# Patient Record
Sex: FEMALE | Race: BLACK OR AFRICAN AMERICAN | NOT HISPANIC OR LATINO
[De-identification: names, ages, dates, MRNs, and addresses within clinical notes are randomized per-mention and may not be internally consistent; named-entity substitution may affect disease eponyms.]

---

## 2017-01-25 ENCOUNTER — MED ADMIN CHARGE (OUTPATIENT)
Age: 32
End: 2017-01-25

## 2017-01-25 ENCOUNTER — LABORATORY RESULT (OUTPATIENT)
Age: 32
End: 2017-01-25

## 2017-01-25 ENCOUNTER — APPOINTMENT (OUTPATIENT)
Dept: FAMILY MEDICINE | Facility: CLINIC | Age: 32
End: 2017-01-25

## 2017-01-25 VITALS
BODY MASS INDEX: 30.23 KG/M2 | HEART RATE: 102 BPM | WEIGHT: 154 LBS | HEIGHT: 60 IN | SYSTOLIC BLOOD PRESSURE: 119 MMHG | DIASTOLIC BLOOD PRESSURE: 81 MMHG | TEMPERATURE: 97.6 F | OXYGEN SATURATION: 99 %

## 2017-01-26 LAB
25(OH)D3 SERPL-MCNC: 40.5 NG/ML
ALBUMIN SERPL ELPH-MCNC: 4.4 G/DL
ALP BLD-CCNC: 62 U/L
ALT SERPL-CCNC: 21 U/L
ANION GAP SERPL CALC-SCNC: 12 MMOL/L
APPEARANCE: ABNORMAL
AST SERPL-CCNC: 26 U/L
BASOPHILS # BLD AUTO: 0.01 K/UL
BASOPHILS NFR BLD AUTO: 0.2 %
BILIRUB SERPL-MCNC: 0.2 MG/DL
BILIRUBIN URINE: NEGATIVE
BLOOD URINE: NEGATIVE
BUN SERPL-MCNC: 8 MG/DL
CALCIUM SERPL-MCNC: 9.7 MG/DL
CHLORIDE SERPL-SCNC: 100 MMOL/L
CHOLEST SERPL-MCNC: 109 MG/DL
CHOLEST/HDLC SERPL: 2.5 RATIO
CO2 SERPL-SCNC: 26 MMOL/L
COLOR: YELLOW
CREAT SERPL-MCNC: 0.67 MG/DL
EOSINOPHIL # BLD AUTO: 0.04 K/UL
EOSINOPHIL NFR BLD AUTO: 0.9 %
GLUCOSE QUALITATIVE U: NORMAL MG/DL
GLUCOSE SERPL-MCNC: 125 MG/DL
HCT VFR BLD CALC: 37.9 %
HDLC SERPL-MCNC: 43 MG/DL
HGB BLD-MCNC: 12.4 G/DL
IMM GRANULOCYTES NFR BLD AUTO: 0 %
KETONES URINE: NEGATIVE
LDLC SERPL CALC-MCNC: 49 MG/DL
LEUKOCYTE ESTERASE URINE: NEGATIVE
LYMPHOCYTES # BLD AUTO: 1.82 K/UL
LYMPHOCYTES NFR BLD AUTO: 39.4 %
MAN DIFF?: NORMAL
MCHC RBC-ENTMCNC: 26.4 PG
MCHC RBC-ENTMCNC: 32.7 GM/DL
MCV RBC AUTO: 80.6 FL
MONOCYTES # BLD AUTO: 0.42 K/UL
MONOCYTES NFR BLD AUTO: 9.1 %
NEUTROPHILS # BLD AUTO: 2.33 K/UL
NEUTROPHILS NFR BLD AUTO: 50.4 %
NITRITE URINE: NEGATIVE
PH URINE: 7
PLATELET # BLD AUTO: 237 K/UL
POTASSIUM SERPL-SCNC: 4.2 MMOL/L
PROT SERPL-MCNC: 8.3 G/DL
PROTEIN URINE: NEGATIVE MG/DL
RBC # BLD: 4.7 M/UL
RBC # FLD: 15 %
SODIUM SERPL-SCNC: 138 MMOL/L
SPECIFIC GRAVITY URINE: 1.02
TRIGL SERPL-MCNC: 87 MG/DL
TSH SERPL-ACNC: 0.6 UIU/ML
UROBILINOGEN URINE: NORMAL MG/DL
WBC # FLD AUTO: 4.62 K/UL

## 2017-03-06 ENCOUNTER — RX RENEWAL (OUTPATIENT)
Age: 32
End: 2017-03-06

## 2017-04-28 ENCOUNTER — APPOINTMENT (OUTPATIENT)
Dept: FAMILY MEDICINE | Facility: CLINIC | Age: 32
End: 2017-04-28

## 2017-04-28 VITALS
TEMPERATURE: 97.8 F | WEIGHT: 155 LBS | SYSTOLIC BLOOD PRESSURE: 115 MMHG | HEIGHT: 60 IN | DIASTOLIC BLOOD PRESSURE: 73 MMHG | HEART RATE: 74 BPM | OXYGEN SATURATION: 98 % | BODY MASS INDEX: 30.43 KG/M2

## 2017-04-28 DIAGNOSIS — R35.1 NOCTURIA: ICD-10-CM

## 2017-04-28 LAB
GLUCOSE BLDC GLUCOMTR-MCNC: 89
HBA1C MFR BLD HPLC: 5.8

## 2017-06-23 ENCOUNTER — RX RENEWAL (OUTPATIENT)
Age: 32
End: 2017-06-23

## 2017-08-07 ENCOUNTER — APPOINTMENT (OUTPATIENT)
Dept: FAMILY MEDICINE | Facility: CLINIC | Age: 32
End: 2017-08-07
Payer: MEDICAID

## 2017-08-07 VITALS
OXYGEN SATURATION: 98 % | HEART RATE: 88 BPM | HEIGHT: 60 IN | WEIGHT: 155 LBS | BODY MASS INDEX: 30.43 KG/M2 | SYSTOLIC BLOOD PRESSURE: 120 MMHG | DIASTOLIC BLOOD PRESSURE: 81 MMHG | TEMPERATURE: 98 F

## 2017-08-07 PROCEDURE — 99212 OFFICE O/P EST SF 10 MIN: CPT

## 2017-10-12 ENCOUNTER — APPOINTMENT (OUTPATIENT)
Dept: FAMILY MEDICINE | Facility: CLINIC | Age: 32
End: 2017-10-12
Payer: MEDICAID

## 2017-10-12 VITALS
DIASTOLIC BLOOD PRESSURE: 70 MMHG | HEIGHT: 60 IN | HEART RATE: 81 BPM | OXYGEN SATURATION: 100 % | SYSTOLIC BLOOD PRESSURE: 105 MMHG | WEIGHT: 157 LBS | TEMPERATURE: 98 F | BODY MASS INDEX: 30.82 KG/M2

## 2017-10-12 PROCEDURE — 90686 IIV4 VACC NO PRSV 0.5 ML IM: CPT

## 2017-10-12 PROCEDURE — 99214 OFFICE O/P EST MOD 30 MIN: CPT | Mod: 25

## 2017-10-12 PROCEDURE — G0008: CPT

## 2017-10-12 PROCEDURE — 69210 REMOVE IMPACTED EAR WAX UNI: CPT

## 2018-01-26 ENCOUNTER — APPOINTMENT (OUTPATIENT)
Dept: FAMILY MEDICINE | Facility: CLINIC | Age: 33
End: 2018-01-26
Payer: MEDICAID

## 2018-01-26 VITALS
WEIGHT: 156 LBS | HEIGHT: 60 IN | BODY MASS INDEX: 30.63 KG/M2 | TEMPERATURE: 97.6 F | OXYGEN SATURATION: 99 % | HEART RATE: 70 BPM | DIASTOLIC BLOOD PRESSURE: 61 MMHG | SYSTOLIC BLOOD PRESSURE: 93 MMHG

## 2018-01-26 PROCEDURE — 86580 TB INTRADERMAL TEST: CPT

## 2018-01-26 PROCEDURE — 99395 PREV VISIT EST AGE 18-39: CPT | Mod: 25

## 2018-01-26 PROCEDURE — 69210 REMOVE IMPACTED EAR WAX UNI: CPT

## 2018-01-26 PROCEDURE — 36415 COLL VENOUS BLD VENIPUNCTURE: CPT

## 2018-01-26 RX ORDER — PHENAZOPYRIDINE 200 MG/1
200 TABLET, FILM COATED ORAL 3 TIMES DAILY
Qty: 6 | Refills: 0 | Status: DISCONTINUED | COMMUNITY
Start: 2017-04-28 | End: 2018-01-26

## 2018-01-29 LAB
25(OH)D3 SERPL-MCNC: 39.6 NG/ML
ALBUMIN SERPL ELPH-MCNC: 4.2 G/DL
ALP BLD-CCNC: 59 U/L
ALT SERPL-CCNC: 22 U/L
ANION GAP SERPL CALC-SCNC: 15 MMOL/L
APPEARANCE: CLEAR
AST SERPL-CCNC: 27 U/L
BASOPHILS # BLD AUTO: 0.02 K/UL
BASOPHILS NFR BLD AUTO: 0.5 %
BILIRUB SERPL-MCNC: 0.2 MG/DL
BILIRUBIN URINE: NEGATIVE
BLOOD URINE: NEGATIVE
BUN SERPL-MCNC: 10 MG/DL
CALCIUM SERPL-MCNC: 9.3 MG/DL
CHLORIDE SERPL-SCNC: 102 MMOL/L
CHOLEST SERPL-MCNC: 110 MG/DL
CHOLEST/HDLC SERPL: 2.2 RATIO
CO2 SERPL-SCNC: 23 MMOL/L
COLOR: YELLOW
CREAT SERPL-MCNC: 0.76 MG/DL
EOSINOPHIL # BLD AUTO: 0.05 K/UL
EOSINOPHIL NFR BLD AUTO: 1.2 %
FERRITIN SERPL-MCNC: 21 NG/ML
GLUCOSE QUALITATIVE U: NEGATIVE MG/DL
GLUCOSE SERPL-MCNC: 87 MG/DL
HCT VFR BLD CALC: 36.3 %
HDLC SERPL-MCNC: 50 MG/DL
HGB BLD-MCNC: 11.8 G/DL
IMM GRANULOCYTES NFR BLD AUTO: 0 %
KETONES URINE: ABNORMAL
LDLC SERPL CALC-MCNC: 48 MG/DL
LEUKOCYTE ESTERASE URINE: NEGATIVE
LYMPHOCYTES # BLD AUTO: 2.08 K/UL
LYMPHOCYTES NFR BLD AUTO: 48.7 %
MAN DIFF?: NORMAL
MCHC RBC-ENTMCNC: 26.5 PG
MCHC RBC-ENTMCNC: 32.5 GM/DL
MCV RBC AUTO: 81.4 FL
MONOCYTES # BLD AUTO: 0.37 K/UL
MONOCYTES NFR BLD AUTO: 8.7 %
NEUTROPHILS # BLD AUTO: 1.75 K/UL
NEUTROPHILS NFR BLD AUTO: 40.9 %
NITRITE URINE: NEGATIVE
PH URINE: 7.5
PLATELET # BLD AUTO: 218 K/UL
POTASSIUM SERPL-SCNC: 4.1 MMOL/L
PROT SERPL-MCNC: 8.4 G/DL
PROTEIN URINE: NEGATIVE MG/DL
RBC # BLD: 4.46 M/UL
RBC # FLD: 15 %
SODIUM SERPL-SCNC: 140 MMOL/L
SPECIFIC GRAVITY URINE: 1.03
TRIGL SERPL-MCNC: 59 MG/DL
TSH SERPL-ACNC: 0.49 UIU/ML
UROBILINOGEN URINE: NEGATIVE MG/DL
WBC # FLD AUTO: 4.27 K/UL

## 2018-04-02 ENCOUNTER — RX RENEWAL (OUTPATIENT)
Age: 33
End: 2018-04-02

## 2018-06-05 ENCOUNTER — RX RENEWAL (OUTPATIENT)
Age: 33
End: 2018-06-05

## 2018-10-01 ENCOUNTER — MED ADMIN CHARGE (OUTPATIENT)
Age: 33
End: 2018-10-01

## 2018-10-01 ENCOUNTER — APPOINTMENT (OUTPATIENT)
Dept: FAMILY MEDICINE | Facility: CLINIC | Age: 33
End: 2018-10-01
Payer: MEDICAID

## 2018-10-01 VITALS
TEMPERATURE: 98 F | SYSTOLIC BLOOD PRESSURE: 106 MMHG | BODY MASS INDEX: 28.66 KG/M2 | HEIGHT: 60 IN | HEART RATE: 81 BPM | OXYGEN SATURATION: 98 % | DIASTOLIC BLOOD PRESSURE: 77 MMHG | WEIGHT: 146 LBS

## 2018-10-01 DIAGNOSIS — Z51.81 ENCOUNTER FOR THERAPEUTIC DRUG LVL MONITORING: ICD-10-CM

## 2018-10-01 PROCEDURE — 99214 OFFICE O/P EST MOD 30 MIN: CPT | Mod: 25

## 2018-10-01 PROCEDURE — 90686 IIV4 VACC NO PRSV 0.5 ML IM: CPT

## 2018-10-01 PROCEDURE — G0008: CPT

## 2018-10-01 PROCEDURE — 36415 COLL VENOUS BLD VENIPUNCTURE: CPT

## 2018-10-01 NOTE — COUNSELING
[Weight management counseling provided] : Weight management [Healthy eating counseling provided] : healthy eating [Activity counseling provided] : activity [Behavioral health counseling provided] : behavioral health  [Limited decision making ability] : Limited decision making ability [Needs reinforcement, provided] : Patient needs reinforcement on understanding lifestyle changes and  the steps needed to achieve self management goals and reinforcement was provided

## 2018-10-01 NOTE — HISTORY OF PRESENT ILLNESS
[Formal Caregiver] : formal caregiver [Other: _____] : [unfilled] [FreeTextEntry1] : flu shot [de-identified] : Pt w/ mental Disorder. No present complaints. Attend daily program at "[facility]".\par \par Seen regularly by Psychiatry. \par  Pt is here for flu shot.\par

## 2018-10-01 NOTE — ASSESSMENT
[FreeTextEntry1] : -CPE:1/2018\par \par -Annual PPD screening.: up to date.\par \par -Immunizations: up to date.Flu shot today\par \par -Continue Psychiatric meds and f/up.: cbc w/ diff, folate.\par \par \par

## 2018-10-02 LAB
BASOPHILS # BLD AUTO: 0.01 K/UL
BASOPHILS NFR BLD AUTO: 0.2 %
EOSINOPHIL # BLD AUTO: 0.03 K/UL
EOSINOPHIL NFR BLD AUTO: 0.7 %
FOLATE SERPL-MCNC: >20 NG/ML
HCT VFR BLD CALC: 38.5 %
HGB BLD-MCNC: 12 G/DL
IMM GRANULOCYTES NFR BLD AUTO: 0.2 %
LYMPHOCYTES # BLD AUTO: 1.94 K/UL
LYMPHOCYTES NFR BLD AUTO: 42.7 %
MAN DIFF?: NORMAL
MCHC RBC-ENTMCNC: 25.2 PG
MCHC RBC-ENTMCNC: 31.2 GM/DL
MCV RBC AUTO: 80.9 FL
MONOCYTES # BLD AUTO: 0.29 K/UL
MONOCYTES NFR BLD AUTO: 6.4 %
NEUTROPHILS # BLD AUTO: 2.26 K/UL
NEUTROPHILS NFR BLD AUTO: 49.8 %
PLATELET # BLD AUTO: 219 K/UL
RBC # BLD: 4.76 M/UL
RBC # FLD: 14.7 %
WBC # FLD AUTO: 4.54 K/UL

## 2018-11-20 ENCOUNTER — RX RENEWAL (OUTPATIENT)
Age: 33
End: 2018-11-20

## 2019-01-25 ENCOUNTER — APPOINTMENT (OUTPATIENT)
Dept: FAMILY MEDICINE | Facility: CLINIC | Age: 34
End: 2019-01-25
Payer: MEDICAID

## 2019-01-25 ENCOUNTER — MED ADMIN CHARGE (OUTPATIENT)
Age: 34
End: 2019-01-25

## 2019-01-25 VITALS
HEIGHT: 60 IN | HEART RATE: 75 BPM | WEIGHT: 148 LBS | DIASTOLIC BLOOD PRESSURE: 76 MMHG | OXYGEN SATURATION: 99 % | BODY MASS INDEX: 29.06 KG/M2 | TEMPERATURE: 98.1 F | SYSTOLIC BLOOD PRESSURE: 130 MMHG

## 2019-01-25 PROCEDURE — 86580 TB INTRADERMAL TEST: CPT

## 2019-01-25 PROCEDURE — 36415 COLL VENOUS BLD VENIPUNCTURE: CPT

## 2019-01-25 PROCEDURE — 99395 PREV VISIT EST AGE 18-39: CPT | Mod: 25

## 2019-01-25 PROCEDURE — 69210 REMOVE IMPACTED EAR WAX UNI: CPT | Mod: 59

## 2019-01-28 LAB
25(OH)D3 SERPL-MCNC: 38.8 NG/ML
ALBUMIN SERPL ELPH-MCNC: 4.3 G/DL
ALP BLD-CCNC: 58 U/L
ALT SERPL-CCNC: 15 U/L
ANION GAP SERPL CALC-SCNC: 9 MMOL/L
APPEARANCE: CLEAR
AST SERPL-CCNC: 20 U/L
BASOPHILS # BLD AUTO: 0.01 K/UL
BASOPHILS NFR BLD AUTO: 0.3 %
BILIRUB SERPL-MCNC: 0.2 MG/DL
BILIRUBIN URINE: NEGATIVE
BLOOD URINE: NEGATIVE
BUN SERPL-MCNC: 10 MG/DL
CALCIUM SERPL-MCNC: 9.3 MG/DL
CHLORIDE SERPL-SCNC: 101 MMOL/L
CHOLEST SERPL-MCNC: 105 MG/DL
CHOLEST/HDLC SERPL: 2.5 RATIO
CO2 SERPL-SCNC: 26 MMOL/L
COLOR: YELLOW
CREAT SERPL-MCNC: 0.7 MG/DL
EOSINOPHIL # BLD AUTO: 0.01 K/UL
EOSINOPHIL NFR BLD AUTO: 0.3 %
GLUCOSE QUALITATIVE U: NEGATIVE MG/DL
GLUCOSE SERPL-MCNC: 97 MG/DL
HCT VFR BLD CALC: 36.6 %
HDLC SERPL-MCNC: 42 MG/DL
HGB BLD-MCNC: 11.7 G/DL
IMM GRANULOCYTES NFR BLD AUTO: 0 %
KETONES URINE: NEGATIVE
LDLC SERPL CALC-MCNC: 52 MG/DL
LEUKOCYTE ESTERASE URINE: NEGATIVE
LYMPHOCYTES # BLD AUTO: 1.51 K/UL
LYMPHOCYTES NFR BLD AUTO: 43.4 %
MAN DIFF?: NORMAL
MCHC RBC-ENTMCNC: 25.7 PG
MCHC RBC-ENTMCNC: 32 GM/DL
MCV RBC AUTO: 80.4 FL
MONOCYTES # BLD AUTO: 0.41 K/UL
MONOCYTES NFR BLD AUTO: 11.8 %
NEUTROPHILS # BLD AUTO: 1.54 K/UL
NEUTROPHILS NFR BLD AUTO: 44.2 %
NITRITE URINE: NEGATIVE
PH URINE: 8
PLATELET # BLD AUTO: 210 K/UL
POTASSIUM SERPL-SCNC: 4 MMOL/L
PROT SERPL-MCNC: 7.8 G/DL
PROTEIN URINE: NEGATIVE MG/DL
RBC # BLD: 4.55 M/UL
RBC # FLD: 14.5 %
SODIUM SERPL-SCNC: 136 MMOL/L
SPECIFIC GRAVITY URINE: 1.02
TRIGL SERPL-MCNC: 57 MG/DL
TSH SERPL-ACNC: 0.39 UIU/ML
UROBILINOGEN URINE: NEGATIVE MG/DL
WBC # FLD AUTO: 3.48 K/UL

## 2019-05-13 ENCOUNTER — RX RENEWAL (OUTPATIENT)
Age: 34
End: 2019-05-13

## 2019-10-23 ENCOUNTER — MED ADMIN CHARGE (OUTPATIENT)
Age: 34
End: 2019-10-23

## 2019-10-23 ENCOUNTER — APPOINTMENT (OUTPATIENT)
Dept: FAMILY MEDICINE | Facility: CLINIC | Age: 34
End: 2019-10-23
Payer: MEDICAID

## 2019-10-23 VITALS
DIASTOLIC BLOOD PRESSURE: 73 MMHG | TEMPERATURE: 98.2 F | OXYGEN SATURATION: 97 % | BODY MASS INDEX: 28.47 KG/M2 | HEIGHT: 60 IN | HEART RATE: 77 BPM | WEIGHT: 145 LBS | SYSTOLIC BLOOD PRESSURE: 105 MMHG

## 2019-10-23 PROCEDURE — 99213 OFFICE O/P EST LOW 20 MIN: CPT | Mod: 25

## 2019-10-23 PROCEDURE — 90674 CCIIV4 VAC NO PRSV 0.5 ML IM: CPT

## 2019-10-23 PROCEDURE — 90471 IMMUNIZATION ADMIN: CPT

## 2019-10-23 NOTE — HISTORY OF PRESENT ILLNESS
[Formal Caregiver] : formal caregiver [Other: _____] : [unfilled] [FreeTextEntry1] : flu shot [de-identified] : Pt w/ mental Disorder. No present complaints. Attend daily program at "[facility]".\par \par Seen regularly by Psychiatry. \par  Pt is here for flu shot.\par

## 2019-10-23 NOTE — ASSESSMENT
[FreeTextEntry1] : -CPE:1/2019\par \par -Annual PPD screening.: up to date.\par \par -Immunizations: up to date.Flu shot today\par \par -Continue Psychiatric meds and f/up.\par \par \par

## 2019-10-23 NOTE — PHYSICAL EXAM
[de-identified] : B/L impacted ear wax [No Acute Distress] : no acute distress [Well Nourished] : well nourished [Well Developed] : well developed [Well-Appearing] : well-appearing [Normal Sclera/Conjunctiva] : normal sclera/conjunctiva [PERRL] : pupils equal round and reactive to light [EOMI] : extraocular movements intact [Normal Outer Ear/Nose] : the outer ears and nose were normal in appearance [Normal Oropharynx] : the oropharynx was normal [No JVD] : no jugular venous distention [Supple] : supple [No Lymphadenopathy] : no lymphadenopathy [No Respiratory Distress] : no respiratory distress  [Thyroid Normal, No Nodules] : the thyroid was normal and there were no nodules present [Clear to Auscultation] : lungs were clear to auscultation bilaterally [No Accessory Muscle Use] : no accessory muscle use [Normal Rate] : normal rate  [Regular Rhythm] : with a regular rhythm [Normal S1, S2] : normal S1 and S2 [No Murmur] : no murmur heard [No Carotid Bruits] : no carotid bruits [No Abdominal Bruit] : a ~M bruit was not heard ~T in the abdomen [No Varicosities] : no varicosities [Pedal Pulses Present] : the pedal pulses are present [No Edema] : there was no peripheral edema [No Extremity Clubbing/Cyanosis] : no extremity clubbing/cyanosis [No Palpable Aorta] : no palpable aorta [Soft] : abdomen soft [Non Tender] : non-tender [Non-distended] : non-distended [No Masses] : no abdominal mass palpated [No HSM] : no HSM [Normal Bowel Sounds] : normal bowel sounds [Normal Posterior Cervical Nodes] : no posterior cervical lymphadenopathy [Normal Anterior Cervical Nodes] : no anterior cervical lymphadenopathy [No CVA Tenderness] : no CVA  tenderness [No Spinal Tenderness] : no spinal tenderness [No Joint Swelling] : no joint swelling [No Rash] : no rash [Grossly Normal Strength/Tone] : grossly normal strength/tone [Normal Gait] : normal gait [Coordination Grossly Intact] : coordination grossly intact [No Focal Deficits] : no focal deficits [Deep Tendon Reflexes (DTR)] : deep tendon reflexes were 2+ and symmetric [Normal Affect] : the affect was normal [Normal Insight/Judgement] : insight and judgment were intact

## 2019-10-23 NOTE — HEALTH RISK ASSESSMENT
[Good] : ~his/her~  mood as  good [Patient reported PAP Smear was normal] : Patient reported PAP Smear was normal [HIV test declined] : HIV test declined [Hepatitis C test declined] : Hepatitis C test declined [Behavior] : difficulty with behavior [Learning/Retaining New Information] : difficulty learning/retaining new information [Handling Complex Tasks] : difficulty handling complex tasks [Reasoning] : difficulty with reasoning [Spatial Ability and Orientation] : difficulty with spatial ability and orientation [Behavioral] : behavioral [On disability] : on disability [Less Than High School] : less than high school [Single] : single [Feels Safe at Home] : Feels safe at home [Fully functional (bathing, dressing, toileting, transferring, walking, feeding)] : Fully functional (bathing, dressing, toileting, transferring, walking, feeding) [Full assistance needed] : managing finances [Smoke Detector] : smoke detector [Seat Belt] :  uses seat belt [Discussed at today's visit] : Advance Directives Discussed at today's visit [Designated Healthcare Proxy] : Designated healthcare proxy [Name: ___] : Health Care Proxy's Name: [unfilled]  [Relationship: ___] : Relationship: [unfilled] [Change in mental status noted] : No change in mental status noted [Language] : denies difficulty with language [Sexually Active] : not sexually active [Reports changes in hearing] : Reports no changes in hearing [Reports changes in vision] : Reports no changes in vision [Reports changes in dental health] : Reports no changes in dental health [PapSmearDate] : 2017 [de-identified] : residential family home [de-identified] : Mental disability [No falls in past year] : Patient reported no falls in the past year [0] : 2) Feeling down, depressed, or hopeless: Not at all (0) [] : No

## 2019-10-23 NOTE — PHYSICAL EXAM
[No Acute Distress] : no acute distress [Well Nourished] : well nourished [Well Developed] : well developed [Well-Appearing] : well-appearing [Normal Sclera/Conjunctiva] : normal sclera/conjunctiva [EOMI] : extraocular movements intact [PERRL] : pupils equal round and reactive to light [Normal Outer Ear/Nose] : the outer ears and nose were normal in appearance [Normal Oropharynx] : the oropharynx was normal [No JVD] : no jugular venous distention [Supple] : supple [Thyroid Normal, No Nodules] : the thyroid was normal and there were no nodules present [No Lymphadenopathy] : no lymphadenopathy [No Respiratory Distress] : no respiratory distress  [Clear to Auscultation] : lungs were clear to auscultation bilaterally [No Accessory Muscle Use] : no accessory muscle use [Normal Rate] : normal rate  [Regular Rhythm] : with a regular rhythm [Normal S1, S2] : normal S1 and S2 [No Murmur] : no murmur heard [No Carotid Bruits] : no carotid bruits [No Varicosities] : no varicosities [No Abdominal Bruit] : a ~M bruit was not heard ~T in the abdomen [No Extremity Clubbing/Cyanosis] : no extremity clubbing/cyanosis [No Edema] : there was no peripheral edema [Pedal Pulses Present] : the pedal pulses are present [No Palpable Aorta] : no palpable aorta [Soft] : abdomen soft [Non-distended] : non-distended [Non Tender] : non-tender [No Masses] : no abdominal mass palpated [No HSM] : no HSM [Normal Posterior Cervical Nodes] : no posterior cervical lymphadenopathy [Normal Bowel Sounds] : normal bowel sounds [Normal Anterior Cervical Nodes] : no anterior cervical lymphadenopathy [No CVA Tenderness] : no CVA  tenderness [No Spinal Tenderness] : no spinal tenderness [No Joint Swelling] : no joint swelling [No Rash] : no rash [Grossly Normal Strength/Tone] : grossly normal strength/tone [Normal Gait] : normal gait [Coordination Grossly Intact] : coordination grossly intact [No Focal Deficits] : no focal deficits [Deep Tendon Reflexes (DTR)] : deep tendon reflexes were 2+ and symmetric [Normal Affect] : the affect was normal [Normal Insight/Judgement] : insight and judgment were intact

## 2019-10-23 NOTE — HISTORY OF PRESENT ILLNESS
[Formal Caregiver] : formal caregiver [Other: _____] : [unfilled] [FreeTextEntry1] : flu shot [de-identified] : Pt w/ mental Disorder. No present complaints. Attend daily program at "[facility]".\par \par Seen regularly by Psychiatry. \par  Pt is here for flu shot.\par

## 2019-10-23 NOTE — PHYSICAL EXAM
[de-identified] : B/L impacted ear wax [No Acute Distress] : no acute distress [Well Nourished] : well nourished [Well Developed] : well developed [Well-Appearing] : well-appearing [Normal Sclera/Conjunctiva] : normal sclera/conjunctiva [PERRL] : pupils equal round and reactive to light [EOMI] : extraocular movements intact [Normal Outer Ear/Nose] : the outer ears and nose were normal in appearance [Normal Oropharynx] : the oropharynx was normal [No JVD] : no jugular venous distention [Supple] : supple [No Lymphadenopathy] : no lymphadenopathy [Thyroid Normal, No Nodules] : the thyroid was normal and there were no nodules present [No Respiratory Distress] : no respiratory distress  [Clear to Auscultation] : lungs were clear to auscultation bilaterally [No Accessory Muscle Use] : no accessory muscle use [Normal Rate] : normal rate  [Regular Rhythm] : with a regular rhythm [Normal S1, S2] : normal S1 and S2 [No Murmur] : no murmur heard [No Carotid Bruits] : no carotid bruits [No Abdominal Bruit] : a ~M bruit was not heard ~T in the abdomen [No Varicosities] : no varicosities [Pedal Pulses Present] : the pedal pulses are present [No Edema] : there was no peripheral edema [No Extremity Clubbing/Cyanosis] : no extremity clubbing/cyanosis [No Palpable Aorta] : no palpable aorta [Soft] : abdomen soft [Non Tender] : non-tender [Non-distended] : non-distended [No Masses] : no abdominal mass palpated [No HSM] : no HSM [Normal Bowel Sounds] : normal bowel sounds [Normal Posterior Cervical Nodes] : no posterior cervical lymphadenopathy [Normal Anterior Cervical Nodes] : no anterior cervical lymphadenopathy [No CVA Tenderness] : no CVA  tenderness [No Spinal Tenderness] : no spinal tenderness [No Joint Swelling] : no joint swelling [Grossly Normal Strength/Tone] : grossly normal strength/tone [No Rash] : no rash [Normal Gait] : normal gait [Coordination Grossly Intact] : coordination grossly intact [No Focal Deficits] : no focal deficits [Deep Tendon Reflexes (DTR)] : deep tendon reflexes were 2+ and symmetric [Normal Affect] : the affect was normal [Normal Insight/Judgement] : insight and judgment were intact

## 2019-10-23 NOTE — COUNSELING
[Fall prevention counseling provided] : Fall prevention counseling provided [Limited decision making ability] : Limited decision making ability [Behavioral health counseling provided] : Behavioral health counseling provided [Needs reinforcement, provided] : Patient needs reinforcement on understanding of lifestyle changes and steps needed to achieve self management goal; reinforcement was provided

## 2019-10-23 NOTE — COUNSELING
[Weight management counseling provided] : Weight management [Healthy eating counseling provided] : healthy eating [Activity counseling provided] : activity [Fall prevention counseling provided] : Fall prevention counseling provided [Limited decision making ability] : Limited decision making ability [Behavioral health counseling provided] : Behavioral health counseling provided [Needs reinforcement, provided] : Patient needs reinforcement on understanding of lifestyle changes and steps needed to achieve self management goal; reinforcement was provided

## 2019-10-23 NOTE — PAST MEDICAL HISTORY
[Definite ___ (Date)] : the last menstrual period was [unfilled] [Total Preg ___] : G[unfilled] [Menstruating] : menstruating [Regular Cycle Intervals] : have been regular

## 2019-10-23 NOTE — HEALTH RISK ASSESSMENT
[Good] : ~his/her~  mood as  good [Patient reported PAP Smear was normal] : Patient reported PAP Smear was normal [HIV test declined] : HIV test declined [Hepatitis C test declined] : Hepatitis C test declined [Behavior] : difficulty with behavior [Learning/Retaining New Information] : difficulty learning/retaining new information [Handling Complex Tasks] : difficulty handling complex tasks [Reasoning] : difficulty with reasoning [Spatial Ability and Orientation] : difficulty with spatial ability and orientation [Behavioral] : behavioral [On disability] : on disability [Less Than High School] : less than high school [Single] : single [Feels Safe at Home] : Feels safe at home [Fully functional (bathing, dressing, toileting, transferring, walking, feeding)] : Fully functional (bathing, dressing, toileting, transferring, walking, feeding) [Full assistance needed] : managing finances [Smoke Detector] : smoke detector [Seat Belt] :  uses seat belt [Discussed at today's visit] : Advance Directives Discussed at today's visit [Designated Healthcare Proxy] : Designated healthcare proxy [Name: ___] : Health Care Proxy's Name: [unfilled]  [Relationship: ___] : Relationship: [unfilled] [Change in mental status noted] : No change in mental status noted [Language] : denies difficulty with language [Sexually Active] : not sexually active [Reports changes in hearing] : Reports no changes in hearing [Reports changes in vision] : Reports no changes in vision [Reports changes in dental health] : Reports no changes in dental health [PapSmearDate] : 2017 [de-identified] : residential family home [de-identified] : Mental disability [No falls in past year] : Patient reported no falls in the past year [0] : 2) Feeling down, depressed, or hopeless: Not at all (0) [] : No

## 2019-10-23 NOTE — HISTORY OF PRESENT ILLNESS
[Formal Caregiver] : formal caregiver [Other: _____] : [unfilled] [FreeTextEntry1] : flu shot [de-identified] : Pt w/ mental Disorder. No present complaints. Attend daily program at "[facility]".\par \par Seen regularly by Psychiatry. \par  Pt is here for flu shot.\par

## 2019-12-18 ENCOUNTER — MEDICATION RENEWAL (OUTPATIENT)
Age: 34
End: 2019-12-18

## 2020-01-27 ENCOUNTER — APPOINTMENT (OUTPATIENT)
Dept: FAMILY MEDICINE | Facility: CLINIC | Age: 35
End: 2020-01-27
Payer: MEDICAID

## 2020-01-27 ENCOUNTER — LABORATORY RESULT (OUTPATIENT)
Age: 35
End: 2020-01-27

## 2020-01-27 ENCOUNTER — MED ADMIN CHARGE (OUTPATIENT)
Age: 35
End: 2020-01-27

## 2020-01-27 VITALS
WEIGHT: 144 LBS | SYSTOLIC BLOOD PRESSURE: 104 MMHG | BODY MASS INDEX: 28.27 KG/M2 | DIASTOLIC BLOOD PRESSURE: 66 MMHG | HEIGHT: 60 IN | TEMPERATURE: 98.8 F | HEART RATE: 67 BPM | OXYGEN SATURATION: 96 %

## 2020-01-27 DIAGNOSIS — Z11.1 ENCOUNTER FOR SCREENING FOR RESPIRATORY TUBERCULOSIS: ICD-10-CM

## 2020-01-27 PROCEDURE — 86580 TB INTRADERMAL TEST: CPT

## 2020-01-27 PROCEDURE — 36415 COLL VENOUS BLD VENIPUNCTURE: CPT

## 2020-01-27 PROCEDURE — 99395 PREV VISIT EST AGE 18-39: CPT | Mod: 25

## 2020-01-27 PROCEDURE — 69210 REMOVE IMPACTED EAR WAX UNI: CPT | Mod: 59

## 2020-01-27 NOTE — PHYSICAL EXAM

## 2020-01-27 NOTE — HISTORY OF PRESENT ILLNESS
[Formal Caregiver] : formal caregiver [Other: _____] : [unfilled] [FreeTextEntry1] : Annual physical\par \par  [de-identified] : Pt is here for CPE.\par \par Pt w/ mental Disorder. No present complaints. Attend daily program at "[facility]".\par \par Seen regularly by Psychiatry. \par

## 2020-01-27 NOTE — ASSESSMENT
[FreeTextEntry1] : -CPE: blood and UA today.\par \par -Annual PPD screening in left anterior forear,.: will have read at program.\par \par -Immunizations: up to date.\par \par -Continue Psychiatric meds and f/up.\par \par -B/L impacted ear wax \par removal with water pressure/ lavage with no complications.

## 2020-01-27 NOTE — COUNSELING
[Weight management counseling provided] : Weight management [Healthy eating counseling provided] : healthy eating [Activity counseling provided] : activity [Limited decision making ability] : Limited decision making ability [Needs reinforcement, provided] : Patient needs reinforcement on understanding lifestyle changes and  the steps needed to achieve self management goals and reinforcement was provided [Fall prevention counseling provided] : Fall prevention counseling provided [Behavioral health counseling provided] : Behavioral health counseling provided [None] : None [Good understanding] : Patient has a good understanding of lifestyle changes and steps needed to achieve self management goal

## 2020-01-27 NOTE — HEALTH RISK ASSESSMENT
[Good] : ~his/her~  mood as  good [No] : In the past 12 months have you used drugs other than those required for medical reasons? No [No falls in past year] : Patient reported no falls in the past year [0] : 2) Feeling down, depressed, or hopeless: Not at all (0) [Patient reported PAP Smear was normal] : Patient reported PAP Smear was normal [HIV test declined] : HIV test declined [Hepatitis C test declined] : Hepatitis C test declined [Behavior] : difficulty with behavior [Learning/Retaining New Information] : difficulty learning/retaining new information [Handling Complex Tasks] : difficulty handling complex tasks [Reasoning] : difficulty with reasoning [Spatial Ability and Orientation] : difficulty with spatial ability and orientation [Behavioral] : behavioral [On disability] : on disability [Less Than High School] : less than high school [Single] : single [Feels Safe at Home] : Feels safe at home [Fully functional (bathing, dressing, toileting, transferring, walking, feeding)] : Fully functional (bathing, dressing, toileting, transferring, walking, feeding) [Full assistance needed] : managing finances [Smoke Detector] : smoke detector [Seat Belt] :  uses seat belt [Reviewed no changes] : Reviewed no changes [] : No [de-identified] : no [Change in mental status noted] : No change in mental status noted [Language] : denies difficulty with language [Sexually Active] : not sexually active [Reports changes in hearing] : Reports no changes in hearing [Reports changes in vision] : Reports no changes in vision [Reports changes in dental health] : Reports no changes in dental health [PapSmearDate] : 2019 [de-identified] : residential family home [de-identified] : Mental disability [AdvancecareDate] : 01/27/20

## 2020-01-28 LAB
25(OH)D3 SERPL-MCNC: 36.2 NG/ML
ALBUMIN SERPL ELPH-MCNC: 4.4 G/DL
ALP BLD-CCNC: 65 U/L
ALT SERPL-CCNC: 33 U/L
ANION GAP SERPL CALC-SCNC: 14 MMOL/L
APPEARANCE: ABNORMAL
AST SERPL-CCNC: 27 U/L
BASOPHILS # BLD AUTO: 0.03 K/UL
BASOPHILS NFR BLD AUTO: 0.8 %
BILIRUB SERPL-MCNC: <0.2 MG/DL
BILIRUBIN URINE: NEGATIVE
BLOOD URINE: NEGATIVE
BUN SERPL-MCNC: 12 MG/DL
CALCIUM SERPL-MCNC: 9.3 MG/DL
CHLORIDE SERPL-SCNC: 106 MMOL/L
CHOLEST SERPL-MCNC: 112 MG/DL
CHOLEST/HDLC SERPL: 3.1 RATIO
CO2 SERPL-SCNC: 24 MMOL/L
COLOR: YELLOW
CREAT SERPL-MCNC: 0.71 MG/DL
EOSINOPHIL # BLD AUTO: 0.03 K/UL
EOSINOPHIL NFR BLD AUTO: 0.8 %
GLUCOSE QUALITATIVE U: NEGATIVE
GLUCOSE SERPL-MCNC: 100 MG/DL
HCT VFR BLD CALC: 35.5 %
HDLC SERPL-MCNC: 36 MG/DL
HGB BLD-MCNC: 11.3 G/DL
IMM GRANULOCYTES NFR BLD AUTO: 0.3 %
KETONES URINE: NEGATIVE
LDLC SERPL CALC-MCNC: 48 MG/DL
LEUKOCYTE ESTERASE URINE: ABNORMAL
LYMPHOCYTES # BLD AUTO: 1.64 K/UL
LYMPHOCYTES NFR BLD AUTO: 41.2 %
MAN DIFF?: NORMAL
MCHC RBC-ENTMCNC: 26.2 PG
MCHC RBC-ENTMCNC: 31.8 GM/DL
MCV RBC AUTO: 82.2 FL
MONOCYTES # BLD AUTO: 0.4 K/UL
MONOCYTES NFR BLD AUTO: 10.1 %
NEUTROPHILS # BLD AUTO: 1.87 K/UL
NEUTROPHILS NFR BLD AUTO: 46.8 %
NITRITE URINE: NEGATIVE
PH URINE: 7.5
PLATELET # BLD AUTO: 205 K/UL
POTASSIUM SERPL-SCNC: 4.2 MMOL/L
PROT SERPL-MCNC: 7.7 G/DL
PROTEIN URINE: NORMAL
RBC # BLD: 4.32 M/UL
RBC # FLD: 14.3 %
SODIUM SERPL-SCNC: 143 MMOL/L
SPECIFIC GRAVITY URINE: 1.03
TRIGL SERPL-MCNC: 142 MG/DL
TSH SERPL-ACNC: 0.57 UIU/ML
UROBILINOGEN URINE: NORMAL
WBC # FLD AUTO: 3.98 K/UL

## 2020-09-25 ENCOUNTER — APPOINTMENT (OUTPATIENT)
Dept: FAMILY MEDICINE | Facility: CLINIC | Age: 35
End: 2020-09-25
Payer: MEDICAID

## 2020-09-25 VITALS
HEIGHT: 60 IN | HEART RATE: 66 BPM | RESPIRATION RATE: 13 BRPM | DIASTOLIC BLOOD PRESSURE: 72 MMHG | OXYGEN SATURATION: 96 % | TEMPERATURE: 97.2 F | SYSTOLIC BLOOD PRESSURE: 124 MMHG

## 2020-09-25 PROCEDURE — 90471 IMMUNIZATION ADMIN: CPT

## 2020-09-25 PROCEDURE — 90686 IIV4 VACC NO PRSV 0.5 ML IM: CPT

## 2021-03-18 ENCOUNTER — LABORATORY RESULT (OUTPATIENT)
Age: 36
End: 2021-03-18

## 2021-03-18 ENCOUNTER — APPOINTMENT (OUTPATIENT)
Dept: FAMILY MEDICINE | Facility: CLINIC | Age: 36
End: 2021-03-18
Payer: MEDICAID

## 2021-03-18 VITALS
WEIGHT: 157 LBS | HEART RATE: 78 BPM | HEIGHT: 60 IN | SYSTOLIC BLOOD PRESSURE: 120 MMHG | OXYGEN SATURATION: 98 % | DIASTOLIC BLOOD PRESSURE: 72 MMHG | RESPIRATION RATE: 16 BRPM | TEMPERATURE: 98 F | BODY MASS INDEX: 30.82 KG/M2

## 2021-03-18 PROCEDURE — 99395 PREV VISIT EST AGE 18-39: CPT | Mod: 25

## 2021-03-18 PROCEDURE — 69210 REMOVE IMPACTED EAR WAX UNI: CPT

## 2021-03-18 NOTE — HISTORY OF PRESENT ILLNESS
[Formal Caregiver] : formal caregiver [Other: _____] : [unfilled] [FreeTextEntry1] : Annual physical\par \par  [de-identified] : Pt is here for CPE.\par \par Pt w/ mental Disorder. No present complaints. Attend daily program at "[facility]".\par \par Seen regularly by Psychiatry. \par had 1st dose Moderna Covid vaccine 3/12/21\par

## 2021-03-18 NOTE — PHYSICAL EXAM

## 2021-03-18 NOTE — ASSESSMENT
[FreeTextEntry1] : -CPE: blood and UA today.\par \par -Annual PPD screening> on hold. recent COVID vaccine.\par \par -Immunizations: up to date.\par \par -Continue Psychiatric meds and f/up.\par \par -B/L impacted ear wax \par removal with water pressure/ lavage with no complications.

## 2021-03-18 NOTE — COUNSELING
[Fall prevention counseling provided] : Fall prevention counseling provided [Behavioral health counseling provided] : Behavioral health counseling provided [None] : None [Good understanding] : Patient has a good understanding of lifestyle changes and steps needed to achieve self management goal [Potential consequences of obesity discussed] : Potential consequences of obesity discussed [Benefits of weight loss discussed] : Benefits of weight loss discussed [Needs reinforcement, provided] : Patient needs reinforcement on understanding of disease, goals and obesity follow-up plan; reinforcement was provided

## 2021-03-18 NOTE — HEALTH RISK ASSESSMENT
[Good] : ~his/her~  mood as  good [No falls in past year] : Patient reported no falls in the past year [0] : 2) Feeling down, depressed, or hopeless: Not at all (0) [Patient reported PAP Smear was normal] : Patient reported PAP Smear was normal [HIV test declined] : HIV test declined [Hepatitis C test declined] : Hepatitis C test declined [Behavior] : difficulty with behavior [Learning/Retaining New Information] : difficulty learning/retaining new information [Handling Complex Tasks] : difficulty handling complex tasks [Reasoning] : difficulty with reasoning [Spatial Ability and Orientation] : difficulty with spatial ability and orientation [Behavioral] : behavioral [On disability] : on disability [Less Than High School] : less than high school [Single] : single [Feels Safe at Home] : Feels safe at home [Fully functional (bathing, dressing, toileting, transferring, walking, feeding)] : Fully functional (bathing, dressing, toileting, transferring, walking, feeding) [Full assistance needed] : managing finances [Smoke Detector] : smoke detector [Seat Belt] :  uses seat belt [Reviewed no changes] : Reviewed no changes [No] : No [] : No [de-identified] : no [Change in mental status noted] : No change in mental status noted [Language] : denies difficulty with language [Sexually Active] : not sexually active [Reports changes in hearing] : Reports no changes in hearing [Reports changes in vision] : Reports no changes in vision [Reports changes in dental health] : Reports no changes in dental health [PapSmearDate] : 2019 [de-identified] : residential family home [de-identified] : Mental disability [AdvancecareDate] : 03/21

## 2021-03-19 LAB
25(OH)D3 SERPL-MCNC: 33.8 NG/ML
ALBUMIN SERPL ELPH-MCNC: 4.1 G/DL
ALP BLD-CCNC: 70 U/L
ALT SERPL-CCNC: 20 U/L
ANION GAP SERPL CALC-SCNC: 12 MMOL/L
AST SERPL-CCNC: 27 U/L
BILIRUB SERPL-MCNC: <0.2 MG/DL
BUN SERPL-MCNC: 7 MG/DL
CALCIUM SERPL-MCNC: 9.7 MG/DL
CHLORIDE SERPL-SCNC: 101 MMOL/L
CHOLEST SERPL-MCNC: 109 MG/DL
CO2 SERPL-SCNC: 26 MMOL/L
CREAT SERPL-MCNC: 0.75 MG/DL
GLUCOSE SERPL-MCNC: 92 MG/DL
HDLC SERPL-MCNC: 38 MG/DL
LDLC SERPL CALC-MCNC: 53 MG/DL
NONHDLC SERPL-MCNC: 71 MG/DL
POTASSIUM SERPL-SCNC: 4.3 MMOL/L
PROT SERPL-MCNC: 7.8 G/DL
SODIUM SERPL-SCNC: 138 MMOL/L
TRIGL SERPL-MCNC: 88 MG/DL

## 2021-03-22 LAB — TSH SERPL-ACNC: 0.79 UIU/ML

## 2021-03-23 LAB
BASOPHILS # BLD AUTO: 0.04 K/UL
BASOPHILS NFR BLD AUTO: 0.9 %
EOSINOPHIL # BLD AUTO: 0.03 K/UL
EOSINOPHIL NFR BLD AUTO: 0.6 %
ESTIMATED AVERAGE GLUCOSE: 123 MG/DL
HBA1C MFR BLD HPLC: 5.9 %
HCT VFR BLD CALC: 40 %
HGB BLD-MCNC: 12.5 G/DL
IMM GRANULOCYTES NFR BLD AUTO: 0.2 %
LYMPHOCYTES # BLD AUTO: 2.18 K/UL
LYMPHOCYTES NFR BLD AUTO: 47.1 %
MAN DIFF?: NORMAL
MCHC RBC-ENTMCNC: 25.6 PG
MCHC RBC-ENTMCNC: 31.3 GM/DL
MCV RBC AUTO: 82 FL
MONOCYTES # BLD AUTO: 0.41 K/UL
MONOCYTES NFR BLD AUTO: 8.9 %
NEUTROPHILS # BLD AUTO: 1.96 K/UL
NEUTROPHILS NFR BLD AUTO: 42.3 %
PLATELET # BLD AUTO: 228 K/UL
RBC # BLD: 4.88 M/UL
RBC # FLD: 14.6 %
WBC # FLD AUTO: 4.63 K/UL

## 2021-10-18 ENCOUNTER — APPOINTMENT (OUTPATIENT)
Dept: FAMILY MEDICINE | Facility: CLINIC | Age: 36
End: 2021-10-18

## 2021-12-29 ENCOUNTER — TRANSCRIPTION ENCOUNTER (OUTPATIENT)
Age: 36
End: 2021-12-29

## 2022-03-21 ENCOUNTER — APPOINTMENT (OUTPATIENT)
Dept: FAMILY MEDICINE | Facility: CLINIC | Age: 37
End: 2022-03-21
Payer: MEDICAID

## 2022-03-21 VITALS
OXYGEN SATURATION: 97 % | WEIGHT: 154 LBS | SYSTOLIC BLOOD PRESSURE: 118 MMHG | RESPIRATION RATE: 16 BRPM | HEIGHT: 60 IN | HEART RATE: 84 BPM | TEMPERATURE: 97.6 F | DIASTOLIC BLOOD PRESSURE: 80 MMHG | BODY MASS INDEX: 30.23 KG/M2

## 2022-03-21 PROCEDURE — 99395 PREV VISIT EST AGE 18-39: CPT | Mod: 25

## 2022-03-21 PROCEDURE — 69210 REMOVE IMPACTED EAR WAX UNI: CPT

## 2022-03-21 RX ORDER — ILOPERIDONE 1 MG/1
1 TABLET ORAL
Refills: 0 | Status: DISCONTINUED | COMMUNITY
Start: 2020-01-27 | End: 2022-03-21

## 2022-03-21 NOTE — COUNSELING
[Fall prevention counseling provided] : Fall prevention counseling provided [Behavioral health counseling provided] : Behavioral health counseling provided [Potential consequences of obesity discussed] : Potential consequences of obesity discussed [Benefits of weight loss discussed] : Benefits of weight loss discussed [Needs reinforcement, provided] : Patient needs reinforcement on understanding of disease, goals and obesity follow-up plan; reinforcement was provided [None] : None [Good understanding] : Patient has a good understanding of lifestyle changes and steps needed to achieve self management goal

## 2022-03-21 NOTE — PHYSICAL EXAM

## 2022-03-21 NOTE — HEALTH RISK ASSESSMENT
[Good] : ~his/her~  mood as  good [No] : In the past 12 months have you used drugs other than those required for medical reasons? No [No falls in past year] : Patient reported no falls in the past year [0] : 2) Feeling down, depressed, or hopeless: Not at all (0) [Patient reported PAP Smear was normal] : Patient reported PAP Smear was normal [HIV test declined] : HIV test declined [Hepatitis C test declined] : Hepatitis C test declined [Behavior] : difficulty with behavior [Learning/Retaining New Information] : difficulty learning/retaining new information [Handling Complex Tasks] : difficulty handling complex tasks [Reasoning] : difficulty with reasoning [Spatial Ability and Orientation] : difficulty with spatial ability and orientation [Behavioral] : behavioral [On disability] : on disability [Less Than High School] : less than high school [Single] : single [Feels Safe at Home] : Feels safe at home [Fully functional (bathing, dressing, toileting, transferring, walking, feeding)] : Fully functional (bathing, dressing, toileting, transferring, walking, feeding) [Full assistance needed] : managing finances [Smoke Detector] : smoke detector [Seat Belt] :  uses seat belt [Never] : Never [With Patient/Caregiver] : , with patient/caregiver [Designated Healthcare Proxy] : Designated healthcare proxy [Name: ___] : Health Care Proxy's Name: [unfilled]  [Relationship: ___] : Relationship: [unfilled] [de-identified] : no [[id number]] : 0 [Change in mental status noted] : No change in mental status noted [Language] : denies difficulty with language [Sexually Active] : not sexually active [Reports changes in hearing] : Reports no changes in hearing [Reports changes in vision] : Reports no changes in vision [Reports changes in dental health] : Reports no changes in dental health [PapSmearDate] : 2019 [de-identified] : residential family home [de-identified] : Mental disability [AdvancecareDate] : 03/22

## 2022-03-21 NOTE — HISTORY OF PRESENT ILLNESS
[Formal Caregiver] : formal caregiver [Other: _____] : [unfilled] [FreeTextEntry1] : Annual physical\par \par  [de-identified] : Pt is here for CPE with new Formal caregiver since last year.\par \par Pt w/ mental Disorder. No present complaints. Attend daily program at "[facility]". [facility] program.\par \par Seen regularly by Psychiatry. \par had 3 dose Moderna Covid vaccine.\par

## 2022-03-21 NOTE — ASSESSMENT
[FreeTextEntry1] : -CPE: blood and UA today.\par TB screening annual: quantiferon.\par \par -Immunizations: up to date.\par \par -Continue Psychiatric meds and f/up.\par \par -B/L impacted ear wax \par removal with water pressure/ lavage with no complications.

## 2022-03-22 LAB
25(OH)D3 SERPL-MCNC: 29.9 NG/ML
ALBUMIN SERPL ELPH-MCNC: 4.6 G/DL
ALP BLD-CCNC: 69 U/L
ALT SERPL-CCNC: 16 U/L
ANION GAP SERPL CALC-SCNC: 13 MMOL/L
APPEARANCE: CLEAR
AST SERPL-CCNC: 25 U/L
BASOPHILS # BLD AUTO: 0.03 K/UL
BASOPHILS NFR BLD AUTO: 0.7 %
BILIRUB SERPL-MCNC: 0.3 MG/DL
BILIRUBIN URINE: NEGATIVE
BLOOD URINE: NEGATIVE
BUN SERPL-MCNC: 11 MG/DL
CALCIUM SERPL-MCNC: 9.3 MG/DL
CHLORIDE SERPL-SCNC: 99 MMOL/L
CHOLEST SERPL-MCNC: 95 MG/DL
CO2 SERPL-SCNC: 22 MMOL/L
COLOR: YELLOW
CREAT SERPL-MCNC: 0.66 MG/DL
EGFR: 117 ML/MIN/1.73M2
EOSINOPHIL # BLD AUTO: 0.01 K/UL
EOSINOPHIL NFR BLD AUTO: 0.2 %
ESTIMATED AVERAGE GLUCOSE: 120 MG/DL
GLUCOSE QUALITATIVE U: NEGATIVE
GLUCOSE SERPL-MCNC: 83 MG/DL
HBA1C MFR BLD HPLC: 5.8 %
HCT VFR BLD CALC: 36.8 %
HDLC SERPL-MCNC: 46 MG/DL
HGB BLD-MCNC: 11.1 G/DL
IMM GRANULOCYTES NFR BLD AUTO: 0.2 %
KETONES URINE: NORMAL
LDLC SERPL CALC-MCNC: 40 MG/DL
LEUKOCYTE ESTERASE URINE: NEGATIVE
LYMPHOCYTES # BLD AUTO: 1.79 K/UL
LYMPHOCYTES NFR BLD AUTO: 41.1 %
MAN DIFF?: NORMAL
MCHC RBC-ENTMCNC: 25.2 PG
MCHC RBC-ENTMCNC: 30.2 GM/DL
MCV RBC AUTO: 83.4 FL
MONOCYTES # BLD AUTO: 0.49 K/UL
MONOCYTES NFR BLD AUTO: 11.3 %
NEUTROPHILS # BLD AUTO: 2.02 K/UL
NEUTROPHILS NFR BLD AUTO: 46.5 %
NITRITE URINE: NEGATIVE
NONHDLC SERPL-MCNC: 49 MG/DL
PH URINE: 6
PLATELET # BLD AUTO: 232 K/UL
POTASSIUM SERPL-SCNC: 4 MMOL/L
PROT SERPL-MCNC: 7.8 G/DL
PROTEIN URINE: NORMAL
RBC # BLD: 4.41 M/UL
RBC # FLD: 15.9 %
SODIUM SERPL-SCNC: 134 MMOL/L
SPECIFIC GRAVITY URINE: 1.03
TRIGL SERPL-MCNC: 45 MG/DL
TSH SERPL-ACNC: 0.67 UIU/ML
UROBILINOGEN URINE: NORMAL
WBC # FLD AUTO: 4.35 K/UL

## 2022-03-23 LAB
M TB IFN-G BLD-IMP: NEGATIVE
QUANTIFERON TB PLUS MITOGEN MINUS NIL: 9.96 IU/ML
QUANTIFERON TB PLUS NIL: 0.04 IU/ML
QUANTIFERON TB PLUS TB1 MINUS NIL: 0.01 IU/ML
QUANTIFERON TB PLUS TB2 MINUS NIL: 0.01 IU/ML

## 2022-09-19 ENCOUNTER — RESULT CHARGE (OUTPATIENT)
Age: 37
End: 2022-09-19

## 2022-09-19 ENCOUNTER — APPOINTMENT (OUTPATIENT)
Dept: FAMILY MEDICINE | Facility: CLINIC | Age: 37
End: 2022-09-19

## 2022-09-19 VITALS
DIASTOLIC BLOOD PRESSURE: 78 MMHG | WEIGHT: 146 LBS | SYSTOLIC BLOOD PRESSURE: 112 MMHG | BODY MASS INDEX: 28.66 KG/M2 | TEMPERATURE: 98 F | HEIGHT: 60 IN | OXYGEN SATURATION: 98 % | HEART RATE: 78 BPM | RESPIRATION RATE: 16 BRPM

## 2022-09-19 DIAGNOSIS — Z71.85 ENCOUNTER FOR IMMUNIZATION SAFETY COUNSELING: ICD-10-CM

## 2022-09-19 DIAGNOSIS — Z23 ENCOUNTER FOR IMMUNIZATION: ICD-10-CM

## 2022-09-19 LAB — HBA1C MFR BLD HPLC: 5.9

## 2022-09-19 PROCEDURE — 99214 OFFICE O/P EST MOD 30 MIN: CPT | Mod: 25

## 2022-09-19 PROCEDURE — 90686 IIV4 VACC NO PRSV 0.5 ML IM: CPT

## 2022-09-19 PROCEDURE — 0124A: CPT

## 2022-09-19 PROCEDURE — G0008: CPT

## 2022-09-19 NOTE — HISTORY OF PRESENT ILLNESS
[Formal Caregiver] : formal caregiver [Other: _____] : [unfilled] [FreeTextEntry1] : f/up [de-identified] : Pt w/ mental Disorder. No present complaints. Attend daily program at "[facility] program.\par \par Seen regularly by Psychiatry. \par had 3 dose Moderna Covid vaccine.\par

## 2022-09-19 NOTE — ASSESSMENT
[FreeTextEntry1] : -CPE: 03/2022\par TB screening annual: quantiferon.\par \par -Immunizations: \par COVID Bivalent and Flu shot today\par \par -Continue Psychiatric meds and f/up.\par \par \par

## 2022-12-12 ENCOUNTER — APPOINTMENT (OUTPATIENT)
Dept: DERMATOLOGY | Facility: CLINIC | Age: 37
End: 2022-12-12

## 2022-12-12 PROCEDURE — 99203 OFFICE O/P NEW LOW 30 MIN: CPT

## 2022-12-19 ENCOUNTER — APPOINTMENT (OUTPATIENT)
Dept: DERMATOLOGY | Facility: CLINIC | Age: 37
End: 2022-12-19

## 2022-12-19 PROCEDURE — 99214 OFFICE O/P EST MOD 30 MIN: CPT

## 2022-12-28 ENCOUNTER — APPOINTMENT (OUTPATIENT)
Dept: DERMATOLOGY | Facility: CLINIC | Age: 37
End: 2022-12-28

## 2023-01-30 ENCOUNTER — APPOINTMENT (OUTPATIENT)
Dept: DERMATOLOGY | Facility: CLINIC | Age: 38
End: 2023-01-30
Payer: MEDICAID

## 2023-01-30 PROCEDURE — 99213 OFFICE O/P EST LOW 20 MIN: CPT

## 2023-02-10 ENCOUNTER — OFFICE (OUTPATIENT)
Dept: URBAN - METROPOLITAN AREA CLINIC 63 | Facility: CLINIC | Age: 38
Setting detail: OPHTHALMOLOGY
End: 2023-02-10
Payer: MEDICAID

## 2023-02-10 DIAGNOSIS — H16.223: ICD-10-CM

## 2023-02-10 DIAGNOSIS — H02.89: ICD-10-CM

## 2023-02-10 DIAGNOSIS — H10.45: ICD-10-CM

## 2023-02-10 DIAGNOSIS — H16.222: ICD-10-CM

## 2023-02-10 PROBLEM — H16.221 DRY EYE SYNDROME K SICCA; RIGHT EYE, LEFT EYE, BOTH EYES: Status: ACTIVE | Noted: 2023-02-10

## 2023-02-10 PROCEDURE — 68761 CLOSE TEAR DUCT OPENING: CPT | Performed by: STUDENT IN AN ORGANIZED HEALTH CARE EDUCATION/TRAINING PROGRAM

## 2023-02-10 PROCEDURE — 92012 INTRM OPH EXAM EST PATIENT: CPT | Performed by: STUDENT IN AN ORGANIZED HEALTH CARE EDUCATION/TRAINING PROGRAM

## 2023-02-10 ASSESSMENT — REFRACTION_AUTOREFRACTION
OS_SPHERE: 0.00
OS_AXIS: 117
OD_AXIS: 047
OS_CYLINDER: -0.75
OD_CYLINDER: -2.00
OD_SPHERE: +0.25

## 2023-02-10 ASSESSMENT — KERATOMETRY
OS_K1POWER_DIOPTERS: 43.50
OS_K2POWER_DIOPTERS: 44.75
OD_AXISANGLE_DEGREES: 121
OD_K2POWER_DIOPTERS: 46.00
OS_AXISANGLE_DEGREES: 068
OD_K1POWER_DIOPTERS: 43.75

## 2023-02-10 ASSESSMENT — LID EXAM ASSESSMENTS
OD_COMMENTS: 1+ MEIBOMIAN GLAND DYSFUNCTION
OS_COMMENTS: 1+ MEIBOMIAN GLAND DYSFUNCTION

## 2023-02-10 ASSESSMENT — TONOMETRY
OS_IOP_MMHG: 14
OD_IOP_MMHG: 16

## 2023-02-10 ASSESSMENT — AXIALLENGTH_DERIVED
OD_AL: 23.381
OS_AL: 23.5087

## 2023-02-10 ASSESSMENT — SPHEQUIV_DERIVED
OS_SPHEQUIV: -0.375
OD_SPHEQUIV: -0.75

## 2023-02-10 ASSESSMENT — CONFRONTATIONAL VISUAL FIELD TEST (CVF)
OD_FINDINGS: FULL
OS_FINDINGS: FULL

## 2023-02-10 ASSESSMENT — VISUAL ACUITY
OS_BCVA: 20/25-1
OD_BCVA: 20/20

## 2023-02-10 ASSESSMENT — SUPERFICIAL PUNCTATE KERATITIS (SPK)
OS_SPK: 2+
OD_SPK: 2+

## 2023-03-20 ENCOUNTER — APPOINTMENT (OUTPATIENT)
Dept: FAMILY MEDICINE | Facility: CLINIC | Age: 38
End: 2023-03-20
Payer: MEDICAID

## 2023-03-20 ENCOUNTER — LABORATORY RESULT (OUTPATIENT)
Age: 38
End: 2023-03-20

## 2023-03-20 VITALS
OXYGEN SATURATION: 99 % | RESPIRATION RATE: 12 BRPM | HEART RATE: 87 BPM | WEIGHT: 145 LBS | BODY MASS INDEX: 28.47 KG/M2 | HEIGHT: 60 IN | DIASTOLIC BLOOD PRESSURE: 80 MMHG | SYSTOLIC BLOOD PRESSURE: 110 MMHG

## 2023-03-20 DIAGNOSIS — H61.20 IMPACTED CERUMEN, UNSPECIFIED EAR: ICD-10-CM

## 2023-03-20 DIAGNOSIS — Z00.00 ENCOUNTER FOR GENERAL ADULT MEDICAL EXAMINATION W/OUT ABNORMAL FINDINGS: ICD-10-CM

## 2023-03-20 PROCEDURE — 99395 PREV VISIT EST AGE 18-39: CPT | Mod: 25

## 2023-03-20 PROCEDURE — 69210 REMOVE IMPACTED EAR WAX UNI: CPT

## 2023-03-20 RX ORDER — MOMETASONE FUROATE 1 MG/G
0.1 CREAM TOPICAL
Qty: 45 | Refills: 0 | Status: COMPLETED | COMMUNITY
Start: 2022-12-15

## 2023-03-20 RX ORDER — TRIAMCINOLONE ACETONIDE 1 MG/G
0.1 OINTMENT TOPICAL
Qty: 454 | Refills: 0 | Status: COMPLETED | COMMUNITY
Start: 2022-12-19

## 2023-03-20 RX ORDER — HYDROCORTISONE 25 MG/G
2.5 OINTMENT TOPICAL
Qty: 454 | Refills: 0 | Status: COMPLETED | COMMUNITY
Start: 2023-01-30

## 2023-03-20 RX ORDER — ALCLOMETASONE DIPROPIONATE 0.5 MG/G
0.05 OINTMENT TOPICAL
Qty: 60 | Refills: 0 | Status: COMPLETED | COMMUNITY
Start: 2022-12-14

## 2023-03-20 NOTE — PHYSICAL EXAM

## 2023-03-20 NOTE — HEALTH RISK ASSESSMENT
[Good] : ~his/her~  mood as  good [No] : In the past 12 months have you used drugs other than those required for medical reasons? No [No falls in past year] : Patient reported no falls in the past year [0] : 2) Feeling down, depressed, or hopeless: Not at all (0) [Patient reported PAP Smear was normal] : Patient reported PAP Smear was normal [HIV test declined] : HIV test declined [Hepatitis C test declined] : Hepatitis C test declined [Behavior] : difficulty with behavior [Learning/Retaining New Information] : difficulty learning/retaining new information [Handling Complex Tasks] : difficulty handling complex tasks [Reasoning] : difficulty with reasoning [Spatial Ability and Orientation] : difficulty with spatial ability and orientation [Behavioral] : behavioral [On disability] : on disability [Less Than High School] : less than high school [Single] : single [Feels Safe at Home] : Feels safe at home [Fully functional (bathing, dressing, toileting, transferring, walking, feeding)] : Fully functional (bathing, dressing, toileting, transferring, walking, feeding) [Full assistance needed] : managing finances [Smoke Detector] : smoke detector [Seat Belt] :  uses seat belt [With Patient/Caregiver] : , with patient/caregiver [Designated Healthcare Proxy] : Designated healthcare proxy [Name: ___] : Health Care Proxy's Name: [unfilled]  [Relationship: ___] : Relationship: [unfilled] [PHQ-2 Negative - No further assessment needed] : PHQ-2 Negative - No further assessment needed [de-identified] : daily exercise [de-identified] : regular- low sugar [[id number]] : 0 [Change in mental status noted] : No change in mental status noted [Language] : denies difficulty with language [Sexually Active] : not sexually active [Reports changes in hearing] : Reports no changes in hearing [Reports changes in vision] : Reports no changes in vision [Reports changes in dental health] : Reports no changes in dental health [PapSmearDate] : 2019 [de-identified] : residential family home [de-identified] : Mental disability [AdvancecareDate] : 03/22 [Never] : Never

## 2023-03-20 NOTE — HISTORY OF PRESENT ILLNESS
[Formal Caregiver] : formal caregiver [Other: _____] : [unfilled] [FreeTextEntry1] : Annual physical\par \par  [de-identified] : Pt is here for CPE with new Formal caregiver for 2 years.\par \par Pt w/ mental Disorder. No present complaints. Attend daily program at "[facility]". \par \par Seen regularly by Psychiatry. \par had 3 dose Moderna Covid vaccine.\par

## 2023-03-21 LAB
ALBUMIN SERPL ELPH-MCNC: 4.3 G/DL
ALP BLD-CCNC: 66 U/L
ALT SERPL-CCNC: 16 U/L
ANION GAP SERPL CALC-SCNC: 11 MMOL/L
APPEARANCE: ABNORMAL
AST SERPL-CCNC: 22 U/L
BASOPHILS # BLD AUTO: 0.02 K/UL
BASOPHILS NFR BLD AUTO: 0.5 %
BILIRUB SERPL-MCNC: 0.2 MG/DL
BILIRUBIN URINE: NEGATIVE
BLOOD URINE: NEGATIVE
BUN SERPL-MCNC: 15 MG/DL
CALCIUM SERPL-MCNC: 9.5 MG/DL
CHLORIDE SERPL-SCNC: 104 MMOL/L
CHOLEST SERPL-MCNC: 117 MG/DL
CO2 SERPL-SCNC: 24 MMOL/L
COLOR: NORMAL
CREAT SERPL-MCNC: 0.73 MG/DL
EGFR: 109 ML/MIN/1.73M2
EOSINOPHIL # BLD AUTO: 0.01 K/UL
EOSINOPHIL NFR BLD AUTO: 0.3 %
ESTIMATED AVERAGE GLUCOSE: 126 MG/DL
GLUCOSE QUALITATIVE U: NEGATIVE
GLUCOSE SERPL-MCNC: 96 MG/DL
HBA1C MFR BLD HPLC: 6 %
HCT VFR BLD CALC: 37.5 %
HDLC SERPL-MCNC: 55 MG/DL
HGB BLD-MCNC: 11.5 G/DL
IMM GRANULOCYTES NFR BLD AUTO: 0.3 %
KETONES URINE: NEGATIVE
LDLC SERPL CALC-MCNC: 56 MG/DL
LEUKOCYTE ESTERASE URINE: NEGATIVE
LYMPHOCYTES # BLD AUTO: 1.4 K/UL
LYMPHOCYTES NFR BLD AUTO: 35.6 %
MAN DIFF?: NORMAL
MCHC RBC-ENTMCNC: 25.5 PG
MCHC RBC-ENTMCNC: 30.7 GM/DL
MCV RBC AUTO: 83.1 FL
MONOCYTES # BLD AUTO: 0.39 K/UL
MONOCYTES NFR BLD AUTO: 9.9 %
NEUTROPHILS # BLD AUTO: 2.1 K/UL
NEUTROPHILS NFR BLD AUTO: 53.4 %
NITRITE URINE: NEGATIVE
NONHDLC SERPL-MCNC: 62 MG/DL
PH URINE: 6
PLATELET # BLD AUTO: 228 K/UL
POTASSIUM SERPL-SCNC: 4.6 MMOL/L
PROT SERPL-MCNC: 7.6 G/DL
PROTEIN URINE: NORMAL
RBC # BLD: 4.51 M/UL
RBC # FLD: 15 %
SODIUM SERPL-SCNC: 139 MMOL/L
SPECIFIC GRAVITY URINE: 1.03
TRIGL SERPL-MCNC: 30 MG/DL
TSH SERPL-ACNC: 0.68 UIU/ML
UROBILINOGEN URINE: NORMAL
WBC # FLD AUTO: 3.93 K/UL

## 2023-03-23 LAB
M TB IFN-G BLD-IMP: NEGATIVE
QUANTIFERON TB PLUS MITOGEN MINUS NIL: 3.03 IU/ML
QUANTIFERON TB PLUS NIL: 0.03 IU/ML
QUANTIFERON TB PLUS TB1 MINUS NIL: 0 IU/ML
QUANTIFERON TB PLUS TB2 MINUS NIL: 0 IU/ML

## 2023-04-10 ENCOUNTER — APPOINTMENT (OUTPATIENT)
Dept: DERMATOLOGY | Facility: CLINIC | Age: 38
End: 2023-04-10
Payer: MEDICAID

## 2023-04-10 PROCEDURE — 99213 OFFICE O/P EST LOW 20 MIN: CPT

## 2023-05-11 ENCOUNTER — APPOINTMENT (OUTPATIENT)
Dept: DERMATOLOGY | Facility: CLINIC | Age: 38
End: 2023-05-11
Payer: MEDICAID

## 2023-05-11 PROCEDURE — 99213 OFFICE O/P EST LOW 20 MIN: CPT

## 2023-05-30 ENCOUNTER — APPOINTMENT (OUTPATIENT)
Dept: OBGYN | Facility: CLINIC | Age: 38
End: 2023-05-30
Payer: MEDICAID

## 2023-05-30 VITALS
BODY MASS INDEX: 25.69 KG/M2 | HEIGHT: 63 IN | DIASTOLIC BLOOD PRESSURE: 82 MMHG | HEART RATE: 82 BPM | WEIGHT: 145 LBS | SYSTOLIC BLOOD PRESSURE: 125 MMHG

## 2023-05-30 DIAGNOSIS — Z01.419 ENCOUNTER FOR GYNECOLOGICAL EXAMINATION (GENERAL) (ROUTINE) W/OUT ABNORMAL FINDINGS: ICD-10-CM

## 2023-05-30 DIAGNOSIS — Z11.3 ENCOUNTER FOR SCREENING FOR INFECTIONS WITH A PREDOMINANTLY SEXUAL MODE OF TRANSMISSION: ICD-10-CM

## 2023-05-30 PROCEDURE — 99385 PREV VISIT NEW AGE 18-39: CPT

## 2023-05-30 NOTE — HISTORY OF PRESENT ILLNESS
[FreeTextEntry1] : 37-year-old -American woman  0, virginal presents as a new patient.  She is here with a provider whom she lives with.  Patient has history of schizophrenia.  She reports regular menses; denies that they are heavy.\par \par Has a medical history of schizophrenia.  No surgical history.  She is G0.  She is single.  She denies alcohol tobacco or drug use.  She denies any family history of breast, ovarian, colon, uterine CA.

## 2023-05-30 NOTE — DISCUSSION/SUMMARY
[FreeTextEntry1] : Pap smear is performed.  I advised mammograms starting at 40.  Patient will monitor her periods and if she finds they are heavy or irregular will advise me and return for follow-up visit.

## 2023-05-31 LAB
C TRACH RRNA SPEC QL NAA+PROBE: NOT DETECTED
HPV HIGH+LOW RISK DNA PNL CVX: NOT DETECTED
N GONORRHOEA RRNA SPEC QL NAA+PROBE: NOT DETECTED
SOURCE TP AMPLIFICATION: NORMAL

## 2023-06-04 LAB — CYTOLOGY CVX/VAG DOC THIN PREP: NORMAL

## 2023-06-06 ENCOUNTER — OFFICE (OUTPATIENT)
Dept: URBAN - METROPOLITAN AREA CLINIC 63 | Facility: CLINIC | Age: 38
Setting detail: OPHTHALMOLOGY
End: 2023-06-06
Payer: MEDICAID

## 2023-06-06 DIAGNOSIS — H02.89: ICD-10-CM

## 2023-06-06 DIAGNOSIS — H16.221: ICD-10-CM

## 2023-06-06 DIAGNOSIS — H10.45: ICD-10-CM

## 2023-06-06 DIAGNOSIS — H16.223: ICD-10-CM

## 2023-06-06 PROBLEM — H16.222 DRY EYE SYNDROME K SICCA; RIGHT EYE, LEFT EYE, BOTH EYES: Status: ACTIVE | Noted: 2023-06-06

## 2023-06-06 PROCEDURE — 92012 INTRM OPH EXAM EST PATIENT: CPT | Performed by: STUDENT IN AN ORGANIZED HEALTH CARE EDUCATION/TRAINING PROGRAM

## 2023-06-06 PROCEDURE — 68761 CLOSE TEAR DUCT OPENING: CPT | Performed by: STUDENT IN AN ORGANIZED HEALTH CARE EDUCATION/TRAINING PROGRAM

## 2023-06-06 ASSESSMENT — KERATOMETRY
OD_K2POWER_DIOPTERS: 46.25
OD_K1POWER_DIOPTERS: 44.00
OS_AXISANGLE_DEGREES: 081
OS_K2POWER_DIOPTERS: 45.25
OS_K1POWER_DIOPTERS: 44.00
OD_AXISANGLE_DEGREES: 120

## 2023-06-06 ASSESSMENT — REFRACTION_AUTOREFRACTION
OS_SPHERE: -0.75
OD_SPHERE: 0.00
OD_CYLINDER: -2.00
OS_CYLINDER: -0.25
OD_AXIS: 045
OS_AXIS: 146

## 2023-06-06 ASSESSMENT — LID EXAM ASSESSMENTS
OS_COMMENTS: 1+ MEIBOMIAN GLAND DYSFUNCTION
OD_COMMENTS: 1+ MEIBOMIAN GLAND DYSFUNCTION

## 2023-06-06 ASSESSMENT — TONOMETRY
OS_IOP_MMHG: 17
OD_IOP_MMHG: 17

## 2023-06-06 ASSESSMENT — CONFRONTATIONAL VISUAL FIELD TEST (CVF)
OD_FINDINGS: FULL
OS_FINDINGS: FULL

## 2023-06-06 ASSESSMENT — VISUAL ACUITY
OD_BCVA: 20/25
OS_BCVA: 20/20-1

## 2023-06-06 ASSESSMENT — SUPERFICIAL PUNCTATE KERATITIS (SPK)
OS_SPK: 2+
OD_SPK: 2+

## 2023-06-06 ASSESSMENT — SPHEQUIV_DERIVED
OS_SPHEQUIV: -0.875
OD_SPHEQUIV: -1

## 2023-06-06 ASSESSMENT — AXIALLENGTH_DERIVED
OS_AL: 23.5198
OD_AL: 23.3864

## 2023-09-19 ENCOUNTER — OFFICE (OUTPATIENT)
Dept: URBAN - METROPOLITAN AREA CLINIC 63 | Facility: CLINIC | Age: 38
Setting detail: OPHTHALMOLOGY
End: 2023-09-19
Payer: MEDICAID

## 2023-09-19 DIAGNOSIS — H16.223: ICD-10-CM

## 2023-09-19 DIAGNOSIS — H02.89: ICD-10-CM

## 2023-09-19 DIAGNOSIS — H10.45: ICD-10-CM

## 2023-09-19 PROBLEM — H16.222 DRY EYE SYNDROME K SICCA; RIGHT EYE, LEFT EYE, BOTH EYES: Status: ACTIVE | Noted: 2023-09-19

## 2023-09-19 PROBLEM — H16.221 DRY EYE SYNDROME K SICCA; RIGHT EYE, LEFT EYE, BOTH EYES: Status: ACTIVE | Noted: 2023-09-19

## 2023-09-19 PROCEDURE — 92012 INTRM OPH EXAM EST PATIENT: CPT | Performed by: STUDENT IN AN ORGANIZED HEALTH CARE EDUCATION/TRAINING PROGRAM

## 2023-09-19 PROCEDURE — 68761 CLOSE TEAR DUCT OPENING: CPT | Performed by: STUDENT IN AN ORGANIZED HEALTH CARE EDUCATION/TRAINING PROGRAM

## 2023-09-19 ASSESSMENT — SUPERFICIAL PUNCTATE KERATITIS (SPK)
OS_SPK: 2+
OD_SPK: 2+

## 2023-09-19 ASSESSMENT — TONOMETRY
OD_IOP_MMHG: 13
OS_IOP_MMHG: 16

## 2023-09-19 ASSESSMENT — LID EXAM ASSESSMENTS
OD_MEIBOMITIS: 1+
OS_MEIBOMITIS: 1+

## 2023-09-20 ASSESSMENT — AXIALLENGTH_DERIVED
OD_AL: 23.3441
OS_AL: 23.4207

## 2023-09-20 ASSESSMENT — REFRACTION_AUTOREFRACTION
OS_CYLINDER: -0.50
OD_CYLINDER: -1.75
OD_SPHERE: -0.25
OD_AXIS: 044
OS_AXIS: 105
OS_SPHERE: -0.25

## 2023-09-20 ASSESSMENT — SPHEQUIV_DERIVED
OS_SPHEQUIV: -0.5
OD_SPHEQUIV: -1.125

## 2023-09-20 ASSESSMENT — VISUAL ACUITY
OS_BCVA: 20/20-1
OD_BCVA: 20/20-1

## 2023-09-20 ASSESSMENT — KERATOMETRY
OD_AXISANGLE_DEGREES: 118
OS_K1POWER_DIOPTERS: 44.00
OD_K2POWER_DIOPTERS: 46.50
OS_AXISANGLE_DEGREES: 072
OD_K1POWER_DIOPTERS: 44.25
OS_K2POWER_DIOPTERS: 45.00

## 2023-10-17 ENCOUNTER — APPOINTMENT (OUTPATIENT)
Dept: FAMILY MEDICINE | Facility: CLINIC | Age: 38
End: 2023-10-17
Payer: MEDICAID

## 2023-10-17 VITALS
SYSTOLIC BLOOD PRESSURE: 130 MMHG | HEIGHT: 63 IN | OXYGEN SATURATION: 98 % | BODY MASS INDEX: 26.4 KG/M2 | HEART RATE: 111 BPM | RESPIRATION RATE: 12 BRPM | DIASTOLIC BLOOD PRESSURE: 80 MMHG | WEIGHT: 149 LBS | TEMPERATURE: 97.8 F

## 2023-10-17 DIAGNOSIS — R73.03 PREDIABETES.: ICD-10-CM

## 2023-10-17 DIAGNOSIS — Z23 ENCOUNTER FOR IMMUNIZATION: ICD-10-CM

## 2023-10-17 PROCEDURE — 99214 OFFICE O/P EST MOD 30 MIN: CPT | Mod: 25

## 2023-10-17 PROCEDURE — G0008: CPT

## 2023-10-17 PROCEDURE — 90686 IIV4 VACC NO PRSV 0.5 ML IM: CPT

## 2023-10-17 RX ORDER — MULTIVITAMIN WITH FOLIC ACID 400 MCG
TABLET ORAL
Qty: 90 | Refills: 1 | Status: ACTIVE | COMMUNITY
Start: 2017-03-06 | End: 1900-01-01

## 2023-11-06 ENCOUNTER — APPOINTMENT (OUTPATIENT)
Dept: FAMILY MEDICINE | Facility: CLINIC | Age: 38
End: 2023-11-06
Payer: MEDICAID

## 2023-11-06 VITALS
DIASTOLIC BLOOD PRESSURE: 78 MMHG | HEIGHT: 63 IN | WEIGHT: 142 LBS | TEMPERATURE: 97.9 F | HEART RATE: 83 BPM | OXYGEN SATURATION: 99 % | BODY MASS INDEX: 25.16 KG/M2 | SYSTOLIC BLOOD PRESSURE: 118 MMHG | RESPIRATION RATE: 14 BRPM

## 2023-11-06 DIAGNOSIS — J30.2 OTHER SEASONAL ALLERGIC RHINITIS: ICD-10-CM

## 2023-11-06 DIAGNOSIS — F99 MENTAL DISORDER, NOT OTHERWISE SPECIFIED: ICD-10-CM

## 2023-11-06 PROCEDURE — 99213 OFFICE O/P EST LOW 20 MIN: CPT

## 2023-11-06 RX ORDER — TACROLIMUS 1 MG/G
0.1 OINTMENT TOPICAL
Qty: 100 | Refills: 0 | Status: ACTIVE | COMMUNITY
Start: 2023-05-11

## 2023-11-06 RX ORDER — CETIRIZINE HYDROCHLORIDE 10 MG/1
10 TABLET, FILM COATED ORAL
Qty: 30 | Refills: 0 | Status: ACTIVE | COMMUNITY
Start: 2023-11-06 | End: 1900-01-01

## 2023-11-13 ENCOUNTER — RX RENEWAL (OUTPATIENT)
Age: 38
End: 2023-11-13

## 2023-11-13 RX ORDER — MULTIVITAMIN WITH FOLIC ACID 400 MCG
TABLET ORAL
Qty: 90 | Refills: 3 | Status: ACTIVE | COMMUNITY
Start: 2023-11-13 | End: 1900-01-01

## 2023-11-20 ENCOUNTER — APPOINTMENT (OUTPATIENT)
Dept: DERMATOLOGY | Facility: CLINIC | Age: 38
End: 2023-11-20
Payer: MEDICAID

## 2023-11-20 PROCEDURE — 99213 OFFICE O/P EST LOW 20 MIN: CPT

## 2023-12-04 ENCOUNTER — APPOINTMENT (OUTPATIENT)
Dept: FAMILY MEDICINE | Facility: CLINIC | Age: 38
End: 2023-12-04
Payer: MEDICAID

## 2023-12-04 DIAGNOSIS — K21.9 GASTRO-ESOPHAGEAL REFLUX DISEASE W/OUT ESOPHAGITIS: ICD-10-CM

## 2023-12-04 PROCEDURE — 99212 OFFICE O/P EST SF 10 MIN: CPT | Mod: 95

## 2023-12-07 ENCOUNTER — NON-APPOINTMENT (OUTPATIENT)
Age: 38
End: 2023-12-07

## 2023-12-07 DIAGNOSIS — R05.8 OTHER SPECIFIED COUGH: ICD-10-CM

## 2023-12-27 ENCOUNTER — RX RENEWAL (OUTPATIENT)
Age: 38
End: 2023-12-27

## 2023-12-27 RX ORDER — OMEPRAZOLE 20 MG/1
20 CAPSULE, DELAYED RELEASE ORAL DAILY
Qty: 30 | Refills: 0 | Status: ACTIVE | COMMUNITY
Start: 2023-12-04 | End: 1900-01-01

## 2023-12-27 RX ORDER — CETIRIZINE HYDROCHLORIDE 10 MG/1
10 TABLET, COATED ORAL
Qty: 30 | Refills: 0 | Status: ACTIVE | COMMUNITY
Start: 2023-12-27 | End: 1900-01-01

## 2024-01-03 ENCOUNTER — EMERGENCY (EMERGENCY)
Facility: HOSPITAL | Age: 39
LOS: 1 days | Discharge: HOME | End: 2024-01-03
Attending: EMERGENCY MEDICINE
Payer: MEDICAID

## 2024-01-03 VITALS
HEART RATE: 60 BPM | TEMPERATURE: 98 F | DIASTOLIC BLOOD PRESSURE: 86 MMHG | SYSTOLIC BLOOD PRESSURE: 136 MMHG | RESPIRATION RATE: 16 BRPM | OXYGEN SATURATION: 98 %

## 2024-01-03 PROCEDURE — 99285 EMERGENCY DEPT VISIT HI MDM: CPT
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
## 2024-01-04 DIAGNOSIS — F79 UNSPECIFIED INTELLECTUAL DISABILITIES: ICD-10-CM

## 2024-01-04 DIAGNOSIS — F23 BRIEF PSYCHOTIC DISORDER: ICD-10-CM

## 2024-01-04 LAB
AMORPH CRY # UR COMP ASSIST: PRESENT
AMORPH CRY # UR COMP ASSIST: PRESENT
AMPHET UR-MCNC: NEGATIVE — SIGNIFICANT CHANGE UP
AMPHET UR-MCNC: NEGATIVE — SIGNIFICANT CHANGE UP
ANION GAP SERPL CALC-SCNC: 11 MMOL/L — SIGNIFICANT CHANGE UP (ref 5–17)
ANION GAP SERPL CALC-SCNC: 11 MMOL/L — SIGNIFICANT CHANGE UP (ref 5–17)
APAP SERPL-MCNC: <3 UG/ML — LOW (ref 10–26)
APAP SERPL-MCNC: <3 UG/ML — LOW (ref 10–26)
APPEARANCE UR: ABNORMAL
APPEARANCE UR: ABNORMAL
BACTERIA # UR AUTO: ABNORMAL /HPF
BACTERIA # UR AUTO: ABNORMAL /HPF
BARBITURATES UR SCN-MCNC: NEGATIVE — SIGNIFICANT CHANGE UP
BARBITURATES UR SCN-MCNC: NEGATIVE — SIGNIFICANT CHANGE UP
BASOPHILS # BLD AUTO: 0.02 K/UL — SIGNIFICANT CHANGE UP (ref 0–0.2)
BASOPHILS # BLD AUTO: 0.02 K/UL — SIGNIFICANT CHANGE UP (ref 0–0.2)
BASOPHILS NFR BLD AUTO: 0.4 % — SIGNIFICANT CHANGE UP (ref 0–2)
BASOPHILS NFR BLD AUTO: 0.4 % — SIGNIFICANT CHANGE UP (ref 0–2)
BENZODIAZ UR-MCNC: NEGATIVE — SIGNIFICANT CHANGE UP
BENZODIAZ UR-MCNC: NEGATIVE — SIGNIFICANT CHANGE UP
BILIRUB UR-MCNC: NEGATIVE — SIGNIFICANT CHANGE UP
BILIRUB UR-MCNC: NEGATIVE — SIGNIFICANT CHANGE UP
BUN SERPL-MCNC: 11.5 MG/DL — SIGNIFICANT CHANGE UP (ref 8–20)
BUN SERPL-MCNC: 11.5 MG/DL — SIGNIFICANT CHANGE UP (ref 8–20)
CALCIUM SERPL-MCNC: 9.2 MG/DL — SIGNIFICANT CHANGE UP (ref 8.4–10.5)
CALCIUM SERPL-MCNC: 9.2 MG/DL — SIGNIFICANT CHANGE UP (ref 8.4–10.5)
CHLORIDE SERPL-SCNC: 102 MMOL/L — SIGNIFICANT CHANGE UP (ref 96–108)
CHLORIDE SERPL-SCNC: 102 MMOL/L — SIGNIFICANT CHANGE UP (ref 96–108)
CO2 SERPL-SCNC: 27 MMOL/L — SIGNIFICANT CHANGE UP (ref 22–29)
CO2 SERPL-SCNC: 27 MMOL/L — SIGNIFICANT CHANGE UP (ref 22–29)
COCAINE METAB.OTHER UR-MCNC: NEGATIVE — SIGNIFICANT CHANGE UP
COCAINE METAB.OTHER UR-MCNC: NEGATIVE — SIGNIFICANT CHANGE UP
COLOR SPEC: YELLOW — SIGNIFICANT CHANGE UP
COLOR SPEC: YELLOW — SIGNIFICANT CHANGE UP
CREAT SERPL-MCNC: 0.84 MG/DL — SIGNIFICANT CHANGE UP (ref 0.5–1.3)
CREAT SERPL-MCNC: 0.84 MG/DL — SIGNIFICANT CHANGE UP (ref 0.5–1.3)
DIFF PNL FLD: NEGATIVE — SIGNIFICANT CHANGE UP
DIFF PNL FLD: NEGATIVE — SIGNIFICANT CHANGE UP
EGFR: 91 ML/MIN/1.73M2 — SIGNIFICANT CHANGE UP
EGFR: 91 ML/MIN/1.73M2 — SIGNIFICANT CHANGE UP
EOSINOPHIL # BLD AUTO: 0.05 K/UL — SIGNIFICANT CHANGE UP (ref 0–0.5)
EOSINOPHIL # BLD AUTO: 0.05 K/UL — SIGNIFICANT CHANGE UP (ref 0–0.5)
EOSINOPHIL NFR BLD AUTO: 1.1 % — SIGNIFICANT CHANGE UP (ref 0–6)
EOSINOPHIL NFR BLD AUTO: 1.1 % — SIGNIFICANT CHANGE UP (ref 0–6)
ETHANOL SERPL-MCNC: <10 MG/DL — SIGNIFICANT CHANGE UP (ref 0–9)
ETHANOL SERPL-MCNC: <10 MG/DL — SIGNIFICANT CHANGE UP (ref 0–9)
GLUCOSE SERPL-MCNC: 105 MG/DL — HIGH (ref 70–99)
GLUCOSE SERPL-MCNC: 105 MG/DL — HIGH (ref 70–99)
GLUCOSE UR QL: NEGATIVE MG/DL — SIGNIFICANT CHANGE UP
GLUCOSE UR QL: NEGATIVE MG/DL — SIGNIFICANT CHANGE UP
HCG SERPL-ACNC: <4 MIU/ML — SIGNIFICANT CHANGE UP
HCG SERPL-ACNC: <4 MIU/ML — SIGNIFICANT CHANGE UP
HCT VFR BLD CALC: 32.1 % — LOW (ref 34.5–45)
HCT VFR BLD CALC: 32.1 % — LOW (ref 34.5–45)
HGB BLD-MCNC: 10.4 G/DL — LOW (ref 11.5–15.5)
HGB BLD-MCNC: 10.4 G/DL — LOW (ref 11.5–15.5)
IMM GRANULOCYTES NFR BLD AUTO: 0.4 % — SIGNIFICANT CHANGE UP (ref 0–0.9)
IMM GRANULOCYTES NFR BLD AUTO: 0.4 % — SIGNIFICANT CHANGE UP (ref 0–0.9)
KETONES UR-MCNC: ABNORMAL MG/DL
KETONES UR-MCNC: ABNORMAL MG/DL
LEUKOCYTE ESTERASE UR-ACNC: NEGATIVE — SIGNIFICANT CHANGE UP
LEUKOCYTE ESTERASE UR-ACNC: NEGATIVE — SIGNIFICANT CHANGE UP
LYMPHOCYTES # BLD AUTO: 2.05 K/UL — SIGNIFICANT CHANGE UP (ref 1–3.3)
LYMPHOCYTES # BLD AUTO: 2.05 K/UL — SIGNIFICANT CHANGE UP (ref 1–3.3)
LYMPHOCYTES # BLD AUTO: 43.6 % — SIGNIFICANT CHANGE UP (ref 13–44)
LYMPHOCYTES # BLD AUTO: 43.6 % — SIGNIFICANT CHANGE UP (ref 13–44)
MCHC RBC-ENTMCNC: 25.5 PG — LOW (ref 27–34)
MCHC RBC-ENTMCNC: 25.5 PG — LOW (ref 27–34)
MCHC RBC-ENTMCNC: 32.4 GM/DL — SIGNIFICANT CHANGE UP (ref 32–36)
MCHC RBC-ENTMCNC: 32.4 GM/DL — SIGNIFICANT CHANGE UP (ref 32–36)
MCV RBC AUTO: 78.7 FL — LOW (ref 80–100)
MCV RBC AUTO: 78.7 FL — LOW (ref 80–100)
METHADONE UR-MCNC: NEGATIVE — SIGNIFICANT CHANGE UP
METHADONE UR-MCNC: NEGATIVE — SIGNIFICANT CHANGE UP
MONOCYTES # BLD AUTO: 0.42 K/UL — SIGNIFICANT CHANGE UP (ref 0–0.9)
MONOCYTES # BLD AUTO: 0.42 K/UL — SIGNIFICANT CHANGE UP (ref 0–0.9)
MONOCYTES NFR BLD AUTO: 8.9 % — SIGNIFICANT CHANGE UP (ref 2–14)
MONOCYTES NFR BLD AUTO: 8.9 % — SIGNIFICANT CHANGE UP (ref 2–14)
NEUTROPHILS # BLD AUTO: 2.14 K/UL — SIGNIFICANT CHANGE UP (ref 1.8–7.4)
NEUTROPHILS # BLD AUTO: 2.14 K/UL — SIGNIFICANT CHANGE UP (ref 1.8–7.4)
NEUTROPHILS NFR BLD AUTO: 45.6 % — SIGNIFICANT CHANGE UP (ref 43–77)
NEUTROPHILS NFR BLD AUTO: 45.6 % — SIGNIFICANT CHANGE UP (ref 43–77)
NITRITE UR-MCNC: NEGATIVE — SIGNIFICANT CHANGE UP
NITRITE UR-MCNC: NEGATIVE — SIGNIFICANT CHANGE UP
OPIATES UR-MCNC: NEGATIVE — SIGNIFICANT CHANGE UP
OPIATES UR-MCNC: NEGATIVE — SIGNIFICANT CHANGE UP
PCP SPEC-MCNC: SIGNIFICANT CHANGE UP
PCP SPEC-MCNC: SIGNIFICANT CHANGE UP
PCP UR-MCNC: NEGATIVE — SIGNIFICANT CHANGE UP
PCP UR-MCNC: NEGATIVE — SIGNIFICANT CHANGE UP
PH UR: 7 — SIGNIFICANT CHANGE UP (ref 5–8)
PH UR: 7 — SIGNIFICANT CHANGE UP (ref 5–8)
PLATELET # BLD AUTO: 212 K/UL — SIGNIFICANT CHANGE UP (ref 150–400)
PLATELET # BLD AUTO: 212 K/UL — SIGNIFICANT CHANGE UP (ref 150–400)
POTASSIUM SERPL-MCNC: 3.5 MMOL/L — SIGNIFICANT CHANGE UP (ref 3.5–5.3)
POTASSIUM SERPL-MCNC: 3.5 MMOL/L — SIGNIFICANT CHANGE UP (ref 3.5–5.3)
POTASSIUM SERPL-SCNC: 3.5 MMOL/L — SIGNIFICANT CHANGE UP (ref 3.5–5.3)
POTASSIUM SERPL-SCNC: 3.5 MMOL/L — SIGNIFICANT CHANGE UP (ref 3.5–5.3)
PROT UR-MCNC: SIGNIFICANT CHANGE UP MG/DL
PROT UR-MCNC: SIGNIFICANT CHANGE UP MG/DL
RBC # BLD: 4.08 M/UL — SIGNIFICANT CHANGE UP (ref 3.8–5.2)
RBC # BLD: 4.08 M/UL — SIGNIFICANT CHANGE UP (ref 3.8–5.2)
RBC # FLD: 15 % — HIGH (ref 10.3–14.5)
RBC # FLD: 15 % — HIGH (ref 10.3–14.5)
RBC CASTS # UR COMP ASSIST: 4 /HPF — SIGNIFICANT CHANGE UP (ref 0–4)
RBC CASTS # UR COMP ASSIST: 4 /HPF — SIGNIFICANT CHANGE UP (ref 0–4)
SALICYLATES SERPL-MCNC: <0.6 MG/DL — LOW (ref 10–20)
SALICYLATES SERPL-MCNC: <0.6 MG/DL — LOW (ref 10–20)
SARS-COV-2 RNA SPEC QL NAA+PROBE: SIGNIFICANT CHANGE UP
SARS-COV-2 RNA SPEC QL NAA+PROBE: SIGNIFICANT CHANGE UP
SODIUM SERPL-SCNC: 140 MMOL/L — SIGNIFICANT CHANGE UP (ref 135–145)
SODIUM SERPL-SCNC: 140 MMOL/L — SIGNIFICANT CHANGE UP (ref 135–145)
SP GR SPEC: 1.02 — SIGNIFICANT CHANGE UP (ref 1–1.03)
SP GR SPEC: 1.02 — SIGNIFICANT CHANGE UP (ref 1–1.03)
SQUAMOUS # UR AUTO: 5 /HPF — SIGNIFICANT CHANGE UP (ref 0–5)
SQUAMOUS # UR AUTO: 5 /HPF — SIGNIFICANT CHANGE UP (ref 0–5)
THC UR QL: NEGATIVE — SIGNIFICANT CHANGE UP
THC UR QL: NEGATIVE — SIGNIFICANT CHANGE UP
UROBILINOGEN FLD QL: 1 MG/DL — SIGNIFICANT CHANGE UP (ref 0.2–1)
UROBILINOGEN FLD QL: 1 MG/DL — SIGNIFICANT CHANGE UP (ref 0.2–1)
WBC # BLD: 4.7 K/UL — SIGNIFICANT CHANGE UP (ref 3.8–10.5)
WBC # BLD: 4.7 K/UL — SIGNIFICANT CHANGE UP (ref 3.8–10.5)
WBC # FLD AUTO: 4.7 K/UL — SIGNIFICANT CHANGE UP (ref 3.8–10.5)
WBC # FLD AUTO: 4.7 K/UL — SIGNIFICANT CHANGE UP (ref 3.8–10.5)
WBC UR QL: 5 /HPF — SIGNIFICANT CHANGE UP (ref 0–5)
WBC UR QL: 5 /HPF — SIGNIFICANT CHANGE UP (ref 0–5)

## 2024-01-04 PROCEDURE — 90792 PSYCH DIAG EVAL W/MED SRVCS: CPT

## 2024-01-04 PROCEDURE — 93010 ELECTROCARDIOGRAM REPORT: CPT

## 2024-01-04 PROCEDURE — 99223 1ST HOSP IP/OBS HIGH 75: CPT

## 2024-01-04 RX ORDER — ACETAMINOPHEN 500 MG
650 TABLET ORAL ONCE
Refills: 0 | Status: COMPLETED | OUTPATIENT
Start: 2024-01-04 | End: 2024-01-04

## 2024-01-04 RX ADMIN — Medication 650 MILLIGRAM(S): at 20:39

## 2024-01-04 NOTE — ED PROVIDER NOTE - CLINICAL SUMMARY MEDICAL DECISION MAKING FREE TEXT BOX
Patient is a 39 yo female with PMHx MR, schizophrenia presenting with SI and aggressive behavior. VSS.      Will check labs, r.o electrolyte abnormalities, consult , place on 1:1, r.o intoxication with UDS BAL, continue to monitor. Patient is a 37 yo female with PMHx MR, schizophrenia presenting with SI and aggressive behavior. VSS.      Will check labs, r.o electrolyte abnormalities, consult , place on 1:1, r.o intoxication with UDS BAL, continue to monitor.

## 2024-01-04 NOTE — ED ADULT NURSE REASSESSMENT NOTE - NS ED NURSE REASSESS COMMENT FT1
Report given to Gabrielle IVY. Patient pending to be escorted to behavioral health unit with RN and wanded by security Report given to Deja IVY. Pt awake alert in stretcher with no active complains
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
## 2024-01-04 NOTE — ED ADULT NURSE NOTE - OBJECTIVE STATEMENT
pt. appears to be sleeping. refuses to wake up to name/touch. no acute resp distress noted. appears comfortable. safety maintained. co at bedside.

## 2024-01-04 NOTE — ED PROVIDER NOTE - WET READ LAUNCH FT
Alvaro Cedeño, APRN-CNP  Zafar Goldsmith MA  Continues to be anemic, potentially iron deficiency, improving. Monitor for now.  
Spoke with Stuart at 3:16PM. Verbalized understanding no further concerns.   
There are no Wet Read(s) to document.

## 2024-01-04 NOTE — ED ADULT NURSE NOTE - NSFALLRISKASMTTYPE_ED_ALL_ED
no loss of consciousness, no gait abnormality, no headache, no sensory deficits, and no weakness. Initial (On Arrival)

## 2024-01-04 NOTE — ED ADULT NURSE NOTE - NSFALLUNIVINTERV_ED_ALL_ED
Bed/Stretcher in lowest position, wheels locked, appropriate side rails in place/Call bell, personal items and telephone in reach/Instruct patient to call for assistance before getting out of bed/chair/stretcher/Non-slip footwear applied when patient is off stretcher/[place] to call system/Physically safe environment - no spills, clutter or unnecessary equipment/Purposeful proactive rounding/Room/bathroom lighting operational, light cord in reach Bed/Stretcher in lowest position, wheels locked, appropriate side rails in place/Call bell, personal items and telephone in reach/Instruct patient to call for assistance before getting out of bed/chair/stretcher/Non-slip footwear applied when patient is off stretcher/[place] to call system/Physically safe environment - no spills, clutter or unnecessary equipment/Purposeful proactive rounding/Room/bathroom lighting operational, light cord in reach

## 2024-01-04 NOTE — ED BEHAVIORAL HEALTH ASSESSMENT NOTE - DESCRIPTION
11 GERD T(C): 36.8 (01-03-24 @ 23:15), Max: 36.8 (01-03-24 @ 23:15)  T(F): 98.3 (01-03-24 @ 23:15), Max: 98.3 (01-03-24 @ 23:15)  HR: 60 (01-03-24 @ 23:15) (60 - 60)  BP: 136/86 (01-03-24 @ 23:15) (136/86 - 136/86)  RR: 16 (01-03-24 @ 23:15) (16 - 16)  SpO2: 98% (01-03-24 @ 23:15) (98% - 98%) lives with caretaker and family
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
## 2024-01-04 NOTE — ED PROVIDER NOTE - PHYSICAL EXAMINATION
Gen: no acute distress  Head: normocephalic, atraumatic  Lung: CTAB, no respiratory distress, no wheezing, rales, rhonchi  CV: normal s1/s2, rrr,   Abd: soft, non-tender, non-distended  MSK: No edema, no visible deformities, full range of motion in all 4 extremities  Neuro: No focal neurologic deficits  Psych: Flat affect, calm, cooperative, endorsing HI, no hallucinations or delusions
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
## 2024-01-04 NOTE — ED ADULT NURSE REASSESSMENT NOTE - NS ED NURSE REASSESS COMMENT FT1
Assumed care of patient under 1:1 constant observation. Pt has no complains at this time. Awaiting Psych dispo
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
## 2024-01-04 NOTE — ED BEHAVIORAL HEALTH ASSESSMENT NOTE - NSBHMSEKNOW_PSY_A_CORE
"[facility]  Office visit  Divine Banuelos  1944  [phone]    VISIT DATE:  10/12/2017    PCP: Queenie Amezcua, APRN  [address] [address]    CC:  Chief Complaint   Patient presents with   • Hypertension   • Shortness of Breath       PROBLEM LIST:  1. Hypertension:  a.  History of elevated blood pressure due to stress.  b.  Discontinuation of enalapril secondary to hypotension, June 2013.  2. Bradycardia-Holter October 2017: Average heart rate 52 bpm, range of 36-94 bpm. Asymptomatic  3. Hypothyroidism.  4. Depression.  5. Anxiety.  6. Osteoporosis, mild.  7. Mild dyslipidemia.  8. History of Bell’s palsy.  9. Surgical history:  a. Bilateral cataract extraction.    ASSESSMENT:   Diagnosis Plan   1. Essential hypertension     2. Bradycardia         PLAN:  Blood pressure lability: Currently well controlled.    Sinus bradycardia: Holter monitor consistent with underlying sick sinus physiology, currently asymptomatic. Continue annual clinical follow-up. Currently no indication for pacing.     Subjective  Blood pressures are predominantly running less than 140/90 mmHg.  She is not required any as needed clonidine.  Denies chest pain, palpitations or dyspnea.  Maintaining an active lifestyle, reports a heart healthy diet and exercising on a regular basis.  No further episodes of lightheadedness.    PHYSICAL EXAMINATION:  Vitals:    02/28/19 1538   BP: 130/70   BP Location: Right arm   Patient Position: Sitting   Pulse: 71   SpO2: 99%   Weight: 72.4 kg (159 lb 9.6 oz)   Height: 157.5 cm (62\")     General Appearance:    Alert, cooperative, no distress, appears stated age   Head:    Normocephalic, without obvious abnormality, atraumatic   Eyes:    conjunctiva/corneas clear   Nose:   Nares normal, septum midline, mucosa normal, no drainage   Throat:   Lips, teeth and gums normal   Neck:   Supple, symmetrical, trachea midline, no carotid    bruit or JVD   Lungs:     Clear to " auscultation bilaterally, respirations unlabored   Chest Wall:    No tenderness or deformity    Heart:    Regular rate and rhythm, S1 and S2 normal, no murmur, rub   or gallop, normal carotid impulse bilaterally without bruit.   Abdomen:     Soft, non-tender   Extremities:   Extremities normal, atraumatic, no cyanosis or edema   Pulses:   2+ and symmetric all extremities   Skin:   Skin color, texture, turgor normal, no rashes or lesions       Diagnostic Data:  Procedures  No results found for: CHLPL, TRIG, HDL, LDLDIRECT  No results found for: GLUCOSE, BUN, CREATININE, NA, K, CL, CO2, CREATININE, BUN  No results found for: HGBA1C  No results found for: WBC, HGB, HCT, PLT    Allergies  No Known Allergies    Current Medications    Current Outpatient Medications:   •  aspirin 81 MG EC tablet, Take 81 mg by mouth daily., Disp: , Rfl:   •  B Complex Vitamins (VITAMIN B COMPLEX) capsule capsule, Take 1 capsule by mouth Daily., Disp: , Rfl:   •  Calcium Citrate (CITRACAL PO), Take  by mouth daily. + Magnesium, Disp: , Rfl:   •  CloNIDine (CATAPRES) 0.1 MG tablet, Take 1 tablet by mouth 3 (Three) Times a Day As Needed for high blood pressure (SBP> 150 and/or DBP>90)., Disp: 30 tablet, Rfl: 11  •  levothyroxine (SYNTHROID, LEVOTHROID) 25 MCG tablet, Take 25 mcg by mouth daily., Disp: , Rfl:   •  meloxicam (MOBIC) 15 MG tablet, Take 1 tablet by mouth As Needed for Moderate Pain ., Disp: 30 tablet, Rfl: 11  •  Misc Natural Products (OSTEO BI-FLEX TRIPLE STRENGTH PO), Take 1 tablet by mouth Daily., Disp: , Rfl:   •  Misc. Devices (GELLHORN PESSARY) misc, Use as directed (Size 3), Disp: 1 each, Rfl: 0  •  Omega-3 Fatty Acids (FISH OIL) 1200 MG capsule capsule, Take 1,200 mg by mouth 2 (two) times a day., Disp: , Rfl:   •  PARoxetine (PAXIL) 20 MG tablet, Take 1 tablet by mouth Every Morning., Disp: 90 tablet, Rfl: 3          ROS  Review of Systems   Cardiovascular: Negative for chest pain, dyspnea on exertion, irregular  heartbeat and near-syncope.   Respiratory: Negative for shortness of breath.        SOCIAL HX  Social History     Socioeconomic History   • Marital status:      Spouse name: Not on file   • Number of children: Not on file   • Years of education: Not on file   • Highest education level: Not on file   Social Needs   • Financial resource strain: Not on file   • Food insecurity - worry: Not on file   • Food insecurity - inability: Not on file   • Transportation needs - medical: Not on file   • Transportation needs - non-medical: Not on file   Occupational History   • Not on file   Tobacco Use   • Smoking status: Never Smoker   • Smokeless tobacco: Never Used   Substance and Sexual Activity   • Alcohol use: No   • Drug use: No   • Sexual activity: Defer     Birth control/protection: Post-menopausal   Other Topics Concern   • Not on file   Social History Narrative   • Not on file       FAMILY HX  Family History   Problem Relation Age of Onset   • Hypertension Mother    • Colon cancer Father    • Colon cancer Paternal Grandmother    • Colon cancer Paternal Uncle    • Breast cancer Maternal Aunt              Reinaldo Cope III, MD, FACC       Impaired

## 2024-01-04 NOTE — ED ADULT NURSE REASSESSMENT NOTE - NS ED NURSE REASSESS COMMENT FT1
Patient received awake and alert x4 in cart, has no labored breathing, is in no acute distress, vital signs are stable
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
## 2024-01-04 NOTE — ED CDU PROVIDER INITIAL DAY NOTE - CLINICAL SUMMARY MEDICAL DECISION MAKING FREE TEXT BOX
Patient is a 37 yo female with PMHx MR, schizophrenia presenting with SI and aggressive behavior. Medically cleared. To be seen by psych. 1:1 for safety.

## 2024-01-04 NOTE — ED ADULT NURSE NOTE - NS ED PATIENT SAFETY CONCERN
Pt. States started chills, cognestion, headache, wheezing and fever. Daughter had the flu about 10 days ago.
Unable to assess due to medical condition

## 2024-01-05 PROCEDURE — 99213 OFFICE O/P EST LOW 20 MIN: CPT

## 2024-01-05 PROCEDURE — 99232 SBSQ HOSP IP/OBS MODERATE 35: CPT

## 2024-01-05 RX ORDER — PANTOPRAZOLE SODIUM 20 MG/1
40 TABLET, DELAYED RELEASE ORAL
Refills: 0 | Status: DISCONTINUED | OUTPATIENT
Start: 2024-01-05 | End: 2024-01-11

## 2024-01-05 RX ORDER — RISPERIDONE 4 MG/1
2 TABLET ORAL AT BEDTIME
Refills: 0 | Status: DISCONTINUED | OUTPATIENT
Start: 2024-01-05 | End: 2024-01-11

## 2024-01-05 RX ORDER — RISPERIDONE 4 MG/1
1 TABLET ORAL DAILY
Refills: 0 | Status: DISCONTINUED | OUTPATIENT
Start: 2024-01-05 | End: 2024-01-08

## 2024-01-05 RX ADMIN — RISPERIDONE 1 MILLIGRAM(S): 4 TABLET ORAL at 18:55

## 2024-01-05 RX ADMIN — RISPERIDONE 2 MILLIGRAM(S): 4 TABLET ORAL at 22:41

## 2024-01-05 NOTE — ED CDU PROVIDER SUBSEQUENT DAY NOTE - CLINICAL SUMMARY MEDICAL DECISION MAKING FREE TEXT BOX
Patient is a 39 yo female with PMHx MR, schizophrenia presenting with SI and aggressive behavior. Medically cleared. Seen by psych; pending involuntary admission for psychosis. Patient is a 37 yo female with PMHx MR, schizophrenia presenting with SI and aggressive behavior. Medically cleared. Seen by psych; pending involuntary admission for psychosis.

## 2024-01-05 NOTE — ED ADULT NURSE REASSESSMENT NOTE - NS ED NURSE REASSESS COMMENT FT1
Assumed care of patient from BIJU GRANADOS at this time. patient resting in bed in room. patient showing no signs of acute distress. patient safety maintained.

## 2024-01-05 NOTE — CHART NOTE - NSCHARTNOTEFT_GEN_A_CORE
SW Note: Bed search ongoing, pt was presented to [facility] earlier and pt was reviewed however ultimately [facility] declined saying pt too acute for opening on womens unit, not appropriate for that setting at this time.  provider alerted, no beds at  or [place], pt w/ straight medicaid so that limits referrals, SW following SW Note: Bed search ongoing, pt was presented to [facility] earlier and pt was reviewed however ultimately [facility] declined saying pt too acute for opening on womens unit, not appropriate for that setting at this time.  provider alerted, no beds at  or [place], pt w/ straight medicaid so that limits referrals, SW following
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
## 2024-01-05 NOTE — ED BEHAVIORAL HEALTH PROGRESS NOTE - TRANSFER ATTEMPTS TO INPATIENT BH SINCE LAST ASSESSMENT
[place].../[facility].../[place].../[place].../[place].../[place].../[place]... [place].../[facility].../[place].../[place].../[place].../[place].../[place]...

## 2024-01-05 NOTE — ED BEHAVIORAL HEALTH PROGRESS NOTE - CASE SUMMARY/FORMULATION (CLEARLY DOCUMENT RATIONALE FOR DISPOSITION CHANGE)
Pt lying in bed watching TV at times.  Ate a good breakfast.  Continues to endorse AH to hurt herself, throw a rock.  Sometimes she hears a boy scream.  Some inappropriate laughing note today and yesterday when in main ED.  No agitation or aggression.  Does not endorse thoughts of wanting to die aside form the voices.  Denies HI.  No delusions noted today.  Less guarded, limited eye contact.  Previously on Fanapt 1 mg bid and Risperdal 1 mg bid, however Fanapt is non-formulary therefore Risperdal increased to 2 mg hs and continue one mg am.  Message left with Dr Rodriguez from [facility] for return call.  Continues to require in pt psych.  H reviewing   Pt lying in bed watching TV at times.  Ate a good breakfast.  Continues to endorse AH to hurt herself, throw a rock.  Sometimes she hears a boy scream.  Some inappropriate laughing note today and yesterday when in main ED.  No agitation or aggression.  Does not endorse thoughts of wanting to die aside form the voices.  Denies HI.  No delusions noted today.  Less guarded, limited eye contact.  Previously on Fanapt 1 mg bid and Risperdal 1 mg bid, however Fanapt is non-formulary therefore Risperdal increased to 2 mg hs and continue one mg am.  Message left with Dr Rodriguez from [facility] for return call.  Continues to require in pt psych.  H reviewing

## 2024-01-05 NOTE — ED ADULT NURSE REASSESSMENT NOTE - NS ED NURSE REASSESS COMMENT FT1
Patient in bed, tends to self-isolate. Polite and cooperative, though subdued, during interactions with staff. Ate well at meals, good PO fluid intake. Awaiting bed for inpatient transfer. SW submitting information to multiple facilities for review. No distress noted.

## 2024-01-05 NOTE — ED BEHAVIORAL HEALTH PROGRESS NOTE - DETAILS:
Pt lying in bed watching TV at times.  Ate a good breakfast.  Continues to endorse AH to hurt herself, throw a rock.  Sometimes she hears a boy scream.  Some inappropriate laughing note today and yesterday when in main ED.  No agitation or aggression.  Does not endorse thoughts of wanting to die aside form the voices.  Denies HI.  No delusions noted today.  Less guarded, limited eye contact.  Previously on Fanapt 1 mg bid and Risperdal 1 mg bid, however Fanapt is non-formulary therefore Risperdal increased to 2 mg hs and continue one mg am.  Message left with Dr Rodriguez from [facility] for return call  Continues to require in pt psych.   Pt lying in bed watching TV at times.  Ate a good breakfast.  Continues to endorse AH to hurt herself, throw a rock.  Sometimes she hears a boy scream.  Some inappropriate laughing note today and yesterday when in main ED.  No agitation or aggression.  Does not endorse thoughts of wanting to die aside form the voices.  Denies HI.  No delusions noted today.  Less guarded, limited eye contact.  Previously on Fanapt 1 mg bid and Risperdal 1 mg bid, however Fanapt is non-formulary therefore Risperdal increased to 2 mg hs and continue one mg am.  Message left with Dr Rodriguez from [facility] for return call  Continues to require in pt psych.

## 2024-01-05 NOTE — ED BEHAVIORAL HEALTH PROGRESS NOTE - TRANSFER ATTEMPTS TO INPATIENT BH SINCE LAST ASSESSMENT DETAIL FREE TEXT
no beds, [facility] reviewing no beds, [facility] reviewing

## 2024-01-05 NOTE — ED ADULT NURSE REASSESSMENT NOTE - NS ED NURSE REASSESS COMMENT FT1
received report.  received pt laying in bed.  pt is polite and pleasant.  no harm to self or others will monitor

## 2024-01-06 PROCEDURE — 99232 SBSQ HOSP IP/OBS MODERATE 35: CPT

## 2024-01-06 PROCEDURE — 99214 OFFICE O/P EST MOD 30 MIN: CPT

## 2024-01-06 RX ADMIN — RISPERIDONE 2 MILLIGRAM(S): 4 TABLET ORAL at 21:54

## 2024-01-06 RX ADMIN — RISPERIDONE 1 MILLIGRAM(S): 4 TABLET ORAL at 13:08

## 2024-01-06 RX ADMIN — PANTOPRAZOLE SODIUM 40 MILLIGRAM(S): 20 TABLET, DELAYED RELEASE ORAL at 06:34

## 2024-01-06 NOTE — ED BEHAVIORAL HEALTH PROGRESS NOTE - DETAILS:
Slept OK.  Appetite good.  Feeling worse than yesterday.  Hearing multiple voices cannot say what voices are saying.  Feels safe her.  No agitation or aggression.  No behavioral problems.  Watching TV in room.  No complaints.
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
## 2024-01-07 PROCEDURE — 99232 SBSQ HOSP IP/OBS MODERATE 35: CPT

## 2024-01-07 RX ADMIN — RISPERIDONE 2 MILLIGRAM(S): 4 TABLET ORAL at 22:08

## 2024-01-07 RX ADMIN — RISPERIDONE 1 MILLIGRAM(S): 4 TABLET ORAL at 12:48

## 2024-01-07 NOTE — ED ADULT NURSE REASSESSMENT NOTE - NS ED NURSE REASSESS COMMENT FT1
remains self isolating to room.  pleasant and cooperative no harm to self or others awaiting transfer
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
## 2024-01-07 NOTE — ED CDU PROVIDER SUBSEQUENT DAY NOTE - CROS ED ROS STATEMENT
all other ROS negative except as per HPI

## 2024-01-07 NOTE — CHART NOTE - NSCHARTNOTEFT_GEN_A_CORE
KATERYNA Note: Pt plan for 9.27 inpt psych tx - pt has straight medicaid. [facility] and [facility] report no beds. [facility] report they have M&F beds and can review. KATERYNA reached out to [facility]'s report they can review for pt. KATERYNA faxed over clincials to [facility] and [facility]. SW will follow. KATERYNA Note: Pt plan for 9.27 inpt psych tx - pt has straight medicaid. [facility] and [facility] report no beds. [facility] report they have M&F beds and can review. KATERYNA reached out to [facility]'s report they can review for pt. KATERYNA faxed over clincials to [facility] and [facility]. SW will follow.

## 2024-01-07 NOTE — ED ADULT NURSE REASSESSMENT NOTE - NS ED NURSE REASSESS COMMENT FT1
resting/sleeping in no acute distress at this time. pt consuming meals 100%. pt has made no attempts to harm herself or others. pt evaluated by psych attending. pt has been cooperative.

## 2024-01-07 NOTE — ED CDU PROVIDER SUBSEQUENT DAY NOTE - STUDIES
EKG - see results section for interpretation

## 2024-01-07 NOTE — ED ADULT NURSE REASSESSMENT NOTE - NS ED NURSE REASSESS COMMENT FT1
pt resting and self isolating to her room. No attempts to harm self or others. pt consuming meals 100% and po fluids. pt awaiting in patient transfer.

## 2024-01-07 NOTE — ED CDU PROVIDER SUBSEQUENT DAY NOTE - OTHER FINDINGS
as interpreted by attending Dr. Hawkins

## 2024-01-08 PROCEDURE — 99213 OFFICE O/P EST LOW 20 MIN: CPT

## 2024-01-08 PROCEDURE — 99232 SBSQ HOSP IP/OBS MODERATE 35: CPT

## 2024-01-08 RX ORDER — RISPERIDONE 4 MG/1
2 TABLET ORAL DAILY
Refills: 0 | Status: DISCONTINUED | OUTPATIENT
Start: 2024-01-08 | End: 2024-01-11

## 2024-01-08 RX ADMIN — RISPERIDONE 2 MILLIGRAM(S): 4 TABLET ORAL at 11:05

## 2024-01-08 RX ADMIN — PANTOPRAZOLE SODIUM 40 MILLIGRAM(S): 20 TABLET, DELAYED RELEASE ORAL at 06:27

## 2024-01-08 RX ADMIN — RISPERIDONE 2 MILLIGRAM(S): 4 TABLET ORAL at 21:31

## 2024-01-08 NOTE — ED BEHAVIORAL HEALTH PROGRESS NOTE - PSYCHIATRIC ISSUES AND PLAN (INCLUDE STANDING AND PRN MEDICATION)
Haldol 5 mg PO/IM, Ativan 2 mg PO/IM, and Benadryl 50 mg PO/IM PRN Q6H for agitation. Haldol 5 mg PO/IM, Ativan 2 mg PO/IM, and Benadryl 50 mg PO/IM PRN Q6H for agitation; increase morning risperidone to 2 mg daily; continue with risperidone 2 mg nightly

## 2024-01-08 NOTE — ED ADULT NURSE REASSESSMENT NOTE - NSFALLUNIVINTERV_ED_ALL_ED
Bed/Stretcher in lowest position, wheels locked, appropriate side rails in place/Non-slip footwear applied when patient is off stretcher/Physically safe environment - no spills, clutter or unnecessary equipment/Purposeful proactive rounding/Room/bathroom lighting operational, light cord in reach
Monitor gait and stability/Monitor for mental status changes and reorient to person, place, and time, as needed/Physically safe environment - no spills, clutter or unnecessary equipment/Purposeful proactive rounding
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
## 2024-01-08 NOTE — ED ADULT NURSE REASSESSMENT NOTE - NS ED NURSE REASSESS COMMENT FT1
received report.  received pt in bed.  pt remains calm cooperative no aggression.  when asked about good and bad hallucinations pt states hears screaming.  pt has difficult time elaborating.  states hears baby screaming.  pt offered shower.  pt showered.  returned to room.  remains self isolating calm and cooperative

## 2024-01-08 NOTE — ED ADULT NURSE REASSESSMENT NOTE - NS ED NURSE REASSESS COMMENT FT1
pt resting/sleeping in no acute distress. No attempts to harm self or others. pt consuming meals 100%. pt continues to endorse auditory hallucinations.

## 2024-01-08 NOTE — ED BEHAVIORAL HEALTH PROGRESS NOTE - CASE SUMMARY/FORMULATION (CLEARLY DOCUMENT RATIONALE FOR DISPOSITION CHANGE)
Slept okay.  Appetite good.  Feeling the same as yesterday.  Hearing multiple voices cannot say what voices are saying but notes that she hears them everyday.  Feels more safe here than at home.  No agitation or aggression.  No behavioral problems.  No complaints. Remains with paranoia and perceptual disturbances, distressed. Would benefit from inpatient psychiatric admission for assurance of safety, further stabilization, medication management, and diagnostic clarification.

## 2024-01-08 NOTE — CHART NOTE - NSCHARTNOTEFT_GEN_A_CORE
KATERYNA Note: Plan is to transfer pt for IP psychiatric care. Pt is listed with straight medicaid so can not be referred to [facility] and [place]. Today there were no beds at [facility],  and [facility]. Contacted [facility], spoke with Shivani [phone]. She confirmed available beds and requested clinical sent for review. Emailsent to Shivani and Dr. Dixon. KATERYNA will follow KATERYNA Note: Plan is to transfer pt for IP psychiatric care. Pt is listed with straight medicaid so can not be referred to [facility] and [place]. Today there were no beds at [facility],  and [facility]. Contacted [facility], spoke with Shivani [phone]. She confirmed available beds and requested clinical sent for review. Emailsent to Shivani and Dr. Dixon. KATERYNA will follow

## 2024-01-08 NOTE — ED ADULT NURSE REASSESSMENT NOTE - NS ED NURSE REASSESS COMMENT FT1
Assumed care of patient at 07:20.  Patient resting in bed appears to be sleeping with no distress and regular nonlabored breathing noted.  Safety of patient maintained.

## 2024-01-08 NOTE — ED ADULT NURSE REASSESSMENT NOTE - NS ED NURSE REASSESS COMMENT FT1
Patient ate 100% of there breakfast.  No attempts to harm self or others.  Patient reports voices are saying good and bad things.  Safety maintained.

## 2024-01-08 NOTE — ED CDU PROVIDER SUBSEQUENT DAY NOTE - CLINICAL SUMMARY MEDICAL DECISION MAKING FREE TEXT BOX
HPI: 30-year-old female past medical history of MR, schizophrenia presents with SI and aggressive behavior.  Patient has been medically cleared and boarding for psych admission.  No acute overnight events.      PE:  General: NAD  Head: NC/AT  Eyes: PERRL, EOMI  ENT: Airway patent, mmm  Pulmonary: CTA b/l, symmetric breath sounds. No W/R/R.  Cardiac: s1s2, RRR, no M,G,R, No JVD  Psych: Suicidal ideation    MDM: 30-year-old female admitted for involuntary transfer pending psychiatric bed.  No acute overnight events or agitation.
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
## 2024-01-08 NOTE — ED BEHAVIORAL HEALTH PROGRESS NOTE - OTHER
pending psychosis okay did not assess concrete vague paranoia, does not feel safe outside of hospital or by herself partial not absent tbd

## 2024-01-08 NOTE — CHART NOTE - NSCHARTNOTEFT_GEN_A_CORE
SWNote: pt needs transfer 9.27 status , no bed available at [facility] this evening per AD N (Ary) . SW to follow in the am. SWNote: pt needs transfer 9.27 status , no bed available at [facility] this evening per AD N (Ary) . SW to follow in the am.

## 2024-01-09 VITALS
SYSTOLIC BLOOD PRESSURE: 138 MMHG | DIASTOLIC BLOOD PRESSURE: 82 MMHG | OXYGEN SATURATION: 99 % | RESPIRATION RATE: 16 BRPM | HEART RATE: 72 BPM | TEMPERATURE: 98 F

## 2024-01-09 PROCEDURE — 80048 BASIC METABOLIC PNL TOTAL CA: CPT

## 2024-01-09 PROCEDURE — 84702 CHORIONIC GONADOTROPIN TEST: CPT

## 2024-01-09 PROCEDURE — 81001 URINALYSIS AUTO W/SCOPE: CPT

## 2024-01-09 PROCEDURE — 80307 DRUG TEST PRSMV CHEM ANLYZR: CPT

## 2024-01-09 PROCEDURE — 36415 COLL VENOUS BLD VENIPUNCTURE: CPT

## 2024-01-09 PROCEDURE — 99239 HOSP IP/OBS DSCHRG MGMT >30: CPT

## 2024-01-09 PROCEDURE — 99285 EMERGENCY DEPT VISIT HI MDM: CPT | Mod: 25

## 2024-01-09 PROCEDURE — 99213 OFFICE O/P EST LOW 20 MIN: CPT

## 2024-01-09 PROCEDURE — 82962 GLUCOSE BLOOD TEST: CPT

## 2024-01-09 PROCEDURE — 93005 ELECTROCARDIOGRAM TRACING: CPT

## 2024-01-09 PROCEDURE — G0378: CPT

## 2024-01-09 PROCEDURE — 87635 SARS-COV-2 COVID-19 AMP PRB: CPT

## 2024-01-09 PROCEDURE — 85025 COMPLETE CBC W/AUTO DIFF WBC: CPT

## 2024-01-09 RX ADMIN — RISPERIDONE 2 MILLIGRAM(S): 4 TABLET ORAL at 11:10

## 2024-01-09 RX ADMIN — PANTOPRAZOLE SODIUM 40 MILLIGRAM(S): 20 TABLET, DELAYED RELEASE ORAL at 06:14

## 2024-01-09 NOTE — ED ADULT NURSE REASSESSMENT NOTE - NS ED NURSE REASSESS COMMENT FT1
Patient reviewed discharge instructions and provided a copy of instructions.  Patient denies suicidal or homicidal ideations.  Patients mood is elevated related to pending discharge.  Patient agrees to return to local ED or call 911 if symptoms worsen or thoughts to harm self or others develop.

## 2024-01-09 NOTE — ED BEHAVIORAL HEALTH PROGRESS NOTE - RISK ASSESSMENT
RF new onset agitation and aggression  PF formerly stable, supportive network

## 2024-01-09 NOTE — ED ADULT NURSE REASSESSMENT NOTE - NS ED NURSE REASSESS COMMENT FT1
Patient ate 100% of her breakfast.  Patient denies suicidal or homicidal ideations.  Patient superficial on interactions.  Patient reports intermittent voices say both good and bad things.  No attempts to harm self or others.  Safety of patient maintained.

## 2024-01-09 NOTE — ED BEHAVIORAL HEALTH PROGRESS NOTE - NS ED BHA PLAN
Admit/Transfer to Inpatient Psychiatry
Treat and Release

## 2024-01-09 NOTE — ED ADULT NURSE REASSESSMENT NOTE - GENERAL PATIENT STATE
comfortable appearance/cooperative
comfortable appearance/cooperative
comfortable appearance/resting/sleeping
comfortable appearance/cooperative
comfortable appearance/cooperative
comfortable appearance/resting/sleeping
comfortable appearance/cooperative
comfortable appearance/resting/sleeping
comfortable appearance/resting/sleeping
comfortable appearance/cooperative
comfortable appearance/resting/sleeping
comfortable appearance/resting/sleeping
comfortable appearance/cooperative
comfortable appearance/cooperative
cooperative/resting/sleeping

## 2024-01-09 NOTE — ED CDU PROVIDER SUBSEQUENT DAY NOTE - HISTORY
Dr. Devries: See attending attestation.
no acute events overnight
awaiting inpt psych bed no acute events overnight

## 2024-01-09 NOTE — ED BEHAVIORAL HEALTH PROGRESS NOTE - DETAILS
aware Advised patient to go to nearest ER or call 911, for any of the followin) agitation aggression or anxiety, 2) suicidal or homicidal thoughts 3) worsening of symptoms or 4) side effects of medications

## 2024-01-09 NOTE — ED ADULT NURSE REASSESSMENT NOTE - NS ED NURSE REASSESS COMMENT FT1
Assumed care of patient at 07!5.  Patient resting in bed appears to be sleeping with no distress and regular nonlabored breathing noted.  Safety of patient maintained.

## 2024-01-09 NOTE — ED CDU PROVIDER SUBSEQUENT DAY NOTE - TOBACCO USE
Unknown if ever smoked

## 2024-01-09 NOTE — ED CDU PROVIDER DISPOSITION NOTE - PATIENT PORTAL LINK FT
You can access the FollowMyHealth Patient Portal offered by [facility] by registering at the following website: [email or web address]. By joining [employer]’s FollowMyHealth portal, you will also be able to view your health information using other applications (apps) compatible with our system. You can access the FollowMyHealth Patient Portal offered by [facility] by registering at the following website: [email or web address]. By joining [employer]’s FollowMyHealth portal, you will also be able to view your health information using other applications (apps) compatible with our system.

## 2024-01-09 NOTE — ED BEHAVIORAL HEALTH PROGRESS NOTE - BED AVAILABILITY
Lack of inpatient Psychiatry bed...
N/A

## 2024-01-09 NOTE — ED BEHAVIORAL HEALTH PROGRESS NOTE - DETAILS:
Evaluated for follow up. Calm cooperative engaged and pleasant throughout encounter. Noted to be intellectually limited. Also noted to be unreliable historian considering inconsistent responses/narrative. Reports feeling overall better, no longer in any distress. While in bh unit, has maintained good acceptable behavioral control, no instances of violence/aggression. Adherent with medications. Inconsistent reports perceptual disturbances though states that no longer command in nature, furthermore does not appear particularly distressed or debilitated by her chronic hallucinations. No overt delusions elicited. Does not appear distressed dysphoric or overtly manic/psychotic.    Collateral information obtained from malika over phone [phone]  Provided with updates on patient. Caregiver able to have supervised conversation over phone with patient. States that she appears at baseline. Comfortable with patient's discharge back home. Does not have any safety concerns regarding the patient. Does not think that patient is acutely dangerous to self or anyone else. Aware of the medication adjustments (as is Dr. Rodriguez, outpatient provider).  Evaluated for follow up. Calm cooperative engaged and pleasant throughout encounter. Noted to be intellectually limited. Also noted to be unreliable historian considering inconsistent responses/narrative. Reports feeling overall better, no longer in any distress. While in bh unit, has maintained good acceptable behavioral control, no instances of violence/aggression. Adherent with medications. Inconsistent reports perceptual disturbances though states that no longer command in nature, furthermore does not appear particularly distressed or debilitated by her chronic hallucinations. No overt delusions elicited. Does not appear distressed dysphoric or overtly manic/psychotic.    Collateral information obtained from malika over phone [phone]  Provided with updates on patient. Caregiver able to have supervised conversation over phone with patient. States that she appears at baseline. Comfortable with patient's discharge back home. Does not have any safety concerns regarding the patient. Does not think that patient is acutely dangerous to self or anyone else. Aware of the medication adjustments (as is Dr. Rodriguez, outpatient provider).

## 2024-01-09 NOTE — ED ADULT NURSE REASSESSMENT NOTE - STATUS
awaiting discharge, no change
awaiting transfer, no change

## 2024-01-09 NOTE — ED BEHAVIORAL HEALTH PROGRESS NOTE - CASE SUMMARY/FORMULATION (CLEARLY DOCUMENT RATIONALE FOR DISPOSITION CHANGE)
Calm cooperative engaged and pleasant throughout encounter. Noted to be intellectually limited. Also noted to be unreliable historian considering inconsistent responses/narrative. Reports feeling overall better, no longer in any distress. While in bh unit, has maintained good acceptable behavioral control, no instances of violence/aggression. Adherent with medications. Inconsistent reports perceptual disturbances though states that no longer command in nature, furthermore does not appear particularly distressed or debilitated by her chronic hallucinations. No overt delusions elicited. Does not appear distressed dysphoric or overtly manic/psychotic.  Patient denies any acute psychiatric issues concerns or complaints. No indication for admission to an acute inpatient psychiatric unit. Combined psychopharmacology and outpatient psychotherapy are the primary treatment modalities that are most likely to assist the patient in developing more productive means of managing her mental health conditions and navigating stressors, rather than an inpatient psychiatric admission.  Patient is at chronic elevated risk for self-injury and aggression towards others / property given psychiatric history and serious intellectual / cognitive limitations which lay the foundation of lifelong dysfunction across the areas of mood, behaviors and adaptive behaviors. Protective factors: stable medical comorbidities; no hx of substance. Medication and treatment compliant; lives in a supervised residence. At this time, Patient's clinical presentation is consistent with her chronic state and consistent with her baseline. Patient has been calm, cooperative and behaved appropriately the entire time she was at the ED. Patient at this time presents as consistent with her baseline. Albeit patient's baseline is very low, she is not an acute threat to self or others and may be discharged back to residence. Consequently, patient would not benefit from inpatient hospitalization as there are no acute off-baseline symptoms to target. suggesting simplifying medication regimen for patient.
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
## 2024-01-09 NOTE — ED BEHAVIORAL HEALTH PROGRESS NOTE - NSICDXBHSECONDARYDX_PSY_ALL_CORE
Intellectual disability   F79  

## 2024-01-09 NOTE — ED BEHAVIORAL HEALTH PROGRESS NOTE - GENERAL APPEARANCE
Patient doing well today.   Sees [facility] Cardiologist.   No concerns today.   No changes today.   
Patient doing well today.   Sees [facility]ologist.   No concerns today.   No changes today.   
Patient doing well today.   Sees [facility] Cardiologist.   No concerns today.   No changes today.   
No deformities present

## 2024-01-09 NOTE — ED ADULT NURSE REASSESSMENT NOTE - NS ED NURSE REASSESS COMMENT FT1
Patient agrees with plan for transfer when a bed is available.  Patient ate 100% of his lunch.  No attempts to harm self or others.  Safety of patient maintained.

## 2024-01-09 NOTE — ED ADULT NURSE REASSESSMENT NOTE - COMFORT CARE
meal provided/plan of care explained
meal provided/plan of care explained
meal provided/plan of care explained/po fluids offered
plan of care explained
meal provided/plan of care explained
meal provided/plan of care explained
darkened lights/meal provided/plan of care explained/po fluids offered
darkened lights
meal provided/plan of care explained/po fluids offered
ambulated to bathroom/meal provided/plan of care explained/po fluids offered
meal provided/plan of care explained/po fluids offered

## 2024-01-09 NOTE — ED BEHAVIORAL HEALTH PROGRESS NOTE - NSBHMSESPABN_PSY_A_CORE
Soft volume/Decreased productivity/Increased latency
Soft volume/Decreased productivity/Increased latency
Soft volume/Slowed rate
Soft volume/Increased latency
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
## 2024-01-11 ENCOUNTER — EMERGENCY (EMERGENCY)
Facility: HOSPITAL | Age: 39
LOS: 1 days | Discharge: OTHER INSTITUTION | End: 2024-01-11
Attending: EMERGENCY MEDICINE
Payer: MEDICAID

## 2024-01-11 VITALS
TEMPERATURE: 97 F | SYSTOLIC BLOOD PRESSURE: 147 MMHG | WEIGHT: 154.98 LBS | RESPIRATION RATE: 18 BRPM | DIASTOLIC BLOOD PRESSURE: 80 MMHG | OXYGEN SATURATION: 98 % | HEART RATE: 84 BPM | HEIGHT: 66 IN

## 2024-01-11 LAB
ANION GAP SERPL CALC-SCNC: 14 MMOL/L — SIGNIFICANT CHANGE UP (ref 5–17)
ANION GAP SERPL CALC-SCNC: 14 MMOL/L — SIGNIFICANT CHANGE UP (ref 5–17)
APAP SERPL-MCNC: <3 UG/ML — LOW (ref 10–26)
APAP SERPL-MCNC: <3 UG/ML — LOW (ref 10–26)
BASOPHILS # BLD AUTO: 0.03 K/UL — SIGNIFICANT CHANGE UP (ref 0–0.2)
BASOPHILS # BLD AUTO: 0.03 K/UL — SIGNIFICANT CHANGE UP (ref 0–0.2)
BASOPHILS NFR BLD AUTO: 0.7 % — SIGNIFICANT CHANGE UP (ref 0–2)
BASOPHILS NFR BLD AUTO: 0.7 % — SIGNIFICANT CHANGE UP (ref 0–2)
BUN SERPL-MCNC: 13.8 MG/DL — SIGNIFICANT CHANGE UP (ref 8–20)
BUN SERPL-MCNC: 13.8 MG/DL — SIGNIFICANT CHANGE UP (ref 8–20)
CALCIUM SERPL-MCNC: 8.8 MG/DL — SIGNIFICANT CHANGE UP (ref 8.4–10.5)
CALCIUM SERPL-MCNC: 8.8 MG/DL — SIGNIFICANT CHANGE UP (ref 8.4–10.5)
CHLORIDE SERPL-SCNC: 100 MMOL/L — SIGNIFICANT CHANGE UP (ref 96–108)
CHLORIDE SERPL-SCNC: 100 MMOL/L — SIGNIFICANT CHANGE UP (ref 96–108)
CO2 SERPL-SCNC: 23 MMOL/L — SIGNIFICANT CHANGE UP (ref 22–29)
CO2 SERPL-SCNC: 23 MMOL/L — SIGNIFICANT CHANGE UP (ref 22–29)
CREAT SERPL-MCNC: 0.6 MG/DL — SIGNIFICANT CHANGE UP (ref 0.5–1.3)
CREAT SERPL-MCNC: 0.6 MG/DL — SIGNIFICANT CHANGE UP (ref 0.5–1.3)
EGFR: 118 ML/MIN/1.73M2 — SIGNIFICANT CHANGE UP
EGFR: 118 ML/MIN/1.73M2 — SIGNIFICANT CHANGE UP
EOSINOPHIL # BLD AUTO: 0.05 K/UL — SIGNIFICANT CHANGE UP (ref 0–0.5)
EOSINOPHIL # BLD AUTO: 0.05 K/UL — SIGNIFICANT CHANGE UP (ref 0–0.5)
EOSINOPHIL NFR BLD AUTO: 1.2 % — SIGNIFICANT CHANGE UP (ref 0–6)
EOSINOPHIL NFR BLD AUTO: 1.2 % — SIGNIFICANT CHANGE UP (ref 0–6)
ETHANOL SERPL-MCNC: <10 MG/DL — SIGNIFICANT CHANGE UP (ref 0–9)
ETHANOL SERPL-MCNC: <10 MG/DL — SIGNIFICANT CHANGE UP (ref 0–9)
FLUAV AG NPH QL: SIGNIFICANT CHANGE UP
FLUAV AG NPH QL: SIGNIFICANT CHANGE UP
FLUBV AG NPH QL: SIGNIFICANT CHANGE UP
FLUBV AG NPH QL: SIGNIFICANT CHANGE UP
GLUCOSE SERPL-MCNC: 96 MG/DL — SIGNIFICANT CHANGE UP (ref 70–99)
GLUCOSE SERPL-MCNC: 96 MG/DL — SIGNIFICANT CHANGE UP (ref 70–99)
HCG SERPL-ACNC: <4 MIU/ML — SIGNIFICANT CHANGE UP
HCG SERPL-ACNC: <4 MIU/ML — SIGNIFICANT CHANGE UP
HCT VFR BLD CALC: 33.9 % — LOW (ref 34.5–45)
HCT VFR BLD CALC: 33.9 % — LOW (ref 34.5–45)
HGB BLD-MCNC: 10.9 G/DL — LOW (ref 11.5–15.5)
HGB BLD-MCNC: 10.9 G/DL — LOW (ref 11.5–15.5)
IMM GRANULOCYTES NFR BLD AUTO: 0.5 % — SIGNIFICANT CHANGE UP (ref 0–0.9)
IMM GRANULOCYTES NFR BLD AUTO: 0.5 % — SIGNIFICANT CHANGE UP (ref 0–0.9)
LYMPHOCYTES # BLD AUTO: 1.59 K/UL — SIGNIFICANT CHANGE UP (ref 1–3.3)
LYMPHOCYTES # BLD AUTO: 1.59 K/UL — SIGNIFICANT CHANGE UP (ref 1–3.3)
LYMPHOCYTES # BLD AUTO: 37.5 % — SIGNIFICANT CHANGE UP (ref 13–44)
LYMPHOCYTES # BLD AUTO: 37.5 % — SIGNIFICANT CHANGE UP (ref 13–44)
MCHC RBC-ENTMCNC: 25.4 PG — LOW (ref 27–34)
MCHC RBC-ENTMCNC: 25.4 PG — LOW (ref 27–34)
MCHC RBC-ENTMCNC: 32.2 GM/DL — SIGNIFICANT CHANGE UP (ref 32–36)
MCHC RBC-ENTMCNC: 32.2 GM/DL — SIGNIFICANT CHANGE UP (ref 32–36)
MCV RBC AUTO: 79 FL — LOW (ref 80–100)
MCV RBC AUTO: 79 FL — LOW (ref 80–100)
MONOCYTES # BLD AUTO: 0.53 K/UL — SIGNIFICANT CHANGE UP (ref 0–0.9)
MONOCYTES # BLD AUTO: 0.53 K/UL — SIGNIFICANT CHANGE UP (ref 0–0.9)
MONOCYTES NFR BLD AUTO: 12.5 % — SIGNIFICANT CHANGE UP (ref 2–14)
MONOCYTES NFR BLD AUTO: 12.5 % — SIGNIFICANT CHANGE UP (ref 2–14)
NEUTROPHILS # BLD AUTO: 2.02 K/UL — SIGNIFICANT CHANGE UP (ref 1.8–7.4)
NEUTROPHILS # BLD AUTO: 2.02 K/UL — SIGNIFICANT CHANGE UP (ref 1.8–7.4)
NEUTROPHILS NFR BLD AUTO: 47.6 % — SIGNIFICANT CHANGE UP (ref 43–77)
NEUTROPHILS NFR BLD AUTO: 47.6 % — SIGNIFICANT CHANGE UP (ref 43–77)
PLATELET # BLD AUTO: 234 K/UL — SIGNIFICANT CHANGE UP (ref 150–400)
PLATELET # BLD AUTO: 234 K/UL — SIGNIFICANT CHANGE UP (ref 150–400)
POTASSIUM SERPL-MCNC: 3.9 MMOL/L — SIGNIFICANT CHANGE UP (ref 3.5–5.3)
POTASSIUM SERPL-MCNC: 3.9 MMOL/L — SIGNIFICANT CHANGE UP (ref 3.5–5.3)
POTASSIUM SERPL-SCNC: 3.9 MMOL/L — SIGNIFICANT CHANGE UP (ref 3.5–5.3)
POTASSIUM SERPL-SCNC: 3.9 MMOL/L — SIGNIFICANT CHANGE UP (ref 3.5–5.3)
RBC # BLD: 4.29 M/UL — SIGNIFICANT CHANGE UP (ref 3.8–5.2)
RBC # BLD: 4.29 M/UL — SIGNIFICANT CHANGE UP (ref 3.8–5.2)
RBC # FLD: 15.2 % — HIGH (ref 10.3–14.5)
RBC # FLD: 15.2 % — HIGH (ref 10.3–14.5)
RSV RNA NPH QL NAA+NON-PROBE: SIGNIFICANT CHANGE UP
RSV RNA NPH QL NAA+NON-PROBE: SIGNIFICANT CHANGE UP
SALICYLATES SERPL-MCNC: <0.6 MG/DL — LOW (ref 10–20)
SALICYLATES SERPL-MCNC: <0.6 MG/DL — LOW (ref 10–20)
SARS-COV-2 RNA SPEC QL NAA+PROBE: SIGNIFICANT CHANGE UP
SARS-COV-2 RNA SPEC QL NAA+PROBE: SIGNIFICANT CHANGE UP
SODIUM SERPL-SCNC: 137 MMOL/L — SIGNIFICANT CHANGE UP (ref 135–145)
SODIUM SERPL-SCNC: 137 MMOL/L — SIGNIFICANT CHANGE UP (ref 135–145)
WBC # BLD: 4.24 K/UL — SIGNIFICANT CHANGE UP (ref 3.8–10.5)
WBC # BLD: 4.24 K/UL — SIGNIFICANT CHANGE UP (ref 3.8–10.5)
WBC # FLD AUTO: 4.24 K/UL — SIGNIFICANT CHANGE UP (ref 3.8–10.5)
WBC # FLD AUTO: 4.24 K/UL — SIGNIFICANT CHANGE UP (ref 3.8–10.5)

## 2024-01-11 PROCEDURE — 90792 PSYCH DIAG EVAL W/MED SRVCS: CPT

## 2024-01-11 PROCEDURE — 99223 1ST HOSP IP/OBS HIGH 75: CPT

## 2024-01-11 PROCEDURE — 93010 ELECTROCARDIOGRAM REPORT: CPT

## 2024-01-11 RX ORDER — RISPERIDONE 4 MG/1
2 TABLET ORAL
Refills: 0 | Status: DISCONTINUED | OUTPATIENT
Start: 2024-01-11 | End: 2024-01-19

## 2024-01-11 RX ORDER — RISPERIDONE 4 MG/1
3 TABLET ORAL
Refills: 0 | Status: DISCONTINUED | OUTPATIENT
Start: 2024-01-11 | End: 2024-01-19

## 2024-01-11 RX ADMIN — RISPERIDONE 3 MILLIGRAM(S): 4 TABLET ORAL at 22:40
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
## 2024-01-11 NOTE — ED ADULT NURSE NOTE - CHIEF COMPLAINT QUOTE
pt BIBA from \[facility]\"" after having "psych episode." pt restrained on arrival, calm and cooperative on arrival, had recent psych med changes. pt with spit mask on as well, placed by EMS. SCPD at bedside as well for safety during transport. pt BIBA from [facility] after having "psych episode." pt restrained on arrival, calm and cooperative on arrival, had recent psych med changes. pt with spit mask on as well, placed by EMS. SCPD at bedside as well for safety during transport.

## 2024-01-11 NOTE — ED BEHAVIORAL HEALTH ASSESSMENT NOTE - OTHER PAST PSYCHIATRIC HISTORY (INCLUDE DETAILS REGARDING ONSET, COURSE OF ILLNESS, INPATIENT/OUTPATIENT TREATMENT)
Diagnosis: intellectual disability, schizophrenia  Medications: previously on fanapt and risperidone  Hospitalizations: none previously  Provider: TRUDY Rodriguez

## 2024-01-11 NOTE — ED ADULT NURSE NOTE - NS ED NURSE AMBULANCES2
[facility]
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
## 2024-01-11 NOTE — ED PROVIDER NOTE - PHYSICAL EXAMINATION
Vital Signs per nursing documentation  Gen: well appearing, no acute distress  HEENT: NCAT, MMM  Cardiac: regular rate rhythm, normal S1S2  Chest: clear to auscultation bilateral, no wheezes or crackles  Abdomen: soft, non tender non distended  Extremity: no gross deformity, good perfusion  Skin: no rash  Neuro: nonfocal neuro exam

## 2024-01-11 NOTE — ED BEHAVIORAL HEALTH ASSESSMENT NOTE - SUMMARY
38-year-old woman, lives with caretaker, [facility] of [facility]ER[facility], PPH schizophrenia, intellectual disability, no prior psych admissions or suicide attempt, in tx at [facility] with Dr Rodriguez, no drug or alcohol use, bib [employer] after she destroyed her caretaker's nail salon. Patient with CAH to destroy property, continues to have CAH to harm herself. Trial of discharge was attempted, patient presents again within two days of discharge. Patient at this time is unable to remain safe in community, requires hospitalization for safety and stabilization. 38-year-old woman, lives with caretaker, [facility] of [place]ER[facility], PPH schizophrenia, intellectual disability, no prior psych admissions or suicide attempt, in tx at [facility] with Dr Rodriguez, no drug or alcohol use, bib [facility] after she destroyed her caretaker's nail salon. Patient with CAH to destroy property, continues to have CAH to harm herself. Trial of discharge was attempted, patient presents again within two days of discharge. Patient at this time is unable to remain safe in community, requires hospitalization for safety and stabilization.

## 2024-01-11 NOTE — ED PROVIDER NOTE - CLINICAL SUMMARY MEDICAL DECISION MAKING FREE TEXT BOX
38 yof pmh intellectual delay, schizophrenia, recently seen here, here for bizarre behavior. Was at nail salon and starting acting bizarre prompting EMS transit. Here patient is calm. Is endorsing auditory hallucinations telling her to "throw glass". Poor eye contact. Does not answer when asked about suicidal intent or homicidal intent. Does not endorse as headache, cp, sob, abd pain.   AP: +hallucinations, poor eye contact. does not offer other complaints. will get medical clearance labs. psych to eval

## 2024-01-11 NOTE — ED BEHAVIORAL HEALTH ASSESSMENT NOTE - PSYCHIATRIC ISSUES AND PLAN (INCLUDE STANDING AND PRN MEDICATION)
Haldol 5 mg PO/IM, Ativan 2 mg PO/IM, and Benadryl 50 mg PO/IM PRN Q6H for agitation. Risperidone 3mg PO qhs; 2mg PO daily

## 2024-01-11 NOTE — ED PROVIDER NOTE - OBJECTIVE STATEMENT
38 yof pmh intellectual delay, schizophrenia, recently seen here, here for bizarre behavior. Was at nail salon and starting acting bizarre prompting EMS transit. Here patient is calm. Is endorsing auditory hallucinations telling her to "throw glass". Poor eye contact. Does not answer when asked about suicidal intent or homicidal intent. Does not endorse as headache, cp, sob, abd pain.

## 2024-01-11 NOTE — ED CDU PROVIDER INITIAL DAY NOTE - CLINICAL SUMMARY MEDICAL DECISION MAKING FREE TEXT BOX
38 yof pmh intellectual delay, schizophrenia, recently seen here, here for bizarre behavior. Was at nail salon and starting acting bizarre prompting EMS transit. Here patient is calm. Is endorsing auditory hallucinations telling her to "throw glass". Poor eye contact. Does not answer when asked about suicidal intent or homicidal intent. Does not endorse as headache, cp, sob, abd pain.   AP: +hallucinations, poor eye contact. does not offer other complaints. will get medical clearance labs. psych recommending inpt admission. pending bed

## 2024-01-11 NOTE — ED ADULT TRIAGE NOTE - CHIEF COMPLAINT QUOTE
pt BIBA from [facility] after having "psych episode." pt restrained on arrival, calm and cooperative on arrival, had recent psych med changes. pt with spit mask on as well, placed by EMS. [facility] at bedside as well for safety during transport. pt BIBA from [facility] after having "psych episode." pt restrained on arrival, calm and cooperative on arrival, had recent psych med changes. pt with spit mask on as well, placed by EMS. [facility] at bedside as well for safety during transport.

## 2024-01-11 NOTE — ED BEHAVIORAL HEALTH ASSESSMENT NOTE - DESCRIPTION
ICU Vital Signs Last 24 Hrs  T(C): 36.1 (11 Jan 2024 16:29), Max: 36.1 (11 Jan 2024 16:29)  T(F): 97 (11 Jan 2024 16:29), Max: 97 (11 Jan 2024 16:29)  HR: 84 (11 Jan 2024 16:29) (84 - 84)  BP: 147/80 (11 Jan 2024 16:29) (147/80 - 147/80)  BP(mean): --  ABP: --  ABP(mean): --  RR: 18 (11 Jan 2024 16:29) (18 - 18)  SpO2: 98% (11 Jan 2024 16:29) (98% - 98%)    O2 Parameters below as of 11 Jan 2024 16:29  Patient On (Oxygen Delivery Method): room air GERD lives with caretaker and family

## 2024-01-12 LAB
AMPHET UR-MCNC: NEGATIVE — SIGNIFICANT CHANGE UP
AMPHET UR-MCNC: NEGATIVE — SIGNIFICANT CHANGE UP
APPEARANCE UR: CLEAR — SIGNIFICANT CHANGE UP
APPEARANCE UR: CLEAR — SIGNIFICANT CHANGE UP
BARBITURATES UR SCN-MCNC: NEGATIVE — SIGNIFICANT CHANGE UP
BARBITURATES UR SCN-MCNC: NEGATIVE — SIGNIFICANT CHANGE UP
BENZODIAZ UR-MCNC: NEGATIVE — SIGNIFICANT CHANGE UP
BENZODIAZ UR-MCNC: NEGATIVE — SIGNIFICANT CHANGE UP
BILIRUB UR-MCNC: NEGATIVE — SIGNIFICANT CHANGE UP
BILIRUB UR-MCNC: NEGATIVE — SIGNIFICANT CHANGE UP
COCAINE METAB.OTHER UR-MCNC: NEGATIVE — SIGNIFICANT CHANGE UP
COCAINE METAB.OTHER UR-MCNC: NEGATIVE — SIGNIFICANT CHANGE UP
COLOR SPEC: YELLOW — SIGNIFICANT CHANGE UP
COLOR SPEC: YELLOW — SIGNIFICANT CHANGE UP
DIFF PNL FLD: NEGATIVE — SIGNIFICANT CHANGE UP
DIFF PNL FLD: NEGATIVE — SIGNIFICANT CHANGE UP
GLUCOSE UR QL: NEGATIVE MG/DL — SIGNIFICANT CHANGE UP
GLUCOSE UR QL: NEGATIVE MG/DL — SIGNIFICANT CHANGE UP
KETONES UR-MCNC: NEGATIVE MG/DL — SIGNIFICANT CHANGE UP
KETONES UR-MCNC: NEGATIVE MG/DL — SIGNIFICANT CHANGE UP
LEUKOCYTE ESTERASE UR-ACNC: NEGATIVE — SIGNIFICANT CHANGE UP
LEUKOCYTE ESTERASE UR-ACNC: NEGATIVE — SIGNIFICANT CHANGE UP
METHADONE UR-MCNC: NEGATIVE — SIGNIFICANT CHANGE UP
METHADONE UR-MCNC: NEGATIVE — SIGNIFICANT CHANGE UP
NITRITE UR-MCNC: NEGATIVE — SIGNIFICANT CHANGE UP
NITRITE UR-MCNC: NEGATIVE — SIGNIFICANT CHANGE UP
OPIATES UR-MCNC: NEGATIVE — SIGNIFICANT CHANGE UP
OPIATES UR-MCNC: NEGATIVE — SIGNIFICANT CHANGE UP
PCP SPEC-MCNC: SIGNIFICANT CHANGE UP
PCP SPEC-MCNC: SIGNIFICANT CHANGE UP
PCP UR-MCNC: NEGATIVE — SIGNIFICANT CHANGE UP
PCP UR-MCNC: NEGATIVE — SIGNIFICANT CHANGE UP
PH UR: 8.5 (ref 5–8)
PH UR: 8.5 (ref 5–8)
PROT UR-MCNC: NEGATIVE MG/DL — SIGNIFICANT CHANGE UP
PROT UR-MCNC: NEGATIVE MG/DL — SIGNIFICANT CHANGE UP
SP GR SPEC: 1.01 — SIGNIFICANT CHANGE UP (ref 1–1.03)
SP GR SPEC: 1.01 — SIGNIFICANT CHANGE UP (ref 1–1.03)
THC UR QL: NEGATIVE — SIGNIFICANT CHANGE UP
THC UR QL: NEGATIVE — SIGNIFICANT CHANGE UP
UROBILINOGEN FLD QL: 0.2 MG/DL — SIGNIFICANT CHANGE UP (ref 0.2–1)
UROBILINOGEN FLD QL: 0.2 MG/DL — SIGNIFICANT CHANGE UP (ref 0.2–1)

## 2024-01-12 PROCEDURE — 99233 SBSQ HOSP IP/OBS HIGH 50: CPT

## 2024-01-12 PROCEDURE — 99213 OFFICE O/P EST LOW 20 MIN: CPT

## 2024-01-12 RX ADMIN — RISPERIDONE 3 MILLIGRAM(S): 4 TABLET ORAL at 21:48

## 2024-01-12 RX ADMIN — RISPERIDONE 2 MILLIGRAM(S): 4 TABLET ORAL at 09:25

## 2024-01-12 NOTE — ED BEHAVIORAL HEALTH PROGRESS NOTE - SUMMARY
38-year-old woman, lives with caretaker, [facility] of [place]ERD, PPH schizophrenia, intellectual disability, no prior psych admissions or suicide attempt, in tx at [facility] with Dr Rodriguez, no drug or alcohol use, bib [employer] after she destroyed her caretaker's nail salon. Patient with CAH to destroy property, continues to have CAH to harm herself. Trial of discharge was attempted, patient presents again within two days of discharge. Patient at this time is unable to remain safe in community, requires hospitalization for safety and stabilization. 38-year-old woman, lives with caretaker, [facility] of [place], PPH schizophrenia, intellectual disability, no prior psych admissions or suicide attempt, in tx at [facility] with Dr Rodriguez, no drug or alcohol use, bib [facility] after she destroyed her caretaker's nail salon. Patient with CAH to destroy property, continues to have CAH to harm herself. Trial of discharge was attempted, patient presents again within two days of discharge. Patient at this time is unable to remain safe in community, requires hospitalization for safety and stabilization.

## 2024-01-12 NOTE — CHART NOTE - NSCHARTNOTEFT_GEN_A_CORE
SW Note: Plan is to transfer pt for IP psychiatric care. Pt has straight medicaid so can not be referred to [facility]. The following facilities reported no beds, [place] , , [facility], [facility] and [facility]. Referral made to [facility]. Confirmed with Shivani Hector and RN Corey Franklin that the pt has been accepted but they can only accept transfer tomorrow 1/13/24 a.m. Unit assigned 2N , # station [phone]. Faxed 9.27 legals to all for review. [facility] has access to sunrise chart for additional info. They are asking for an updated psych note sent with pt as well as MAR. SW will need to call the unit in the a.m to confirm bed and RN will need to give report.   Tried to call pts caregiver Dee [phone] but no answer and no VM  SW will follow SW Note: Plan is to transfer pt for IP psychiatric care. Pt has straight medicaid so can not be referred to [facility]. The following facilities reported no beds, [place] , , [facility], [facility] and [facility]. Referral made to [facility]. Confirmed with Shivani Hector and RN Corey Franklin that the pt has been accepted but they can only accept transfer tomorrow 1/13/24 a.m. Unit assigned 2N , # station [phone]. Faxed 9.27 legals to all for review. [facility] has access to sunrise chart for additional info. They are asking for an updated psych note sent with pt as well as MAR. SW will need to call the unit in the a.m to confirm bed and RN will need to give report.   Tried to call pts caregiver Dee [phone] but no answer and no VM  SW will follow

## 2024-01-12 NOTE — ED ADULT NURSE REASSESSMENT NOTE - GENERAL PATIENT STATE
comfortable appearance/resting/sleeping

## 2024-01-12 NOTE — ED BEHAVIORAL HEALTH PROGRESS NOTE - CASE SUMMARY/FORMULATION (CLEARLY DOCUMENT RATIONALE FOR DISPOSITION CHANGE)
38-year-old woman, lives with caretaker, [facility] of [place]ER[place], PPH schizophrenia, intellectual disability, no prior psych admissions or suicide attempt, in tx at [facility] with Dr Rodriguez, no drug or alcohol use, bib [employer] after she destroyed her caretaker's nail salon. Patient with CAH to destroy property, continues to have CAH to harm herself. Trial of discharge was attempted, patient presents again within two days of discharge. Patient at this time is unable to remain safe in community, requires hospitalization for safety and stabilization.    Continues to have CAH to harm self and others; had recent aggressive behavior; requires hospitalization at this time. 38-year-old woman, lives with caretaker, [facility] of [place]ER[place], PPH schizophrenia, intellectual disability, no prior psych admissions or suicide attempt, in tx at [facility] with Dr Rodriguez, no drug or alcohol use, bib [employer] after she destroyed her caretaker's nail salon. Patient with CAH to destroy property, continues to have CAH to harm herself. Trial of discharge was attempted, patient presents again within two days of discharge. Patient at this time is unable to remain safe in community, requires hospitalization for safety and stabilization.    Continues to have CAH to harm self and others; had recent aggressive behavior; requires hospitalization at this time.

## 2024-01-12 NOTE — ED BEHAVIORAL HEALTH PROGRESS NOTE - DETAILS:
Continues to have CAH, is internally preoccupied, limiting herself to her room; limited, concrete interview.

## 2024-01-13 ENCOUNTER — INPATIENT (INPATIENT)
Facility: HOSPITAL | Age: 39
LOS: 12 days | Discharge: HOME | DRG: 750 | End: 2024-01-26
Attending: PSYCHIATRY & NEUROLOGY | Admitting: PSYCHIATRY & NEUROLOGY
Payer: MEDICAID

## 2024-01-13 VITALS
DIASTOLIC BLOOD PRESSURE: 68 MMHG | TEMPERATURE: 98 F | OXYGEN SATURATION: 97 % | RESPIRATION RATE: 18 BRPM | SYSTOLIC BLOOD PRESSURE: 106 MMHG | HEART RATE: 67 BPM

## 2024-01-13 VITALS
HEIGHT: 63 IN | RESPIRATION RATE: 16 BRPM | TEMPERATURE: 97 F | HEART RATE: 73 BPM | DIASTOLIC BLOOD PRESSURE: 55 MMHG | SYSTOLIC BLOOD PRESSURE: 110 MMHG | WEIGHT: 143.3 LBS

## 2024-01-13 DIAGNOSIS — E22.1 HYPERPROLACTINEMIA: ICD-10-CM

## 2024-01-13 DIAGNOSIS — F20.9 SCHIZOPHRENIA, UNSPECIFIED: ICD-10-CM

## 2024-01-13 DIAGNOSIS — F20.89 OTHER SCHIZOPHRENIA: ICD-10-CM

## 2024-01-13 DIAGNOSIS — R45.1 RESTLESSNESS AND AGITATION: ICD-10-CM

## 2024-01-13 DIAGNOSIS — F29 UNSPECIFIED PSYCHOSIS NOT DUE TO A SUBSTANCE OR KNOWN PHYSIOLOGICAL CONDITION: ICD-10-CM

## 2024-01-13 DIAGNOSIS — F81.9 DEVELOPMENTAL DISORDER OF SCHOLASTIC SKILLS, UNSPECIFIED: ICD-10-CM

## 2024-01-13 DIAGNOSIS — F79 UNSPECIFIED INTELLECTUAL DISABILITIES: ICD-10-CM

## 2024-01-13 PROCEDURE — 81003 URINALYSIS AUTO W/O SCOPE: CPT

## 2024-01-13 PROCEDURE — 99285 EMERGENCY DEPT VISIT HI MDM: CPT | Mod: 25

## 2024-01-13 PROCEDURE — 80061 LIPID PANEL: CPT

## 2024-01-13 PROCEDURE — 80053 COMPREHEN METABOLIC PANEL: CPT

## 2024-01-13 PROCEDURE — 36415 COLL VENOUS BLD VENIPUNCTURE: CPT

## 2024-01-13 PROCEDURE — 84443 ASSAY THYROID STIM HORMONE: CPT

## 2024-01-13 PROCEDURE — 84146 ASSAY OF PROLACTIN: CPT

## 2024-01-13 PROCEDURE — 99233 SBSQ HOSP IP/OBS HIGH 50: CPT

## 2024-01-13 PROCEDURE — 93005 ELECTROCARDIOGRAM TRACING: CPT

## 2024-01-13 PROCEDURE — 80048 BASIC METABOLIC PNL TOTAL CA: CPT

## 2024-01-13 PROCEDURE — 84702 CHORIONIC GONADOTROPIN TEST: CPT

## 2024-01-13 PROCEDURE — 80307 DRUG TEST PRSMV CHEM ANLYZR: CPT

## 2024-01-13 PROCEDURE — 85025 COMPLETE CBC W/AUTO DIFF WBC: CPT

## 2024-01-13 PROCEDURE — 83036 HEMOGLOBIN GLYCOSYLATED A1C: CPT

## 2024-01-13 PROCEDURE — G0378: CPT

## 2024-01-13 PROCEDURE — 87637 SARSCOV2&INF A&B&RSV AMP PRB: CPT

## 2024-01-13 RX ORDER — DIPHENHYDRAMINE HCL 50 MG
50 CAPSULE ORAL EVERY 6 HOURS
Refills: 0 | Status: DISCONTINUED | OUTPATIENT
Start: 2024-01-13 | End: 2024-01-26

## 2024-01-13 RX ORDER — RISPERIDONE 4 MG/1
1 TABLET ORAL DAILY
Refills: 0 | Status: DISCONTINUED | OUTPATIENT
Start: 2024-01-13 | End: 2024-01-17

## 2024-01-13 RX ORDER — HALOPERIDOL DECANOATE 100 MG/ML
5 INJECTION INTRAMUSCULAR EVERY 6 HOURS
Refills: 0 | Status: DISCONTINUED | OUTPATIENT
Start: 2024-01-13 | End: 2024-01-26

## 2024-01-13 RX ORDER — RISPERIDONE 4 MG/1
2 TABLET ORAL AT BEDTIME
Refills: 0 | Status: DISCONTINUED | OUTPATIENT
Start: 2024-01-13 | End: 2024-01-22

## 2024-01-13 RX ADMIN — RISPERIDONE 2 MILLIGRAM(S): 4 TABLET ORAL at 20:15

## 2024-01-13 RX ADMIN — RISPERIDONE 2 MILLIGRAM(S): 4 TABLET ORAL at 09:07

## 2024-01-13 NOTE — CHART NOTE - NSCHARTNOTEFT_GEN_A_CORE
KATERYNA informed by clinical team that patient remains cleared pending inpatient psych placement. Bed was obtained for patient yesterday at [facility] to be confirmed this morning. KATERYNA contacted [facility] 2N unit, [phone] and spoke with BIJU Calvert who confirms they are expecting patient and have the bed for her. Per Nehal, they have all documentation needed. Patient must arrive to [facility] by 3pm. RN report given. KATERYNA contacted [facility], [phone], and spoke with Luis Alberto to arrange EMS transport for next available. RN report given to EMS. [facility]  and physician transfer form completed and packet arranged.     KATERYNA spoke with patient's caretaker, Dee [phone], who is aware and in agreement with transfer to [facility]. KATERYNA informed by clinical team that patient remains cleared pending inpatient psych placement. Bed was obtained for patient yesterday at [facility] to be confirmed this morning. KATERYNA contacted [facility] 2N unit, [phone] and spoke with BIJU Calvert who confirms they are expecting patient and have the bed for her. Per Nehal, they have all documentation needed. Patient must arrive to [facility] by 3pm. RN report given. KATERYNA contacted [facility], [phone], and spoke with Luis Alberto to arrange EMS transport for next available. RN report given to EMS. [facility]  and physician transfer form completed and packet arranged.     KATERYNA spoke with patient's caretaker, Dee [phone], who is aware and in agreement with transfer to [facility].

## 2024-01-13 NOTE — BH INPATIENT PSYCHIATRY ASSESSMENT NOTE - NSBHASSESSSUMMFT_PSY_ALL_CORE
Amanda Neil is a 38-year-old female, domiciled in a private residence with her caretaker and caretaker's two nephews (none have any biological relation to patient) for the last 3 years, disabled, single, no significant PMH, past psychiatric history of intellectual disability and ? schizophrenia, no known history of suicide attempts, no known history of IPP admissions aside from current admission at [facility], currently in outpatient treatment with psychiatrist Dr. Albertina Rodriguez, no known substance abuse history, presented to the ED BIB police after destroying property, admitted to IP for "psychosis."    On psychiatric evaluation, patient was calm, cooperative, and remained in good behavioral control; she did not appear to be overtly psychotic or responding to internal stimuli. She was admitted to the inpatient psychiatric unit following a behavioral disturbance that included destroying property at her caretaker's nail salon, which patient endorsed she did due to feeling frustrated with hearing voices and that the voices told her to do so. At this time, it is unclear whether patient is experiencing true auditory hallucinations as a psychotic symptom. The voices she endorses hearing may represent a feature of her potentially regressed developmental level given the presence of intellectual disability. There does not appear to be prodrome symptoms for the psychosis and substance use does not appear to be a factor. It will also be important to consider whether patient has a comorbid depressive or anxiety disorder, in which she may benefit from an SSRI. Her behavioral disturbances, per collateral, are occurring in the context of medication changes made by patient's outpatient psychiatrist. At this time, will continue admission on inpatient psychiatry, will continue patient's home Risperdal at 1 mg qdaily and 2 mg qHS, will hold off on her home med Fanapt 1 mg BID (non-formulary), and will plan to contact outpatient psychiatrist who has been caring for patient since she was a child for collateral information.    # Behavioral agitation associated with intellectual disability vs. Psychosis  - Risperdal 1 mg qdaily and 2 mg qHS  - Obtain further collateral information (medication history, diagnoses) from outpatient psychiatrist Dr. Albertina Rodriguez from [facility] ([phone]).  - Patient may benefit from treatment of depression/anxiety, if present, with an SSRI, but it will be important to obtain collateral from Dr. Jennifer to see history of medication trials.    # Agitation recommendations  - For severe agitation not responding to behavioral intervention, may give Haldol 5 mg PO q6hrs PRN, Ativan 2 mg PO q6hrs PRN, Benadryl 50 mg PO q6hrs PRN, with escalation to IM if patient refusing PO and remains an imminent danger to self or others. If IM antipsychotic is administered, please perform follow-up ECG for QTc monitoring. Amanda Neil is a 38-year-old female, domiciled in a private residence with her caretaker and caretaker's two nephews (none have any biological relation to patient) for the last 3 years, disabled, single, no significant PMH, past psychiatric history of intellectual disability and ? schizophrenia, no known history of suicide attempts, no known history of IPP admissions aside from current admission at [facility], currently in outpatient treatment with psychiatrist Dr. Albertina Rodriguez, no known substance abuse history, presented to the ED BIB police after destroying property, admitted to IP for "psychosis."    On psychiatric evaluation, patient was calm, cooperative, and remained in good behavioral control; she did not appear to be overtly psychotic or responding to internal stimuli. She was admitted to the inpatient psychiatric unit following a behavioral disturbance that included destroying property at her caretaker's nail salon, which patient endorsed she did due to feeling frustrated with hearing voices and that the voices told her to do so. At this time, it is unclear whether patient is experiencing true auditory hallucinations as a psychotic symptom. The voices she endorses hearing may represent a feature of her potentially regressed developmental level given the presence of intellectual disability. There does not appear to be prodrome symptoms for the psychosis and substance use does not appear to be a factor. It will also be important to consider whether patient has a comorbid depressive or anxiety disorder, in which she may benefit from an SSRI. Her behavioral disturbances, per collateral, are occurring in the context of medication changes made by patient's outpatient psychiatrist. At this time, will continue admission on inpatient psychiatry, will continue patient's home Risperdal at 1 mg qdaily and 2 mg qHS, will hold off on her home med Fanapt 1 mg BID (non-formulary), and will plan to contact outpatient psychiatrist who has been caring for patient since she was a child for collateral information.    # Behavioral agitation associated with intellectual disability vs. Psychosis  - Risperdal 1 mg qdaily and 2 mg qHS  - Obtain further collateral information (medication history, diagnoses) from outpatient psychiatrist Dr. Albertina Rodriguez from [facility] ([phone]).  - Patient may benefit from treatment of depression/anxiety, if present, with an SSRI, but it will be important to obtain collateral from Dr. Jennifer to see history of medication trials.    # Agitation recommendations  - For severe agitation not responding to behavioral intervention, may give Haldol 5 mg PO q6hrs PRN, Ativan 2 mg PO q6hrs PRN, Benadryl 50 mg PO q6hrs PRN, with escalation to IM if patient refusing PO and remains an imminent danger to self or others. If IM antipsychotic is administered, please perform follow-up ECG for QTc monitoring.

## 2024-01-13 NOTE — BH INPATIENT PSYCHIATRY ASSESSMENT NOTE - PERSONAL COLLATERAL RELATIONSHIP
Caretaker. No biological relationship. Patient resides in the same home as Dee, but Dee is not her legal guardian.

## 2024-01-13 NOTE — ED CDU PROVIDER DISPOSITION NOTE - CLINICAL COURSE
Patient presented for bizarre behavior, danger to self and others, unable to care for self, medically cleared and evaluated by psych, stable for inpatient psychiatric admission. Accepted to [facility]. Patient presented for bizarre behavior, danger to self and others, unable to care for self, medically cleared and evaluated by psych, stable for inpatient psychiatric admission. Accepted to [facility].

## 2024-01-13 NOTE — BH INPATIENT PSYCHIATRY ASSESSMENT NOTE - RISK ASSESSMENT
Risk factors for suicide include impulsivity, possible underlying depressed mood. Protective factors include residential stability, sobriety, engagement in treatment, no known history of suicide attempts.
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
## 2024-01-13 NOTE — BH INPATIENT PSYCHIATRY ASSESSMENT NOTE - NSBHATTESTBILLING_PSY_A_CORE
[id number] diagnostic evaluation with medical services [id number] diagnostic evaluation with medical services

## 2024-01-13 NOTE — BH INPATIENT PSYCHIATRY ASSESSMENT NOTE - NSBHCHARTREVIEWVS_PSY_A_CORE FT
Vital Signs Last 24 Hrs  T(C): 35.9 (01-13-24 @ 13:47), Max: 37 (01-13-24 @ 07:38)  T(F): 96.7 (01-13-24 @ 13:47), Max: 98.6 (01-13-24 @ 07:38)  HR: 73 (01-13-24 @ 13:47) (67 - 82)  BP: 110/55 (01-13-24 @ 13:47) (101/67 - 111/71)  BP(mean): --  RR: 16 (01-13-24 @ 13:47) (16 - 19)  SpO2: 97% (01-13-24 @ 10:55) (95% - 98%)

## 2024-01-13 NOTE — BH PATIENT PROFILE - STATED REASON FOR ADMISSION
Pt. stated, " I was just throwing stuff and breaking glass". When asked why, patient stated that she was hearing voices that made her upset.

## 2024-01-13 NOTE — BH INPATIENT PSYCHIATRY ASSESSMENT NOTE - OTHER
Not formally assessed Unclear at this time if the voices patient endorses she hears are true auditory hallucinations (psychosis) or secondary to developmental deficit secondary to intellectual disability. "Sad"

## 2024-01-13 NOTE — ED CDU PROVIDER SUBSEQUENT DAY NOTE - CLINICAL SUMMARY MEDICAL DECISION MAKING FREE TEXT BOX
38 yof pmh intellectual delay, schizophrenia, recently seen here, here for bizarre behavior. Endorsing auditory hallucinations. Medically cleared. Psych recommending inpt admission. pending bed
38 yof pmh intellectual delay, schizophrenia, recently seen here, here for bizarre behavior. Endorsing auditory hallucinations. Medically cleared. Psych recommending inpt admission. pending bed

## 2024-01-13 NOTE — ED CDU PROVIDER SUBSEQUENT DAY NOTE - PHYSICAL EXAMINATION
Vital Signs per nursing documentation  Gen: well appearing, no acute distress  HEENT: NCAT, MMM  Cardiac: regular rate rhythm, normal S1S2  Chest: clear to auscultation bilateral, no wheezes or crackles  Abdomen: soft, non tender non distended  Extremity: no gross deformity, good perfusion  Skin: no rash  Neuro: nonfocal neuro exam
Vital Signs per nursing documentation  Gen: well appearing, no acute distress  HEENT: NCAT, MMM  Cardiac: regular rate rhythm, normal S1S2  Chest: clear to auscultation bilateral, no wheezes or crackles  Abdomen: soft, non tender non distended  Extremity: no gross deformity, good perfusion  Skin: no rash  Neuro: nonfocal neuro exam

## 2024-01-13 NOTE — ED ADULT NURSE REASSESSMENT NOTE - NS ED NURSE REASSESS COMMENT FT1
Assumed care of patient.  Pt alert and cooperative.  Pt states that she continues to hear voices to harm self without a plan at this time.  Pt offered po fluids and will monitor and chart changes and maintain safe environment
Assumed patient care at 7:00 PM. Patient in bed, alert and awake. Patient reported she is here because of auditory hallucinations. She reported she still hearing voices but a weak level.  Patient is on schedule for transfer, she is aware. Patient complied with the plan of care, no attempt made to self harm or to harm others, safety maintained.
Patient in bed sleeping, safety maintained.
Patient observed sleeping during the night, complied with nursing care and medications. Patient has no complains this AM, no sign of agitation, no attempt to harm self or others, safety maintained.
Received PM medication, tolerates well. Patient in bed sleeping, safety maintained.
pt ambulatory to bathroom.  remains self isolating to room.  no harm to self or others
pt care assumed at 0730, no apparent distress noted at this time, charting as noted. Pt received A&Ox4 resting in bed comfortably at this time. Pt states "I knocked things over and was spitting in the salon". Pt understands plan of care to transfer to inpatient psych. VSS.
pt latoya admits to ah saying "bad things"
remains self isolating to room. no harm to self or others
received report.   received pt sleeping no harm to self or others.

## 2024-01-13 NOTE — BH INPATIENT PSYCHIATRY ASSESSMENT NOTE - NSBHCONSBHPROVDETAILS_PSY_A_CORE  FT
Behavioral health provider is Dr. Albertina Rodriguez from [facility] ([phone]). Unable to speak with Dr. Rodriguez since office is closed 1/13-1/15.  Behavioral health provider is Dr. Albertina Rodriguez from [facility] ([phone]). Unable to speak with Dr. Rodriguez since office is closed 1/13-1/15.

## 2024-01-13 NOTE — BH INPATIENT PSYCHIATRY ASSESSMENT NOTE - NSBHCHARTREVIEWINVESTIGATE_PSY_A_CORE FT
< from: 12 Lead ECG (01.11.24 @ 17:16) >    Ventricular Rate 70 BPM    Atrial Rate 70 BPM    P-R Interval 132 ms    QRS Duration 94 ms    Q-T Interval 404 ms    QTC Calculation(Bazett) 436 ms    P Axis 42 degrees    R Axis 50 degrees    T Axis 20 degrees    Diagnosis Line Normal sinus rhythm  Nonspecific T wave abnormality  Abnormal ECG    < end of copied text >

## 2024-01-13 NOTE — BH PATIENT PROFILE - FALL HARM RISK - UNIVERSAL INTERVENTIONS
Bed in lowest position, wheels locked, appropriate side rails in place/Call bell, personal items and telephone in reach/Instruct patient to call for assistance before getting out of bed or chair/Non-slip footwear when patient is out of bed/[place] to call system/Physically safe environment - no spills, clutter or unnecessary equipment/Purposeful Proactive Rounding/Room/bathroom lighting operational, light cord in reach Bed in lowest position, wheels locked, appropriate side rails in place/Call bell, personal items and telephone in reach/Instruct patient to call for assistance before getting out of bed or chair/Non-slip footwear when patient is out of bed/[place] to call system/Physically safe environment - no spills, clutter or unnecessary equipment/Purposeful Proactive Rounding/Room/bathroom lighting operational, light cord in reach

## 2024-01-13 NOTE — BH INPATIENT PSYCHIATRY ASSESSMENT NOTE - CURRENT MEDICATION
MEDICATIONS  (STANDING):    MEDICATIONS  (PRN):   MEDICATIONS  (STANDING):  risperiDONE   Tablet 1 milliGRAM(s) Oral daily  risperiDONE   Tablet 2 milliGRAM(s) Oral at bedtime    MEDICATIONS  (PRN):  diphenhydrAMINE 50 milliGRAM(s) Oral every 6 hours PRN EPS Prophylaxis (given with Haldol, Ativan for agitation)  haloperidol     Tablet 5 milliGRAM(s) Oral every 6 hours PRN Severe Agitation  LORazepam     Tablet 2 milliGRAM(s) Oral every 6 hours PRN Severe Agitation

## 2024-01-13 NOTE — BH INPATIENT PSYCHIATRY ASSESSMENT NOTE - NSBHMETABOLIC_PSY_ALL_CORE_FT
BMI: BMI (kg/m2): 25.4 (01-13-24 @ 13:47)  HbA1c:   Glucose: POCT Blood Glucose.: 115 mg/dL (01-04-24 @ 00:46)    BP: 110/55 (01-13-24 @ 13:47) (110/55 - 110/55)Vital Signs Last 24 Hrs  T(C): 35.9 (01-13-24 @ 13:47), Max: 37 (01-13-24 @ 07:38)  T(F): 96.7 (01-13-24 @ 13:47), Max: 98.6 (01-13-24 @ 07:38)  HR: 73 (01-13-24 @ 13:47) (67 - 82)  BP: 110/55 (01-13-24 @ 13:47) (101/67 - 111/71)  BP(mean): --  RR: 16 (01-13-24 @ 13:47) (16 - 19)  SpO2: 97% (01-13-24 @ 10:55) (95% - 98%)      Lipid Panel:

## 2024-01-13 NOTE — BH INPATIENT PSYCHIATRY ASSESSMENT NOTE - NSBHCHARTREVIEWLAB_PSY_A_CORE FT
HCG Quantitative, Serum (01.11.24 @ 17:40)   HCG Quantitative, Serum: <4.0    Complete Blood Count + Automated Diff (01.11.24 @ 17:40)   WBC Count: 4.24 K/uL  RBC Count: 4.29 M/uL  Hemoglobin: 10.9 g/dL  Hematocrit: 33.9 %  Mean Cell Volume: 79.0 fl  Mean Cell Hemoglobin: 25.4 pg  Mean Cell Hemoglobin Conc: 32.2 gm/dL  Red Cell Distrib Width: 15.2 %  Platelet Count - Automated: 234 K/uL  Auto Neutrophil #: 2.02 K/uL  Auto Lymphocyte #: 1.59 K/uL  Auto Monocyte #: 0.53 K/uL  Auto Eosinophil #: 0.05 K/uL  Auto Basophil #: 0.03 K/uL  Auto Neutrophil %: 47.6: Differential percentages must be correlated with absolute numbers for   clinical significance. %  Auto Lymphocyte %: 37.5 %  Auto Monocyte %: 12.5 %  Auto Eosinophil %: 1.2 %  Auto Basophil %: 0.7 %  Auto Immature Granulocyte %: 0.5: (Includes meta, myelo and promyelocytes). Mild elevations in immature   granulocytes may be seen with many inflammatory processes and pregnancy;   clinical correlation suggested. %    Basic Metabolic Panel (01.11.24 @ 17:40)   Sodium: 137 mmol/L  Potassium: 3.9 mmol/L  Chloride: 100: Chloride reference range changed from ..10/26/2022 mmol/L  Carbon Dioxide: 23.0 mmol/L  Anion Gap: 14 mmol/L  Blood Urea Nitrogen: 13.8 mg/dL  Creatinine: 0.60 mg/dL  Glucose: 96 mg/dL  Calcium: 8.8 mg/dL  eGFR: 118

## 2024-01-14 LAB
ALBUMIN SERPL ELPH-MCNC: 4.1 G/DL — SIGNIFICANT CHANGE UP (ref 3.5–5.2)
ALBUMIN SERPL ELPH-MCNC: 4.1 G/DL — SIGNIFICANT CHANGE UP (ref 3.5–5.2)
ALP SERPL-CCNC: 66 U/L — SIGNIFICANT CHANGE UP (ref 30–115)
ALP SERPL-CCNC: 66 U/L — SIGNIFICANT CHANGE UP (ref 30–115)
ALT FLD-CCNC: 21 U/L — SIGNIFICANT CHANGE UP (ref 0–41)
ALT FLD-CCNC: 21 U/L — SIGNIFICANT CHANGE UP (ref 0–41)
ANION GAP SERPL CALC-SCNC: 11 MMOL/L — SIGNIFICANT CHANGE UP (ref 7–14)
ANION GAP SERPL CALC-SCNC: 11 MMOL/L — SIGNIFICANT CHANGE UP (ref 7–14)
AST SERPL-CCNC: 33 U/L — SIGNIFICANT CHANGE UP (ref 0–41)
AST SERPL-CCNC: 33 U/L — SIGNIFICANT CHANGE UP (ref 0–41)
BASOPHILS # BLD AUTO: 0.04 K/UL — SIGNIFICANT CHANGE UP (ref 0–0.2)
BASOPHILS # BLD AUTO: 0.04 K/UL — SIGNIFICANT CHANGE UP (ref 0–0.2)
BASOPHILS NFR BLD AUTO: 0.9 % — SIGNIFICANT CHANGE UP (ref 0–1)
BASOPHILS NFR BLD AUTO: 0.9 % — SIGNIFICANT CHANGE UP (ref 0–1)
BILIRUB SERPL-MCNC: <0.2 MG/DL — SIGNIFICANT CHANGE UP (ref 0.2–1.2)
BILIRUB SERPL-MCNC: <0.2 MG/DL — SIGNIFICANT CHANGE UP (ref 0.2–1.2)
BUN SERPL-MCNC: 9 MG/DL — LOW (ref 10–20)
BUN SERPL-MCNC: 9 MG/DL — LOW (ref 10–20)
CALCIUM SERPL-MCNC: 9.1 MG/DL — SIGNIFICANT CHANGE UP (ref 8.4–10.5)
CALCIUM SERPL-MCNC: 9.1 MG/DL — SIGNIFICANT CHANGE UP (ref 8.4–10.5)
CHLORIDE SERPL-SCNC: 98 MMOL/L — SIGNIFICANT CHANGE UP (ref 98–110)
CHLORIDE SERPL-SCNC: 98 MMOL/L — SIGNIFICANT CHANGE UP (ref 98–110)
CHOLEST SERPL-MCNC: 130 MG/DL — SIGNIFICANT CHANGE UP
CHOLEST SERPL-MCNC: 130 MG/DL — SIGNIFICANT CHANGE UP
CO2 SERPL-SCNC: 25 MMOL/L — SIGNIFICANT CHANGE UP (ref 17–32)
CO2 SERPL-SCNC: 25 MMOL/L — SIGNIFICANT CHANGE UP (ref 17–32)
CREAT SERPL-MCNC: 0.7 MG/DL — SIGNIFICANT CHANGE UP (ref 0.7–1.5)
CREAT SERPL-MCNC: 0.7 MG/DL — SIGNIFICANT CHANGE UP (ref 0.7–1.5)
EGFR: 113 ML/MIN/1.73M2 — SIGNIFICANT CHANGE UP
EGFR: 113 ML/MIN/1.73M2 — SIGNIFICANT CHANGE UP
EOSINOPHIL # BLD AUTO: 0.02 K/UL — SIGNIFICANT CHANGE UP (ref 0–0.7)
EOSINOPHIL # BLD AUTO: 0.02 K/UL — SIGNIFICANT CHANGE UP (ref 0–0.7)
EOSINOPHIL NFR BLD AUTO: 0.5 % — SIGNIFICANT CHANGE UP (ref 0–8)
EOSINOPHIL NFR BLD AUTO: 0.5 % — SIGNIFICANT CHANGE UP (ref 0–8)
GLUCOSE SERPL-MCNC: 92 MG/DL — SIGNIFICANT CHANGE UP (ref 70–99)
GLUCOSE SERPL-MCNC: 92 MG/DL — SIGNIFICANT CHANGE UP (ref 70–99)
HCT VFR BLD CALC: 36.6 % — LOW (ref 37–47)
HCT VFR BLD CALC: 36.6 % — LOW (ref 37–47)
HDLC SERPL-MCNC: 53 MG/DL — SIGNIFICANT CHANGE UP
HDLC SERPL-MCNC: 53 MG/DL — SIGNIFICANT CHANGE UP
HGB BLD-MCNC: 11.6 G/DL — LOW (ref 12–16)
HGB BLD-MCNC: 11.6 G/DL — LOW (ref 12–16)
IMM GRANULOCYTES NFR BLD AUTO: 0.2 % — SIGNIFICANT CHANGE UP (ref 0.1–0.3)
IMM GRANULOCYTES NFR BLD AUTO: 0.2 % — SIGNIFICANT CHANGE UP (ref 0.1–0.3)
LIPID PNL WITH DIRECT LDL SERPL: 61 MG/DL — SIGNIFICANT CHANGE UP
LIPID PNL WITH DIRECT LDL SERPL: 61 MG/DL — SIGNIFICANT CHANGE UP
LYMPHOCYTES # BLD AUTO: 1.7 K/UL — SIGNIFICANT CHANGE UP (ref 1.2–3.4)
LYMPHOCYTES # BLD AUTO: 1.7 K/UL — SIGNIFICANT CHANGE UP (ref 1.2–3.4)
LYMPHOCYTES # BLD AUTO: 38.7 % — SIGNIFICANT CHANGE UP (ref 20.5–51.1)
LYMPHOCYTES # BLD AUTO: 38.7 % — SIGNIFICANT CHANGE UP (ref 20.5–51.1)
MCHC RBC-ENTMCNC: 24.8 PG — LOW (ref 27–31)
MCHC RBC-ENTMCNC: 24.8 PG — LOW (ref 27–31)
MCHC RBC-ENTMCNC: 31.7 G/DL — LOW (ref 32–37)
MCHC RBC-ENTMCNC: 31.7 G/DL — LOW (ref 32–37)
MCV RBC AUTO: 78.2 FL — LOW (ref 81–99)
MCV RBC AUTO: 78.2 FL — LOW (ref 81–99)
MONOCYTES # BLD AUTO: 0.36 K/UL — SIGNIFICANT CHANGE UP (ref 0.1–0.6)
MONOCYTES # BLD AUTO: 0.36 K/UL — SIGNIFICANT CHANGE UP (ref 0.1–0.6)
MONOCYTES NFR BLD AUTO: 8.2 % — SIGNIFICANT CHANGE UP (ref 1.7–9.3)
MONOCYTES NFR BLD AUTO: 8.2 % — SIGNIFICANT CHANGE UP (ref 1.7–9.3)
NEUTROPHILS # BLD AUTO: 2.26 K/UL — SIGNIFICANT CHANGE UP (ref 1.4–6.5)
NEUTROPHILS # BLD AUTO: 2.26 K/UL — SIGNIFICANT CHANGE UP (ref 1.4–6.5)
NEUTROPHILS NFR BLD AUTO: 51.5 % — SIGNIFICANT CHANGE UP (ref 42.2–75.2)
NEUTROPHILS NFR BLD AUTO: 51.5 % — SIGNIFICANT CHANGE UP (ref 42.2–75.2)
NON HDL CHOLESTEROL: 77 MG/DL — SIGNIFICANT CHANGE UP
NON HDL CHOLESTEROL: 77 MG/DL — SIGNIFICANT CHANGE UP
NRBC # BLD: 0 /100 WBCS — SIGNIFICANT CHANGE UP (ref 0–0)
NRBC # BLD: 0 /100 WBCS — SIGNIFICANT CHANGE UP (ref 0–0)
PLATELET # BLD AUTO: 262 K/UL — SIGNIFICANT CHANGE UP (ref 130–400)
PLATELET # BLD AUTO: 262 K/UL — SIGNIFICANT CHANGE UP (ref 130–400)
PMV BLD: 9.7 FL — SIGNIFICANT CHANGE UP (ref 7.4–10.4)
PMV BLD: 9.7 FL — SIGNIFICANT CHANGE UP (ref 7.4–10.4)
POTASSIUM SERPL-MCNC: 4.9 MMOL/L — SIGNIFICANT CHANGE UP (ref 3.5–5)
POTASSIUM SERPL-MCNC: 4.9 MMOL/L — SIGNIFICANT CHANGE UP (ref 3.5–5)
POTASSIUM SERPL-SCNC: 4.9 MMOL/L — SIGNIFICANT CHANGE UP (ref 3.5–5)
POTASSIUM SERPL-SCNC: 4.9 MMOL/L — SIGNIFICANT CHANGE UP (ref 3.5–5)
PROT SERPL-MCNC: 7.7 G/DL — SIGNIFICANT CHANGE UP (ref 6–8)
PROT SERPL-MCNC: 7.7 G/DL — SIGNIFICANT CHANGE UP (ref 6–8)
RBC # BLD: 4.68 M/UL — SIGNIFICANT CHANGE UP (ref 4.2–5.4)
RBC # BLD: 4.68 M/UL — SIGNIFICANT CHANGE UP (ref 4.2–5.4)
RBC # FLD: 14.8 % — HIGH (ref 11.5–14.5)
RBC # FLD: 14.8 % — HIGH (ref 11.5–14.5)
SODIUM SERPL-SCNC: 134 MMOL/L — LOW (ref 135–146)
SODIUM SERPL-SCNC: 134 MMOL/L — LOW (ref 135–146)
TRIGL SERPL-MCNC: 79 MG/DL — SIGNIFICANT CHANGE UP
TRIGL SERPL-MCNC: 79 MG/DL — SIGNIFICANT CHANGE UP
WBC # BLD: 4.39 K/UL — LOW (ref 4.8–10.8)
WBC # BLD: 4.39 K/UL — LOW (ref 4.8–10.8)
WBC # FLD AUTO: 4.39 K/UL — LOW (ref 4.8–10.8)
WBC # FLD AUTO: 4.39 K/UL — LOW (ref 4.8–10.8)

## 2024-01-14 PROCEDURE — 99236 HOSP IP/OBS SAME DATE HI 85: CPT

## 2024-01-14 RX ADMIN — RISPERIDONE 1 MILLIGRAM(S): 4 TABLET ORAL at 09:03

## 2024-01-14 RX ADMIN — RISPERIDONE 2 MILLIGRAM(S): 4 TABLET ORAL at 20:09

## 2024-01-14 NOTE — BH INPATIENT PSYCHIATRY PROGRESS NOTE - NSBHFUPINTERVALHXFT_PSY_A_CORE
Chart reviwed , discussed with staff and patient  interviewed.    No  acute event overnight. she is compliant with medications.    mood   " ok".  she admits hearing command   auditory  hallucinations  telling her to throw  random things.   She  reports that she does not want to act on them. she is calm ,  laying on bed.   denies s/h ideations intent or plan.

## 2024-01-14 NOTE — CONSULT NOTE ADULT - SUBJECTIVE AND OBJECTIVE BOX
SUBJECTIVE:    Today is hospital day 1d. This morning she is resting in bed and reports no new issues or overnight events.     PAST MEDICAL & SURGICAL HISTORY    SOCIAL HISTORY:  Negative for smoking/alcohol/drug use.     ALLERGIES:  No Known Allergies    MEDICATIONS:  STANDING MEDICATIONS  risperiDONE   Tablet 2 milliGRAM(s) Oral at bedtime  risperiDONE   Tablet 1 milliGRAM(s) Oral daily    PRN MEDICATIONS  diphenhydrAMINE 50 milliGRAM(s) Oral every 6 hours PRN  haloperidol     Tablet 5 milliGRAM(s) Oral every 6 hours PRN  LORazepam     Tablet 2 milliGRAM(s) Oral every 6 hours PRN    VITALS:   T(F): 96.1  HR: 87  BP: 113/69  RR: 16  SpO2: --    LABS:                        11.6   4.39  )-----------( 262      ( 14 Jan 2024 04:30 )             36.6     01-14    134<L>  |  98  |  9<L>  ----------------------------<  92  4.9   |  25  |  0.7    Ca    9.1      14 Jan 2024 04:30    TPro  7.7  /  Alb  4.1  /  TBili  <0.2  /  DBili  x   /  AST  33  /  ALT  21  /  AlkPhos  66  01-14      Urinalysis Basic - ( 14 Jan 2024 04:30 )    Color: x / Appearance: x / SG: x / pH: x  Gluc: 92 mg/dL / Ketone: x  / Bili: x / Urobili: x   Blood: x / Protein: x / Nitrite: x   Leuk Esterase: x / RBC: x / WBC x   Sq Epi: x / Non Sq Epi: x / Bacteria: x    RADIOLOGY:  no images to review     PHYSICAL EXAM:  GEN: No acute distress  LUNGS: Clear to auscultation bilaterally   HEART: S1/S2 present. RRR.   ABD: Soft, non-tender, non-distended. Bowel sounds present  EXT: NC/NC/NE/2+PP/LYNN  NEURO: AAOX3  SKIN: intact

## 2024-01-14 NOTE — BH INPATIENT PSYCHIATRY PROGRESS NOTE - NSBHCONSBHPROVDETAILS_PSY_A_CORE  FT
Behavioral health provider is Dr. Albertina Rodriguez from [facility] ([phone]). Unable to speak with Dr. Rodriguez since office is closed 1/13-1/15.  Behavioral health provider is Dr. Albertina Rodriguez from [facility] ([phone]). Unable to speak with Dr. Rodriguez since office is closed 1/13-1/15.

## 2024-01-14 NOTE — BH INPATIENT PSYCHIATRY PROGRESS NOTE - NSBHMETABOLIC_PSY_ALL_CORE_FT
BMI: BMI (kg/m2): 25.4 (01-13-24 @ 13:47)  HbA1c:   Glucose: POCT Blood Glucose.: 115 mg/dL (01-04-24 @ 00:46)    BP: 113/69 (01-14-24 @ 08:22) (110/55 - 113/69)Vital Signs Last 24 Hrs  T(C): 35.6 (01-14-24 @ 08:22), Max: 35.6 (01-14-24 @ 08:22)  T(F): 96.1 (01-14-24 @ 08:22), Max: 96.1 (01-14-24 @ 08:22)  HR: 87 (01-14-24 @ 08:22) (87 - 87)  BP: 113/69 (01-14-24 @ 08:22) (113/69 - 113/69)  BP(mean): --  RR: 16 (01-14-24 @ 08:22) (16 - 16)  SpO2: --      Lipid Panel: Date/Time: 01-14-24 @ 04:30  Cholesterol, Serum: 130  LDL Cholesterol Calculated: 61  HDL Cholesterol, Serum: 53  Total Cholesterol/HDL Ration Measurement: --  Triglycerides, Serum: 79

## 2024-01-14 NOTE — BH INPATIENT PSYCHIATRY PROGRESS NOTE - CURRENT MEDICATION
MEDICATIONS  (STANDING):  risperiDONE   Tablet 2 milliGRAM(s) Oral at bedtime  risperiDONE   Tablet 1 milliGRAM(s) Oral daily    MEDICATIONS  (PRN):  diphenhydrAMINE 50 milliGRAM(s) Oral every 6 hours PRN EPS Prophylaxis (given with Haldol, Ativan for agitation)  haloperidol     Tablet 5 milliGRAM(s) Oral every 6 hours PRN Severe Agitation  LORazepam     Tablet 2 milliGRAM(s) Oral every 6 hours PRN Severe Agitation

## 2024-01-14 NOTE — CONSULT NOTE ADULT - ASSESSMENT
38-year-old female with history of intellectual disability and schizophrenia (follows psychiatrist Dr. Albertina Rodriguez) presented to the ED BIB police after destroying property, admitted to [facility] for "psychosis."    # Aggression   # Schizophrenia   - no active complains at encounter  - hemodynamically stable  - labs unremarkable   - utox neg   - RVP and COVID negative   - EKG: NSR   - check TSH   - treat as per primary team  38-year-old female with history of intellectual disability and schizophrenia (follows psychiatrist Dr. Albertina Rodriguez) presented to the ED BIB police after destroying property, admitted to [facility] for "psychosis."    # Aggression   # Schizophrenia   # Intellectual Disability   - no active complains at encounter  - hemodynamically stable  - labs unremarkable   - utox neg   - RVP and COVID negative   - EKG: NSR   - check TSH   - treat as per primary team

## 2024-01-14 NOTE — BH INPATIENT PSYCHIATRY PROGRESS NOTE - NSBHCHARTREVIEWVS_PSY_A_CORE FT
Vital Signs Last 24 Hrs  T(C): 35.6 (01-14-24 @ 08:22), Max: 35.6 (01-14-24 @ 08:22)  T(F): 96.1 (01-14-24 @ 08:22), Max: 96.1 (01-14-24 @ 08:22)  HR: 87 (01-14-24 @ 08:22) (87 - 87)  BP: 113/69 (01-14-24 @ 08:22) (113/69 - 113/69)  BP(mean): --  RR: 16 (01-14-24 @ 08:22) (16 - 16)  SpO2: --

## 2024-01-15 LAB
A1C WITH ESTIMATED AVERAGE GLUCOSE RESULT: 6.1 % — HIGH (ref 4–5.6)
A1C WITH ESTIMATED AVERAGE GLUCOSE RESULT: 6.1 % — HIGH (ref 4–5.6)
ESTIMATED AVERAGE GLUCOSE: 128 MG/DL — HIGH (ref 68–114)
ESTIMATED AVERAGE GLUCOSE: 128 MG/DL — HIGH (ref 68–114)
TSH SERPL-MCNC: 0.59 UIU/ML — SIGNIFICANT CHANGE UP (ref 0.27–4.2)
TSH SERPL-MCNC: 0.59 UIU/ML — SIGNIFICANT CHANGE UP (ref 0.27–4.2)

## 2024-01-15 RX ADMIN — RISPERIDONE 2 MILLIGRAM(S): 4 TABLET ORAL at 20:07

## 2024-01-15 RX ADMIN — RISPERIDONE 1 MILLIGRAM(S): 4 TABLET ORAL at 08:14

## 2024-01-15 NOTE — BH INPATIENT PSYCHIATRY PROGRESS NOTE - NSBHCONSBHPROVDETAILS_PSY_A_CORE  FT
Behavioral health provider is Dr. Albertina Rodriguez from [facility] ([phone]). Unable to speak with Dr. Rodriguez since office is closed 1/13-1/15.  Behavioral health provider is Dr. Albertina Rodriguez from [facility] ([phone]). Unable to speak with Dr. Rodriguez since office is closed 1/13-1/15.  Behavioral health provider is Dr. Albertina Rodriguez from [facility] ([phone]). Unable to speak with Dr. Rodriguez since office is closed 1/13-1/15.

## 2024-01-15 NOTE — BH INPATIENT PSYCHIATRY PROGRESS NOTE - NSBHMETABOLIC_PSY_ALL_CORE_FT
BMI: BMI (kg/m2): 25.4 (01-13-24 @ 13:47)  HbA1c: A1C with Estimated Average Glucose Result: 6.1 % (01-14-24 @ 04:30)    Glucose: POCT Blood Glucose.: 115 mg/dL (01-04-24 @ 00:46)    BP: 124/58 (01-15-24 @ 07:39) (106/68 - 124/58)Vital Signs Last 24 Hrs  T(C): 36.2 (01-15-24 @ 07:39), Max: 36.2 (01-15-24 @ 07:39)  T(F): 97.2 (01-15-24 @ 07:39), Max: 97.2 (01-15-24 @ 07:39)  HR: 82 (01-15-24 @ 07:39) (82 - 100)  BP: 124/58 (01-15-24 @ 07:39) (106/68 - 124/58)  BP(mean): --  RR: 18 (01-15-24 @ 07:39) (16 - 18)  SpO2: --      Lipid Panel: Date/Time: 01-14-24 @ 04:30  Cholesterol, Serum: 130  LDL Cholesterol Calculated: 61  HDL Cholesterol, Serum: 53  Total Cholesterol/HDL Ration Measurement: --  Triglycerides, Serum: 79   BMI: BMI (kg/m2): 25.4 (01-13-24 @ 13:47)  HbA1c: A1C with Estimated Average Glucose Result: 6.1 % (01-14-24 @ 04:30)    Glucose: POCT Blood Glucose.: 115 mg/dL (01-04-24 @ 00:46)    BP: 110/60 (01-15-24 @ 15:59) (106/68 - 124/58)Vital Signs Last 24 Hrs  T(C): 37.1 (01-15-24 @ 15:59), Max: 37.1 (01-15-24 @ 15:59)  T(F): 98.8 (01-15-24 @ 15:59), Max: 98.8 (01-15-24 @ 15:59)  HR: 91 (01-15-24 @ 15:59) (82 - 91)  BP: 110/60 (01-15-24 @ 15:59) (110/60 - 124/58)  BP(mean): --  RR: 16 (01-15-24 @ 15:59) (16 - 18)  SpO2: --      Lipid Panel: Date/Time: 01-14-24 @ 04:30  Cholesterol, Serum: 130  LDL Cholesterol Calculated: 61  HDL Cholesterol, Serum: 53  Total Cholesterol/HDL Ration Measurement: --  Triglycerides, Serum: 79

## 2024-01-15 NOTE — BH INPATIENT PSYCHIATRY PROGRESS NOTE - NSBHFUPINTERVALHXFT_PSY_A_CORE
Nursing reports no acute events overnight. Per chart review the patient has not required any behavioral PRNS since the last assessment.    Chart reviewed, patient seen and evaluated in AM. On approach, patient is sititng up her bed. Patient reports that she us doing good. She appears elevated, alughing throughout interview and smiling. Patient endorsed OK sleep and appetite. No SI/HI/AVH elicited.   Nursing reports no acute events overnight. Per chart review the patient has not required any behavioral PRNS since the last assessment.    Chart reviewed, patient seen and evaluated in AM. On approach, patient is sitting up her bed. Patient reports that she us doing good. She appears elevated, laughing throughout interview and smiling. Patient endorsed OK sleep and appetite. No SI/HI/AVH elicited.   Nursing reports no acute events overnight. Per chart review the patient has not required any behavioral PRNS since the last assessment.    Chart reviewed, patient seen and evaluated in AM. On approach, patient is sitting up her bed. Patient reports that she us doing good. She appears elevated, laughing throughout interview and smiling. Patient endorsed OK sleep and appetite. Denied medication side effects. No SI/HI/AVH elicited.

## 2024-01-15 NOTE — BH INPATIENT PSYCHIATRY PROGRESS NOTE - NSBHCHARTREVIEWVS_PSY_A_CORE FT
Vital Signs Last 24 Hrs  T(C): 36.2 (01-15-24 @ 07:39), Max: 36.2 (01-15-24 @ 07:39)  T(F): 97.2 (01-15-24 @ 07:39), Max: 97.2 (01-15-24 @ 07:39)  HR: 82 (01-15-24 @ 07:39) (82 - 100)  BP: 124/58 (01-15-24 @ 07:39) (106/68 - 124/58)  BP(mean): --  RR: 18 (01-15-24 @ 07:39) (16 - 18)  SpO2: --     Vital Signs Last 24 Hrs  T(C): 37.1 (01-15-24 @ 15:59), Max: 37.1 (01-15-24 @ 15:59)  T(F): 98.8 (01-15-24 @ 15:59), Max: 98.8 (01-15-24 @ 15:59)  HR: 91 (01-15-24 @ 15:59) (82 - 91)  BP: 110/60 (01-15-24 @ 15:59) (110/60 - 124/58)  BP(mean): --  RR: 16 (01-15-24 @ 15:59) (16 - 18)  SpO2: --

## 2024-01-15 NOTE — BH INPATIENT PSYCHIATRY PROGRESS NOTE - CURRENT MEDICATION
MEDICATIONS  (STANDING):  risperiDONE   Tablet 1 milliGRAM(s) Oral daily  risperiDONE   Tablet 2 milliGRAM(s) Oral at bedtime    MEDICATIONS  (PRN):  diphenhydrAMINE 50 milliGRAM(s) Oral every 6 hours PRN EPS Prophylaxis (given with Haldol, Ativan for agitation)  haloperidol     Tablet 5 milliGRAM(s) Oral every 6 hours PRN Severe Agitation  LORazepam     Tablet 2 milliGRAM(s) Oral every 6 hours PRN Severe Agitation   MEDICATIONS  (STANDING):  risperiDONE   Tablet 2 milliGRAM(s) Oral at bedtime  risperiDONE   Tablet 1 milliGRAM(s) Oral daily    MEDICATIONS  (PRN):  diphenhydrAMINE 50 milliGRAM(s) Oral every 6 hours PRN EPS Prophylaxis (given with Haldol, Ativan for agitation)  haloperidol     Tablet 5 milliGRAM(s) Oral every 6 hours PRN Severe Agitation  LORazepam     Tablet 2 milliGRAM(s) Oral every 6 hours PRN Severe Agitation

## 2024-01-15 NOTE — BH INPATIENT PSYCHIATRY PROGRESS NOTE - NSBHASSESSSUMMFT_PSY_ALL_CORE
Amanda Neil is a 38-year-old female, domiciled in a private residence with her caretaker and caretaker's two nephews (none have any biological relation to patient) for the last 3 years, disabled, single, no significant PMH, past psychiatric history of intellectual disability and ? schizophrenia, no known history of suicide attempts, no known history of IPP admissions aside from current admission at [facility], [place], currently in outpatient treatment with psychiatrist Dr. Albertina Rodriguez, no known substance abuse history, presented to the ED BIB police after destroying property, admitted to IPP for "psychosis."    On psychiatric evaluation, patient was calm, cooperative, and remained in good behavioral control; she did not appear to be overtly psychotic or responding to internal stimuli. She was admitted to the inpatient psychiatric unit following a behavioral disturbance that included destroying property at her caretaker's nail salon, which patient endorsed she did due to feeling frustrated with hearing voices and that the voices told her to do so. At this time, it is unclear whether patient is experiencing true auditory hallucinations as a psychotic symptom. The voices she endorses hearing may represent a feature of her potentially regressed developmental level given the presence of intellectual disability. There does not appear to be prodrome symptoms for the psychosis and substance use does not appear to be a factor. It will also be important to consider whether patient has a comorbid depressive or anxiety disorder, in which she may benefit from an SSRI. Her behavioral disturbances, per collateral, are occurring in the context of medication changes made by patient's outpatient psychiatrist. At this time, will continue admission on inpatient psychiatry, will continue patient's home Risperdal at 1 mg qdaily and 2 mg qHS, will hold off on her home med Fanapt 1 mg BID (non-formulary), and will plan to contact outpatient psychiatrist who has been caring for patient since she was a child for collateral information.    1/15/24: Patient was calm, cooperative, and remained in good behavioral control. She appears elevated, smiling/laughing throughout interview.    #Behavioral agitation associated with intellectual disability vs. Psychosis  - Risperdal 1 mg daily and 2 mg qHS  - Obtain further collateral information (medication history, diagnoses) from outpatient psychiatrist Dr. Albertina Rodriguez from [facility] ([phone]).  - Patient may benefit from treatment of depression/anxiety, if present, with an SSRI, but it will be important to obtain collateral from Dr. Rodriguez to see history of medication trials.    # Agitation recommendations  - For severe agitation not responding to behavioral intervention, may give Haldol 5 mg PO q6hrs PRN, Ativan 2 mg PO q6hrs PRN, Benadryl 50 mg PO q6hrs PRN, with escalation to IM if patient refusing PO and remains an imminent danger to self or others. If IM antipsychotic is administered, please perform follow-up ECG for QTc monitoring Amanda Neil is a 38-year-old female, domiciled in a private residence with her caretaker and caretaker's two nephews (none have any biological relation to patient) for the last 3 years, disabled, single, no significant PMH, past psychiatric history of intellectual disability and ? schizophrenia, no known history of suicide attempts, no known history of IPP admissions aside from current admission at [facility], currently in outpatient treatment with psychiatrist Dr. Albertina Rodriguez, no known substance abuse history, presented to the ED BIB police after destroying property, admitted to IPP for "psychosis."    On psychiatric evaluation, patient was calm, cooperative, and remained in good behavioral control; she did not appear to be overtly psychotic or responding to internal stimuli. She was admitted to the inpatient psychiatric unit following a behavioral disturbance that included destroying property at her caretaker's nail salon, which patient endorsed she did due to feeling frustrated with hearing voices and that the voices told her to do so. At this time, it is unclear whether patient is experiencing true auditory hallucinations as a psychotic symptom. The voices she endorses hearing may represent a feature of her potentially regressed developmental level given the presence of intellectual disability. There does not appear to be prodrome symptoms for the psychosis and substance use does not appear to be a factor. It will also be important to consider whether patient has a comorbid depressive or anxiety disorder, in which she may benefit from an SSRI. Her behavioral disturbances, per collateral, are occurring in the context of medication changes made by patient's outpatient psychiatrist. At this time, will continue admission on inpatient psychiatry, will continue patient's home Risperdal at 1 mg qdaily and 2 mg qHS, will hold off on her home med Fanapt 1 mg BID (non-formulary), and will plan to contact outpatient psychiatrist who has been caring for patient since she was a child for collateral information.    1/15/24: Patient was calm, cooperative, and remained in good behavioral control. She appears elevated, smiling/laughing throughout interview.    #Behavioral agitation associated with intellectual disability vs. Psychosis  - Risperdal 1 mg daily and 2 mg qHS  - Obtain further collateral information (medication history, diagnoses) from outpatient psychiatrist Dr. Albertina Rodriguez from [facility] ([phone]).  - Patient may benefit from treatment of depression/anxiety, if present, with an SSRI, but it will be important to obtain collateral from Dr. Rodriguez to see history of medication trials.    # Agitation recommendations  - For severe agitation not responding to behavioral intervention, may give Haldol 5 mg PO q6hrs PRN, Ativan 2 mg PO q6hrs PRN, Benadryl 50 mg PO q6hrs PRN, with escalation to IM if patient refusing PO and remains an imminent danger to self or others. If IM antipsychotic is administered, please perform follow-up ECG for QTc monitoring Amanda Neil is a 38-year-old female, domiciled in a private residence with her caretaker and caretaker's two nephews (none have any biological relation to patient) for the last 3 years, disabled, single, no significant PMH, past psychiatric history of intellectual disability and ? schizophrenia, no known history of suicide attempts, no known history of IPP admissions aside from current admission at [facility], currently in outpatient treatment with psychiatrist Dr. Albertina Rodriguez, no known substance abuse history, presented to the ED BIB police after destroying property, admitted to IPP for "psychosis."    On psychiatric evaluation, patient was calm, cooperative, and remained in good behavioral control; she did not appear to be overtly psychotic or responding to internal stimuli. She was admitted to the inpatient psychiatric unit following a behavioral disturbance that included destroying property at her caretaker's nail salon, which patient endorsed she did due to feeling frustrated with hearing voices and that the voices told her to do so. At this time, it is unclear whether patient is experiencing true auditory hallucinations as a psychotic symptom. The voices she endorses hearing may represent a feature of her potentially regressed developmental level given the presence of intellectual disability. There does not appear to be prodrome symptoms for the psychosis and substance use does not appear to be a factor. It will also be important to consider whether patient has a comorbid depressive or anxiety disorder, in which she may benefit from an SSRI. Her behavioral disturbances, per collateral, are occurring in the context of medication changes made by patient's outpatient psychiatrist. At this time, will continue admission on inpatient psychiatry, will continue patient's home Risperdal at 1 mg qdaily and 2 mg qHS, will hold off on her home med Fanapt 1 mg BID (non-formulary), and will plan to contact outpatient psychiatrist who has been caring for patient since she was a child for collateral information.    1/15/24: Patient was calm, cooperative, and remained in good behavioral control. She appears elevated, smiling/laughing throughout interview.    #Behavioral agitation associated with intellectual disability vs. Psychosis  - Risperdal 1 mg daily and 2 mg qHS  - Obtain further collateral information (medication history, diagnoses) from outpatient psychiatrist Dr. Albertina Rodriguez from [facility] ([phone]).  - Patient may benefit from treatment of depression/anxiety, if present, with an SSRI, but it will be important to obtain collateral from Dr. Rodriguez to see history of medication trials.    # Agitation recommendations  - For severe agitation not responding to behavioral intervention, may give Haldol 5 mg PO q6hrs PRN, Ativan 2 mg PO q6hrs PRN, Benadryl 50 mg PO q6hrs PRN, with escalation to IM if patient refusing PO and remains an imminent danger to self or others. If IM antipsychotic is administered, please perform follow-up ECG for QTc monitoring Amanda Neil is a 38-year-old female, domiciled in a private residence with her caretaker and caretaker's two nephews (none have any biological relation to patient) for the last 3 years, disabled, single, no significant PMH, past psychiatric history of intellectual disability and ? schizophrenia, no known history of suicide attempts, no known history of IPP admissions aside from current admission at [facility], currently in outpatient treatment with psychiatrist Dr. Albertina Rodriguez, no known substance abuse history, presented to the ED BIB police after destroying property, admitted to IP for "psychosis."    On psychiatric evaluation, patient was calm, cooperative, and remained in good behavioral control; she did not appear to be overtly psychotic or responding to internal stimuli. She was admitted to the inpatient psychiatric unit following a behavioral disturbance that included destroying property at her caretaker's nail salon, which patient endorsed she did due to feeling frustrated with hearing voices and that the voices told her to do so. At this time, it is unclear whether patient is experiencing true auditory hallucinations as a psychotic symptom. The voices she endorses hearing may represent a feature of her potentially regressed developmental level given the presence of intellectual disability. There does not appear to be prodrome symptoms for the psychosis and substance use does not appear to be a factor. It will also be important to consider whether patient has a comorbid depressive or anxiety disorder, in which she may benefit from an SSRI. Her behavioral disturbances, per collateral, are occurring in the context of medication changes made by patient's outpatient psychiatrist. At this time, will continue admission on inpatient psychiatry, will continue patient's home Risperdal at 1 mg qdaily and 2 mg qHS, will hold off on her home med Fanapt 1 mg BID (non-formulary), and will plan to contact outpatient psychiatrist who has been caring for patient since she was a child for collateral information.    1/15/24: Patient was calm, cooperative, and remained in good behavioral control. She appears elevated, smiling/laughing throughout interview. She is medication compliant. No changes to medication regimen were made.     #Behavioral agitation associated with intellectual disability vs. Psychosis  - Risperdal 1 mg daily and 2 mg qHS  - Obtain further collateral information (medication history, diagnoses) from outpatient psychiatrist Dr. Albertina Rodriguez from [facility] ([phone]).  - Patient may benefit from treatment of depression/anxiety, if present, with an SSRI, but it will be important to obtain collateral from Dr. Rodriguez to see history of medication trials.    # Agitation recommendations  - For severe agitation not responding to behavioral intervention, may give Haldol 5 mg PO q6hrs PRN, Ativan 2 mg PO q6hrs PRN, Benadryl 50 mg PO q6hrs PRN, with escalation to IM if patient refusing PO and remains an imminent danger to self or others. If IM antipsychotic is administered, please perform follow-up ECG for QTc monitoring Amanda Neil is a 38-year-old female, domiciled in a private residence with her caretaker and caretaker's two nephews (none have any biological relation to patient) for the last 3 years, disabled, single, no significant PMH, past psychiatric history of intellectual disability and ? schizophrenia, no known history of suicide attempts, no known history of IPP admissions aside from current admission at [facility], currently in outpatient treatment with psychiatrist Dr. Albertina Rodriguez, no known substance abuse history, presented to the ED BIB police after destroying property, admitted to IP for "psychosis."    On psychiatric evaluation, patient was calm, cooperative, and remained in good behavioral control; she did not appear to be overtly psychotic or responding to internal stimuli. She was admitted to the inpatient psychiatric unit following a behavioral disturbance that included destroying property at her caretaker's nail salon, which patient endorsed she did due to feeling frustrated with hearing voices and that the voices told her to do so. At this time, it is unclear whether patient is experiencing true auditory hallucinations as a psychotic symptom. The voices she endorses hearing may represent a feature of her potentially regressed developmental level given the presence of intellectual disability. There does not appear to be prodrome symptoms for the psychosis and substance use does not appear to be a factor. It will also be important to consider whether patient has a comorbid depressive or anxiety disorder, in which she may benefit from an SSRI. Her behavioral disturbances, per collateral, are occurring in the context of medication changes made by patient's outpatient psychiatrist. At this time, will continue admission on inpatient psychiatry, will continue patient's home Risperdal at 1 mg qdaily and 2 mg qHS, will hold off on her home med Fanapt 1 mg BID (non-formulary), and will plan to contact outpatient psychiatrist who has been caring for patient since she was a child for collateral information.    1/15/24: Patient was calm, cooperative, and remained in good behavioral control. She appears elevated, smiling/laughing throughout interview. She is medication compliant. No changes to medication regimen were made.     #Behavioral agitation associated with intellectual disability vs. Psychosis  - Risperdal 1 mg daily and 2 mg qHS  - Obtain further collateral information (medication history, diagnoses) from outpatient psychiatrist Dr. Albertina Rodriguez from [facility] ([phone]).  - Patient may benefit from treatment of depression/anxiety, if present, with an SSRI, but it will be important to obtain collateral from Dr. Rodriguez to see history of medication trials.    # Agitation recommendations  - For severe agitation not responding to behavioral intervention, may give Haldol 5 mg PO q6hrs PRN, Ativan 2 mg PO q6hrs PRN, Benadryl 50 mg PO q6hrs PRN, with escalation to IM if patient refusing PO and remains an imminent danger to self or others. If IM antipsychotic is administered, please perform follow-up ECG for QTc monitoring Amanda Neil is a 38-year-old female, domiciled in a private residence with her caretaker and caretaker's two nephews (none have any biological relation to patient) for the last 3 years, disabled, single, no significant PMH, past psychiatric history of intellectual disability and ? schizophrenia, no known history of suicide attempts, no known history of IPP admissions aside from current admission at [facility], currently in outpatient treatment with psychiatrist Dr. Albertina Rodriguez, no known substance abuse history, presented to the ED BIB police after destroying property, admitted to IP for "psychosis."    On psychiatric evaluation, patient was calm, cooperative, and remained in good behavioral control; she did not appear to be overtly psychotic or responding to internal stimuli. She was admitted to the inpatient psychiatric unit following a behavioral disturbance that included destroying property at her caretaker's nail salon, which patient endorsed she did due to feeling frustrated with hearing voices and that the voices told her to do so. At this time, it is unclear whether patient is experiencing true auditory hallucinations as a psychotic symptom. The voices she endorses hearing may represent a feature of her potentially regressed developmental level given the presence of intellectual disability. There does not appear to be prodrome symptoms for the psychosis and substance use does not appear to be a factor. It will also be important to consider whether patient has a comorbid depressive or anxiety disorder, in which she may benefit from an SSRI. Her behavioral disturbances, per collateral, are occurring in the context of medication changes made by patient's outpatient psychiatrist. At this time, will continue admission on inpatient psychiatry, will continue patient's home Risperdal at 1 mg qdaily and 2 mg qHS, will hold off on her home med Fanapt 1 mg BID (non-formulary), and will plan to contact outpatient psychiatrist who has been caring for patient since she was a child for collateral information.    1/15/24: Patient was calm, cooperative, and remained in good behavioral control. She appears elevated, smiling/laughing throughout interview. She is medication compliant. No changes to medication regimen were made.     #Behavioral agitation associated with intellectual disability vs. Psychosis  - Risperdal 1 mg daily and 2 mg qHS  - Obtain further collateral information (medication history, diagnoses) from outpatient psychiatrist Dr. Albertina Rodriguez from [facility] ([phone]).  - Patient may benefit from treatment of depression/anxiety, if present, with an SSRI, but it will be important to obtain collateral from Dr. Rodriguez to see history of medication trials.    # Agitation recommendations  - For severe agitation not responding to behavioral intervention, may give Haldol 5 mg PO q6hrs PRN, Ativan 2 mg PO q6hrs PRN, Benadryl 50 mg PO q6hrs PRN, with escalation to IM if patient refusing PO and remains an imminent danger to self or others. If IM antipsychotic is administered, please perform follow-up ECG for QTc monitoring

## 2024-01-15 NOTE — BH INPATIENT PSYCHIATRY PROGRESS NOTE - NSBHATTESTBILLING_PSY_A_CORE
91977-Gueyexaepr OBS or IP - low complexity OR 25-34 mins 37365-Pwxkwyjhov OBS or IP - low complexity OR 25-34 mins 58681-Gcvmggdvrz OBS or IP - low complexity OR 25-34 mins

## 2024-01-15 NOTE — BH INPATIENT PSYCHIATRY PROGRESS NOTE - NSBHATTESTCOMMENTATTENDFT_PSY_A_CORE
Patient was seen and evaluated with resident Dr. Fernandez. Appropriate changes made. Agree with assessment and plan.

## 2024-01-16 RX ADMIN — RISPERIDONE 2 MILLIGRAM(S): 4 TABLET ORAL at 20:11

## 2024-01-16 RX ADMIN — RISPERIDONE 1 MILLIGRAM(S): 4 TABLET ORAL at 08:12

## 2024-01-16 RX ADMIN — Medication 2 MILLIGRAM(S): at 01:31

## 2024-01-16 NOTE — BH INPATIENT PSYCHIATRY PROGRESS NOTE - NSBHMETABOLIC_PSY_ALL_CORE_FT
BMI: BMI (kg/m2): 25.4 (01-13-24 @ 13:47)  HbA1c: A1C with Estimated Average Glucose Result: 6.1 % (01-14-24 @ 04:30)    Glucose: POCT Blood Glucose.: 115 mg/dL (01-04-24 @ 00:46)    BP: 125/72 (01-16-24 @ 07:50) (106/68 - 127/79)Vital Signs Last 24 Hrs  T(C): 36 (01-16-24 @ 07:50), Max: 37.1 (01-15-24 @ 15:59)  T(F): 96.8 (01-16-24 @ 07:50), Max: 98.8 (01-15-24 @ 15:59)  HR: 123 (01-16-24 @ 07:50) (91 - 123)  BP: 125/72 (01-16-24 @ 07:50) (110/60 - 127/79)  BP(mean): --  RR: 18 (01-16-24 @ 07:50) (16 - 18)  SpO2: --      Lipid Panel: Date/Time: 01-14-24 @ 04:30  Cholesterol, Serum: 130  LDL Cholesterol Calculated: 61  HDL Cholesterol, Serum: 53  Total Cholesterol/HDL Ration Measurement: --  Triglycerides, Serum: 79

## 2024-01-16 NOTE — BH INPATIENT PSYCHIATRY PROGRESS NOTE - CURRENT MEDICATION
MEDICATIONS  (STANDING):  risperiDONE   Tablet 2 milliGRAM(s) Oral at bedtime  risperiDONE   Tablet 1 milliGRAM(s) Oral daily    MEDICATIONS  (PRN):  diphenhydrAMINE 50 milliGRAM(s) Oral every 6 hours PRN EPS Prophylaxis (given with Haldol, Ativan for agitation)  haloperidol     Tablet 5 milliGRAM(s) Oral every 6 hours PRN Severe Agitation  LORazepam     Tablet 2 milliGRAM(s) Oral every 6 hours PRN Severe Agitation   MEDICATIONS  (STANDING):  risperiDONE   Tablet 1 milliGRAM(s) Oral daily  risperiDONE   Tablet 2 milliGRAM(s) Oral at bedtime    MEDICATIONS  (PRN):  diphenhydrAMINE 50 milliGRAM(s) Oral every 6 hours PRN EPS Prophylaxis (given with Haldol, Ativan for agitation)  haloperidol     Tablet 5 milliGRAM(s) Oral every 6 hours PRN Severe Agitation  LORazepam     Tablet 2 milliGRAM(s) Oral every 6 hours PRN Severe Agitation

## 2024-01-16 NOTE — BH INPATIENT PSYCHIATRY PROGRESS NOTE - NSBHCONSBHPROVDETAILS_PSY_A_CORE  FT
TOYA Elizabeth/Nurse Behavioral health provider is Dr. Albertina Rodriguez from [facility] ([phone]). Unable to speak with Dr. Rodriguez since office is closed 1/13-1/15.  Behavioral health provider is Dr. Albertina Rodriguez from [facility] ([phone]). Unable to speak with Dr. Rodriguez since office is closed 1/13-1/15.  Behavioral health provider is Dr. Albertina Rodriguez from [facility] ([phone]). Unable to speak with Dr. Rodriguez since office is closed 1/13-1/15.    1/16/24 Collateral attempted at Dr. Rodriguez's office at two separate times, but was not able to be reached.   No voice mail system; unable to leave voice mail.   Behavioral health provider is Dr. Albertina Rodriguez from [facility] ([phone]). Unable to speak with Dr. Rodriguez since office is closed 1/13-1/15.    1/16/24 Collateral attempted at Dr. Rodriguez's office at two separate times, but was not able to be reached.   No voice mail system; unable to leave voice mail.

## 2024-01-16 NOTE — BH INPATIENT PSYCHIATRY PROGRESS NOTE - NSBHFUPINTERVALHXFT_PSY_A_CORE
Chart reviewed. Patient did not receive any PRN medications overnight. Patient was found this morning in the day room watching tv. She was agreeable to a short conversation. Patient stated that she feels okay, but feels uncomfortable sometimes when she over hears the other patients on the [place]. Patient said she is sleeping normally and eating normally. Patient emphasized that she loved the food and snacks. Patient admitted to hearing voicing last night that were saying "bad things about herself", but denied having thoughts about hurting herself or others. Patient also denied having thoughts about ending her life.  Chart reviewed. Patient did not receive any PRN medications overnight. Patient was found this morning in the day room watching tv. She was agreeable to a short conversation. Patient stated that she feels okay, but feels uncomfortable sometimes when she over hears the other patients on the [place]. Patient said she is sleeping normally and eating normally. Patient emphasized that she loved the food and snacks. Patient admitted to hearing voicing last night that were saying "bad things about herself", but denied having thoughts about hurting herself or others. Patient also denied having thoughts about ending her life. Tried to contact Dr. Rodriguez's office twice and was not able to get through nor leave a voice message. Chart reviewed. Patient received Lorazepam 2mg PO at 1AM last night. Otherwise, behavior well controlled.     Upon approach, patient was in the day room watching TV. She was calm, cooperative, and agreeable to an interview in her room. Patient stated that she feels okay, but feels uncomfortable sometimes when she over hears the other patients on the [place]. She notes this as a reason to taking Ativan 2mg PO last night. Patient said she is sleeping normally and eating normally. Patient emphasized that she "loved the food and snacks". Patient admitted to hearing voicing last night that were saying "bad things about herself", but denied having thoughts about hurting herself or others. Patient also denied having thoughts about ending her life.     Collateral attempted at Dr. Rodriguez's office at two separate times, but was not able to be reached.   No voice mail system; unable to leave voice mail.  Chart reviewed. Patient received Lorazepam 2mg PO at 1AM last night. Otherwise, behavior well controlled.     Upon approach, patient was in the day room watching TV. She was calm, cooperative, and agreeable to an interview in her room. Patient stated that she feels okay, but feels uncomfortable sometimes when she over hears the other patients on the [place]. She notes this as a reason to taking Ativan 2mg PO last night. Patient said she is sleeping normally and eating normally. Patient emphasized that she "loved the food and snacks". Patient admitted to hearing voicing last night that were saying "bad things about herself", but denied having thoughts about hurting herself or others. Patient also denied having thoughts about ending her life.     Collateral attempted at Dr. Rodriguez's office at two separate times, but was not able to be reached.   No voice mail system; unable to leave voice mail.     Collateral attempted at [facility], patient's care provider   No voice mail box set up.  Chart reviewed. Patient received Lorazepam 2mg PO at 1AM last night. Otherwise, behavior well controlled.     Upon approach, patient was in the day room watching TV. She was calm, cooperative, and agreeable to an interview in her room. Patient stated that she feels okay, but feels uncomfortable sometimes when she over hears the other patients on the [place]. She notes this as a reason to taking Ativan 2mg PO last night. Patient said she is sleeping normally and eating normally. Patient emphasized that she "loved the food and snacks". Patient admitted to hearing voicing last night that were saying "bad things about herself", but denied having thoughts about hurting herself or others. Patient also denied having thoughts about ending her life.     Collateral attempted at Dr. Rodriguez's office at two separate times, but was not able to be reached.   No voice mail system; unable to leave voice mail.     Collateral attempted at [facility], patient's care provider   No voice mail box set up.

## 2024-01-16 NOTE — BH INPATIENT PSYCHIATRY PROGRESS NOTE - NSBHASSESSSUMMFT_PSY_ALL_CORE
Amanda Neil is a 38-year-old female, domiciled in a private residence with her caretaker and caretaker's two nephews (none have any biological relation to patient) for the last 3 years, disabled, single, no significant PMH, past psychiatric history of intellectual disability and ? schizophrenia, no known history of suicide attempts, no known history of IPP admissions aside from current admission at [facility], currently in outpatient treatment with psychiatrist Dr. Albertina Rodriguez, no known substance abuse history, presented to the ED BIB police after destroying property, admitted to IPP for "psychosis."    On psychiatric evaluation, patient was calm, cooperative, and remained in good behavioral control; she did not appear to be overtly psychotic or responding to internal stimuli. She was admitted to the inpatient psychiatric unit following a behavioral disturbance that included destroying property at her caretaker's nail salon, which patient endorsed she did due to feeling frustrated with hearing voices and that the voices told her to do so. At this time, it is unclear whether patient is experiencing true auditory hallucinations as a psychotic symptom. The voices she endorses hearing may represent a feature of her potentially regressed developmental level given the presence of intellectual disability. There does not appear to be prodrome symptoms for the psychosis and substance use does not appear to be a factor. It will also be important to consider whether patient has a comorbid depressive or anxiety disorder, in which she may benefit from an SSRI. Her behavioral disturbances, per collateral, are occurring in the context of medication changes made by patient's outpatient psychiatrist. At this time, will continue admission on inpatient psychiatry, will continue patient's home Risperdal at 1 mg qdaily and 2 mg qHS, will hold off on her home med Fanapt 1 mg BID (non-formulary), and will plan to contact outpatient psychiatrist who has been caring for patient since she was a child for collateral information.    Week 1 of admission:  1/16/24 Patient admitted to hearing voices the night before and felt uncomfortable because of overhearing the other patients on the [place].     # Behavioral agitation associated with intellectual disability vs. Psychosis  - Risperdal 1 mg qdaily and 2 mg qHS  - Obtain further collateral information (medication history, diagnoses) from outpatient psychiatrist Dr. Albertina Rodriguez from [facility] ([phone]).  - Patient may benefit from treatment of depression/anxiety, if present, with an SSRI, but it will be important to obtain collateral from Dr. Rodriguez to see history of medication trials.    # Agitation recommendations  - For severe agitation not responding to behavioral intervention, may give Haldol 5 mg PO q6hrs PRN, Ativan 2 mg PO q6hrs PRN, Benadryl 50 mg PO q6hrs PRN, with escalation to IM if patient refusing PO and remains an imminent danger to self or others. If IM antipsychotic is administered, please perform follow-up ECG for QTc monitoring. Amanda Neil is a 38-year-old female, domiciled in a private residence with her caretaker and caretaker's two nephews (none have any biological relation to patient) for the last 3 years, disabled, single, no significant PMH, past psychiatric history of intellectual disability and ? schizophrenia, no known history of suicide attempts, no known history of IPP admissions aside from current admission at [facility], currently in outpatient treatment with psychiatrist Dr. Albertina Rodriguez, no known substance abuse history, presented to the ED BIB police after destroying property, admitted to IPP for "psychosis."    On psychiatric evaluation, patient was calm, cooperative, and remained in good behavioral control; she did not appear to be overtly psychotic or responding to internal stimuli. She was admitted to the inpatient psychiatric unit following a behavioral disturbance that included destroying property at her caretaker's nail salon, which patient endorsed she did due to feeling frustrated with hearing voices and that the voices told her to do so. At this time, it is unclear whether patient is experiencing true auditory hallucinations as a psychotic symptom. The voices she endorses hearing may represent a feature of her potentially regressed developmental level given the presence of intellectual disability. There does not appear to be prodrome symptoms for the psychosis and substance use does not appear to be a factor. It will also be important to consider whether patient has a comorbid depressive or anxiety disorder, in which she may benefit from an SSRI. Her behavioral disturbances, per collateral, are occurring in the context of medication changes made by patient's outpatient psychiatrist. At this time, will continue admission on inpatient psychiatry, will continue patient's home Risperdal at 1 mg qdaily and 2 mg qHS, will hold off on her home med Fanapt 1 mg BID (non-formulary), and will plan to contact outpatient psychiatrist who has been caring for patient since she was a child for collateral information.    Week 1 of admission:  1/16/24 Patient admitted to hearing voices the night before and felt uncomfortable because of overhearing the other patients on the [place].     # Behavioral agitation associated with intellectual disability vs. Psychosis  - Risperdal 1 mg qdaily and 2 mg qHS  - Obtain further collateral information (medication history, diagnoses) from outpatient psychiatrist Dr. Albertina Rodriguez from [facility] ([phone]).  - Patient may benefit from treatment of depression/anxiety, if present, with an SSRI, but it will be important to obtain collateral from Dr. Rodriguez to see history of medication trials.    # Agitation recommendations  - For severe agitation not responding to behavioral intervention, may give Haldol 5 mg PO q6hrs PRN, Ativan 2 mg PO q6hrs PRN, Benadryl 50 mg PO q6hrs PRN, with escalation to IM if patient refusing PO and remains an imminent danger to self or others. If IM antipsychotic is administered, please perform follow-up ECG for QTc monitoring. Amanda Neil is a 38-year-old female, domiciled in a private residence with her caretaker and caretaker's two nephews (none have any biological relation to patient) for the last 3 years, disabled, single, no significant PMH, past psychiatric history of intellectual disability and ? schizophrenia, no known history of suicide attempts, no known history of IPP admissions aside from current admission at [facility], currently in outpatient treatment with psychiatrist Dr. Albertina Rodriguez, no known substance abuse history, presented to the ED BIB police after destroying property, admitted to IP for "psychosis."    On psychiatric evaluation, patient was calm, cooperative, and remained in good behavioral control; she did not appear to be overtly psychotic or responding to internal stimuli. She was admitted to the inpatient psychiatric unit following a behavioral disturbance that included destroying property at her caretaker's nail salon, which patient endorsed she did due to feeling frustrated with hearing voices and that the voices told her to do so. At this time, it is unclear whether patient is experiencing true auditory hallucinations as a psychotic symptom. The voices she endorses hearing may represent a feature of her potentially regressed developmental level given the presence of intellectual disability. There does not appear to be prodrome symptoms for the psychosis and substance use does not appear to be a factor. It will also be important to consider whether patient has a comorbid depressive or anxiety disorder, in which she may benefit from an SSRI. Her behavioral disturbances, per collateral, are occurring in the context of medication changes made by patient's outpatient psychiatrist. At this time, will continue admission on inpatient psychiatry, will continue patient's home Risperdal at 1 mg qdaily and 2 mg qHS, will hold off on her home med Fanapt 1 mg BID (non-formulary), and will plan to contact outpatient psychiatrist who has been caring for patient since she was a child for collateral information.    Week 1 of admission:  1/16/24 : Received Ativan 2mg PO at 1AM last night, in the context of peers being in fights. Patient notes auditory hallucination of "bad things about [her]self" Otherwise, pleasant, calm, cooperative, behavior well controlled. No event noted during 1/13-1/15.     # Behavioral agitation associated with intellectual disability vs. Psychosis  - Risperdal 1 mg qdaily and 2 mg qHS  - Hold Fanapt at this time   - Obtain further collateral information (medication history, diagnoses) from outpatient psychiatrist Dr. Albertina Rodriguez from [facility] ([phone]); Reached out twice with no voice mail system.   - Patient may benefit from treatment of depression/anxiety, if present, with an SSRI, but it will be important to obtain collateral from Dr. Rodriguez to see history of medication trials.    # Agitation recommendations  - For severe agitation not responding to behavioral intervention, may give Haldol 5 mg PO q6hrs PRN, Ativan 2 mg PO q6hrs PRN, Benadryl 50 mg PO q6hrs PRN, with escalation to IM if patient refusing PO and remains an imminent danger to self or others. If IM antipsychotic is administered, please perform follow-up ECG for QTc monitoring. Amanda Neil is a 38-year-old female, domiciled in a private residence with her caretaker and caretaker's two nephews (none have any biological relation to patient) for the last 3 years, disabled, single, no significant PMH, past psychiatric history of intellectual disability and ? schizophrenia, no known history of suicide attempts, no known history of IPP admissions aside from current admission at [facility] currently in outpatient treatment with psychiatrist Dr. Albertina Rodriguez, no known substance abuse history, presented to the ED BIB police after destroying property, admitted to IP for "psychosis."    On psychiatric evaluation, patient was calm, cooperative, and remained in good behavioral control; she did not appear to be overtly psychotic or responding to internal stimuli. She was admitted to the inpatient psychiatric unit following a behavioral disturbance that included destroying property at her caretaker's nail salon, which patient endorsed she did due to feeling frustrated with hearing voices and that the voices told her to do so. At this time, it is unclear whether patient is experiencing true auditory hallucinations as a psychotic symptom. The voices she endorses hearing may represent a feature of her potentially regressed developmental level given the presence of intellectual disability. There does not appear to be prodrome symptoms for the psychosis and substance use does not appear to be a factor. It will also be important to consider whether patient has a comorbid depressive or anxiety disorder, in which she may benefit from an SSRI. Her behavioral disturbances, per collateral, are occurring in the context of medication changes made by patient's outpatient psychiatrist. At this time, will continue admission on inpatient psychiatry, will continue patient's home Risperdal at 1 mg qdaily and 2 mg qHS, will hold off on her home med Fanapt 1 mg BID (non-formulary), and will plan to contact outpatient psychiatrist who has been caring for patient since she was a child for collateral information.    Week 1 of admission:  1/16/24 : Received Ativan 2mg PO at 1AM last night, in the context of peers being in fights. Patient notes auditory hallucination of "bad things about [her]self" Otherwise, pleasant, calm, cooperative, behavior well controlled. No event noted during 1/13-1/15.     # Behavioral agitation associated with intellectual disability vs. Psychosis  - Risperdal 1 mg qdaily and 2 mg qHS  - Hold Fanapt at this time   - Obtain further collateral information (medication history, diagnoses) from outpatient psychiatrist Dr. Albertina Rodriguez from [facility] ([phone]); Reached out twice with no voice mail system.   - Patient may benefit from treatment of depression/anxiety, if present, with an SSRI, but it will be important to obtain collateral from Dr. Rodriguez to see history of medication trials.    # Agitation recommendations  - For severe agitation not responding to behavioral intervention, may give Haldol 5 mg PO q6hrs PRN, Ativan 2 mg PO q6hrs PRN, Benadryl 50 mg PO q6hrs PRN, with escalation to IM if patient refusing PO and remains an imminent danger to self or others. If IM antipsychotic is administered, please perform follow-up ECG for QTc monitoring.

## 2024-01-16 NOTE — BH INPATIENT PSYCHIATRY PROGRESS NOTE - NSBHCHARTREVIEWVS_PSY_A_CORE FT
Vital Signs Last 24 Hrs  T(C): 36 (01-16-24 @ 07:50), Max: 37.1 (01-15-24 @ 15:59)  T(F): 96.8 (01-16-24 @ 07:50), Max: 98.8 (01-15-24 @ 15:59)  HR: 123 (01-16-24 @ 07:50) (91 - 123)  BP: 125/72 (01-16-24 @ 07:50) (110/60 - 127/79)  BP(mean): --  RR: 18 (01-16-24 @ 07:50) (16 - 18)  SpO2: --

## 2024-01-17 RX ORDER — RISPERIDONE 4 MG/1
2 TABLET ORAL DAILY
Refills: 0 | Status: DISCONTINUED | OUTPATIENT
Start: 2024-01-17 | End: 2024-01-22

## 2024-01-17 RX ADMIN — RISPERIDONE 1 MILLIGRAM(S): 4 TABLET ORAL at 08:10

## 2024-01-17 RX ADMIN — RISPERIDONE 2 MILLIGRAM(S): 4 TABLET ORAL at 20:18

## 2024-01-17 RX ADMIN — HALOPERIDOL DECANOATE 5 MILLIGRAM(S): 100 INJECTION INTRAMUSCULAR at 22:00

## 2024-01-17 NOTE — BH INPATIENT PSYCHIATRY PROGRESS NOTE - NSBHFUPINTERVALHXFT_PSY_A_CORE
Chart reviewed. Patient received Ativan 2mg PO at 1AM last night. Otherwise, behavior well controlled.     Upon approach, patient was standing outside of her room. She was calm, cooperative, and agreeable to an interview in her room. Patient stated that she feels good while holding two thumbs up, and feels comfortable here. She said she was upset last night because Jack, Zora and Gregory were talking about her behind her back. She notes this as a reason to taking Ativan 2mg PO last night. Patient said she is sleeping normally and eating normally. When asked if patient was hearing voices, patient stated "not really". Patient denied having thoughts about hurting herself or others. Patient also denied having thoughts about ending her life. Patient did not have any concerns. She stated that when she is upset or angry she has coping techniques like going on vacation or to hug someone. She said that she hugged Shankar last night. She also provided her caregivers, Dee's, number so we can get into contact with her. Chart reviewed. Patient received Ativan 2mg PO at 1AM last night. Otherwise, behavior well controlled.      Upon approach, patient was standing outside of her room. She was calm, cooperative, and agreeable to an interview in her room. Patient stated that she feels good while holding two thumbs up, and feels comfortable here. She said she was upset last night because Jack, Zora and Gregory were talking about her behind her back. She notes this as a reason to taking Ativan 2mg PO last night. Patient said she is sleeping normally and eating normally. When asked if patient was hearing voices, patient stated "not really". Patient denied having thoughts about hurting herself or others. Patient also denied having thoughts about ending her life. Patient did not have any concerns. She stated that when she is upset or angry she has coping techniques like going on vacation or to hug someone. She said that she hugged Shankar last night. She also provided her caregivers, Dee's, number so we can get into contact with her. Chart reviewed. Patient received Ativan 2mg PO at 1AM last night. Otherwise, behavior well controlled.      Upon approach, patient was standing outside of her room. She was calm, cooperative, and agreeable to an interview in her room. Patient stated that she feels good while holding two thumbs up, and feels comfortable here. She said she was upset last night because Jack, Zora and Gregory were talking about her behind her back. She notes this as a reason to taking Ativan 2mg PO last night. Patient said she is sleeping normally and eating normally. When asked if patient was hearing voices, patient stated "not really". Patient denied having thoughts about hurting herself or others. Patient also denied having thoughts about ending her life. Patient did not have any concerns. She stated that when she is upset or angry she has coping techniques like going on vacation or to hug someone. She said that she hugged Shankar last night. She also provided her caregivers, Dee's, number so we can get into contact with her.    Collateral contacted 1/17- Dee : Dee stated that the patient called her over 20 times and was very upset. Patient was crying and saying that "the nurses, doctors and other patients were talking about her and teasing her". Dee noted that the patient's ability to request medication to calm her down when she was upset is an improvement from previous behavior. Dee's main concern upon release of patient is safety because it has been "a cycle of her getting upset and breaking things every 3-4 days". Chart reviewed. Patient received Ativan 2mg PO at 1AM last night. Otherwise, behavior well controlled.      Upon approach, patient was standing outside of her room. She was calm, cooperative, and agreeable to an interview in her room. Patient stated that she feels good while holding two thumbs up, and feels comfortable here. She said she was upset last night because Jack, Zora and Gregory were talking about her behind her back. She notes this as a reason to taking Ativan 2mg PO last night. Patient said she is sleeping normally and eating normally. When asked if patient was hearing voices, patient stated "not really". Patient denied having thoughts about hurting herself or others. Patient also denied having thoughts about ending her life. Patient did not have any concerns. She stated that when she is upset or angry she has coping techniques like going on vacation or to hug someone. She said that she hugged Shankar last night. She also provided her caregivers, Dee's, number so we can get into contact with her.    Collateral contacted 1/17- Dee : Dee stated that the patient called her over 20 times and was very upset. Patient was crying and saying that "the nurses, doctors and other patients were talking about her and teasing her". Dee noted that the patient's ability to request medication to calm her down when she was upset is an improvement from previous behavior. Dee's main concern upon release of patient is safety because it has been "a cycle of her getting upset and breaking things every 3-4 days".    Collateral attempted 1/17- Dr. Rodriguez : Call attempted and Dr. Rodriguez's  picked up and wrote down the message to give to Dr. Rodriguez. She said that the  will call back during business hours when he has time.

## 2024-01-17 NOTE — BH INPATIENT PSYCHIATRY PROGRESS NOTE - NSBHCONSBHPROVDETAILS_PSY_A_CORE  FT
Behavioral health provider is Dr. Albertina Rodriguez from [facility] ([phone]). Unable to speak with Dr. Rodriguez since office is closed 1/13-1/15.    1/16/24 Collateral attempted at Dr. Rodriguze's office at two separate times, but was not able to be reached.   No voice mail system; unable to leave voice mail.

## 2024-01-17 NOTE — BH INPATIENT PSYCHIATRY PROGRESS NOTE - NSBHCHARTREVIEWVS_PSY_A_CORE FT
Vital Signs Last 24 Hrs  T(C): 35.9 (01-17-24 @ 07:53), Max: 36.1 (01-16-24 @ 15:59)  T(F): 96.7 (01-17-24 @ 07:53), Max: 96.9 (01-16-24 @ 15:59)  HR: 100 (01-17-24 @ 07:53) (100 - 108)  BP: 119/58 (01-17-24 @ 07:53) (116/60 - 119/58)  BP(mean): --  RR: 17 (01-17-24 @ 07:53) (16 - 17)  SpO2: --

## 2024-01-17 NOTE — BH INPATIENT PSYCHIATRY PROGRESS NOTE - CURRENT MEDICATION
MEDICATIONS  (STANDING):  risperiDONE   Tablet 2 milliGRAM(s) Oral at bedtime  risperiDONE   Tablet 1 milliGRAM(s) Oral daily    MEDICATIONS  (PRN):  diphenhydrAMINE 50 milliGRAM(s) Oral every 6 hours PRN EPS Prophylaxis (given with Haldol, Ativan for agitation)  haloperidol     Tablet 5 milliGRAM(s) Oral every 6 hours PRN Severe Agitation  LORazepam     Tablet 2 milliGRAM(s) Oral every 6 hours PRN Severe Agitation   MEDICATIONS  (STANDING):  risperiDONE   Tablet 2 milliGRAM(s) Oral at bedtime  risperiDONE   Tablet 2 milliGRAM(s) Oral daily    MEDICATIONS  (PRN):  diphenhydrAMINE 50 milliGRAM(s) Oral every 6 hours PRN EPS Prophylaxis (given with Haldol, Ativan for agitation)  haloperidol     Tablet 5 milliGRAM(s) Oral every 6 hours PRN Severe Agitation  LORazepam     Tablet 2 milliGRAM(s) Oral every 6 hours PRN Severe Agitation

## 2024-01-17 NOTE — BH INPATIENT PSYCHIATRY PROGRESS NOTE - NSBHMETABOLIC_PSY_ALL_CORE_FT
BMI: BMI (kg/m2): 25.4 (01-13-24 @ 13:47)  HbA1c: A1C with Estimated Average Glucose Result: 6.1 % (01-14-24 @ 04:30)    Glucose: POCT Blood Glucose.: 115 mg/dL (01-04-24 @ 00:46)    BP: 119/58 (01-17-24 @ 07:53) (106/68 - 127/79)Vital Signs Last 24 Hrs  T(C): 35.9 (01-17-24 @ 07:53), Max: 36.1 (01-16-24 @ 15:59)  T(F): 96.7 (01-17-24 @ 07:53), Max: 96.9 (01-16-24 @ 15:59)  HR: 100 (01-17-24 @ 07:53) (100 - 108)  BP: 119/58 (01-17-24 @ 07:53) (116/60 - 119/58)  BP(mean): --  RR: 17 (01-17-24 @ 07:53) (16 - 17)  SpO2: --      Lipid Panel: Date/Time: 01-14-24 @ 04:30  Cholesterol, Serum: 130  LDL Cholesterol Calculated: 61  HDL Cholesterol, Serum: 53  Total Cholesterol/HDL Ration Measurement: --  Triglycerides, Serum: 79

## 2024-01-17 NOTE — BH INPATIENT PSYCHIATRY PROGRESS NOTE - NSBHASSESSSUMMFT_PSY_ALL_CORE
Amanda Neil is a 38-year-old female, domiciled in a private residence with her caretaker and caretaker's two nephews (none have any biological relation to patient) for the last 3 years, disabled, single, no significant PMH, past psychiatric history of intellectual disability and ? schizophrenia, no known history of suicide attempts, no known history of IPP admissions aside from current admission at [facility], currently in outpatient treatment with psychiatrist Dr. Albertina Rodriguez, no known substance abuse history, presented to the ED BIB police after destroying property, admitted to IP for "psychosis."    On psychiatric evaluation, patient was calm, cooperative, and remained in good behavioral control; she did not appear to be overtly psychotic or responding to internal stimuli. She was admitted to the inpatient psychiatric unit following a behavioral disturbance that included destroying property at her caretaker's nail salon, which patient endorsed she did due to feeling frustrated with hearing voices and that the voices told her to do so. At this time, it is unclear whether patient is experiencing true auditory hallucinations as a psychotic symptom. The voices she endorses hearing may represent a feature of her potentially regressed developmental level given the presence of intellectual disability. There does not appear to be prodrome symptoms for the psychosis and substance use does not appear to be a factor. It will also be important to consider whether patient has a comorbid depressive or anxiety disorder, in which she may benefit from an SSRI. Her behavioral disturbances, per collateral, are occurring in the context of medication changes made by patient's outpatient psychiatrist. At this time, will continue admission on inpatient psychiatry, will continue patient's home Risperdal at 1 mg qdaily and 2 mg qHS, will hold off on her home med Fanapt 1 mg BID (non-formulary), and will plan to contact outpatient psychiatrist who has been caring for patient since she was a child for collateral information.    Week 1 of admission:  1/16/24 : Received Ativan 2mg PO at 1AM 1/15, in the context of peers being in fights. Patient notes auditory hallucination of "bad things about [her]self" Otherwise, pleasant, calm, cooperative, behavior well controlled. No event noted during 1/13-1/15.   1/17/24 : Received Ativan 2mg PO at 1AM 1/16, in the context of peers "Jack, Zora and Gregory talking behind [her] back". Otherwise, pleasant, calm, cooperative, behavior well controlled. No acute events.     # Behavioral agitation associated with intellectual disability vs. Psychosis  - Risperdal 1 mg qdaily and 2 mg qHS  - Hold Fanapt at this time   - Obtain further collateral information (medication history, diagnoses) from outpatient psychiatrist Dr. Albertina Rodriguez from [facility] ([phone]); Reached out twice with no voice mail system.   - Patient may benefit from treatment of depression/anxiety, if present, with an SSRI, but it will be important to obtain collateral from Dr. Rodriguez to see history of medication trials.    # Agitation recommendations  - For severe agitation not responding to behavioral intervention, may give Haldol 5 mg PO q6hrs PRN, Ativan 2 mg PO q6hrs PRN, Benadryl 50 mg PO q6hrs PRN, with escalation to IM if patient refusing PO and remains an imminent danger to self or others. If IM antipsychotic is administered, please perform follow-up ECG for QTc monitoring.

## 2024-01-18 RX ADMIN — HALOPERIDOL DECANOATE 5 MILLIGRAM(S): 100 INJECTION INTRAMUSCULAR at 13:05

## 2024-01-18 RX ADMIN — Medication 2 MILLIGRAM(S): at 13:05

## 2024-01-18 RX ADMIN — RISPERIDONE 2 MILLIGRAM(S): 4 TABLET ORAL at 20:40

## 2024-01-18 RX ADMIN — RISPERIDONE 2 MILLIGRAM(S): 4 TABLET ORAL at 08:13

## 2024-01-18 NOTE — BH INPATIENT PSYCHIATRY PROGRESS NOTE - NSBHASSESSSUMMFT_PSY_ALL_CORE
Amanda Neil is a 38-year-old female, domiciled in a private residence with her caretaker and caretaker's two nephews (none have any biological relation to patient) for the last 3 years, disabled, single, no significant PMH, past psychiatric history of intellectual disability and ? schizophrenia, no known history of suicide attempts, no known history of IPP admissions aside from current admission at [facility], currently in outpatient treatment with psychiatrist Dr. Albertina Rodriguez, no known substance abuse history, presented to the ED BIB police after destroying property, admitted to IP for "psychosis."    On psychiatric evaluation, patient was calm, cooperative, and remained in good behavioral control; she did not appear to be overtly psychotic or responding to internal stimuli. She was admitted to the inpatient psychiatric unit following a behavioral disturbance that included destroying property at her caretaker's nail salon, which patient endorsed she did due to feeling frustrated with hearing voices and that the voices told her to do so. At this time, it is unclear whether patient is experiencing true auditory hallucinations as a psychotic symptom. The voices she endorses hearing may represent a feature of her potentially regressed developmental level given the presence of intellectual disability. There does not appear to be prodrome symptoms for the psychosis and substance use does not appear to be a factor. It will also be important to consider whether patient has a comorbid depressive or anxiety disorder, in which she may benefit from an SSRI. Her behavioral disturbances, per collateral, are occurring in the context of medication changes made by patient's outpatient psychiatrist. At this time, will continue admission on inpatient psychiatry, will continue patient's home Risperdal at 1 mg qdaily and 2 mg qHS, will hold off on her home med Fanapt 1 mg BID (non-formulary), and will plan to contact outpatient psychiatrist who has been caring for patient since she was a child for collateral information.    Week 1 of admission:  1/16/24 : Received Ativan 2mg PO at 1AM 1/15, in the context of peers being in fights. Patient notes auditory hallucination of "bad things about [her]self" Otherwise, pleasant, calm, cooperative, behavior well controlled. No event noted during 1/13-1/15.   1/17/24 : Received Ativan 2mg PO at 1AM 1/16, in the context of peers "Jack, Zora and Gregory talking behind [her] back". Otherwise, pleasant, calm, cooperative, behavior well controlled. No acute events.     # Behavioral agitation associated with intellectual disability vs. Psychosis  - Risperdal 1 mg qdaily and 2 mg qHS  - Hold Fanapt at this time   - Obtain further collateral information (medication history, diagnoses) from outpatient psychiatrist Dr. Albertina Rodriguez from [facility] ([phone]); Reached out twice with no voice mail system.   - Patient may benefit from treatment of depression/anxiety, if present, with an SSRI, but it will be important to obtain collateral from Dr. Rodriguez to see history of medication trials.    # Agitation recommendations  - For severe agitation not responding to behavioral intervention, may give Haldol 5 mg PO q6hrs PRN, Ativan 2 mg PO q6hrs PRN, Benadryl 50 mg PO q6hrs PRN, with escalation to IM if patient refusing PO and remains an imminent danger to self or others. If IM antipsychotic is administered, please perform follow-up ECG for QTc monitoring. Amanda Neil is a 38-year-old female, domiciled in a private residence with her caretaker and caretaker's two nephews (none have any biological relation to patient) for the last 3 years, disabled, single, no significant PMH, past psychiatric history of intellectual disability and ? schizophrenia, no known history of suicide attempts, no known history of IPP admissions aside from current admission at [facility], currently in outpatient treatment with psychiatrist Dr. Albertina Rodriguez, no known substance abuse history, presented to the ED BIB police after destroying property, admitted to IP for "psychosis."    On psychiatric evaluation, patient was calm, cooperative, and remained in good behavioral control; she did not appear to be overtly psychotic or responding to internal stimuli. She was admitted to the inpatient psychiatric unit following a behavioral disturbance that included destroying property at her caretaker's nail salon, which patient endorsed she did due to feeling frustrated with hearing voices and that the voices told her to do so. At this time, it is unclear whether patient is experiencing true auditory hallucinations as a psychotic symptom. The voices she endorses hearing may represent a feature of her potentially regressed developmental level given the presence of intellectual disability. There does not appear to be prodrome symptoms for the psychosis and substance use does not appear to be a factor. It will also be important to consider whether patient has a comorbid depressive or anxiety disorder, in which she may benefit from an SSRI. Her behavioral disturbances, per collateral, are occurring in the context of medication changes made by patient's outpatient psychiatrist. At this time, will continue admission on inpatient psychiatry, will continue patient's home Risperdal at 1 mg qdaily and 2 mg qHS, will hold off on her home med Fanapt 1 mg BID (non-formulary), and will plan to contact outpatient psychiatrist who has been caring for patient since she was a child for collateral information.    Week 1 of admission:  1/16/24 : Received Ativan 2mg PO at 1AM 1/15, in the context of peers being in fights. Patient notes auditory hallucination of "bad things about [her]self" Otherwise, pleasant, calm, cooperative, behavior well controlled. No event noted during 1/13-1/15.   1/17/24 : Received Ativan 2mg PO at 1AM 1/16, in the context of peers "Jack, Zora and Gregory talking behind [her] back". Otherwise, pleasant, calm, cooperative, behavior well controlled. No acute events.   1/18/24 : Received Haldol 5 mg PO at 10 PM 1/17, in the context of peers were "talking behind her back". Otherwise, pleasant, calm, cooperative, behavior well controlled. No acute events.    # Behavioral agitation associated with intellectual disability vs. Psychosis  - Risperdal 1 mg qdaily and 2 mg qHS  - Hold Fanapt at this time   - Obtain further collateral information (medication history, diagnoses) from outpatient psychiatrist Dr. Albertina Rodriguez from [facility] ([phone]); Reached out twice with no voice mail system.   - Patient may benefit from treatment of depression/anxiety, if present, with an SSRI, but it will be important to obtain collateral from Dr. Rodriguez to see history of medication trials.    # Agitation recommendations  - For severe agitation not responding to behavioral intervention, may give Haldol 5 mg PO q6hrs PRN, Ativan 2 mg PO q6hrs PRN, Benadryl 50 mg PO q6hrs PRN, with escalation to IM if patient refusing PO and remains an imminent danger to self or others. If IM antipsychotic is administered, please perform follow-up ECG for QTc monitoring. Amanda Neil is a 38-year-old female, domiciled in a private residence with her caretaker and caretaker's two nephews (none have any biological relation to patient) for the last 3 years, disabled, single, no significant PMH, past psychiatric history of intellectual disability and ? schizophrenia, no known history of suicide attempts, no known history of IPP admissions aside from current admission at [facility], currently in outpatient treatment with psychiatrist Dr. Albertina Rodriguez, no known substance abuse history, presented to the ED BIB police after destroying property, admitted to IP for "psychosis."    On psychiatric evaluation, patient was calm, cooperative, and remained in good behavioral control; she did not appear to be overtly psychotic or responding to internal stimuli. She was admitted to the inpatient psychiatric unit following a behavioral disturbance that included destroying property at her caretaker's nail salon, which patient endorsed she did due to feeling frustrated with hearing voices and that the voices told her to do so. At this time, it is unclear whether patient is experiencing true auditory hallucinations as a psychotic symptom. The voices she endorses hearing may represent a feature of her potentially regressed developmental level given the presence of intellectual disability. There does not appear to be prodrome symptoms for the psychosis and substance use does not appear to be a factor. It will also be important to consider whether patient has a comorbid depressive or anxiety disorder, in which she may benefit from an SSRI. Her behavioral disturbances, per collateral, are occurring in the context of medication changes made by patient's outpatient psychiatrist. At this time, will continue admission on inpatient psychiatry, will continue patient's home Risperdal at 1 mg qdaily and 2 mg qHS, will hold off on her home med Fanapt 1 mg BID (non-formulary), and will plan to contact outpatient psychiatrist who has been caring for patient since she was a child for collateral information.    Week 1 of admission:  1/16/24 : Received Ativan 2mg PO at 1AM 1/15, in the context of peers being in fights. Patient notes auditory hallucination of "bad things about [her]self" Otherwise, pleasant, calm, cooperative, behavior well controlled. No event noted during 1/13-1/15.   1/17/24 : Received Ativan 2mg PO at 1AM 1/16, in the context of peers "Jack, Zora and Gregory talking behind [her] back". Otherwise, pleasant, calm, cooperative, behavior well controlled. Risperdal was increased to 2mg BID.   1/18/24 : Received Haldol 5 mg PO at 10 PM 1/17, in the context of peers were "talking behind her back". Otherwise, pleasant, calm, cooperative, behavior well controlled. Due to hx of prolactinemia, prolactin is ordered for 1/19/24 AM. It appears that patient has episodes of agitation in the setting of referential paranoia/auditory hallucinations; therefore, will continue risperdal for now and check prolactin level for hx of prolactinemia. If there is prolactinemia, will consider switching to zyprexa.     # Behavioral agitation associated with intellectual disability vs. Psychosis  - Continue Risperdal 2mg PO BID   - Hold Fanapt at this time   - Obtain further collateral information (medication history, diagnoses) from outpatient psychiatrist Dr. Albertina Rodriguez from [facility] ([phone]); Reached out twice with no voice mail system.   - Patient may benefit from treatment of depression/anxiety, if present, with an SSRI, but it will be important to obtain collateral from Dr. Rodriguez to see history of medication trials.     # Agitation recommendations  - For severe agitation not responding to behavioral intervention, may give Haldol 5 mg PO q6hrs PRN, Ativan 2 mg PO q6hrs PRN, Benadryl 50 mg PO q6hrs PRN, with escalation to IM if patient refusing PO and remains an imminent danger to self or others. If IM antipsychotic is administered, please perform follow-up ECG for QTc monitoring.

## 2024-01-18 NOTE — BH INPATIENT PSYCHIATRY PROGRESS NOTE - NSBHMETABOLIC_PSY_ALL_CORE_FT
BMI: BMI (kg/m2): 25.4 (01-13-24 @ 13:47)  HbA1c: A1C with Estimated Average Glucose Result: 6.1 % (01-14-24 @ 04:30)    Glucose: POCT Blood Glucose.: 115 mg/dL (01-04-24 @ 00:46)    BP: 115/70 (01-18-24 @ 08:27) (110/60 - 127/79)Vital Signs Last 24 Hrs  T(C): 35.8 (01-18-24 @ 08:27), Max: 35.8 (01-18-24 @ 08:27)  T(F): 96.4 (01-18-24 @ 08:27), Max: 96.4 (01-18-24 @ 08:27)  HR: 87 (01-18-24 @ 08:27) (87 - 87)  BP: 115/70 (01-18-24 @ 08:27) (115/70 - 115/70)  BP(mean): --  RR: 18 (01-18-24 @ 08:27) (18 - 18)  SpO2: --      Lipid Panel: Date/Time: 01-14-24 @ 04:30  Cholesterol, Serum: 130  LDL Cholesterol Calculated: 61  HDL Cholesterol, Serum: 53  Total Cholesterol/HDL Ration Measurement: --  Triglycerides, Serum: 79

## 2024-01-18 NOTE — BH INPATIENT PSYCHIATRY PROGRESS NOTE - NSBHCONSBHPROVDETAILS_PSY_A_CORE  FT
Behavioral health provider is Dr. Albertina Rodriguez from [facility] ([phone]). Unable to speak with Dr. Rodriguez since office is closed 1/13-1/15.    1/16/24 Collateral attempted at Dr. Rodriguez's office at two separate times, but was not able to be reached.   No voice mail system; unable to leave voice mail.

## 2024-01-18 NOTE — BH INPATIENT PSYCHIATRY PROGRESS NOTE - NSBHFUPINTERVALHXFT_PSY_A_CORE
Chart reviewed. Patient received Haldol 5 mg PO at 10PM last night. Otherwise, behavior well controlled.      Upon approach, patient was leaving her room. She was calm, cooperative, and agreeable to an interview in her room. Patient stated that she was upset last night because people were talking about her behind her back again. She said that she meditated and talked to Shankar and those helped her calm down. Patient conveyed that she destroyed a bathroom in the [employer] shop because "people were talking about her". When questioned about what makes her upset usually, she said "when people talk behind [her] back". Patient said she is sleeping normally and eating normally. She stated that she likes "diet food". When asked if patient was hearing voices, patient said "yeah" and the voices say "bad things". Patient denied having thoughts about hurting herself or others. Patient also denied having thoughts about ending her life. Patient did not have any concerns. Chart reviewed. Patient received Haldol 5 mg PO at 10PM last night. Otherwise, behavior well controlled.      Upon approach, patient was leaving her room. She was calm, cooperative, and agreeable to an interview in her room. Patient stated that she was upset last night because people were talking about her behind her back again. She said that she meditated and talked to Shankar and those helped her calm down. Patient conveyed that she destroyed a bathroom in the beauty shop because "people were talking about her". When questioned about what makes her upset usually, she said "when people talk behind [her] back". Patient said she is sleeping normally and eating normally. She stated that she likes "diet food". When asked if patient was hearing voices, patient said "yeah" and the voices say "bad things". Patient denied having thoughts about hurting herself or others. Patient also denied having thoughts about ending her life. Patient did not have any concerns.    Collateral obtained from Jose, visiting nurse ([phone]) and Dee, care taker ([phone])  Per Dee, patient has never had behavior dysregulation for the past 3 yers she has seen her, until since August of 2023. According to Jose, patient has been on 3mg Fanapt PO BID, and 2mg Risperdal PO BID until August 2023, when her care was transitioned to a new psychiatrist. The new psychiatrist attempted to down-titrate patient's medication, and since august it was Fanapt 3mg PO BID and 1mg Risperdal PO BID. In december 2023, the dose was decreased further to Fanapt 1mg PO BID and 0.5mg Risperdal PO BID. The dosage adjustment was done due to prolactinemia.  Chart reviewed. Patient received Haldol 5 mg PO at 10PM last night. Otherwise, behavior well controlled.      Upon approach, patient was leaving her room. She was calm, cooperative, and agreeable to an interview in her room. Patient stated that she was upset last night because people were talking about her behind her back again. She said that she meditated and talked to Shankar and those helped her calm down. Patient conveyed that she destroyed a bathroom in the beauty shop because "people were talking about her". When questioned about what makes her upset usually, she said "when people talk behind [her] back". Patient said she is sleeping normally and eating normally. She stated that she likes "diet food". When asked if patient was hearing voices, patient said "yeah" and the voices say "bad things". Patient denied having thoughts about hurting herself or others. Patient also denied having thoughts about ending her life. Patient did not have any concerns.    Collateral obtained from Jose, visiting nurse ([phone]) and Dee, care taker ([phone])  Per Dee, patient has never had behavior dysregulation for the past 3 yers she has seen her, until since August of 2023. According to Jose, patient has been on 3mg Fanapt PO BID, and 2mg Risperdal PO BID until August 2023, when her care was transitioned to a new psychiatrist. The new psychiatrist attempted to down-titrate patient's medication, and since august it was Fanapt 3mg PO BID and 1mg Risperdal PO BID. In december 2023, the dose was decreased further to Fanapt 1mg PO BID and 0.5mg Risperdal PO BID. The dosage adjustment was done due to prolactinemia.     Dr. Jennifer Machadoapt started being lowered in February but then increased back in March.   Fanapt 1mg BID and Risperdal 1mg BID was the dose she was stable on.   Has been on Geodon, fanapt and risperdal when she first saw the patient in 2017 September.  Patient has chronic self talk, talking to imaginary friend. Dr. Rodriguez has not heard of patient having referential thinking or any changes in her life that might have triggered this.

## 2024-01-18 NOTE — BH INPATIENT PSYCHIATRY PROGRESS NOTE - CURRENT MEDICATION
MEDICATIONS  (STANDING):  risperiDONE   Tablet 2 milliGRAM(s) Oral at bedtime  risperiDONE   Tablet 2 milliGRAM(s) Oral daily    MEDICATIONS  (PRN):  diphenhydrAMINE 50 milliGRAM(s) Oral every 6 hours PRN EPS Prophylaxis (given with Haldol, Ativan for agitation)  haloperidol     Tablet 5 milliGRAM(s) Oral every 6 hours PRN Severe Agitation  LORazepam     Tablet 2 milliGRAM(s) Oral every 6 hours PRN Severe Agitation

## 2024-01-18 NOTE — BH INPATIENT PSYCHIATRY PROGRESS NOTE - NSBHCHARTREVIEWVS_PSY_A_CORE FT
Vital Signs Last 24 Hrs  T(C): 35.8 (01-18-24 @ 08:27), Max: 35.8 (01-18-24 @ 08:27)  T(F): 96.4 (01-18-24 @ 08:27), Max: 96.4 (01-18-24 @ 08:27)  HR: 87 (01-18-24 @ 08:27) (87 - 87)  BP: 115/70 (01-18-24 @ 08:27) (115/70 - 115/70)  BP(mean): --  RR: 18 (01-18-24 @ 08:27) (18 - 18)  SpO2: --

## 2024-01-19 PROCEDURE — 99231 SBSQ HOSP IP/OBS SF/LOW 25: CPT

## 2024-01-19 RX ORDER — ACETAMINOPHEN 500 MG
650 TABLET ORAL EVERY 6 HOURS
Refills: 0 | Status: DISCONTINUED | OUTPATIENT
Start: 2024-01-19 | End: 2024-01-26

## 2024-01-19 RX ADMIN — RISPERIDONE 2 MILLIGRAM(S): 4 TABLET ORAL at 08:26

## 2024-01-19 RX ADMIN — Medication 650 MILLIGRAM(S): at 19:57

## 2024-01-19 RX ADMIN — Medication 650 MILLIGRAM(S): at 20:02

## 2024-01-19 RX ADMIN — RISPERIDONE 2 MILLIGRAM(S): 4 TABLET ORAL at 20:04

## 2024-01-19 RX ADMIN — Medication 2 MILLIGRAM(S): at 22:58
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
## 2024-01-19 NOTE — PSYCHIATRIC REHAB INITIAL EVALUATION - PRO INTERPRETER NEED 2
How Severe Is Your Rash?: moderate Is This A New Presentation, Or A Follow-Up?: Follow Up Rash Additional History: The patient notes that her rash was improved while she was on prednisone, though once she finished her prednisone, her rash flared up again on her hands and feet. English
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
## 2024-01-19 NOTE — BH INPATIENT PSYCHIATRY PROGRESS NOTE - NSBHCHARTREVIEWVS_PSY_A_CORE FT
Vital Signs Last 24 Hrs  T(C): 35.6 (01-19-24 @ 08:58), Max: 35.6 (01-19-24 @ 08:58)  T(F): 96 (01-19-24 @ 08:58), Max: 96 (01-19-24 @ 08:58)  HR: 121 (01-19-24 @ 08:58) (109 - 121)  BP: 117/75 (01-19-24 @ 08:58) (117/75 - 118/55)  BP(mean): --  RR: 16 (01-19-24 @ 08:58) (16 - 16)  SpO2: --
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
## 2024-01-19 NOTE — BH INPATIENT PSYCHIATRY PROGRESS NOTE - NSBHFUPINTERVALHXFT_PSY_A_CORE
Chart reviewed. Patient received Haldol 5 mg PO and Ativan 2 mg PO at 12PM yesterday. Otherwise, behavior well controlled.      Upon approach, patient was outside of her room talking to another patient (Preethi W). She was calm, cooperative, and agreeable to an interview in her room. Patient stated that she was very happy because she "is going home today at 3PM". She plans to take out her cristino when she gets home because her head is itching. She is excited to watch the Super Bowl and hopes the [place]olphins win against the [place]. She is "going to have a Super Bowl party with a lot of food". She also stated that she is going to travel to a place to see a movie. Patient said she is sleeping normally and eating normally. When asked if patient was hearing voices, patient said "no. I only here the patients outside [her] room". Patient denied having thoughts about hurting herself or others. Patient also denied having thoughts about ending her life. Patient did not have any concerns.  Chart reviewed. Patient received Haldol 5 mg PO and Ativan 2 mg PO at 12PM yesterday. Otherwise, behavior well controlled.      Upon approach, patient was outside of her room talking to another patient (Preethi FOX). She was calm, cooperative, and agreeable to an interview in her room. Patient stated that she was very happy because she "is going home today at 3PM". She plans to take out her cristino when she gets home because her head is itching. She is excited to watch the Super Bowl and hopes the [place] win against the [place]. She is "going to have a Super Bowl party with a lot of food". She also stated that she is going to travel to a place to see a movie. Patient stated that she talked to Shankar yesterday and that "he is [her] best friend". Patient said she is sleeping normally and eating normally. When asked if patient was hearing voices, patient said "no. I only here the patients outside [her] room". Patient denied having thoughts about hurting herself or others. Patient also denied having thoughts about ending her life. Patient did not have any concerns.  Chart reviewed. Patient received Haldol 5 mg PO and Ativan 2 mg PO at 12PM yesterday. Otherwise, behavior well controlled.      Upon approach, patient was outside of her room talking to another patient. She was calm, cooperative, and agreeable to an interview in her room. Patient stated that she was very happy because she "is going home today at 3PM". She plans to take out her cristino when she gets home because her head is itching. She is excited to watch the Super Bowl and hopes the [place] win against the [place]. She is "going to have a Super Bowl party with a lot of food". She also stated that she is going to travel to a place to see a movie. Patient stated that she talked to Shankar yesterday and that "he is [her] best friend". Patient said she is sleeping normally and eating normally. When asked if patient was hearing voices, patient said "no. I only here the patients outside [her] room". Patient denied having thoughts about hurting herself or others. Patient also denied having thoughts about ending her life. Patient did not have any concerns.

## 2024-01-19 NOTE — BH INPATIENT PSYCHIATRY PROGRESS NOTE - NSBHCONSBHPROVDETAILS_PSY_A_CORE  FT
Behavioral health provider is Dr. Albertina Rodriguez from [facility] ([phone]). Unable to speak with Dr. Rodriguez since office is closed 1/13-1/15.    1/16/24 Collateral attempted at Dr. Rodriguez's office at two separate times, but was not able to be reached.   No voice mail system; unable to leave voice mail.

## 2024-01-19 NOTE — BH INPATIENT PSYCHIATRY PROGRESS NOTE - NSBHMETABOLIC_PSY_ALL_CORE_FT
BMI: BMI (kg/m2): 25.4 (01-13-24 @ 13:47)  HbA1c: A1C with Estimated Average Glucose Result: 6.1 % (01-14-24 @ 04:30)    Glucose: POCT Blood Glucose.: 115 mg/dL (01-04-24 @ 00:46)    BP: 117/75 (01-19-24 @ 08:58) (115/70 - 119/58)Vital Signs Last 24 Hrs  T(C): 35.6 (01-19-24 @ 08:58), Max: 35.6 (01-19-24 @ 08:58)  T(F): 96 (01-19-24 @ 08:58), Max: 96 (01-19-24 @ 08:58)  HR: 121 (01-19-24 @ 08:58) (109 - 121)  BP: 117/75 (01-19-24 @ 08:58) (117/75 - 118/55)  BP(mean): --  RR: 16 (01-19-24 @ 08:58) (16 - 16)  SpO2: --      Lipid Panel: Date/Time: 01-14-24 @ 04:30  Cholesterol, Serum: 130  LDL Cholesterol Calculated: 61  HDL Cholesterol, Serum: 53  Total Cholesterol/HDL Ration Measurement: --  Triglycerides, Serum: 79

## 2024-01-20 LAB — PROLACTIN SERPL-MCNC: 55.3 NG/ML — HIGH (ref 3.4–24.1)

## 2024-01-20 PROCEDURE — 99232 SBSQ HOSP IP/OBS MODERATE 35: CPT

## 2024-01-20 RX ADMIN — Medication 50 MILLIGRAM(S): at 02:09

## 2024-01-20 RX ADMIN — RISPERIDONE 2 MILLIGRAM(S): 4 TABLET ORAL at 20:14

## 2024-01-20 RX ADMIN — RISPERIDONE 2 MILLIGRAM(S): 4 TABLET ORAL at 08:27

## 2024-01-20 RX ADMIN — HALOPERIDOL DECANOATE 5 MILLIGRAM(S): 100 INJECTION INTRAMUSCULAR at 02:09
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
## 2024-01-20 NOTE — BH INPATIENT PSYCHIATRY PROGRESS NOTE - CURRENT MEDICATION
MEDICATIONS  (STANDING):  risperiDONE   Tablet 2 milliGRAM(s) Oral at bedtime  risperiDONE   Tablet 2 milliGRAM(s) Oral daily    MEDICATIONS  (PRN):  acetaminophen     Tablet .. 650 milliGRAM(s) Oral every 6 hours PRN Mild Pain (1 - 3)  diphenhydrAMINE 50 milliGRAM(s) Oral every 6 hours PRN EPS Prophylaxis (given with Haldol, Ativan for agitation)  haloperidol     Tablet 5 milliGRAM(s) Oral every 6 hours PRN Severe Agitation  LORazepam     Tablet 2 milliGRAM(s) Oral every 6 hours PRN Severe Agitation

## 2024-01-20 NOTE — BH INPATIENT PSYCHIATRY PROGRESS NOTE - NSBHCONSBHPROVDETAILS_PSY_A_CORE  FT
Behavioral health [facility] ([phone]). Unable to speak with Dr. Rodriguez since office is closed 1/13-1/15.    1/16/24 Collateral attempted at Dr. Rodriguez's office at two separate times, but was not able to be reached.   No voice mail system; unable to leave voice mail.

## 2024-01-20 NOTE — BH INPATIENT PSYCHIATRY PROGRESS NOTE - NSBHCHARTREVIEWVS_PSY_A_CORE FT
Vital Signs Last 24 Hrs  T(C): 35.7 (01-20-24 @ 09:12), Max: 36.2 (01-19-24 @ 16:18)  T(F): 96.3 (01-20-24 @ 09:12), Max: 97.1 (01-19-24 @ 16:18)  HR: 92 (01-20-24 @ 09:12) (92 - 119)  BP: 125/75 (01-20-24 @ 09:12) (125/75 - 130/66)  BP(mean): --  RR: 18 (01-20-24 @ 09:12) (18 - 18)  SpO2: --

## 2024-01-20 NOTE — BH INPATIENT PSYCHIATRY PROGRESS NOTE - NSBHFUPINTERVALHXFT_PSY_A_CORE
****THIS IS AN INCOMPLETE NOTE. ****PLAN HAS NOT BEEN FINALIZED BY ATTENDING. ****FULL NOTE TO FOLLOW SHORTLY. ****  Chart reviewed. No significant behavioral events since incident yesterday evening as documented in chart note. Per staff, patient has been calm, did not appear in acute distress, did not receive any PRNs.     Upon approach, patient in room. Patient on 1:1 for inappropriate behaviors. Patient was calm, cooperative, and agreeable to an interview in her room. Patient appears cheerful, thought process is very concrete. Patient states that her mood is "OK."  Patient said she is sleeping normally and eating normally. When asked if patient was seeing things, patient said that she knows a man who is "43 years old who has kids." Denies VH.  Patient denied having thoughts about hurting herself or others. Patient also denied having thoughts about ending her life. States that she feels safe on the unit. Patient did not have any acute concerns.    States that she hopes to go home son.
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
## 2024-01-20 NOTE — BH INPATIENT PSYCHIATRY PROGRESS NOTE - NSBHMETABOLIC_PSY_ALL_CORE_FT
BMI: BMI (kg/m2): 25.4 (01-13-24 @ 13:47)  HbA1c: A1C with Estimated Average Glucose Result: 6.1 % (01-14-24 @ 04:30)    Glucose: POCT Blood Glucose.: 115 mg/dL (01-04-24 @ 00:46)    BP: 125/75 (01-20-24 @ 09:12) (115/70 - 130/66)Vital Signs Last 24 Hrs  T(C): 35.7 (01-20-24 @ 09:12), Max: 36.2 (01-19-24 @ 16:18)  T(F): 96.3 (01-20-24 @ 09:12), Max: 97.1 (01-19-24 @ 16:18)  HR: 92 (01-20-24 @ 09:12) (92 - 119)  BP: 125/75 (01-20-24 @ 09:12) (125/75 - 130/66)  BP(mean): --  RR: 18 (01-20-24 @ 09:12) (18 - 18)  SpO2: --      Lipid Panel: Date/Time: 01-14-24 @ 04:30  Cholesterol, Serum: 130  LDL Cholesterol Calculated: 61  HDL Cholesterol, Serum: 53  Total Cholesterol/HDL Ration Measurement: --  Triglycerides, Serum: 79

## 2024-01-21 RX ADMIN — RISPERIDONE 2 MILLIGRAM(S): 4 TABLET ORAL at 20:43

## 2024-01-21 RX ADMIN — RISPERIDONE 2 MILLIGRAM(S): 4 TABLET ORAL at 08:19

## 2024-01-21 NOTE — BH INPATIENT PSYCHIATRY PROGRESS NOTE - NSBHFUPINTERVALHXFT_PSY_A_CORE
Chart reviewed. No significant behavioral events since incident yesterday evening as documented in chart note. Per staff, patient has been calm, did not appear in acute distress.    Upon approach, patient in room. Patient on 1:1 for sexual activity risk. Patient was cooperative and agreeable to an interview in her room. Patient appears cheerful, smiling and laughing throughout interview, thought process is very concrete. Patient states that her mood is good.  Patient said she is sleeping normally and eating normally. Patient denied any AH/VH. Patient denied having thoughts about hurting herself or others. Patient did not have any acute concerns.

## 2024-01-21 NOTE — BH INPATIENT PSYCHIATRY PROGRESS NOTE - NSBHCONSBHPROVDETAILS_PSY_A_CORE  FT
Behavioral health provider is Dr. Albertina Rodriguez from [facility] ([phone]). Unable to speak with Dr. Rodriguez since office is closed 1/13-1/15.    1/16/24 Collateral attempted at Dr. Rodriguez's office at two separate times, but was not able to be reached.   No voice mail system; unable to leave voice mail.

## 2024-01-21 NOTE — BH INPATIENT PSYCHIATRY PROGRESS NOTE - NSBHASSESSSUMMFT_PSY_ALL_CORE
Amanda Neil is a 38-year-old female, domiciled in a private residence with her caretaker and caretaker's two nephews (none have any biological relation to patient) for the last 3 years, disabled, single, no significant PMH, past psychiatric history of intellectual disability and ? schizophrenia, no known history of suicide attempts, no known history of IPP admissions aside from current admission at [facility], currently in outpatient treatment with psychiatrist Dr. Albertina Rodriguez, no known substance abuse history, presented to the ED BIB police after destroying property, admitted to IP for "psychosis."    On psychiatric evaluation, patient was calm, cooperative, and remained in good behavioral control; she did not appear to be overtly psychotic or responding to internal stimuli. She was admitted to the inpatient psychiatric unit following a behavioral disturbance that included destroying property at her caretaker's nail salon, which patient endorsed she did due to feeling frustrated with hearing voices and that the voices told her to do so. At this time, it is unclear whether patient is experiencing true auditory hallucinations as a psychotic symptom. The voices she endorses hearing may represent a feature of her potentially regressed developmental level given the presence of intellectual disability. There does not appear to be prodrome symptoms for the psychosis and substance use does not appear to be a factor. It will also be important to consider whether patient has a comorbid depressive or anxiety disorder, in which she may benefit from an SSRI. Her behavioral disturbances, per collateral, are occurring in the context of medication changes made by patient's outpatient psychiatrist. At this time, will continue admission on inpatient psychiatry, will continue patient's home Risperdal at 1 mg qdaily and 2 mg qHS, will hold off on her home med Fanapt 1 mg BID (non-formulary), and will plan to contact outpatient psychiatrist who has been caring for patient since she was a child for collateral information.    Week 1 of admission:  1/16/24 : Received Ativan 2mg PO at 1AM 1/15, in the context of peers being in fights. Patient notes auditory hallucination of "bad things about [her]self" Otherwise, pleasant, calm, cooperative, behavior well controlled. No event noted during 1/13-1/15.   1/17/24 : Received Ativan 2mg PO at 1AM 1/16, in the context of peers "Jack, Zora and Gregory talking behind [her] back". Otherwise, pleasant, calm, cooperative, behavior well controlled. Risperdal was increased to 2mg BID.   1/18/24 : Received Haldol 5 mg PO at 10 PM 1/17, in the context of peers were "talking behind her back". Otherwise, pleasant, calm, cooperative, behavior well controlled. Due to hx of prolactinemia, prolactin is ordered for 1/19/24 AM. It appears that patient has episodes of agitation in the setting of referential paranoia/auditory hallucinations; therefore, will continue Risperdal for now and check prolactin level for hx of prolactinemia. If there is prolactinemia, will consider switching to Zyprexa.   1/19/24 : Patient received Haldol 5 mg PO and Ativan 2 mg PO at 12PM 1/18. Otherwise, pleasant, calm, cooperative, behavior well controlled. No acute events noted.  1/20/24: pt presents as pleasant, calm and cooperative on interview. Very concrete, presentation much more consistent with primary intellectual disability than psychosis. Continue risperdal for mood/agitation     # Behavioral agitation associated with intellectual disability r/o possible Psychosis   - Continue Risperdal 2mg PO BID   - It appears that patient has episodes of agitation in the setting of referential paranoia/auditory hallucinations; therefore, will continue Risperdal for now and check prolactin level for hx of hyperprolactinemia. If there is hyperprolactinemia, will consider switching to Zyprexa.   - Hold Fanapt at this time   - Prolactin level 1/19 55.3    # Agitation recommendations  - For severe agitation not responding to behavioral intervention, may give Haldol 5 mg PO q6hrs PRN, Ativan 2 mg PO q6hrs PRN, Benadryl 50 mg PO q6hrs PRN, with escalation to IM if patient refusing PO and remains an imminent danger to self or others. If IM antipsychotic is administered, please perform follow-up ECG for QTc monitoring.

## 2024-01-21 NOTE — BH INPATIENT PSYCHIATRY PROGRESS NOTE - NSBHATTESTCOMMENTATTENDFT_PSY_A_CORE
Amanda Neil is a 38-year-old female, domiciled in a private residence with her caretaker and caretaker's two nephews (none have any biological relation to patient) for the last 3 years, disabled, single, no significant PMH, past psychiatric history of intellectual disability and ? schizophrenia, no known history of suicide attempts, no known history of IPP admissions aside from current admission at [facility], currently in outpatient treatment with psychiatrist Dr. Albertina Rodriguez, no known substance abuse history, presented to the ED BIB police after destroying property, admitted to IPP for "psychosis."

## 2024-01-21 NOTE — BH INPATIENT PSYCHIATRY PROGRESS NOTE - NSBHMETABOLIC_PSY_ALL_CORE_FT
BMI: BMI (kg/m2): 25.4 (01-13-24 @ 13:47)  HbA1c: A1C with Estimated Average Glucose Result: 6.1 % (01-14-24 @ 04:30)    Glucose: POCT Blood Glucose.: 115 mg/dL (01-04-24 @ 00:46)    BP: 136/89 (01-21-24 @ 08:44) (117/75 - 136/89)Vital Signs Last 24 Hrs  T(C): 35.9 (01-21-24 @ 08:44), Max: 35.9 (01-21-24 @ 08:44)  T(F): 96.6 (01-21-24 @ 08:44), Max: 96.6 (01-21-24 @ 08:44)  HR: 94 (01-21-24 @ 08:44) (94 - 94)  BP: 136/89 (01-21-24 @ 08:44) (136/89 - 136/89)  BP(mean): --  RR: 18 (01-21-24 @ 08:44) (18 - 18)  SpO2: --      Lipid Panel: Date/Time: 01-14-24 @ 04:30  Cholesterol, Serum: 130  LDL Cholesterol Calculated: 61  HDL Cholesterol, Serum: 53  Total Cholesterol/HDL Ration Measurement: --  Triglycerides, Serum: 79

## 2024-01-21 NOTE — BH INPATIENT PSYCHIATRY PROGRESS NOTE - NSBHCHARTREVIEWVS_PSY_A_CORE FT
Vital Signs Last 24 Hrs  T(C): 35.9 (01-21-24 @ 08:44), Max: 35.9 (01-21-24 @ 08:44)  T(F): 96.6 (01-21-24 @ 08:44), Max: 96.6 (01-21-24 @ 08:44)  HR: 94 (01-21-24 @ 08:44) (94 - 94)  BP: 136/89 (01-21-24 @ 08:44) (136/89 - 136/89)  BP(mean): --  RR: 18 (01-21-24 @ 08:44) (18 - 18)  SpO2: --

## 2024-01-22 RX ORDER — RISPERIDONE 4 MG/1
1 TABLET ORAL AT BEDTIME
Refills: 0 | Status: COMPLETED | OUTPATIENT
Start: 2024-01-22 | End: 2024-01-23

## 2024-01-22 RX ORDER — OLANZAPINE 15 MG/1
2.5 TABLET, FILM COATED ORAL ONCE
Refills: 0 | Status: COMPLETED | OUTPATIENT
Start: 2024-01-22 | End: 2024-01-22

## 2024-01-22 RX ORDER — OLANZAPINE 15 MG/1
5 TABLET, FILM COATED ORAL DAILY
Refills: 0 | Status: DISCONTINUED | OUTPATIENT
Start: 2024-01-23 | End: 2024-01-24

## 2024-01-22 RX ORDER — RISPERIDONE 4 MG/1
1 TABLET ORAL DAILY
Refills: 0 | Status: COMPLETED | OUTPATIENT
Start: 2024-01-23 | End: 2024-01-24

## 2024-01-22 RX ADMIN — RISPERIDONE 2 MILLIGRAM(S): 4 TABLET ORAL at 08:05

## 2024-01-22 RX ADMIN — RISPERIDONE 1 MILLIGRAM(S): 4 TABLET ORAL at 20:17

## 2024-01-22 RX ADMIN — OLANZAPINE 2.5 MILLIGRAM(S): 15 TABLET, FILM COATED ORAL at 14:37

## 2024-01-22 NOTE — BH INPATIENT PSYCHIATRY PROGRESS NOTE - NSBHASSESSSUMMFT_PSY_ALL_CORE
Amanda Neil is a 38-year-old female, domiciled in a private residence with her caretaker and caretaker's two nephews (none have any biological relation to patient) for the last 3 years, disabled, single, no significant PMH, past psychiatric history of intellectual disability and ? schizophrenia, no known history of suicide attempts, no known history of IPP admissions aside from current admission at [facility], currently in outpatient treatment with psychiatrist Dr. Albertina Rodriguez, no known substance abuse history, presented to the ED BIB police after destroying property, admitted to IP for "psychosis."    On psychiatric evaluation, patient was calm, cooperative, and remained in good behavioral control; she did not appear to be overtly psychotic or responding to internal stimuli. She was admitted to the inpatient psychiatric unit following a behavioral disturbance that included destroying property at her caretaker's nail salon, which patient endorsed she did due to feeling frustrated with hearing voices and that the voices told her to do so. At this time, it is unclear whether patient is experiencing true auditory hallucinations as a psychotic symptom. The voices she endorses hearing may represent a feature of her potentially regressed developmental level given the presence of intellectual disability. There does not appear to be prodrome symptoms for the psychosis and substance use does not appear to be a factor. It will also be important to consider whether patient has a comorbid depressive or anxiety disorder, in which she may benefit from an SSRI. Her behavioral disturbances, per collateral, are occurring in the context of medication changes made by patient's outpatient psychiatrist. At this time, will continue admission on inpatient psychiatry, will continue patient's home Risperdal at 1 mg qdaily and 2 mg qHS, will hold off on her home med Fanapt 1 mg BID (non-formulary), and will plan to contact outpatient psychiatrist who has been caring for patient since she was a child for collateral information.    Week 1 of admission:  1/16/24 : Received Ativan 2mg PO at 1AM 1/15, in the context of peers being in fights. Patient notes auditory hallucination of "bad things about [her]self" Otherwise, pleasant, calm, cooperative, behavior well controlled. No event noted during 1/13-1/15.   1/17/24 : Received Ativan 2mg PO at 1AM 1/16, in the context of peers "Jack, Zora and Gregory talking behind [her] back". Otherwise, pleasant, calm, cooperative, behavior well controlled. Risperdal was increased to 2mg BID.   1/18/24 : Received Haldol 5 mg PO at 10 PM 1/17, in the context of peers were "talking behind her back". Otherwise, pleasant, calm, cooperative, behavior well controlled. Due to hx of prolactinemia, prolactin is ordered for 1/19/24 AM. It appears that patient has episodes of agitation in the setting of referential paranoia/auditory hallucinations; therefore, will continue Risperdal for now and check prolactin level for hx of prolactinemia. If there is prolactinemia, will consider switching to Zyprexa.   1/19/24 : Patient received Haldol 5 mg PO and Ativan 2 mg PO at 12PM 1/18. Otherwise, pleasant, calm, cooperative, behavior well controlled. No acute events noted.  1/20/24: pt presents as pleasant, calm and cooperative on interview. Very concrete, presentation much more consistent with primary intellectual disability than psychosis. Continue risperdal for mood/agitation   Week 2 of admission:  1/22/24 : No PRN's received on 1/21. Patient on 1:1 for sexual activity risk due to incident on 1/19 as documented in chart note. Otherwise, pleasant, calm, cooperative, behavior well controlled.    # Behavioral agitation associated with intellectual disability r/o possible Psychosis   - Continue Risperdal 2mg PO BID   - It appears that patient has episodes of agitation in the setting of referential paranoia/auditory hallucinations; therefore, will continue Risperdal for now and check prolactin level for hx of hyperprolactinemia. If there is hyperprolactinemia, will consider switching to Zyprexa.   - Hold Fanapt at this time   - Prolactin level 1/19 55.3    # Agitation recommendations  - For severe agitation not responding to behavioral intervention, may give Haldol 5 mg PO q6hrs PRN, Ativan 2 mg PO q6hrs PRN, Benadryl 50 mg PO q6hrs PRN, with escalation to IM if patient refusing PO and remains an imminent danger to self or others. If IM antipsychotic is administered, please perform follow-up ECG for QTc monitoring. Amanda Neil is a 38-year-old female, domiciled in a private residence with her caretaker and caretaker's two nephews (none have any biological relation to patient) for the last 3 years, disabled, single, no significant PMH, past psychiatric history of intellectual disability and ? schizophrenia, no known history of suicide attempts, no known history of IPP admissions aside from current admission at [facility], currently in outpatient treatment with psychiatrist Dr. Albertina Rodriguez, no known substance abuse history, presented to the ED BIB police after destroying property, admitted to IP for "psychosis."    On psychiatric evaluation, patient was calm, cooperative, and remained in good behavioral control; she did not appear to be overtly psychotic or responding to internal stimuli. She was admitted to the inpatient psychiatric unit following a behavioral disturbance that included destroying property at her caretaker's nail salon, which patient endorsed she did due to feeling frustrated with hearing voices and that the voices told her to do so. At this time, it is unclear whether patient is experiencing true auditory hallucinations as a psychotic symptom. The voices she endorses hearing may represent a feature of her potentially regressed developmental level given the presence of intellectual disability. There does not appear to be prodrome symptoms for the psychosis and substance use does not appear to be a factor. It will also be important to consider whether patient has a comorbid depressive or anxiety disorder, in which she may benefit from an SSRI. Her behavioral disturbances, per collateral, are occurring in the context of medication changes made by patient's outpatient psychiatrist. At this time, will continue admission on inpatient psychiatry, will continue patient's home Risperdal at 1 mg qdaily and 2 mg qHS, will hold off on her home med Fanapt 1 mg BID (non-formulary), and will plan to contact outpatient psychiatrist who has been caring for patient since she was a child for collateral information.    Week 1 of admission:  1/16/24 : Received Ativan 2mg PO at 1AM 1/15, in the context of peers being in fights. Patient notes auditory hallucination of "bad things about [her]self" Otherwise, pleasant, calm, cooperative, behavior well controlled. No event noted during 1/13-1/15.   1/17/24 : Received Ativan 2mg PO at 1AM 1/16, in the context of peers "Jack, Zora and Gregory talking behind [her] back". Otherwise, pleasant, calm, cooperative, behavior well controlled. Risperdal was increased to 2mg BID.   1/18/24 : Received Haldol 5 mg PO at 10 PM 1/17, in the context of peers were "talking behind her back". Otherwise, pleasant, calm, cooperative, behavior well controlled. Due to hx of prolactinemia, prolactin is ordered for 1/19/24 AM. It appears that patient has episodes of agitation in the setting of referential paranoia/auditory hallucinations; therefore, will continue Risperdal for now and check prolactin level for hx of prolactinemia. If there is prolactinemia, will consider switching to Zyprexa.   1/19/24 : Patient received Haldol 5 mg PO and Ativan 2 mg PO at 12PM 1/18. Otherwise, pleasant, calm, cooperative, behavior well controlled. No acute events noted. Patient had a sexual encounter with another patient in the unit. It was determined that patient has capacity to consent.   1/20/24: pt presents as pleasant, calm and cooperative on interview. Very concrete, presentation much more consistent with primary intellectual disability than psychosis. Continue risperdal for mood/agitation   Week 2 of admission:  1/22/24 : No PRN's received on 1/21. Patient on 1:1 for sexual activity risk due to incident on 1/19 as documented in chart note. Patient's Prolactin level came back as 55.3. Will taper off Risperdal and start Zyprexa. Risperdal 2mg qd, 1mg qhs. Zyprexa 2.5mg added.   1/23/24: Risperdal 1mg BID. Zyprexa increased to 5mg   1/24/24: Continue with Zyprexa 5mg. Risperdal continue to wean off. 1mg PO qd.   1/25/24:    # Behavioral agitation associated with intellectual disability r/o possible Psychosis   - Prolactin level 1/19: 55.3. Due to the hyperprolactenemia, patient will be tapered off from Risperdal and start on Zyprexa.  - Risperdal 2mg qd, 1mg qhs today. Start Zyprexa 2.5mg today.   - Hold Fanapt at this time        # Agitation recommendations  - For severe agitation not responding to behavioral intervention, may give Haldol 5 mg PO q6hrs PRN, Ativan 2 mg PO q6hrs PRN, Benadryl 50 mg PO q6hrs PRN, with escalation to IM if patient refusing PO and remains an imminent danger to self or others. If IM antipsychotic is administered, please perform follow-up ECG for QTc monitoring.

## 2024-01-22 NOTE — BH INPATIENT PSYCHIATRY PROGRESS NOTE - CURRENT MEDICATION
MEDICATIONS  (STANDING):  risperiDONE   Tablet 2 milliGRAM(s) Oral at bedtime  risperiDONE   Tablet 2 milliGRAM(s) Oral daily    MEDICATIONS  (PRN):  acetaminophen     Tablet .. 650 milliGRAM(s) Oral every 6 hours PRN Mild Pain (1 - 3)  diphenhydrAMINE 50 milliGRAM(s) Oral every 6 hours PRN EPS Prophylaxis (given with Haldol, Ativan for agitation)  haloperidol     Tablet 5 milliGRAM(s) Oral every 6 hours PRN Severe Agitation  LORazepam     Tablet 2 milliGRAM(s) Oral every 6 hours PRN Severe Agitation   MEDICATIONS  (STANDING):  OLANZapine 2.5 milliGRAM(s) Oral once  risperiDONE   Tablet 1 milliGRAM(s) Oral at bedtime    MEDICATIONS  (PRN):  acetaminophen     Tablet .. 650 milliGRAM(s) Oral every 6 hours PRN Mild Pain (1 - 3)  diphenhydrAMINE 50 milliGRAM(s) Oral every 6 hours PRN EPS Prophylaxis (given with Haldol, Ativan for agitation)  haloperidol     Tablet 5 milliGRAM(s) Oral every 6 hours PRN Severe Agitation  LORazepam     Tablet 2 milliGRAM(s) Oral every 6 hours PRN Severe Agitation

## 2024-01-22 NOTE — BH INPATIENT PSYCHIATRY PROGRESS NOTE - NSBHMETABOLIC_PSY_ALL_CORE_FT
BMI: BMI (kg/m2): 25.4 (01-13-24 @ 13:47)  HbA1c: A1C with Estimated Average Glucose Result: 6.1 % (01-14-24 @ 04:30)    Glucose: POCT Blood Glucose.: 115 mg/dL (01-04-24 @ 00:46)    BP: 144/65 (01-22-24 @ 08:44) (125/75 - 144/65)Vital Signs Last 24 Hrs  T(C): 35.9 (01-22-24 @ 08:44), Max: 35.9 (01-22-24 @ 08:44)  T(F): 96.7 (01-22-24 @ 08:44), Max: 96.7 (01-22-24 @ 08:44)  HR: 119 (01-22-24 @ 08:44) (101 - 119)  BP: 144/65 (01-22-24 @ 08:44) (135/85 - 144/65)  BP(mean): --  RR: 17 (01-22-24 @ 08:44) (16 - 17)  SpO2: --      Lipid Panel: Date/Time: 01-14-24 @ 04:30  Cholesterol, Serum: 130  LDL Cholesterol Calculated: 61  HDL Cholesterol, Serum: 53  Total Cholesterol/HDL Ration Measurement: --  Triglycerides, Serum: 79

## 2024-01-22 NOTE — BH TREATMENT PLAN - NSTXPLANTHERAPYSESSIONSFT_PSY_ALL_CORE
01-19-24  Type of therapy: Coping skills, Creative arts therapy, Inspiration and motiviation, Leisure development, Social skills training, Stress management, Symptom management  Type of session: Group  Level of patient participation: Participated with encouragement  Duration of participation: 30 minutes  Therapy conducted by: Psych rehab  Therapy Summary: Pt has attended RT sessions , pt has been pleasant , cooperative but does require assistance and reminders to focus .    01-22-24  Type of therapy: Coping skills, Creative arts therapy, Inspiration and motiviation, Leisure development, Self esteem, Social skills training, Stress management, Symptom management  Type of session: Group  Level of patient participation: Engaged, Participates  Duration of participation: 45 minutes  Therapy conducted by: Psych rehab  Therapy Summary: Pt is attending RT sessions , pt will engage in creative arts , enjoys music , pt will need assistance and reminders to remain focused .

## 2024-01-22 NOTE — BH INPATIENT PSYCHIATRY PROGRESS NOTE - NSBHCONSBHPROVDETAILS_PSY_A_CORE  FT
Behavioral health provider is Dr. Albertina Rodriguez from [facility] ([phone]). Unable to speak with Dr. Rodriguez since office is closed 1/13-1/15.    1/16/24 Collateral attempted at Dr. Rodriguez's office at two separate times, but was not able to be reached.   No voice mail system; unable to leave voice mail.

## 2024-01-22 NOTE — BH INPATIENT PSYCHIATRY PROGRESS NOTE - NSBHCHARTREVIEWVS_PSY_A_CORE FT
Vital Signs Last 24 Hrs  T(C): 35.9 (01-22-24 @ 08:44), Max: 35.9 (01-22-24 @ 08:44)  T(F): 96.7 (01-22-24 @ 08:44), Max: 96.7 (01-22-24 @ 08:44)  HR: 119 (01-22-24 @ 08:44) (101 - 119)  BP: 144/65 (01-22-24 @ 08:44) (135/85 - 144/65)  BP(mean): --  RR: 17 (01-22-24 @ 08:44) (16 - 17)  SpO2: --

## 2024-01-22 NOTE — BH TREATMENT PLAN - NSTXPSYCHOINTERSW_PSY_ALL_CORE
SW will meet with patient daily to provide support, education, collateral and referrals. Patient will be encouraged to attend groups to gain coping skills.
SW will meet with patient daily to provide support, education, collateral and referrals. Patient will be encouraged to attend groups to gain coping skills.

## 2024-01-22 NOTE — BH INPATIENT PSYCHIATRY PROGRESS NOTE - NSBHFUPINTERVALHXFT_PSY_A_CORE
Chart reviewed. No significant behavioral events since incident on 1/19 as documented in chart note. Per staff, patient has been calm, did not appear in acute distress. No PRN medication given yesterday.    Upon approach, patient was in room lying down in bed. Patient on 1:1 for sexual activity risk. Patient was cooperative and agreeable to an interview in her room. Patient appears cheerful, smiling and laughing throughout interview. Patient had prolonged inappropriate laughter throughout the interview. Patient states that her mood is good. Patient said she is sleeping normally and eating normally. Patient denied any AH/VH. Patient denied having thoughts about hurting herself or others. Patient wanted to speak with her  to find out when she is leaving this facility. Chart reviewed. No significant behavioral events since incident on 1/19 as documented in chart note. Per staff, patient has been calm, did not appear in acute distress. No PRN medication given yesterday.    Upon approach, patient was in room lying down in bed. Patient on 1:1 for sexual activity risk. Patient was cooperative and agreeable to an interview in her room. Patient appears cheerful, smiling and laughing throughout interview. Patient had prolonged laughter throughout the interview. Patient states that her mood is good. Patient said she is sleeping normally and eating normally. Patient denied any AH/VH. Patient denied having thoughts about hurting herself or others. Patient wanted to speak with her  to find out when she is leaving this facility.

## 2024-01-23 RX ADMIN — RISPERIDONE 1 MILLIGRAM(S): 4 TABLET ORAL at 20:12

## 2024-01-23 RX ADMIN — OLANZAPINE 5 MILLIGRAM(S): 15 TABLET, FILM COATED ORAL at 08:10

## 2024-01-23 RX ADMIN — RISPERIDONE 1 MILLIGRAM(S): 4 TABLET ORAL at 08:10

## 2024-01-23 NOTE — BH INPATIENT PSYCHIATRY PROGRESS NOTE - CURRENT MEDICATION
MEDICATIONS  (STANDING):  OLANZapine 5 milliGRAM(s) Oral daily  risperiDONE   Tablet 1 milliGRAM(s) Oral at bedtime  risperiDONE   Tablet 1 milliGRAM(s) Oral daily    MEDICATIONS  (PRN):  acetaminophen     Tablet .. 650 milliGRAM(s) Oral every 6 hours PRN Mild Pain (1 - 3)  diphenhydrAMINE 50 milliGRAM(s) Oral every 6 hours PRN EPS Prophylaxis (given with Haldol, Ativan for agitation)  haloperidol     Tablet 5 milliGRAM(s) Oral every 6 hours PRN Severe Agitation  LORazepam     Tablet 2 milliGRAM(s) Oral every 6 hours PRN Severe Agitation
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
## 2024-01-23 NOTE — BH INPATIENT PSYCHIATRY PROGRESS NOTE - NSBHCHARTREVIEWVS_PSY_A_CORE FT
Vital Signs Last 24 Hrs  T(C): 36.1 (01-23-24 @ 09:09), Max: 36.1 (01-22-24 @ 16:12)  T(F): 96.9 (01-23-24 @ 09:09), Max: 96.9 (01-22-24 @ 16:12)  HR: 96 (01-23-24 @ 09:09) (96 - 99)  BP: 134/96 (01-23-24 @ 09:09) (129/74 - 134/96)  BP(mean): --  RR: 18 (01-23-24 @ 09:09) (18 - 18)  SpO2: --

## 2024-01-23 NOTE — BH INPATIENT PSYCHIATRY PROGRESS NOTE - NSBHFUPINTERVALHXFT_PSY_A_CORE
Chart reviewed. No significant behavioral events since incident on 1/19 as documented in chart note. Per staff, patient has been calm, did not appear in acute distress. No PRN medication given yesterday.    Upon approach, patient was in room lying down in bed. Patient on 1:1 for sexual activity risk. Patient was cooperative and agreeable to an interview in her room. Patient appears cheerful, smiling and laughing throughout interview. Patient had prolonged laughter throughout the interview. Patient states that her mood is good. Patient said she is sleeping normally aside from the nightmares last night most likely because there is "no fun in [this facility]". Patient is eating okay but the "eggs were cold" and she ate a salad yesterday. Yesterday, she was hearing staff talking about her. Patient denied any AH/VH. Patient denied having thoughts about hurting herself or others. Patient stated that she spoke with her caretaker, Dee, a couple days ago on the phone. She expressed that she wants to be told when she is getting discharged. Chart reviewed. Per staff, patient has been calm, did not appear in acute distress. Nursing staff reported that patient complained that she hears staff talking about her and had nightmares. Per 1:1, there was no acute overnight events. No PRN medication given yesterday.    Upon approach, patient was in room lying down in bed. Patient on 1:1 for sexual activity risk. Patient was cooperative and agreeable to an interview in her room. Patient appears cheerful, smiling and laughing throughout interview. Patient had prolonged laughter throughout the interview. Patient states that her mood is good. Patient said she is sleeping normally aside from the nightmares last night most likely because there is "no fun in [this facility]". Patient is eating okay but the "eggs were cold" and she ate a salad yesterday.  Patient stated that she spoke with her caretaker, Dee, a couple days ago on the phone. She expressed that she wants to be told when she is getting discharged. No SI/HI/AVH were elicited.

## 2024-01-23 NOTE — BH INPATIENT PSYCHIATRY PROGRESS NOTE - NSBHMETABOLIC_PSY_ALL_CORE_FT
BMI: BMI (kg/m2): 25.4 (01-13-24 @ 13:47)  HbA1c: A1C with Estimated Average Glucose Result: 6.1 % (01-14-24 @ 04:30)    Glucose: POCT Blood Glucose.: 115 mg/dL (01-04-24 @ 00:46)    BP: 134/96 (01-23-24 @ 09:09) (129/74 - 144/65)Vital Signs Last 24 Hrs  T(C): 36.1 (01-23-24 @ 09:09), Max: 36.1 (01-22-24 @ 16:12)  T(F): 96.9 (01-23-24 @ 09:09), Max: 96.9 (01-22-24 @ 16:12)  HR: 96 (01-23-24 @ 09:09) (96 - 99)  BP: 134/96 (01-23-24 @ 09:09) (129/74 - 134/96)  BP(mean): --  RR: 18 (01-23-24 @ 09:09) (18 - 18)  SpO2: --      Lipid Panel: Date/Time: 01-14-24 @ 04:30  Cholesterol, Serum: 130  LDL Cholesterol Calculated: 61  HDL Cholesterol, Serum: 53  Total Cholesterol/HDL Ration Measurement: --  Triglycerides, Serum: 79

## 2024-01-24 RX ORDER — OLANZAPINE 15 MG/1
7.5 TABLET, FILM COATED ORAL DAILY
Refills: 0 | Status: DISCONTINUED | OUTPATIENT
Start: 2024-01-25 | End: 2024-01-26

## 2024-01-24 RX ORDER — OLANZAPINE 15 MG/1
2.5 TABLET, FILM COATED ORAL ONCE
Refills: 0 | Status: COMPLETED | OUTPATIENT
Start: 2024-01-24 | End: 2024-01-24

## 2024-01-24 RX ADMIN — OLANZAPINE 5 MILLIGRAM(S): 15 TABLET, FILM COATED ORAL at 08:06

## 2024-01-24 RX ADMIN — RISPERIDONE 1 MILLIGRAM(S): 4 TABLET ORAL at 08:07

## 2024-01-24 RX ADMIN — OLANZAPINE 2.5 MILLIGRAM(S): 15 TABLET, FILM COATED ORAL at 10:50

## 2024-01-24 NOTE — BH INPATIENT PSYCHIATRY PROGRESS NOTE - NSBHMETABOLIC_PSY_ALL_CORE_FT
BMI: BMI (kg/m2): 25.4 (01-13-24 @ 13:47)  HbA1c: A1C with Estimated Average Glucose Result: 6.1 % (01-14-24 @ 04:30)    Glucose: POCT Blood Glucose.: 115 mg/dL (01-04-24 @ 00:46)    BP: 122/77 (01-24-24 @ 08:20) (122/77 - 144/65)Vital Signs Last 24 Hrs  T(C): 35.7 (01-24-24 @ 08:20), Max: 36.5 (01-23-24 @ 16:27)  T(F): 96.3 (01-24-24 @ 08:20), Max: 97.7 (01-23-24 @ 16:27)  HR: 108 (01-24-24 @ 08:20) (108 - 115)  BP: 122/77 (01-24-24 @ 08:20) (122/77 - 137/82)  BP(mean): --  RR: 16 (01-24-24 @ 08:20) (16 - 18)  SpO2: --      Lipid Panel: Date/Time: 01-14-24 @ 04:30  Cholesterol, Serum: 130  LDL Cholesterol Calculated: 61  HDL Cholesterol, Serum: 53  Total Cholesterol/HDL Ration Measurement: --  Triglycerides, Serum: 79

## 2024-01-24 NOTE — BH DISCHARGE NOTE NURSING/SOCIAL WORK/PSYCH REHAB - NSCDUDCCRISIS_PSY_A_CORE
[facility] Well  [phone] [facility]-WELL ([phone])  Text "WELL" to 30130  Website: [email or web address]  [facility] Crisis [facility] for Children, Adults and Families  [address] [place] [address]  Mobile Crisis Hotline – [phone]) 174-4255/988 Suicide and Crisis Lifeline

## 2024-01-24 NOTE — BH INPATIENT PSYCHIATRY PROGRESS NOTE - NSBHCHARTREVIEWVS_PSY_A_CORE FT
Vital Signs Last 24 Hrs  T(C): 35.7 (01-24-24 @ 08:20), Max: 36.5 (01-23-24 @ 16:27)  T(F): 96.3 (01-24-24 @ 08:20), Max: 97.7 (01-23-24 @ 16:27)  HR: 108 (01-24-24 @ 08:20) (108 - 115)  BP: 122/77 (01-24-24 @ 08:20) (122/77 - 137/82)  BP(mean): --  RR: 16 (01-24-24 @ 08:20) (16 - 18)  SpO2: --

## 2024-01-24 NOTE — BH DISCHARGE NOTE NURSING/SOCIAL WORK/PSYCH REHAB - PATIENT PORTAL LINK FT
You can access the FollowMyHealth Patient Portal offered by [facility] by registering at the following website: [email or web address]. By joining [employer]’s FollowMyHealth portal, you will also be able to view your health information using other applications (apps) compatible with our system.

## 2024-01-24 NOTE — BH INPATIENT PSYCHIATRY PROGRESS NOTE - NSBHATTESTTYPESTAFF_PSY_A_CORE
Resident
Resident/Student
Resident
Resident/Student
Resident

## 2024-01-24 NOTE — BH INPATIENT PSYCHIATRY PROGRESS NOTE - NSBHATTESTCOMMENTATTENDFT_PSY_A_CORE
Pt seen, chart reviewed and case discussed with treatment team. Agree with assessment and plan as stated in this document.  Participated in meeting with pts agency for post DC planning and they were informed and explained in detail pts hospital course , treatment changes and observable improvement and psychiatric stability
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
## 2024-01-24 NOTE — BH DISCHARGE NOTE NURSING/SOCIAL WORK/PSYCH REHAB - NSDCNEXTLEVELWHO_PSY_ALL_CORE_FT
Fax to [phone] Attn to Lauren Martínez Fax to [phone] Attn to Lauren Martínez & Email to Grace [email or web address] Ruthann Fax to [phone] Attn to Lauren Martínez & Email to [email or web address]

## 2024-01-24 NOTE — BH INPATIENT PSYCHIATRY PROGRESS NOTE - NSBHFUPINTERVALHXFT_PSY_A_CORE
Chart reviewed. Per nursing there were no acute overnight events. No PRN medication given yesterday.    Upon approach, patient sitting upright on her bed. Patient on 1:1 for sexual activity risk. Patient was cooperative and agreeable to an interview in her room. Patient appears cheerful, smiling and inappropriately laughing after questions throughout interview. Patient states that her mood is "good" with a thumbs up gesture to the team. Patient agrees that she is sleeping "good!". When prompted whether the patient feels as if anyone is talking about her or scheming against her, the patient looks up with her hand on her chin appearing to engage in brief deep thought for a few seconds before replying "no, nothing like that going on" the laughs. The patient informs the team that "they told me I would be going home tomorrow after", and when asked who had told the patient this information th patient responded "um... Gabriel" and smiles. The patient states that "all is good!" and endorses that her appetite is good. She expressed that she wants to be told when she is getting discharged. No SI/HI/AVH were elicited.  Chart reviewed. Per nursing there were no acute overnight events. No PRN medication given yesterday.    Upon approach, patient sitting upright on her bed. Patient on 1:1 for sexual activity risk. Patient was cooperative and agreeable to an interview in her room. Patient appears cheerful, smiling and laughing after questions throughout interview. Patient states that her mood is "good" with a thumbs up gesture to the team. Patient agrees that she is sleeping "good!". When prompted whether the patient feels as if anyone is talking about her or scheming against her, the patient looks up with her hand on her chin appearing to engage in brief deep thought for a few seconds before replying "no, nothing like that going on" the laughs. The patient informs the team that "they told me I would be going home tomorrow after", and when asked who had told the patient this information th patient responded "um... Gabriel" and smiles. The patient states that "all is good!" and endorses that her appetite is good. She expressed that she wants to be told when she is getting discharged. No SI/HI/AVH were elicited.

## 2024-01-24 NOTE — BH INPATIENT PSYCHIATRY PROGRESS NOTE - CURRENT MEDICATION
MEDICATIONS  (STANDING):  OLANZapine 5 milliGRAM(s) Oral daily    MEDICATIONS  (PRN):  acetaminophen     Tablet .. 650 milliGRAM(s) Oral every 6 hours PRN Mild Pain (1 - 3)  diphenhydrAMINE 50 milliGRAM(s) Oral every 6 hours PRN EPS Prophylaxis (given with Haldol, Ativan for agitation)  haloperidol     Tablet 5 milliGRAM(s) Oral every 6 hours PRN Severe Agitation  LORazepam     Tablet 2 milliGRAM(s) Oral every 6 hours PRN Severe Agitation   MEDICATIONS  (STANDING):  OLANZapine 2.5 milliGRAM(s) Oral once    MEDICATIONS  (PRN):  acetaminophen     Tablet .. 650 milliGRAM(s) Oral every 6 hours PRN Mild Pain (1 - 3)  diphenhydrAMINE 50 milliGRAM(s) Oral every 6 hours PRN EPS Prophylaxis (given with Haldol, Ativan for agitation)  haloperidol     Tablet 5 milliGRAM(s) Oral every 6 hours PRN Severe Agitation  LORazepam     Tablet 2 milliGRAM(s) Oral every 6 hours PRN Severe Agitation

## 2024-01-25 RX ORDER — HALOPERIDOL DECANOATE 100 MG/ML
5 INJECTION INTRAMUSCULAR ONCE
Refills: 0 | Status: COMPLETED | OUTPATIENT
Start: 2024-01-25 | End: 2024-01-25

## 2024-01-25 RX ORDER — OLANZAPINE 15 MG/1
1 TABLET, FILM COATED ORAL
Qty: 15 | Refills: 0
Start: 2024-01-25 | End: 2024-02-08

## 2024-01-25 RX ORDER — DIPHENHYDRAMINE HCL 50 MG
50 CAPSULE ORAL ONCE
Refills: 0 | Status: COMPLETED | OUTPATIENT
Start: 2024-01-25 | End: 2024-01-25

## 2024-01-25 RX ADMIN — Medication 50 MILLIGRAM(S): at 15:51

## 2024-01-25 RX ADMIN — OLANZAPINE 7.5 MILLIGRAM(S): 15 TABLET, FILM COATED ORAL at 08:17

## 2024-01-25 RX ADMIN — Medication 50 MILLIGRAM(S): at 03:13

## 2024-01-25 RX ADMIN — HALOPERIDOL DECANOATE 5 MILLIGRAM(S): 100 INJECTION INTRAMUSCULAR at 03:13

## 2024-01-25 RX ADMIN — Medication 2 MILLIGRAM(S): at 15:50

## 2024-01-25 RX ADMIN — HALOPERIDOL DECANOATE 5 MILLIGRAM(S): 100 INJECTION INTRAMUSCULAR at 15:50

## 2024-01-25 NOTE — BH INPATIENT PSYCHIATRY PROGRESS NOTE - NSBHPSYCHOLCOGORIENT_PSY_A_CORE
Oriented to time, place, person, situation
Unable to assess
Oriented to time, place, person, situation

## 2024-01-25 NOTE — BH INPATIENT PSYCHIATRY PROGRESS NOTE - NSBHMSEMUSCLE_PSY_A_CORE
Normal muscle tone/strength
Other
Normal muscle tone/strength
Normal muscle tone/strength
Unable to assess
Normal muscle tone/strength

## 2024-01-25 NOTE — BH INPATIENT PSYCHIATRY PROGRESS NOTE - NSBHMSEINTELL_PSY_A_CORE
Below Average
Unable to assess
Below Average

## 2024-01-25 NOTE — BH INPATIENT PSYCHIATRY PROGRESS NOTE - NSBHFUPINTERVALHXFT_PSY_A_CORE
Chart reviewed. Per nursing there were no acute overnight events. Benadryl 50 mg and Haldol 5 mg PO given yesterday 1/24 and 3:13AM.    Upon approach, patient sitting upright on her bed. Patient on 1:1 for sexual activity risk. Patient was cooperative and agreeable to an interview in her room. Patient appears cheerful, smiling and laughing after questions throughout interview. Patient states that her mood is "good" with a thumbs up gesture to the team. Patient agrees that she is sleeping "good!". When prompted whether the patient feels as if anyone is talking about her or scheming against her, the patient looks up with her hand on her chin appearing to engage in brief deep thought for a few seconds before replying "no, nothing like that going on" the laughs. The patient informs the team that "they told me I would be going home tomorrow after", and when asked who had told the patient this information th patient responded "um... Gabriel" and smiles. The patient states that "all is good!" and endorses that her appetite is good. She expressed that she wants to be told when she is getting discharged. No SI/HI/AVH were elicited.  Chart reviewed. Per nursing there were no acute overnight events. Benadryl 50 mg and Haldol 5 mg PO given 3:13AM.    Upon approach, patient sitting upright on her bed. Patient was cooperative and agreeable to an interview in her room. Patient appears cheerful, smiling and laughing after questions throughout interview. Patient states that her mood is "good" with a thumbs up gesture to the team. Patient agrees that she is sleeping "good!". Patient states when she gets discharged, she would like to get cristino. No SI/HI/AVH were elicited.  Chart reviewed. Per nursing there were no acute overnight events. Benadryl 50 mg and Haldol 5 mg PO given 3:13AM.    Upon approach, patient was putting on her gown and pacing room. Information gathered from the PCA was that the patient has been disorganized all morning, showered twice, and took her hairstyle out. Patient was cooperative and agreeable to an interview in her room. Patient appears cheerful, smiling and laughing after questions throughout interview. Patient states that her mood is "good" with a thumbs up gesture to the team. Patient agrees that she is sleeping well and eating well. Patient states when she gets discharged, she is going to get box cristino, watch the SuperBowl and celebrate her friends birthday which is next month. No SI/HI/AVH were elicited.

## 2024-01-25 NOTE — BH INPATIENT PSYCHIATRY PROGRESS NOTE - CURRENT MEDICATION
MEDICATIONS  (STANDING):  OLANZapine 7.5 milliGRAM(s) Oral daily    MEDICATIONS  (PRN):  acetaminophen     Tablet .. 650 milliGRAM(s) Oral every 6 hours PRN Mild Pain (1 - 3)  diphenhydrAMINE 50 milliGRAM(s) Oral every 6 hours PRN EPS Prophylaxis (given with Haldol, Ativan for agitation)  haloperidol     Tablet 5 milliGRAM(s) Oral every 6 hours PRN Severe Agitation  LORazepam     Tablet 2 milliGRAM(s) Oral every 6 hours PRN Severe Agitation

## 2024-01-25 NOTE — BH INPATIENT PSYCHIATRY PROGRESS NOTE - NSDCCRITERIA_PSY_ALL_CORE
Improvement of behavioral disturbances

## 2024-01-25 NOTE — BH INPATIENT PSYCHIATRY DISCHARGE NOTE - NSBHFUPINTERVALHXFT_PSY_A_CORE
Upon approach, patient was sitting on her bed with a smile on her face. Patient was calm, cooperative, and in good behavior control. She asks, "How are you?" and states that she is excited to leave the hospital. She adds, "I will miss you!" When asked what happened yesterday when she seemed to be upset, she states it was a mistake and she feels remorseful about it. She states she should have been calmer and more cooperative the staff. She denies any SI/HI/AVH and states she is happy to leave.

## 2024-01-25 NOTE — BH INPATIENT PSYCHIATRY PROGRESS NOTE - NSBHATTESTAPPBILLTIME_PSY_A_CORE
I attest my time as LENA is greater than 50% of the total combined time spent on qualifying patient care activities. I have reviewed and verified the documentation.

## 2024-01-25 NOTE — BH INPATIENT PSYCHIATRY PROGRESS NOTE - NSTXPSYCHOGOAL_PSY_ALL_CORE
Will identify 2 coping skills that assist with focus on reality
Will identify 2 coping skills that assist with focus on reality
Will be able to report experiencing hallucinations to staff
Will identify 2 coping skills that assist with focus on reality
Will be able to report experiencing hallucinations to staff
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
## 2024-01-25 NOTE — BH INPATIENT PSYCHIATRY PROGRESS NOTE - NSBHATTESTTYPEVISIT_PSY_A_CORE
Attending with Resident/Fellow/Student
Attending with Resident/Fellow/Student
On-site Attending supervising LENA (99XXX codes)
Attending with Resident/Fellow/Student
Attending Only
Attending with Resident/Fellow/Student
Attending with Resident/Fellow/Student

## 2024-01-25 NOTE — BH INPATIENT PSYCHIATRY PROGRESS NOTE - NSICDXBHPRIMARYDX_PSY_ALL_CORE
Schizophrenia   F20.9  
Intellectual disability   F79  
Schizophrenia   F20.9  
Intellectual disability   F79  
Schizophrenia   F20.9  

## 2024-01-25 NOTE — BH INPATIENT PSYCHIATRY PROGRESS NOTE - NSTXPSYCHOINTERMD_PSY_ALL_CORE
Continuing and/or managing psychotropic medications

## 2024-01-25 NOTE — BH INPATIENT PSYCHIATRY PROGRESS NOTE - NSBHMSESPABN_PSY_A_CORE
Soft volume/Impaired articulation
Impaired articulation
Soft volume/Impaired articulation
Impaired articulation
Soft volume/Impaired articulation
Soft volume/Impaired articulation

## 2024-01-25 NOTE — BH INPATIENT PSYCHIATRY PROGRESS NOTE - NSBHCHARTREVIEWVS_PSY_A_CORE FT
Vital Signs Last 24 Hrs  T(C): 36 (01-25-24 @ 08:41), Max: 36 (01-25-24 @ 08:41)  T(F): 96.8 (01-25-24 @ 08:41), Max: 96.8 (01-25-24 @ 08:41)  HR: 106 (01-25-24 @ 08:41) (106 - 121)  BP: 132/77 (01-25-24 @ 08:41) (127/82 - 132/77)  BP(mean): --  RR: 16 (01-25-24 @ 08:41) (16 - 16)  SpO2: --

## 2024-01-25 NOTE — BH INPATIENT PSYCHIATRY PROGRESS NOTE - NSBHMETABOLIC_PSY_ALL_CORE_FT
BMI: BMI (kg/m2): 25.4 (01-13-24 @ 13:47)  HbA1c: A1C with Estimated Average Glucose Result: 6.1 % (01-14-24 @ 04:30)    Glucose: POCT Blood Glucose.: 115 mg/dL (01-04-24 @ 00:46)    BP: 132/77 (01-25-24 @ 08:41) (122/77 - 137/82)Vital Signs Last 24 Hrs  T(C): 36 (01-25-24 @ 08:41), Max: 36 (01-25-24 @ 08:41)  T(F): 96.8 (01-25-24 @ 08:41), Max: 96.8 (01-25-24 @ 08:41)  HR: 106 (01-25-24 @ 08:41) (106 - 121)  BP: 132/77 (01-25-24 @ 08:41) (127/82 - 132/77)  BP(mean): --  RR: 16 (01-25-24 @ 08:41) (16 - 16)  SpO2: --      Lipid Panel: Date/Time: 01-14-24 @ 04:30  Cholesterol, Serum: 130  LDL Cholesterol Calculated: 61  HDL Cholesterol, Serum: 53  Total Cholesterol/HDL Ration Measurement: --  Triglycerides, Serum: 79

## 2024-01-25 NOTE — BH INPATIENT PSYCHIATRY PROGRESS NOTE - NSBHMSEBEHAV_PSY_A_CORE
[facility],  [address] [place]  Phone: [phone]  Fax [phone]    Physical Therapy Treatment Note/ Progress Report:           Date:  12/3/2020    Patient Name:  Angie Carlson    :  1945  MRN: [id number]  Restrictions/Precautions:    Medical/Treatment Diagnosis Information:  Diagnosis: R knee pain M25.561 s/p R TKA 10/22/20             Treatment diagnosis: R knee pain M25.561; difficulty walking R26.2         Insurance/Certification information:   [facility]  Physician Information:  Referring Practitioner: Krystal Harry MD  Has the plan of care been signed (Y/N):        []  Yes  [x]  No     Date of Patient follow up with Physician:       Is this a Progress Report:     [x]  Yes  []  No        If Yes:  Date Range for reporting period:  Beginning 20  Ending 12/3/20    Progress report will be due (10 Rx or 30 days whichever is less): 10/2/53       Recertification will be due (POC Duration  / 90 days whichever is less): 21      Visit # Insurance Allowable Auth Required   In-person 7 BMN []  Yes []  No    Telehealth   []  Yes []  No    Total          Functional Scale: LEFS 20%    Date assessed:  12/3/20      Therapy Diagnosis/Practice Pattern:H      Number of Comorbidities:  []0     [x]1-2    []3+    Latex Allergy:  [x]NO      []YES  Preferred Language for Healthcare:   [x]English       []other:      Pain level:  0/10     SUBJECTIVE:  Pt states that his knee feels great. Thinks he'll be finishing up with PT soon.   OBJECTIVE:    Observation: decreased tightness noted distal HS with STM      Test measurements:  AROM R knee flexion 136 deg, ext 0 deg   MMT B knee 4+/5 ext, 5/5 flex, good quad tone    RESTRICTIONS/PRECAUTIONS:     Exercises/Interventions:     Therapeutic Ex (67081) Sets/sec Reps Notes/CUES   QS     Gastroc Belt S     Table side HS S     Heel slide     LAQ with ADD      SLR      SL hip ABD     Bridge
Cooperative
Cooperative
with ADD     SAQ           TKE Red loop 25x5\"     Bike 6'     Pt ed: HEP, ice, POC 5'           Manual Intervention (18570)      Patellar mobs; STM HS; scar massage manual  HS S, modified Beto S,PROM R knee ext with  overpressure  18'                                   NMR re-education (99915)   CUES NEEDED   Independent gait  Cues for heel strike   Stairs with B rails           Standing HR/TR      Mini squat 3x10     Tandem stance airex L     FSU & over 6\" x15 R  Heel toe, slow ecc control   SLS airex 5x15\" R/L     LSD 4\" 2x10 R/L     Therapeutic Activity (25565)                                                Therapeutic Exercise and NMR EXR  [x] (54263) Provided verbal/tactile cueing for activities related to strengthening, flexibility, endurance, ROM for improvements in LE, proximal hip, and core control with self care, mobility, lifting, ambulation.  [] (92250) Provided verbal/tactile cueing for activities related to improving balance, coordination, kinesthetic sense, posture, motor skill, proprioception  to assist with LE, proximal hip, and core control in self care, mobility, lifting, ambulation and eccentric single leg control.      NMR and Therapeutic Activities:    [x] (78637 or 75279) Provided verbal/tactile cueing for activities related to improving balance, coordination, kinesthetic sense, posture, motor skill, proprioception and motor activation to allow for proper function of core, proximal hip and LE with self care and ADLs  [] (06756) Gait Re-education- Provided training and instruction to the patient for proper LE, core and proximal hip recruitment and positioning and eccentric body weight control with ambulation re-education including up and down stairs     Home Exercise Program:    [x] (68746) Reviewed/Progressed HEP activities related to strengthening, flexibility, endurance, ROM of core, proximal hip and LE for functional self-care, mobility, lifting and ambulation/stair navigation   []
(04313)Reviewed/Progressed HEP activities related to improving balance, coordination, kinesthetic sense, posture, motor skill, proprioception of core, proximal hip and LE for self care, mobility, lifting, and ambulation/stair navigation      Manual Treatments:  PROM / STM / Oscillations-Mobs:  G-I, II, III, IV (PA's, Inf., Post.)  [x] (11238) Provided manual therapy to mobilize LE, proximal hip and/or LS spine soft tissue/joints for the purpose of modulating pain, promoting relaxation,  increasing ROM, reducing/eliminating soft tissue swelling/inflammation/restriction, improving soft tissue extensibility and allowing for proper ROM for normal function with self care, mobility, lifting and ambulation. Modalities:    [] GAME READY (VASO)- for significant edema, swelling, pain control. Charges:  Timed Code Treatment Minutes: 45   Total Treatment Minutes: 45     [facility] time in/time out:   (and requires time in and out for each CPT code)    [] EVAL (LOW) 56786   [] EVAL (MOD) 99657  [] EVAL (HIGH) 25757   [] RE-EVAL     [x] LA(93469) x1   [] IONTO  [x] NMR (43570) x 1  [] VASO   [x] Manual (08064) x1      [] Other:  [] TA x      [] Mech Traction (84389)  [] ES(attended) (94527)      [] ES (un) (25455):       GOALS:   Patient stated goal: full use of knee, return to work and golf  []? Progressing: []? Met: []? Not Met: []? Adjusted     Therapist goals for Patient:   Short Term Goals: To be achieved in: 2 weeks  1. Independent in HEP and progression per patient tolerance, in order to prevent re-injury. []? Progressing: [x]? Met: []? Not Met: []? Adjusted  2. Patient will have a decrease in pain to facilitate improvement in movement, function, and ADLs as indicated by Functional Deficits. []? Progressing: [x]? Met: []? Not Met: []? Adjusted     Long Term Goals: To be achieved in: 12 weeks  1. Disability index score of 25% or less for the LEFS to assist with reaching prior level of function. []? Progressing: [x]?
Cooperative
Met: []? Not Met: []? Adjusted  2. Patient will demonstrate increased AROM to 0-120 deg to allow for proper joint functioning as indicated by patients Functional Deficits. []? Progressing: [x]? Met: []? Not Met: []? Adjusted  3. Patient will demonstrate an increase in Strength to good quad tone and good proximal hip strength and control, within 5lb HHD in LE to allow for proper functional mobility as indicated by patients Functional Deficits. [x]? Progressing: []? Met: []? Not Met: []? Adjusted  4. Patient will return to negotiating stairs with reciprocal pattern without increased symptoms or restriction. []? Progressing: [x]? Met: []? Not Met: []? Adjusted  5. Patient will be able to return to work without restrictions. (patient specific functional goal)    []? Progressing: [x]? Met: []? Not Met: []? Adjusted         Progression Towards Functional goals:  [x] Patient is progressing as expected towards functional goals listed. [] Progression is slowed due to complexities listed. [] Progression has been slowed due to co-morbidities. [] Plan just implemented, too soon to assess goals progression  [] Other:         Overall Progression Towards Functional goals/ Treatment Progress Update:  [] Patient is progressing as expected towards functional goals listed. [] Progression is slowed due to complexities/Impairments listed. [] Progression has been slowed due to co-morbidities.   [x] Plan just implemented, too soon to assess goals progression <30days   [] Goals require adjustment due to lack of progress  [] Patient is not progressing as expected and requires additional follow up with physician  [] Other    Prognosis for POC: [x] Good [] Fair  [] Poor      Patient requires continued skilled intervention: [x] Yes  [] No    Treatment/Activity Tolerance:  [x] Patient able to complete treatment  [] Patient limited by fatigue  [] Patient limited by pain    [] Patient limited by other medical complications  []
Other: ASSESSMENT: Pt is doing well. See progress towards goals above. Likely DC nv. Return to Play: (if applicable)   []  Stage 1: Intro to Strength   []  Stage 2: Return to Run and Strength   []  Stage 3: Return to Jump and Strength   []  Stage 4: Dynamic Strength and Agility   []  Stage 5: Sport Specific Training     []  Ready to Return to Play, Meets All Above Stages   []  Not Ready for Return to Sports   Comments:                               PLAN: See eval  [x] Continue per plan of care [] Alter current plan (see comments above)  [] Plan of care initiated [] Hold pending MD visit [] Discharge      Electronically signed by:  Basil Puga PT     Note: If patient does not return for scheduled/ recommended follow up visits, this note will serve as a discharge from care along with most recent update on progress.
Cooperative
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
## 2024-01-25 NOTE — BH INPATIENT PSYCHIATRY PROGRESS NOTE - NSBHMSEKNOW_PSY_A_CORE
Normal
Unable to assess
Unable to assess
Normal
Unable to assess
Normal
Normal
Unable to assess

## 2024-01-25 NOTE — BH INPATIENT PSYCHIATRY PROGRESS NOTE - NSBHMSESPEECH_PSY_A_CORE
Abnormal as indicated, otherwise normal...
Normal volume, rate, productivity, spontaneity and articulation
Abnormal as indicated, otherwise normal...
Normal volume, rate, productivity, spontaneity and articulation
Abnormal as indicated, otherwise normal...
Normal volume, rate, productivity, spontaneity and articulation

## 2024-01-25 NOTE — BH INPATIENT PSYCHIATRY PROGRESS NOTE - NSTXPSYCHODATEEST_PSY_ALL_CORE
16-Jan-2024
13-Jan-2024
16-Jan-2024
13-Jan-2024
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
## 2024-01-25 NOTE — BH INPATIENT PSYCHIATRY DISCHARGE NOTE - HOSPITAL COURSE
Throughout the course of the patient's stay, she was pleasant, calm and cooperative. Patient interacted positively with the other patients on the [place]. Patient would report hearing voices in the beginning of the stay. For the past couple of days, patient denied reports of hearing voices. Patient was originally started on Risperdal 1 mg and 2 mg PO, once daily, but prolactin levels were elevated so patient was titrated off of Risperdal and started on Zyprexa 7.5 mg. Patient was asymptomatic on Risperdal and seems to be responding well to Zyprexa. Amanda Neil is a 38-year-old female, domiciled in a private residence with her caretaker and caretaker's two nephews (none have any biological relation to patient) for the last 3 years, disabled, single, no significant PMH, past psychiatric history of intellectual disability and ? schizophrenia, no known history of suicide attempts, no known history of IPP admissions aside from current admission at [facility], currently in outpatient treatment with psychiatrist Dr. Albertina Rodriguez, no known substance abuse history, presented to the ED BIB police after destroying property, admitted to [facility] for "psychosis."    On psychiatric evaluation, patient was calm, cooperative, and remained in good behavioral control; she did not appear to be overtly psychotic or responding to internal stimuli. She was admitted to the inpatient psychiatric unit following a behavioral disturbance that included destroying property at her caretaker's nail salon, which patient endorsed she did due to feeling frustrated with hearing voices and that the voices told her to do so. At this time, it is unclear whether patient is experiencing true auditory hallucinations as a psychotic symptom. The voices she endorses hearing may represent a feature of her potentially regressed developmental level given the presence of intellectual disability. There does not appear to be prodrome symptoms for the psychosis and substance use does not appear to be a factor. Per collateral, are occurring in the context of medication changes made by patient's outpatient psychiatrist, in which Fanapt and Risperdal were being titrated down. However, Dr. Rodriguez noted that although she had imaginary friend, she never told her about paranoid thoughts.     Throughout the hospitalization, patient initially required PRN's at night when patient was agitated and stating that she hears other people talking about her. Risperdal was up-titrated initially, however, the team later got in touch with the outpatient provider, who stated she has history of hyperprolactinemia. From then on, patient was titrated down of Risperdal 2mg BID, and instead given Zyprexa. Zyprexa was titrated up to 7.5mg PO q daily. Patient was always calm, cooperative, and in good behavior control with the treatment team. Patient was seen to be yelling and hitting walls in the afternoon, not amenable to verbal redirection and refusing PO PRNs, and patient was given IM PRNs. This presentation may be due to impairment in emotional regulation and frustration tolerance in the context of hx of intellectual disability, and reacting to the impending discharge. Although patient has been wanting to be discharged throughout the hospitalization, the idea of sudden change of environment may have been the trigger.       Week 1 of admission (1/16-1/22): Patient received nightly PRN Ativan in the context of patients hearing peers speaking about her and her being upset. Otherwise, patient was euthymic, cooperative, and good behavior control. However, on 1/19, patient had an encounter with another peer and since then was put on 1:1. 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 The plan is to taper off Risperdal and start Zyprexa.     Week 2 of admission (1/23-)  1/23/24: No PRN's received on 1/22. Risperdal 1mg BID. Zyprexa increased to 5mg. No acute events. Will start planning for discharge after tapering off Risperdal.   1/24/24: Patient at baseline. Increase Zyprexa to 7.5 mg today and DC Risperdal.  Prepare for discharge initiated and Planned for tomorrow. Pt presents calm, pleasant , organized and psychiatrically stable for discharge tomorrow. She denied any AVH or SI/HI/VI and does not appear internally preoccupied. She is tolerating medication well with no adverse effects reported or observed  1/25/24: Benadryl 50 mg and Haldol 5 mg PO given on 3:13AM. Pt presents calm, pleasant , organized and psychiatrically stable for discharge tomorrow. She denied any AVH or SI/HI/VI and does not appear internally preoccupied. She is tolerating medication well with no adverse effects reported or observed. Prepare for discharge initiated and Planned for tomorrow. In the afternoon, patient was yelling and hitting walls in the afternoon, not amenable to verbal redirection and refusing PO PRNs, and patient was given IM PRNs. This presentation may be due to impairment in emotional regulation and frustration tolerance in the context of hx of intellectual disability, and reacting to the impending discharge. Although patient has been wanting to be discharged throughout the hospitalization, the idea of sudden change of environment may have been the trigger.     # Behavioral agitation associated with intellectual disability r/o possible Psychosis   - Prolactin level 1/19: 55.3. Due to the hyperprolactenemia, patient will be tapered off from Risperdal and start on Zyprexa.  - Continue Zyprexa 7.5mg PO daily   - Hold Fanapt at this time     # Agitation recommendations  - For severe agitation not responding to behavioral intervention, may give Haldol 5 mg PO q6hrs PRN, Ativan 2 mg PO q6hrs PRN, Benadryl 50 mg PO q6hrs PRN, with escalation to IM if patient refusing PO and remains an imminent danger to self or others. Amanda Neil is a 38-year-old female, domiciled in a private residence with her caretaker and caretaker's two nephews (none have any biological relation to patient) for the last 3 years, disabled, single, no significant PMH, past psychiatric history of intellectual disability and ? schizophrenia, no known history of suicide attempts, no known history of IPP admissions aside from current admission at [facility] currently in outpatient treatment with psychiatrist Dr. Albertina Rodriugez, no known substance abuse history, presented to the ED BIB police after destroying property, admitted to [facility] for "psychosis."    On psychiatric evaluation, patient was calm, cooperative, and remained in good behavioral control; she did not appear to be overtly psychotic or responding to internal stimuli. She was admitted to the inpatient psychiatric unit following a behavioral disturbance that included destroying property at her caretaker's nail salon, which patient endorsed she did due to feeling frustrated with hearing voices and that the voices told her to do so. At this time, it is unclear whether patient is experiencing true auditory hallucinations as a psychotic symptom. The voices she endorses hearing may represent a feature of her potentially regressed developmental level given the presence of intellectual disability. There does not appear to be prodrome symptoms for the psychosis and substance use does not appear to be a factor. Per collateral, are occurring in the context of medication changes made by patient's outpatient psychiatrist, in which Fanapt and Risperdal were being titrated down. However, Dr. Rodriguez noted that although she had imaginary friend, she never told her about paranoid thoughts.     Throughout the hospitalization, patient initially required PRN's at night when patient was agitated and stating that she hears other people talking about her. Risperdal was up-titrated initially, however, the team later got in touch with the outpatient provider, who stated she has history of hyperprolactinemia. From then on, patient was titrated down of Risperdal 2mg BID, and instead given Zyprexa. Zyprexa was titrated up to 7.5mg PO q daily. Patient was always calm, cooperative, and in good behavior control with the treatment team. However, the day prior to the discharge date, patient was seen to be yelling and hitting walls in the afternoon, not amenable to verbal redirection and refusing PO PRNs, and patient was given IM PRNs. This presentation may be due to impairment in emotional regulation and frustration tolerance in the context of hx of intellectual disability, and may be a maladaptive coping mechanism reacting to the impending discharge. Although patient has been wanting to be discharged throughout the hospitalization, the idea of sudden change of environment may have been the trigger. This type of presentation may be frequent with patients with intellectual disability, and the first line management should be a behavior intervention.       Week 1 of admission (1/16-1/22): Patient received nightly PRN Ativan in the context of patients hearing peers speaking about her and her being upset. Otherwise, patient was euthymic, cooperative, and good behavior control. However, on 1/19, patient had an encounter with another peer and since then was put on 1:1. 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 The plan is to taper off Risperdal and start Zyprexa.     Week 2 of admission (1/23-)  1/23/24: No PRN's received on 1/22. Risperdal 1mg BID. Zyprexa increased to 5mg. No acute events. Will start planning for discharge after tapering off Risperdal.   1/24/24: Patient at baseline. Increase Zyprexa to 7.5 mg today and DC Risperdal.  Prepare for discharge initiated and Planned for tomorrow. Pt presents calm, pleasant , organized and psychiatrically stable for discharge tomorrow. She denied any AVH or SI/HI/VI and does not appear internally preoccupied. She is tolerating medication well with no adverse effects reported or observed  1/25/24: Benadryl 50 mg and Haldol 5 mg PO given on 3:13AM. Pt presents calm, pleasant , organized and psychiatrically stable for discharge tomorrow. She denied any AVH or SI/HI/VI and does not appear internally preoccupied. She is tolerating medication well with no adverse effects reported or observed. Prepare for discharge initiated and Planned for tomorrow. In the afternoon, patient was yelling and hitting walls in the afternoon, not amenable to verbal redirection and refusing PO PRNs, and patient was given IM PRNs. This presentation may be due to impairment in emotional regulation and frustration tolerance in the context of hx of intellectual disability, and reacting to the impending discharge. Although patient has been wanting to be discharged throughout the hospitalization, the idea of sudden change of environment may have been the trigger.     # Behavioral agitation associated with intellectual disability r/o possible Psychosis   - Prolactin level 1/19: 55.3. Due to the hyperprolactenemia, patient will be tapered off from Risperdal and started on Zyprexa.  - Continue Zyprexa 7.5mg PO daily   - Hold Fanapt at this time     # Agitation recommendations  - For severe agitation not responding to behavioral intervention, may give Haldol 5 mg PO q6hrs PRN, Ativan 2 mg PO q6hrs PRN, Benadryl 50 mg PO q6hrs PRN, with escalation to IM if patient refusing PO and remains an imminent danger to self or others. Amanda Neil is a 38-year-old female, domiciled in a private residence with her caretaker and caretaker's two nephews (none have any biological relation to patient) for the last 3 years, disabled, single, no significant PMH, past psychiatric history of intellectual disability and ? schizophrenia, no known history of suicide attempts, no known history of IPP admissions aside from current admission at [facility], currently in outpatient treatment with psychiatrist Dr. Albertina Rodriguez, no known substance abuse history, presented to the ED BIB police after destroying property, admitted to [facility] for "psychosis."    On psychiatric evaluation, patient was calm, cooperative, and remained in good behavioral control; she did not appear to be overtly psychotic or responding to internal stimuli. She was admitted to the inpatient psychiatric unit following a behavioral disturbance that included destroying property at her caretaker's nail salon, which patient endorsed she did due to feeling frustrated with hearing voices and that the voices told her to do so. At this time, it is unclear whether patient is experiencing true auditory hallucinations as a psychotic symptom. The voices she endorses hearing may represent a feature of her potentially regressed developmental level given the presence of intellectual disability. There does not appear to be prodrome symptoms for the psychosis and substance use does not appear to be a factor. Per collateral, are occurring in the context of medication changes made by patient's outpatient psychiatrist, in which Fanapt and Risperdal were being titrated down. However, Dr. Rodriguez noted that although she had imaginary friend, she never told her about paranoid thoughts.     Throughout the hospitalization, patient initially required PRN's at night when patient was agitated and stating that she hears other people talking about her. Risperdal was up-titrated initially, however, the team later got in touch with the outpatient provider, who stated she has history of hyperprolactinemia. Prolactin level was drawn, which came back elevated at 55.3. From then on, patient was titrated down of Risperdal 2mg BID, and instead given Zyprexa. Zyprexa was titrated up to 7.5mg PO q daily. Patient had a romantic encounter with peer, and was temporarily put on 1:1 for safety in the unit. She no longer requires 1:1 and does not need such level of supervision in the community. Patient was always calm, cooperative, and in good behavior control with the treatment team. Patient no longer complains of people talking about her or being angry at herself. She denies hearing any voices or wanting to hurt herself or others. She engaged in conversations and activities with peers in the unit. There was an isolated incident, the day prior to the discharge date, in which patient was seen to be yelling and hitting walls. No individual was injured and no property was damaged. This presentation may be due to impairment in emotional regulation and frustration tolerance in the context of hx of intellectual disability, and may be a maladaptive coping mechanism reacting to the impending discharge. Although patient has been wanting to be discharged throughout the hospitalization, the idea of sudden change of environment may have been the trigger. This type of presentation may be frequent in patients with intellectual disability, and the first line management should be a behavior intervention.     Patient is not an imminent danger to her or others, and patient does not require inpatient level psychiatric care. Patient would most benefit from following up with outpatient psychiatrist and/or therapist to build frustration tolerance and healthy, adaptive coping mechanism while functioning and adapting to stressors in the community. The chronic nature of symptoms and signs of intellectual disability will be best mitigated with outpatient care.      Week 1 of admission (1/16-1/22): Patient received nightly PRN Ativan in the context of patients hearing peers speaking about her and her being upset. Otherwise, patient was euthymic, cooperative, and good behavior control. Patient had a romantic encounter and was temporarily put on 1:1 to maintain safety. Patient was found to have hyperprolactinemia of 55.3. The plan is to taper off Risperdal and start Zyprexa.     Week 2 of admission (1/23-)  1/23/24: No PRN's received on 1/22. Risperdal 1mg BID. Zyprexa increased to 5mg. No acute events. Will start planning for discharge after tapering off Risperdal.   1/24/24: Patient at baseline. Increase Zyprexa to 7.5 mg today and DC Risperdal.  Prepare for discharge initiated and Planned for tomorrow. Pt presents calm, pleasant , organized and psychiatrically stable for discharge tomorrow. She denied any AVH or SI/HI/VI and does not appear internally preoccupied. She is tolerating medication well with no adverse effects reported or observed  1/25/24: Benadryl 50 mg and Haldol 5 mg PO given on 3:13AM. Pt presents calm, pleasant , organized and psychiatrically stable for discharge tomorrow. She denied any AVH or SI/HI/VI and does not appear internally preoccupied. There was an isolated incident, the day prior to the discharge date, in which patient was seen to be yelling and hitting walls. No individual was injured and no property was damaged. This presentation may be due to impairment in emotional regulation and frustration tolerance in the context of hx of intellectual disability, and may be a maladaptive coping mechanism reacting to the impending discharge. Although patient has been wanting to be discharged throughout the hospitalization, the idea of sudden change of environment may have been the trigger. This type of presentation may be frequent in patients with intellectual disability, and the first line management should be a behavior intervention.     # Behavioral agitation associated with intellectual disability r/o possible Psychosis   - Prolactin level 1/19: 55.3. Due to the hyperprolactenemia, patient will be tapered off from Risperdal and started on Zyprexa.  - Continue Zyprexa 7.5mg PO daily   - Hold Fanapt at this time     # Agitation recommendations  - For severe agitation not responding to behavioral intervention, may give Haldol 5 mg PO q6hrs PRN, Ativan 2 mg PO q6hrs PRN, Benadryl 50 mg PO q6hrs PRN, with escalation to IM if patient refusing PO and remains an imminent danger to self or others. Ms. Neil is a 38-year-old female, domiciled in a private residence with her caretaker and caretaker's two nephews (none have any biological relation to patient) for the last 3 years, disabled, single, no significant PMH, past psychiatric history of intellectual disability and ? schizophrenia, no known history of suicide attempts, no known history of IPP admissions aside from current admission at [facility], currently in outpatient treatment with psychiatrist Dr. Albertina Rodriguez, no known substance abuse history, presented to the ED BIB police after destroying property, admitted to [facility] for "psychosis."    On psychiatric evaluation, patient was calm, cooperative, and remained in good behavioral control; she did not appear to be overtly psychotic or responding to internal stimuli. She was admitted to the inpatient psychiatric unit following a behavioral disturbance that included destroying property at her caretaker's nail salon, which patient endorsed she did due to feeling frustrated with hearing voices and that the voices told her to do so. At this time, it is unclear whether patient is experiencing true auditory hallucinations as a psychotic symptom. The voices she endorses hearing may represent a feature of her potentially regressed developmental level given the presence of intellectual disability. There does not appear to be prodrome symptoms for the psychosis and substance use does not appear to be a factor. Per collateral, are occurring in the context of medication changes made by patient's outpatient psychiatrist, in which Fanapt and Risperdal were being titrated down. However, Dr. Rodriguez noted that although she had imaginary friend, she never told her about paranoid thoughts.     Throughout the hospitalization, patient initially required PRN's at night when patient was agitated and stating that she hears other people talking about her. Risperdal was up-titrated initially, however, the team later got in touch with the outpatient provider, who stated she has history of hyperprolactinemia. Prolactin level was drawn, which came back elevated at 55.3. From then on, patient was titrated down of Risperdal 2mg BID, and instead given Zyprexa. Zyprexa was titrated up to 7.5mg PO q daily. Patient had a romantic encounter with peer, and was temporarily put on 1:1 for safety in the unit. She no longer requires 1:1 and does not need such level of supervision in the community. Patient was always calm, cooperative, and in good behavior control with the treatment team. Patient no longer complains of people talking about her or being angry at herself. She denies hearing any voices or wanting to hurt herself or others. She engaged in conversations and activities with peers in the unit. There was an isolated incident, the day prior to the discharge date, in which patient was seen to be yelling and hitting walls. No individual was injured and no property was damaged. This presentation may be due to impairment in emotional regulation and frustration tolerance in the context of hx of intellectual disability, and may be a maladaptive coping mechanism reacting to the impending discharge. Although patient has been wanting to be discharged throughout the hospitalization, the idea of sudden change of environment may have been the trigger. This type of presentation may be frequent in patients with intellectual disability, and the first line management should be a behavior intervention.     Patient is not an imminent danger to her or others, and patient does not require inpatient level psychiatric care. Patient would most benefit from following up with outpatient psychiatrist and/or therapist to build frustration tolerance and healthy, adaptive coping mechanism while functioning and adapting to stressors in the community. The chronic nature of symptoms and signs of intellectual disability will be best mitigated with outpatient care.      Week 1 of admission (1/16-1/22): Patient received nightly PRN Ativan in the context of patients hearing peers speaking about her and her being upset. Otherwise, patient was euthymic, cooperative, and good behavior control. Patient had a romantic encounter and was temporarily put on 1:1 to maintain safety. Patient was found to have hyperprolactinemia of 55.3. The plan is to taper off Risperdal and start Zyprexa.     Week 2 of admission (1/23-)  1/23/24: No PRN's received on 1/22. Risperdal 1mg BID. Zyprexa increased to 5mg. No acute events. Will start planning for discharge after tapering off Risperdal.   1/24/24: Patient at baseline. Increase Zyprexa to 7.5 mg today and DC Risperdal.  Prepare for discharge initiated and Planned for tomorrow. Pt presents calm, pleasant , organized and psychiatrically stable for discharge tomorrow. She denied any AVH or SI/HI/VI and does not appear internally preoccupied. She is tolerating medication well with no adverse effects reported or observed  1/25/24: Benadryl 50 mg and Haldol 5 mg PO given on 3:13AM. Pt presents calm, pleasant , organized and psychiatrically stable for discharge tomorrow. She denied any AVH or SI/HI/VI and does not appear internally preoccupied. There was an isolated incident, the day prior to the discharge date, in which patient was seen to be yelling and hitting walls. No individual was injured and no property was damaged. This presentation may be due to impairment in emotional regulation and frustration tolerance in the context of hx of intellectual disability, and may be a maladaptive coping mechanism reacting to the impending discharge. Although patient has been wanting to be discharged throughout the hospitalization, the idea of sudden change of environment may have been the trigger. This type of presentation may be frequent in patients with intellectual disability, and the first line management should be a behavior intervention.     # Behavioral agitation associated with intellectual disability r/o possible Psychosis   - Prolactin level 1/19: 55.3. Due to the hyperprolactenemia, patient will be tapered off from Risperdal and started on Zyprexa.  - Continue Zyprexa 7.5mg PO daily   - Hold Fanapt at this time     # Agitation recommendations  - For severe agitation not responding to behavioral intervention, may give Haldol 5 mg PO q6hrs PRN, Ativan 2 mg PO q6hrs PRN, Benadryl 50 mg PO q6hrs PRN, with escalation to IM if patient refusing PO and remains an imminent danger to self or others. Ms. Neil is a 38-year-old female, domiciled in a private residence with her caretaker and caretaker's two nephews (none have any biological relation to patient) for the last 3 years, disabled, single, no significant PMH, past psychiatric history of intellectual disability and ? schizophrenia, no known history of suicide attempts, no known history of IPP admissions aside from current admission at [facility], currently in outpatient treatment with psychiatrist Dr. Albertina Rodriguez, no known substance abuse history, presented to the ED BIB police after destroying property, admitted to [facility] for "psychosis."    On psychiatric evaluation, patient was calm, cooperative, and remained in good behavioral control; she did not appear to be overtly psychotic or responding to internal stimuli. She was admitted to the inpatient psychiatric unit following a behavioral disturbance that included destroying property at her caretaker's nail salon, which patient endorsed she did due to feeling frustrated with hearing voices and that the voices told her to do so. At this time, it is unclear whether patient is experiencing true auditory hallucinations as a psychotic symptom. The voices she endorses hearing may represent a feature of her potentially regressed developmental level given the presence of intellectual disability. There does not appear to be prodrome symptoms for the psychosis and substance use does not appear to be a factor. Per collateral, are occurring in the context of medication changes made by patient's outpatient psychiatrist, in which Fanapt and Risperdal were being titrated down. However, Dr. Rodriguez noted that although she had imaginary friend, she never told her about paranoid thoughts.     Throughout the hospitalization, patient initially required PRN's at night when patient was agitated and stating that she hears other people talking about her. Risperdal was up-titrated initially, however, the team later got in touch with the outpatient provider, who stated she has history of hyperprolactinemia. Prolactin level was drawn, which came back elevated at 55.3. From then on, patient was titrated down of Risperdal 2mg BID, and instead given Zyprexa. Zyprexa was titrated up to 7.5mg PO q daily. Patient had a romantic encounter with peer, and was temporarily put on 1:1 for safety in the unit. She no longer requires 1:1 and does not need such level of supervision in the community. Patient was always calm, cooperative, and in good behavior control with the treatment team. Patient no longer complains of people talking about her or being angry at herself. She denies hearing any voices or wanting to hurt herself or others. She engaged in conversations and activities with peers in the unit. There was an isolated incident, the day prior to the discharge date, in which patient was seen to be yelling and hitting walls. No individual was injured and no property was damaged. This presentation may be due to impairment in emotional regulation and frustration tolerance in the context of hx of intellectual disability, and may be a maladaptive coping mechanism reacting to the impending discharge. Although patient has been wanting to be discharged throughout the hospitalization, the idea of sudden change of environment may have been the trigger. This type of presentation may be frequent in patients with intellectual disability, and the first line management should be a behavior intervention.     Today/discharge day :Patient is not an imminent danger to her or others, and patient does not require inpatient level psychiatric care. Patient would most benefit from following up with outpatient psychiatrist and/or therapist to build frustration tolerance and healthy, adaptive coping mechanism while functioning and adapting to stressors in the community. The chronic nature of symptoms and signs of intellectual disability will be best mitigated with outpatient care.      Week 1 of admission (1/16-1/22): Patient received nightly PRN Ativan in the context of patients hearing peers speaking about her and her being upset. Otherwise, patient was euthymic, cooperative, and good behavior control. Patient had a romantic encounter and was temporarily put on 1:1 to maintain safety. Patient was found to have hyperprolactinemia of 55.3. The plan is to taper off Risperdal and start Zyprexa.     Week 2 of admission (1/23-)  1/23/24: No PRN's received on 1/22. Risperdal 1mg BID. Zyprexa increased to 5mg. No acute events. Will start planning for discharge after tapering off Risperdal.   1/24/24: Patient at baseline. Increase Zyprexa to 7.5 mg today and DC Risperdal.  Prepare for discharge initiated and Planned for tomorrow. Pt presents calm, pleasant , organized and psychiatrically stable for discharge tomorrow. She denied any AVH or SI/HI/VI and does not appear internally preoccupied. She is tolerating medication well with no adverse effects reported or observed  1/25/24: Benadryl 50 mg and Haldol 5 mg PO given on 3:13AM. Pt presents calm, pleasant , organized and psychiatrically stable for discharge tomorrow. She denied any AVH or SI/HI/VI and does not appear internally preoccupied. There was an isolated incident, the day prior to the discharge date, in which patient was seen to be yelling and hitting walls. No individual was injured and no property was damaged. This presentation may be due to impairment in emotional regulation and frustration tolerance in the context of hx of intellectual disability, and may be a maladaptive coping mechanism reacting to the impending discharge. Although patient has been wanting to be discharged throughout the hospitalization, the idea of sudden change of environment may have been the trigger. This type of presentation may be frequent in patients with intellectual disability, and the first line management should be a behavior intervention.     # Behavioral agitation associated with intellectual disability r/o possible Psychosis   - Prolactin level 1/19: 55.3. Due to the hyperprolactenemia, patient will be tapered off from Risperdal and started on Zyprexa.  - Continue Zyprexa 7.5mg PO daily   - Hold Fanapt at this time     # Agitation recommendations  - For severe agitation not responding to behavioral intervention, may give Haldol 5 mg PO q6hrs PRN, Ativan 2 mg PO q6hrs PRN, Benadryl 50 mg PO q6hrs PRN, with escalation to IM if patient refusing PO and remains an imminent danger to self or others.

## 2024-01-25 NOTE — BH INPATIENT PSYCHIATRY PROGRESS NOTE - NSTXPSYCHODATETRGT_PSY_ALL_CORE
06-Feb-2024
15-Nabor-2024
06-Feb-2024
15-Nabor-2024

## 2024-01-25 NOTE — BH INPATIENT PSYCHIATRY DISCHARGE NOTE - REASON FOR ADMISSION
Amanda Neil is a 38-year-old female, domiciled in a private residence with her caretaker and caretaker's two nephews (none have any biological relation to patient) for the last 3 years, disabled, single, no significant PMH, past psychiatric history of intellectual disability and ? schizophrenia, no known history of suicide attempts, no known history of IPP admissions aside from current admission at [facility], [place], currently in outpatient treatment with psychiatrist Dr. Albertina Rodriguez, no known substance abuse history, presented to the ED BIB police after destroying property, admitted to IPP for "psychosis."

## 2024-01-25 NOTE — BH INPATIENT PSYCHIATRY DISCHARGE NOTE - NSBHASSESSSUMMFT_PSY_ALL_CORE
Amanda Neil is a 38-year-old female, domiciled in a private residence with her caretaker and caretaker's two nephews (none have any biological relation to patient) for the last 3 years, disabled, single, no significant PMH, past psychiatric history of intellectual disability and ? schizophrenia, no known history of suicide attempts, no known history of IPP admissions aside from current admission at [facility], currently in outpatient treatment with psychiatrist Dr. Albertina Rodriguez, no known substance abuse history, presented to the ED BIB police after destroying property, admitted to [facility] for "psychosis."    On psychiatric evaluation, patient was calm, cooperative, and remained in good behavioral control; she did not appear to be overtly psychotic or responding to internal stimuli. She was admitted to the inpatient psychiatric unit following a behavioral disturbance that included destroying property at her caretaker's nail salon, which patient endorsed she did due to feeling frustrated with hearing voices and that the voices told her to do so. At this time, it is unclear whether patient is experiencing true auditory hallucinations as a psychotic symptom. The voices she endorses hearing may represent a feature of her potentially regressed developmental level given the presence of intellectual disability. There does not appear to be prodrome symptoms for the psychosis and substance use does not appear to be a factor. Per collateral, are occurring in the context of medication changes made by patient's outpatient psychiatrist, in which Fanapt and Risperdal were being titrated down. However, Dr. Rodriguez noted that although she had imaginary friend, she never told her about paranoid thoughts.     Week 1 of admission (1/16-1/22): Patient received nightly PRN Ativan in the context of patients hearing peers speaking about her and her being upset. Otherwise, patient was euthymic, cooperative, and good behavior control. However, on 1/19, patient had a sexual encounter with another peer and since then was put on 1:1. 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 The plan is to taper off Risperdal and start Zyprexa.     Week 2 of admission (1/23-)  1/23/24: No PRN's received on 1/22. Risperdal 1mg BID. Zyprexa increased to 5mg. No acute events. Will start planning for discharge after tapering off Risperdal.   1/24/24: Patient at baseline. Increase Zyprexa to 7.5 mg today and DC Risperdal.  Prepare for discharge initiated and Planned for tomorrow. Pt presents calm, pleasant , organized and psychiatrically stable for discharge tomorrow. She denied any AVH or SI/HI/VI and does not appear internally preoccupied. She is tolerating medication well with no adverse effects reported or observed  1/25/24: Benadryl 50 mg and Haldol 5 mg PO given on 3:13AM. Pt presents calm, pleasant , organized and psychiatrically stable for discharge tomorrow. She denied any AVH or SI/HI/VI and does not appear internally preoccupied. She is tolerating medication well with no adverse effects reported or observed. Prepare for discharge initiated and Planned for tomorrow.    # Behavioral agitation associated with intellectual disability r/o possible Psychosis   - Prolactin level 1/19: 55.3. Due to the hyperprolactenemia, patient will be tapered off from Risperdal and start on Zyprexa.  - Continue Zyprexa 7.5mg PO daily   - Hold Fanapt at this time     # Agitation recommendations  - For severe agitation not responding to behavioral intervention, may give Haldol 5 mg PO q6hrs PRN, Ativan 2 mg PO q6hrs PRN, Benadryl 50 mg PO q6hrs PRN, with escalation to IM if patient refusing PO and remains an imminent danger to self or others. If IM antipsychotic is administered, please perform follow-up ECG for QTc monitoring. Amanda Neil is a 38-year-old female, domiciled in a private residence with her caretaker and caretaker's two nephews (none have any biological relation to patient) for the last 3 years, disabled, single, no significant PMH, past psychiatric history of intellectual disability and ? schizophrenia, no known history of suicide attempts, no known history of IPP admissions aside from current admission at [facility], currently in outpatient treatment with psychiatrist Dr. Albertina Rodriguez, no known substance abuse history, presented to the ED BIB police after destroying property, admitted to [facility] for "psychosis."    On psychiatric evaluation, patient was calm, cooperative, and remained in good behavioral control; she did not appear to be overtly psychotic or responding to internal stimuli. She was admitted to the inpatient psychiatric unit following a behavioral disturbance that included destroying property at her caretaker's nail salon, which patient endorsed she did due to feeling frustrated with hearing voices and that the voices told her to do so. At this time, it is unclear whether patient is experiencing true auditory hallucinations as a psychotic symptom. The voices she endorses hearing may represent a feature of her potentially regressed developmental level given the presence of intellectual disability. There does not appear to be prodrome symptoms for the psychosis and substance use does not appear to be a factor. Per collateral, are occurring in the context of medication changes made by patient's outpatient psychiatrist, in which Fanapt and Risperdal were being titrated down. However, Dr. Rodriguez noted that although she had imaginary friend, she never told her about paranoid thoughts.     Throughout the hospitalization, patient initially required PRN's at night when patient was agitated and stating that she hears other people talking about her. Risperdal was up-titrated initially, however, the team later got in touch with the outpatient provider, who stated she has history of hyperprolactinemia. Prolactin level was drawn, which came back elevated at 55.3. From then on, patient was titrated down of Risperdal 2mg BID, and instead given Zyprexa. Zyprexa was titrated up to 7.5mg PO q daily. Patient had a romantic encounter with peer, and was temporarily put on 1:1 for safety in the unit. She no longer requires 1:1 and does not need such level of supervision in the community. Patient was always calm, cooperative, and in good behavior control with the treatment team. Patient no longer complains of people talking about her or being angry at herself. She denies hearing any voices or wanting to hurt herself or others. She engaged in conversations and activities with peers in the unit. There was an isolated incident, the day prior to the discharge date, in which patient was seen to be yelling and hitting walls. No individual was injured and no property was damaged. This presentation may be due to impairment in emotional regulation and frustration tolerance in the context of hx of intellectual disability, and may be a maladaptive coping mechanism reacting to the impending discharge. Although patient has been wanting to be discharged throughout the hospitalization, the idea of sudden change of environment may have been the trigger. This type of presentation may be frequent in patients with intellectual disability, and the first line management should be a behavior intervention.     Patient is not an imminent danger to her or others, and patient does not require inpatient level psychiatric care. Patient would most benefit from following up with outpatient psychiatrist and/or therapist to build frustration tolerance and healthy, adaptive coping mechanism while functioning and adapting to stressors in the community. The chronic nature of symptoms and signs of intellectual disability will be best mitigated with outpatient care.      Week 1 of admission (1/16-1/22): Patient received nightly PRN Ativan in the context of patients hearing peers speaking about her and her being upset. Otherwise, patient was euthymic, cooperative, and good behavior control. Patient had a romantic encounter and was temporarily put on 1:1 to maintain safety. Patient was found to have hyperprolactinemia of 55.3. The plan is to taper off Risperdal and start Zyprexa.     Week 2 of admission (1/23-)  1/23/24: No PRN's received on 1/22. Risperdal 1mg BID. Zyprexa increased to 5mg. No acute events. Will start planning for discharge after tapering off Risperdal.   1/24/24: Patient at baseline. Increase Zyprexa to 7.5 mg today and DC Risperdal.  Prepare for discharge initiated and Planned for tomorrow. Pt presents calm, pleasant , organized and psychiatrically stable for discharge tomorrow. She denied any AVH or SI/HI/VI and does not appear internally preoccupied. She is tolerating medication well with no adverse effects reported or observed  1/25/24: Benadryl 50 mg and Haldol 5 mg PO given on 3:13AM. Pt presents calm, pleasant , organized and psychiatrically stable for discharge tomorrow. She denied any AVH or SI/HI/VI and does not appear internally preoccupied. There was an isolated incident, the day prior to the discharge date, in which patient was seen to be yelling and hitting walls. No individual was injured and no property was damaged. This presentation may be due to impairment in emotional regulation and frustration tolerance in the context of hx of intellectual disability, and may be a maladaptive coping mechanism reacting to the impending discharge. Although patient has been wanting to be discharged throughout the hospitalization, the idea of sudden change of environment may have been the trigger. This type of presentation may be frequent in patients with intellectual disability, and the first line management should be a behavior intervention.     # Behavioral agitation associated with intellectual disability r/o possible Psychosis   - Prolactin level 1/19: 55.3. Due to the hyperprolactenemia, patient will be tapered off from Risperdal and started on Zyprexa.  - Continue Zyprexa 7.5mg PO daily   - Hold Fanapt at this time     # Agitation recommendations  - For severe agitation not responding to behavioral intervention, may give Haldol 5 mg PO q6hrs PRN, Ativan 2 mg PO q6hrs PRN, Benadryl 50 mg PO q6hrs PRN, with escalation to IM if patient refusing PO and remains an imminent danger to self or others. Ms. Neil is a 38-year-old female, domiciled in a private residence with her caretaker and caretaker's two nephews (none have any biological relation to patient) for the last 3 years, disabled, single, no significant PMH, past psychiatric history of intellectual disability and ? schizophrenia, no known history of suicide attempts, no known history of IPP admissions aside from current admission at [facility], currently in outpatient treatment with psychiatrist Dr. Albertina Rodriguez, no known substance abuse history, presented to the ED BIB police after destroying property, admitted to [facility] for "psychosis."    On psychiatric evaluation, patient was calm, cooperative, and remained in good behavioral control; she did not appear to be overtly psychotic or responding to internal stimuli. She was admitted to the inpatient psychiatric unit following a behavioral disturbance that included destroying property at her caretaker's nail salon, which patient endorsed she did due to feeling frustrated with hearing voices and that the voices told her to do so. At this time, it is unclear whether patient is experiencing true auditory hallucinations as a psychotic symptom. The voices she endorses hearing may represent a feature of her potentially regressed developmental level given the presence of intellectual disability. There does not appear to be prodrome symptoms for the psychosis and substance use does not appear to be a factor. Per collateral, are occurring in the context of medication changes made by patient's outpatient psychiatrist, in which Fanapt and Risperdal were being titrated down. However, Dr. Rodriguez noted that although she had imaginary friend, she never told her about paranoid thoughts.     Throughout the hospitalization, patient initially required PRN's at night when patient was agitated and stating that she hears other people talking about her. Risperdal was up-titrated initially, however, the team later got in touch with the outpatient provider, who stated she has history of hyperprolactinemia. Prolactin level was drawn, which came back elevated at 55.3. From then on, patient was titrated down of Risperdal 2mg BID, and instead given Zyprexa. Zyprexa was titrated up to 7.5mg PO q daily. Patient had a romantic encounter with peer, and was temporarily put on 1:1 for safety in the unit. She no longer requires 1:1 and does not need such level of supervision in the community. Patient was always calm, cooperative, and in good behavior control with the treatment team. Patient no longer complains of people talking about her or being angry at herself. She denies hearing any voices or wanting to hurt herself or others. She engaged in conversations and activities with peers in the unit. There was an isolated incident, the day prior to the discharge date, in which patient was seen to be yelling and hitting walls. No individual was injured and no property was damaged. This presentation may be due to impairment in emotional regulation and frustration tolerance in the context of hx of intellectual disability, and may be a maladaptive coping mechanism reacting to the impending discharge. Although patient has been wanting to be discharged throughout the hospitalization, the idea of sudden change of environment may have been the trigger. This type of presentation may be frequent in patients with intellectual disability, and the first line management should be a behavior intervention.     Patient is not an imminent danger to her or others, and patient does not require inpatient level psychiatric care. Patient would most benefit from following up with outpatient psychiatrist and/or therapist to build frustration tolerance and healthy, adaptive coping mechanism while functioning and adapting to stressors in the community. The chronic nature of symptoms and signs of intellectual disability will be best mitigated with outpatient care.      Week 1 of admission (1/16-1/22): Patient received nightly PRN Ativan in the context of patients hearing peers speaking about her and her being upset. Otherwise, patient was euthymic, cooperative, and good behavior control. Patient had a romantic encounter and was temporarily put on 1:1 to maintain safety. Patient was found to have hyperprolactinemia of 55.3. The plan is to taper off Risperdal and start Zyprexa.     Week 2 of admission (1/23-)  1/23/24: No PRN's received on 1/22. Risperdal 1mg BID. Zyprexa increased to 5mg. No acute events. Will start planning for discharge after tapering off Risperdal.   1/24/24: Patient at baseline. Increase Zyprexa to 7.5 mg today and DC Risperdal.  Prepare for discharge initiated and Planned for tomorrow. Pt presents calm, pleasant , organized and psychiatrically stable for discharge tomorrow. She denied any AVH or SI/HI/VI and does not appear internally preoccupied. She is tolerating medication well with no adverse effects reported or observed  1/25/24: Benadryl 50 mg and Haldol 5 mg PO given on 3:13AM. Pt presents calm, pleasant , organized and psychiatrically stable for discharge tomorrow. She denied any AVH or SI/HI/VI and does not appear internally preoccupied. There was an isolated incident, the day prior to the discharge date, in which patient was seen to be yelling and hitting walls. No individual was injured and no property was damaged. This presentation may be due to impairment in emotional regulation and frustration tolerance in the context of hx of intellectual disability, and may be a maladaptive coping mechanism reacting to the impending discharge. Although patient has been wanting to be discharged throughout the hospitalization, the idea of sudden change of environment may have been the trigger. This type of presentation may be frequent in patients with intellectual disability, and the first line management should be a behavior intervention.     # Behavioral agitation associated with intellectual disability r/o possible Psychosis   - Prolactin level 1/19: 55.3. Due to the hyperprolactenemia, patient will be tapered off from Risperdal and started on Zyprexa.  - Continue Zyprexa 7.5mg PO daily   - Hold Fanapt at this time     # Agitation recommendations  - For severe agitation not responding to behavioral intervention, may give Haldol 5 mg PO q6hrs PRN, Ativan 2 mg PO q6hrs PRN, Benadryl 50 mg PO q6hrs PRN, with escalation to IM if patient refusing PO and remains an imminent danger to self or others. Ms. Neil is a 38-year-old female, domiciled in a private residence with her caretaker and caretaker's two nephews (none have any biological relation to patient) for the last 3 years, disabled, single, no significant PMH, past psychiatric history of intellectual disability and ? schizophrenia, no known history of suicide attempts, no known history of IPP admissions aside from current admission at [facility], currently in outpatient treatment with psychiatrist Dr. Albertina Rodriguez, no known substance abuse history, presented to the ED BIB police after destroying property, admitted to [facility] for "psychosis."    On psychiatric evaluation, patient was calm, cooperative, and remained in good behavioral control; she did not appear to be overtly psychotic or responding to internal stimuli. She was admitted to the inpatient psychiatric unit following a behavioral disturbance that included destroying property at her caretaker's nail salon, which patient endorsed she did due to feeling frustrated with hearing voices and that the voices told her to do so. At this time, it is unclear whether patient is experiencing true auditory hallucinations as a psychotic symptom. The voices she endorses hearing may represent a feature of her potentially regressed developmental level given the presence of intellectual disability. There does not appear to be prodrome symptoms for the psychosis and substance use does not appear to be a factor. Per collateral, are occurring in the context of medication changes made by patient's outpatient psychiatrist, in which Fanapt and Risperdal were being titrated down. However, Dr. Rodriguez noted that although she had imaginary friend, she never told her about paranoid thoughts.     Throughout the hospitalization, patient initially required PRN's at night when patient was agitated and stating that she hears other people talking about her. Risperdal was up-titrated initially, however, the team later got in touch with the outpatient provider, who stated she has history of hyperprolactinemia. Prolactin level was drawn, which came back elevated at 55.3. From then on, patient was titrated down of Risperdal 2mg BID, and instead given Zyprexa. Zyprexa was titrated up to 7.5mg PO q daily. Patient had a romantic encounter with peer, and was temporarily put on 1:1 for safety in the unit. She no longer requires 1:1 and does not need such level of supervision in the community. Patient was always calm, cooperative, and in good behavior control with the treatment team. Patient no longer complains of people talking about her or being angry at herself. She denies hearing any voices or wanting to hurt herself or others. She engaged in conversations and activities with peers in the unit. There was an isolated incident, the day prior to the discharge date, in which patient was seen to be yelling and hitting walls. No individual was injured and no property was damaged. This presentation may be due to impairment in emotional regulation and frustration tolerance in the context of hx of intellectual disability, and may be a maladaptive coping mechanism reacting to the impending discharge. Although patient has been wanting to be discharged throughout the hospitalization, the idea of sudden change of environment may have been the trigger. This type of presentation may be frequent in patients with intellectual disability, and the first line management should be a behavior intervention.     Patient is not an imminent danger to her or others, and patient does not require inpatient level psychiatric care. Patient would most benefit from following up with outpatient psychiatrist and/or therapist to build frustration tolerance and healthy, adaptive coping mechanism while functioning and adapting to stressors in the community. The chronic nature of symptoms and signs of intellectual disability will be best mitigated with outpatient care.      Week 1 of admission (1/16-1/22): Patient received nightly PRN Ativan in the context of patients hearing peers speaking about her and her being upset. Otherwise, patient was euthymic, cooperative, and good behavior control. Patient had a romantic encounter and was temporarily put on 1:1 to maintain safety. Patient was found to have hyperprolactinemia of 55.3. The plan is to taper off Risperdal and start Zyprexa.     Week 2 of admission (1/23-)  1/23/24: No PRN's received on 1/22. Risperdal 1mg BID. Zyprexa increased to 5mg. No acute events. Will start planning for discharge after tapering off Risperdal.   1/24/24: Patient at baseline. Increase Zyprexa to 7.5 mg today and DC Risperdal.  Prepare for discharge initiated and Planned for tomorrow. Pt presents calm, pleasant , organized and psychiatrically stable for discharge tomorrow. She denied any AVH or SI/HI/VI and does not appear internally preoccupied. She is tolerating medication well with no adverse effects reported or observed  1/25/24: Benadryl 50 mg and Haldol 5 mg PO given on 3:13AM. Pt presents calm, pleasant , organized and psychiatrically stable for discharge tomorrow. She denied any AVH or SI/HI/VI and does not appear internally preoccupied. There was an isolated incident, the day prior to the discharge date, in which patient was seen to be yelling and hitting walls. No individual was injured and no property was damaged. This presentation may be due to impairment in emotional regulation and frustration tolerance in the context of hx of intellectual disability, and may be a maladaptive coping mechanism reacting to the impending discharge. Although patient has been wanting to be discharged throughout the hospitalization, the idea of sudden change of environment may have been the trigger. This type of presentation may be frequent in patients with intellectual disability, and the first line management should be a behavior intervention.     1/26/24: Patient presented in good behavioral control , with bright affect and positive outlook. She denied any AVH, SI/HI/VI. Expressed understanding of dealing with anger without showing any physical aggression.  No acute dangerousness or signs of decompensation noted. She has responded well to her medication regimen . No side effects reported or observed. Stable for discharge .    # Behavioral agitation associated with intellectual disability r/o possible Psychosis   - Prolactin level 1/19: 55.3. Due to the hyperprolactenemia,  tapered off from Risperdal and started on Zyprexa.  - Continue Zyprexa 7.5mg PO daily   - Fanapt not restarted     # Agitation recommendations  - For severe agitation not responding to behavioral intervention, may give Haldol 5 mg PO q6hrs PRN, Ativan 2 mg PO q6hrs PRN, Benadryl 50 mg PO q6hrs PRN, with escalation to IM if patient refusing PO and remains an imminent danger to self or others.

## 2024-01-25 NOTE — BH INPATIENT PSYCHIATRY DISCHARGE NOTE - NSDCCPCAREPLAN_GEN_ALL_CORE_FT
PRINCIPAL DISCHARGE DIAGNOSIS  Diagnosis: Psychosis, unspecified psychosis type  Assessment and Plan of Treatment:

## 2024-01-25 NOTE — BH INPATIENT PSYCHIATRY PROGRESS NOTE - PRN MEDS
MEDICATIONS  (PRN):  acetaminophen     Tablet .. 650 milliGRAM(s) Oral every 6 hours PRN Mild Pain (1 - 3)  diphenhydrAMINE 50 milliGRAM(s) Oral every 6 hours PRN EPS Prophylaxis (given with Haldol, Ativan for agitation)  haloperidol     Tablet 5 milliGRAM(s) Oral every 6 hours PRN Severe Agitation  LORazepam     Tablet 2 milliGRAM(s) Oral every 6 hours PRN Severe Agitation  
MEDICATIONS  (PRN):  acetaminophen     Tablet .. 650 milliGRAM(s) Oral every 6 hours PRN Mild Pain (1 - 3)  diphenhydrAMINE 50 milliGRAM(s) Oral every 6 hours PRN EPS Prophylaxis (given with Haldol, Ativan for agitation)  haloperidol     Tablet 5 milliGRAM(s) Oral every 6 hours PRN Severe Agitation  LORazepam     Tablet 2 milliGRAM(s) Oral every 6 hours PRN Severe Agitation  
MEDICATIONS  (PRN):  diphenhydrAMINE 50 milliGRAM(s) Oral every 6 hours PRN EPS Prophylaxis (given with Haldol, Ativan for agitation)  haloperidol     Tablet 5 milliGRAM(s) Oral every 6 hours PRN Severe Agitation  LORazepam     Tablet 2 milliGRAM(s) Oral every 6 hours PRN Severe Agitation  
MEDICATIONS  (PRN):  diphenhydrAMINE 50 milliGRAM(s) Oral every 6 hours PRN EPS Prophylaxis (given with Haldol, Ativan for agitation)  haloperidol     Tablet 5 milliGRAM(s) Oral every 6 hours PRN Severe Agitation  LORazepam     Tablet 2 milliGRAM(s) Oral every 6 hours PRN Severe Agitation  
MEDICATIONS  (PRN):  acetaminophen     Tablet .. 650 milliGRAM(s) Oral every 6 hours PRN Mild Pain (1 - 3)  diphenhydrAMINE 50 milliGRAM(s) Oral every 6 hours PRN EPS Prophylaxis (given with Haldol, Ativan for agitation)  haloperidol     Tablet 5 milliGRAM(s) Oral every 6 hours PRN Severe Agitation  LORazepam     Tablet 2 milliGRAM(s) Oral every 6 hours PRN Severe Agitation  
MEDICATIONS  (PRN):  diphenhydrAMINE 50 milliGRAM(s) Oral every 6 hours PRN EPS Prophylaxis (given with Haldol, Ativan for agitation)  haloperidol     Tablet 5 milliGRAM(s) Oral every 6 hours PRN Severe Agitation  LORazepam     Tablet 2 milliGRAM(s) Oral every 6 hours PRN Severe Agitation  
MEDICATIONS  (PRN):  acetaminophen     Tablet .. 650 milliGRAM(s) Oral every 6 hours PRN Mild Pain (1 - 3)  diphenhydrAMINE 50 milliGRAM(s) Oral every 6 hours PRN EPS Prophylaxis (given with Haldol, Ativan for agitation)  haloperidol     Tablet 5 milliGRAM(s) Oral every 6 hours PRN Severe Agitation  LORazepam     Tablet 2 milliGRAM(s) Oral every 6 hours PRN Severe Agitation  
MEDICATIONS  (PRN):  diphenhydrAMINE 50 milliGRAM(s) Oral every 6 hours PRN EPS Prophylaxis (given with Haldol, Ativan for agitation)  haloperidol     Tablet 5 milliGRAM(s) Oral every 6 hours PRN Severe Agitation  LORazepam     Tablet 2 milliGRAM(s) Oral every 6 hours PRN Severe Agitation  
MEDICATIONS  (PRN):  acetaminophen     Tablet .. 650 milliGRAM(s) Oral every 6 hours PRN Mild Pain (1 - 3)  diphenhydrAMINE 50 milliGRAM(s) Oral every 6 hours PRN EPS Prophylaxis (given with Haldol, Ativan for agitation)  haloperidol     Tablet 5 milliGRAM(s) Oral every 6 hours PRN Severe Agitation  LORazepam     Tablet 2 milliGRAM(s) Oral every 6 hours PRN Severe Agitation  
MEDICATIONS  (PRN):  diphenhydrAMINE 50 milliGRAM(s) Oral every 6 hours PRN EPS Prophylaxis (given with Haldol, Ativan for agitation)  haloperidol     Tablet 5 milliGRAM(s) Oral every 6 hours PRN Severe Agitation  LORazepam     Tablet 2 milliGRAM(s) Oral every 6 hours PRN Severe Agitation  
MEDICATIONS  (PRN):  acetaminophen     Tablet .. 650 milliGRAM(s) Oral every 6 hours PRN Mild Pain (1 - 3)  diphenhydrAMINE 50 milliGRAM(s) Oral every 6 hours PRN EPS Prophylaxis (given with Haldol, Ativan for agitation)  haloperidol     Tablet 5 milliGRAM(s) Oral every 6 hours PRN Severe Agitation  LORazepam     Tablet 2 milliGRAM(s) Oral every 6 hours PRN Severe Agitation  
MEDICATIONS  (PRN):  diphenhydrAMINE 50 milliGRAM(s) Oral every 6 hours PRN EPS Prophylaxis (given with Haldol, Ativan for agitation)  haloperidol     Tablet 5 milliGRAM(s) Oral every 6 hours PRN Severe Agitation  LORazepam     Tablet 2 milliGRAM(s) Oral every 6 hours PRN Severe Agitation

## 2024-01-26 VITALS
SYSTOLIC BLOOD PRESSURE: 142 MMHG | TEMPERATURE: 97 F | HEART RATE: 120 BPM | RESPIRATION RATE: 18 BRPM | DIASTOLIC BLOOD PRESSURE: 63 MMHG

## 2024-01-26 RX ADMIN — OLANZAPINE 7.5 MILLIGRAM(S): 15 TABLET, FILM COATED ORAL at 08:09

## 2024-01-27 ENCOUNTER — EMERGENCY (EMERGENCY)
Facility: HOSPITAL | Age: 39
LOS: 1 days | Discharge: HOME | End: 2024-01-27
Attending: EMERGENCY MEDICINE
Payer: MEDICAID

## 2024-01-27 VITALS
HEIGHT: 63 IN | WEIGHT: 145.95 LBS | TEMPERATURE: 98 F | OXYGEN SATURATION: 98 % | SYSTOLIC BLOOD PRESSURE: 115 MMHG | DIASTOLIC BLOOD PRESSURE: 74 MMHG | RESPIRATION RATE: 20 BRPM | HEART RATE: 107 BPM

## 2024-01-27 PROCEDURE — 99282 EMERGENCY DEPT VISIT SF MDM: CPT

## 2024-01-27 PROCEDURE — 99283 EMERGENCY DEPT VISIT LOW MDM: CPT
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
## 2024-01-27 NOTE — ED PROVIDER NOTE - PHYSICAL EXAMINATION
Gen: NAD, AOx3  Head: NCAT  HEENT: EOMI, oral mucosa moist, normal conjunctiva, neck supple  Lung: no respiratory distress  CV:  Normal perfusion  Abd: soft, NTND  MSK: No edema, no visible deformities  Neuro: No focal neurologic deficits  Skin: No rash   Psych: flat affect, erratic speech pattern
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
## 2024-01-27 NOTE — ED PROVIDER NOTE - PATIENT PORTAL LINK FT
You can access the FollowMyHealth Patient Portal offered by [facility] by registering at the following website: [email or web address]. By joining [employer]’s FollowMyHealth portal, you will also be able to view your health information using other applications (apps) compatible with our system.
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
## 2024-01-27 NOTE — ED ADULT TRIAGE NOTE - STATUS:
Date: 3/31/2020    Patient Name: Salo Moctezuma   : 1945   ID: [id number]    Complaint: Patient arrived to clinic to repeat labs.       VS:   /60   Pulse 84   Temp 97.8 °F (36.6 °C) (Oral)   Resp 18   Wt 105 kg (232 lb)   SpO2 93%   BMI 34.26 kg/m²        Labs:       Lab Results   Component Value Date    WBC 9.05 2020    HGB 14.4 2020    HCT 45.9 2020    MCV 89.5 2020    RDW 13.0 2020     2020    NEUTRORELPCT 56.9 2020    LYMPHORELPCT 22.0 2020    MONORELPCT 14.1 (H) 2020    EOSRELPCT 4.8 2020    BASORELPCT 1.3 2020    NEUTROABS 5.15 2020    LYMPHSABS 1.99 2020       Lab Results   Component Value Date     2020    K 4.4 2020    CO2 24.2 2020     2020    BUN 18 2020    CREATININE 0.92 2020    GLUCOSE 163 (H) 2020    CALCIUM 9.3 2020    ALKPHOS 139 (H) 2020    AST 33 2020    ALT 34 2020    BILITOT 0.7 2020    ALBUMIN 3.71 2020    PROTEINTOT 6.8 2020    MG 2.0 2020    PHOS 3.4 2020       Lab Results   Component Value Date     (H) 2020    URICACID 5.7 2020        Imaging Results (Last 24 Hours)     ** No results found for the last 24 hours. **          Meds Due: None    Action: Patient received R-CHOP yesterday upon arrival patient had rash covering his body and complained of severe itching with hiving noted.  Stated it began when started taking Allopurinol and had worsened with each dosage of med.  Discussed findings with Dr. Crawford; allopurinol was discontinued and labs were repeated today.  Results of today's labs discussed with Dr. Crawford.  Patient to return 2020 for repeat labs.  Appointment provided to patient with understanding verbalized.    Nurse: Jourdan Brooks, RN    Physician Notes:   Salo Moctezuma is seen today for an acute visit to eval for tumor lysis given inability  to take Allopurinol (had to discontinue due to hives).  Uric acid is wnl.  Will have him return in 1 week for repeat toxicity check/labwork.  Encouraged to push oral hydration.      Electronically Signed By: Demetria Crawford MD    Date Signed: 03/31/20       Applied
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
## 2024-01-29 ENCOUNTER — NON-APPOINTMENT (OUTPATIENT)
Age: 39
End: 2024-01-29

## 2024-01-31 ENCOUNTER — APPOINTMENT (OUTPATIENT)
Dept: FAMILY MEDICINE | Facility: CLINIC | Age: 39
End: 2024-01-31
Payer: MEDICAID

## 2024-01-31 VITALS
RESPIRATION RATE: 14 BRPM | SYSTOLIC BLOOD PRESSURE: 120 MMHG | WEIGHT: 145 LBS | HEIGHT: 63 IN | BODY MASS INDEX: 25.69 KG/M2 | HEART RATE: 98 BPM | DIASTOLIC BLOOD PRESSURE: 84 MMHG | OXYGEN SATURATION: 98 %

## 2024-01-31 DIAGNOSIS — F29 UNSPECIFIED PSYCHOSIS NOT DUE TO A SUBSTANCE OR KNOWN PHYSIOLOGICAL CONDITION: ICD-10-CM

## 2024-01-31 DIAGNOSIS — Z09 ENCOUNTER FOR FOLLOW-UP EXAMINATION AFTER COMPLETED TREATMENT FOR CONDITIONS OTHER THAN MALIGNANT NEOPLASM: ICD-10-CM

## 2024-01-31 PROCEDURE — 99495 TRANSJ CARE MGMT MOD F2F 14D: CPT

## 2024-01-31 RX ORDER — GUAIFENESIN SYRUP AND DEXTROMETHORPHAN 100; 10 MG/5ML; MG/5ML
100-10 SYRUP ORAL
Qty: 1 | Refills: 0 | Status: DISCONTINUED | COMMUNITY
Start: 2023-12-07 | End: 2024-01-31

## 2024-01-31 RX ORDER — HYDROXYZINE HYDROCHLORIDE 25 MG/1
25 TABLET ORAL
Qty: 20 | Refills: 0 | Status: ACTIVE | COMMUNITY
Start: 2024-01-31

## 2024-01-31 RX ORDER — OLANZAPINE 7.5 MG/1
7.5 TABLET, FILM COATED ORAL DAILY
Refills: 0 | Status: ACTIVE | COMMUNITY
Start: 2024-01-31

## 2024-01-31 RX ORDER — RISPERIDONE 0.5 MG/1
0.5 TABLET, FILM COATED ORAL
Qty: 60 | Refills: 0 | Status: DISCONTINUED | COMMUNITY
Start: 2023-08-01 | End: 2024-01-31

## 2024-01-31 NOTE — PHYSICAL EXAM
[Normal] : normal rate, regular rhythm, normal S1 and S2 and no murmur heard [05181 - Moderate Complexity requires multiple possible diagnoses and/or the management options, moderate complexity of the medical data (tests, etc.) to be reviewed, and moderate risk of significant complications, morbidity, and/or mortality as well as co] : Moderate Complexity
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
## 2024-01-31 NOTE — BH SOCIAL WORK CONFIRMATION FOLLOW UP NOTE - NSLINKEDTOLOC_PSY_ALL_CORE
Amanda was discharged from the [facility] inpatient psychiatry unit on 1/25. She attended her follow up outpatient mental health appointment at [facility] as scheduled, [phone].

## 2024-01-31 NOTE — COUNSELING
[Fall prevention counseling provided] : Fall prevention counseling provided [Behavioral health counseling provided] : Behavioral health counseling provided [None] : None [Good understanding] : Patient has a good understanding of lifestyle changes and steps needed to achieve self management goal Home

## 2024-01-31 NOTE — HEALTH RISK ASSESSMENT
[No] : In the past 12 months have you used drugs other than those required for medical reasons? No [HIV test declined] : HIV test declined [Hepatitis C test declined] : Hepatitis C test declined [Change in mental status noted] : No change in mental status noted [None] : None [With Family] : lives with family [On disability] : on disability [Single] : single [Fully functional (bathing, dressing, toileting, transferring, walking, feeding)] : Fully functional (bathing, dressing, toileting, transferring, walking, feeding) [Full assistance needed] : managing finances [Reports changes in hearing] : Reports no changes in hearing [Reports changes in dental health] : Reports no changes in dental health [Reports changes in vision] : Reports no changes in vision [Smoke Detector] : smoke detector [Safety elements used in home] : safety elements used in home [Seat Belt] :  uses seat belt [TB Exposure] : is not being exposed to tuberculosis [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

## 2024-02-08 ENCOUNTER — EMERGENCY (EMERGENCY)
Facility: HOSPITAL | Age: 39
LOS: 1 days | Discharge: OTHER INSTITUTION | End: 2024-02-08
Attending: EMERGENCY MEDICINE
Payer: MEDICAID

## 2024-02-08 VITALS
TEMPERATURE: 98 F | DIASTOLIC BLOOD PRESSURE: 97 MMHG | OXYGEN SATURATION: 94 % | HEART RATE: 94 BPM | RESPIRATION RATE: 18 BRPM | SYSTOLIC BLOOD PRESSURE: 139 MMHG | HEIGHT: 63 IN

## 2024-02-08 LAB
ALBUMIN SERPL ELPH-MCNC: 4.1 G/DL — SIGNIFICANT CHANGE UP (ref 3.3–5.2)
ALP SERPL-CCNC: 68 U/L — SIGNIFICANT CHANGE UP (ref 40–120)
ALT FLD-CCNC: 14 U/L — SIGNIFICANT CHANGE UP
ANION GAP SERPL CALC-SCNC: 12 MMOL/L — SIGNIFICANT CHANGE UP (ref 5–17)
APAP SERPL-MCNC: <3 UG/ML — LOW (ref 10–26)
AST SERPL-CCNC: 24 U/L — SIGNIFICANT CHANGE UP
BASOPHILS # BLD AUTO: 0.01 K/UL — SIGNIFICANT CHANGE UP (ref 0–0.2)
BASOPHILS NFR BLD AUTO: 0.2 % — SIGNIFICANT CHANGE UP (ref 0–2)
BILIRUB SERPL-MCNC: <0.2 MG/DL — LOW (ref 0.4–2)
BUN SERPL-MCNC: 11.8 MG/DL — SIGNIFICANT CHANGE UP (ref 8–20)
CALCIUM SERPL-MCNC: 9 MG/DL — SIGNIFICANT CHANGE UP (ref 8.4–10.5)
CHLORIDE SERPL-SCNC: 98 MMOL/L — SIGNIFICANT CHANGE UP (ref 96–108)
CO2 SERPL-SCNC: 25 MMOL/L — SIGNIFICANT CHANGE UP (ref 22–29)
CREAT SERPL-MCNC: 0.65 MG/DL — SIGNIFICANT CHANGE UP (ref 0.5–1.3)
EGFR: 116 ML/MIN/1.73M2 — SIGNIFICANT CHANGE UP
EOSINOPHIL # BLD AUTO: 0.01 K/UL — SIGNIFICANT CHANGE UP (ref 0–0.5)
EOSINOPHIL NFR BLD AUTO: 0.2 % — SIGNIFICANT CHANGE UP (ref 0–6)
ETHANOL SERPL-MCNC: <10 MG/DL — SIGNIFICANT CHANGE UP (ref 0–9)
FLUAV AG NPH QL: SIGNIFICANT CHANGE UP
FLUBV AG NPH QL: SIGNIFICANT CHANGE UP
GLUCOSE SERPL-MCNC: 92 MG/DL — SIGNIFICANT CHANGE UP (ref 70–99)
HCG SERPL-ACNC: <4 MIU/ML — SIGNIFICANT CHANGE UP
HCT VFR BLD CALC: 35.4 % — SIGNIFICANT CHANGE UP (ref 34.5–45)
HGB BLD-MCNC: 11.4 G/DL — LOW (ref 11.5–15.5)
IMM GRANULOCYTES NFR BLD AUTO: 0.2 % — SIGNIFICANT CHANGE UP (ref 0–0.9)
LYMPHOCYTES # BLD AUTO: 1.69 K/UL — SIGNIFICANT CHANGE UP (ref 1–3.3)
LYMPHOCYTES # BLD AUTO: 35.2 % — SIGNIFICANT CHANGE UP (ref 13–44)
MCHC RBC-ENTMCNC: 24.7 PG — LOW (ref 27–34)
MCHC RBC-ENTMCNC: 32.2 GM/DL — SIGNIFICANT CHANGE UP (ref 32–36)
MCV RBC AUTO: 76.6 FL — LOW (ref 80–100)
MONOCYTES # BLD AUTO: 0.54 K/UL — SIGNIFICANT CHANGE UP (ref 0–0.9)
MONOCYTES NFR BLD AUTO: 11.3 % — SIGNIFICANT CHANGE UP (ref 2–14)
NEUTROPHILS # BLD AUTO: 2.54 K/UL — SIGNIFICANT CHANGE UP (ref 1.8–7.4)
NEUTROPHILS NFR BLD AUTO: 52.9 % — SIGNIFICANT CHANGE UP (ref 43–77)
PLATELET # BLD AUTO: 292 K/UL — SIGNIFICANT CHANGE UP (ref 150–400)
POTASSIUM SERPL-MCNC: 3.7 MMOL/L — SIGNIFICANT CHANGE UP (ref 3.5–5.3)
POTASSIUM SERPL-SCNC: 3.7 MMOL/L — SIGNIFICANT CHANGE UP (ref 3.5–5.3)
PROT SERPL-MCNC: 7.8 G/DL — SIGNIFICANT CHANGE UP (ref 6.6–8.7)
RBC # BLD: 4.62 M/UL — SIGNIFICANT CHANGE UP (ref 3.8–5.2)
RBC # FLD: 15.2 % — HIGH (ref 10.3–14.5)
RSV RNA NPH QL NAA+NON-PROBE: SIGNIFICANT CHANGE UP
SALICYLATES SERPL-MCNC: <0.6 MG/DL — LOW (ref 10–20)
SARS-COV-2 RNA SPEC QL NAA+PROBE: SIGNIFICANT CHANGE UP
SODIUM SERPL-SCNC: 135 MMOL/L — SIGNIFICANT CHANGE UP (ref 135–145)
WBC # BLD: 4.8 K/UL — SIGNIFICANT CHANGE UP (ref 3.8–10.5)
WBC # FLD AUTO: 4.8 K/UL — SIGNIFICANT CHANGE UP (ref 3.8–10.5)

## 2024-02-08 PROCEDURE — 93010 ELECTROCARDIOGRAM REPORT: CPT

## 2024-02-08 PROCEDURE — 99284 EMERGENCY DEPT VISIT MOD MDM: CPT

## 2024-02-08 RX ORDER — OLANZAPINE 15 MG/1
7.5 TABLET, FILM COATED ORAL
Refills: 0 | Status: DISCONTINUED | OUTPATIENT
Start: 2024-02-08 | End: 2024-02-16

## 2024-02-08 RX ORDER — HYDROXYZINE HCL 10 MG
25 TABLET ORAL EVERY 8 HOURS
Refills: 0 | Status: DISCONTINUED | OUTPATIENT
Start: 2024-02-08 | End: 2024-02-16

## 2024-02-08 RX ADMIN — OLANZAPINE 7.5 MILLIGRAM(S): 15 TABLET, FILM COATED ORAL at 21:38

## 2024-02-08 RX ADMIN — Medication 25 MILLIGRAM(S): at 21:39
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
## 2024-02-09 ENCOUNTER — INPATIENT (INPATIENT)
Facility: HOSPITAL | Age: 39
LOS: 1 YEAR days | Discharge: HOME | End: 2025-04-30
Attending: STUDENT IN AN ORGANIZED HEALTH CARE EDUCATION/TRAINING PROGRAM | Admitting: STUDENT IN AN ORGANIZED HEALTH CARE EDUCATION/TRAINING PROGRAM
Payer: MEDICAID

## 2024-02-09 VITALS
TEMPERATURE: 99 F | OXYGEN SATURATION: 95 % | HEART RATE: 92 BPM | RESPIRATION RATE: 18 BRPM | SYSTOLIC BLOOD PRESSURE: 109 MMHG | DIASTOLIC BLOOD PRESSURE: 72 MMHG

## 2024-02-09 DIAGNOSIS — F20.5 RESIDUAL SCHIZOPHRENIA: ICD-10-CM

## 2024-02-09 DIAGNOSIS — F79 UNSPECIFIED INTELLECTUAL DISABILITIES: ICD-10-CM

## 2024-02-09 LAB
AMPHET UR-MCNC: NEGATIVE — SIGNIFICANT CHANGE UP
APPEARANCE UR: ABNORMAL
BACTERIA # UR AUTO: ABNORMAL /HPF
BARBITURATES UR SCN-MCNC: NEGATIVE — SIGNIFICANT CHANGE UP
BENZODIAZ UR-MCNC: NEGATIVE — SIGNIFICANT CHANGE UP
BILIRUB UR-MCNC: NEGATIVE — SIGNIFICANT CHANGE UP
COCAINE METAB.OTHER UR-MCNC: NEGATIVE — SIGNIFICANT CHANGE UP
COLOR SPEC: YELLOW — SIGNIFICANT CHANGE UP
DIFF PNL FLD: NEGATIVE — SIGNIFICANT CHANGE UP
GLUCOSE UR QL: NEGATIVE MG/DL — SIGNIFICANT CHANGE UP
KETONES UR-MCNC: 15 MG/DL
LEUKOCYTE ESTERASE UR-ACNC: NEGATIVE — SIGNIFICANT CHANGE UP
METHADONE UR-MCNC: NEGATIVE — SIGNIFICANT CHANGE UP
NITRITE UR-MCNC: NEGATIVE — SIGNIFICANT CHANGE UP
OPIATES UR-MCNC: NEGATIVE — SIGNIFICANT CHANGE UP
PCP SPEC-MCNC: SIGNIFICANT CHANGE UP
PCP UR-MCNC: NEGATIVE — SIGNIFICANT CHANGE UP
PH UR: 5.5 — SIGNIFICANT CHANGE UP (ref 5–8)
PROT UR-MCNC: 100 MG/DL
RBC CASTS # UR COMP ASSIST: 3 /HPF — SIGNIFICANT CHANGE UP (ref 0–4)
SP GR SPEC: 1.02 — SIGNIFICANT CHANGE UP (ref 1–1.03)
SQUAMOUS # UR AUTO: 6 /HPF — HIGH (ref 0–5)
THC UR QL: NEGATIVE — SIGNIFICANT CHANGE UP
UROBILINOGEN FLD QL: 0.2 MG/DL — SIGNIFICANT CHANGE UP (ref 0.2–1)
WBC UR QL: 4 /HPF — SIGNIFICANT CHANGE UP (ref 0–5)

## 2024-02-09 PROCEDURE — 99283 EMERGENCY DEPT VISIT LOW MDM: CPT | Mod: 25

## 2024-02-09 PROCEDURE — 99236 HOSP IP/OBS SAME DATE HI 85: CPT

## 2024-02-09 PROCEDURE — 99232 SBSQ HOSP IP/OBS MODERATE 35: CPT

## 2024-02-09 PROCEDURE — G0378: CPT

## 2024-02-09 PROCEDURE — 85025 COMPLETE CBC W/AUTO DIFF WBC: CPT

## 2024-02-09 PROCEDURE — 36415 COLL VENOUS BLD VENIPUNCTURE: CPT

## 2024-02-09 PROCEDURE — 81001 URINALYSIS AUTO W/SCOPE: CPT

## 2024-02-09 PROCEDURE — 80307 DRUG TEST PRSMV CHEM ANLYZR: CPT

## 2024-02-09 PROCEDURE — 87637 SARSCOV2&INF A&B&RSV AMP PRB: CPT

## 2024-02-09 PROCEDURE — 93005 ELECTROCARDIOGRAM TRACING: CPT

## 2024-02-09 PROCEDURE — 80053 COMPREHEN METABOLIC PANEL: CPT

## 2024-02-09 PROCEDURE — 84702 CHORIONIC GONADOTROPIN TEST: CPT

## 2024-02-09 PROCEDURE — 90792 PSYCH DIAG EVAL W/MED SRVCS: CPT | Mod: 95

## 2024-02-09 RX ORDER — DIPHENHYDRAMINE HCL 12.5MG/5ML
50 ELIXIR ORAL EVERY 6 HOURS
Refills: 0 | Status: DISCONTINUED | OUTPATIENT
Start: 2024-02-09 | End: 2025-04-01

## 2024-02-09 RX ORDER — DIPHENHYDRAMINE HCL 12.5MG/5ML
50 ELIXIR ORAL ONCE
Refills: 0 | Status: DISCONTINUED | OUTPATIENT
Start: 2024-02-09 | End: 2025-04-01

## 2024-02-09 RX ORDER — OLANZAPINE 10 MG/1
10 TABLET ORAL AT BEDTIME
Refills: 0 | Status: DISCONTINUED | OUTPATIENT
Start: 2024-02-09 | End: 2024-02-20

## 2024-02-09 RX ORDER — LORAZEPAM 4 MG/ML
2 VIAL (ML) INJECTION ONCE
Refills: 0 | Status: DISCONTINUED | OUTPATIENT
Start: 2024-02-09 | End: 2024-02-12

## 2024-02-09 RX ORDER — MAGNESIUM HYDROXIDE 400 MG/5ML
30 SUSPENSION ORAL DAILY
Refills: 0 | Status: DISCONTINUED | OUTPATIENT
Start: 2024-02-09 | End: 2024-12-17

## 2024-02-09 RX ORDER — TRAZODONE HCL 100 MG
50 TABLET ORAL AT BEDTIME
Refills: 0 | Status: DISCONTINUED | OUTPATIENT
Start: 2024-02-09 | End: 2024-10-29

## 2024-02-09 RX ORDER — MAGNESIUM, ALUMINUM HYDROXIDE 200-200 MG
30 TABLET,CHEWABLE ORAL EVERY 6 HOURS
Refills: 0 | Status: DISCONTINUED | OUTPATIENT
Start: 2024-02-09 | End: 2024-12-17

## 2024-02-09 RX ORDER — ACETAMINOPHEN 500 MG/5ML
650 LIQUID (ML) ORAL EVERY 6 HOURS
Refills: 0 | Status: DISCONTINUED | OUTPATIENT
Start: 2024-02-09 | End: 2025-04-01

## 2024-02-09 RX ORDER — OLANZAPINE 10 MG/1
7.5 TABLET ORAL DAILY
Refills: 0 | Status: DISCONTINUED | OUTPATIENT
Start: 2024-02-09 | End: 2024-02-15

## 2024-02-09 RX ORDER — HALOPERIDOL 10 MG/1
5 TABLET ORAL EVERY 4 HOURS
Refills: 0 | Status: DISCONTINUED | OUTPATIENT
Start: 2024-02-09 | End: 2024-02-12

## 2024-02-09 RX ORDER — LORAZEPAM 4 MG/ML
2 VIAL (ML) INJECTION EVERY 6 HOURS
Refills: 0 | Status: DISCONTINUED | OUTPATIENT
Start: 2024-02-09 | End: 2024-02-13

## 2024-02-09 RX ORDER — HALOPERIDOL 10 MG/1
7.5 TABLET ORAL ONCE
Refills: 0 | Status: DISCONTINUED | OUTPATIENT
Start: 2024-02-09 | End: 2024-02-12

## 2024-02-09 RX ADMIN — Medication 25 MILLIGRAM(S): at 06:13

## 2024-02-09 RX ADMIN — OLANZAPINE 7.5 MILLIGRAM(S): 15 TABLET, FILM COATED ORAL at 18:02

## 2024-02-09 RX ADMIN — OLANZAPINE 7.5 MILLIGRAM(S): 15 TABLET, FILM COATED ORAL at 06:13

## 2024-02-09 RX ADMIN — Medication 25 MILLIGRAM(S): at 18:01

## 2024-02-09 NOTE — BH PATIENT PROFILE - HOME MEDICATIONS
OLANZapine 7.5 mg oral tablet , 1 tab(s) orally once a day MDD: 7.5mg   Dylan Sustenna 234 mg/1.5 mL intramuscular suspension, extended release , 234 milligram(s) intramuscularly every 4 weeks Please check insurance coverage only and email or page with result.  Thanks!  OLANZapine 7.5 mg oral tablet , 1 tab(s) orally once a day (at bedtime)

## 2024-02-09 NOTE — ED ADULT NURSE REASSESSMENT NOTE - COMFORT CARE
meal provided/po fluids offered
meal provided/plan of care explained/po fluids offered
darkened lights/plan of care explained

## 2024-02-09 NOTE — BH INPATIENT PSYCHIATRY ASSESSMENT NOTE - NSBHASSESSSUMMFT_PSY_ALL_CORE
- highly doubt patient actually has Schizophrenia  - main issue seems to be Intellectual Disability with long hx of institutionalization with limited coping skills/poor frustration tolerance as indicated by acting out behavior over not being able to have friends over for a Superbowl party  - Intellectual limitations lay the foundation of lifelong dysfunction across the areas of mood, behaviors and adaptive behaviors for which psychiatric medications will have their limited efficacy  - Zyprexa 7.5mg Po qd can be continue + add on 10mg PO qhs   - ideal placement in [facility] residence
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
## 2024-02-09 NOTE — BH INPATIENT PSYCHIATRY ASSESSMENT NOTE - NSBHMETABOLIC_PSY_ALL_CORE_FT
BMI: BMI (kg/m2): 25.9 (02-08-24 @ 20:30)  HbA1c: A1C with Estimated Average Glucose Result: 6.1 % (01-14-24 @ 04:30)    Glucose: POCT Blood Glucose.: 115 mg/dL (01-04-24 @ 00:46)    BP: --Vital Signs Last 24 Hrs  T(C): 37.1 (02-09-24 @ 19:02), Max: 37.1 (02-09-24 @ 19:02)  T(F): 98.8 (02-09-24 @ 19:02), Max: 98.8 (02-09-24 @ 19:02)  HR: 92 (02-09-24 @ 19:02) (64 - 92)  BP: 109/72 (02-09-24 @ 19:02) (100/61 - 111/73)  BP(mean): --  RR: 18 (02-09-24 @ 19:02) (17 - 19)  SpO2: 95% (02-09-24 @ 19:02) (95% - 98%)      Lipid Panel: Date/Time: 01-14-24 @ 04:30  Cholesterol, Serum: 130  LDL Cholesterol Calculated: 61  HDL Cholesterol, Serum: 53  Total Cholesterol/HDL Ration Measurement: --  Triglycerides, Serum: 79

## 2024-02-09 NOTE — ED BEHAVIORAL HEALTH ASSESSMENT NOTE - REASON FOR REFERRAL
Recent PHQ 2/9 Score    PHQ 2:  Date Adult PHQ 2 Score Adult PHQ 2 Interpretation   10/25/2022 0 No further screening needed       PHQ 9:  Date Adult PHQ 9 Score Adult PHQ 9 Interpretation   10/1/2019 2 Minimal Depression      Psychiatric Evaluation

## 2024-02-09 NOTE — ED BEHAVIORAL HEALTH ASSESSMENT NOTE - VIOLENCE RISK FACTORS:
Affective dysregulation/Impulsivity/History of violation of a legal mandate (e.g., parole, probation, AOT)
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
## 2024-02-09 NOTE — BH INPATIENT PSYCHIATRY ASSESSMENT NOTE - NSBHCHARTREVIEWVS_PSY_A_CORE FT
Vital Signs Last 24 Hrs  T(C): 37.1 (02-09-24 @ 19:02), Max: 37.1 (02-09-24 @ 19:02)  T(F): 98.8 (02-09-24 @ 19:02), Max: 98.8 (02-09-24 @ 19:02)  HR: 92 (02-09-24 @ 19:02) (64 - 92)  BP: 109/72 (02-09-24 @ 19:02) (100/61 - 111/73)  BP(mean): --  RR: 18 (02-09-24 @ 19:02) (17 - 19)  SpO2: 95% (02-09-24 @ 19:02) (95% - 98%)

## 2024-02-09 NOTE — ED PROVIDER NOTE - NSICDXPASTMEDICALHX_GEN_ALL_CORE_FT
Referral from PCP  Pt needs consult regarding Nexplanon vs Mirena.  Please contact pt to schedule appt with provider of her choice or [facility]  Link appt to referral   PAST MEDICAL HISTORY:  Intellectual delay     Schizophrenia
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
## 2024-02-09 NOTE — BH INPATIENT PSYCHIATRY ASSESSMENT NOTE - CURRENT MEDICATION
MEDICATIONS  (STANDING):  OLANZapine 10 milliGRAM(s) Oral at bedtime  OLANZapine 7.5 milliGRAM(s) Oral daily    MEDICATIONS  (PRN):  acetaminophen     Tablet .. 650 milliGRAM(s) Oral every 6 hours PRN Temp greater or equal to 38C (100.4F), Mild Pain (1 - 3)  aluminum hydroxide/magnesium hydroxide/simethicone Suspension 30 milliLiter(s) Oral every 6 hours PRN Dyspepsia  diphenhydrAMINE 50 milliGRAM(s) Oral every 6 hours PRN Extrapyramidal symptoms or prophylaxis  haloperidol     Tablet 5 milliGRAM(s) Oral every 4 hours PRN moderate agitation  haloperidol    Injectable 7.5 milliGRAM(s) IntraMuscular once PRN severe agitation / aggression  LORazepam     Tablet 2 milliGRAM(s) Oral every 6 hours PRN Anxiety  LORazepam   Injectable 2 milliGRAM(s) IntraMuscular once PRN severe anxiousness, restlessness  magnesium hydroxide Suspension 30 milliLiter(s) Oral daily PRN Constipation  traZODone 50 milliGRAM(s) Oral at bedtime PRN insomnia

## 2024-02-09 NOTE — ED ADULT NURSE REASSESSMENT NOTE - NS ED NURSE REASSESS COMMENT FT1
Patient in bed sleeping, safety maintained.
Pt placed in yellow gown; SNA and  assisted pt back to . EKg done, RVP done and labs sent. pt ambulating with steady gait and breathing equal and unlabored,.
awake alert in no acute distress at this time. pt has made no attempts to harm herself or others. appetite is good consuming meals.
pt slept.  remains calm and cooperative. ambulatory to bathroom urine sent
received pt being transfer report given to ems by prior rn.
telepysch done awaiting dispo
pt received resting on her bed in no acute distress. pt easily aroused to verbal stimuli and offers no complaints at this time.. pt has made no attempts to harm herself or others. pt was accepted to [facility] and report was given to Luna IVY.
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
## 2024-02-09 NOTE — CHART NOTE - NSCHARTNOTEFT_GEN_A_CORE
SW Note: Pt will be transferred to [facility] for IP psychiatric care. Coordinated admit with Viviana from [facility] admissions [phone]. Accepting Md Dr. lao and unit is 2N [phone]. [facility]. Bed is available at 6pm so [facility] requested p/u scheduled for 6pm. Msg left for pts CM thru Torie COSTA [phone]. ED provider and RN aware. [facility] arranged. [facility] letter provided. No auth required fro admit, straight medicaid listed.

## 2024-02-09 NOTE — BH INPATIENT PSYCHIATRY ASSESSMENT NOTE - OTHER
adequate  chronically low end of fair to impaired  superficially cooperative "fine"  concrete serious, solemn

## 2024-02-09 NOTE — ED PROVIDER NOTE - CLINICAL SUMMARY MEDICAL DECISION MAKING FREE TEXT BOX
Patient with history of intellectual delay and schizophrenia presenting with increasing agitation/aggression over the past 24 hours and complaints of visualizations of seeing demons.  Patient is remaining calm during ER evaluation.  Case discussed with patient's caregiver and psychiatric consultation for requested.  Patient  is medically cleared and awaiting psych consultation.

## 2024-02-09 NOTE — ED CDU PROVIDER INITIAL DAY NOTE - CLINICAL SUMMARY MEDICAL DECISION MAKING FREE TEXT BOX
Patient with history of intellectual delay and schizophrenia presenting with increasing agitation/aggression over the past 24 hours and complaints of visualizations of seeing demons.  Patient is also undergoing adjustment of her medications.  Patient was originally on olanzapine 7.5 once a day and hydroxyzine 25 mg once a day as well as medication for allergies and a multivitamin.  However due to her increasing agitation patient was switched to olanzapine 7.5 twice a day and hydroxyzine 25 mg 3 times a day.  This was done within the past week. Patient is remaining calm during ER evaluation.  Case discussed with patient's caregiver and psychiatric consultation for requested.  Patient  is medically cleared and awaiting psych consultation.
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
## 2024-02-09 NOTE — CHART NOTE - NSCHARTNOTEFT_GEN_A_CORE
Screening Medical Evaluation    Patient Admitted from: [facility] ED    ZH admitting diagnosis: Residual schizophrenia      PAST MEDICAL & SURGICAL HISTORY:  Schizophrenia  Intellectual delay  No significant past surgical history    ALLERGIES:  No Known Allergies    SOCIAL HISTORY:   Unable to assess    FAMILY HISTORY:  Unable to assess      MEDICATIONS  (STANDING):  OLANZapine 7.5 milliGRAM(s) Oral daily  OLANZapine 10 milliGRAM(s) Oral at bedtime    MEDICATIONS  (PRN):  acetaminophen     Tablet .. 650 milliGRAM(s) Oral every 6 hours PRN Temp greater or equal to 38C (100.4F), Mild Pain (1 - 3)  aluminum hydroxide/magnesium hydroxide/simethicone Suspension 30 milliLiter(s) Oral every 6 hours PRN Dyspepsia  diphenhydrAMINE 50 milliGRAM(s) Oral every 6 hours PRN Extrapyramidal symptoms or prophylaxis  diphenhydrAMINE Injectable 50 milliGRAM(s) IntraMuscular once PRN Extrapyramidal prophylaxis  haloperidol     Tablet 5 milliGRAM(s) Oral every 4 hours PRN moderate agitation  haloperidol    Injectable 7.5 milliGRAM(s) IntraMuscular once PRN severe agitation / aggression  LORazepam     Tablet 2 milliGRAM(s) Oral every 6 hours PRN Anxiety  LORazepam   Injectable 2 milliGRAM(s) IntraMuscular once PRN severe anxiousness, restlessness  magnesium hydroxide Suspension 30 milliLiter(s) Oral daily PRN Constipation  traZODone 50 milliGRAM(s) Oral at bedtime PRN insomnia      Vital Signs Last 24 Hrs  T(C): 37.1 (2024 19:02), Max: 37.1 (2024 19:02)  T(F): 98.8 (2024 19:02), Max: 98.8 (2024 19:02)  HR: 92 (2024 19:02) (64 - 92)  BP: 109/72 (2024 19:02) (100/61 - 109/72)  RR: 18 (2024 19:02) (17 - 18)  SpO2: 95% (2024 19:02) (95% - 98%)      PHYSICAL EXAM:  GENERAL: NAD  HEAD:  Atraumatic, Normocephalic  EYES: Conjunctiva and sclera clear  NECK: Supple, No JVD  CHEST/LUNG: Clear to auscultation bilaterally; No wheeze, rales, rhonchi  HEART: Regular rate and rhythm; No murmurs, rubs, or gallops      LABS:                        11.4   4.80  )-----------( 292      ( 2024 21:15 )             35.4         135  |  98  |  11.8  ----------------------------<  92  3.7   |  25.0  |  0.65    Ca    9.0      2024 21:15    TPro  7.8  /  Alb  4.1  /  TBili  <0.2<L>  /  DBili  x   /  AST  24  /  ALT  14  /  AlkPhos  68        Urinalysis Basic - ( 2024 06:16 )  Color: Yellow / Appearance: Cloudy / S.022 / pH: x  Gluc: x / Ketone: 15 mg/dL  / Bili: Negative / Urobili: 0.2 mg/dL   Blood: x / Protein: 100 mg/dL / Nitrite: Negative   Leuk Esterase: Negative / RBC: 3 /HPF / WBC 4 /HPF   Sq Epi: x / Non Sq Epi: 6 /HPF / Bacteria: Few /HPF      Assessment and Plan:  38F admitted to [facility] for Residual schizophrenia w/ PMHx of intellectual disability seen at bedside for medical screening evaluation. Patient has no acute complaints at this time. Unable to obtain ROS as patient very reluctant to answer questions, but states she is feeling fine. Physical exam limited but unremarkable, VSS. Labs WNL. EKG NSR.    1.) Residual schizophrenia: Refer to primary team documentation for recs
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
## 2024-02-09 NOTE — ED BEHAVIORAL HEALTH PROGRESS NOTE - STANDING MEDS RECEIVED IN ED DETAILS FREE TEXT
hydrOXYzine hydrochloride 25 milliGRAM(s) Oral every 8 hours  OLANZapine 7.5 milliGRAM(s) Oral two times a day

## 2024-02-09 NOTE — ED BEHAVIORAL HEALTH NOTE - BEHAVIORAL HEALTH NOTE
==================    PRE-HOSPITAL COURSE    ===================    SOURCE:  RN and secondhand ED documentation     DETAILS: RN Gabrielle reports pt has been calm and cooperative.     =========    ED COURSE    =========    SOURCE:  RN and secondhand ED documentation.    ARRIVAL:  Per chart and RN, patient arrived via EMS. Per RN, patient was calm upon arrival, and cooperative with triage process.    BELONGINGS:  Per RN, patient arrived with no notable belongings. All belongings were provided to hospital security, and patient currently in a gown with a 1:1 staff member.    BEHAVIOR: RN described patient to be calm and cooperative presenting with disorganized thought process endorsing AVH seeing the devil, remains in good behavioral and impulse control, is not violent/aggressive. RN stated that the patient is denying SI/HI/A/VH. RN stated that there are no visible marks, bruises, or lacerations on the body. RN stated that the patient appears to be well-groomed, maintains good hygiene, and reports IND ADLs, ambulates without assistance.     TREATMENT:  Per chart and RN, patient did not require PRN medications.    VISITORS:  Per RN, there are no visitors.

## 2024-02-09 NOTE — ED CDU PROVIDER DISPOSITION NOTE - CLINICAL COURSE
Patient with PMH schizophrenia presents decompensated, violent, medically cleared and stable for inpatient psychiatric placement, requiring involuntary admission as she is a danger to herself and others.

## 2024-02-09 NOTE — BH INPATIENT PSYCHIATRY ASSESSMENT NOTE - NSBHCHARTREVIEWLAB_PSY_A_CORE FT
02-08    135  |  98  |  11.8  ----------------------------<  92  3.7   |  25.0  |  0.65    Ca    9.0      08 Feb 2024 21:15    TPro  7.8  /  Alb  4.1  /  TBili  <0.2<L>  /  DBili  x   /  AST  24  /  ALT  14  /  AlkPhos  68  02-08

## 2024-02-09 NOTE — ED BEHAVIORAL HEALTH PROGRESS NOTE - TELEPSYCHIATRY?
Pt reports abdominal pain for 9 months. Pt seen in ED with a negative work up. Pt states on Sunday she was at the clinic for same sx and dx with UTI. Pt has been taking antibiotics since Sunday night, 4 doses. Pt c/o hematuria and dysuria. ABC in tact. A/OX4   No

## 2024-02-09 NOTE — ED ADULT NURSE REASSESSMENT NOTE - GENERAL PATIENT STATE
comfortable appearance/resting/sleeping
resting/sleeping
comfortable appearance/cooperative/resting/sleeping
comfortable appearance/cooperative
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
## 2024-02-09 NOTE — ED BEHAVIORAL HEALTH ASSESSMENT NOTE - REFERRED BY
Documentation for DOT paperwork [facility] National Registry Number, this paperwork has been changed/corrected and rescanned into chart, patient to be notified of this and to  by patient the corrected paperwork. TAMIKO Cartagena MA at [facility][note 3], to contact patient for this.   Self

## 2024-02-09 NOTE — BH INPATIENT PSYCHIATRY ASSESSMENT NOTE - NSPRESENTSXS_PSY_ALL_CORE
NOTIFICATION RETURN TO WORK / SCHOOL 
 
5/17/2021 9:19 AM 
 
Mr. Rudolfo Seip [address] [facility] [facility] [phone] [id number] To Whom It May Concern: 
 
Rudolfo Seip is currently under the care of [facility] [address] [facility]. He will return to work/school on: 5/17/2021 If there are questions or concerns please have the patient contact our office. Sincerely, Yael Marion MD 
 
                                
 
 Impulsivity

## 2024-02-10 VITALS — HEIGHT: 64 IN | WEIGHT: 136.69 LBS

## 2024-02-10 PROCEDURE — 99232 SBSQ HOSP IP/OBS MODERATE 35: CPT

## 2024-02-10 RX ADMIN — OLANZAPINE 10 MILLIGRAM(S): 10 TABLET ORAL at 20:34

## 2024-02-10 RX ADMIN — OLANZAPINE 7.5 MILLIGRAM(S): 10 TABLET ORAL at 08:23

## 2024-02-10 NOTE — BH INPATIENT PSYCHIATRY PROGRESS NOTE - NSBHMETABOLIC_PSY_ALL_CORE_FT
BMI: BMI (kg/m2): 23.5 (02-10-24 @ 02:59)  HbA1c: A1C with Estimated Average Glucose Result: 6.1 % (01-14-24 @ 04:30)    Glucose: POCT Blood Glucose.: 115 mg/dL (01-04-24 @ 00:46)    BP: --Vital Signs Last 24 Hrs  T(C): 36.9 (02-10-24 @ 07:39), Max: 37.1 (02-09-24 @ 19:02)  T(F): 98.4 (02-10-24 @ 07:39), Max: 98.8 (02-09-24 @ 19:02)  HR: 92 (02-09-24 @ 19:02) (82 - 92)  BP: 109/72 (02-09-24 @ 19:02) (100/67 - 109/72)  BP(mean): --  RR: 18 (02-09-24 @ 19:02) (18 - 18)  SpO2: 95% (02-09-24 @ 19:02) (95% - 96%)    Orthostatic VS  02-10-24 @ 07:39  Lying BP: --/-- HR: --  Sitting BP: 122/80 HR: 86  Standing BP: 119/69 HR: 104  Site: --  Mode: --  Orthostatic VS  02-10-24 @ 02:59  Lying BP: --/-- HR: --  Sitting BP: 130/78 HR: --  Standing BP: 128/82 HR: 86  Site: --  Mode: --    Lipid Panel: Date/Time: 01-14-24 @ 04:30  Cholesterol, Serum: 130  LDL Cholesterol Calculated: 61  HDL Cholesterol, Serum: 53  Total Cholesterol/HDL Ration Measurement: --  Triglycerides, Serum: 79
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
## 2024-02-10 NOTE — BH INPATIENT PSYCHIATRY PROGRESS NOTE - NSBHFUPINTERVALHXFT_PSY_A_CORE
Chart reviewed. Pt refused Zyprexa 10mg po qhs last night but accepted 7.5mg po this AM.   She is a poor historian and does not answer questions in depth on why she is in the hospital or how the hospital could help her.   She denies any AH/VH, SI/I/P.   She has been sleeping and eating well.   She states she no longer wants to be here but can't explain where she would like to go.

## 2024-02-10 NOTE — BH INPATIENT PSYCHIATRY PROGRESS NOTE - NSBHCHARTREVIEWVS_PSY_A_CORE FT
Vital Signs Last 24 Hrs  T(C): 36.9 (02-10-24 @ 07:39), Max: 37.1 (02-09-24 @ 19:02)  T(F): 98.4 (02-10-24 @ 07:39), Max: 98.8 (02-09-24 @ 19:02)  HR: 92 (02-09-24 @ 19:02) (82 - 92)  BP: 109/72 (02-09-24 @ 19:02) (100/67 - 109/72)  BP(mean): --  RR: 18 (02-09-24 @ 19:02) (18 - 18)  SpO2: 95% (02-09-24 @ 19:02) (95% - 96%)    Orthostatic VS  02-10-24 @ 07:39  Lying BP: --/-- HR: --  Sitting BP: 122/80 HR: 86  Standing BP: 119/69 HR: 104  Site: --  Mode: --  Orthostatic VS  02-10-24 @ 02:59  Lying BP: --/-- HR: --  Sitting BP: 130/78 HR: --  Standing BP: 128/82 HR: 86  Site: --  Mode: --

## 2024-02-10 NOTE — BH INPATIENT PSYCHIATRY PROGRESS NOTE - PRN MEDS
MEDICATIONS  (PRN):  acetaminophen     Tablet .. 650 milliGRAM(s) Oral every 6 hours PRN Temp greater or equal to 38C (100.4F), Mild Pain (1 - 3)  aluminum hydroxide/magnesium hydroxide/simethicone Suspension 30 milliLiter(s) Oral every 6 hours PRN Dyspepsia  diphenhydrAMINE 50 milliGRAM(s) Oral every 6 hours PRN Extrapyramidal symptoms or prophylaxis  diphenhydrAMINE Injectable 50 milliGRAM(s) IntraMuscular once PRN Extrapyramidal prophylaxis  haloperidol     Tablet 5 milliGRAM(s) Oral every 4 hours PRN moderate agitation  haloperidol    Injectable 7.5 milliGRAM(s) IntraMuscular once PRN severe agitation / aggression  LORazepam     Tablet 2 milliGRAM(s) Oral every 6 hours PRN Anxiety  LORazepam   Injectable 2 milliGRAM(s) IntraMuscular once PRN severe anxiousness, restlessness  magnesium hydroxide Suspension 30 milliLiter(s) Oral daily PRN Constipation  traZODone 50 milliGRAM(s) Oral at bedtime PRN insomnia

## 2024-02-10 NOTE — BH INPATIENT PSYCHIATRY PROGRESS NOTE - CURRENT MEDICATION
MEDICATIONS  (STANDING):  OLANZapine 10 milliGRAM(s) Oral at bedtime  OLANZapine 7.5 milliGRAM(s) Oral daily    MEDICATIONS  (PRN):  acetaminophen     Tablet .. 650 milliGRAM(s) Oral every 6 hours PRN Temp greater or equal to 38C (100.4F), Mild Pain (1 - 3)  aluminum hydroxide/magnesium hydroxide/simethicone Suspension 30 milliLiter(s) Oral every 6 hours PRN Dyspepsia  diphenhydrAMINE 50 milliGRAM(s) Oral every 6 hours PRN Extrapyramidal symptoms or prophylaxis  diphenhydrAMINE Injectable 50 milliGRAM(s) IntraMuscular once PRN Extrapyramidal prophylaxis  haloperidol     Tablet 5 milliGRAM(s) Oral every 4 hours PRN moderate agitation  haloperidol    Injectable 7.5 milliGRAM(s) IntraMuscular once PRN severe agitation / aggression  LORazepam     Tablet 2 milliGRAM(s) Oral every 6 hours PRN Anxiety  LORazepam   Injectable 2 milliGRAM(s) IntraMuscular once PRN severe anxiousness, restlessness  magnesium hydroxide Suspension 30 milliLiter(s) Oral daily PRN Constipation  traZODone 50 milliGRAM(s) Oral at bedtime PRN insomnia

## 2024-02-11 PROCEDURE — 99232 SBSQ HOSP IP/OBS MODERATE 35: CPT

## 2024-02-11 RX ADMIN — OLANZAPINE 10 MILLIGRAM(S): 10 TABLET ORAL at 20:22

## 2024-02-11 RX ADMIN — OLANZAPINE 7.5 MILLIGRAM(S): 10 TABLET ORAL at 08:27

## 2024-02-11 NOTE — BH INPATIENT PSYCHIATRY PROGRESS NOTE - NSBHFUPINTERVALHXFT_PSY_A_CORE
Chart reviewed. Pt accepted all medications last night and this morning. No PRNS needed.   She is a poor historian and does not answer questions in depth on why she is in the hospital or how the hospital could help her.   She denies any AH/VH, SI/I/P.   She has been sleeping and eating well.   Patient requesting to live in a new apartment when she leaves the hospital.

## 2024-02-11 NOTE — BH INPATIENT PSYCHIATRY PROGRESS NOTE - NSBHMETABOLIC_PSY_ALL_CORE_FT
BMI: BMI (kg/m2): 23.5 (02-10-24 @ 02:59)  HbA1c: A1C with Estimated Average Glucose Result: 6.1 % (01-14-24 @ 04:30)    Glucose: POCT Blood Glucose.: 115 mg/dL (01-04-24 @ 00:46)    BP: --Vital Signs Last 24 Hrs  T(C): 36.7 (02-11-24 @ 08:00), Max: 36.9 (02-10-24 @ 20:44)  T(F): 98 (02-11-24 @ 08:00), Max: 98.5 (02-10-24 @ 20:44)  HR: --  BP: --  BP(mean): --  RR: --  SpO2: 100% (02-10-24 @ 20:44) (100% - 100%)    Orthostatic VS  02-11-24 @ 08:00  Lying BP: --/-- HR: --  Sitting BP: 104/64 HR: 64  Standing BP: 105/68 HR: 82  Site: --  Mode: --  Orthostatic VS  02-10-24 @ 20:44  Lying BP: --/-- HR: --  Sitting BP: 113/73 HR: 90  Standing BP: 121/80 HR: 104  Site: --  Mode: --  Orthostatic VS  02-10-24 @ 07:39  Lying BP: --/-- HR: --  Sitting BP: 122/80 HR: 86  Standing BP: 119/69 HR: 104  Site: --  Mode: --  Orthostatic VS  02-10-24 @ 02:59  Lying BP: --/-- HR: --  Sitting BP: 130/78 HR: --  Standing BP: 128/82 HR: 86  Site: --  Mode: --    Lipid Panel: Date/Time: 01-14-24 @ 04:30  Cholesterol, Serum: 130  LDL Cholesterol Calculated: 61  HDL Cholesterol, Serum: 53  Total Cholesterol/HDL Ration Measurement: --  Triglycerides, Serum: 79

## 2024-02-11 NOTE — BH INPATIENT PSYCHIATRY PROGRESS NOTE - NSBHCHARTREVIEWVS_PSY_A_CORE FT
Vital Signs Last 24 Hrs  T(C): 36.7 (02-11-24 @ 08:00), Max: 36.9 (02-10-24 @ 20:44)  T(F): 98 (02-11-24 @ 08:00), Max: 98.5 (02-10-24 @ 20:44)  HR: --  BP: --  BP(mean): --  RR: --  SpO2: 100% (02-10-24 @ 20:44) (100% - 100%)    Orthostatic VS  02-11-24 @ 08:00  Lying BP: --/-- HR: --  Sitting BP: 104/64 HR: 64  Standing BP: 105/68 HR: 82  Site: --  Mode: --  Orthostatic VS  02-10-24 @ 20:44  Lying BP: --/-- HR: --  Sitting BP: 113/73 HR: 90  Standing BP: 121/80 HR: 104  Site: --  Mode: --  Orthostatic VS  02-10-24 @ 07:39  Lying BP: --/-- HR: --  Sitting BP: 122/80 HR: 86  Standing BP: 119/69 HR: 104  Site: --  Mode: --  Orthostatic VS  02-10-24 @ 02:59  Lying BP: --/-- HR: --  Sitting BP: 130/78 HR: --  Standing BP: 128/82 HR: 86  Site: --  Mode: --

## 2024-02-12 PROCEDURE — 90832 PSYTX W PT 30 MINUTES: CPT

## 2024-02-12 RX ORDER — HALOPERIDOL 10 MG/1
5 TABLET ORAL ONCE
Refills: 0 | Status: COMPLETED | OUTPATIENT
Start: 2024-02-12 | End: 2024-02-12

## 2024-02-12 RX ORDER — HALOPERIDOL 10 MG/1
5 TABLET ORAL EVERY 6 HOURS
Refills: 0 | Status: DISCONTINUED | OUTPATIENT
Start: 2024-02-12 | End: 2024-06-11

## 2024-02-12 RX ORDER — LORAZEPAM 4 MG/ML
2 VIAL (ML) INJECTION ONCE
Refills: 0 | Status: DISCONTINUED | OUTPATIENT
Start: 2024-02-12 | End: 2024-02-12

## 2024-02-12 RX ORDER — HALOPERIDOL 10 MG/1
5 TABLET ORAL ONCE
Refills: 0 | Status: DISCONTINUED | OUTPATIENT
Start: 2024-02-12 | End: 2025-04-01

## 2024-02-12 RX ORDER — HALOPERIDOL 10 MG/1
5 TABLET ORAL ONCE
Refills: 0 | Status: DISCONTINUED | OUTPATIENT
Start: 2024-02-12 | End: 2024-02-12

## 2024-02-12 RX ORDER — DIPHENHYDRAMINE HCL 12.5MG/5ML
50 ELIXIR ORAL ONCE
Refills: 0 | Status: DISCONTINUED | OUTPATIENT
Start: 2024-02-12 | End: 2024-02-12

## 2024-02-12 RX ORDER — LORAZEPAM 4 MG/ML
2 VIAL (ML) INJECTION ONCE
Refills: 0 | Status: DISCONTINUED | OUTPATIENT
Start: 2024-02-12 | End: 2024-02-13

## 2024-02-12 RX ORDER — DIPHENHYDRAMINE HCL 12.5MG/5ML
50 ELIXIR ORAL ONCE
Refills: 0 | Status: COMPLETED | OUTPATIENT
Start: 2024-02-12 | End: 2024-02-12

## 2024-02-12 RX ADMIN — Medication 50 MILLIGRAM(S): at 20:25

## 2024-02-12 RX ADMIN — OLANZAPINE 7.5 MILLIGRAM(S): 10 TABLET ORAL at 09:17

## 2024-02-12 RX ADMIN — Medication 2 MILLIGRAM(S): at 18:05

## 2024-02-12 RX ADMIN — HALOPERIDOL 5 MILLIGRAM(S): 10 TABLET ORAL at 20:25

## 2024-02-12 RX ADMIN — OLANZAPINE 10 MILLIGRAM(S): 10 TABLET ORAL at 20:25

## 2024-02-12 RX ADMIN — Medication 2 MILLIGRAM(S): at 20:25

## 2024-02-12 RX ADMIN — HALOPERIDOL 5 MILLIGRAM(S): 10 TABLET ORAL at 18:04

## 2024-02-12 RX ADMIN — Medication 50 MILLIGRAM(S): at 18:05

## 2024-02-12 NOTE — BH INPATIENT PSYCHIATRY PROGRESS NOTE - NSICDXBHSECONDARYDX_PSY_ALL_CORE
Intellectual disability   F79   Acitretin Pregnancy And Lactation Text: This medication is Pregnancy Category X and should not be given to women who are pregnant or may become pregnant in the future. This medication is excreted in breast milk.

## 2024-02-12 NOTE — BH INPATIENT PSYCHIATRY PROGRESS NOTE - NSBHATTESTCOMMENTATTENDFT_PSY_A_CORE
Pleasant, repetitive about plans to go to [facility].  Visible on unit, social with peers.  No clear mood episode or psychosis at this time.  Awaiting further collateral.  Continue meds as ordered.

## 2024-02-12 NOTE — BH INPATIENT PSYCHIATRY PROGRESS NOTE - PRN MEDS
MEDICATIONS  (PRN):  acetaminophen     Tablet .. 650 milliGRAM(s) Oral every 6 hours PRN Temp greater or equal to 38C (100.4F), Mild Pain (1 - 3)  aluminum hydroxide/magnesium hydroxide/simethicone Suspension 30 milliLiter(s) Oral every 6 hours PRN Dyspepsia  diphenhydrAMINE 50 milliGRAM(s) Oral every 6 hours PRN Extrapyramidal symptoms or prophylaxis  diphenhydrAMINE Injectable 50 milliGRAM(s) IntraMuscular once PRN Extrapyramidal prophylaxis  haloperidol     Tablet 5 milliGRAM(s) Oral every 4 hours PRN moderate agitation  haloperidol    Injectable 7.5 milliGRAM(s) IntraMuscular once PRN severe agitation / aggression  LORazepam     Tablet 2 milliGRAM(s) Oral every 6 hours PRN Anxiety  LORazepam   Injectable 2 milliGRAM(s) IntraMuscular once PRN severe anxiousness, restlessness  magnesium hydroxide Suspension 30 milliLiter(s) Oral daily PRN Constipation  traZODone 50 milliGRAM(s) Oral at bedtime PRN insomnia   MEDICATIONS  (PRN):  acetaminophen     Tablet .. 650 milliGRAM(s) Oral every 6 hours PRN Temp greater or equal to 38C (100.4F), Mild Pain (1 - 3)  aluminum hydroxide/magnesium hydroxide/simethicone Suspension 30 milliLiter(s) Oral every 6 hours PRN Dyspepsia  diphenhydrAMINE 50 milliGRAM(s) Oral every 6 hours PRN Extrapyramidal symptoms or prophylaxis  diphenhydrAMINE Injectable 50 milliGRAM(s) IntraMuscular once PRN Extrapyramidal prophylaxis  haloperidol     Tablet 5 milliGRAM(s) Oral every 6 hours PRN agitation  haloperidol    Injectable 5 milliGRAM(s) IntraMuscular once PRN aggression  LORazepam     Tablet 2 milliGRAM(s) Oral every 6 hours PRN Anxiety  LORazepam   Injectable 2 milliGRAM(s) IntraMuscular once PRN severe agitation  magnesium hydroxide Suspension 30 milliLiter(s) Oral daily PRN Constipation  traZODone 50 milliGRAM(s) Oral at bedtime PRN insomnia

## 2024-02-12 NOTE — BH SOCIAL WORK INITIAL PSYCHOSOCIAL EVALUATION - NSBHCOMMCURRENT_PSY_ALL_CORE
Pt was fixated on moving out of state./Unable to answer (specify) Dawn Mejia   Family Care Coordinator  [email or web address]  [facility]   [address] [place] [address]  Work cell: [phone]/Case Management

## 2024-02-12 NOTE — BH SOCIAL WORK INITIAL PSYCHOSOCIAL EVALUATION - NSBHMILITARYHX_PSY_ALL_CORE
Pt was fixated on moving out of state./Unable to answer (specify) Pt was fixated on moving out of state./None
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
## 2024-02-12 NOTE — BH INPATIENT PSYCHIATRY PROGRESS NOTE - NSBHMETABOLIC_PSY_ALL_CORE_FT
BMI: BMI (kg/m2): 23.5 (02-10-24 @ 02:59)  HbA1c: A1C with Estimated Average Glucose Result: 6.1 % (01-14-24 @ 04:30)    Glucose: POCT Blood Glucose.: 115 mg/dL (01-04-24 @ 00:46)    BP: --Vital Signs Last 24 Hrs  T(C): 36.4 (02-12-24 @ 08:12), Max: 36.4 (02-12-24 @ 08:12)  T(F): 97.5 (02-12-24 @ 08:12), Max: 97.5 (02-12-24 @ 08:12)  HR: --  BP: --  BP(mean): --  RR: --  SpO2: --    Orthostatic VS  02-12-24 @ 08:12  Lying BP: --/-- HR: --  Sitting BP: 118/79 HR: 92  Standing BP: 129/76 HR: 97  Site: --  Mode: --  Orthostatic VS  02-11-24 @ 08:00  Lying BP: --/-- HR: --  Sitting BP: 104/64 HR: 64  Standing BP: 105/68 HR: 82  Site: --  Mode: --  Orthostatic VS  02-10-24 @ 20:44  Lying BP: --/-- HR: --  Sitting BP: 113/73 HR: 90  Standing BP: 121/80 HR: 104  Site: --  Mode: --    Lipid Panel: Date/Time: 01-14-24 @ 04:30  Cholesterol, Serum: 130  LDL Cholesterol Calculated: 61  HDL Cholesterol, Serum: 53  Total Cholesterol/HDL Ration Measurement: --  Triglycerides, Serum: 79   BMI: BMI (kg/m2): 23.5 (02-10-24 @ 02:59)  HbA1c: A1C with Estimated Average Glucose Result: 6.1 % (01-14-24 @ 04:30)    Glucose: POCT Blood Glucose.: 115 mg/dL (01-04-24 @ 00:46)    BP: --Vital Signs Last 24 Hrs  T(C): 36.8 (02-13-24 @ 08:30), Max: 36.8 (02-13-24 @ 08:30)  T(F): 98.2 (02-13-24 @ 08:30), Max: 98.2 (02-13-24 @ 08:30)  HR: --  BP: --  BP(mean): --  RR: --  SpO2: --    Orthostatic VS  02-13-24 @ 08:30  Lying BP: --/-- HR: --  Sitting BP: 126/76 HR: 102  Standing BP: 130/87 HR: 115  Site: --  Mode: --  Orthostatic VS  02-12-24 @ 08:12  Lying BP: --/-- HR: --  Sitting BP: 118/79 HR: 92  Standing BP: 129/76 HR: 97  Site: --  Mode: --    Lipid Panel: Date/Time: 01-14-24 @ 04:30  Cholesterol, Serum: 130  LDL Cholesterol Calculated: 61  HDL Cholesterol, Serum: 53  Total Cholesterol/HDL Ration Measurement: --  Triglycerides, Serum: 79

## 2024-02-12 NOTE — BH SOCIAL WORK INITIAL PSYCHOSOCIAL EVALUATION - NSBHTREATHXNAMEFT_PSY_ALL_CORE
Dr. Rodriguez Dr. Albertina Rodriguez (psychiatrist in [place]) Dr. Albertina Rodriguez (psychiatrist in [place]); [facility] ([facility] [phone] Dr. Albertina Rodriguez, [facility] [place] [facility] [phone]
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
## 2024-02-12 NOTE — BH SOCIAL WORK INITIAL PSYCHOSOCIAL EVALUATION - NSBHABUSESEXHX_PSY_ALL_CORE
Pt was fixated on moving out of state and unable to answer questions./Unknown... Pt was fixated on moving out of state and unable to answer questions./Yes...

## 2024-02-12 NOTE — BH SOCIAL WORK INITIAL PSYCHOSOCIAL EVALUATION - NSBHSUBSTANCEHX_PSY_ALL_CORE
Pt was fixated on moving out of state./Patient unable to answer (specify) Pt was fixated on moving out of state./No

## 2024-02-12 NOTE — BH SOCIAL WORK INITIAL PSYCHOSOCIAL EVALUATION - NSBHBARRIERS_PSY_ALL_CORE
Pt has intellectual disability as per ED note./Medical co-morbidities/Poor judgement Pt has intellectual disability as per ED note./Lack of intelligence

## 2024-02-12 NOTE — BH INPATIENT PSYCHIATRY PROGRESS NOTE - MSE UNSTRUCTURED FT
LENA/BEH: Adult female in no acute distress; dressed in hospital clothes; friendly and cooperative, poorly related, intense eye contact, intrusive with poor boundaries. Adequate hygiene/grooming.   SPEECH: +impaired articulation, at times loud volume.   MOTOR: No PMR/PMA; no other abnormal movements.   MOOD: “good”.  AFFECT: Euthymic, mildly elevated, mood congruent.   THOUGHT PROCESS: [place], preoccupied with housing.   THOUGHT CONTENT: Denies SI or HI; no evidence of delusions, TC notable for discharge focus, desire for housing.  PERCEPTIONS: denies AVH, does not appear internally preoccupied.   COGNITION: Grossly intact. Estimated Intelligence: Below average based on h/o IDD and presentation.   INSIGHT: Poor.  JUDGMENT: Limited.  IMPULSE CONTROL: Intact on unit.

## 2024-02-12 NOTE — BH INPATIENT PSYCHIATRY PROGRESS NOTE - NSBHFUPINTERVALHXFT_PSY_A_CORE
Chart reviewed. Pt accepted all medications last night and this morning. No PRNS needed.   She is a poor historian and does not answer questions in depth on why she is in the hospital or how the hospital could help her.   She denies any AH/VH, SI/I/P.   She has been sleeping and eating well.   Patient requesting to live in a new apartment when she leaves the hospital.  Chart reviewed. Case d/w interdisciplinary team. Patient seen and examined for follow up of behavioral problems and r/o psychosis. No acute events overnight. Compliant with standing medication. No PRNs required or requested. Per sleep log, fair sleep.    Interview continues to be limited 2/2 intellectual disability. Pt is extremely friendly, perseverative and concrete. Shares that she will be moving to "a new apartment in [facility]" tomorrow. She denies any AH/VH, SI/I/P. She has been sleeping and eating well.

## 2024-02-12 NOTE — BH INPATIENT PSYCHIATRY PROGRESS NOTE - NSBHCHARTREVIEWVS_PSY_A_CORE FT
Vital Signs Last 24 Hrs  T(C): 36.4 (02-12-24 @ 08:12), Max: 36.4 (02-12-24 @ 08:12)  T(F): 97.5 (02-12-24 @ 08:12), Max: 97.5 (02-12-24 @ 08:12)  HR: --  BP: --  BP(mean): --  RR: --  SpO2: --    Orthostatic VS  02-12-24 @ 08:12  Lying BP: --/-- HR: --  Sitting BP: 118/79 HR: 92  Standing BP: 129/76 HR: 97  Site: --  Mode: --  Orthostatic VS  02-11-24 @ 08:00  Lying BP: --/-- HR: --  Sitting BP: 104/64 HR: 64  Standing BP: 105/68 HR: 82  Site: --  Mode: --  Orthostatic VS  02-10-24 @ 20:44  Lying BP: --/-- HR: --  Sitting BP: 113/73 HR: 90  Standing BP: 121/80 HR: 104  Site: --  Mode: --   Vital Signs Last 24 Hrs  T(C): 36.8 (02-13-24 @ 08:30), Max: 36.8 (02-13-24 @ 08:30)  T(F): 98.2 (02-13-24 @ 08:30), Max: 98.2 (02-13-24 @ 08:30)  HR: --  BP: --  BP(mean): --  RR: --  SpO2: --    Orthostatic VS  02-13-24 @ 08:30  Lying BP: --/-- HR: --  Sitting BP: 126/76 HR: 102  Standing BP: 130/87 HR: 115  Site: --  Mode: --  Orthostatic VS  02-12-24 @ 08:12  Lying BP: --/-- HR: --  Sitting BP: 118/79 HR: 92  Standing BP: 129/76 HR: 97  Site: --  Mode: --

## 2024-02-12 NOTE — BH CHART NOTE - NSEVENTNOTEFT_PSY_ALL_CORE
[facility] called for activation of IM medication due to patient agitation. Psych emergency called around 18h00. Per staff patient was yelling w/o any provocation "I am the devil and I hate ETC" and throwing food on the day room. Patient able to be redirected by staff and accepted PO prn medications Haldol 5 mg x 1, Ativan 2 mg x 1, Benadryl 50 mg x 1. IM medication not needed and discontinued. Advised RN staff to notify [facility] if patient continues to be agitated.

## 2024-02-12 NOTE — BH SOCIAL WORK INITIAL PSYCHOSOCIAL EVALUATION - NSPTSTATEDGOAL_PSY_ALL_CORE
"I want to move out of [facility]. To [place] or [place]. I don't want to go back to [place]". "I want to move out of [facility]. To [place] or [place]. I don't want to go back to [place]."

## 2024-02-12 NOTE — BH SOCIAL WORK INITIAL PSYCHOSOCIAL EVALUATION - NSBHCHILDEVENTS_PSY_ALL_CORE
Unable to Obtain Pt was in foster care her whole life and then placed in family home care once she aged out of foster care./Other (specify)

## 2024-02-12 NOTE — BH INPATIENT PSYCHIATRY PROGRESS NOTE - CURRENT MEDICATION
MEDICATIONS  (STANDING):  OLANZapine 7.5 milliGRAM(s) Oral daily  OLANZapine 10 milliGRAM(s) Oral at bedtime    MEDICATIONS  (PRN):  acetaminophen     Tablet .. 650 milliGRAM(s) Oral every 6 hours PRN Temp greater or equal to 38C (100.4F), Mild Pain (1 - 3)  aluminum hydroxide/magnesium hydroxide/simethicone Suspension 30 milliLiter(s) Oral every 6 hours PRN Dyspepsia  diphenhydrAMINE 50 milliGRAM(s) Oral every 6 hours PRN Extrapyramidal symptoms or prophylaxis  diphenhydrAMINE Injectable 50 milliGRAM(s) IntraMuscular once PRN Extrapyramidal prophylaxis  haloperidol     Tablet 5 milliGRAM(s) Oral every 4 hours PRN moderate agitation  haloperidol    Injectable 7.5 milliGRAM(s) IntraMuscular once PRN severe agitation / aggression  LORazepam     Tablet 2 milliGRAM(s) Oral every 6 hours PRN Anxiety  LORazepam   Injectable 2 milliGRAM(s) IntraMuscular once PRN severe anxiousness, restlessness  magnesium hydroxide Suspension 30 milliLiter(s) Oral daily PRN Constipation  traZODone 50 milliGRAM(s) Oral at bedtime PRN insomnia   MEDICATIONS  (STANDING):  OLANZapine 7.5 milliGRAM(s) Oral daily  OLANZapine 10 milliGRAM(s) Oral at bedtime    MEDICATIONS  (PRN):  acetaminophen     Tablet .. 650 milliGRAM(s) Oral every 6 hours PRN Temp greater or equal to 38C (100.4F), Mild Pain (1 - 3)  aluminum hydroxide/magnesium hydroxide/simethicone Suspension 30 milliLiter(s) Oral every 6 hours PRN Dyspepsia  diphenhydrAMINE 50 milliGRAM(s) Oral every 6 hours PRN Extrapyramidal symptoms or prophylaxis  diphenhydrAMINE Injectable 50 milliGRAM(s) IntraMuscular once PRN Extrapyramidal prophylaxis  haloperidol     Tablet 5 milliGRAM(s) Oral every 6 hours PRN agitation  haloperidol    Injectable 5 milliGRAM(s) IntraMuscular once PRN aggression  LORazepam     Tablet 2 milliGRAM(s) Oral every 6 hours PRN Anxiety  LORazepam   Injectable 2 milliGRAM(s) IntraMuscular once PRN severe agitation  magnesium hydroxide Suspension 30 milliLiter(s) Oral daily PRN Constipation  traZODone 50 milliGRAM(s) Oral at bedtime PRN insomnia

## 2024-02-12 NOTE — BH SOCIAL WORK INITIAL PSYCHOSOCIAL EVALUATION - NSBHEDULEVEL_PSY_ALL_CORE
Anesthesia Pre Eval Note    Anesthesia ROS/Med Hx        Anesthetic Complication History:  Patient does not have a history of anesthetic complications    Additional Results:     ALLERGIES:   -- Adhesive   (Environmental) -- RASH   -- Amoxicillin -- HIVES   -- Morphine -- NAUSEA   No results found for: \"WBC\", \"RBC\", \"HGB\", \"HCT\", \"MCV\", \"MCH\", \"MCHC\", \"RDWCV\", \"SODIUM\", \"POTASSIUM\", \"CHLORIDE\", \"CO2\", \"GLUCOSE\", \"BUN\", \"CREATININE\", \"GFRESTIMATE\", \"EGFRNONAFR\", \"GFRA\", \"GFRNA\", \"CALCIUM\", \"HCG\", \"PLT\", \"PTT\", \"INR\"   Past Medical History:  No date: Band keratopathy, right eye  No date: Cleft palate      Comment:  s/p repair  No date: Degenerative myopia of both eyes  No date: Failure to thrive (0-17)      Comment:  Resolved after ZENOBIA was treated  No date: ZENOBIA (obstructive sleep apnea)      Comment:  resolved after cleft palate repair  No date: Migue Dayo sequence      Comment:  s/p mandibular distraction and cleft repair  No date: PONV (postoperative nausea and vomiting)      Comment:  SEVERE POST-OP  No date: Speech delay      Comment:  when she was younger(baby) dalia her father  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 H/o                of tympanostomy tubes with normal hearing. Hypermobile                joints.   No date: Traction detachment of retina, right eye  Past Surgical History:  No date: Adenoidectomy  08/2011: Anes palatoplasty-cleft palate; soft tis  No date: Eye examination under anesthesia  No date: Eye surgery      Comment:  left eye cornea band chelation,right eye exam (as of                today 08/16 2022 per pt's farther between 20 to 30 times                procedure  No date: Lens material removal  No date: Mandible surgery      Comment:  Mandibular distraction osteogenesis 09/2010, harware                removal 12/10  01/02/2019: Reconst cleft palate,major doris      Comment:  revision  No date: Retinal detachment surgery       Comment:  Multiple. First in 6/2013, last in 3/2015  No date: Retinal laser procedure  No date: Tympanostomy tube placement      Comment:  Placed 12/2010, last 2014  No date: Vitrectomy  09/17/2019: Vitrectomy; Right   Prior to Admission medications :  Medication ibuprofen (CHILDRENS ADVIL) 100 MG/5ML suspension, Sig Take 20 mLs by mouth every 6 hours as needed for Pain or Fever., Start Date 3/22/23, End Date , Taking? , Authorizing Provider Sienna Matthews MD    Medication prednisoLONE sod-phos (INFLAMASE FORTE) 1 % ophthalmic solution, Sig Place 1 drop into both eyes 4 times daily., Start Date , End Date , Taking? , Authorizing Provider Provider, Outside    Medication acetaminophen (TYLENOL) 160 MG/5ML suspension, Sig Take 416 mg by mouth., Start Date 8/24/14, End Date , Taking? , Authorizing Provider Provider, Outside    Medication Pediatric Multiple Vit-C-FA (MULTIVITAMIN CHILDRENS) Chew Tab, Sig Chew by mouth daily., Start Date , End Date , Taking? , Authorizing Provider Provider, Outside         Patient Vitals in the past 24 hrs:  11/07/23 1035, BP:125/67, Temp:36.3 °C (97.3 °F), Pulse:95, Resp:16, SpO2:99 %, Height:5' 4.57\" (1.64 m), Weight:53.3 kg (117 lb 8.1 oz)      Relevant Problems   Anesthesia Problems   (+) ZENOBIA (obstructive sleep apnea)   (+) Post-operative nausea and vomiting      Development   (+) Failure to thrive (0-17)   (+) Speech delay      Pulmonary   (+) ZENOBIA (obstructive sleep apnea)       Physical Exam     Airway     Neck ROM: Full  TMJ Mobility: Good    Cardiovascular    Cardio Rhythm: Regular    Pulmonary Exam    Breath sounds clear to auscultation:  Yes    Other Findings  Denies loose teeth, weakened teeth, or sensitive dental work.         Anesthesia Plan:    ASA Status: 2  Anesthesia Type: General    Induction: Intravenous  Preferred Airway Type: ETT  Maintenance: Inhalational  Premedication: IV    Consent/Risks Discussed Statement:  The proposed anesthetic plan, including its risks and  benefits, have been discussed with the Father and Patient along with the risks and benefits of alternatives. Questions were encouraged and answered and the patient and/or representative understands and agrees to proceed.        I discussed with the patient (and/or patient's legal representative) the risks and benefits of the proposed anesthesia plan, General, which may include services performed by other anesthesia providers.    Alternative anesthesia plans, if available, were reviewed with the patient (and/or patient's legal representative). Discussion has been held with the patient (and/or patient's legal representative) regarding risks of anesthesia, which include emergent situations that may require change in anesthesia plan.  The patient (and/or patient's legal representative) has indicated understanding, his/her questions have been answered, and he/she wishes to proceed with the planned anesthetic.     Pt was fixated on moving out of state and unable to answer questions./Other (Specify) Unable to obtain./Other (Specify)

## 2024-02-12 NOTE — BH INPATIENT PSYCHIATRY PROGRESS NOTE - NSBHMSESPABN_PSY_A_CORE
Soft volume/Slowed rate/Increased latency/Impaired articulation

## 2024-02-12 NOTE — BH SOCIAL WORK INITIAL PSYCHOSOCIAL EVALUATION - NSBHSPIRITUALPOWER_PSY_ALL_CORE
Pt was fixated on moving out of state and unable to answer questions./Unable to answer (specify) Unable to answer (specify)

## 2024-02-13 PROCEDURE — 99232 SBSQ HOSP IP/OBS MODERATE 35: CPT | Mod: GC

## 2024-02-13 RX ORDER — DIPHENHYDRAMINE HCL 12.5MG/5ML
50 ELIXIR ORAL ONCE
Refills: 0 | Status: COMPLETED | OUTPATIENT
Start: 2024-02-13 | End: 2024-02-13

## 2024-02-13 RX ORDER — LORAZEPAM 4 MG/ML
2 VIAL (ML) INJECTION EVERY 6 HOURS
Refills: 0 | Status: DISCONTINUED | OUTPATIENT
Start: 2024-02-13 | End: 2024-02-20

## 2024-02-13 RX ORDER — LORAZEPAM 4 MG/ML
2 VIAL (ML) INJECTION ONCE
Refills: 0 | Status: DISCONTINUED | OUTPATIENT
Start: 2024-02-13 | End: 2024-02-13

## 2024-02-13 RX ORDER — HALOPERIDOL 10 MG/1
5 TABLET ORAL ONCE
Refills: 0 | Status: COMPLETED | OUTPATIENT
Start: 2024-02-13 | End: 2024-02-13

## 2024-02-13 RX ORDER — LORAZEPAM 4 MG/ML
2 VIAL (ML) INJECTION ONCE
Refills: 0 | Status: DISCONTINUED | OUTPATIENT
Start: 2024-02-13 | End: 2024-02-20

## 2024-02-13 RX ADMIN — OLANZAPINE 10 MILLIGRAM(S): 10 TABLET ORAL at 21:39

## 2024-02-13 RX ADMIN — Medication 50 MILLIGRAM(S): at 14:32

## 2024-02-13 RX ADMIN — OLANZAPINE 7.5 MILLIGRAM(S): 10 TABLET ORAL at 08:32

## 2024-02-13 RX ADMIN — Medication 50 MILLIGRAM(S): at 08:32

## 2024-02-13 RX ADMIN — Medication 2 MILLIGRAM(S): at 14:33

## 2024-02-13 RX ADMIN — HALOPERIDOL 5 MILLIGRAM(S): 10 TABLET ORAL at 14:32

## 2024-02-13 NOTE — BH INPATIENT PSYCHIATRY PROGRESS NOTE - NSBHATTESTCOMMENTATTENDFT_PSY_A_CORE
Pt with episodes of agitation yesterday with unclear trigger.  Otherwise pt was pleasant, visible, attending activities, stating that she [place].  No clear objective signs of active positive symptoms of psychosis yesterday.  Agitation/aggression therefore unlikely to be related to any current psychotic episode.  Maintain diagnostic assessment that pt presentation may be most consistent with behavioral sequelae of neurodevelopmental disorder (IDD) rather than any historical primary psychotic disorder.  Continue meds for now.  Will need collateral on why risperidone stopped with relatively low prolactin levels when pt was stable on it for so long (i.e. physical symptoms of hyperprolactinemia?).

## 2024-02-13 NOTE — BH INPATIENT PSYCHIATRY PROGRESS NOTE - NSBHFUPINTERVALHXFT_PSY_A_CORE
Chart reviewed. Case d/w interdisciplinary team. Patient seen and examined for follow up of behavioral problems and r/o psychosis. Psych emergency overnight at 18:00 - pt began yelling without any provocation, spitting and throwing food in day room. She was eventually able to be redirected by staff and accepted PO prn H/A/B. At 20:00 LERO[facility] was called again for similar behavior, pr received an additional H/A/B PO prn as well as nightly zyprexa. Per sleep log, good sleep.   Chart reviewed. Case d/w interdisciplinary team. Patient seen and examined for follow up of behavioral problems and r/o psychosis. Psych emergency overnight at 18:00 - pt began yelling without any provocation, spitting and throwing food in day room. She was eventually able to be redirected by staff and accepted PO prn H/A/B. At 20:00 [facility] was called again for similar behavior, pr received an additional H/A/B PO prn as well as nightly zyprexa. Per sleep log, good sleep.    This morning pt was observed to be awake and in no apparent distress, but screaming in short bursts. When asked why, she was unable to fully describe what was causing the behavior but mentioned wanting to leave and go to another hospital/housing, as well as her concern she will get ECT. Able to reassure patient she will not be receiving ECT, which appeared to placate her. Provided pt with support and validation, reviewed unit protocol and emphasized need for pt to come to staff if she needs help coping.

## 2024-02-13 NOTE — BH INPATIENT PSYCHIATRY PROGRESS NOTE - MSE UNSTRUCTURED FT
LENA/BEH: Adult female in no acute distress; dressed in hospital clothes; friendly and cooperative, poorly related, intense eye contact, intrusive with poor boundaries. Adequate hygiene/grooming.   SPEECH: +impaired articulation, at times loud volume.   MOTOR: No PMR/PMA; no other abnormal movements.   MOOD: “good”.  AFFECT: Euthymic, mildly elevated, mood congruent.   THOUGHT PROCESS: [place], preoccupied with housing.   THOUGHT CONTENT: Denies SI or HI; no evidence of delusions, TC notable for discharge focus, desire for housing.  PERCEPTIONS: denies AVH, does not appear internally preoccupied.   COGNITION: Grossly intact. Estimated Intelligence: Below average based on h/o IDD and presentation.   INSIGHT: Poor.  JUDGMENT: Limited.  IMPULSE CONTROL: Intact on unit.      LENA/BEH: Adult female in no acute distress; dressed in hospital clothes; friendly and cooperative, poorly related, intense eye contact, intrusive with poor boundaries. Adequate hygiene/grooming. At times can suddenly decompensate without apparent triggers- yelling, spitting, throwing objects.   SPEECH: +impaired articulation, at times loud volume.   MOTOR: No PMR/PMA; no other abnormal movements.   MOOD: does not provide mood.   AFFECT: Neutral, constricted.   THOUGHT PROCESS: [place], preoccupied with housing and discharge, as well as ECT.   THOUGHT CONTENT: Denies SI or HI; no evidence of delusions, TC notable for discharge focus, desire for housing, belief she will get "ECT" (unclear if she knows what that is)  PERCEPTIONS: denies AVH, does not appear internally preoccupied.   COGNITION: Grossly intact. Estimated Intelligence: Below average based on h/o IDD and presentation.   INSIGHT: Poor.  JUDGMENT: Limited.  IMPULSE CONTROL: Intact right now on unit, tenuous.

## 2024-02-13 NOTE — BH INPATIENT PSYCHIATRY PROGRESS NOTE - NSBHCHARTREVIEWVS_PSY_A_CORE FT
Vital Signs Last 24 Hrs  T(C): 36.8 (02-13-24 @ 08:30), Max: 36.8 (02-13-24 @ 08:30)  T(F): 98.2 (02-13-24 @ 08:30), Max: 98.2 (02-13-24 @ 08:30)  HR: --  BP: --  BP(mean): --  RR: --  SpO2: --    Orthostatic VS  02-13-24 @ 08:30  Lying BP: --/-- HR: --  Sitting BP: 126/76 HR: 102  Standing BP: 130/87 HR: 115  Site: --  Mode: --  Orthostatic VS  02-12-24 @ 08:12  Lying BP: --/-- HR: --  Sitting BP: 118/79 HR: 92  Standing BP: 129/76 HR: 97  Site: --  Mode: --
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
## 2024-02-13 NOTE — BH INPATIENT PSYCHIATRY PROGRESS NOTE - PRN MEDS
MEDICATIONS  (PRN):  acetaminophen     Tablet .. 650 milliGRAM(s) Oral every 6 hours PRN Temp greater or equal to 38C (100.4F), Mild Pain (1 - 3)  aluminum hydroxide/magnesium hydroxide/simethicone Suspension 30 milliLiter(s) Oral every 6 hours PRN Dyspepsia  diphenhydrAMINE 50 milliGRAM(s) Oral every 6 hours PRN Extrapyramidal symptoms or prophylaxis  diphenhydrAMINE Injectable 50 milliGRAM(s) IntraMuscular once PRN Extrapyramidal prophylaxis  haloperidol     Tablet 5 milliGRAM(s) Oral every 6 hours PRN agitation  haloperidol    Injectable 5 milliGRAM(s) IntraMuscular once PRN aggression  LORazepam     Tablet 2 milliGRAM(s) Oral every 6 hours PRN Anxiety  LORazepam   Injectable 2 milliGRAM(s) IntraMuscular once PRN severe agitation  magnesium hydroxide Suspension 30 milliLiter(s) Oral daily PRN Constipation  traZODone 50 milliGRAM(s) Oral at bedtime PRN insomnia

## 2024-02-13 NOTE — BH INPATIENT PSYCHIATRY PROGRESS NOTE - CURRENT MEDICATION
MEDICATIONS  (STANDING):  OLANZapine 7.5 milliGRAM(s) Oral daily  OLANZapine 10 milliGRAM(s) Oral at bedtime    MEDICATIONS  (PRN):  acetaminophen     Tablet .. 650 milliGRAM(s) Oral every 6 hours PRN Temp greater or equal to 38C (100.4F), Mild Pain (1 - 3)  aluminum hydroxide/magnesium hydroxide/simethicone Suspension 30 milliLiter(s) Oral every 6 hours PRN Dyspepsia  diphenhydrAMINE 50 milliGRAM(s) Oral every 6 hours PRN Extrapyramidal symptoms or prophylaxis  diphenhydrAMINE Injectable 50 milliGRAM(s) IntraMuscular once PRN Extrapyramidal prophylaxis  haloperidol     Tablet 5 milliGRAM(s) Oral every 6 hours PRN agitation  haloperidol    Injectable 5 milliGRAM(s) IntraMuscular once PRN aggression  LORazepam     Tablet 2 milliGRAM(s) Oral every 6 hours PRN Anxiety  LORazepam   Injectable 2 milliGRAM(s) IntraMuscular once PRN severe agitation  magnesium hydroxide Suspension 30 milliLiter(s) Oral daily PRN Constipation  traZODone 50 milliGRAM(s) Oral at bedtime PRN insomnia   MEDICATIONS  (STANDING):  OLANZapine 10 milliGRAM(s) Oral at bedtime  OLANZapine 7.5 milliGRAM(s) Oral daily    MEDICATIONS  (PRN):  acetaminophen     Tablet .. 650 milliGRAM(s) Oral every 6 hours PRN Temp greater or equal to 38C (100.4F), Mild Pain (1 - 3)  aluminum hydroxide/magnesium hydroxide/simethicone Suspension 30 milliLiter(s) Oral every 6 hours PRN Dyspepsia  diphenhydrAMINE 50 milliGRAM(s) Oral every 6 hours PRN Extrapyramidal symptoms or prophylaxis  diphenhydrAMINE Injectable 50 milliGRAM(s) IntraMuscular once PRN Extrapyramidal prophylaxis  haloperidol     Tablet 5 milliGRAM(s) Oral every 6 hours PRN agitation  haloperidol    Injectable 5 milliGRAM(s) IntraMuscular once PRN aggression  LORazepam     Tablet 2 milliGRAM(s) Oral every 6 hours PRN Anxiety  LORazepam   Injectable 2 milliGRAM(s) IntraMuscular once PRN severe agitation  magnesium hydroxide Suspension 30 milliLiter(s) Oral daily PRN Constipation  traZODone 50 milliGRAM(s) Oral at bedtime PRN insomnia

## 2024-02-13 NOTE — BH INPATIENT PSYCHIATRY PROGRESS NOTE - NSBHMETABOLIC_PSY_ALL_CORE_FT
IV discontinued, cath removed intact BMI: BMI (kg/m2): 23.5 (02-10-24 @ 02:59)  HbA1c: A1C with Estimated Average Glucose Result: 6.1 % (01-14-24 @ 04:30)    Glucose: POCT Blood Glucose.: 115 mg/dL (01-04-24 @ 00:46)    BP: --Vital Signs Last 24 Hrs  T(C): 36.8 (02-13-24 @ 08:30), Max: 36.8 (02-13-24 @ 08:30)  T(F): 98.2 (02-13-24 @ 08:30), Max: 98.2 (02-13-24 @ 08:30)  HR: --  BP: --  BP(mean): --  RR: --  SpO2: --    Orthostatic VS  02-13-24 @ 08:30  Lying BP: --/-- HR: --  Sitting BP: 126/76 HR: 102  Standing BP: 130/87 HR: 115  Site: --  Mode: --  Orthostatic VS  02-12-24 @ 08:12  Lying BP: --/-- HR: --  Sitting BP: 118/79 HR: 92  Standing BP: 129/76 HR: 97  Site: --  Mode: --    Lipid Panel: Date/Time: 01-14-24 @ 04:30  Cholesterol, Serum: 130  LDL Cholesterol Calculated: 61  HDL Cholesterol, Serum: 53  Total Cholesterol/HDL Ration Measurement: --  Triglycerides, Serum: 79

## 2024-02-14 PROCEDURE — 90853 GROUP PSYCHOTHERAPY: CPT

## 2024-02-14 PROCEDURE — 99232 SBSQ HOSP IP/OBS MODERATE 35: CPT | Mod: GC

## 2024-02-14 RX ADMIN — Medication 2 MILLIGRAM(S): at 18:40

## 2024-02-14 RX ADMIN — Medication 2 MILLIGRAM(S): at 09:07

## 2024-02-14 RX ADMIN — OLANZAPINE 7.5 MILLIGRAM(S): 10 TABLET ORAL at 09:07

## 2024-02-14 RX ADMIN — OLANZAPINE 10 MILLIGRAM(S): 10 TABLET ORAL at 20:02

## 2024-02-14 RX ADMIN — Medication 50 MILLIGRAM(S): at 09:09

## 2024-02-14 RX ADMIN — HALOPERIDOL 5 MILLIGRAM(S): 10 TABLET ORAL at 09:07

## 2024-02-14 RX ADMIN — HALOPERIDOL 5 MILLIGRAM(S): 10 TABLET ORAL at 18:40
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
## 2024-02-14 NOTE — BH INPATIENT PSYCHIATRY PROGRESS NOTE - MSE UNSTRUCTURED FT
LENA/BEH: Adult female in no acute distress; dressed in hospital clothes; friendly and cooperative, poorly related, intense eye contact, intrusive with poor boundaries. Adequate hygiene/grooming. At times can suddenly decompensate without apparent triggers- yelling, spitting, throwing objects.   SPEECH: +impaired articulation, at times loud volume.   MOTOR: No PMR/PMA; no other abnormal movements.   MOOD: does not provide mood.   AFFECT: Neutral, constricted.   THOUGHT PROCESS: [place], preoccupied with housing and discharge, as well as ECT.   THOUGHT CONTENT: Denies SI or HI; no evidence of delusions, TC notable for discharge focus, desire for housing, belief she will get "ECT" (unclear if she knows what that is)  PERCEPTIONS: denies AVH, does not appear internally preoccupied.   COGNITION: Grossly intact. Estimated Intelligence: Below average based on h/o IDD and presentation.   INSIGHT: Poor.  JUDGMENT: Limited.  IMPULSE CONTROL: Intact right now on unit, tenuous.      LENA/BEH: Adult female in no acute distress; dressed in hospital clothes; friendly and cooperative, poorly related, intense eye contact, intrusive with poor boundaries. Adequate hygiene/grooming. At times can suddenly decompensate without apparent triggers- yelling, spitting, throwing objects.   SPEECH: +impaired articulation, at times loud volume.   MOTOR: No PMR/PMA; no other abnormal movements.   MOOD: does not provide mood.   AFFECT: Neutral, constricted.   THOUGHT PROCESS: [place], preoccupied with housing and discharge, as well as ECT.   THOUGHT CONTENT: Denies SI or HI; no evidence of delusions, TC notable for discharge focus, desire for housing.   PERCEPTIONS: denies AVH, does not appear internally preoccupied.   COGNITION: Grossly intact. Estimated Intelligence: Below average based on h/o IDD and presentation.   INSIGHT: Poor.  JUDGMENT: Limited.  IMPULSE CONTROL: Intact right now on unit, tenuous.

## 2024-02-14 NOTE — BH INPATIENT PSYCHIATRY PROGRESS NOTE - NSBHFUPINTERVALHXFT_PSY_A_CORE
Chart reviewed. Case d/w interdisciplinary team. Patient seen and examined for follow up of behavioral problems and r/o psychosis. Psych emergency yesterday afternoon at 2pm, similar to the night before where pt began yelling without known provocation, required IM H/A/B. Compliant with PO meds. Per sleep log, good sleep.    This morning pt was observed to be awake and in no apparent distress** Chart reviewed. Case d/w interdisciplinary team. Patient seen and examined for follow up of behavioral problems and r/o psychosis. Psych emergency yesterday afternoon at 2pm, similar to the night before where pt began yelling without known provocation, required IM H/A/B. Compliant with PO meds. Per sleep log, good sleep.    This morning pt is awake and visible on unit. Still extremely intrusive with poor boundaries, but appears to be smiling and happy at the moment. She shares that she is supposed to leave tomorrow to go to [facility], because they are looking into "[place]." Difficult to redirect. Says she is happy at the moment. Able to acknowledge past behavioral events as being caused by overstimulation, but only able to provide her stress ball and crossword as coping mechanisms.

## 2024-02-14 NOTE — BH INPATIENT PSYCHIATRY PROGRESS NOTE - NSBHATTESTCOMMENTATTENDFT_PSY_A_CORE
Patient presents child-like, reported to have become very irritable yesterday due to environmental triggers required STAT IM medications. This AM, patient states she is unhappy with being here, that she is going to go back to [facility] tomorrow. She overtly denies AVH, feeling paranoid, denies SI and HI. will maintain psychiatric regimen as outlined above. Team awaiting professional collateral  to help guide decision re. Risperdal vs. Zyprexa.

## 2024-02-14 NOTE — BH INPATIENT PSYCHIATRY PROGRESS NOTE - NSBHCHARTREVIEWVS_PSY_A_CORE FT
Vital Signs Last 24 Hrs  T(C): 36.8 (02-13-24 @ 08:30), Max: 36.8 (02-13-24 @ 08:30)  T(F): 98.2 (02-13-24 @ 08:30), Max: 98.2 (02-13-24 @ 08:30)  HR: --  BP: --  BP(mean): --  RR: --  SpO2: --    Orthostatic VS  02-13-24 @ 08:30  Lying BP: --/-- HR: --  Sitting BP: 126/76 HR: 102  Standing BP: 130/87 HR: 115  Site: --  Mode: --   Vital Signs Last 24 Hrs  T(C): --  T(F): --  HR: --  BP: --  BP(mean): --  RR: --  SpO2: --    Orthostatic VS  02-13-24 @ 08:30  Lying BP: --/-- HR: --  Sitting BP: 126/76 HR: 102  Standing BP: 130/87 HR: 115  Site: --  Mode: --   Vital Signs Last 24 Hrs  T(C): 36.4 (02-14-24 @ 12:47), Max: 36.4 (02-14-24 @ 12:47)  T(F): 97.6 (02-14-24 @ 12:47), Max: 97.6 (02-14-24 @ 12:47)  HR: --  BP: --  BP(mean): --  RR: --  SpO2: --    Orthostatic VS  02-14-24 @ 12:47  Lying BP: --/-- HR: --  Sitting BP: 132/60 HR: 112  Standing BP: --/-- HR: --  Site: --  Mode: --  Orthostatic VS  02-13-24 @ 08:30  Lying BP: --/-- HR: --  Sitting BP: 126/76 HR: 102  Standing BP: 130/87 HR: 115  Site: --  Mode: --

## 2024-02-14 NOTE — BH PSYCHOLOGY - GROUP THERAPY NOTE - NSBHPSYCHOLRESPCOMMENT_PSY_A_CORE FT
The patient appeared adequately groomed and dressed in a gown. Pt appeared engaged in the group as evidenced by their willingness to independently verbally communicate during the discussion, sharing that a self care goal of hers was to walk for 10 minutes after dinner, as well as wanting to relocate housing, pt was fixated on housing and discharge at times but able to be redirected. Pt was oriented X3. Pt was appropriate with peers.  The patient appeared adequately groomed and dressed in a gown. Pt appeared fairly engaged in the group as evidenced by their willingness to verbally communicate during the discussion when prompted, sharing that a self care goal of hers was to walk for 10 minutes after dinner, as well as wanting to relocate housing, pt was fixated on housing and discharge at times but able to be redirected. Pt was oriented X3. Pt was appropriate with peers.

## 2024-02-14 NOTE — BH INPATIENT PSYCHIATRY PROGRESS NOTE - NSBHMETABOLIC_PSY_ALL_CORE_FT
BMI: BMI (kg/m2): 23.5 (02-10-24 @ 02:59)  HbA1c: A1C with Estimated Average Glucose Result: 6.1 % (01-14-24 @ 04:30)    Glucose: POCT Blood Glucose.: 115 mg/dL (01-04-24 @ 00:46)    BP: --Vital Signs Last 24 Hrs  T(C): 36.8 (02-13-24 @ 08:30), Max: 36.8 (02-13-24 @ 08:30)  T(F): 98.2 (02-13-24 @ 08:30), Max: 98.2 (02-13-24 @ 08:30)  HR: --  BP: --  BP(mean): --  RR: --  SpO2: --    Orthostatic VS  02-13-24 @ 08:30  Lying BP: --/-- HR: --  Sitting BP: 126/76 HR: 102  Standing BP: 130/87 HR: 115  Site: --  Mode: --    Lipid Panel: Date/Time: 01-14-24 @ 04:30  Cholesterol, Serum: 130  LDL Cholesterol Calculated: 61  HDL Cholesterol, Serum: 53  Total Cholesterol/HDL Ration Measurement: --  Triglycerides, Serum: 79   BMI: BMI (kg/m2): 23.5 (02-10-24 @ 02:59)  HbA1c: A1C with Estimated Average Glucose Result: 6.1 % (01-14-24 @ 04:30)    Glucose: POCT Blood Glucose.: 115 mg/dL (01-04-24 @ 00:46)    BP: --Vital Signs Last 24 Hrs  T(C): --  T(F): --  HR: --  BP: --  BP(mean): --  RR: --  SpO2: --    Orthostatic VS  02-13-24 @ 08:30  Lying BP: --/-- HR: --  Sitting BP: 126/76 HR: 102  Standing BP: 130/87 HR: 115  Site: --  Mode: --    Lipid Panel: Date/Time: 01-14-24 @ 04:30  Cholesterol, Serum: 130  LDL Cholesterol Calculated: 61  HDL Cholesterol, Serum: 53  Total Cholesterol/HDL Ration Measurement: --  Triglycerides, Serum: 79   BMI: BMI (kg/m2): 23.5 (02-10-24 @ 02:59)  HbA1c: A1C with Estimated Average Glucose Result: 6.1 % (01-14-24 @ 04:30)    Glucose: POCT Blood Glucose.: 115 mg/dL (01-04-24 @ 00:46)    BP: --Vital Signs Last 24 Hrs  T(C): 36.4 (02-14-24 @ 12:47), Max: 36.4 (02-14-24 @ 12:47)  T(F): 97.6 (02-14-24 @ 12:47), Max: 97.6 (02-14-24 @ 12:47)  HR: --  BP: --  BP(mean): --  RR: --  SpO2: --    Orthostatic VS  02-14-24 @ 12:47  Lying BP: --/-- HR: --  Sitting BP: 132/60 HR: 112  Standing BP: --/-- HR: --  Site: --  Mode: --  Orthostatic VS  02-13-24 @ 08:30  Lying BP: --/-- HR: --  Sitting BP: 126/76 HR: 102  Standing BP: 130/87 HR: 115  Site: --  Mode: --    Lipid Panel: Date/Time: 01-14-24 @ 04:30  Cholesterol, Serum: 130  LDL Cholesterol Calculated: 61  HDL Cholesterol, Serum: 53  Total Cholesterol/HDL Ration Measurement: --  Triglycerides, Serum: 79

## 2024-02-14 NOTE — BH PSYCHOLOGY - GROUP THERAPY NOTE - NSPSYCHOLGRPCOGPT_PSY_A_CORE FT
Patient attended Cognitive Behavioral Therapy Group incorporating ACT-based concepts. The group started with a brief check-in asking pts about a vacation they would take if they could pick any place. The group was then engaged in a mindfulness exercise, as well as a worksheet discussing self-care tips. Group facilitator explained concepts, reinforced participation, and engaged patients in the discussion.

## 2024-02-15 PROCEDURE — 90832 PSYTX W PT 30 MINUTES: CPT | Mod: 59

## 2024-02-15 PROCEDURE — 99232 SBSQ HOSP IP/OBS MODERATE 35: CPT | Mod: GC

## 2024-02-15 PROCEDURE — 90853 GROUP PSYCHOTHERAPY: CPT

## 2024-02-15 RX ORDER — RISPERIDONE 4 MG
2 TABLET ORAL DAILY
Refills: 0 | Status: DISCONTINUED | OUTPATIENT
Start: 2024-02-16 | End: 2024-02-20

## 2024-02-15 RX ORDER — HALOPERIDOL 10 MG/1
5 TABLET ORAL ONCE
Refills: 0 | Status: COMPLETED | OUTPATIENT
Start: 2024-02-15 | End: 2024-02-15

## 2024-02-15 RX ORDER — LORAZEPAM 4 MG/ML
2 VIAL (ML) INJECTION ONCE
Refills: 0 | Status: DISCONTINUED | OUTPATIENT
Start: 2024-02-15 | End: 2024-02-15

## 2024-02-15 RX ORDER — DIPHENHYDRAMINE HCL 12.5MG/5ML
50 ELIXIR ORAL ONCE
Refills: 0 | Status: COMPLETED | OUTPATIENT
Start: 2024-02-15 | End: 2024-02-15

## 2024-02-15 RX ADMIN — HALOPERIDOL 5 MILLIGRAM(S): 10 TABLET ORAL at 09:07

## 2024-02-15 RX ADMIN — Medication 50 MILLIGRAM(S): at 09:07

## 2024-02-15 RX ADMIN — HALOPERIDOL 5 MILLIGRAM(S): 10 TABLET ORAL at 19:00

## 2024-02-15 RX ADMIN — OLANZAPINE 7.5 MILLIGRAM(S): 10 TABLET ORAL at 09:08

## 2024-02-15 RX ADMIN — Medication 2 MILLIGRAM(S): at 09:07

## 2024-02-15 RX ADMIN — OLANZAPINE 10 MILLIGRAM(S): 10 TABLET ORAL at 20:40

## 2024-02-15 RX ADMIN — HALOPERIDOL 5 MILLIGRAM(S): 10 TABLET ORAL at 20:40

## 2024-02-15 RX ADMIN — Medication 2 MILLIGRAM(S): at 20:39

## 2024-02-15 RX ADMIN — Medication 50 MILLIGRAM(S): at 20:39

## 2024-02-15 RX ADMIN — Medication 50 MILLIGRAM(S): at 19:00

## 2024-02-15 RX ADMIN — Medication 2 MILLIGRAM(S): at 19:00

## 2024-02-15 NOTE — BH INPATIENT PSYCHIATRY PROGRESS NOTE - NSBHCHARTREVIEWVS_PSY_A_CORE FT
Vital Signs Last 24 Hrs  T(C): 36.3 (02-15-24 @ 08:40), Max: 36.4 (02-14-24 @ 12:47)  T(F): 97.4 (02-15-24 @ 08:40), Max: 97.6 (02-14-24 @ 12:47)  HR: --  BP: --  BP(mean): --  RR: --  SpO2: --    Orthostatic VS  02-15-24 @ 08:40  Lying BP: --/-- HR: --  Sitting BP: 109/69 HR: 90  Standing BP: 118/70 HR: 112  Site: --  Mode: --  Orthostatic VS  02-14-24 @ 12:47  Lying BP: --/-- HR: --  Sitting BP: 132/60 HR: 112  Standing BP: --/-- HR: --  Site: --  Mode: --

## 2024-02-15 NOTE — BH INPATIENT PSYCHIATRY PROGRESS NOTE - NSBHATTESTCOMMENTATTENDFT_PSY_A_CORE
Pt continues to report characterization of voices that are not wholly consistent with true psychosis.  Behaviors on unit correlate most with ID/neurodevelopmental diagnosis rather than true psychosis.  Continue meds for now.  Awaiting collateral from outpatient psychiatrist.

## 2024-02-15 NOTE — BH INPATIENT PSYCHIATRY PROGRESS NOTE - CURRENT MEDICATION
Hydroquinone Pregnancy And Lactation Text: This medication has not been assigned a Pregnancy Risk Category but animal studies failed to show danger with the topical medication. It is unknown if the medication is excreted in breast milk. MEDICATIONS  (STANDING):  OLANZapine 7.5 milliGRAM(s) Oral daily  OLANZapine 10 milliGRAM(s) Oral at bedtime    MEDICATIONS  (PRN):  acetaminophen     Tablet .. 650 milliGRAM(s) Oral every 6 hours PRN Temp greater or equal to 38C (100.4F), Mild Pain (1 - 3)  aluminum hydroxide/magnesium hydroxide/simethicone Suspension 30 milliLiter(s) Oral every 6 hours PRN Dyspepsia  diphenhydrAMINE 50 milliGRAM(s) Oral every 6 hours PRN Extrapyramidal symptoms or prophylaxis  diphenhydrAMINE Injectable 50 milliGRAM(s) IntraMuscular once PRN Extrapyramidal prophylaxis  haloperidol     Tablet 5 milliGRAM(s) Oral every 6 hours PRN agitation  haloperidol    Injectable 5 milliGRAM(s) IntraMuscular once PRN aggression  LORazepam     Tablet 2 milliGRAM(s) Oral every 6 hours PRN Anxiety  LORazepam   Injectable 2 milliGRAM(s) IntraMuscular once PRN severe agitation  magnesium hydroxide Suspension 30 milliLiter(s) Oral daily PRN Constipation  traZODone 50 milliGRAM(s) Oral at bedtime PRN insomnia   MEDICATIONS  (STANDING):  OLANZapine 10 milliGRAM(s) Oral at bedtime  OLANZapine 7.5 milliGRAM(s) Oral daily    MEDICATIONS  (PRN):  acetaminophen     Tablet .. 650 milliGRAM(s) Oral every 6 hours PRN Temp greater or equal to 38C (100.4F), Mild Pain (1 - 3)  aluminum hydroxide/magnesium hydroxide/simethicone Suspension 30 milliLiter(s) Oral every 6 hours PRN Dyspepsia  diphenhydrAMINE 50 milliGRAM(s) Oral every 6 hours PRN Extrapyramidal symptoms or prophylaxis  diphenhydrAMINE Injectable 50 milliGRAM(s) IntraMuscular once PRN Extrapyramidal prophylaxis  haloperidol     Tablet 5 milliGRAM(s) Oral every 6 hours PRN agitation  haloperidol    Injectable 5 milliGRAM(s) IntraMuscular once PRN aggression  LORazepam     Tablet 2 milliGRAM(s) Oral every 6 hours PRN Anxiety  LORazepam   Injectable 2 milliGRAM(s) IntraMuscular once PRN severe agitation  magnesium hydroxide Suspension 30 milliLiter(s) Oral daily PRN Constipation  traZODone 50 milliGRAM(s) Oral at bedtime PRN insomnia

## 2024-02-15 NOTE — BH PSYCHOLOGY - GROUP THERAPY NOTE - NSBHPSYCHOLRESPCOMMENT_PSY_A_CORE FT
The patient appeared adequately groomed and wrapped herself in a blanket. She seemed disorganized in the group, evidenced by her interruptions to others with unrelated questions and reflections on proposed topics, but was redirectable with prompting. She was observed not watching the video, with her eyes closed, and she remained quiet during the subsequent discussion about the videos. Speech was WNL. PT was oriented X3. Pt was appropriate with peers.

## 2024-02-15 NOTE — BH INPATIENT PSYCHIATRY PROGRESS NOTE - MSE UNSTRUCTURED FT
LENA/BEH: Adult female in no acute distress; dressed in hospital clothes; friendly and cooperative, poorly related, intense eye contact, intrusive with poor boundaries. Adequate hygiene/grooming. At times can suddenly decompensate without apparent triggers- yelling, spitting, throwing objects.   SPEECH: +impaired articulation, at times loud volume.   MOTOR: No PMR/PMA; no other abnormal movements.   MOOD: does not provide mood.   AFFECT: Neutral, constricted.   THOUGHT PROCESS: [place], preoccupied with housing and discharge, as well as ECT.   THOUGHT CONTENT: Denies SI or HI; no evidence of delusions, TC notable for discharge focus, desire for housing.   PERCEPTIONS: denies AVH, does not appear internally preoccupied.   COGNITION: Grossly intact. Estimated Intelligence: Below average based on h/o IDD and presentation.   INSIGHT: Poor.  JUDGMENT: Limited.  IMPULSE CONTROL: Intact right now on unit, tenuous.      LENA/BEH: Adult female in no acute distress; dressed in hospital clothes; friendly and cooperative, poorly related, intense eye contact, intrusive with poor boundaries. Adequate hygiene/grooming. At times can suddenly decompensate without apparent triggers- yelling, spitting, throwing objects.   SPEECH: +impaired articulation, at times loud volume.   MOTOR: No PMR/PMA; no other abnormal movements.   MOOD: does not provide mood.   AFFECT: Friendly, constricted.   THOUGHT PROCESS: [place], preoccupied with housing and discharge.  THOUGHT CONTENT: Denies SI or HI; no evidence of delusions, TC notable for discharge focus, desire for housing.   PERCEPTIONS: Describes hearing the voices of her imaginary friends since she was a child. Does not appear to be true AH. Denies VH.   COGNITION: Grossly intact. Estimated Intelligence: Below average based on h/o IDD and presentation.   INSIGHT: Poor.  JUDGMENT: Limited.  IMPULSE CONTROL: Intact right now on unit, tenuous.
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
## 2024-02-15 NOTE — BH PSYCHOLOGY - GROUP THERAPY NOTE - NSPSYCHOLGRPCOGPT_PSY_A_CORE FT
Patient attended Cognitive Behavioral Therapy Group incorporating ACT-based concepts. The group started with a brief check-in, prompting participants to think about one thing they would like to change about the world. Members then engaged in a mindfulness exercise (deep breath + body scan). Afterward, the group watched two videos illustrating an ACT Metaphor and participated in a discussion regarding their thoughts and reactions to it. Group facilitator explained concepts, reinforced participation, and engaged patients in the discussion.

## 2024-02-15 NOTE — BH INPATIENT PSYCHIATRY PROGRESS NOTE - NSBHMETABOLIC_PSY_ALL_CORE_FT
BMI: BMI (kg/m2): 23.5 (02-10-24 @ 02:59)  HbA1c: A1C with Estimated Average Glucose Result: 6.1 % (01-14-24 @ 04:30)    Glucose: POCT Blood Glucose.: 115 mg/dL (01-04-24 @ 00:46)    BP: --Vital Signs Last 24 Hrs  T(C): 36.3 (02-15-24 @ 08:40), Max: 36.4 (02-14-24 @ 12:47)  T(F): 97.4 (02-15-24 @ 08:40), Max: 97.6 (02-14-24 @ 12:47)  HR: --  BP: --  BP(mean): --  RR: --  SpO2: --    Orthostatic VS  02-15-24 @ 08:40  Lying BP: --/-- HR: --  Sitting BP: 109/69 HR: 90  Standing BP: 118/70 HR: 112  Site: --  Mode: --  Orthostatic VS  02-14-24 @ 12:47  Lying BP: --/-- HR: --  Sitting BP: 132/60 HR: 112  Standing BP: --/-- HR: --  Site: --  Mode: --    Lipid Panel: Date/Time: 01-14-24 @ 04:30  Cholesterol, Serum: 130  LDL Cholesterol Calculated: 61  HDL Cholesterol, Serum: 53  Total Cholesterol/HDL Ration Measurement: --  Triglycerides, Serum: 79

## 2024-02-15 NOTE — BH INPATIENT PSYCHIATRY PROGRESS NOTE - NSBHFUPINTERVALHXFT_PSY_A_CORE
Chart reviewed. Case d/w interdisciplinary team. Patient seen and examined for follow up of behavioral problems and r/o psychosis. Pt required PO H/A/B prns for agitation yesterday at 9am and 4pm. Compliant with PO meds. Per sleep log, good sleep.    This morning pt is awake and visible on unit. Still extremely intrusive with poor boundaries, but appears to be smiling and happy at the moment.*** Chart reviewed. Case d/w interdisciplinary team. Patient seen and examined for follow up of behavioral problems and r/o psychosis. Pt required PO H/A/B prns for agitation yesterday at 9am and 4pm. Compliant with PO meds. Per sleep log, good sleep.    This morning pt is awake and visible on unit. Still extremely intrusive with poor boundaries, but smiling and happy this morning. Says her mood is good, that she is sleeping and eating well. Denies physical symptoms including medication side effects. Describes the "voices" she hears at times (but denies currently experiencing them) as belonging to her multiple imaginary friends who have been around since she was little. Says they are positive parts of her life and usually nice to her. Denies current HIIP/VI towards others/property. Continues to perseverate on discharge to [facility] so she can meet with "eric" and get an apartment [facility].  Chart reviewed. Case d/w interdisciplinary team. Patient seen and examined for follow up of behavioral problems and r/o psychosis. Pt required PO H/A/B prns for agitation yesterday at 9am and 4pm. Compliant with PO meds. Per sleep log, good sleep.    This morning pt is awake and visible on unit. Still extremely intrusive with poor boundaries, but smiling and happy this morning. Says her mood is good, that she is sleeping and eating well. Denies physical symptoms including medication side effects. Describes the "voices" she hears at times (but denies currently experiencing them) as belonging to her multiple imaginary friends who have been around since she was little. Says they are positive parts of her life and usually nice to her. Denies current HIIP/VI towards others/property. Continues to perseverate on discharge to [facility] so she can meet with "Stevie" and get an apartment [facility] [place].

## 2024-02-16 DIAGNOSIS — Z86.59 PERSONAL HISTORY OF OTHER MENTAL AND BEHAVIORAL DISORDERS: ICD-10-CM

## 2024-02-16 PROCEDURE — 99232 SBSQ HOSP IP/OBS MODERATE 35: CPT | Mod: GC

## 2024-02-16 PROCEDURE — 90853 GROUP PSYCHOTHERAPY: CPT

## 2024-02-16 RX ORDER — DIPHENHYDRAMINE HCL 12.5MG/5ML
50 ELIXIR ORAL ONCE
Refills: 0 | Status: DISCONTINUED | OUTPATIENT
Start: 2024-02-16 | End: 2024-02-20

## 2024-02-16 RX ORDER — HALOPERIDOL 10 MG/1
5 TABLET ORAL ONCE
Refills: 0 | Status: DISCONTINUED | OUTPATIENT
Start: 2024-02-16 | End: 2024-02-20

## 2024-02-16 RX ORDER — LORAZEPAM 4 MG/ML
2 VIAL (ML) INJECTION ONCE
Refills: 0 | Status: DISCONTINUED | OUTPATIENT
Start: 2024-02-16 | End: 2024-02-20

## 2024-02-16 RX ADMIN — Medication 2 MILLIGRAM(S): at 08:51

## 2024-02-16 RX ADMIN — Medication 50 MILLIGRAM(S): at 20:56

## 2024-02-16 RX ADMIN — OLANZAPINE 10 MILLIGRAM(S): 10 TABLET ORAL at 20:34

## 2024-02-16 RX ADMIN — Medication 2 MILLIGRAM(S): at 14:55

## 2024-02-16 RX ADMIN — Medication 50 MILLIGRAM(S): at 14:55

## 2024-02-16 RX ADMIN — HALOPERIDOL 5 MILLIGRAM(S): 10 TABLET ORAL at 14:55

## 2024-02-16 RX ADMIN — HALOPERIDOL 5 MILLIGRAM(S): 10 TABLET ORAL at 08:50

## 2024-02-16 RX ADMIN — Medication 50 MILLIGRAM(S): at 08:51

## 2024-02-16 NOTE — BH PSYCHOLOGY - GROUP THERAPY NOTE - NSBHPSYCHOLRESPCOMMENT_PSY_A_CORE FT
The patient appeared adequately groomed and was casually dressed. During the group session, she exhibited signs of distraction, such as failing to respond to the group facilitator's inquiries and repeatedly talking about her update about getting an apartment. She remained mostly quiet throughout the session and seemed disengaged from the group topic. Speech was WNL. PT was oriented X3.

## 2024-02-16 NOTE — BH INPATIENT PSYCHIATRY PROGRESS NOTE - NSBHFUPINTERVALHXFT_PSY_A_CORE
Chart reviewed. Case d/w interdisciplinary team. Patient seen and examined for follow up of behavioral problems and r/o psychosis. Pt required PO H/A/B prns for agitation yesterday at 9am and 4pm. Compliant with PO meds. Per sleep log, good sleep.    ** Chart reviewed. Case d/w interdisciplinary team. Patient seen and examined for follow up of behavioral problems and r/o psychosis. Pt required IM H/A/B prns yesterday at 1900, then PO H/A/B at 2000 for agitation. This morning also required PO H/A/B for agitation i/s/o overstimulation when extra staff were called for another patient. Per sleep log, good sleep.    Patient presentation similar to previous days. She is mostly cheerful and intrusive, perseverating on leaving for [facility] to meet with Stevie, as well as listing various types of "diet" foods she likes. However, as the milieu gets busier, she gets more overwhelmed and is unable to remove herself from the situations, resulting in behavioral events when her mood is "negative." During this time she describes hating certain "friends" whose voices she endorses hearing, and is seen cursing at select unit peers. She can be redirected, but sometimes with great effort. Psychology will work with patient and unit staff to enact behavior plan to encourage positive behaviors.

## 2024-02-16 NOTE — BH INPATIENT PSYCHIATRY PROGRESS NOTE - NSBHMETABOLIC_PSY_ALL_CORE_FT
BMI: BMI (kg/m2): 23.5 (02-10-24 @ 02:59)  HbA1c: A1C with Estimated Average Glucose Result: 6.1 % (01-14-24 @ 04:30)    Glucose: POCT Blood Glucose.: 115 mg/dL (01-04-24 @ 00:46)    BP: --Vital Signs Last 24 Hrs  T(C): 36.8 (02-16-24 @ 08:16), Max: 36.8 (02-16-24 @ 08:16)  T(F): 98.2 (02-16-24 @ 08:16), Max: 98.2 (02-16-24 @ 08:16)  HR: --  BP: --  BP(mean): --  RR: --  SpO2: --    Orthostatic VS  02-16-24 @ 08:16  Lying BP: --/-- HR: --  Sitting BP: 113/73 HR: 104  Standing BP: 110/68 HR: 118  Site: --  Mode: --  Orthostatic VS  02-15-24 @ 08:40  Lying BP: --/-- HR: --  Sitting BP: 109/69 HR: 90  Standing BP: 118/70 HR: 112  Site: --  Mode: --  Orthostatic VS  02-14-24 @ 12:47  Lying BP: --/-- HR: --  Sitting BP: 132/60 HR: 112  Standing BP: --/-- HR: --  Site: --  Mode: --    Lipid Panel: Date/Time: 01-14-24 @ 04:30  Cholesterol, Serum: 130  LDL Cholesterol Calculated: 61  HDL Cholesterol, Serum: 53  Total Cholesterol/HDL Ration Measurement: --  Triglycerides, Serum: 79
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
## 2024-02-16 NOTE — BH PSYCHOLOGY - GROUP THERAPY NOTE - NSPSYCHOLGRPCOGPT_PSY_A_CORE FT
Patient attended Cognitive Behavioral Therapy Group incorporating ACT-based concepts. The group started with a brief check-in, prompting participants to think about a person whom they would like to switch lives for a day. Members then engaged in an imagination mindfulness exercise and were encouraged to share any thoughts/reactions. Afterward, the group focused on engaging in a discussion about the benefits of emotions (i.e., give information; communicate to and influence others; motivate and prepare for action). Group facilitator explained concepts, reinforced participation, and engaged patients in the discussion.

## 2024-02-16 NOTE — BH INPATIENT PSYCHIATRY PROGRESS NOTE - NSBHATTESTCOMMENTATTENDFT_PSY_A_CORE
Pt continues to have intermittent behavioral events likely triggered by overstimulus on unit (e.g. visiting hours, disruptive peers).  Pt also cursing at select peers, which appears to be volitional in nature based on choice of target.  Presentation continues to be most consistent with behavioral disturbances from intellectual disability/neurodevelopmental etiology.  Team still unable to confirm historical diagnosis of schizophrenia, pt is not displaying any objective signs of active psychosis at this time.  Switching from olanzapine back to risperidone.

## 2024-02-16 NOTE — BH INPATIENT PSYCHIATRY PROGRESS NOTE - MSE UNSTRUCTURED FT
LENA/BEH: Adult female in no acute distress; dressed in hospital clothes; friendly and cooperative, poorly related, intense eye contact, intrusive with poor boundaries. Adequate hygiene/grooming. At times can suddenly decompensate without apparent triggers- yelling, spitting, throwing objects.   SPEECH: +impaired articulation, at times loud volume.   MOTOR: No PMR/PMA; no other abnormal movements.   MOOD: does not provide mood.   AFFECT: Friendly, constricted.   THOUGHT PROCESS: [place], preoccupied with housing and discharge.  THOUGHT CONTENT: Denies SI or HI; no evidence of delusions, TC notable for discharge focus, desire for housing.   PERCEPTIONS: Describes hearing the voices of her imaginary friends since she was a child. Does not appear to be true AH. Denies VH.   COGNITION: Grossly intact. Estimated Intelligence: Below average based on h/o IDD and presentation.   INSIGHT: Poor.  JUDGMENT: Limited.  IMPULSE CONTROL: Intact right now on unit, tenuous.      LENA/BEH: Adult female in no acute distress; dressed in hospital clothes; friendly and cooperative, poorly related, intense eye contact, intrusive with poor boundaries. Adequate hygiene/grooming. At times can suddenly decompensate often when overstimulated- yelling, spitting, throwing objects.   SPEECH: +impaired articulation, at times loud volume.   MOTOR: No PMR/PMA; no other abnormal movements.   MOOD: does not provide mood.   AFFECT: Friendly, constricted.   THOUGHT PROCESS: [place], preoccupied with housing and discharge, accessing "diet foods."   THOUGHT CONTENT: Denies SI or HI; no evidence of delusions, TC notable for discharge focus, desire for housing.   PERCEPTIONS: Describes hearing the voices of her imaginary friends since she was a child. Does not appear to be true AH. Denies VH.   COGNITION: Grossly intact. Estimated Intelligence: Below average based on h/o IDD and presentation.   INSIGHT: Poor.  JUDGMENT: Limited.  IMPULSE CONTROL: Intact right now on unit, tenuous.

## 2024-02-16 NOTE — BH PSYCHOLOGY - GROUP THERAPY NOTE - NSBHPSYCHOLPARTICIPCOMMENT_PSY_A_CORE FT
Pt participated with prompting  Pt participated with prompting; observed to close her eyes at times; disengaged

## 2024-02-16 NOTE — BH INPATIENT PSYCHIATRY PROGRESS NOTE - CURRENT MEDICATION
MEDICATIONS  (STANDING):  OLANZapine 10 milliGRAM(s) Oral at bedtime  risperiDONE   Tablet 2 milliGRAM(s) Oral daily    MEDICATIONS  (PRN):  acetaminophen     Tablet .. 650 milliGRAM(s) Oral every 6 hours PRN Temp greater or equal to 38C (100.4F), Mild Pain (1 - 3)  aluminum hydroxide/magnesium hydroxide/simethicone Suspension 30 milliLiter(s) Oral every 6 hours PRN Dyspepsia  diphenhydrAMINE 50 milliGRAM(s) Oral every 6 hours PRN Extrapyramidal symptoms or prophylaxis  diphenhydrAMINE Injectable 50 milliGRAM(s) IntraMuscular once PRN Extrapyramidal prophylaxis  haloperidol     Tablet 5 milliGRAM(s) Oral every 6 hours PRN agitation  haloperidol    Injectable 5 milliGRAM(s) IntraMuscular once PRN aggression  LORazepam     Tablet 2 milliGRAM(s) Oral every 6 hours PRN Anxiety  LORazepam   Injectable 2 milliGRAM(s) IntraMuscular once PRN severe agitation  magnesium hydroxide Suspension 30 milliLiter(s) Oral daily PRN Constipation  traZODone 50 milliGRAM(s) Oral at bedtime PRN insomnia

## 2024-02-16 NOTE — BH INPATIENT PSYCHIATRY PROGRESS NOTE - NSBHCHARTREVIEWVS_PSY_A_CORE FT
Vital Signs Last 24 Hrs  T(C): 36.8 (02-16-24 @ 08:16), Max: 36.8 (02-16-24 @ 08:16)  T(F): 98.2 (02-16-24 @ 08:16), Max: 98.2 (02-16-24 @ 08:16)  HR: --  BP: --  BP(mean): --  RR: --  SpO2: --    Orthostatic VS  02-16-24 @ 08:16  Lying BP: --/-- HR: --  Sitting BP: 113/73 HR: 104  Standing BP: 110/68 HR: 118  Site: --  Mode: --  Orthostatic VS  02-15-24 @ 08:40  Lying BP: --/-- HR: --  Sitting BP: 109/69 HR: 90  Standing BP: 118/70 HR: 112  Site: --  Mode: --  Orthostatic VS  02-14-24 @ 12:47  Lying BP: --/-- HR: --  Sitting BP: 132/60 HR: 112  Standing BP: --/-- HR: --  Site: --  Mode: --

## 2024-02-17 RX ADMIN — HALOPERIDOL 5 MILLIGRAM(S): 10 TABLET ORAL at 20:47

## 2024-02-17 RX ADMIN — Medication 2 MILLIGRAM(S): at 09:54

## 2024-02-17 RX ADMIN — OLANZAPINE 10 MILLIGRAM(S): 10 TABLET ORAL at 20:47

## 2024-02-17 RX ADMIN — Medication 50 MILLIGRAM(S): at 20:47

## 2024-02-18 LAB — SARS-COV-2 RNA SPEC QL NAA+PROBE: DETECTED

## 2024-02-18 PROCEDURE — 99232 SBSQ HOSP IP/OBS MODERATE 35: CPT

## 2024-02-18 RX ORDER — SODIUM CHLORIDE 0.65 %
1 AEROSOL, SPRAY (ML) NASAL THREE TIMES A DAY
Refills: 0 | Status: DISCONTINUED | OUTPATIENT
Start: 2024-02-18 | End: 2024-03-19

## 2024-02-18 RX ADMIN — Medication 2 MILLIGRAM(S): at 09:20

## 2024-02-18 RX ADMIN — OLANZAPINE 10 MILLIGRAM(S): 10 TABLET ORAL at 20:04

## 2024-02-18 RX ADMIN — Medication 50 MILLIGRAM(S): at 20:05

## 2024-02-18 NOTE — BH INPATIENT PSYCHIATRY PROGRESS NOTE - MSE UNSTRUCTURED FT
LENA/BEH: Adult female in no acute distress; dressed in hospital clothes; friendly and cooperative, poorly related, intense eye contact, intrusive with poor boundaries. Adequate hygiene/grooming. At times can suddenly decompensate often when overstimulated- yelling, spitting, throwing objects.   SPEECH: +impaired articulation, at times loud volume.   MOTOR: No PMR/PMA; no other abnormal movements.   MOOD: does not provide mood.   AFFECT: Friendly, constricted.   THOUGHT PROCESS: [place], preoccupied with housing and discharge, accessing "diet foods."   THOUGHT CONTENT: Denies SI or HI; no evidence of delusions, TC notable for discharge focus, desire for housing.   PERCEPTIONS: Describes hearing the voices of her imaginary friends since she was a child. Does not appear to be true AH. Denies VH.   COGNITION: Grossly intact. Estimated Intelligence: Below average based on h/o IDD and presentation.   INSIGHT: Poor.  JUDGMENT: Limited.  IMPULSE CONTROL: Intact right now on unit, tenuous.

## 2024-02-18 NOTE — BH INPATIENT PSYCHIATRY PROGRESS NOTE - NSBHCHARTREVIEWVS_PSY_A_CORE FT
Vital Signs Last 24 Hrs  T(C): 36.7 (02-18-24 @ 07:56), Max: 36.7 (02-18-24 @ 07:56)  T(F): 98 (02-18-24 @ 07:56), Max: 98 (02-18-24 @ 07:56)  HR: --  BP: --  BP(mean): --  RR: --  SpO2: --    Orthostatic VS  02-18-24 @ 07:56  Lying BP: --/-- HR: --  Sitting BP: 134/77 HR: 99  Standing BP: 127/79 HR: 105  Site: --  Mode: --  Orthostatic VS  02-17-24 @ 08:57  Lying BP: --/-- HR: --  Sitting BP: 131/74 HR: 98  Standing BP: 122/79 HR: 100  Site: --  Mode: --

## 2024-02-18 NOTE — BH INPATIENT PSYCHIATRY PROGRESS NOTE - NSBHMETABOLIC_PSY_ALL_CORE_FT
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
## 2024-02-18 NOTE — BH INPATIENT PSYCHIATRY PROGRESS NOTE - PRN MEDS
MEDICATIONS  (PRN):  acetaminophen     Tablet .. 650 milliGRAM(s) Oral every 6 hours PRN Temp greater or equal to 38C (100.4F), Mild Pain (1 - 3)  aluminum hydroxide/magnesium hydroxide/simethicone Suspension 30 milliLiter(s) Oral every 6 hours PRN Dyspepsia  diphenhydrAMINE 50 milliGRAM(s) Oral every 6 hours PRN Extrapyramidal symptoms or prophylaxis  diphenhydrAMINE Injectable 50 milliGRAM(s) IntraMuscular once PRN Extrapyramidal prophylaxis  diphenhydrAMINE Injectable 50 milliGRAM(s) IntraMuscular once PRN Extrapyramidal prophylaxis  haloperidol     Tablet 5 milliGRAM(s) Oral every 6 hours PRN agitation  haloperidol    Injectable 5 milliGRAM(s) IntraMuscular once PRN aggression  haloperidol    Injectable 5 milliGRAM(s) IntraMuscular once PRN aggression  LORazepam     Tablet 2 milliGRAM(s) Oral every 6 hours PRN Anxiety  LORazepam   Injectable 2 milliGRAM(s) IntraMuscular once PRN severe agitation  LORazepam   Injectable 2 milliGRAM(s) IntraMuscular once PRN severe agitation  magnesium hydroxide Suspension 30 milliLiter(s) Oral daily PRN Constipation  traZODone 50 milliGRAM(s) Oral at bedtime PRN insomnia

## 2024-02-18 NOTE — BH INPATIENT PSYCHIATRY PROGRESS NOTE - NSBHFUPINTERVALHXFT_PSY_A_CORE
Chart reviewed. Case d/w interdisciplinary team. Patient will be tested today for covid exposure, due to roommate testing positive.  Patient is reported " I feel like I have a cold" reports symptoms of : stuffy nose, congestion, cough, sneezing, and slight body ache.  VSS 98.0, 134/77, 99

## 2024-02-18 NOTE — BH INPATIENT PSYCHIATRY PROGRESS NOTE - CURRENT MEDICATION
MEDICATIONS  (STANDING):  OLANZapine 10 milliGRAM(s) Oral at bedtime  risperiDONE   Tablet 2 milliGRAM(s) Oral daily    MEDICATIONS  (PRN):  acetaminophen     Tablet .. 650 milliGRAM(s) Oral every 6 hours PRN Temp greater or equal to 38C (100.4F), Mild Pain (1 - 3)  aluminum hydroxide/magnesium hydroxide/simethicone Suspension 30 milliLiter(s) Oral every 6 hours PRN Dyspepsia  diphenhydrAMINE 50 milliGRAM(s) Oral every 6 hours PRN Extrapyramidal symptoms or prophylaxis  diphenhydrAMINE Injectable 50 milliGRAM(s) IntraMuscular once PRN Extrapyramidal prophylaxis  diphenhydrAMINE Injectable 50 milliGRAM(s) IntraMuscular once PRN Extrapyramidal prophylaxis  haloperidol     Tablet 5 milliGRAM(s) Oral every 6 hours PRN agitation  haloperidol    Injectable 5 milliGRAM(s) IntraMuscular once PRN aggression  haloperidol    Injectable 5 milliGRAM(s) IntraMuscular once PRN aggression  LORazepam     Tablet 2 milliGRAM(s) Oral every 6 hours PRN Anxiety  LORazepam   Injectable 2 milliGRAM(s) IntraMuscular once PRN severe agitation  LORazepam   Injectable 2 milliGRAM(s) IntraMuscular once PRN severe agitation  magnesium hydroxide Suspension 30 milliLiter(s) Oral daily PRN Constipation  traZODone 50 milliGRAM(s) Oral at bedtime PRN insomnia

## 2024-02-19 PROCEDURE — 99232 SBSQ HOSP IP/OBS MODERATE 35: CPT

## 2024-02-19 RX ADMIN — OLANZAPINE 10 MILLIGRAM(S): 10 TABLET ORAL at 20:27

## 2024-02-19 RX ADMIN — Medication 2 MILLIGRAM(S): at 08:39

## 2024-02-19 RX ADMIN — Medication 50 MILLIGRAM(S): at 08:39

## 2024-02-19 RX ADMIN — Medication 2 MILLIGRAM(S): at 08:40

## 2024-02-19 RX ADMIN — HALOPERIDOL 5 MILLIGRAM(S): 10 TABLET ORAL at 08:39

## 2024-02-19 NOTE — BH INPATIENT PSYCHIATRY PROGRESS NOTE - MSE UNSTRUCTURED FT
LENA/BEH: Adult female in no acute distress; dressed in hospital clothes; friendly and cooperative, poorly related, intense eye contact, intrusive with poor boundaries. Adequate hygiene/grooming. At times can suddenly decompensate often when overstimulated- yelling, spitting, throwing objects.   SPEECH: +impaired articulation, at times loud volume.   MOTOR: No PMR/PMA; no other abnormal movements.   MOOD: does not provide mood.   AFFECT: Friendly, constricted.   THOUGHT PROCESS: [place], preoccupied with housing and discharge, accessing "diet foods."   THOUGHT CONTENT: Denies SI or HI; no evidence of delusions, TC notable for discharge focus, desire for housing.   PERCEPTIONS: Describes hearing the voices of her imaginary friends since she was a child. Does not appear to be true AH. Denies VH.   COGNITION: Grossly intact. Estimated Intelligence: Below average based on h/o IDD and presentation.   INSIGHT: Poor.  JUDGMENT: Limited.  IMPULSE CONTROL: Intact right now on unit, tenuous.

## 2024-02-19 NOTE — BH INPATIENT PSYCHIATRY PROGRESS NOTE - NSBHCHARTREVIEWVS_PSY_A_CORE FT
Vital Signs Last 24 Hrs  T(C): 36.4 (02-19-24 @ 08:04), Max: 36.4 (02-19-24 @ 08:04)  T(F): 97.5 (02-19-24 @ 08:04), Max: 97.5 (02-19-24 @ 08:04)  HR: --  BP: --  BP(mean): --  RR: --  SpO2: --    Orthostatic VS  02-19-24 @ 08:04  Lying BP: --/-- HR: --  Sitting BP: 104/67 HR: 111  Standing BP: 113/63 HR: 118  Site: --  Mode: --  Orthostatic VS  02-18-24 @ 07:56  Lying BP: --/-- HR: --  Sitting BP: 134/77 HR: 99  Standing BP: 127/79 HR: 105  Site: --  Mode: --

## 2024-02-19 NOTE — BH INPATIENT PSYCHIATRY PROGRESS NOTE - CURRENT MEDICATION
MEDICATIONS  (STANDING):  OLANZapine 10 milliGRAM(s) Oral at bedtime  risperiDONE   Tablet 2 milliGRAM(s) Oral daily    MEDICATIONS  (PRN):  acetaminophen     Tablet .. 650 milliGRAM(s) Oral every 6 hours PRN Temp greater or equal to 38C (100.4F), Mild Pain (1 - 3)  aluminum hydroxide/magnesium hydroxide/simethicone Suspension 30 milliLiter(s) Oral every 6 hours PRN Dyspepsia  benzocaine/menthol Lozenge 1 Lozenge Oral every 2 hours PRN Sore throat  diphenhydrAMINE 50 milliGRAM(s) Oral every 6 hours PRN Extrapyramidal symptoms or prophylaxis  diphenhydrAMINE Injectable 50 milliGRAM(s) IntraMuscular once PRN Extrapyramidal prophylaxis  diphenhydrAMINE Injectable 50 milliGRAM(s) IntraMuscular once PRN Extrapyramidal prophylaxis  haloperidol     Tablet 5 milliGRAM(s) Oral every 6 hours PRN agitation  haloperidol    Injectable 5 milliGRAM(s) IntraMuscular once PRN aggression  haloperidol    Injectable 5 milliGRAM(s) IntraMuscular once PRN aggression  LORazepam     Tablet 2 milliGRAM(s) Oral every 6 hours PRN Anxiety  LORazepam   Injectable 2 milliGRAM(s) IntraMuscular once PRN severe agitation  LORazepam   Injectable 2 milliGRAM(s) IntraMuscular once PRN severe agitation  magnesium hydroxide Suspension 30 milliLiter(s) Oral daily PRN Constipation  sodium chloride 0.65% Nasal 1 Spray(s) Both Nostrils three times a day PRN Nasal Congestion  traZODone 50 milliGRAM(s) Oral at bedtime PRN insomnia

## 2024-02-19 NOTE — BH INPATIENT PSYCHIATRY PROGRESS NOTE - PRN MEDS
MEDICATIONS  (PRN):  acetaminophen     Tablet .. 650 milliGRAM(s) Oral every 6 hours PRN Temp greater or equal to 38C (100.4F), Mild Pain (1 - 3)  aluminum hydroxide/magnesium hydroxide/simethicone Suspension 30 milliLiter(s) Oral every 6 hours PRN Dyspepsia  benzocaine/menthol Lozenge 1 Lozenge Oral every 2 hours PRN Sore throat  diphenhydrAMINE 50 milliGRAM(s) Oral every 6 hours PRN Extrapyramidal symptoms or prophylaxis  diphenhydrAMINE Injectable 50 milliGRAM(s) IntraMuscular once PRN Extrapyramidal prophylaxis  diphenhydrAMINE Injectable 50 milliGRAM(s) IntraMuscular once PRN Extrapyramidal prophylaxis  haloperidol     Tablet 5 milliGRAM(s) Oral every 6 hours PRN agitation  haloperidol    Injectable 5 milliGRAM(s) IntraMuscular once PRN aggression  haloperidol    Injectable 5 milliGRAM(s) IntraMuscular once PRN aggression  LORazepam     Tablet 2 milliGRAM(s) Oral every 6 hours PRN Anxiety  LORazepam   Injectable 2 milliGRAM(s) IntraMuscular once PRN severe agitation  LORazepam   Injectable 2 milliGRAM(s) IntraMuscular once PRN severe agitation  magnesium hydroxide Suspension 30 milliLiter(s) Oral daily PRN Constipation  sodium chloride 0.65% Nasal 1 Spray(s) Both Nostrils three times a day PRN Nasal Congestion  traZODone 50 milliGRAM(s) Oral at bedtime PRN insomnia
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
## 2024-02-19 NOTE — BH INPATIENT PSYCHIATRY PROGRESS NOTE - NSBHMETABOLIC_PSY_ALL_CORE_FT
BMI: BMI (kg/m2): 23.5 (02-10-24 @ 02:59)  HbA1c: A1C with Estimated Average Glucose Result: 6.1 % (01-14-24 @ 04:30)    Glucose: POCT Blood Glucose.: 115 mg/dL (01-04-24 @ 00:46)    BP: --Vital Signs Last 24 Hrs  T(C): 36.4 (02-19-24 @ 08:04), Max: 36.4 (02-19-24 @ 08:04)  T(F): 97.5 (02-19-24 @ 08:04), Max: 97.5 (02-19-24 @ 08:04)  HR: --  BP: --  BP(mean): --  RR: --  SpO2: --    Orthostatic VS  02-19-24 @ 08:04  Lying BP: --/-- HR: --  Sitting BP: 104/67 HR: 111  Standing BP: 113/63 HR: 118  Site: --  Mode: --  Orthostatic VS  02-18-24 @ 07:56  Lying BP: --/-- HR: --  Sitting BP: 134/77 HR: 99  Standing BP: 127/79 HR: 105  Site: --  Mode: --    Lipid Panel: Date/Time: 01-14-24 @ 04:30  Cholesterol, Serum: 130  LDL Cholesterol Calculated: 61  HDL Cholesterol, Serum: 53  Total Cholesterol/HDL Ration Measurement: --  Triglycerides, Serum: 79

## 2024-02-19 NOTE — BH INPATIENT PSYCHIATRY PROGRESS NOTE - NSBHFUPINTERVALHXFT_PSY_A_CORE
Chart reviewed. Case d/w interdisciplinary team. Patient covid test resulted positive, (likely exposure  due to roommate testing positive).  Patient is reported " I feel sick" Pt continued to report symptoms of : stuffy nose, congestion, cough, sneezing, and body ache.  VSS 97.5, 104/67, 111. Patient is encouraged to wear facial mask Chart reviewed. Case d/w nursing team. Patient covid test resulted positive, (likely exposure  due to roommate testing positive).  Patient reported " I feel sick" Pt reports continued  symptoms of : stuffy nose, congestion, cough, sneezing, and body ache.  VSS 97.5, 104/67, 111. Patient is encouraged to wear facial mask

## 2024-02-20 PROCEDURE — 99232 SBSQ HOSP IP/OBS MODERATE 35: CPT

## 2024-02-20 RX ORDER — RISPERIDONE 4 MG
2 TABLET ORAL
Refills: 0 | Status: DISCONTINUED | OUTPATIENT
Start: 2024-02-20 | End: 2024-04-09

## 2024-02-20 RX ORDER — LORAZEPAM 4 MG/ML
2 VIAL (ML) INJECTION ONCE
Refills: 0 | Status: DISCONTINUED | OUTPATIENT
Start: 2024-02-20 | End: 2024-02-27

## 2024-02-20 RX ORDER — LORAZEPAM 4 MG/ML
2 VIAL (ML) INJECTION EVERY 6 HOURS
Refills: 0 | Status: DISCONTINUED | OUTPATIENT
Start: 2024-02-20 | End: 2024-02-27

## 2024-02-20 RX ADMIN — Medication 2 MILLIGRAM(S): at 17:49

## 2024-02-20 RX ADMIN — Medication 2 MILLIGRAM(S): at 17:57

## 2024-02-20 RX ADMIN — Medication 50 MILLIGRAM(S): at 17:48

## 2024-02-20 RX ADMIN — Medication 2 MILLIGRAM(S): at 09:25

## 2024-02-20 RX ADMIN — HALOPERIDOL 5 MILLIGRAM(S): 10 TABLET ORAL at 17:54

## 2024-02-20 NOTE — BH INPATIENT PSYCHIATRY PROGRESS NOTE - NSBHMETABOLIC_PSY_ALL_CORE_FT
BMI: BMI (kg/m2): 23.5 (02-10-24 @ 02:59)  HbA1c: A1C with Estimated Average Glucose Result: 6.1 % (01-14-24 @ 04:30)    Glucose: POCT Blood Glucose.: 115 mg/dL (01-04-24 @ 00:46)    BP: --Vital Signs Last 24 Hrs  T(C): 36.7 (02-20-24 @ 07:38), Max: 36.7 (02-20-24 @ 07:38)  T(F): 98 (02-20-24 @ 07:38), Max: 98 (02-20-24 @ 07:38)  HR: --  BP: --  BP(mean): --  RR: --  SpO2: --    Orthostatic VS  02-20-24 @ 07:38  Lying BP: --/-- HR: --  Sitting BP: 110/63 HR: 91  Standing BP: 99/69 HR: 100  Site: --  Mode: --  Orthostatic VS  02-19-24 @ 08:04  Lying BP: --/-- HR: --  Sitting BP: 104/67 HR: 111  Standing BP: 113/63 HR: 118  Site: --  Mode: --    Lipid Panel: Date/Time: 01-14-24 @ 04:30  Cholesterol, Serum: 130  LDL Cholesterol Calculated: 61  HDL Cholesterol, Serum: 53  Total Cholesterol/HDL Ration Measurement: --  Triglycerides, Serum: 79

## 2024-02-20 NOTE — BH INPATIENT PSYCHIATRY PROGRESS NOTE - NSBHCHARTREVIEWVS_PSY_A_CORE FT
Vital Signs Last 24 Hrs  T(C): 36.7 (02-20-24 @ 07:38), Max: 36.7 (02-20-24 @ 07:38)  T(F): 98 (02-20-24 @ 07:38), Max: 98 (02-20-24 @ 07:38)  HR: --  BP: --  BP(mean): --  RR: --  SpO2: --    Orthostatic VS  02-20-24 @ 07:38  Lying BP: --/-- HR: --  Sitting BP: 110/63 HR: 91  Standing BP: 99/69 HR: 100  Site: --  Mode: --  Orthostatic VS  02-19-24 @ 08:04  Lying BP: --/-- HR: --  Sitting BP: 104/67 HR: 111  Standing BP: 113/63 HR: 118  Site: --  Mode: --
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
## 2024-02-20 NOTE — BH INPATIENT PSYCHIATRY PROGRESS NOTE - MSE UNSTRUCTURED FT
Appearance: Dressed appropriately.    Behavior: Cooperative.    Motor: No abnormal movements, no psychomotor slowing or activation.  Speech: Regular rate.  Mood: Does not give a mood.  Affect: Neutral.  Thought Process: [place], repetitive.   Associations: Fair.  Thought Content: [place]  No AVH, SIIP, HIIP.  Insight: Limited.  Judgment: Fair on interview.  Attention: Fair.  Language: Fluent.  Gait/station: seated.

## 2024-02-21 PROCEDURE — 99232 SBSQ HOSP IP/OBS MODERATE 35: CPT

## 2024-02-21 RX ADMIN — Medication 50 MILLIGRAM(S): at 15:44

## 2024-02-21 RX ADMIN — Medication 2 MILLIGRAM(S): at 15:44

## 2024-02-21 RX ADMIN — Medication 2 MILLIGRAM(S): at 20:20

## 2024-02-21 RX ADMIN — Medication 50 MILLIGRAM(S): at 09:18

## 2024-02-21 RX ADMIN — Medication 2 MILLIGRAM(S): at 09:18

## 2024-02-21 RX ADMIN — HALOPERIDOL 5 MILLIGRAM(S): 10 TABLET ORAL at 15:43

## 2024-02-21 NOTE — BH INPATIENT PSYCHIATRY PROGRESS NOTE - CURRENT MEDICATION
MEDICATIONS  (STANDING):  risperiDONE   Tablet 2 milliGRAM(s) Oral two times a day    MEDICATIONS  (PRN):  acetaminophen     Tablet .. 650 milliGRAM(s) Oral every 6 hours PRN Temp greater or equal to 38C (100.4F), Mild Pain (1 - 3)  aluminum hydroxide/magnesium hydroxide/simethicone Suspension 30 milliLiter(s) Oral every 6 hours PRN Dyspepsia  benzocaine/menthol Lozenge 1 Lozenge Oral every 2 hours PRN Sore throat  diphenhydrAMINE 50 milliGRAM(s) Oral every 6 hours PRN Extrapyramidal symptoms or prophylaxis  diphenhydrAMINE Injectable 50 milliGRAM(s) IntraMuscular once PRN Extrapyramidal prophylaxis  haloperidol     Tablet 5 milliGRAM(s) Oral every 6 hours PRN agitation  haloperidol    Injectable 5 milliGRAM(s) IntraMuscular once PRN aggression  LORazepam     Tablet 2 milliGRAM(s) Oral every 6 hours PRN Anxiety  LORazepam   Injectable 2 milliGRAM(s) IntraMuscular once PRN severe agitation  magnesium hydroxide Suspension 30 milliLiter(s) Oral daily PRN Constipation  sodium chloride 0.65% Nasal 1 Spray(s) Both Nostrils three times a day PRN Nasal Congestion  traZODone 50 milliGRAM(s) Oral at bedtime PRN insomnia

## 2024-02-21 NOTE — BH INPATIENT PSYCHIATRY PROGRESS NOTE - PRN MEDS
MEDICATIONS  (PRN):  acetaminophen     Tablet .. 650 milliGRAM(s) Oral every 6 hours PRN Temp greater or equal to 38C (100.4F), Mild Pain (1 - 3)  aluminum hydroxide/magnesium hydroxide/simethicone Suspension 30 milliLiter(s) Oral every 6 hours PRN Dyspepsia  benzocaine/menthol Lozenge 1 Lozenge Oral every 2 hours PRN Sore throat  diphenhydrAMINE 50 milliGRAM(s) Oral every 6 hours PRN Extrapyramidal symptoms or prophylaxis  diphenhydrAMINE Injectable 50 milliGRAM(s) IntraMuscular once PRN Extrapyramidal prophylaxis  haloperidol     Tablet 5 milliGRAM(s) Oral every 6 hours PRN agitation  haloperidol    Injectable 5 milliGRAM(s) IntraMuscular once PRN aggression  LORazepam     Tablet 2 milliGRAM(s) Oral every 6 hours PRN Anxiety  LORazepam   Injectable 2 milliGRAM(s) IntraMuscular once PRN severe agitation  magnesium hydroxide Suspension 30 milliLiter(s) Oral daily PRN Constipation  sodium chloride 0.65% Nasal 1 Spray(s) Both Nostrils three times a day PRN Nasal Congestion  traZODone 50 milliGRAM(s) Oral at bedtime PRN insomnia

## 2024-02-21 NOTE — BH INPATIENT PSYCHIATRY PROGRESS NOTE - NSBHFUPINTERVALHXFT_PSY_A_CORE
Patient ID: Sterling Mireles is a 20 y.o. male.    Chief Complaint: Medication Refill    HPI      Patient here for refill of the medicines used to treat attention deficit disorder. Doing well on the medicines and does not want to change. No significant weight changes, tachycardia or agitation.        Review of Symptoms    Constitutional  Neg activity change, chills fever   Resp  Neg hemoptysis, stridor, choking  CVS  Neg chest pain, palpitations    Physical Exam    Constitutional:   Oriented to person, place, and time.appears well-developed and well-nourished.   No distress.     HENT  Head: Normocephalic and atraumatic  Right Ear: External ear normal.   Left Ear: External ear normal.   Nose: External nose normal.   Mouth: Moist Mucus Membranes  Eyes:   Conjunctivae are normal. Right eye exhibits no discharge. Left eye exhibits no discharge. No scleral icterus. No periorbital edema    Cardiovascular:  Regular rate and rhythm with normal S1 and S2     Pulmonary/Chest:   Clear to auscultation bilaterally without wheezes, rhonchi or rales    Musculoskeletal:  No edema. No obvious deformity No wasting   Neurological:   alert and oriented to person, place, and time. Coordination normal.     Skin:   Skin is warm and dry. No rash noted.  No diaphoresis.     Psychiatric: Normal mood and affect. Behavior is normal. Judgment and thought   content normal.       Assessment / Plan:        ICD-10-CM ICD-9-CM   1. Attention deficit disorder, unspecified hyperactivity presence F98.8 314.00     Attention deficit disorder, unspecified hyperactivity presence    Other orders  -     dextroamphetamine-amphetamine (ADDERALL XR) 20 MG 24 hr capsule; Take 1 capsule (20 mg total) by mouth every morning.  Dispense: 30 capsule; Refill: 0  -     dextroamphetamine-amphetamine 10 mg Tab; Take 1 tablet (10 mg total) by mouth once daily.  Dispense: 30 tablet; Refill: 0          Tan Arthur MD   Pt today states she is leaving Sunday and [place].  Counts down days.

## 2024-02-21 NOTE — BH INPATIENT PSYCHIATRY PROGRESS NOTE - MSE UNSTRUCTURED FT
Appearance: Dressed appropriately.    Behavior: Cooperative.    Motor: No abnormal movements, no psychomotor slowing or activation.  Speech: Regular rate.  Mood: Does not give a mood.  Affect: Neutral.  Thought Process: Repetitive.   Associations: Fair.  Thought Content: [place].  No AVH, SIIP, HIIP.  Insight: Limited.  Judgment: Fair on interview.  Attention: Fair.  Language: Fluent.  Gait/station: seated.
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
## 2024-02-21 NOTE — BH INPATIENT PSYCHIATRY PROGRESS NOTE - NSBHCHARTREVIEWVS_PSY_A_CORE FT
Vital Signs Last 24 Hrs  T(C): 36.6 (02-21-24 @ 08:32), Max: 36.6 (02-21-24 @ 08:32)  T(F): 97.9 (02-21-24 @ 08:32), Max: 97.9 (02-21-24 @ 08:32)  HR: --  BP: --  BP(mean): --  RR: --  SpO2: --    Orthostatic VS  02-21-24 @ 08:32  Lying BP: --/-- HR: --  Sitting BP: 122/74 HR: 99  Standing BP: 114/69 HR: 103  Site: --  Mode: --  Orthostatic VS  02-20-24 @ 07:38  Lying BP: --/-- HR: --  Sitting BP: 110/63 HR: 91  Standing BP: 99/69 HR: 100  Site: --  Mode: --

## 2024-02-21 NOTE — BH INPATIENT PSYCHIATRY PROGRESS NOTE - NSBHMETABOLIC_PSY_ALL_CORE_FT
BMI: BMI (kg/m2): 23.5 (02-10-24 @ 02:59)  HbA1c: A1C with Estimated Average Glucose Result: 6.1 % (01-14-24 @ 04:30)    Glucose: POCT Blood Glucose.: 115 mg/dL (01-04-24 @ 00:46)    BP: --Vital Signs Last 24 Hrs  T(C): 36.6 (02-21-24 @ 08:32), Max: 36.6 (02-21-24 @ 08:32)  T(F): 97.9 (02-21-24 @ 08:32), Max: 97.9 (02-21-24 @ 08:32)  HR: --  BP: --  BP(mean): --  RR: --  SpO2: --    Orthostatic VS  02-21-24 @ 08:32  Lying BP: --/-- HR: --  Sitting BP: 122/74 HR: 99  Standing BP: 114/69 HR: 103  Site: --  Mode: --  Orthostatic VS  02-20-24 @ 07:38  Lying BP: --/-- HR: --  Sitting BP: 110/63 HR: 91  Standing BP: 99/69 HR: 100  Site: --  Mode: --    Lipid Panel: Date/Time: 01-14-24 @ 04:30  Cholesterol, Serum: 130  LDL Cholesterol Calculated: 61  HDL Cholesterol, Serum: 53  Total Cholesterol/HDL Ration Measurement: --  Triglycerides, Serum: 79

## 2024-02-22 PROCEDURE — 99232 SBSQ HOSP IP/OBS MODERATE 35: CPT

## 2024-02-22 RX ADMIN — Medication 2 MILLIGRAM(S): at 08:38

## 2024-02-22 RX ADMIN — Medication 50 MILLIGRAM(S): at 08:38

## 2024-02-22 RX ADMIN — HALOPERIDOL 5 MILLIGRAM(S): 10 TABLET ORAL at 08:39

## 2024-02-22 RX ADMIN — Medication 2 MILLIGRAM(S): at 08:39

## 2024-02-22 RX ADMIN — Medication 2 MILLIGRAM(S): at 20:13
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
## 2024-02-22 NOTE — BH INPATIENT PSYCHIATRY PROGRESS NOTE - NSBHMETABOLIC_PSY_ALL_CORE_FT
BMI: BMI (kg/m2): 23.5 (02-10-24 @ 02:59)  HbA1c: A1C with Estimated Average Glucose Result: 6.1 % (01-14-24 @ 04:30)    Glucose: POCT Blood Glucose.: 115 mg/dL (01-04-24 @ 00:46)    BP: --Vital Signs Last 24 Hrs  T(C): 36.8 (02-22-24 @ 08:06), Max: 36.8 (02-22-24 @ 08:06)  T(F): 98.3 (02-22-24 @ 08:06), Max: 98.3 (02-22-24 @ 08:06)  HR: --  BP: --  BP(mean): --  RR: --  SpO2: --    Orthostatic VS  02-22-24 @ 08:06  Lying BP: --/-- HR: --  Sitting BP: 119/69 HR: 87  Standing BP: 109/65 HR: 96  Site: --  Mode: --  Orthostatic VS  02-21-24 @ 08:32  Lying BP: --/-- HR: --  Sitting BP: 122/74 HR: 99  Standing BP: 114/69 HR: 103  Site: --  Mode: --    Lipid Panel: Date/Time: 01-14-24 @ 04:30  Cholesterol, Serum: 130  LDL Cholesterol Calculated: 61  HDL Cholesterol, Serum: 53  Total Cholesterol/HDL Ration Measurement: --  Triglycerides, Serum: 79

## 2024-02-22 NOTE — BH INPATIENT PSYCHIATRY PROGRESS NOTE - MSE UNSTRUCTURED FT
Appearance: Dressed appropriately.    Behavior: Cooperative.    Motor: No abnormal movements, no psychomotor slowing or activation.  Speech: Regular rate.  Mood: Does not give a mood.  Affect: Pleasant.  Thought Process: Repetitive.   Associations: Fair.  Thought Content[place]  No AVH, SIIP, HIIP.  Insight: Limited.  Judgment: Fair on interview.  Attention: Fair.  Language: Fluent.  Gait: intact.
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
## 2024-02-22 NOTE — BH INPATIENT PSYCHIATRY PROGRESS NOTE - NSBHCHARTREVIEWVS_PSY_A_CORE FT
Vital Signs Last 24 Hrs  T(C): 36.8 (02-22-24 @ 08:06), Max: 36.8 (02-22-24 @ 08:06)  T(F): 98.3 (02-22-24 @ 08:06), Max: 98.3 (02-22-24 @ 08:06)  HR: --  BP: --  BP(mean): --  RR: --  SpO2: --    Orthostatic VS  02-22-24 @ 08:06  Lying BP: --/-- HR: --  Sitting BP: 119/69 HR: 87  Standing BP: 109/65 HR: 96  Site: --  Mode: --  Orthostatic VS  02-21-24 @ 08:32  Lying BP: --/-- HR: --  Sitting BP: 122/74 HR: 99  Standing BP: 114/69 HR: 103  Site: --  Mode: --

## 2024-02-23 PROCEDURE — 99232 SBSQ HOSP IP/OBS MODERATE 35: CPT

## 2024-02-23 RX ADMIN — Medication 2 MILLIGRAM(S): at 08:41

## 2024-02-23 RX ADMIN — Medication 50 MILLIGRAM(S): at 08:41

## 2024-02-23 RX ADMIN — HALOPERIDOL 5 MILLIGRAM(S): 10 TABLET ORAL at 20:54

## 2024-02-23 RX ADMIN — HALOPERIDOL 5 MILLIGRAM(S): 10 TABLET ORAL at 08:42

## 2024-02-23 RX ADMIN — Medication 2 MILLIGRAM(S): at 20:54

## 2024-02-23 RX ADMIN — Medication 50 MILLIGRAM(S): at 20:54

## 2024-02-23 NOTE — BH INPATIENT PSYCHIATRY PROGRESS NOTE - NSBHMETABOLIC_PSY_ALL_CORE_FT
BMI: BMI (kg/m2): 23.5 (02-10-24 @ 02:59)  HbA1c: A1C with Estimated Average Glucose Result: 6.1 % (01-14-24 @ 04:30)    Glucose: POCT Blood Glucose.: 115 mg/dL (01-04-24 @ 00:46)    BP: --Vital Signs Last 24 Hrs  T(C): 36.7 (02-23-24 @ 06:37), Max: 36.7 (02-23-24 @ 06:37)  T(F): 98 (02-23-24 @ 06:37), Max: 98 (02-23-24 @ 06:37)  HR: --  BP: --  BP(mean): --  RR: 17 (02-23-24 @ 06:37) (17 - 17)  SpO2: 98% (02-23-24 @ 06:37) (98% - 98%)    Orthostatic VS  02-23-24 @ 06:37  Lying BP: --/-- HR: --  Sitting BP: 112/68 HR: 99  Standing BP: 117/75 HR: 96  Site: --  Mode: --  Orthostatic VS  02-22-24 @ 08:06  Lying BP: --/-- HR: --  Sitting BP: 119/69 HR: 87  Standing BP: 109/65 HR: 96  Site: --  Mode: --    Lipid Panel: Date/Time: 01-14-24 @ 04:30  Cholesterol, Serum: 130  LDL Cholesterol Calculated: 61  HDL Cholesterol, Serum: 53  Total Cholesterol/HDL Ration Measurement: --  Triglycerides, Serum: 79
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
## 2024-02-23 NOTE — BH INPATIENT PSYCHIATRY PROGRESS NOTE - NSBHFUPINTERVALHXFT_PSY_A_CORE
As per staff report, pt has been in good behavioral control.  Pt today found doing crossword puzzle in her room.  She states she is doing well and has no needs/complaints at this time.

## 2024-02-24 RX ADMIN — Medication 50 MILLIGRAM(S): at 08:36

## 2024-02-24 RX ADMIN — Medication 2 MILLIGRAM(S): at 08:36

## 2024-02-24 RX ADMIN — Medication 2 MILLIGRAM(S): at 20:49

## 2024-02-25 LAB
B PERT DNA SPEC QL NAA+PROBE: SIGNIFICANT CHANGE UP
B PERT+PARAPERT DNA PNL SPEC NAA+PROBE: SIGNIFICANT CHANGE UP
C PNEUM DNA SPEC QL NAA+PROBE: SIGNIFICANT CHANGE UP
FLUAV SUBTYP SPEC NAA+PROBE: SIGNIFICANT CHANGE UP
FLUBV RNA SPEC QL NAA+PROBE: SIGNIFICANT CHANGE UP
HADV DNA SPEC QL NAA+PROBE: SIGNIFICANT CHANGE UP
HCOV 229E RNA SPEC QL NAA+PROBE: SIGNIFICANT CHANGE UP
HCOV HKU1 RNA SPEC QL NAA+PROBE: SIGNIFICANT CHANGE UP
HCOV NL63 RNA SPEC QL NAA+PROBE: SIGNIFICANT CHANGE UP
HCOV OC43 RNA SPEC QL NAA+PROBE: SIGNIFICANT CHANGE UP
HMPV RNA SPEC QL NAA+PROBE: SIGNIFICANT CHANGE UP
HPIV1 RNA SPEC QL NAA+PROBE: SIGNIFICANT CHANGE UP
HPIV2 RNA SPEC QL NAA+PROBE: SIGNIFICANT CHANGE UP
HPIV3 RNA SPEC QL NAA+PROBE: SIGNIFICANT CHANGE UP
HPIV4 RNA SPEC QL NAA+PROBE: SIGNIFICANT CHANGE UP
M PNEUMO DNA SPEC QL NAA+PROBE: SIGNIFICANT CHANGE UP
RAPID RVP RESULT: SIGNIFICANT CHANGE UP
RSV RNA SPEC QL NAA+PROBE: SIGNIFICANT CHANGE UP
RV+EV RNA SPEC QL NAA+PROBE: SIGNIFICANT CHANGE UP
SARS-COV-2 RNA SPEC QL NAA+PROBE: SIGNIFICANT CHANGE UP

## 2024-02-25 RX ADMIN — Medication 2 MILLIGRAM(S): at 21:00

## 2024-02-25 RX ADMIN — Medication 2 MILLIGRAM(S): at 09:39

## 2024-02-25 RX ADMIN — Medication 2 MILLIGRAM(S): at 09:40

## 2024-02-25 RX ADMIN — HALOPERIDOL 5 MILLIGRAM(S): 10 TABLET ORAL at 09:39

## 2024-02-26 LAB — SARS-COV-2 RNA SPEC QL NAA+PROBE: SIGNIFICANT CHANGE UP

## 2024-02-26 PROCEDURE — 90832 PSYTX W PT 30 MINUTES: CPT

## 2024-02-26 PROCEDURE — 99232 SBSQ HOSP IP/OBS MODERATE 35: CPT | Mod: GC

## 2024-02-26 RX ADMIN — Medication 2 MILLIGRAM(S): at 20:39

## 2024-02-26 RX ADMIN — Medication 2 MILLIGRAM(S): at 08:18

## 2024-02-26 NOTE — BH INPATIENT PSYCHIATRY PROGRESS NOTE - NSBHATTESTCOMMENTATTENDFT_PSY_A_CORE
Pt continues to be in much improved behavioral control.  No observable signs of active mood episode or psychosis.  Continue meds.  Dispo planning.
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
## 2024-02-26 NOTE — BH INPATIENT PSYCHIATRY PROGRESS NOTE - NSBHCHARTREVIEWLAB_PSY_A_CORE FT
02-08    135  |  98  |  11.8  ----------------------------<  92  3.7   |  25.0  |  0.65    Ca    9.0      08 Feb 2024 21:15    TPro  7.8  /  Alb  4.1  /  TBili  <0.2<L>  /  DBili  x   /  AST  24  /  ALT  14  /  AlkPhos  68  02-08   2/17 COVID +; 2/25 COVID neg  02-08    135  |  98  |  11.8  ----------------------------<  92  3.7   |  25.0  |  0.65    Ca    9.0      08 Feb 2024 21:15    TPro  7.8  /  Alb  4.1  /  TBili  <0.2<L>  /  DBili  x   /  AST  24  /  ALT  14  /  AlkPhos  68  02-08

## 2024-02-26 NOTE — BH INPATIENT PSYCHIATRY PROGRESS NOTE - MSE UNSTRUCTURED FT
Appearance: Dressed appropriately.    Behavior: Cooperative.    Motor: No abnormal movements, no psychomotor slowing or activation.  Speech: Regular rate.  Mood: Good  Affect: Pleasant.  Thought Process: [place] but linear.   Associations: Fair.  Thought Content: No AVH, SIIP, HIIP.  Insight: Limited.  Judgment: Fair on interview.  Attention: Fair.  Language: Fluent.  Gait/station: seated.      Appearance: Dressed appropriately.    Behavior: Cooperative. Friendly. Poor boundaries.   Motor: No abnormal movements, no psychomotor slowing or activation.  Speech: Regular rate.  Mood: Good  Affect: Pleasant.  Thought Process: [place] but linear.   Associations: Fair.  Thought Content: No AVH, SIIP, HIIP. Less preservative regarding discharge.   Insight: Limited.  Judgment: Fair on interview.  Attention: Fair.  Language: Fluent.  Gait/station: seated.

## 2024-02-26 NOTE — BH INPATIENT PSYCHIATRY PROGRESS NOTE - NSBHFUPINTERVALHXFT_PSY_A_CORE
As per staff report, pt has been in good behavioral control.  Pt today found doing crossword puzzle in her room.  She states she is doing well and has no needs/complaints at this time. Chart reviewed. Case d/w interdisciplinary team. Patient seen and examined. No acute weekend events. Requested PO PRN H/A/B 1-2 times per day over the weekend for restlessness i/s/o overstimulation. Per sleep log, fair sleep overnight. Compliant with standing meds, now on Risperdal 2mg BID with good effect. COVID neg on 2/25, asymptomatic.     Today, pt found in the dayroom. She denies URI symptoms, appears to be following hygiene protocol closely. Says she is in a good mood today. Only asked once about discharge to [facility].

## 2024-02-26 NOTE — BH INPATIENT PSYCHIATRY PROGRESS NOTE - NSBHCHARTREVIEWVS_PSY_A_CORE FT
Vital Signs Last 24 Hrs  T(C): 36.3 (02-26-24 @ 08:44), Max: 36.9 (02-25-24 @ 19:45)  T(F): 97.4 (02-26-24 @ 08:44), Max: 98.5 (02-25-24 @ 19:45)  HR: 94 (02-26-24 @ 08:44) (94 - 94)  BP: --  BP(mean): --  RR: --  SpO2: 100% (02-26-24 @ 08:44) (100% - 100%)    Orthostatic VS  02-26-24 @ 08:44  Lying BP: --/-- HR: --  Sitting BP: 100/62 HR: --  Standing BP: 112/69 HR: --  Site: --  Mode: --  Orthostatic VS  02-25-24 @ 07:33  Lying BP: --/-- HR: --  Sitting BP: 123/78 HR: 83  Standing BP: --/-- HR: --  Site: --  Mode: --

## 2024-02-26 NOTE — BH INPATIENT PSYCHIATRY PROGRESS NOTE - NSBHMETABOLIC_PSY_ALL_CORE_FT
BMI: BMI (kg/m2): 23.5 (02-10-24 @ 02:59)  HbA1c: A1C with Estimated Average Glucose Result: 6.1 % (01-14-24 @ 04:30)    Glucose: POCT Blood Glucose.: 115 mg/dL (01-04-24 @ 00:46)    BP: --Vital Signs Last 24 Hrs  T(C): 36.3 (02-26-24 @ 08:44), Max: 36.9 (02-25-24 @ 19:45)  T(F): 97.4 (02-26-24 @ 08:44), Max: 98.5 (02-25-24 @ 19:45)  HR: 94 (02-26-24 @ 08:44) (94 - 94)  BP: --  BP(mean): --  RR: --  SpO2: 100% (02-26-24 @ 08:44) (100% - 100%)    Orthostatic VS  02-26-24 @ 08:44  Lying BP: --/-- HR: --  Sitting BP: 100/62 HR: --  Standing BP: 112/69 HR: --  Site: --  Mode: --  Orthostatic VS  02-25-24 @ 07:33  Lying BP: --/-- HR: --  Sitting BP: 123/78 HR: 83  Standing BP: --/-- HR: --  Site: --  Mode: --    Lipid Panel: Date/Time: 01-14-24 @ 04:30  Cholesterol, Serum: 130  LDL Cholesterol Calculated: 61  HDL Cholesterol, Serum: 53  Total Cholesterol/HDL Ration Measurement: --  Triglycerides, Serum: 79

## 2024-02-27 PROCEDURE — 99232 SBSQ HOSP IP/OBS MODERATE 35: CPT | Mod: GC

## 2024-02-27 RX ORDER — LORAZEPAM 4 MG/ML
2 VIAL (ML) INJECTION EVERY 6 HOURS
Refills: 0 | Status: DISCONTINUED | OUTPATIENT
Start: 2024-02-27 | End: 2024-03-05

## 2024-02-27 RX ORDER — LORAZEPAM 4 MG/ML
2 VIAL (ML) INJECTION ONCE
Refills: 0 | Status: DISCONTINUED | OUTPATIENT
Start: 2024-02-27 | End: 2024-03-05

## 2024-02-27 RX ADMIN — Medication 2 MILLIGRAM(S): at 09:08

## 2024-02-27 RX ADMIN — Medication 2 MILLIGRAM(S): at 20:07
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
## 2024-02-27 NOTE — BH INPATIENT PSYCHIATRY PROGRESS NOTE - MSE UNSTRUCTURED FT
Appearance: Dressed appropriately.    Behavior: Cooperative. Friendly. Poor boundaries.   Motor: No abnormal movements, no psychomotor slowing or activation.  Speech: Regular rate.  Mood: Good  Affect: Pleasant.  Thought Process: [place] but linear.   Associations: Fair.  Thought Content: No AVH, SIIP, HIIP. Less preservative regarding discharge.   Insight: Limited.  Judgment: Fair on interview.  Attention: Fair.  Language: Fluent.  Gait/station: seated.

## 2024-02-27 NOTE — BH INPATIENT PSYCHIATRY PROGRESS NOTE - NSBHCHARTREVIEWLAB_PSY_A_CORE FT
2/17 COVID +; 2/25 and 2/26 COVID neg  02-08    135  |  98  |  11.8  ----------------------------<  92  3.7   |  25.0  |  0.65    Ca    9.0      08 Feb 2024 21:15    TPro  7.8  /  Alb  4.1  /  TBili  <0.2<L>  /  DBili  x   /  AST  24  /  ALT  14  /  AlkPhos  68  02-08

## 2024-02-27 NOTE — BH INPATIENT PSYCHIATRY PROGRESS NOTE - NSBHFUPINTERVALHXFT_PSY_A_CORE
Chart reviewed. Case d/w interdisciplinary team. Patient seen and examined. No acute overnight events. No PRNs requested/required. Per sleep log, good sleep overnight. Compliant with standing meds. COVID neg on 2/25 and 2/26.     Today, pt found in her bedroom. She is in a good mood, discussed different rewards that she has earned via her behavior plan. Briefly discussed discharge upstate but was able to quickly switch to a new topic. Endorsed a headache last night that, but denies physical sxs or medication side effects today including galactorrhea and vision changes.

## 2024-02-27 NOTE — BH INPATIENT PSYCHIATRY PROGRESS NOTE - NSBHCHARTREVIEWVS_PSY_A_CORE FT
Vital Signs Last 24 Hrs  T(C): 36.3 (02-27-24 @ 08:06), Max: 36.3 (02-27-24 @ 08:06)  T(F): 97.3 (02-27-24 @ 08:06), Max: 97.3 (02-27-24 @ 08:06)  HR: --  BP: --  BP(mean): --  RR: --  SpO2: --    Orthostatic VS  02-27-24 @ 08:06  Lying BP: 110/69 HR: 103  Sitting BP: 104/62 HR: 87  Standing BP: --/-- HR: --  Site: --  Mode: --  Orthostatic VS  02-26-24 @ 08:44  Lying BP: --/-- HR: --  Sitting BP: 100/62 HR: --  Standing BP: 112/69 HR: --  Site: --  Mode: --

## 2024-02-27 NOTE — BH INPATIENT PSYCHIATRY PROGRESS NOTE - NSBHMETABOLIC_PSY_ALL_CORE_FT
BMI: BMI (kg/m2): 23.5 (02-10-24 @ 02:59)  HbA1c: A1C with Estimated Average Glucose Result: 6.1 % (01-14-24 @ 04:30)    Glucose: POCT Blood Glucose.: 115 mg/dL (01-04-24 @ 00:46)    BP: --Vital Signs Last 24 Hrs  T(C): 36.3 (02-27-24 @ 08:06), Max: 36.3 (02-27-24 @ 08:06)  T(F): 97.3 (02-27-24 @ 08:06), Max: 97.3 (02-27-24 @ 08:06)  HR: --  BP: --  BP(mean): --  RR: --  SpO2: --    Orthostatic VS  02-27-24 @ 08:06  Lying BP: 110/69 HR: 103  Sitting BP: 104/62 HR: 87  Standing BP: --/-- HR: --  Site: --  Mode: --  Orthostatic VS  02-26-24 @ 08:44  Lying BP: --/-- HR: --  Sitting BP: 100/62 HR: --  Standing BP: 112/69 HR: --  Site: --  Mode: --    Lipid Panel: Date/Time: 01-14-24 @ 04:30  Cholesterol, Serum: 130  LDL Cholesterol Calculated: 61  HDL Cholesterol, Serum: 53  Total Cholesterol/HDL Ration Measurement: --  Triglycerides, Serum: 79

## 2024-02-28 PROCEDURE — 99232 SBSQ HOSP IP/OBS MODERATE 35: CPT | Mod: GC

## 2024-02-28 RX ADMIN — Medication 2 MILLIGRAM(S): at 20:56

## 2024-02-28 RX ADMIN — Medication 2 MILLIGRAM(S): at 09:37

## 2024-02-28 NOTE — BH INPATIENT PSYCHIATRY PROGRESS NOTE - NSBHMETABOLIC_PSY_ALL_CORE_FT
BMI: BMI (kg/m2): 23.5 (02-10-24 @ 02:59)  HbA1c: A1C with Estimated Average Glucose Result: 6.1 % (01-14-24 @ 04:30)    Glucose: POCT Blood Glucose.: 115 mg/dL (01-04-24 @ 00:46)    BP: --Vital Signs Last 24 Hrs  T(C): 36.7 (02-28-24 @ 08:04), Max: 36.7 (02-28-24 @ 08:04)  T(F): 98.1 (02-28-24 @ 08:04), Max: 98.1 (02-28-24 @ 08:04)  HR: --  BP: --  BP(mean): --  RR: --  SpO2: --    Orthostatic VS  02-28-24 @ 08:04  Lying BP: --/-- HR: --  Sitting BP: 122/70 HR: 95  Standing BP: 119/64 HR: 101  Site: --  Mode: --  Orthostatic VS  02-27-24 @ 08:06  Lying BP: 110/69 HR: 103  Sitting BP: 104/62 HR: 87  Standing BP: --/-- HR: --  Site: --  Mode: --    Lipid Panel: Date/Time: 01-14-24 @ 04:30  Cholesterol, Serum: 130  LDL Cholesterol Calculated: 61  HDL Cholesterol, Serum: 53  Total Cholesterol/HDL Ration Measurement: --  Triglycerides, Serum: 79

## 2024-02-28 NOTE — BH INPATIENT PSYCHIATRY PROGRESS NOTE - MSE UNSTRUCTURED FT
Appearance: Dressed appropriately.    Behavior: Cooperative. Friendly. Poor boundaries.   Motor: No abnormal movements, no psychomotor slowing or activation.  Speech: Regular rate.  Mood: Good  Affect: Pleasant.  Thought Process: [place] but linear.   Associations: Fair.  Thought Content: No AVH, SIIP, HIIP. Less preservative regarding discharge.   Insight: Limited.  Judgment: Fair on interview.  Attention: Fair.  Language: Fluent.  Gait/station: seated.

## 2024-02-28 NOTE — BH INPATIENT PSYCHIATRY PROGRESS NOTE - NSBHCHARTREVIEWVS_PSY_A_CORE FT
Vital Signs Last 24 Hrs  T(C): 36.7 (02-28-24 @ 08:04), Max: 36.7 (02-28-24 @ 08:04)  T(F): 98.1 (02-28-24 @ 08:04), Max: 98.1 (02-28-24 @ 08:04)  HR: --  BP: --  BP(mean): --  RR: --  SpO2: --    Orthostatic VS  02-28-24 @ 08:04  Lying BP: --/-- HR: --  Sitting BP: 122/70 HR: 95  Standing BP: 119/64 HR: 101  Site: --  Mode: --  Orthostatic VS  02-27-24 @ 08:06  Lying BP: 110/69 HR: 103  Sitting BP: 104/62 HR: 87  Standing BP: --/-- HR: --  Site: --  Mode: --
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
## 2024-02-29 PROCEDURE — 99231 SBSQ HOSP IP/OBS SF/LOW 25: CPT | Mod: GC

## 2024-02-29 RX ADMIN — HALOPERIDOL 5 MILLIGRAM(S): 10 TABLET ORAL at 19:56

## 2024-02-29 RX ADMIN — Medication 50 MILLIGRAM(S): at 19:56

## 2024-02-29 RX ADMIN — Medication 2 MILLIGRAM(S): at 20:14

## 2024-02-29 RX ADMIN — Medication 2 MILLIGRAM(S): at 19:56

## 2024-02-29 RX ADMIN — Medication 2 MILLIGRAM(S): at 08:43

## 2024-02-29 NOTE — BH INPATIENT PSYCHIATRY PROGRESS NOTE - NSBHATTESTCOMMENTATTENDFT_PSY_A_CORE
Continues to be emotionally regulated and in good behavioral control without any true mood episode or psychosis.  Continue meds.  Dispo planning.
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
## 2024-02-29 NOTE — BH INPATIENT PSYCHIATRY PROGRESS NOTE - NSBHMETABOLIC_PSY_ALL_CORE_FT
BMI: BMI (kg/m2): 23.5 (02-10-24 @ 02:59)  HbA1c: A1C with Estimated Average Glucose Result: 6.1 % (01-14-24 @ 04:30)    Glucose: POCT Blood Glucose.: 115 mg/dL (01-04-24 @ 00:46)    BP: --Vital Signs Last 24 Hrs  T(C): 36.5 (02-29-24 @ 07:48), Max: 36.5 (02-29-24 @ 07:48)  T(F): 97.7 (02-29-24 @ 07:48), Max: 97.7 (02-29-24 @ 07:48)  HR: --  BP: --  BP(mean): --  RR: --  SpO2: --    Orthostatic VS  02-29-24 @ 07:48  Lying BP: --/-- HR: --  Sitting BP: 127/86 HR: 101  Standing BP: 131/89 HR: 105  Site: --  Mode: --  Orthostatic VS  02-28-24 @ 08:04  Lying BP: --/-- HR: --  Sitting BP: 122/70 HR: 95  Standing BP: 119/64 HR: 101  Site: --  Mode: --    Lipid Panel: Date/Time: 01-14-24 @ 04:30  Cholesterol, Serum: 130  LDL Cholesterol Calculated: 61  HDL Cholesterol, Serum: 53  Total Cholesterol/HDL Ration Measurement: --  Triglycerides, Serum: 79

## 2024-02-29 NOTE — BH INPATIENT PSYCHIATRY PROGRESS NOTE - MSE UNSTRUCTURED FT
Appearance: Dressed appropriately.    Behavior: Cooperative. Friendly. Poor boundaries.   Motor: No abnormal movements, no psychomotor slowing or activation.  Speech: Regular rate.  Mood: Good  Affect: Pleasant.  Thought Process: [place] but linear.   Associations: Fair.  Thought Content: No AVH, SIIP, HIIP. Less preservative regarding discharge.   Insight: Limited.  Judgment: Fair on interview.  Attention: Fair.  Language: Fluent.  Gait/station: seated.      Appearance: Dressed appropriately.    Behavior: Cooperative. Friendly. Poor boundaries. More energetic today.   Motor: No abnormal movements, no psychomotor slowing or activation.  Speech: Regular rate. Talkative.   Mood: Good.   Affect: Pleasant.   Thought Process: [place] but linear.   Associations: Fair.  Thought Content: No AVH, SIIP, HIIP. Less preservative regarding discharge. Notable for romantic interest in male unit peer.   Insight: Limited.  Judgment: Fair on interview.  Attention: Fair.  Language: Fluent.  Gait/station: seated.

## 2024-02-29 NOTE — BH INPATIENT PSYCHIATRY PROGRESS NOTE - NSBHCHARTREVIEWVS_PSY_A_CORE FT
Vital Signs Last 24 Hrs  T(C): 36.5 (02-29-24 @ 07:48), Max: 36.5 (02-29-24 @ 07:48)  T(F): 97.7 (02-29-24 @ 07:48), Max: 97.7 (02-29-24 @ 07:48)  HR: --  BP: --  BP(mean): --  RR: --  SpO2: --    Orthostatic VS  02-29-24 @ 07:48  Lying BP: --/-- HR: --  Sitting BP: 127/86 HR: 101  Standing BP: 131/89 HR: 105  Site: --  Mode: --  Orthostatic VS  02-28-24 @ 08:04  Lying BP: --/-- HR: --  Sitting BP: 122/70 HR: 95  Standing BP: 119/64 HR: 101  Site: --  Mode: --
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
## 2024-03-01 PROCEDURE — 90853 GROUP PSYCHOTHERAPY: CPT

## 2024-03-01 PROCEDURE — 99231 SBSQ HOSP IP/OBS SF/LOW 25: CPT | Mod: GC

## 2024-03-01 RX ADMIN — Medication 2 MILLIGRAM(S): at 21:27

## 2024-03-01 RX ADMIN — Medication 50 MILLIGRAM(S): at 21:26

## 2024-03-01 RX ADMIN — HALOPERIDOL 5 MILLIGRAM(S): 10 TABLET ORAL at 21:26

## 2024-03-01 RX ADMIN — Medication 2 MILLIGRAM(S): at 09:17
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
## 2024-03-01 NOTE — BH INPATIENT PSYCHIATRY PROGRESS NOTE - NSBHMETABOLIC_PSY_ALL_CORE_FT
BMI: BMI (kg/m2): 23.5 (02-10-24 @ 02:59)  HbA1c: A1C with Estimated Average Glucose Result: 6.1 % (01-14-24 @ 04:30)    Glucose: POCT Blood Glucose.: 115 mg/dL (01-04-24 @ 00:46)    BP: --Vital Signs Last 24 Hrs  T(C): 36.3 (03-01-24 @ 08:20), Max: 36.3 (03-01-24 @ 08:20)  T(F): 97.3 (03-01-24 @ 08:20), Max: 97.3 (03-01-24 @ 08:20)  HR: --  BP: --  BP(mean): --  RR: --  SpO2: --    Orthostatic VS  03-01-24 @ 08:20  Lying BP: --/-- HR: --  Sitting BP: 104/60 HR: 83  Standing BP: 105/71 HR: 89  Site: --  Mode: --  Orthostatic VS  02-29-24 @ 07:48  Lying BP: --/-- HR: --  Sitting BP: 127/86 HR: 101  Standing BP: 131/89 HR: 105  Site: --  Mode: --    Lipid Panel: Date/Time: 01-14-24 @ 04:30  Cholesterol, Serum: 130  LDL Cholesterol Calculated: 61  HDL Cholesterol, Serum: 53  Total Cholesterol/HDL Ration Measurement: --  Triglycerides, Serum: 79

## 2024-03-01 NOTE — BH INPATIENT PSYCHIATRY PROGRESS NOTE - NSBHATTESTCOMMENTATTENDFT_PSY_A_CORE
Continues to be in good behavioral control, emotionally regulated, compliant with care.  Continue meds.  Dispo planning.

## 2024-03-01 NOTE — BH PSYCHOLOGY - GROUP THERAPY NOTE - NSBHPSYCHOLRESPCOMMENT_PSY_A_CORE FT
The patient appeared adequately groomed and was casually dressed. During the group session, pt appeared engaged in the group as evidenced by her willingness to read the worksheet and verbally communicate during the discussion. Sometimes she interrupted others but was able to be redirected. She shared experience of practicing relevant self-soothing skills appropriately. Speech was WNL. PT was oriented X3. Pt was appropriate with peers.

## 2024-03-01 NOTE — BH INPATIENT PSYCHIATRY PROGRESS NOTE - NSBHFUPINTERVALHXFT_PSY_A_CORE
Chart reviewed. Case d/w interdisciplinary team. Patient seen and examined. No acute overnight events. Required PO H/A/B last night for behavioral disturbance (yelling, agitation, saying she "hates her family"). PRNs accepted with good effect. Per sleep log, good sleep overnight. Compliant with standing meds.    Today, pt found in her bedroom eating breakfast. She described becoming upset last night and yelling, but could not provide further details. Denies psych ROS. Says she feels much better today, is looking forward to seeing psychology and psych rehab. [place] appeared satisfied when told there were no dispo plans yet. Denies physical symptoms.

## 2024-03-01 NOTE — BH INPATIENT PSYCHIATRY PROGRESS NOTE - MSE UNSTRUCTURED FT
Appearance: Dressed appropriately.    Behavior: Cooperative. Friendly. Poor boundaries. More energetic today.   Motor: No abnormal movements, no psychomotor slowing or activation.  Speech: Regular rate. Talkative.   Mood: Good.   Affect: Pleasant.   Thought Process: [place] but linear.   Associations: Fair.  Thought Content: No AVH, SIIP, HIIP. Less preservative regarding discharge. Notable for romantic interest in male unit peer.   Insight: Limited.  Judgment: Fair on interview.  Attention: Fair.  Language: Fluent.  Gait/station: seated.      Appearance: Dressed appropriately.    Behavior: Cooperative. Friendly. Poor boundaries.   Motor: No abnormal movements, no psychomotor slowing or activation.  Speech: Regular rate. Talkative.   Mood: Good.   Affect: Pleasant.   Thought Process: [place] but linear.   Associations: Fair.  Thought Content: No AVH, SIIP, HIIP. Less preservative regarding discharge. Notable for romantic interest in male unit peer.   Insight: Limited.  Judgment: Fair on interview.  Attention: Fair.  Language: Fluent.  Gait/station: seated.

## 2024-03-01 NOTE — BH PSYCHOLOGY - GROUP THERAPY NOTE - NSPSYCHOLGRPCOGPT_PSY_A_CORE FT
Patient attended Cognitive Behavioral Therapy Group incorporating ACT-based concepts. The group started with a brief check-in, asking about something they would like to tell their younger self. Afterward, the group focused on a discussion about self-soothe skills by using sensory experience (i.e., vision, hearing, smell, taste, touch, and movement). Group members were engaged in sharing their thoughts on these skills with examples. Group facilitator explained concepts, reinforced participation, and engaged patients in the discussion.

## 2024-03-01 NOTE — BH INPATIENT PSYCHIATRY PROGRESS NOTE - NSBHCHARTREVIEWVS_PSY_A_CORE FT
Vital Signs Last 24 Hrs  T(C): 36.3 (03-01-24 @ 08:20), Max: 36.3 (03-01-24 @ 08:20)  T(F): 97.3 (03-01-24 @ 08:20), Max: 97.3 (03-01-24 @ 08:20)  HR: --  BP: --  BP(mean): --  RR: --  SpO2: --    Orthostatic VS  03-01-24 @ 08:20  Lying BP: --/-- HR: --  Sitting BP: 104/60 HR: 83  Standing BP: 105/71 HR: 89  Site: --  Mode: --  Orthostatic VS  02-29-24 @ 07:48  Lying BP: --/-- HR: --  Sitting BP: 127/86 HR: 101  Standing BP: 131/89 HR: 105  Site: --  Mode: --

## 2024-03-02 RX ADMIN — Medication 2 MILLIGRAM(S): at 21:10

## 2024-03-02 RX ADMIN — Medication 2 MILLIGRAM(S): at 09:17

## 2024-03-02 RX ADMIN — Medication 50 MILLIGRAM(S): at 12:48

## 2024-03-02 RX ADMIN — HALOPERIDOL 5 MILLIGRAM(S): 10 TABLET ORAL at 12:48

## 2024-03-02 RX ADMIN — Medication 2 MILLIGRAM(S): at 12:48

## 2024-03-03 RX ADMIN — Medication 50 MILLIGRAM(S): at 20:23

## 2024-03-03 RX ADMIN — Medication 2 MILLIGRAM(S): at 20:22

## 2024-03-03 RX ADMIN — Medication 2 MILLIGRAM(S): at 08:23

## 2024-03-04 PROCEDURE — 99231 SBSQ HOSP IP/OBS SF/LOW 25: CPT | Mod: GC

## 2024-03-04 RX ADMIN — Medication 50 MILLIGRAM(S): at 21:07

## 2024-03-04 RX ADMIN — Medication 2 MILLIGRAM(S): at 21:07

## 2024-03-04 RX ADMIN — Medication 2 MILLIGRAM(S): at 08:36

## 2024-03-04 NOTE — BH INPATIENT PSYCHIATRY PROGRESS NOTE - MSE UNSTRUCTURED FT
Appearance: Dressed appropriately.    Behavior: Cooperative. Friendly. Poor boundaries.   Motor: No abnormal movements, no psychomotor slowing or activation.  Speech: Regular rate. Talkative.   Mood: Good.   Affect: Pleasant.   Thought Process: [place] but linear.   Associations: Fair.  Thought Content: No AVH, SIIP, HIIP. Less preservative regarding discharge. Notable for romantic interest in male unit peer.   Insight: Limited.  Judgment: Fair on interview.  Attention: Fair.  Language: Fluent.  Gait/station: seated.      Appearance: Dressed appropriately.    Behavior: Cooperative. Friendly. Poor boundaries.   Motor: No abnormal movements, no psychomotor slowing or activation.  Speech: Regular rate. Talkative.   Mood: Great.   Affect: Pleasant.   Thought Process: [place] but linear.   Associations: Fair.  Thought Content: No AVH, SIIP, HIIP. Perseverative regarding discharge.   Insight: Limited.  Judgment: Fair on interview.  Attention: Fair.  Language: Fluent.  Gait/station: Intact.

## 2024-03-04 NOTE — BH INPATIENT PSYCHIATRY PROGRESS NOTE - NSBHATTESTCOMMENTATTENDFT_PSY_A_CORE
Pt continues to be at likely baseline that is more consistent with intellectual disability, as pt does not present with true psychosis at this time.  Continue meds.  Dispo planning.
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
## 2024-03-04 NOTE — BH INPATIENT PSYCHIATRY PROGRESS NOTE - NSDCCRITERIA_PSY_ALL_CORE
When pt is no longer an acute or imminent risk of harm to self or others, and is able to care for self safely, pt may then be discharged.  Safe discharge.
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
## 2024-03-04 NOTE — BH INPATIENT PSYCHIATRY PROGRESS NOTE - NSBHCHARTREVIEWVS_PSY_A_CORE FT
Vital Signs Last 24 Hrs  T(C): 36.7 (03-04-24 @ 07:47), Max: 36.7 (03-04-24 @ 07:47)  T(F): 98 (03-04-24 @ 07:47), Max: 98 (03-04-24 @ 07:47)  HR: --  BP: --  BP(mean): --  RR: --  SpO2: --    Orthostatic VS  03-04-24 @ 07:47  Lying BP: --/-- HR: --  Sitting BP: 112/67 HR: 91  Standing BP: 121/79 HR: 101  Site: --  Mode: --  Orthostatic VS  03-03-24 @ 08:23  Lying BP: --/-- HR: --  Sitting BP: 140/80 HR: 109  Standing BP: 135/81 HR: 110  Site: --  Mode: --  Orthostatic VS  03-02-24 @ 08:53  Lying BP: --/-- HR: --  Sitting BP: 126/66 HR: 89  Standing BP: 112/66 HR: 102  Site: --  Mode: --   Unknown Vital Signs Last 24 Hrs  T(C): 36.7 (03-04-24 @ 07:47), Max: 36.7 (03-04-24 @ 07:47)  T(F): 98 (03-04-24 @ 07:47), Max: 98 (03-04-24 @ 07:47)  HR: --  BP: --  BP(mean): --  RR: --  SpO2: --    Orthostatic VS  03-04-24 @ 07:47  Lying BP: --/-- HR: --  Sitting BP: 112/67 HR: 91  Standing BP: 121/79 HR: 101  Site: --  Mode: --  Orthostatic VS  03-03-24 @ 08:23  Lying BP: --/-- HR: --  Sitting BP: 140/80 HR: 109  Standing BP: 135/81 HR: 110  Site: --  Mode: --

## 2024-03-04 NOTE — BH INPATIENT PSYCHIATRY PROGRESS NOTE - NSBHMETABOLIC_PSY_ALL_CORE_FT
BMI: BMI (kg/m2): 23.5 (02-10-24 @ 02:59)  HbA1c: A1C with Estimated Average Glucose Result: 6.1 % (01-14-24 @ 04:30)    Glucose: POCT Blood Glucose.: 115 mg/dL (01-04-24 @ 00:46)    BP: --Vital Signs Last 24 Hrs  T(C): 36.7 (03-04-24 @ 07:47), Max: 36.7 (03-04-24 @ 07:47)  T(F): 98 (03-04-24 @ 07:47), Max: 98 (03-04-24 @ 07:47)  HR: --  BP: --  BP(mean): --  RR: --  SpO2: --    Orthostatic VS  03-04-24 @ 07:47  Lying BP: --/-- HR: --  Sitting BP: 112/67 HR: 91  Standing BP: 121/79 HR: 101  Site: --  Mode: --  Orthostatic VS  03-03-24 @ 08:23  Lying BP: --/-- HR: --  Sitting BP: 140/80 HR: 109  Standing BP: 135/81 HR: 110  Site: --  Mode: --  Orthostatic VS  03-02-24 @ 08:53  Lying BP: --/-- HR: --  Sitting BP: 126/66 HR: 89  Standing BP: 112/66 HR: 102  Site: --  Mode: --    Lipid Panel: Date/Time: 01-14-24 @ 04:30  Cholesterol, Serum: 130  LDL Cholesterol Calculated: 61  HDL Cholesterol, Serum: 53  Total Cholesterol/HDL Ration Measurement: --  Triglycerides, Serum: 79   BMI: BMI (kg/m2): 23.5 (02-10-24 @ 02:59)  HbA1c: A1C with Estimated Average Glucose Result: 6.1 % (01-14-24 @ 04:30)    Glucose: POCT Blood Glucose.: 115 mg/dL (01-04-24 @ 00:46)    BP: --Vital Signs Last 24 Hrs  T(C): 36.7 (03-04-24 @ 07:47), Max: 36.7 (03-04-24 @ 07:47)  T(F): 98 (03-04-24 @ 07:47), Max: 98 (03-04-24 @ 07:47)  HR: --  BP: --  BP(mean): --  RR: --  SpO2: --    Orthostatic VS  03-04-24 @ 07:47  Lying BP: --/-- HR: --  Sitting BP: 112/67 HR: 91  Standing BP: 121/79 HR: 101  Site: --  Mode: --  Orthostatic VS  03-03-24 @ 08:23  Lying BP: --/-- HR: --  Sitting BP: 140/80 HR: 109  Standing BP: 135/81 HR: 110  Site: --  Mode: --    Lipid Panel: Date/Time: 01-14-24 @ 04:30  Cholesterol, Serum: 130  LDL Cholesterol Calculated: 61  HDL Cholesterol, Serum: 53  Total Cholesterol/HDL Ration Measurement: --  Triglycerides, Serum: 79

## 2024-03-04 NOTE — BH INPATIENT PSYCHIATRY PROGRESS NOTE - NSBHFUPINTERVALHXFT_PSY_A_CORE
Chart reviewed. Case d/w interdisciplinary team. Patient seen and examined. No acute overnight events. Over the weekend required PO H/A/B Friday and Saturday nights with good effect. Per sleep log, no sleep overnight. Compliant with standing meds.   Chart reviewed. Case d/w interdisciplinary team. Patient seen and examined. No acute overnight events. Over the weekend required PO H/A/B Friday and Saturday nights with good effect. Per sleep log, no sleep overnight. Compliant with standing meds.    Today, pt states she is "great." Discussed her relationships with various unit peers, encouraged her to maintain appropriate boundaries with others and engage in coping skills when she becomes overwhelmed by others. She did not sleep last night due to noise outside of her room. She reported that she is not eating today because she is supposed to have surgery at "[address] [place] [address]." Writer told pt there are no plans for her to have surgery and no reason she cannot eat today, pt expressed some understanding. Continues to discuss plans to go [employer] for housing. She denies physical sxs.

## 2024-03-05 PROCEDURE — 99232 SBSQ HOSP IP/OBS MODERATE 35: CPT | Mod: GC

## 2024-03-05 PROCEDURE — 90832 PSYTX W PT 30 MINUTES: CPT

## 2024-03-05 RX ORDER — LORAZEPAM 4 MG/ML
2 VIAL (ML) INJECTION EVERY 6 HOURS
Refills: 0 | Status: DISCONTINUED | OUTPATIENT
Start: 2024-03-05 | End: 2024-03-12

## 2024-03-05 RX ORDER — LORAZEPAM 4 MG/ML
2 VIAL (ML) INJECTION ONCE
Refills: 0 | Status: DISCONTINUED | OUTPATIENT
Start: 2024-03-05 | End: 2024-03-11

## 2024-03-05 RX ADMIN — Medication 50 MILLIGRAM(S): at 02:11

## 2024-03-05 RX ADMIN — Medication 2 MILLIGRAM(S): at 20:35

## 2024-03-05 RX ADMIN — Medication 2 MILLIGRAM(S): at 02:11

## 2024-03-05 RX ADMIN — HALOPERIDOL 5 MILLIGRAM(S): 10 TABLET ORAL at 02:11

## 2024-03-05 RX ADMIN — Medication 2 MILLIGRAM(S): at 08:23

## 2024-03-05 NOTE — BH INPATIENT PSYCHIATRY PROGRESS NOTE - NSBHATTESTCOMMENTATTENDFT_PSY_A_CORE
Continues to be in good behavioral control, able to discuss emotions, especially regarding recent discharge of peer that she liked.  Continue meds as ordered.

## 2024-03-05 NOTE — BH INPATIENT PSYCHIATRY PROGRESS NOTE - MSE UNSTRUCTURED FT
Appearance: Dressed appropriately.    Behavior: Cooperative. Friendly. Poor boundaries.   Motor: No abnormal movements, no psychomotor slowing or activation.  Speech: Regular rate. Talkative.   Mood: "I don't know".   Affect: Tearful  Thought Process: [place] but linear.   Associations: Fair.  Thought Content: No AVH, SIIP, HIIP. Perseverative regarding discharge.   Insight: Limited.  Judgment: Fair on interview.  Attention: Fair.  Language: Fluent.  Gait/station: Intact.

## 2024-03-05 NOTE — DIETITIAN INITIAL EVALUATION ADULT - PERTINENT LABORATORY DATA
A1C with Estimated Average Glucose Result: 6.1 % (01-14-24 @ 04:30)       A1C with Estimated Average Glucose Result: 6.1 % (01-14-24 @ 04:30)  Glucose @ 92 WNL (02-08-24)

## 2024-03-05 NOTE — DIETITIAN INITIAL EVALUATION ADULT - EDUCATION DIETARY MODIFICATIONS
(1) partially meets; needs review/practice/verbalization Pt. is not a candidate for diet education/(1) partially meets; needs review/practice/verbalization

## 2024-03-05 NOTE — BH INPATIENT PSYCHIATRY PROGRESS NOTE - NSBHMETABOLIC_PSY_ALL_CORE_FT
BMI: BMI (kg/m2): 23.5 (02-10-24 @ 02:59)  HbA1c: A1C with Estimated Average Glucose Result: 6.1 % (01-14-24 @ 04:30)    Glucose: POCT Blood Glucose.: 115 mg/dL (01-04-24 @ 00:46)    BP: --Vital Signs Last 24 Hrs  T(C): 36.6 (03-05-24 @ 08:15), Max: 36.6 (03-05-24 @ 08:15)  T(F): 97.9 (03-05-24 @ 08:15), Max: 97.9 (03-05-24 @ 08:15)  HR: --  BP: --  BP(mean): --  RR: --  SpO2: --    Orthostatic VS  03-05-24 @ 08:15  Lying BP: --/-- HR: --  Sitting BP: 116/86 HR: 111  Standing BP: 112/73 HR: 114  Site: --  Mode: --  Orthostatic VS  03-04-24 @ 07:47  Lying BP: --/-- HR: --  Sitting BP: 112/67 HR: 91  Standing BP: 121/79 HR: 101  Site: --  Mode: --    Lipid Panel: Date/Time: 01-14-24 @ 04:30  Cholesterol, Serum: 130  LDL Cholesterol Calculated: 61  HDL Cholesterol, Serum: 53  Total Cholesterol/HDL Ration Measurement: --  Triglycerides, Serum: 79

## 2024-03-05 NOTE — BH INPATIENT PSYCHIATRY PROGRESS NOTE - NSBHCHARTREVIEWVS_PSY_A_CORE FT
Vital Signs Last 24 Hrs  T(C): 36.6 (03-05-24 @ 08:15), Max: 36.6 (03-05-24 @ 08:15)  T(F): 97.9 (03-05-24 @ 08:15), Max: 97.9 (03-05-24 @ 08:15)  HR: --  BP: --  BP(mean): --  RR: --  SpO2: --    Orthostatic VS  03-05-24 @ 08:15  Lying BP: --/-- HR: --  Sitting BP: 116/86 HR: 111  Standing BP: 112/73 HR: 114  Site: --  Mode: --  Orthostatic VS  03-04-24 @ 07:47  Lying BP: --/-- HR: --  Sitting BP: 112/67 HR: 91  Standing BP: 121/79 HR: 101  Site: --  Mode: --
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
## 2024-03-06 PROCEDURE — 99232 SBSQ HOSP IP/OBS MODERATE 35: CPT | Mod: GC

## 2024-03-06 RX ADMIN — Medication 2 MILLIGRAM(S): at 20:44

## 2024-03-06 RX ADMIN — Medication 2 MILLIGRAM(S): at 09:22

## 2024-03-06 NOTE — BH INPATIENT PSYCHIATRY PROGRESS NOTE - MSE UNSTRUCTURED FT
Appearance: Dressed appropriately.    Behavior: Cooperative. Friendly. Poor boundaries.   Motor: No abnormal movements, no psychomotor slowing or activation.  Speech: Regular rate. Talkative.   Mood: "I don't know".   Affect: Tearful  Thought Process: [place] but linear.   Associations: Fair.  Thought Content: No AVH, SIIP, HIIP. Perseverative regarding discharge.   Insight: Limited.  Judgment: Fair on interview.  Attention: Fair.  Language: Fluent.  Gait/station: Intact.      Appearance: Dressed appropriately. Good hygiene/grooming. Wearing new clothing from psych rehab.     Behavior: Cooperative. Friendly. Poor boundaries.   Motor: No abnormal movements, no psychomotor slowing or activation.  Speech: Regular rate. Talkative.   Mood: "good"   Affect: Bright and friendly.   Thought Process: [place] but linear.   Associations: Fair.  Thought Content: No AVH, SIIP, HIIP. Perseverative regarding discharge, "COVID 19 surgery."  Insight: Limited.  Judgment: Fair on interview.  Attention: Fair.  Language: Fluent.  Gait/station: Intact.
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
## 2024-03-06 NOTE — BH INPATIENT PSYCHIATRY PROGRESS NOTE - NSBHCHARTREVIEWVS_PSY_A_CORE FT
Vital Signs Last 24 Hrs  T(C): 36.7 (03-06-24 @ 07:50), Max: 36.7 (03-06-24 @ 07:50)  T(F): 98 (03-06-24 @ 07:50), Max: 98 (03-06-24 @ 07:50)  HR: --  BP: --  BP(mean): --  RR: --  SpO2: --    Orthostatic VS  03-06-24 @ 07:50  Lying BP: --/-- HR: --  Sitting BP: 125/65 HR: 95  Standing BP: 124/76 HR: 103  Site: --  Mode: --  Orthostatic VS  03-05-24 @ 08:15  Lying BP: --/-- HR: --  Sitting BP: 116/86 HR: 111  Standing BP: 112/73 HR: 114  Site: --  Mode: --

## 2024-03-06 NOTE — BH INPATIENT PSYCHIATRY PROGRESS NOTE - NSBHMETABOLIC_PSY_ALL_CORE_FT
BMI: BMI (kg/m2): 23.5 (02-10-24 @ 02:59)  HbA1c: A1C with Estimated Average Glucose Result: 6.1 % (01-14-24 @ 04:30)    Glucose: POCT Blood Glucose.: 115 mg/dL (01-04-24 @ 00:46)    BP: --Vital Signs Last 24 Hrs  T(C): 36.7 (03-06-24 @ 07:50), Max: 36.7 (03-06-24 @ 07:50)  T(F): 98 (03-06-24 @ 07:50), Max: 98 (03-06-24 @ 07:50)  HR: --  BP: --  BP(mean): --  RR: --  SpO2: --    Orthostatic VS  03-06-24 @ 07:50  Lying BP: --/-- HR: --  Sitting BP: 125/65 HR: 95  Standing BP: 124/76 HR: 103  Site: --  Mode: --  Orthostatic VS  03-05-24 @ 08:15  Lying BP: --/-- HR: --  Sitting BP: 116/86 HR: 111  Standing BP: 112/73 HR: 114  Site: --  Mode: --    Lipid Panel: Date/Time: 01-14-24 @ 04:30  Cholesterol, Serum: 130  LDL Cholesterol Calculated: 61  HDL Cholesterol, Serum: 53  Total Cholesterol/HDL Ration Measurement: --  Triglycerides, Serum: 79

## 2024-03-06 NOTE — BH INPATIENT PSYCHIATRY PROGRESS NOTE - NSBHFUPINTERVALHXFT_PSY_A_CORE
Chart reviewed. Case d/w interdisciplinary team. Patient seen and examined. No acute overnight events. No PRNs requested/required. Per sleep log, 3 hrs of sleep. Compliant with standing meds.    Today,*** Chart reviewed. Case d/w interdisciplinary team. Patient seen and examined. No acute overnight events. No PRNs requested/required. Per sleep log, 3 hrs of sleep. Compliant with standing meds.    Today pt appeared brighter and reports a good mood. She is happy about the new clothing she received. Denies all psych ROS. Sleep was interrupted last night due to unit activity. Appetite is good. She denies physical sxs including medication side effects. Asks writer about taking an ambulance to "[facility]" for her COVID 19 surgery, but was less fixated on leaving than earlier this week. Was able to appropriately discuss respecting other patient's boundaries.

## 2024-03-07 PROCEDURE — 99231 SBSQ HOSP IP/OBS SF/LOW 25: CPT | Mod: GC

## 2024-03-07 RX ADMIN — Medication 2 MILLIGRAM(S): at 08:18

## 2024-03-07 RX ADMIN — Medication 2 MILLIGRAM(S): at 13:03

## 2024-03-07 RX ADMIN — Medication 50 MILLIGRAM(S): at 13:03

## 2024-03-07 RX ADMIN — Medication 2 MILLIGRAM(S): at 20:36

## 2024-03-07 RX ADMIN — HALOPERIDOL 5 MILLIGRAM(S): 10 TABLET ORAL at 13:03

## 2024-03-07 NOTE — BH INPATIENT PSYCHIATRY PROGRESS NOTE - NSBHMETABOLIC_PSY_ALL_CORE_FT
BMI: BMI (kg/m2): 23.5 (02-10-24 @ 02:59)  HbA1c: A1C with Estimated Average Glucose Result: 6.1 % (01-14-24 @ 04:30)    Glucose: POCT Blood Glucose.: 115 mg/dL (01-04-24 @ 00:46)    BP: --Vital Signs Last 24 Hrs  T(C): 36.4 (03-07-24 @ 08:11), Max: 36.4 (03-07-24 @ 08:11)  T(F): 97.5 (03-07-24 @ 08:11), Max: 97.5 (03-07-24 @ 08:11)  HR: --  BP: --  BP(mean): --  RR: --  SpO2: --    Orthostatic VS  03-07-24 @ 08:11  Lying BP: --/-- HR: --  Sitting BP: 123/74 HR: 85  Standing BP: 126/61 HR: 87  Site: --  Mode: --  Orthostatic VS  03-06-24 @ 07:50  Lying BP: --/-- HR: --  Sitting BP: 125/65 HR: 95  Standing BP: 124/76 HR: 103  Site: --  Mode: --    Lipid Panel: Date/Time: 01-14-24 @ 04:30  Cholesterol, Serum: 130  LDL Cholesterol Calculated: 61  HDL Cholesterol, Serum: 53  Total Cholesterol/HDL Ration Measurement: --  Triglycerides, Serum: 79

## 2024-03-07 NOTE — BH INPATIENT PSYCHIATRY PROGRESS NOTE - NSBHCHARTREVIEWVS_PSY_A_CORE FT
Vital Signs Last 24 Hrs  T(C): 36.4 (03-07-24 @ 08:11), Max: 36.4 (03-07-24 @ 08:11)  T(F): 97.5 (03-07-24 @ 08:11), Max: 97.5 (03-07-24 @ 08:11)  HR: --  BP: --  BP(mean): --  RR: --  SpO2: --    Orthostatic VS  03-07-24 @ 08:11  Lying BP: --/-- HR: --  Sitting BP: 123/74 HR: 85  Standing BP: 126/61 HR: 87  Site: --  Mode: --  Orthostatic VS  03-06-24 @ 07:50  Lying BP: --/-- HR: --  Sitting BP: 125/65 HR: 95  Standing BP: 124/76 HR: 103  Site: --  Mode: --

## 2024-03-07 NOTE — BH INPATIENT PSYCHIATRY PROGRESS NOTE - NSBHFUPINTERVALHXFT_PSY_A_CORE
Chart reviewed. Case d/w interdisciplinary team. Patient seen and examined. No acute overnight events. No PRNs requested/required. Per sleep log, 7+ hrs of sleep. Compliant with standing meds.    ** Chart reviewed. Case d/w interdisciplinary team. Patient seen and examined. No acute overnight events. No PRNs requested/required. Per sleep log, 7+ hrs of sleep. Compliant with standing meds.    Today pt remains in a good mood, denies all psych ROS. She is eating and sleeping well, no physical sxs including medication side effects. Easily redirected today regarding belief she is going to another hospital for "COVID 19 surgery" and discharge plans. Shares that a male unit peer bothers her, was able to spontaneously describe appropriate coping strategies. Provided validation and encouragement.

## 2024-03-07 NOTE — BH INPATIENT PSYCHIATRY PROGRESS NOTE - MSE UNSTRUCTURED FT
Appearance: Dressed appropriately. Good hygiene/grooming. Wearing new clothing from psych rehab.     Behavior: Cooperative. Friendly. Poor boundaries.   Motor: No abnormal movements, no psychomotor slowing or activation.  Speech: Regular rate. Talkative.   Mood: "good"   Affect: Bright and friendly.   Thought Process: [place] but linear.   Associations: Fair.  Thought Content: No AVH, SIIP, HIIP. Perseverative regarding discharge, "COVID 19 surgery."  Insight: Limited.  Judgment: Fair on interview.  Attention: Fair.  Language: Fluent.  Gait/station: Intact.

## 2024-03-07 NOTE — BH INPATIENT PSYCHIATRY PROGRESS NOTE - NSBHATTESTCOMMENTATTENDFT_PSY_A_CORE
Emotionally regulated, in good behavioral control.  Psychology to provide psychological eval update.  Continue meds.  Dispo planning.
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
## 2024-03-08 LAB — PROLACTIN SERPL-MCNC: 51.5 NG/ML — HIGH (ref 3.4–24.1)

## 2024-03-08 PROCEDURE — 99232 SBSQ HOSP IP/OBS MODERATE 35: CPT | Mod: GC

## 2024-03-08 RX ADMIN — Medication 2 MILLIGRAM(S): at 12:10

## 2024-03-08 RX ADMIN — Medication 2 MILLIGRAM(S): at 20:13

## 2024-03-08 RX ADMIN — Medication 2 MILLIGRAM(S): at 08:49

## 2024-03-08 RX ADMIN — HALOPERIDOL 5 MILLIGRAM(S): 10 TABLET ORAL at 20:12

## 2024-03-08 RX ADMIN — Medication 50 MILLIGRAM(S): at 20:12

## 2024-03-08 RX ADMIN — Medication 50 MILLIGRAM(S): at 12:10

## 2024-03-08 RX ADMIN — HALOPERIDOL 5 MILLIGRAM(S): 10 TABLET ORAL at 12:10
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
## 2024-03-08 NOTE — BH INPATIENT PSYCHIATRY PROGRESS NOTE - MSE UNSTRUCTURED FT
Appearance: Dressed appropriately. Good hygiene/grooming. Wearing new clothing from psych rehab.     Behavior: Cooperative. Friendly. Poor boundaries.   Motor: No abnormal movements, no psychomotor slowing or activation.  Speech: Regular rate. Talkative.   Mood: "good"   Affect: Bright and friendly.   Thought Process: [place] but linear.   Associations: Fair.  Thought Content: No AVH, SIIP, HIIP. Perseverative regarding discharge, "COVID 19 surgery."  Insight: Limited.  Judgment: Fair on interview.  Attention: Fair.  Language: Fluent.  Gait/station: Intact.      Appearance: Dressed appropriately. Good hygiene/grooming.  Behavior: Cooperative. Friendly. Poor boundaries. Seen waiting by telephone.   Motor: No abnormal movements, no psychomotor slowing or activation.  Speech: Regular rate. Talkative.   Mood: did not provide mood.  Affect: Concerned, but brightens by end of interview.   Thought Process: [place] but linear.   Associations: Fair.  Thought Content: No AVH, SIIP, HIIP. Perseverative regarding "COVID 19 surgery", male unit peers who bother her, "cancer surgeries" she needs in 2026 (likely mammography screenings once she turns 40).   Insight: Limited.  Judgment: Fair on interview.  Attention: Fair.  Language: Fluent.  Gait/station: Intact.

## 2024-03-08 NOTE — BH INPATIENT PSYCHIATRY PROGRESS NOTE - NSBHMETABOLIC_PSY_ALL_CORE_FT
BMI: BMI (kg/m2): 23.5 (02-10-24 @ 02:59)  HbA1c: A1C with Estimated Average Glucose Result: 6.1 % (01-14-24 @ 04:30)    Glucose: POCT Blood Glucose.: 115 mg/dL (01-04-24 @ 00:46)    BP: --Vital Signs Last 24 Hrs  T(C): 36.7 (03-08-24 @ 07:58), Max: 36.7 (03-08-24 @ 07:58)  T(F): 98 (03-08-24 @ 07:58), Max: 98 (03-08-24 @ 07:58)  HR: --  BP: --  BP(mean): --  RR: --  SpO2: --    Orthostatic VS  03-08-24 @ 07:58  Lying BP: --/-- HR: --  Sitting BP: 109/67 HR: 81  Standing BP: 117/68 HR: 84  Site: --  Mode: --  Orthostatic VS  03-07-24 @ 08:11  Lying BP: --/-- HR: --  Sitting BP: 123/74 HR: 85  Standing BP: 126/61 HR: 87  Site: --  Mode: --    Lipid Panel: Date/Time: 01-14-24 @ 04:30  Cholesterol, Serum: 130  LDL Cholesterol Calculated: 61  HDL Cholesterol, Serum: 53  Total Cholesterol/HDL Ration Measurement: --  Triglycerides, Serum: 79

## 2024-03-08 NOTE — BH INPATIENT PSYCHIATRY PROGRESS NOTE - NSBHFUPINTERVALHXFT_PSY_A_CORE
Chart reviewed. Case d/w interdisciplinary team. Patient seen and examined. No acute overnight events. No PRNs requested/required. Per sleep log, 6.5 hrs of sleep. Compliant with standing meds.    ** Chart reviewed. Case d/w interdisciplinary team. Patient seen and examined. No acute overnight events. No PRNs requested/required. Per sleep log, 6.5 hrs of sleep. Compliant with standing meds.    Today pt perseverative re "COVID 19 surgery" and waiting for the ambulance to come at 930am. She endorses stomach and upper back pain, but is unable to give additional information. Thinks she needs surgery for her stomach and back now, wonders if it will affect her "breast cancer surgery" she needs to get in 2026 (likely regular mammography screenings). Unclear if pt eating meals. Does not appear acutely distressed by pain, denies other physical sxs including changes in bm or urination, n/v. By the end of the interview she was brighter, talking about male unit peer who was recently discharged that she has a crush on.

## 2024-03-08 NOTE — BH INPATIENT PSYCHIATRY PROGRESS NOTE - NSBHCHARTREVIEWVS_PSY_A_CORE FT
Vital Signs Last 24 Hrs  T(C): 36.7 (03-08-24 @ 07:58), Max: 36.7 (03-08-24 @ 07:58)  T(F): 98 (03-08-24 @ 07:58), Max: 98 (03-08-24 @ 07:58)  HR: --  BP: --  BP(mean): --  RR: --  SpO2: --    Orthostatic VS  03-08-24 @ 07:58  Lying BP: --/-- HR: --  Sitting BP: 109/67 HR: 81  Standing BP: 117/68 HR: 84  Site: --  Mode: --  Orthostatic VS  03-07-24 @ 08:11  Lying BP: --/-- HR: --  Sitting BP: 123/74 HR: 85  Standing BP: 126/61 HR: 87  Site: --  Mode: --

## 2024-03-09 RX ORDER — DIPHENHYDRAMINE HCL 12.5MG/5ML
50 ELIXIR ORAL ONCE
Refills: 0 | Status: COMPLETED | OUTPATIENT
Start: 2024-03-09 | End: 2024-03-09

## 2024-03-09 RX ORDER — HALOPERIDOL 10 MG/1
5 TABLET ORAL ONCE
Refills: 0 | Status: COMPLETED | OUTPATIENT
Start: 2024-03-09 | End: 2024-03-09

## 2024-03-09 RX ORDER — LORAZEPAM 4 MG/ML
2 VIAL (ML) INJECTION ONCE
Refills: 0 | Status: DISCONTINUED | OUTPATIENT
Start: 2024-03-09 | End: 2024-03-09

## 2024-03-09 RX ADMIN — Medication 50 MILLIGRAM(S): at 14:17

## 2024-03-09 RX ADMIN — Medication 2 MILLIGRAM(S): at 20:21

## 2024-03-09 RX ADMIN — HALOPERIDOL 5 MILLIGRAM(S): 10 TABLET ORAL at 20:21

## 2024-03-09 RX ADMIN — Medication 2 MILLIGRAM(S): at 20:22

## 2024-03-09 RX ADMIN — Medication 50 MILLIGRAM(S): at 20:22

## 2024-03-09 RX ADMIN — Medication 2 MILLIGRAM(S): at 14:16

## 2024-03-09 RX ADMIN — HALOPERIDOL 5 MILLIGRAM(S): 10 TABLET ORAL at 14:17

## 2024-03-09 RX ADMIN — Medication 2 MILLIGRAM(S): at 08:07

## 2024-03-10 RX ORDER — LORAZEPAM 4 MG/ML
2 VIAL (ML) INJECTION ONCE
Refills: 0 | Status: DISCONTINUED | OUTPATIENT
Start: 2024-03-10 | End: 2024-03-11

## 2024-03-10 RX ORDER — HALOPERIDOL 10 MG/1
5 TABLET ORAL ONCE
Refills: 0 | Status: DISCONTINUED | OUTPATIENT
Start: 2024-03-10 | End: 2024-03-11

## 2024-03-10 RX ORDER — DIPHENHYDRAMINE HCL 12.5MG/5ML
50 ELIXIR ORAL ONCE
Refills: 0 | Status: DISCONTINUED | OUTPATIENT
Start: 2024-03-10 | End: 2024-03-11

## 2024-03-10 RX ADMIN — Medication 2 MILLIGRAM(S): at 08:46

## 2024-03-10 RX ADMIN — Medication 2 MILLIGRAM(S): at 15:56

## 2024-03-10 RX ADMIN — HALOPERIDOL 5 MILLIGRAM(S): 10 TABLET ORAL at 15:55

## 2024-03-10 RX ADMIN — Medication 50 MILLIGRAM(S): at 08:46

## 2024-03-10 RX ADMIN — HALOPERIDOL 5 MILLIGRAM(S): 10 TABLET ORAL at 08:57

## 2024-03-10 RX ADMIN — Medication 2 MILLIGRAM(S): at 20:26

## 2024-03-10 RX ADMIN — Medication 50 MILLIGRAM(S): at 15:56

## 2024-03-11 RX ORDER — LORAZEPAM 4 MG/ML
2 VIAL (ML) INJECTION ONCE
Refills: 0 | Status: DISCONTINUED | OUTPATIENT
Start: 2024-03-11 | End: 2024-03-11

## 2024-03-11 RX ORDER — HALOPERIDOL 10 MG/1
5 TABLET ORAL ONCE
Refills: 0 | Status: COMPLETED | OUTPATIENT
Start: 2024-03-11 | End: 2024-03-11

## 2024-03-11 RX ORDER — DIPHENHYDRAMINE HCL 12.5MG/5ML
50 ELIXIR ORAL ONCE
Refills: 0 | Status: COMPLETED | OUTPATIENT
Start: 2024-03-11 | End: 2024-03-11

## 2024-03-11 RX ORDER — LORAZEPAM 4 MG/ML
2 VIAL (ML) INJECTION ONCE
Refills: 0 | Status: DISCONTINUED | OUTPATIENT
Start: 2024-03-11 | End: 2024-03-18

## 2024-03-11 RX ADMIN — Medication 50 MILLIGRAM(S): at 08:26

## 2024-03-11 RX ADMIN — Medication 2 MILLIGRAM(S): at 08:26

## 2024-03-11 RX ADMIN — Medication 2 MILLIGRAM(S): at 20:01

## 2024-03-11 RX ADMIN — HALOPERIDOL 5 MILLIGRAM(S): 10 TABLET ORAL at 08:26

## 2024-03-11 RX ADMIN — Medication 2 MILLIGRAM(S): at 08:25

## 2024-03-11 NOTE — BH INPATIENT PSYCHIATRY PROGRESS NOTE - NSBHCHARTREVIEWLAB_PSY_A_CORE FT
3/9 PRL 51.5 (down from 55.3 on 1/19)    2/17 COVID +; 2/25 and 2/26 COVID neg  02-08    135  |  98  |  11.8  ----------------------------<  92  3.7   |  25.0  |  0.65    Ca    9.0      08 Feb 2024 21:15    TPro  7.8  /  Alb  4.1  /  TBili  <0.2<L>  /  DBili  x   /  AST  24  /  ALT  14  /  AlkPhos  68  02-08

## 2024-03-11 NOTE — BH INPATIENT PSYCHIATRY PROGRESS NOTE - NSBHCHARTREVIEWVS_PSY_A_CORE FT
Vital Signs Last 24 Hrs  T(C): 36.6 (03-11-24 @ 08:32), Max: 36.6 (03-11-24 @ 08:32)  T(F): 97.9 (03-11-24 @ 08:32), Max: 97.9 (03-11-24 @ 08:32)  HR: --  BP: --  BP(mean): --  RR: --  SpO2: --    Orthostatic VS  03-11-24 @ 08:32  Lying BP: --/-- HR: --  Sitting BP: 102/63 HR: 87  Standing BP: 108/67 HR: 99  Site: --  Mode: --  Orthostatic VS  03-10-24 @ 08:00  Lying BP: --/-- HR: --  Sitting BP: 118/64 HR: 96  Standing BP: 121/82 HR: 114  Site: --  Mode: --  Orthostatic VS  03-09-24 @ 08:06  Lying BP: --/-- HR: --  Sitting BP: 106/67 HR: 80  Standing BP: 112/77 HR: 83  Site: --  Mode: --   Vital Signs Last 24 Hrs  T(C): 36.1 (03-12-24 @ 08:19), Max: 36.1 (03-12-24 @ 08:19)  T(F): 97 (03-12-24 @ 08:19), Max: 97 (03-12-24 @ 08:19)  HR: --  BP: --  BP(mean): --  RR: --  SpO2: --    Orthostatic VS  03-12-24 @ 08:19  Lying BP: --/-- HR: --  Sitting BP: 107/61 HR: 80  Standing BP: 117/78 HR: 87  Site: --  Mode: --  Orthostatic VS  03-11-24 @ 08:32  Lying BP: --/-- HR: --  Sitting BP: 102/63 HR: 87  Standing BP: 108/67 HR: 99  Site: --  Mode: --

## 2024-03-11 NOTE — BH INPATIENT PSYCHIATRY PROGRESS NOTE - NSBHMETABOLIC_PSY_ALL_CORE_FT
BMI: BMI (kg/m2): 23.5 (02-10-24 @ 02:59)  HbA1c: A1C with Estimated Average Glucose Result: 6.1 % (01-14-24 @ 04:30)    Glucose: POCT Blood Glucose.: 115 mg/dL (01-04-24 @ 00:46)    BP: --Vital Signs Last 24 Hrs  T(C): 36.6 (03-11-24 @ 08:32), Max: 36.6 (03-11-24 @ 08:32)  T(F): 97.9 (03-11-24 @ 08:32), Max: 97.9 (03-11-24 @ 08:32)  HR: --  BP: --  BP(mean): --  RR: --  SpO2: --    Orthostatic VS  03-11-24 @ 08:32  Lying BP: --/-- HR: --  Sitting BP: 102/63 HR: 87  Standing BP: 108/67 HR: 99  Site: --  Mode: --  Orthostatic VS  03-10-24 @ 08:00  Lying BP: --/-- HR: --  Sitting BP: 118/64 HR: 96  Standing BP: 121/82 HR: 114  Site: --  Mode: --  Orthostatic VS  03-09-24 @ 08:06  Lying BP: --/-- HR: --  Sitting BP: 106/67 HR: 80  Standing BP: 112/77 HR: 83  Site: --  Mode: --    Lipid Panel: Date/Time: 01-14-24 @ 04:30  Cholesterol, Serum: 130  LDL Cholesterol Calculated: 61  HDL Cholesterol, Serum: 53  Total Cholesterol/HDL Ration Measurement: --  Triglycerides, Serum: 79   BMI: BMI (kg/m2): 23.5 (02-10-24 @ 02:59)  HbA1c: A1C with Estimated Average Glucose Result: 6.1 % (01-14-24 @ 04:30)    Glucose: POCT Blood Glucose.: 115 mg/dL (01-04-24 @ 00:46)    BP: --Vital Signs Last 24 Hrs  T(C): 36.1 (03-12-24 @ 08:19), Max: 36.1 (03-12-24 @ 08:19)  T(F): 97 (03-12-24 @ 08:19), Max: 97 (03-12-24 @ 08:19)  HR: --  BP: --  BP(mean): --  RR: --  SpO2: --    Orthostatic VS  03-12-24 @ 08:19  Lying BP: --/-- HR: --  Sitting BP: 107/61 HR: 80  Standing BP: 117/78 HR: 87  Site: --  Mode: --  Orthostatic VS  03-11-24 @ 08:32  Lying BP: --/-- HR: --  Sitting BP: 102/63 HR: 87  Standing BP: 108/67 HR: 99  Site: --  Mode: --    Lipid Panel: Date/Time: 01-14-24 @ 04:30  Cholesterol, Serum: 130  LDL Cholesterol Calculated: 61  HDL Cholesterol, Serum: 53  Total Cholesterol/HDL Ration Measurement: --  Triglycerides, Serum: 79
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
## 2024-03-11 NOTE — BH CHART NOTE - NSEVENTNOTEFT_PSY_ALL_CORE
[facility] called for activation of IM medication due to patient agitation. Psych emergency called at 8:19. Per staff, patient has been discharge focused and was calling an outside hospital this morning to ask for transfer. She became upset, started yelling, and was banging on the walls/nurse's station partition. Unable to be redirected, refused PO PRN medications. Haldol 5 mg IM x 1, Ativan 2 mg IM x 1, Benadryl 50 mg IM x 1 activated for threat to self and others. Patient seen after IM administered, noted to be resting comfortably in NAD, IM with fair effect. Advised RN staff to notify [facility] if patient continues to be agitated.

## 2024-03-11 NOTE — BH INPATIENT PSYCHIATRY PROGRESS NOTE - MSE UNSTRUCTURED FT
Appearance: Dressed appropriately. Good hygiene/grooming.  Behavior: Uncooperative, distressed and difficult to redirect. Seen waiting by telephone.   Motor: No abnormal movements, no psychomotor slowing or activation.  Speech: Loud volume, talkative.   Mood: did not provide mood.  Affect: Distressed. Constricted.   Thought Process: [place].   Associations: Fair.  Thought Content: No AVH, SIIP, HIIP. Perseverative regarding discharge.   Insight: Limited.  Judgment: Limited on interview.  Attention: Fair.  Language: Fluent.  Gait/station: Intact.

## 2024-03-11 NOTE — BH INPATIENT PSYCHIATRY PROGRESS NOTE - NSBHATTESTCOMMENTATTENDFT_PSY_A_CORE
Pt has been acting out more, likely due to changes on unit that she has low frustration tolerance with, including discharge of peer that she liked and now pending discharge of roommate whom pt became close with.  Likely sequelae of neurodevelopmental disorder.  Continue meds.  Psychotherapeutic interventions/behavioral plan.

## 2024-03-11 NOTE — BH INPATIENT PSYCHIATRY PROGRESS NOTE - NSBHFUPINTERVALHXFT_PSY_A_CORE
Chart reviewed. Case d/w interdisciplinary team. Patient seen and examined. No acute events overnight. PRNs required throughout weekend for behavioral disturbances + discharge focused. Per sleep log, 4.5 hrs of sleep. Compliant with standing meds.    This morning pt perseverative re discharge, banging on walls/nurse's station partition, cursing, and difficult to redirect. Per nursing this behavior has been occurring all weekend, additionally she has been repeatedly calling [facility] and asking when the ambulance is coming. Psych emergency was called and pt required IM H/A/B which was administered without difficulty, taking fair effect. Pt later seen eating breakfast by hallways phone.

## 2024-03-12 PROCEDURE — 99232 SBSQ HOSP IP/OBS MODERATE 35: CPT | Mod: GC

## 2024-03-12 RX ORDER — LORAZEPAM 4 MG/ML
2 VIAL (ML) INJECTION EVERY 6 HOURS
Refills: 0 | Status: DISCONTINUED | OUTPATIENT
Start: 2024-03-12 | End: 2024-03-19

## 2024-03-12 RX ADMIN — Medication 2 MILLIGRAM(S): at 20:49

## 2024-03-12 RX ADMIN — Medication 50 MILLIGRAM(S): at 20:49

## 2024-03-12 RX ADMIN — Medication 2 MILLIGRAM(S): at 08:15

## 2024-03-12 RX ADMIN — HALOPERIDOL 5 MILLIGRAM(S): 10 TABLET ORAL at 20:49

## 2024-03-12 RX ADMIN — Medication 50 MILLIGRAM(S): at 08:18

## 2024-03-12 RX ADMIN — HALOPERIDOL 5 MILLIGRAM(S): 10 TABLET ORAL at 08:15

## 2024-03-12 NOTE — BH INPATIENT PSYCHIATRY PROGRESS NOTE - NSBHCHARTREVIEWVS_PSY_A_CORE FT
Vital Signs Last 24 Hrs  T(C): 36.6 (03-11-24 @ 08:32), Max: 36.6 (03-11-24 @ 08:32)  T(F): 97.9 (03-11-24 @ 08:32), Max: 97.9 (03-11-24 @ 08:32)  HR: --  BP: --  BP(mean): --  RR: --  SpO2: --    Orthostatic VS  03-11-24 @ 08:32  Lying BP: --/-- HR: --  Sitting BP: 102/63 HR: 87  Standing BP: 108/67 HR: 99  Site: --  Mode: --   Vital Signs Last 24 Hrs  T(C): 36.1 (03-12-24 @ 08:19), Max: 36.1 (03-12-24 @ 08:19)  T(F): 97 (03-12-24 @ 08:19), Max: 97 (03-12-24 @ 08:19)  HR: --  BP: --  BP(mean): --  RR: --  SpO2: --    Orthostatic VS  03-12-24 @ 08:19  Lying BP: --/-- HR: --  Sitting BP: 107/61 HR: 80  Standing BP: 117/78 HR: 87  Site: --  Mode: --  Orthostatic VS  03-11-24 @ 08:32  Lying BP: --/-- HR: --  Sitting BP: 102/63 HR: 87  Standing BP: 108/67 HR: 99  Site: --  Mode: --

## 2024-03-12 NOTE — BH INPATIENT PSYCHIATRY PROGRESS NOTE - NSBHATTESTCOMMENTATTENDFT_PSY_A_CORE
Pt continuing to have repetitive odd somatic thought content, likely in response to discharge of two peers that she was close to, sequelae of neurodevelopmental disorder.  Continue meds.

## 2024-03-12 NOTE — BH INPATIENT PSYCHIATRY PROGRESS NOTE - MSE UNSTRUCTURED FT
Appearance: Dressed appropriately. Good hygiene/grooming.  Behavior: Uncooperative, distressed and difficult to redirect. Seen waiting by telephone.   Motor: No abnormal movements, no psychomotor slowing or activation.  Speech: Loud volume, talkative.   Mood: did not provide mood.  Affect: Distressed. Constricted.   Thought Process: [place].   Associations: Fair.  Thought Content: No AVH, SIIP, HIIP. Perseverative regarding discharge.   Insight: Limited.  Judgment: Limited on interview.  Attention: Fair.  Language: Fluent.  Gait/station: Intact.      Appearance: Dressed appropriately. Good hygiene/grooming.  Behavior: Cooperative and more calm today, still difficult to redirect. Intrusive. Seen in dayroom with her belongings packed.   Motor: No abnormal movements, no psychomotor slowing or activation.  Speech: Loud volume, talkative.   Mood: Anger   Affect: Euthymic + worried, constricted. mood incongruent.   Thought Process: [place].   Associations: Fair.  Thought Content: No AVH, SIIP, HIIP. Perseverative regarding discharge, requiring surgery, dislike of unit peers.   Insight: Limited.  Judgment: Limited on interview.  Attention: Fair.  Language: Fluent.  Gait/station: Intact.

## 2024-03-12 NOTE — BH INPATIENT PSYCHIATRY PROGRESS NOTE - NSBHCHARTREVIEWINVESTIGATE_PSY_A_CORE FT
Ventricular Rate 70 BPM    Atrial Rate 70 BPM    P-R Interval 132 ms    QRS Duration 94 ms    Q-T Interval 404 ms    QTC Calculation(Bazett) 436 ms    P Axis 42 degrees    R Axis 50 degrees    T Axis 20 degrees    Diagnosis Line Normal sinus rhythm  Nonspecific T wave abnormality  Abnormal ECG    Confirmed by ANTONELLA TREVIÑO ([id number]) on 1/12/2024 5:42:48 PM  
  Ventricular Rate 70 BPM    Atrial Rate 70 BPM    P-R Interval 132 ms    QRS Duration 94 ms    Q-T Interval 404 ms    QTC Calculation(Bazett) 436 ms    P Axis 42 degrees    R Axis 50 degrees    T Axis 20 degrees    Diagnosis Line Normal sinus rhythm  Nonspecific T wave abnormality  Abnormal ECG    Confirmed by ANTONELLA TREVIÑO ([id number]) on 1/12/2024 5:42:48 PM  
Prostate cancer
  Ventricular Rate 70 BPM    Atrial Rate 70 BPM    P-R Interval 132 ms    QRS Duration 94 ms    Q-T Interval 404 ms    QTC Calculation(Bazett) 436 ms    P Axis 42 degrees    R Axis 50 degrees    T Axis 20 degrees    Diagnosis Line Normal sinus rhythm  Nonspecific T wave abnormality  Abnormal ECG    Confirmed by ANTONELLA TREVIÑO ([id number]) on 1/12/2024 5:42:48 PM  
< from: 12 Lead ECG (01.11.24 @ 17:16) >      Ventricular Rate 70 BPM    Atrial Rate 70 BPM    P-R Interval 132 ms    QRS Duration 94 ms    Q-T Interval 404 ms    QTC Calculation(Bazett) 436 ms    P Axis 42 degrees    R Axis 50 degrees    T Axis 20 degrees    Diagnosis Line Normal sinus rhythm  Nonspecific T wave abnormality  Abnormal ECG    Confirmed by ANTONELLA TREVIÑO ([id number]) on 1/12/2024 5:42:48 PM    < end of copied text >

## 2024-03-12 NOTE — BH INPATIENT PSYCHIATRY PROGRESS NOTE - NSBHMETABOLIC_PSY_ALL_CORE_FT
BMI: BMI (kg/m2): 23.5 (02-10-24 @ 02:59)  HbA1c: A1C with Estimated Average Glucose Result: 6.1 % (01-14-24 @ 04:30)    Glucose: POCT Blood Glucose.: 115 mg/dL (01-04-24 @ 00:46)    BP: --Vital Signs Last 24 Hrs  T(C): 36.6 (03-11-24 @ 08:32), Max: 36.6 (03-11-24 @ 08:32)  T(F): 97.9 (03-11-24 @ 08:32), Max: 97.9 (03-11-24 @ 08:32)  HR: --  BP: --  BP(mean): --  RR: --  SpO2: --    Orthostatic VS  03-11-24 @ 08:32  Lying BP: --/-- HR: --  Sitting BP: 102/63 HR: 87  Standing BP: 108/67 HR: 99  Site: --  Mode: --    Lipid Panel: Date/Time: 01-14-24 @ 04:30  Cholesterol, Serum: 130  LDL Cholesterol Calculated: 61  HDL Cholesterol, Serum: 53  Total Cholesterol/HDL Ration Measurement: --  Triglycerides, Serum: 79   BMI: BMI (kg/m2): 23.5 (02-10-24 @ 02:59)  HbA1c: A1C with Estimated Average Glucose Result: 6.1 % (01-14-24 @ 04:30)    Glucose: POCT Blood Glucose.: 115 mg/dL (01-04-24 @ 00:46)    BP: --Vital Signs Last 24 Hrs  T(C): 36.1 (03-12-24 @ 08:19), Max: 36.1 (03-12-24 @ 08:19)  T(F): 97 (03-12-24 @ 08:19), Max: 97 (03-12-24 @ 08:19)  HR: --  BP: --  BP(mean): --  RR: --  SpO2: --    Orthostatic VS  03-12-24 @ 08:19  Lying BP: --/-- HR: --  Sitting BP: 107/61 HR: 80  Standing BP: 117/78 HR: 87  Site: --  Mode: --  Orthostatic VS  03-11-24 @ 08:32  Lying BP: --/-- HR: --  Sitting BP: 102/63 HR: 87  Standing BP: 108/67 HR: 99  Site: --  Mode: --    Lipid Panel: Date/Time: 01-14-24 @ 04:30  Cholesterol, Serum: 130  LDL Cholesterol Calculated: 61  HDL Cholesterol, Serum: 53  Total Cholesterol/HDL Ration Measurement: --  Triglycerides, Serum: 79

## 2024-03-12 NOTE — BH INPATIENT PSYCHIATRY PROGRESS NOTE - NSBHFUPINTERVALHXFT_PSY_A_CORE
Chart reviewed. Case d/w interdisciplinary team. Patient seen and examined. No acute events overnight. No prns required/requested overnight. Per sleep log, 1 hr of sleep in dayroom. Compliant with standing meds.    ** Chart reviewed. Case d/w interdisciplinary team. Patient seen and examined. No acute events overnight. No prns required/requested overnight. Per sleep log, 1 hr of sleep in dayroom. Compliant with standing meds.    Pt was seen in the dayroom. Interview was difficult due to pt perseverating on multiple topics including dislike of her new roommate, insistence that she needs to get COVID 19 surgery right away because a blonde lady told her, discharge focus. She describes "angry" mood. Writer attempted to redirect pt and have her identify and express her emotions, including likely feelings of abandonment by discharged peers. Pt was unable to engage.
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
## 2024-03-13 PROCEDURE — 99232 SBSQ HOSP IP/OBS MODERATE 35: CPT

## 2024-03-13 RX ADMIN — Medication 2 MILLIGRAM(S): at 09:07

## 2024-03-13 RX ADMIN — Medication 2 MILLIGRAM(S): at 20:19

## 2024-03-13 RX ADMIN — Medication 2 MILLIGRAM(S): at 20:18

## 2024-03-13 NOTE — BH INPATIENT PSYCHIATRY PROGRESS NOTE - NSBHMETABOLIC_PSY_ALL_CORE_FT
BMI: BMI (kg/m2): 23.5 (02-10-24 @ 02:59)  HbA1c: A1C with Estimated Average Glucose Result: 6.1 % (01-14-24 @ 04:30)    Glucose: POCT Blood Glucose.: 115 mg/dL (01-04-24 @ 00:46)    BP: --Vital Signs Last 24 Hrs  T(C): --  T(F): --  HR: --  BP: --  BP(mean): --  RR: --  SpO2: --    Orthostatic VS  03-12-24 @ 08:19  Lying BP: --/-- HR: --  Sitting BP: 107/61 HR: 80  Standing BP: 117/78 HR: 87  Site: --  Mode: --  Orthostatic VS  03-11-24 @ 08:32  Lying BP: --/-- HR: --  Sitting BP: 102/63 HR: 87  Standing BP: 108/67 HR: 99  Site: --  Mode: --    Lipid Panel: Date/Time: 01-14-24 @ 04:30  Cholesterol, Serum: 130  LDL Cholesterol Calculated: 61  HDL Cholesterol, Serum: 53  Total Cholesterol/HDL Ration Measurement: --  Triglycerides, Serum: 79   BMI: BMI (kg/m2): 23.5 (02-10-24 @ 02:59)  HbA1c: A1C with Estimated Average Glucose Result: 6.1 % (01-14-24 @ 04:30)    Glucose: POCT Blood Glucose.: 115 mg/dL (01-04-24 @ 00:46)    BP: --Vital Signs Last 24 Hrs  T(C): 36.2 (03-13-24 @ 08:23), Max: 36.2 (03-13-24 @ 08:23)  T(F): 97.2 (03-13-24 @ 08:23), Max: 97.2 (03-13-24 @ 08:23)  HR: --  BP: --  BP(mean): --  RR: --  SpO2: --    Orthostatic VS  03-13-24 @ 08:23  Lying BP: --/-- HR: --  Sitting BP: 104/63 HR: 83  Standing BP: 89/62 HR: 70  Site: --  Mode: --  Orthostatic VS  03-12-24 @ 08:19  Lying BP: --/-- HR: --  Sitting BP: 107/61 HR: 80  Standing BP: 117/78 HR: 87  Site: --  Mode: --    Lipid Panel: Date/Time: 01-14-24 @ 04:30  Cholesterol, Serum: 130  LDL Cholesterol Calculated: 61  HDL Cholesterol, Serum: 53  Total Cholesterol/HDL Ration Measurement: --  Triglycerides, Serum: 79
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
## 2024-03-13 NOTE — BH PSYCHOLOGY ASSESSMENT - NSBHPSYCHOLASREASON_PSY_A_CORE FT
Pt was referred for cognitive and adaptive functioning assessment for available resources in mental health and disability services. During this session, pt was engaged in clinical interviewing and one subtest of WAIS-IV.  Pt was referred for cognitive and adaptive functioning assessment for available resources in mental health and disability services. During this session, pt was engaged in clinical interviewing along with one subtest of WAIS-IV.

## 2024-03-13 NOTE — BH INPATIENT PSYCHIATRY PROGRESS NOTE - NSBHFUPINTERVALHXFT_PSY_A_CORE
Chart reviewed. Case d/w interdisciplinary team. Patient seen and examined. No acute events overnight. No prns required/requested overnight. Per sleep log, 1 hr of sleep in dayroom. Compliant with standing meds.    Today Chart reviewed. Case d/w interdisciplinary team. Patient seen and examined. No acute events overnight. No prns required/requested overnight. Per sleep log, 1 hr of sleep in dayroom. Compliant with standing meds.  Pt continues to state that she has surgery tomorrow and is awaiting the ambulance.  States she is going to speak to someone today about her apartment [facility]. Chart reviewed. Case d/w interdisciplinary team. Patient seen and examined. No acute events overnight. No prns required/requested overnight. Per sleep log, 6.5 hr of sleep in dayroom. Compliant with standing meds.  Pt continues to state that she has surgery tomorrow and is awaiting the ambulance.  States she is going to speak to someone today about her apartment [facility].

## 2024-03-13 NOTE — BH INPATIENT PSYCHIATRY PROGRESS NOTE - NSBHCHARTREVIEWVS_PSY_A_CORE FT
Vital Signs Last 24 Hrs  T(C): --  T(F): --  HR: --  BP: --  BP(mean): --  RR: --  SpO2: --    Orthostatic VS  03-12-24 @ 08:19  Lying BP: --/-- HR: --  Sitting BP: 107/61 HR: 80  Standing BP: 117/78 HR: 87  Site: --  Mode: --  Orthostatic VS  03-11-24 @ 08:32  Lying BP: --/-- HR: --  Sitting BP: 102/63 HR: 87  Standing BP: 108/67 HR: 99  Site: --  Mode: --   Vital Signs Last 24 Hrs  T(C): 36.2 (03-13-24 @ 08:23), Max: 36.2 (03-13-24 @ 08:23)  T(F): 97.2 (03-13-24 @ 08:23), Max: 97.2 (03-13-24 @ 08:23)  HR: --  BP: --  BP(mean): --  RR: --  SpO2: --    Orthostatic VS  03-13-24 @ 08:23  Lying BP: --/-- HR: --  Sitting BP: 104/63 HR: 83  Standing BP: 89/62 HR: 70  Site: --  Mode: --  Orthostatic VS  03-12-24 @ 08:19  Lying BP: --/-- HR: --  Sitting BP: 107/61 HR: 80  Standing BP: 117/78 HR: 87  Site: --  Mode: --

## 2024-03-13 NOTE — BH INPATIENT PSYCHIATRY PROGRESS NOTE - MSE UNSTRUCTURED FT
Appearance: Dressed appropriately. Good hygiene/grooming.  Behavior: Cooperative and more calm today, still difficult to redirect. Intrusive. Seen in dayroom with her belongings packed.   Motor: No abnormal movements, no psychomotor slowing or activation.  Speech: Loud volume, talkative.   Mood: Anger   Affect: Euthymic + worried, constricted. mood incongruent.   Thought Process: [place].   Associations: Fair.  Thought Content: No AVH, SIIP, HIIP. Perseverative regarding discharge, requiring surgery, dislike of unit peers.   Insight: Limited.  Judgment: Limited on interview.  Attention: Fair.  Language: Fluent.  Gait/station: Intact.      Appearance: Dressed appropriately. Good hygiene/grooming.  Behavior: Cooperative and calm.  Sitting in chair in day room with belongings.  Motor: No abnormal movements, no psychomotor slowing or activation.  Speech: Regular rate.   Mood: Does not give a mood.  Affect: More euthymic today.   Thought Process: [place].   Associations: Fair.  Thought Content: No AVH, SIIP, HIIP.  Repetitive ideas [place] and [place].    Insight: Limited.  Judgment: Limited on interview.  Attention: Fair.  Language: Fluent.  Gait/station: Intact.

## 2024-03-13 NOTE — BH INPATIENT PSYCHIATRY PROGRESS NOTE - NSDCCRITERIA_PSY_ALL_CORE
Safe discharge.  When pt is no longer an acute or imminent risk of harm to self or others, and is able to care for self safely, pt may then be discharged.

## 2024-03-14 PROCEDURE — 99232 SBSQ HOSP IP/OBS MODERATE 35: CPT | Mod: GC

## 2024-03-14 RX ADMIN — Medication 2 MILLIGRAM(S): at 08:33

## 2024-03-14 RX ADMIN — Medication 2 MILLIGRAM(S): at 20:22

## 2024-03-14 RX ADMIN — Medication 2 MILLIGRAM(S): at 08:34

## 2024-03-14 NOTE — BH INPATIENT PSYCHIATRY PROGRESS NOTE - NSBHFUPINTERVALHXFT_PSY_A_CORE
Chart reviewed. Case d/w interdisciplinary team. Patient seen and examined. No acute events overnight. No prns required/requested overnight. Per sleep log, 6 hr of sleep in dayroom. Compliant with standing meds.  Pt continues to state that she has surgery tomorrow and is awaiting the ambulance.  States she is going to speak to someone today about her apartment [facility]. Chart reviewed. Case d/w interdisciplinary team. Patient seen and examined. No acute events overnight. No prns required/requested overnight. Per sleep log, 6 hr of sleep in dayroom. Compliant with standing meds.  Pt continues to state that she has surgery tomorrow. Is in a better mood.

## 2024-03-14 NOTE — BH INPATIENT PSYCHIATRY PROGRESS NOTE - MSE UNSTRUCTURED FT
Appearance: Dressed appropriately. Good hygiene/grooming.  Behavior: Cooperative and calm.  Sitting in chair in day room with belongings.  Motor: No abnormal movements, no psychomotor slowing or activation.  Speech: Regular rate.   Mood: Does not give a mood.  Affect: More euthymic today.   Thought Process: [place].   Associations: Fair.  Thought Content: No AVH, SIIP, HIIP.  Repetitive ideas regarding surgery and [place]state.    Insight: Limited.  Judgment: Limited on interview.  Attention: Fair.  Language: Fluent.  Gait/station: Intact.

## 2024-03-14 NOTE — BH INPATIENT PSYCHIATRY PROGRESS NOTE - NSBHATTESTCOMMENTATTENDFT_PSY_A_CORE
Starting to emotionally regulate, but still with repetitive fantasy thought content.  Continue meds.

## 2024-03-14 NOTE — BH INPATIENT PSYCHIATRY PROGRESS NOTE - NSBHMETABOLIC_PSY_ALL_CORE_FT
BMI: BMI (kg/m2): 23.5 (02-10-24 @ 02:59)  HbA1c: A1C with Estimated Average Glucose Result: 6.1 % (01-14-24 @ 04:30)    Glucose: POCT Blood Glucose.: 115 mg/dL (01-04-24 @ 00:46)    BP: --Vital Signs Last 24 Hrs  T(C): 36.2 (03-14-24 @ 08:01), Max: 36.2 (03-14-24 @ 08:01)  T(F): 97.2 (03-14-24 @ 08:01), Max: 97.2 (03-14-24 @ 08:01)  HR: --  BP: --  BP(mean): --  RR: --  SpO2: --    Orthostatic VS  03-14-24 @ 08:01  Lying BP: --/-- HR: --  Sitting BP: 123/83 HR: 95  Standing BP: 127/67 HR: 92  Site: --  Mode: --  Orthostatic VS  03-13-24 @ 08:23  Lying BP: --/-- HR: --  Sitting BP: 104/63 HR: 83  Standing BP: 89/62 HR: 70  Site: --  Mode: --    Lipid Panel: Date/Time: 01-14-24 @ 04:30  Cholesterol, Serum: 130  LDL Cholesterol Calculated: 61  HDL Cholesterol, Serum: 53  Total Cholesterol/HDL Ration Measurement: --  Triglycerides, Serum: 79
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
## 2024-03-14 NOTE — BH INPATIENT PSYCHIATRY PROGRESS NOTE - NSBHCHARTREVIEWVS_PSY_A_CORE FT
Vital Signs Last 24 Hrs  T(C): 36.2 (03-14-24 @ 08:01), Max: 36.2 (03-14-24 @ 08:01)  T(F): 97.2 (03-14-24 @ 08:01), Max: 97.2 (03-14-24 @ 08:01)  HR: --  BP: --  BP(mean): --  RR: --  SpO2: --    Orthostatic VS  03-14-24 @ 08:01  Lying BP: --/-- HR: --  Sitting BP: 123/83 HR: 95  Standing BP: 127/67 HR: 92  Site: --  Mode: --  Orthostatic VS  03-13-24 @ 08:23  Lying BP: --/-- HR: --  Sitting BP: 104/63 HR: 83  Standing BP: 89/62 HR: 70  Site: --  Mode: --

## 2024-03-15 PROCEDURE — 99232 SBSQ HOSP IP/OBS MODERATE 35: CPT

## 2024-03-15 RX ADMIN — Medication 2 MILLIGRAM(S): at 08:03

## 2024-03-15 RX ADMIN — Medication 2 MILLIGRAM(S): at 21:19

## 2024-03-15 NOTE — BH INPATIENT PSYCHIATRY PROGRESS NOTE - NSBHCHARTREVIEWVS_PSY_A_CORE FT
Vital Signs Last 24 Hrs  T(C): 36.7 (03-15-24 @ 08:16), Max: 36.7 (03-15-24 @ 08:16)  T(F): 98.1 (03-15-24 @ 08:16), Max: 98.1 (03-15-24 @ 08:16)  HR: --  BP: --  BP(mean): --  RR: --  SpO2: --    Orthostatic VS  03-15-24 @ 08:16  Lying BP: --/-- HR: --  Sitting BP: 112/72 HR: 101  Standing BP: 116/63 HR: 108  Site: --  Mode: --  Orthostatic VS  03-14-24 @ 08:01  Lying BP: --/-- HR: --  Sitting BP: 123/83 HR: 95  Standing BP: 127/67 HR: 92  Site: --  Mode: --

## 2024-03-15 NOTE — BH INPATIENT PSYCHIATRY PROGRESS NOTE - NSBHFUPINTERVALHXFT_PSY_A_CORE
Chart reviewed. Case d/w interdisciplinary team. Patient seen and examined. No acute events overnight. No prns required/requested overnight. Per sleep log, 6 hr of sleep in dayroom. Compliant with standing meds.  Pt continues to state that she has surgery tomorrow. Is in a better mood.  As per staff report, took meds, stayed in dayroom overnight with limited sleep.  Pt today states she has surgery for COVID-19.
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
## 2024-03-15 NOTE — BH INPATIENT PSYCHIATRY PROGRESS NOTE - MSE UNSTRUCTURED FT
Appearance: Dressed appropriately. Good hygiene/grooming.  Behavior: Cooperative and calm.  Sitting in chair in day room with belongings.  Motor: No abnormal movements, no psychomotor slowing or activation.  Speech: Regular rate.   Mood: Does not give a mood.  Affect: More euthymic today.   Thought Process: [place].   Associations: Fair.  Thought Content: No AVH, SIIP, HIIP.  Repetitive ideas regarding surgery and [place].    Insight: Limited.  Judgment: Limited on interview.  Attention: Fair.  Language: Fluent.  Gait/station: Intact.      Appearance: Dressed appropriately. Good hygiene/grooming.  Behavior: Cooperative and calm.  Sitting in chair in day room with belongings.  Motor: No abnormal movements, no psychomotor slowing or activation.  Speech: Regular rate.   Mood: Better.  Affect: More euthymic today.   Thought Process: [place].   Associations: Fair.  Thought Content: No AVH, SIIP, HIIP.  Repetitive ideas regarding surgery and [place]state.    Insight: Limited.  Judgment: Limited on interview.  Attention: Fair.  Language: Fluent.  Gait/station: Intact.      Appearance: Dressed appropriately. Good hygiene/grooming.  Behavior: Cooperative and calm.  Sitting in chair in day room with belongings.  Motor: No abnormal movements, no psychomotor slowing or activation.  Speech: Regular rate.   Mood: Ok.  Affect: More euthymic today.   Thought Process: [place].   Associations: Fair.  Thought Content: No AVH, SIIP, HIIP.  Repetitive ideas regarding surgery.    Insight: Limited.  Judgment: Limited on interview.  Attention: Fair.  Language: Fluent.  Gait/station: Intact.

## 2024-03-15 NOTE — BH INPATIENT PSYCHIATRY PROGRESS NOTE - NSBHMETABOLIC_PSY_ALL_CORE_FT
BMI: BMI (kg/m2): 23.5 (02-10-24 @ 02:59)  HbA1c: A1C with Estimated Average Glucose Result: 6.1 % (01-14-24 @ 04:30)    Glucose: POCT Blood Glucose.: 115 mg/dL (01-04-24 @ 00:46)    BP: --Vital Signs Last 24 Hrs  T(C): 36.7 (03-15-24 @ 08:16), Max: 36.7 (03-15-24 @ 08:16)  T(F): 98.1 (03-15-24 @ 08:16), Max: 98.1 (03-15-24 @ 08:16)  HR: --  BP: --  BP(mean): --  RR: --  SpO2: --    Orthostatic VS  03-15-24 @ 08:16  Lying BP: --/-- HR: --  Sitting BP: 112/72 HR: 101  Standing BP: 116/63 HR: 108  Site: --  Mode: --  Orthostatic VS  03-14-24 @ 08:01  Lying BP: --/-- HR: --  Sitting BP: 123/83 HR: 95  Standing BP: 127/67 HR: 92  Site: --  Mode: --    Lipid Panel: Date/Time: 01-14-24 @ 04:30  Cholesterol, Serum: 130  LDL Cholesterol Calculated: 61  HDL Cholesterol, Serum: 53  Total Cholesterol/HDL Ration Measurement: --  Triglycerides, Serum: 79

## 2024-03-16 RX ADMIN — Medication 2 MILLIGRAM(S): at 08:26

## 2024-03-16 RX ADMIN — Medication 2 MILLIGRAM(S): at 08:33

## 2024-03-16 RX ADMIN — Medication 50 MILLIGRAM(S): at 08:34

## 2024-03-16 RX ADMIN — Medication 2 MILLIGRAM(S): at 20:03

## 2024-03-16 RX ADMIN — HALOPERIDOL 5 MILLIGRAM(S): 10 TABLET ORAL at 08:33

## 2024-03-17 RX ADMIN — Medication 2 MILLIGRAM(S): at 21:06

## 2024-03-17 RX ADMIN — Medication 50 MILLIGRAM(S): at 08:52

## 2024-03-17 RX ADMIN — Medication 2 MILLIGRAM(S): at 08:53

## 2024-03-17 RX ADMIN — HALOPERIDOL 5 MILLIGRAM(S): 10 TABLET ORAL at 08:52

## 2024-03-17 RX ADMIN — Medication 2 MILLIGRAM(S): at 08:52

## 2024-03-18 PROCEDURE — 90853 GROUP PSYCHOTHERAPY: CPT

## 2024-03-18 PROCEDURE — 99232 SBSQ HOSP IP/OBS MODERATE 35: CPT

## 2024-03-18 RX ADMIN — Medication 50 MILLIGRAM(S): at 15:45

## 2024-03-18 RX ADMIN — HALOPERIDOL 5 MILLIGRAM(S): 10 TABLET ORAL at 15:45

## 2024-03-18 RX ADMIN — Medication 2 MILLIGRAM(S): at 20:00

## 2024-03-18 RX ADMIN — Medication 2 MILLIGRAM(S): at 15:45

## 2024-03-18 RX ADMIN — Medication 2 MILLIGRAM(S): at 08:44

## 2024-03-18 NOTE — BH INPATIENT PSYCHIATRY PROGRESS NOTE - NSBHFUPINTERVALHXFT_PSY_A_CORE
As per staff report, took meds, stayed in dayroom overnight with limited sleep.  Pt today states she has surgery for COVID-19.   Pt today states that she no longer wants surgery because it is too much money.  States she would like to leave hospital.

## 2024-03-18 NOTE — BH INPATIENT PSYCHIATRY PROGRESS NOTE - MSE UNSTRUCTURED FT
Appearance: Dressed appropriately. Good hygiene/grooming.  Behavior: Cooperative and calm.  Sitting in chair in day room with belongings.  Motor: No abnormal movements, no psychomotor slowing or activation.  Speech: Regular rate.   Mood: Ok.  Affect: More euthymic today.   Thought Process: [place].   Associations: Fair.  Thought Content: No AVH, SIIP, HIIP.  Repetitive ideas regarding surgery.    Insight: Limited.  Judgment: Limited on interview.  Attention: Fair.  Language: Fluent.  Gait/station: Intact.      Appearance: Dressed appropriately. Good hygiene/grooming.  Behavior: Cooperative and calm.  Sitting in chair in day room with belongings.  Motor: No abnormal movements, no psychomotor slowing or activation.  Speech: Regular rate.   Mood: Ok.  Affect: Pleasant.  Thought Process: [place].   Associations: Fair.  Thought Content: No AVH, SIIP, HIIP.  No longer as fixated on surgery, discharge focused now.  Insight: Limited.  Judgment: Limited on interview.  Attention: Fair.  Language: Fluent.  Gait/station: Intact.

## 2024-03-18 NOTE — BH PSYCHOLOGY - GROUP THERAPY NOTE - NSPSYCHOLGRPCOGPT_PSY_A_CORE FT
Patient attended Cognitive Behavioral Therapy Group incorporating ACT-based concepts. The group started with a brief check-in, asking participants their favorite season. Members then engaged in a discussion of two mindfulness quotes. Afterward, the group focused on a discussion about three types of communication styles, including passive, aggressive, and assertive communications. Group facilitator explained concepts, reinforced participation, and engaged patients in the discussion.

## 2024-03-18 NOTE — BH PSYCHOLOGY ASSESSMENT - NSBHPSYCHOLASREASON_PSY_A_CORE FT
Pt was referred for cognitive and adaptive functioning assessment for available resources in mental health and disability services. During this session, pt was engaged in MARILEE-4 (both Form A and Form B) along with three subtests of WAIS-IV.

## 2024-03-18 NOTE — BH INPATIENT PSYCHIATRY PROGRESS NOTE - NSBHMETABOLIC_PSY_ALL_CORE_FT
BMI: BMI (kg/m2): 23.5 (02-10-24 @ 02:59)  HbA1c: A1C with Estimated Average Glucose Result: 6.1 % (01-14-24 @ 04:30)    Glucose: POCT Blood Glucose.: 115 mg/dL (01-04-24 @ 00:46)    BP: --Vital Signs Last 24 Hrs  T(C): 36.7 (03-18-24 @ 08:09), Max: 36.7 (03-18-24 @ 08:09)  T(F): 98 (03-18-24 @ 08:09), Max: 98 (03-18-24 @ 08:09)  HR: --  BP: --  BP(mean): --  RR: --  SpO2: --    Orthostatic VS  03-18-24 @ 08:09  Lying BP: --/-- HR: --  Sitting BP: 106/66 HR: 76  Standing BP: 110/71 HR: 80  Site: --  Mode: --  Orthostatic VS  03-17-24 @ 08:08  Lying BP: --/-- HR: --  Sitting BP: 111/62 HR: 90  Standing BP: 115/87 HR: 90  Site: --  Mode: --    Lipid Panel: Date/Time: 01-14-24 @ 04:30  Cholesterol, Serum: 130  LDL Cholesterol Calculated: 61  HDL Cholesterol, Serum: 53  Total Cholesterol/HDL Ration Measurement: --  Triglycerides, Serum: 79

## 2024-03-18 NOTE — BH INPATIENT PSYCHIATRY PROGRESS NOTE - NSBHCHARTREVIEWVS_PSY_A_CORE FT
Vital Signs Last 24 Hrs  T(C): 36.7 (03-18-24 @ 08:09), Max: 36.7 (03-18-24 @ 08:09)  T(F): 98 (03-18-24 @ 08:09), Max: 98 (03-18-24 @ 08:09)  HR: --  BP: --  BP(mean): --  RR: --  SpO2: --    Orthostatic VS  03-18-24 @ 08:09  Lying BP: --/-- HR: --  Sitting BP: 106/66 HR: 76  Standing BP: 110/71 HR: 80  Site: --  Mode: --  Orthostatic VS  03-17-24 @ 08:08  Lying BP: --/-- HR: --  Sitting BP: 111/62 HR: 90  Standing BP: 115/87 HR: 90  Site: --  Mode: --
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
## 2024-03-18 NOTE — BH PSYCHOLOGY - GROUP THERAPY NOTE - NSBHPSYCHOLRESPCOMMENT_PSY_A_CORE FT
The patient appeared adequately groomed and was casually dressed. During the group session, she appeared energetic. She was engaged in the group, demonstrated by her willingness to verbally communicate during the discussion, even though sometimes she became tangential and would interrupt others. She noted that having good eye contact is a sign of assertive communication. PT was oriented X3.

## 2024-03-19 PROCEDURE — 99232 SBSQ HOSP IP/OBS MODERATE 35: CPT | Mod: GC

## 2024-03-19 RX ORDER — LORAZEPAM 4 MG/ML
2 VIAL (ML) INJECTION EVERY 6 HOURS
Refills: 0 | Status: DISCONTINUED | OUTPATIENT
Start: 2024-03-19 | End: 2024-03-26

## 2024-03-19 RX ORDER — LORAZEPAM 4 MG/ML
2 VIAL (ML) INJECTION ONCE
Refills: 0 | Status: DISCONTINUED | OUTPATIENT
Start: 2024-03-19 | End: 2024-03-26

## 2024-03-19 RX ADMIN — HALOPERIDOL 5 MILLIGRAM(S): 10 TABLET ORAL at 21:02

## 2024-03-19 RX ADMIN — Medication 50 MILLIGRAM(S): at 22:09

## 2024-03-19 RX ADMIN — Medication 2 MILLIGRAM(S): at 21:58

## 2024-03-19 RX ADMIN — Medication 2 MILLIGRAM(S): at 08:00

## 2024-03-19 RX ADMIN — Medication 2 MILLIGRAM(S): at 21:00
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
## 2024-03-19 NOTE — BH INPATIENT PSYCHIATRY PROGRESS NOTE - NSBHATTESTCOMMENTATTENDFT_PSY_A_CORE
Emotionally regulated, in behavioral control.  Pt continues to have overvalued ideas that are more fantasy than true delusion.  Continue meds.  Awaiting addendum to psychological eval for dispo planning purposes.

## 2024-03-19 NOTE — BH INPATIENT PSYCHIATRY PROGRESS NOTE - NSBHFUPINTERVALHXFT_PSY_A_CORE
Pt today states that she no longer wants surgery because it is too much money.  States she would like to leave hospital. Chart reviewed. Case d/w interdisciplinary team. Per staff pt still sleeping in dayroom, 7hrs sleep overnight per log. No acute overnight events. Compliant with standing meds.     Pt reports good mood, waiting by the door for a visitor. Is eating and sleeping well, taking care of ADLs. Denies physical sxs. Still prefers sleeping in dayroom due to dislike for roommate. Does not mention surgery. Says she will be leaving next week for [facility] apartment, planning on adopting children. Encourages pt to complete cognitive testing with psychology team.

## 2024-03-19 NOTE — BH INPATIENT PSYCHIATRY PROGRESS NOTE - NSBHCHARTREVIEWVS_PSY_A_CORE FT
Vital Signs Last 24 Hrs  T(C): 36.4 (03-19-24 @ 08:10), Max: 36.4 (03-19-24 @ 08:10)  T(F): 97.6 (03-19-24 @ 08:10), Max: 97.6 (03-19-24 @ 08:10)  HR: --  BP: --  BP(mean): --  RR: --  SpO2: --    Orthostatic VS  03-19-24 @ 08:10  Lying BP: --/-- HR: --  Sitting BP: 115/69 HR: 99  Standing BP: 116/69 HR: 100  Site: --  Mode: --  Orthostatic VS  03-18-24 @ 08:09  Lying BP: --/-- HR: --  Sitting BP: 106/66 HR: 76  Standing BP: 110/71 HR: 80  Site: --  Mode: --

## 2024-03-19 NOTE — BH INPATIENT PSYCHIATRY PROGRESS NOTE - CURRENT MEDICATION
MEDICATIONS  (STANDING):  risperiDONE   Tablet 2 milliGRAM(s) Oral two times a day    MEDICATIONS  (PRN):  acetaminophen     Tablet .. 650 milliGRAM(s) Oral every 6 hours PRN Temp greater or equal to 38C (100.4F), Mild Pain (1 - 3)  aluminum hydroxide/magnesium hydroxide/simethicone Suspension 30 milliLiter(s) Oral every 6 hours PRN Dyspepsia  benzocaine/menthol Lozenge 1 Lozenge Oral every 2 hours PRN Sore throat  diphenhydrAMINE 50 milliGRAM(s) Oral every 6 hours PRN Extrapyramidal symptoms or prophylaxis  diphenhydrAMINE Injectable 50 milliGRAM(s) IntraMuscular once PRN Extrapyramidal prophylaxis  haloperidol     Tablet 5 milliGRAM(s) Oral every 6 hours PRN agitation  haloperidol    Injectable 5 milliGRAM(s) IntraMuscular once PRN aggression  LORazepam     Tablet 2 milliGRAM(s) Oral every 6 hours PRN Anxiety  magnesium hydroxide Suspension 30 milliLiter(s) Oral daily PRN Constipation  sodium chloride 0.65% Nasal 1 Spray(s) Both Nostrils three times a day PRN Nasal Congestion  traZODone 50 milliGRAM(s) Oral at bedtime PRN insomnia

## 2024-03-19 NOTE — BH INPATIENT PSYCHIATRY PROGRESS NOTE - NSBHMETABOLIC_PSY_ALL_CORE_FT
BMI: BMI (kg/m2): 23.5 (02-10-24 @ 02:59)  HbA1c: A1C with Estimated Average Glucose Result: 6.1 % (01-14-24 @ 04:30)    Glucose: POCT Blood Glucose.: 115 mg/dL (01-04-24 @ 00:46)    BP: --Vital Signs Last 24 Hrs  T(C): 36.4 (03-19-24 @ 08:10), Max: 36.4 (03-19-24 @ 08:10)  T(F): 97.6 (03-19-24 @ 08:10), Max: 97.6 (03-19-24 @ 08:10)  HR: --  BP: --  BP(mean): --  RR: --  SpO2: --    Orthostatic VS  03-19-24 @ 08:10  Lying BP: --/-- HR: --  Sitting BP: 115/69 HR: 99  Standing BP: 116/69 HR: 100  Site: --  Mode: --  Orthostatic VS  03-18-24 @ 08:09  Lying BP: --/-- HR: --  Sitting BP: 106/66 HR: 76  Standing BP: 110/71 HR: 80  Site: --  Mode: --    Lipid Panel: Date/Time: 01-14-24 @ 04:30  Cholesterol, Serum: 130  LDL Cholesterol Calculated: 61  HDL Cholesterol, Serum: 53  Total Cholesterol/HDL Ration Measurement: --  Triglycerides, Serum: 79

## 2024-03-19 NOTE — BH INPATIENT PSYCHIATRY PROGRESS NOTE - PRN MEDS
MEDICATIONS  (PRN):  acetaminophen     Tablet .. 650 milliGRAM(s) Oral every 6 hours PRN Temp greater or equal to 38C (100.4F), Mild Pain (1 - 3)  aluminum hydroxide/magnesium hydroxide/simethicone Suspension 30 milliLiter(s) Oral every 6 hours PRN Dyspepsia  benzocaine/menthol Lozenge 1 Lozenge Oral every 2 hours PRN Sore throat  diphenhydrAMINE 50 milliGRAM(s) Oral every 6 hours PRN Extrapyramidal symptoms or prophylaxis  diphenhydrAMINE Injectable 50 milliGRAM(s) IntraMuscular once PRN Extrapyramidal prophylaxis  haloperidol     Tablet 5 milliGRAM(s) Oral every 6 hours PRN agitation  haloperidol    Injectable 5 milliGRAM(s) IntraMuscular once PRN aggression  LORazepam     Tablet 2 milliGRAM(s) Oral every 6 hours PRN Anxiety  magnesium hydroxide Suspension 30 milliLiter(s) Oral daily PRN Constipation  sodium chloride 0.65% Nasal 1 Spray(s) Both Nostrils three times a day PRN Nasal Congestion  traZODone 50 milliGRAM(s) Oral at bedtime PRN insomnia

## 2024-03-19 NOTE — BH INPATIENT PSYCHIATRY PROGRESS NOTE - MSE UNSTRUCTURED FT
Appearance: Dressed appropriately. Good hygiene/grooming.  Behavior: Cooperative and calm.  Sitting in chair in day room with belongings.  Motor: No abnormal movements, no psychomotor slowing or activation.  Speech: Regular rate.   Mood: Ok.  Affect: Pleasant.  Thought Process: [place].   Associations: Fair.  Thought Content: No AVH, SIIP, HIIP.  No longer as fixated on surgery, discharge focused now.  Insight: Limited.  Judgment: Limited on interview.  Attention: Fair.  Language: Fluent.  Gait/station: Intact.      Appearance: Dressed appropriately. Good hygiene/grooming.  Behavior: Cooperative and calm.  Standing by nursing station.  Motor: No abnormal movements, no psychomotor slowing or activation.  Speech: Regular rate.   Mood: "Good."  Affect: Friendly.  Thought Process: [place].   Associations: Fair.  Thought Content: No AVH, SIIP, HIIP.  No longer as fixated on surgery, discharge focused now.   Insight: Limited.  Judgment: Limited on interview.  Attention: Fair.  Language: Fluent.  Gait/station: Intact.

## 2024-03-20 PROCEDURE — 99232 SBSQ HOSP IP/OBS MODERATE 35: CPT | Mod: GC

## 2024-03-20 RX ADMIN — Medication 50 MILLIGRAM(S): at 18:48

## 2024-03-20 RX ADMIN — Medication 2 MILLIGRAM(S): at 08:20

## 2024-03-20 RX ADMIN — HALOPERIDOL 5 MILLIGRAM(S): 10 TABLET ORAL at 18:48

## 2024-03-20 RX ADMIN — Medication 2 MILLIGRAM(S): at 18:48

## 2024-03-20 RX ADMIN — Medication 2 MILLIGRAM(S): at 20:38

## 2024-03-20 NOTE — BH INPATIENT PSYCHIATRY PROGRESS NOTE - MSE UNSTRUCTURED FT
Appearance: Dressed appropriately. Good hygiene/grooming.  Behavior: Cooperative and calm.  Standing by nursing station. Poor physical boundaries.   Motor: No abnormal movements, no psychomotor slowing or activation.  Speech: Regular rate. Loud volume.   Mood: "Good."  Affect: Friendly.  Thought Process: [place].   Associations: Fair.  Thought Content: No AVH, SIIP, HIIP.  No longer as fixated on surgery, now focused on perceived relationship with prior unit peer.   Insight: Limited.  Judgment: Limited on interview.  Attention: Fair.  Language: Fluent.  Gait/station: Intact.

## 2024-03-20 NOTE — BH INPATIENT PSYCHIATRY PROGRESS NOTE - NSBHFUPINTERVALHXFT_PSY_A_CORE
Chart reviewed. Case d/w interdisciplinary team. Per staff pt still sleeping in dayroom, 5.75 hrs sleep overnight per log. No acute overnight events. Compliant with standing meds.     Pt reports good mood, continues to perseverate on being visited by prior male unit peer who she says is now her boyfriend. Sleeping in her room now that she has a new roommate. Describes "shaking and screaming" last night because she was thinking about her boyfriend's evil brother. Is eating and sleeping well, taking care of ADLs.

## 2024-03-20 NOTE — BH INPATIENT PSYCHIATRY PROGRESS NOTE - PRN MEDS
MEDICATIONS  (PRN):  acetaminophen     Tablet .. 650 milliGRAM(s) Oral every 6 hours PRN Temp greater or equal to 38C (100.4F), Mild Pain (1 - 3)  aluminum hydroxide/magnesium hydroxide/simethicone Suspension 30 milliLiter(s) Oral every 6 hours PRN Dyspepsia  diphenhydrAMINE 50 milliGRAM(s) Oral every 6 hours PRN Extrapyramidal symptoms or prophylaxis  diphenhydrAMINE Injectable 50 milliGRAM(s) IntraMuscular once PRN Extrapyramidal prophylaxis  haloperidol     Tablet 5 milliGRAM(s) Oral every 6 hours PRN agitation  haloperidol    Injectable 5 milliGRAM(s) IntraMuscular once PRN aggression  LORazepam     Tablet 2 milliGRAM(s) Oral every 6 hours PRN severe anxiety, agitation  LORazepam   Injectable 2 milliGRAM(s) IntraMuscular once PRN severe agitation  magnesium hydroxide Suspension 30 milliLiter(s) Oral daily PRN Constipation  traZODone 50 milliGRAM(s) Oral at bedtime PRN insomnia

## 2024-03-20 NOTE — BH INPATIENT PSYCHIATRY PROGRESS NOTE - NSBHCHARTREVIEWVS_PSY_A_CORE FT
Vital Signs Last 24 Hrs  T(C): 36.7 (03-20-24 @ 06:46), Max: 36.7 (03-20-24 @ 06:46)  T(F): 98.1 (03-20-24 @ 06:46), Max: 98.1 (03-20-24 @ 06:46)  HR: --  BP: --  BP(mean): --  RR: --  SpO2: --    Orthostatic VS  03-20-24 @ 06:46  Lying BP: --/-- HR: --  Sitting BP: 136/82 HR: 102  Standing BP: 127/76 HR: 115  Site: --  Mode: --  Orthostatic VS  03-19-24 @ 08:10  Lying BP: --/-- HR: --  Sitting BP: 115/69 HR: 99  Standing BP: 116/69 HR: 100  Site: --  Mode: --

## 2024-03-20 NOTE — BH INPATIENT PSYCHIATRY PROGRESS NOTE - NSBHATTESTCOMMENTATTENDFT_PSY_A_CORE
Increase in fantastical thought content and attention seeking behavior is commensurate with rise in acuity on the unit due to multiple disruptive peers.  Continue meds.  Awaiting completion of addendum to psychological eval, [employer] requires this for CR application.
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
## 2024-03-20 NOTE — BH INPATIENT PSYCHIATRY PROGRESS NOTE - CURRENT MEDICATION
MEDICATIONS  (STANDING):  risperiDONE   Tablet 2 milliGRAM(s) Oral two times a day    MEDICATIONS  (PRN):  acetaminophen     Tablet .. 650 milliGRAM(s) Oral every 6 hours PRN Temp greater or equal to 38C (100.4F), Mild Pain (1 - 3)  aluminum hydroxide/magnesium hydroxide/simethicone Suspension 30 milliLiter(s) Oral every 6 hours PRN Dyspepsia  diphenhydrAMINE 50 milliGRAM(s) Oral every 6 hours PRN Extrapyramidal symptoms or prophylaxis  diphenhydrAMINE Injectable 50 milliGRAM(s) IntraMuscular once PRN Extrapyramidal prophylaxis  haloperidol     Tablet 5 milliGRAM(s) Oral every 6 hours PRN agitation  haloperidol    Injectable 5 milliGRAM(s) IntraMuscular once PRN aggression  LORazepam     Tablet 2 milliGRAM(s) Oral every 6 hours PRN severe anxiety, agitation  LORazepam   Injectable 2 milliGRAM(s) IntraMuscular once PRN severe agitation  magnesium hydroxide Suspension 30 milliLiter(s) Oral daily PRN Constipation  traZODone 50 milliGRAM(s) Oral at bedtime PRN insomnia

## 2024-03-20 NOTE — BH INPATIENT PSYCHIATRY PROGRESS NOTE - NSBHMETABOLIC_PSY_ALL_CORE_FT
BMI: BMI (kg/m2): 23.5 (02-10-24 @ 02:59)  HbA1c: A1C with Estimated Average Glucose Result: 6.1 % (01-14-24 @ 04:30)    Glucose: POCT Blood Glucose.: 115 mg/dL (01-04-24 @ 00:46)    BP: --Vital Signs Last 24 Hrs  T(C): 36.7 (03-20-24 @ 06:46), Max: 36.7 (03-20-24 @ 06:46)  T(F): 98.1 (03-20-24 @ 06:46), Max: 98.1 (03-20-24 @ 06:46)  HR: --  BP: --  BP(mean): --  RR: --  SpO2: --    Orthostatic VS  03-20-24 @ 06:46  Lying BP: --/-- HR: --  Sitting BP: 136/82 HR: 102  Standing BP: 127/76 HR: 115  Site: --  Mode: --  Orthostatic VS  03-19-24 @ 08:10  Lying BP: --/-- HR: --  Sitting BP: 115/69 HR: 99  Standing BP: 116/69 HR: 100  Site: --  Mode: --    Lipid Panel: Date/Time: 01-14-24 @ 04:30  Cholesterol, Serum: 130  LDL Cholesterol Calculated: 61  HDL Cholesterol, Serum: 53  Total Cholesterol/HDL Ration Measurement: --  Triglycerides, Serum: 79

## 2024-03-21 PROCEDURE — 99232 SBSQ HOSP IP/OBS MODERATE 35: CPT | Mod: GC

## 2024-03-21 RX ADMIN — Medication 2 MILLIGRAM(S): at 20:39

## 2024-03-21 RX ADMIN — HALOPERIDOL 5 MILLIGRAM(S): 10 TABLET ORAL at 08:05

## 2024-03-21 RX ADMIN — Medication 50 MILLIGRAM(S): at 08:06

## 2024-03-21 RX ADMIN — Medication 2 MILLIGRAM(S): at 08:06

## 2024-03-21 RX ADMIN — Medication 2 MILLIGRAM(S): at 08:05

## 2024-03-21 NOTE — BH INPATIENT PSYCHIATRY PROGRESS NOTE - NSBHCHARTREVIEWVS_PSY_A_CORE FT
Vital Signs Last 24 Hrs  T(C): 36.6 (03-21-24 @ 08:13), Max: 36.6 (03-21-24 @ 08:13)  T(F): 97.8 (03-21-24 @ 08:13), Max: 97.8 (03-21-24 @ 08:13)  HR: --  BP: --  BP(mean): --  RR: --  SpO2: --    Orthostatic VS  03-21-24 @ 08:13  Lying BP: --/-- HR: --  Sitting BP: 116/72 HR: 96  Standing BP: 124/74 HR: 106  Site: --  Mode: --  Orthostatic VS  03-20-24 @ 06:46  Lying BP: --/-- HR: --  Sitting BP: 136/82 HR: 102  Standing BP: 127/76 HR: 115  Site: --  Mode: --

## 2024-03-21 NOTE — BH PSYCHOLOGY ASSESSMENT - NSBHPSYCHOLASREASON_PSY_A_CORE FT
Pt was referred for cognitive and adaptive functioning assessment for available resources in mental health and disability services. During this session, pt was engaged in six subtests of WAIS-IV.

## 2024-03-21 NOTE — BH INPATIENT PSYCHIATRY PROGRESS NOTE - NSBHMETABOLIC_PSY_ALL_CORE_FT
BMI: BMI (kg/m2): 23.5 (02-10-24 @ 02:59)  HbA1c: A1C with Estimated Average Glucose Result: 6.1 % (01-14-24 @ 04:30)    Glucose: POCT Blood Glucose.: 115 mg/dL (01-04-24 @ 00:46)    BP: --Vital Signs Last 24 Hrs  T(C): 36.6 (03-21-24 @ 08:13), Max: 36.6 (03-21-24 @ 08:13)  T(F): 97.8 (03-21-24 @ 08:13), Max: 97.8 (03-21-24 @ 08:13)  HR: --  BP: --  BP(mean): --  RR: --  SpO2: --    Orthostatic VS  03-21-24 @ 08:13  Lying BP: --/-- HR: --  Sitting BP: 116/72 HR: 96  Standing BP: 124/74 HR: 106  Site: --  Mode: --  Orthostatic VS  03-20-24 @ 06:46  Lying BP: --/-- HR: --  Sitting BP: 136/82 HR: 102  Standing BP: 127/76 HR: 115  Site: --  Mode: --    Lipid Panel: Date/Time: 01-14-24 @ 04:30  Cholesterol, Serum: 130  LDL Cholesterol Calculated: 61  HDL Cholesterol, Serum: 53  Total Cholesterol/HDL Ration Measurement: --  Triglycerides, Serum: 79

## 2024-03-21 NOTE — BH INPATIENT PSYCHIATRY PROGRESS NOTE - NSBHATTESTCOMMENTATTENDFT_PSY_A_CORE
Continues to have fantasy thought content, attention seeking behavior on unit, likely in response to rise in acuity on unit + change in roommate/return to room.    Continue meds as ordered.  Awaiting completion of addendum to psychological eval.

## 2024-03-21 NOTE — BH INPATIENT PSYCHIATRY PROGRESS NOTE - MSE UNSTRUCTURED FT
Appearance: Dressed appropriately. Good hygiene/grooming.  Behavior: Cooperative and calm.  Standing by nursing station. Poor physical boundaries.   Motor: No abnormal movements, no psychomotor slowing or activation.  Speech: Regular rate. Loud volume.   Mood: "Good."  Affect: Friendly.  Thought Process: [place].   Associations: Fair.  Thought Content: No AVH, SIIP, HIIP.  No longer as fixated on surgery, now focused on perceived relationship with prior unit peer.   Insight: Limited.  Judgment: Limited on interview.  Attention: Fair.  Language: Fluent.  Gait/station: Intact.      Appearance: Dressed appropriately. Good hygiene/grooming.  Behavior: Cooperative and calm. Sitting on bed holding stuffed animal. Poor physical boundaries.   Motor: No abnormal movements, no psychomotor slowing or activation.  Speech: Regular rate. Whispering.   Mood: "Good."  Affect: Neutral, more reserved.  Thought Process: [place].   Associations: Fair.  Thought Content: No AVH, SIIP, HIIP.  Elaborate fantasies around perceived relationship with prior unit peer. Vague mentions of recently discharged peers.   Insight: Limited.  Judgment: Limited on interview.  Attention: Fair.  Language: Fluent.  Gait/station: Intact.

## 2024-03-21 NOTE — BH INPATIENT PSYCHIATRY PROGRESS NOTE - NSBHFUPINTERVALHXFT_PSY_A_CORE
Chart reviewed. Case d/w interdisciplinary team. Per staff pt still sleeping in dayroom, 5.75 hrs sleep overnight per log. No acute overnight events. Compliant with standing meds.     Pt reports good mood, continues to perseverate on being visited by prior male unit peer who she says is now her boyfriend. Sleeping in her room now that she has a new roommate. Describes "shaking and screaming" last night because she was thinking about her boyfriend's evil brother. Is eating and sleeping well, taking care of ADLs.  Chart reviewed. Case d/w interdisciplinary team. Per staff pt now sleeping in room, 6-7 hrs of sleep per logs. Required PO H/A/B last night for agitation + screaming. Compliant with standing meds.     Today pt reports good mood, although is less forthcoming with writer. Continues to talk about perceived relationship with prior male patient, including [place]. Mentions female unit peer that was discharged yesterday. Pt denies physical sxs including medication side effects, taking care of ADLs. Encouraged pt to leave room for breakfast.
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
## 2024-03-22 PROCEDURE — 99232 SBSQ HOSP IP/OBS MODERATE 35: CPT

## 2024-03-22 RX ADMIN — HALOPERIDOL 5 MILLIGRAM(S): 10 TABLET ORAL at 20:07

## 2024-03-22 RX ADMIN — Medication 2 MILLIGRAM(S): at 09:23

## 2024-03-22 RX ADMIN — Medication 2 MILLIGRAM(S): at 09:22

## 2024-03-22 RX ADMIN — Medication 50 MILLIGRAM(S): at 20:06

## 2024-03-22 RX ADMIN — Medication 50 MILLIGRAM(S): at 09:23

## 2024-03-22 RX ADMIN — Medication 2 MILLIGRAM(S): at 20:06

## 2024-03-22 RX ADMIN — HALOPERIDOL 5 MILLIGRAM(S): 10 TABLET ORAL at 09:23

## 2024-03-22 NOTE — BH INPATIENT PSYCHIATRY PROGRESS NOTE - NSBHCHARTREVIEWVS_PSY_A_CORE FT
Vital Signs Last 24 Hrs  T(C): 36.3 (03-22-24 @ 08:19), Max: 36.3 (03-22-24 @ 08:19)  T(F): 97.3 (03-22-24 @ 08:19), Max: 97.3 (03-22-24 @ 08:19)  HR: --  BP: --  BP(mean): --  RR: --  SpO2: --    Orthostatic VS  03-22-24 @ 08:19  Lying BP: --/-- HR: --  Sitting BP: 129/64 HR: 100  Standing BP: 127/85 HR: 100  Site: --  Mode: --  Orthostatic VS  03-21-24 @ 08:13  Lying BP: --/-- HR: --  Sitting BP: 116/72 HR: 96  Standing BP: 124/74 HR: 106  Site: --  Mode: --
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
## 2024-03-22 NOTE — BH INPATIENT PSYCHIATRY PROGRESS NOTE - NSBHMETABOLIC_PSY_ALL_CORE_FT
BMI: BMI (kg/m2): 23.5 (02-10-24 @ 02:59)  HbA1c: A1C with Estimated Average Glucose Result: 6.1 % (01-14-24 @ 04:30)    Glucose: POCT Blood Glucose.: 115 mg/dL (01-04-24 @ 00:46)    BP: --Vital Signs Last 24 Hrs  T(C): 36.3 (03-22-24 @ 08:19), Max: 36.3 (03-22-24 @ 08:19)  T(F): 97.3 (03-22-24 @ 08:19), Max: 97.3 (03-22-24 @ 08:19)  HR: --  BP: --  BP(mean): --  RR: --  SpO2: --    Orthostatic VS  03-22-24 @ 08:19  Lying BP: --/-- HR: --  Sitting BP: 129/64 HR: 100  Standing BP: 127/85 HR: 100  Site: --  Mode: --  Orthostatic VS  03-21-24 @ 08:13  Lying BP: --/-- HR: --  Sitting BP: 116/72 HR: 96  Standing BP: 124/74 HR: 106  Site: --  Mode: --    Lipid Panel: Date/Time: 01-14-24 @ 04:30  Cholesterol, Serum: 130  LDL Cholesterol Calculated: 61  HDL Cholesterol, Serum: 53  Total Cholesterol/HDL Ration Measurement: --  Triglycerides, Serum: 79
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
## 2024-03-22 NOTE — BH INPATIENT PSYCHIATRY PROGRESS NOTE - MSE UNSTRUCTURED FT
Appearance: Dressed appropriately. Good hygiene/grooming.  Behavior: Cooperative and calm. Childlike, lying on bed holding stuffed animal and stress ball.  Motor: No abnormal movements, no psychomotor slowing or activation.  Speech: Regular rate. Loud at times.   Mood: "Good."  Affect: At times pleasant, and other times frustrated.  Thought Process: [place].   Associations: Fair.  Thought Content: No AVH, SIIP, HIIP.  Elaborate fantasies around unit and peers.   Insight: Limited.  Judgment: Limited on interview.  Attention: Fair.  Language: Fluent.  Gait/station: Intact.

## 2024-03-23 RX ADMIN — Medication 2 MILLIGRAM(S): at 19:39

## 2024-03-23 RX ADMIN — HALOPERIDOL 5 MILLIGRAM(S): 10 TABLET ORAL at 19:40

## 2024-03-23 RX ADMIN — Medication 2 MILLIGRAM(S): at 19:40

## 2024-03-23 RX ADMIN — Medication 50 MILLIGRAM(S): at 19:40

## 2024-03-23 RX ADMIN — Medication 2 MILLIGRAM(S): at 09:05

## 2024-03-24 RX ADMIN — Medication 2 MILLIGRAM(S): at 21:34

## 2024-03-24 RX ADMIN — Medication 2 MILLIGRAM(S): at 08:19

## 2024-03-25 PROCEDURE — 99232 SBSQ HOSP IP/OBS MODERATE 35: CPT | Mod: GC

## 2024-03-25 RX ADMIN — Medication 2 MILLIGRAM(S): at 20:54

## 2024-03-25 RX ADMIN — Medication 50 MILLIGRAM(S): at 20:54

## 2024-03-25 RX ADMIN — Medication 2 MILLIGRAM(S): at 08:18

## 2024-03-25 NOTE — BH INPATIENT PSYCHIATRY PROGRESS NOTE - NSBHCHARTREVIEWVS_PSY_A_CORE FT
Vital Signs Last 24 Hrs  T(C): 36.6 (03-25-24 @ 08:35), Max: 36.6 (03-25-24 @ 08:35)  T(F): 97.8 (03-25-24 @ 08:35), Max: 97.8 (03-25-24 @ 08:35)  HR: --  BP: --  BP(mean): --  RR: --  SpO2: --    Orthostatic VS  03-25-24 @ 08:35  Lying BP: --/-- HR: --  Sitting BP: 112/65 HR: 98  Standing BP: 115/75 HR: 102  Site: --  Mode: --  Orthostatic VS  03-24-24 @ 08:21  Lying BP: --/-- HR: --  Sitting BP: 112/74 HR: 97  Standing BP: 111/66 HR: 98  Site: --  Mode: --

## 2024-03-25 NOTE — BH INPATIENT PSYCHIATRY PROGRESS NOTE - NSBHMETABOLIC_PSY_ALL_CORE_FT
BMI: BMI (kg/m2): 23.5 (02-10-24 @ 02:59)  HbA1c: A1C with Estimated Average Glucose Result: 6.1 % (01-14-24 @ 04:30)    Glucose: POCT Blood Glucose.: 115 mg/dL (01-04-24 @ 00:46)    BP: --Vital Signs Last 24 Hrs  T(C): 36.6 (03-25-24 @ 08:35), Max: 36.6 (03-25-24 @ 08:35)  T(F): 97.8 (03-25-24 @ 08:35), Max: 97.8 (03-25-24 @ 08:35)  HR: --  BP: --  BP(mean): --  RR: --  SpO2: --    Orthostatic VS  03-25-24 @ 08:35  Lying BP: --/-- HR: --  Sitting BP: 112/65 HR: 98  Standing BP: 115/75 HR: 102  Site: --  Mode: --  Orthostatic VS  03-24-24 @ 08:21  Lying BP: --/-- HR: --  Sitting BP: 112/74 HR: 97  Standing BP: 111/66 HR: 98  Site: --  Mode: --    Lipid Panel: Date/Time: 01-14-24 @ 04:30  Cholesterol, Serum: 130  LDL Cholesterol Calculated: 61  HDL Cholesterol, Serum: 53  Total Cholesterol/HDL Ration Measurement: --  Triglycerides, Serum: 79
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
## 2024-03-25 NOTE — BH INPATIENT PSYCHIATRY PROGRESS NOTE - MSE UNSTRUCTURED FT
Appearance: Dressed appropriately. Good hygiene/grooming.  Behavior: Cooperative and calm. Childlike, sitting on bed holding stress ball.   Motor: No abnormal movements, no psychomotor slowing or activation.  Speech: Regular rate. Loud at times.   Mood: "Good."  Affect: At times pleasant, and other times frustrated.  Thought Process: [place].   Associations: Fair.  Thought Content: No AVH, SIIP, HIIP.  Elaborate fantasies around unit and peers. Discharge focused.   Insight: Limited.  Judgment: Limited on interview.  Attention: Fair.  Language: Fluent.  Gait/station: Intact.

## 2024-03-25 NOTE — BH INPATIENT PSYCHIATRY PROGRESS NOTE - NSBHFUPINTERVALHXFT_PSY_A_CORE
As per staff report, pt intermittently yelling in her room, at times has to be redirected but overall in behavioral control. Pt received PO H/A/B x1 over the weekend. Compliant with meds. Poor sleep overnight per logs (1.5hrs). Pt on interview continues to be focused on prior unit peer and discharge to "[facility]." Pt states she is waiting for a visitor tonight. Endorses good appetite and sleep. Denies medication side effects and physical sxs. Expresses uneasiness towards female unit peer, able to appropriately maintain her distance.

## 2024-03-26 PROCEDURE — 99231 SBSQ HOSP IP/OBS SF/LOW 25: CPT | Mod: GC

## 2024-03-26 RX ORDER — LORAZEPAM 4 MG/ML
2 VIAL (ML) INJECTION ONCE
Refills: 0 | Status: DISCONTINUED | OUTPATIENT
Start: 2024-03-26 | End: 2024-04-02

## 2024-03-26 RX ORDER — LORAZEPAM 4 MG/ML
2 VIAL (ML) INJECTION EVERY 6 HOURS
Refills: 0 | Status: DISCONTINUED | OUTPATIENT
Start: 2024-03-26 | End: 2024-04-02

## 2024-03-26 RX ADMIN — Medication 2 MILLIGRAM(S): at 09:10

## 2024-03-26 RX ADMIN — HALOPERIDOL 5 MILLIGRAM(S): 10 TABLET ORAL at 20:32

## 2024-03-26 RX ADMIN — Medication 50 MILLIGRAM(S): at 20:32

## 2024-03-26 RX ADMIN — Medication 2 MILLIGRAM(S): at 20:34

## 2024-03-26 NOTE — BH INPATIENT PSYCHIATRY PROGRESS NOTE - MSE UNSTRUCTURED FT
Appearance: Dressed appropriately. Good hygiene/grooming.  Behavior: Cooperative and calm. Childlike, sitting on bed holding stress ball.   Motor: No abnormal movements, no psychomotor slowing or activation.  Speech: Regular rate. Loud at times.   Mood: "Good."  Affect: At times pleasant, and other times frustrated.  Thought Process: [place].   Associations: Fair.  Thought Content: No AVH, SIIP, HIIP.  Elaborate fantasies around unit and peers. Discharge focused.   Insight: Limited.  Judgment: Limited on interview.  Attention: [place].  Language: Fluent.  Gait/station: Intact.      Appearance: Dressed appropriately. Good hygiene/grooming.  Behavior: Cooperative and calm. Childlike, standing in hallways holding stuffed animal.   Motor: No abnormal movements, no psychomotor slowing or activation.  Speech: Regular rate. Loud at times.   Mood: "Good."  Affect: Pleasant, at times confused.   Thought Process: [place].   Associations: Fair.  Thought Content: No AVH, SIIP, HIIP.  Elaborate fantasies around unit and peers. Discharge focused.   Insight: Limited.  Judgment: Limited on interview.  Attention: Fair.  Language: Fluent.  Gait/station: Intact.
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
## 2024-03-26 NOTE — BH INPATIENT PSYCHIATRY PROGRESS NOTE - NSBHFUPINTERVALHXFT_PSY_A_CORE
As per staff report, pt intermittently yelling in her room, at times has to be redirected but overall in behavioral control. Pt received PO H/A/B x1 over the weekend. Compliant with meds. Poor sleep overnight per logs ( rs). Pt on interview continues to be focused on prior unit peer and discharge to "[facility]." Pt states she is waiting for a visitor tonight. Endorses good appetite and sleep. Denies medication side effects and physical sxs. Expresses uneasiness towards female unit peer, able to appropriately maintain her distance.  As per staff report, pt intermittently yelling in her room, but compliant and in behavioral control. No prns received. Compliant with meds. Poor sleep overnight per logs (4.5hrs). Pt on interview continues to be focused on prior unit peer and discharge, waiting for visitor. Good appetite and sleep. No medication side effects and physical sxs.

## 2024-03-26 NOTE — BH INPATIENT PSYCHIATRY PROGRESS NOTE - NSBHMETABOLIC_PSY_ALL_CORE_FT
BMI: BMI (kg/m2): 23.5 (02-10-24 @ 02:59)  HbA1c: A1C with Estimated Average Glucose Result: 6.1 % (01-14-24 @ 04:30)    Glucose: POCT Blood Glucose.: 115 mg/dL (01-04-24 @ 00:46)    BP: --Vital Signs Last 24 Hrs  T(C): 36.6 (03-25-24 @ 08:35), Max: 36.6 (03-25-24 @ 08:35)  T(F): 97.8 (03-25-24 @ 08:35), Max: 97.8 (03-25-24 @ 08:35)  HR: --  BP: --  BP(mean): --  RR: --  SpO2: --    Orthostatic VS  03-25-24 @ 08:35  Lying BP: --/-- HR: --  Sitting BP: 112/65 HR: 98  Standing BP: 115/75 HR: 102  Site: --  Mode: --  Orthostatic VS  03-24-24 @ 08:21  Lying BP: --/-- HR: --  Sitting BP: 112/74 HR: 97  Standing BP: 111/66 HR: 98  Site: --  Mode: --    Lipid Panel: Date/Time: 01-14-24 @ 04:30  Cholesterol, Serum: 130  LDL Cholesterol Calculated: 61  HDL Cholesterol, Serum: 53  Total Cholesterol/HDL Ration Measurement: --  Triglycerides, Serum: 79   BMI: BMI (kg/m2): 23.5 (02-10-24 @ 02:59)  HbA1c: A1C with Estimated Average Glucose Result: 6.1 % (01-14-24 @ 04:30)    Glucose: POCT Blood Glucose.: 115 mg/dL (01-04-24 @ 00:46)    BP: --Vital Signs Last 24 Hrs  T(C): 36.4 (03-26-24 @ 08:12), Max: 36.4 (03-26-24 @ 08:12)  T(F): 97.6 (03-26-24 @ 08:12), Max: 97.6 (03-26-24 @ 08:12)  HR: --  BP: --  BP(mean): --  RR: --  SpO2: --    Orthostatic VS  03-26-24 @ 08:12  Lying BP: --/-- HR: --  Sitting BP: 134/84 HR: 120  Standing BP: 128/80 HR: 130  Site: --  Mode: --  Orthostatic VS  03-25-24 @ 08:35  Lying BP: --/-- HR: --  Sitting BP: 112/65 HR: 98  Standing BP: 115/75 HR: 102  Site: --  Mode: --    Lipid Panel: Date/Time: 01-14-24 @ 04:30  Cholesterol, Serum: 130  LDL Cholesterol Calculated: 61  HDL Cholesterol, Serum: 53  Total Cholesterol/HDL Ration Measurement: --  Triglycerides, Serum: 79

## 2024-03-26 NOTE — BH INPATIENT PSYCHIATRY PROGRESS NOTE - NSBHCHARTREVIEWVS_PSY_A_CORE FT
Vital Signs Last 24 Hrs  T(C): 36.6 (03-25-24 @ 08:35), Max: 36.6 (03-25-24 @ 08:35)  T(F): 97.8 (03-25-24 @ 08:35), Max: 97.8 (03-25-24 @ 08:35)  HR: --  BP: --  BP(mean): --  RR: --  SpO2: --    Orthostatic VS  03-25-24 @ 08:35  Lying BP: --/-- HR: --  Sitting BP: 112/65 HR: 98  Standing BP: 115/75 HR: 102  Site: --  Mode: --  Orthostatic VS  03-24-24 @ 08:21  Lying BP: --/-- HR: --  Sitting BP: 112/74 HR: 97  Standing BP: 111/66 HR: 98  Site: --  Mode: --   Vital Signs Last 24 Hrs  T(C): 36.4 (03-26-24 @ 08:12), Max: 36.4 (03-26-24 @ 08:12)  T(F): 97.6 (03-26-24 @ 08:12), Max: 97.6 (03-26-24 @ 08:12)  HR: --  BP: --  BP(mean): --  RR: --  SpO2: --    Orthostatic VS  03-26-24 @ 08:12  Lying BP: --/-- HR: --  Sitting BP: 134/84 HR: 120  Standing BP: 128/80 HR: 130  Site: --  Mode: --  Orthostatic VS  03-25-24 @ 08:35  Lying BP: --/-- HR: --  Sitting BP: 112/65 HR: 98  Standing BP: 115/75 HR: 102  Site: --  Mode: --

## 2024-03-27 PROCEDURE — 99231 SBSQ HOSP IP/OBS SF/LOW 25: CPT | Mod: GC

## 2024-03-27 RX ADMIN — Medication 2 MILLIGRAM(S): at 20:25

## 2024-03-27 RX ADMIN — Medication 2 MILLIGRAM(S): at 08:45
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
## 2024-03-27 NOTE — BH INPATIENT PSYCHIATRY PROGRESS NOTE - NSBHCHARTREVIEWVS_PSY_A_CORE FT
Vital Signs Last 24 Hrs  T(C): 36.4 (03-26-24 @ 08:12), Max: 36.4 (03-26-24 @ 08:12)  T(F): 97.6 (03-26-24 @ 08:12), Max: 97.6 (03-26-24 @ 08:12)  HR: --  BP: --  BP(mean): --  RR: --  SpO2: --    Orthostatic VS  03-26-24 @ 08:12  Lying BP: --/-- HR: --  Sitting BP: 134/84 HR: 120  Standing BP: 128/80 HR: 130  Site: --  Mode: --  Orthostatic VS  03-25-24 @ 08:35  Lying BP: --/-- HR: --  Sitting BP: 112/65 HR: 98  Standing BP: 115/75 HR: 102  Site: --  Mode: --   Vital Signs Last 24 Hrs  T(C): 33.7 (03-27-24 @ 08:09), Max: 33.7 (03-27-24 @ 08:09)  T(F): 92.6 (03-27-24 @ 08:09), Max: 92.6 (03-27-24 @ 08:09)  HR: --  BP: --  BP(mean): --  RR: --  SpO2: --    Orthostatic VS  03-27-24 @ 08:09  Lying BP: --/-- HR: --  Sitting BP: 128/79 HR: 94  Standing BP: 130/82 HR: 96  Site: --  Mode: --  Orthostatic VS  03-26-24 @ 08:12  Lying BP: --/-- HR: --  Sitting BP: 134/84 HR: 120  Standing BP: 128/80 HR: 130  Site: --  Mode: --

## 2024-03-27 NOTE — BH INPATIENT PSYCHIATRY PROGRESS NOTE - NSBHMETABOLIC_PSY_ALL_CORE_FT
BMI: BMI (kg/m2): 23.5 (02-10-24 @ 02:59)  HbA1c: A1C with Estimated Average Glucose Result: 6.1 % (01-14-24 @ 04:30)    Glucose: POCT Blood Glucose.: 115 mg/dL (01-04-24 @ 00:46)    BP: --Vital Signs Last 24 Hrs  T(C): 36.4 (03-26-24 @ 08:12), Max: 36.4 (03-26-24 @ 08:12)  T(F): 97.6 (03-26-24 @ 08:12), Max: 97.6 (03-26-24 @ 08:12)  HR: --  BP: --  BP(mean): --  RR: --  SpO2: --    Orthostatic VS  03-26-24 @ 08:12  Lying BP: --/-- HR: --  Sitting BP: 134/84 HR: 120  Standing BP: 128/80 HR: 130  Site: --  Mode: --  Orthostatic VS  03-25-24 @ 08:35  Lying BP: --/-- HR: --  Sitting BP: 112/65 HR: 98  Standing BP: 115/75 HR: 102  Site: --  Mode: --    Lipid Panel: Date/Time: 01-14-24 @ 04:30  Cholesterol, Serum: 130  LDL Cholesterol Calculated: 61  HDL Cholesterol, Serum: 53  Total Cholesterol/HDL Ration Measurement: --  Triglycerides, Serum: 79   BMI: BMI (kg/m2): 23.5 (02-10-24 @ 02:59)  HbA1c: A1C with Estimated Average Glucose Result: 6.1 % (01-14-24 @ 04:30)    Glucose: POCT Blood Glucose.: 115 mg/dL (01-04-24 @ 00:46)    BP: --Vital Signs Last 24 Hrs  T(C): 33.7 (03-27-24 @ 08:09), Max: 33.7 (03-27-24 @ 08:09)  T(F): 92.6 (03-27-24 @ 08:09), Max: 92.6 (03-27-24 @ 08:09)  HR: --  BP: --  BP(mean): --  RR: --  SpO2: --    Orthostatic VS  03-27-24 @ 08:09  Lying BP: --/-- HR: --  Sitting BP: 128/79 HR: 94  Standing BP: 130/82 HR: 96  Site: --  Mode: --  Orthostatic VS  03-26-24 @ 08:12  Lying BP: --/-- HR: --  Sitting BP: 134/84 HR: 120  Standing BP: 128/80 HR: 130  Site: --  Mode: --    Lipid Panel: Date/Time: 01-14-24 @ 04:30  Cholesterol, Serum: 130  LDL Cholesterol Calculated: 61  HDL Cholesterol, Serum: 53  Total Cholesterol/HDL Ration Measurement: --  Triglycerides, Serum: 79

## 2024-03-27 NOTE — BH INPATIENT PSYCHIATRY PROGRESS NOTE - NSBHFUPINTERVALHXFT_PSY_A_CORE
As per staff report, pt intermittently yelling in her room, but compliant and in behavioral control. No prns received. Compliant with meds. Poor sleep overnight per logs (4.5hrs). Pt on interview continues to be focused on prior unit peer and discharge, waiting for visitor. Good appetite and sleep. No medication side effects and physical sxs.  As per staff report, pt behavior improved with less episodes of yelling/attention seeking behavior. Received prn po h/a/b overnight. Compliant with meds. Slept 8hrs overnight per logs. Pt on interview excited to see select staff, continues to ask about discharge, mention prior unit peer. Good appetite and sleep. No med side effects and physical sxs.

## 2024-03-28 PROCEDURE — 99231 SBSQ HOSP IP/OBS SF/LOW 25: CPT | Mod: GC

## 2024-03-28 RX ADMIN — Medication 2 MILLIGRAM(S): at 08:39

## 2024-03-28 RX ADMIN — Medication 2 MILLIGRAM(S): at 20:24

## 2024-03-28 NOTE — BH INPATIENT PSYCHIATRY PROGRESS NOTE - NSBHCHARTREVIEWVS_PSY_A_CORE FT
Vital Signs Last 24 Hrs  T(C): 36.4 (03-28-24 @ 06:46), Max: 36.4 (03-28-24 @ 06:46)  T(F): 97.5 (03-28-24 @ 06:46), Max: 97.5 (03-28-24 @ 06:46)  HR: --  BP: --  BP(mean): --  RR: --  SpO2: --    Orthostatic VS  03-28-24 @ 06:46  Lying BP: --/-- HR: --  Sitting BP: 119/72 HR: 96  Standing BP: 119/76 HR: 102  Site: --  Mode: --  Orthostatic VS  03-27-24 @ 08:09  Lying BP: --/-- HR: --  Sitting BP: 128/79 HR: 94  Standing BP: 130/82 HR: 96  Site: --  Mode: --

## 2024-03-28 NOTE — BH INPATIENT PSYCHIATRY PROGRESS NOTE - MSE UNSTRUCTURED FT
Appearance: Dressed appropriately. Good hygiene/grooming.  Behavior: Cooperative and calm. Childlike, holding stuffed animal, in room.   Motor: No abnormal movements, no psychomotor slowing or activation.  Speech: Regular rate. Loud at times.   Mood: "Good."  Affect: Pleasant, friendly.  Thought Process: [place].   Associations: Fair.  Thought Content: No AVH, SIIP, HIIP.  Elaborate fantasies around unit and peers. Discharge focused.   Insight: Limited.  Judgment: Limited on interview.  Attention: Fair.  Language: Fluent.  Gait/station: Intact.      Appearance: Dressed appropriately. Good hygiene/grooming.  Behavior: Cooperative and calm. Childlike, holding stuffed animal, in hallway.   Motor: No abnormal movements, no psychomotor slowing or activation.  Speech: Regular rate. Loud at times.   Mood: "Good."  Affect: Pleasant, friendly.  Thought Process: [place].   Associations: Fair.  Thought Content: No AVH, SIIP, HIIP.  Elaborate fantasies around unit and peers. Discharge focused.   Insight: Limited.  Judgment: Limited on interview.  Attention: Fair.  Language: Fluent.  Gait/station: Intact.

## 2024-03-28 NOTE — BH INPATIENT PSYCHIATRY PROGRESS NOTE - NSBHFUPINTERVALHXFT_PSY_A_CORE
As per staff report, pt behavior improved with less episodes of yelling/attention seeking behavior. Received prn po h/a/b overnight. Compliant with meds. Slept 8hrs overnight per logs. Pt on interview excited to see select staff, continues to ask about discharge, mention prior unit peer. Good appetite and sleep. No med side effects and physical sxs.  As per staff report, no acute events overnight. No prns required. Compliant with meds. Poor sleep overnight per log. Pt on interview friendly, says she is seeing boyfriend today and then a cab will come pick them up tomorrow for discharge. Good appetite and sleep. No med side effects and physical sxs.
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
## 2024-03-28 NOTE — BH INPATIENT PSYCHIATRY PROGRESS NOTE - NSBHATTESTCOMMENTATTENDFT_PSY_A_CORE
At baseline, though anticipate will likely have acting out as pt will have new roommate today (pt usually takes several days to reacclimate to these types of changes on unit for which she has low frustration tolerance).  Continue meds as ordered.

## 2024-03-28 NOTE — BH INPATIENT PSYCHIATRY PROGRESS NOTE - MSE UNSTRUCTURED FT
Appearance: Dressed appropriately. Good hygiene/grooming.  Behavior: Cooperative and calm. Childlike, holding stuffed animal, in room.   Motor: No abnormal movements, no psychomotor slowing or activation.  Speech: Regular rate. Loud at times.   Mood: "Good."  Affect: Pleasant, friendly.  Thought Process: [place].   Associations: Fair.  Thought Content: No AVH, SIIP, HIIP.  Elaborate fantasies around unit and peers. Discharge focused.   Insight: Limited.  Judgment: Limited on interview.  Attention: Fair.  Language: Fluent.  Gait/station: Intact.

## 2024-03-28 NOTE — BH INPATIENT PSYCHIATRY PROGRESS NOTE - NSBHFUPINTERVALHXFT_PSY_A_CORE
As per staff report, pt behavior improved with less episodes of yelling/attention seeking behavior. Received prn po h/a/b overnight. Compliant with meds. Slept 8hrs overnight per logs. Pt on interview excited to see select staff, continues to ask about discharge, mention prior unit peer. Good appetite and sleep. No med side effects and physical sxs.

## 2024-03-28 NOTE — BH INPATIENT PSYCHIATRY PROGRESS NOTE - NSBHMETABOLIC_PSY_ALL_CORE_FT
BMI: BMI (kg/m2): 23.5 (02-10-24 @ 02:59)  HbA1c: A1C with Estimated Average Glucose Result: 6.1 % (01-14-24 @ 04:30)    Glucose: POCT Blood Glucose.: 115 mg/dL (01-04-24 @ 00:46)    BP: --Vital Signs Last 24 Hrs  T(C): 36.4 (03-28-24 @ 06:46), Max: 36.4 (03-28-24 @ 06:46)  T(F): 97.5 (03-28-24 @ 06:46), Max: 97.5 (03-28-24 @ 06:46)  HR: --  BP: --  BP(mean): --  RR: --  SpO2: --    Orthostatic VS  03-28-24 @ 06:46  Lying BP: --/-- HR: --  Sitting BP: 119/72 HR: 96  Standing BP: 119/76 HR: 102  Site: --  Mode: --  Orthostatic VS  03-27-24 @ 08:09  Lying BP: --/-- HR: --  Sitting BP: 128/79 HR: 94  Standing BP: 130/82 HR: 96  Site: --  Mode: --    Lipid Panel: Date/Time: 01-14-24 @ 04:30  Cholesterol, Serum: 130  LDL Cholesterol Calculated: 61  HDL Cholesterol, Serum: 53  Total Cholesterol/HDL Ration Measurement: --  Triglycerides, Serum: 79

## 2024-03-29 PROCEDURE — 99231 SBSQ HOSP IP/OBS SF/LOW 25: CPT | Mod: GC

## 2024-03-29 RX ADMIN — Medication 2 MILLIGRAM(S): at 20:18

## 2024-03-29 RX ADMIN — HALOPERIDOL 5 MILLIGRAM(S): 10 TABLET ORAL at 20:15

## 2024-03-29 RX ADMIN — Medication 2 MILLIGRAM(S): at 08:51

## 2024-03-29 RX ADMIN — Medication 50 MILLIGRAM(S): at 20:15

## 2024-03-29 NOTE — BH INPATIENT PSYCHIATRY PROGRESS NOTE - NSBHATTESTCOMMENTATTENDFT_PSY_A_CORE
Patient seen by me, chart reviewed, and case discussed with treatment team.  Reviewed and agree with above progress note, assessment and plan; corrections and modification made where appropriate.  The patient continues to do well on the unit, without agitation.  Continue current plan.

## 2024-03-29 NOTE — BH INPATIENT PSYCHIATRY PROGRESS NOTE - NSBHMETABOLIC_PSY_ALL_CORE_FT
BMI: BMI (kg/m2): 23.5 (02-10-24 @ 02:59)  HbA1c: A1C with Estimated Average Glucose Result: 6.1 % (01-14-24 @ 04:30)    Glucose: POCT Blood Glucose.: 115 mg/dL (01-04-24 @ 00:46)    BP: --Vital Signs Last 24 Hrs  T(C): 36.6 (03-29-24 @ 08:14), Max: 36.6 (03-29-24 @ 08:14)  T(F): 97.9 (03-29-24 @ 08:14), Max: 97.9 (03-29-24 @ 08:14)  HR: --  BP: --  BP(mean): --  RR: --  SpO2: --    Orthostatic VS  03-29-24 @ 08:14  Lying BP: --/-- HR: --  Sitting BP: 134/80 HR: 101  Standing BP: 134/112 HR: --  Site: --  Mode: --  Orthostatic VS  03-28-24 @ 06:46  Lying BP: --/-- HR: --  Sitting BP: 119/72 HR: 96  Standing BP: 119/76 HR: 102  Site: --  Mode: --    Lipid Panel: Date/Time: 01-14-24 @ 04:30  Cholesterol, Serum: 130  LDL Cholesterol Calculated: 61  HDL Cholesterol, Serum: 53  Total Cholesterol/HDL Ration Measurement: --  Triglycerides, Serum: 79

## 2024-03-29 NOTE — BH INPATIENT PSYCHIATRY PROGRESS NOTE - NSBHCHARTREVIEWVS_PSY_A_CORE FT
Vital Signs Last 24 Hrs  T(C): 36.6 (03-29-24 @ 08:14), Max: 36.6 (03-29-24 @ 08:14)  T(F): 97.9 (03-29-24 @ 08:14), Max: 97.9 (03-29-24 @ 08:14)  HR: --  BP: --  BP(mean): --  RR: --  SpO2: --    Orthostatic VS  03-29-24 @ 08:14  Lying BP: --/-- HR: --  Sitting BP: 134/80 HR: 101  Standing BP: 134/112 HR: --  Site: --  Mode: --  Orthostatic VS  03-28-24 @ 06:46  Lying BP: --/-- HR: --  Sitting BP: 119/72 HR: 96  Standing BP: 119/76 HR: 102  Site: --  Mode: --

## 2024-03-29 NOTE — BH INPATIENT PSYCHIATRY PROGRESS NOTE - MSE UNSTRUCTURED FT
Appearance: Dressed appropriately. Good hygiene/grooming.  Behavior: Cooperative and calm. Childlike, friendly.   Motor: No abnormal movements, no psychomotor slowing or activation.  Speech: Regular rate. Loud at times.   Mood: "Good."  Affect: Pleasant.  Thought Process: [place].   Associations: Fair.  Thought Content: No AVH, SIIP, HIIP.  Elaborate fantasies involving romantic relationships and plans for children. Discharge focused.   Insight: Limited.  Judgment: Limited on interview.  Attention: Fair.  Language: Fluent.  Gait/station: Intact.

## 2024-03-29 NOTE — BH INPATIENT PSYCHIATRY PROGRESS NOTE - NSBHFUPINTERVALHXFT_PSY_A_CORE
As per staff report, no acute events overnight. Pt is cooperative and pleasant, at likely baseline. No prns required. Compliant with meds. Poor sleep overnight per log. On interview pt friendly, poor boundaries but redirectable. Asks writer repeatedly for a pregnancy test so she can have babies. Clarifies that she needs to use the pregnancy test from the drug store, and then that will make her pregnant. Asks to see the  to figure out when she will get her apartment. Otherwise reports good mood, appetite and sleep. No physical sxs or med side effects.
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
## 2024-03-30 RX ADMIN — Medication 2 MILLIGRAM(S): at 08:42

## 2024-03-30 RX ADMIN — Medication 2 MILLIGRAM(S): at 20:27

## 2024-03-31 RX ADMIN — Medication 2 MILLIGRAM(S): at 08:13

## 2024-03-31 RX ADMIN — Medication 2 MILLIGRAM(S): at 20:05

## 2024-04-01 PROCEDURE — 99231 SBSQ HOSP IP/OBS SF/LOW 25: CPT | Mod: GC

## 2024-04-01 RX ADMIN — Medication 2 MILLIGRAM(S): at 08:47

## 2024-04-01 RX ADMIN — Medication 50 MILLIGRAM(S): at 20:23

## 2024-04-01 RX ADMIN — HALOPERIDOL 5 MILLIGRAM(S): 10 TABLET ORAL at 20:23

## 2024-04-01 RX ADMIN — Medication 2 MILLIGRAM(S): at 20:24

## 2024-04-01 NOTE — BH INPATIENT PSYCHIATRY PROGRESS NOTE - NSBHCHARTREVIEWVS_PSY_A_CORE FT
Vital Signs Last 24 Hrs  T(C): 36.6 (04-01-24 @ 08:38), Max: 36.6 (04-01-24 @ 08:38)  T(F): 97.8 (04-01-24 @ 08:38), Max: 97.8 (04-01-24 @ 08:38)  HR: --  BP: --  BP(mean): --  RR: --  SpO2: --    Orthostatic VS  04-01-24 @ 08:38  Lying BP: --/-- HR: --  Sitting BP: 121/88 HR: 130  Standing BP: 123/77 HR: 142  Site: --  Mode: --  Orthostatic VS  03-31-24 @ 10:05  Lying BP: --/-- HR: --  Sitting BP: 136/91 HR: 116  Standing BP: 123/81 HR: 114  Site: --  Mode: --

## 2024-04-01 NOTE — BH INPATIENT PSYCHIATRY PROGRESS NOTE - NSDCCRITERIA_PSY_ALL_CORE
When pt is no longer an acute or imminent risk of harm to self or others, and is able to care for self safely, pt may then be discharged.  Safe discharge planning.
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
## 2024-04-01 NOTE — BH INPATIENT PSYCHIATRY PROGRESS NOTE - MSE UNSTRUCTURED FT
Appearance: Dressed appropriately. Good hygiene/grooming.  Behavior: Cooperative and calm. Childlike, friendly.   Motor: No abnormal movements, no psychomotor slowing or activation.  Speech: Regular rate. Loud at times.   Mood: "Good."  Affect: Pleasant.  Thought Process: [place].   Associations: Fair.  Thought Content: No AVH, SIIP, HIIP.  Elaborate fantasies involving romantic relationships and plans for children. Discharge focused.   Insight: Limited.  Judgment: Limited on interview.  Attention: Fair.  Language: Fluent.  Gait/station: Intact.      Appearance: Dressed appropriately. Good hygiene/grooming.  Behavior: Cooperative and calm. Childlike, friendly. Holding stuffed animal.   Motor: No abnormal movements, no psychomotor slowing or activation.  Speech: Regular rate. Loud at times.   Mood: "Happy."  Affect: Pleasant.  Thought Process: [place].   Associations: Fair.  Thought Content: No AVH, SIIP, HIIP.  Elaborate fantasies involving romantic relationships and plans for children. Discharge focused.   Insight: Limited.  Judgment: Limited on interview.  Attention: Fair.  Language: Fluent.  Gait/station: Intact.

## 2024-04-01 NOTE — BH INPATIENT PSYCHIATRY PROGRESS NOTE - NSBHFUPINTERVALHXFT_PSY_A_CORE
As per staff report, no acute events overnight. Pt is cooperative and pleasant, at likely baseline. No prns required. Compliant with meds. Poor sleep overnight per log. On interview pt friendly, poor boundaries but redirectable. Asks writer repeatedly for a pregnancy test so she can have babies. Clarifies that she needs to use the pregnancy test from the drug store, and then that will make her pregnant. Asks to see the  to figure out when she will get her apartment. Otherwise reports good mood, appetite and sleep. No physical sxs or med side effects.  As per staff report, no acute events overnight. Pt is cooperative and pleasant, at likely baseline. No prns required. Compliant with meds. 7h sleep overnight per log. On interview pt friendly, poor boundaries but redirectable. Tells writer she is planning on having 5+ children. Now plans to move to [place] in November. Otherwise very happy that she slept well last night. Good appetite, no physical sxs or med side effects.

## 2024-04-01 NOTE — BH INPATIENT PSYCHIATRY PROGRESS NOTE - NSBHMETABOLIC_PSY_ALL_CORE_FT
BMI: BMI (kg/m2): 23.5 (02-10-24 @ 02:59)  HbA1c: A1C with Estimated Average Glucose Result: 6.1 % (01-14-24 @ 04:30)    Glucose: POCT Blood Glucose.: 115 mg/dL (01-04-24 @ 00:46)    BP: --Vital Signs Last 24 Hrs  T(C): 36.6 (04-01-24 @ 08:38), Max: 36.6 (04-01-24 @ 08:38)  T(F): 97.8 (04-01-24 @ 08:38), Max: 97.8 (04-01-24 @ 08:38)  HR: --  BP: --  BP(mean): --  RR: --  SpO2: --    Orthostatic VS  04-01-24 @ 08:38  Lying BP: --/-- HR: --  Sitting BP: 121/88 HR: 130  Standing BP: 123/77 HR: 142  Site: --  Mode: --  Orthostatic VS  03-31-24 @ 10:05  Lying BP: --/-- HR: --  Sitting BP: 136/91 HR: 116  Standing BP: 123/81 HR: 114  Site: --  Mode: --    Lipid Panel: Date/Time: 01-14-24 @ 04:30  Cholesterol, Serum: 130  LDL Cholesterol Calculated: 61  HDL Cholesterol, Serum: 53  Total Cholesterol/HDL Ration Measurement: --  Triglycerides, Serum: 79

## 2024-04-02 PROCEDURE — 99231 SBSQ HOSP IP/OBS SF/LOW 25: CPT | Mod: GC

## 2024-04-02 RX ORDER — LORAZEPAM 4 MG/ML
2 VIAL (ML) INJECTION EVERY 6 HOURS
Refills: 0 | Status: DISCONTINUED | OUTPATIENT
Start: 2024-04-02 | End: 2024-04-09

## 2024-04-02 RX ORDER — LORAZEPAM 4 MG/ML
2 VIAL (ML) INJECTION ONCE
Refills: 0 | Status: DISCONTINUED | OUTPATIENT
Start: 2024-04-02 | End: 2024-04-09

## 2024-04-02 RX ADMIN — Medication 50 MILLIGRAM(S): at 21:05

## 2024-04-02 RX ADMIN — HALOPERIDOL 5 MILLIGRAM(S): 10 TABLET ORAL at 21:04

## 2024-04-02 RX ADMIN — Medication 2 MILLIGRAM(S): at 21:04

## 2024-04-02 RX ADMIN — Medication 2 MILLIGRAM(S): at 08:12

## 2024-04-02 NOTE — BH INPATIENT PSYCHIATRY PROGRESS NOTE - MSE UNSTRUCTURED FT
Appearance: Dressed appropriately. Good hygiene/grooming.  Behavior: Cooperative and calm. Childlike, friendly. Holding stuffed animal.   Motor: No abnormal movements, no psychomotor slowing or activation.  Speech: Regular rate. Loud at times.   Mood: "Happy."  Affect: Pleasant.  Thought Process: [place].   Associations: Fair.  Thought Content: No AVH, SIIP, HIIP.  Elaborate fantasies involving romantic relationships and plans for children. Discharge focused.   Insight: Limited.  Judgment: Limited on interview.  Attention: Fair.  Language: Fluent.  Gait/station: Intact.      Appearance: Dressed appropriately. Good hygiene/grooming.  Behavior: Cooperative and calm. Childlike, friendly. Holding stuffed animal.   Motor: No abnormal movements, no psychomotor slowing or activation.  Speech: Regular rate. Loud at times.   Mood: "Good."  Affect: Pleasant.  Thought Process: [place].   Associations: Fair.  Thought Content: No AVH, SIIP, HIIP.  Elaborate fantasies involving romantic relationships and plans for children. Discharge focused.   Insight: Limited.  Judgment: Limited on interview.  Attention: Fair.  Language: Fluent.  Gait/station: Intact.

## 2024-04-02 NOTE — BH INPATIENT PSYCHIATRY PROGRESS NOTE - NSBHATTESTCOMMENTATTENDFT_PSY_A_CORE
Continues to be at baseline.  Awaiting psychological addendum/neuropsych testing completion.  Continue meds/behavioral plan.

## 2024-04-02 NOTE — BH INPATIENT PSYCHIATRY PROGRESS NOTE - NSBHFUPINTERVALHXFT_PSY_A_CORE
As per staff report, no acute events overnight. Pt is cooperative and pleasant, at likely baseline. No prns required. Compliant with meds. 7h sleep overnight per log. On interview pt friendly, poor boundaries but redirectable. Tells writer she is planning on having 5+ children. Now plans to move to [place] in November. Otherwise very happy that she slept well last night. Good appetite, no physical sxs or med side effects.  As per staff report, no acute events overnight. Pt is cooperative and pleasant, at likely baseline. PRN po H/B given overnight. Compliant with meds. 7-8h sleep overnight per log. On interview pt continues to be friendly with poor boundaries but redirectable. Tells writer about her future plans for children, topics she discusses with her boyfriends. Denies all sxs on psychiatric ROS. Good sleep and appetite. No physical sxs, toleratig meds wells.

## 2024-04-02 NOTE — BH INPATIENT PSYCHIATRY PROGRESS NOTE - NSBHMETABOLIC_PSY_ALL_CORE_FT
BMI: BMI (kg/m2): 23.5 (02-10-24 @ 02:59)  HbA1c: A1C with Estimated Average Glucose Result: 6.1 % (01-14-24 @ 04:30)    Glucose: POCT Blood Glucose.: 115 mg/dL (01-04-24 @ 00:46)    BP: --Vital Signs Last 24 Hrs  T(C): --  T(F): --  HR: --  BP: --  BP(mean): --  RR: --  SpO2: --    Orthostatic VS  04-01-24 @ 08:38  Lying BP: --/-- HR: --  Sitting BP: 121/88 HR: 130  Standing BP: 123/77 HR: 142  Site: --  Mode: --  Orthostatic VS  03-31-24 @ 10:05  Lying BP: --/-- HR: --  Sitting BP: 136/91 HR: 116  Standing BP: 123/81 HR: 114  Site: --  Mode: --    Lipid Panel: Date/Time: 01-14-24 @ 04:30  Cholesterol, Serum: 130  LDL Cholesterol Calculated: 61  HDL Cholesterol, Serum: 53  Total Cholesterol/HDL Ration Measurement: --  Triglycerides, Serum: 79   BMI: BMI (kg/m2): 23.5 (02-10-24 @ 02:59)  HbA1c: A1C with Estimated Average Glucose Result: 6.1 % (01-14-24 @ 04:30)    Glucose: POCT Blood Glucose.: 115 mg/dL (01-04-24 @ 00:46)    BP: --Vital Signs Last 24 Hrs  T(C): --  T(F): --  HR: --  BP: --  BP(mean): --  RR: --  SpO2: --    Orthostatic VS  04-01-24 @ 08:38  Lying BP: --/-- HR: --  Sitting BP: 121/88 HR: 130  Standing BP: 123/77 HR: 142  Site: --  Mode: --    Lipid Panel: Date/Time: 01-14-24 @ 04:30  Cholesterol, Serum: 130  LDL Cholesterol Calculated: 61  HDL Cholesterol, Serum: 53  Total Cholesterol/HDL Ration Measurement: --  Triglycerides, Serum: 79

## 2024-04-02 NOTE — BH INPATIENT PSYCHIATRY PROGRESS NOTE - NSCGISEVERILLNESS_PSY_ALL_CORE
Obesity is improving with lifestyle modifications.  Discussed the patient's BMI.  The BMI is above average. The patient received dietary education, feeding regime and exercise education because they have an above normal BMI.. cont weight loss  General weight loss/lifestyle modification strategies discussed (elicit support from others; identify saboteurs; non-food rewards, etc).   5 = Markedly ill - intrusive symptoms that distinctly impair social/occupational function or cause intrusive levels of distress
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
## 2024-04-02 NOTE — BH INPATIENT PSYCHIATRY PROGRESS NOTE - NSBHCHARTREVIEWVS_PSY_A_CORE FT
Vital Signs Last 24 Hrs  T(C): --  T(F): --  HR: --  BP: --  BP(mean): --  RR: --  SpO2: --    Orthostatic VS  04-01-24 @ 08:38  Lying BP: --/-- HR: --  Sitting BP: 121/88 HR: 130  Standing BP: 123/77 HR: 142  Site: --  Mode: --  Orthostatic VS  03-31-24 @ 10:05  Lying BP: --/-- HR: --  Sitting BP: 136/91 HR: 116  Standing BP: 123/81 HR: 114  Site: --  Mode: --   Vital Signs Last 24 Hrs  T(C): --  T(F): --  HR: --  BP: --  BP(mean): --  RR: --  SpO2: --    Orthostatic VS  04-01-24 @ 08:38  Lying BP: --/-- HR: --  Sitting BP: 121/88 HR: 130  Standing BP: 123/77 HR: 142  Site: --  Mode: --

## 2024-04-03 PROCEDURE — 99231 SBSQ HOSP IP/OBS SF/LOW 25: CPT | Mod: GC

## 2024-04-03 RX ADMIN — Medication 2 MILLIGRAM(S): at 08:51

## 2024-04-03 NOTE — BH INPATIENT PSYCHIATRY PROGRESS NOTE - NSBHFUPINTERVALHXFT_PSY_A_CORE
As per staff report, no acute events overnight. Pt is cooperative and pleasant, at likely baseline. PRN po H/B given overnight. Compliant with meds. 7-8h sleep overnight per log. On interview pt continues to be friendly with poor boundaries but redirectable. Tells writer about her future plans for children, topics she discusses with her boyfriends. Denies all sxs on psychiatric ROS. Good sleep and appetite. No physical sxs, toleratig meds wells. As per staff report, no acute events overnight. PRN po H/B given overnight. Compliant with meds. 3h sleep overnight per log. On interview pt continues to be friendly with poor boundaries but re-directable. Asks to speak with writer repeatedly about her future plans + vivid inner fantasies regarding boyfriends, future plans for children and apartment. Denies all sxs on psychiatric ROS. Good sleep and appetite. No physical sxs, tolerating meds wells.

## 2024-04-03 NOTE — BH INPATIENT PSYCHIATRY PROGRESS NOTE - NSBHMETABOLIC_PSY_ALL_CORE_FT
Pharmacy faxed in a request for prior authorization on:    Medication: Contour Next Test Strips   Dosage: Test blood sugar 5 times daily  Quantity requested:  150 strips with 11 refills  Pharmacy for prescription has been selected. Initiation of prior authorization needed. Medically necessary to communicate with insulin pump. BMI: BMI (kg/m2): 23.5 (02-10-24 @ 02:59)  HbA1c: A1C with Estimated Average Glucose Result: 6.1 % (01-14-24 @ 04:30)    Glucose: POCT Blood Glucose.: 115 mg/dL (01-04-24 @ 00:46)    BP: --Vital Signs Last 24 Hrs  T(C): 36.3 (04-03-24 @ 08:01), Max: 36.3 (04-03-24 @ 08:01)  T(F): 97.4 (04-03-24 @ 08:01), Max: 97.4 (04-03-24 @ 08:01)  HR: --  BP: --  BP(mean): --  RR: --  SpO2: --    Orthostatic VS  04-03-24 @ 08:01  Lying BP: --/-- HR: --  Sitting BP: 106/81 HR: 120  Standing BP: 114/85 HR: 115  Site: --  Mode: --  Orthostatic VS  04-01-24 @ 08:38  Lying BP: --/-- HR: --  Sitting BP: 121/88 HR: 130  Standing BP: 123/77 HR: 142  Site: --  Mode: --    Lipid Panel: Date/Time: 01-14-24 @ 04:30  Cholesterol, Serum: 130  LDL Cholesterol Calculated: 61  HDL Cholesterol, Serum: 53  Total Cholesterol/HDL Ration Measurement: --  Triglycerides, Serum: 79   BMI: BMI (kg/m2): 23.5 (02-10-24 @ 02:59)  HbA1c: A1C with Estimated Average Glucose Result: 6.1 % (01-14-24 @ 04:30)    Glucose: POCT Blood Glucose.: 115 mg/dL (01-04-24 @ 00:46)    BP: --Vital Signs Last 24 Hrs  T(C): 36.3 (04-03-24 @ 08:01), Max: 36.3 (04-03-24 @ 08:01)  T(F): 97.4 (04-03-24 @ 08:01), Max: 97.4 (04-03-24 @ 08:01)  HR: --  BP: --  BP(mean): --  RR: --  SpO2: --    Orthostatic VS  04-03-24 @ 08:01  Lying BP: --/-- HR: --  Sitting BP: 106/81 HR: 120  Standing BP: 114/85 HR: 115  Site: --  Mode: --    Lipid Panel: Date/Time: 01-14-24 @ 04:30  Cholesterol, Serum: 130  LDL Cholesterol Calculated: 61  HDL Cholesterol, Serum: 53  Total Cholesterol/HDL Ration Measurement: --  Triglycerides, Serum: 79

## 2024-04-03 NOTE — BH INPATIENT PSYCHIATRY PROGRESS NOTE - NSBHATTESTCOMMENTATTENDFT_PSY_A_CORE
Pt continues to be at likely baseline, conclusion of psychological eval tomorrow.  Continue meds.  Dispo planning.

## 2024-04-03 NOTE — BH INPATIENT PSYCHIATRY PROGRESS NOTE - NSBHCHARTREVIEWVS_PSY_A_CORE FT
Vital Signs Last 24 Hrs  T(C): 36.3 (04-03-24 @ 08:01), Max: 36.3 (04-03-24 @ 08:01)  T(F): 97.4 (04-03-24 @ 08:01), Max: 97.4 (04-03-24 @ 08:01)  HR: --  BP: --  BP(mean): --  RR: --  SpO2: --    Orthostatic VS  04-03-24 @ 08:01  Lying BP: --/-- HR: --  Sitting BP: 106/81 HR: 120  Standing BP: 114/85 HR: 115  Site: --  Mode: --  Orthostatic VS  04-01-24 @ 08:38  Lying BP: --/-- HR: --  Sitting BP: 121/88 HR: 130  Standing BP: 123/77 HR: 142  Site: --  Mode: --   Vital Signs Last 24 Hrs  T(C): 36.3 (04-03-24 @ 08:01), Max: 36.3 (04-03-24 @ 08:01)  T(F): 97.4 (04-03-24 @ 08:01), Max: 97.4 (04-03-24 @ 08:01)  HR: --  BP: --  BP(mean): --  RR: --  SpO2: --    Orthostatic VS  04-03-24 @ 08:01  Lying BP: --/-- HR: --  Sitting BP: 106/81 HR: 120  Standing BP: 114/85 HR: 115  Site: --  Mode: --
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
## 2024-04-03 NOTE — BH INPATIENT PSYCHIATRY PROGRESS NOTE - MSE UNSTRUCTURED FT
Appearance: Dressed appropriately. Good hygiene/grooming.  Behavior: Cooperative and calm. Childlike, friendly. Holding stuffed animal.   Motor: No abnormal movements, no psychomotor slowing or activation.  Speech: Regular rate. Loud at times.   Mood: "Good."  Affect: Pleasant.  Thought Process: [place].   Associations: Fair.  Thought Content: No AVH, SIIP, HIIP.  Elaborate fantasies involving romantic relationships and plans for children. Discharge focused.   Insight: Limited.  Judgment: Limited on interview.  Attention: Fair.  Language: Fluent.  Gait/station: Intact.

## 2024-04-04 PROCEDURE — 99232 SBSQ HOSP IP/OBS MODERATE 35: CPT | Mod: GC

## 2024-04-04 RX ADMIN — Medication 2 MILLIGRAM(S): at 10:23

## 2024-04-04 RX ADMIN — Medication 2 MILLIGRAM(S): at 20:29

## 2024-04-04 NOTE — BH INPATIENT PSYCHIATRY PROGRESS NOTE - NSBHATTESTCOMMENTATTENDFT_PSY_A_CORE
Pt continues to be at likely baseline.  Psychological eval completed and sent to outpatient team.  CR to be arranged.  Continue meds.

## 2024-04-04 NOTE — BH PSYCHOLOGY ASSESSMENT - NSTXVIOLNTGOAL_PSY_ALL_CORE
Will be able to express understanding of at least one trigger to their aggressive behavior

## 2024-04-04 NOTE — BH INPATIENT PSYCHIATRY PROGRESS NOTE - MSE UNSTRUCTURED FT
Appearance: Dressed appropriately. Good hygiene/grooming.  Behavior: Cooperative and calm. Childlike, friendly. Holding stuffed animal.   Motor: No abnormal movements, no psychomotor slowing or activation.  Speech: Regular rate. Loud at times.   Mood: "Good."  Affect: Pleasant.  Thought Process: [place].   Associations: Fair.  Thought Content: No AVH, SIIP, HIIP.  Elaborate fantasies involving romantic relationships and plans for children. Discharge focused.   Insight: Limited.  Judgment: Limited on interview.  Attention: Fair.  Language: Fluent.  Gait/station: Intact.      Appearance: Dressed appropriately. Good hygiene/grooming.  Behavior: Cooperative and calm. Childlike, friendly. Holding stuffed animal.   Motor: No abnormal movements, no psychomotor slowing or activation.  Speech: Regular rate. Loud at times.   Mood: "Good."  Affect: Frustrated at first, then pleasant.  Thought Process: [place].   Associations: Fair.  Thought Content: No AVH, SIIP, HIIP.  Elaborate fantasies involving romantic relationships and plans for children, her biological mother. Discharge focused.   Insight: Limited.  Judgment: Limited on interview.  Attention: Fair.  Language: Fluent.  Gait/station: Intact.

## 2024-04-04 NOTE — BH PSYCHOLOGY ASSESSMENT - NSTXCONDUCGOAL_PSY_ALL_CORE
Will develop more adaptive coping strategies to manage stress

## 2024-04-04 NOTE — BH PSYCHOLOGY ASSESSMENT - NSBHPSYCHOLASSESSPROV_PSY_A_CORE
Psychology Trainee only
Licensed Psychologist and Psychology Trainee
Psychology Trainee only
Psychology Trainee only

## 2024-04-04 NOTE — BH PSYCHOLOGY ASSESSMENT - NSTXDCHOUSGOAL_PSY_ALL_CORE
Will agree to participate in housing process
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
## 2024-04-04 NOTE — BH PSYCHOLOGY ASSESSMENT - NSTXPSYCHOPROGRES_PSY_ALL_CORE
Met - goal discontinued
Improving

## 2024-04-04 NOTE — BH INPATIENT PSYCHIATRY PROGRESS NOTE - NSBHMETABOLIC_PSY_ALL_CORE_FT
BMI: BMI (kg/m2): 23.5 (02-10-24 @ 02:59)  HbA1c: A1C with Estimated Average Glucose Result: 6.1 % (01-14-24 @ 04:30)    Glucose: POCT Blood Glucose.: 115 mg/dL (01-04-24 @ 00:46)    BP: --Vital Signs Last 24 Hrs  T(C): 36.6 (04-04-24 @ 08:28), Max: 36.6 (04-04-24 @ 08:28)  T(F): 97.9 (04-04-24 @ 08:28), Max: 97.9 (04-04-24 @ 08:28)  HR: --  BP: --  BP(mean): --  RR: --  SpO2: --    Orthostatic VS  04-04-24 @ 08:28  Lying BP: --/-- HR: --  Sitting BP: 117/90 HR: 105  Standing BP: 114/68 HR: 118  Site: --  Mode: --  Orthostatic VS  04-03-24 @ 08:01  Lying BP: --/-- HR: --  Sitting BP: 106/81 HR: 120  Standing BP: 114/85 HR: 115  Site: --  Mode: --    Lipid Panel: Date/Time: 01-14-24 @ 04:30  Cholesterol, Serum: 130  LDL Cholesterol Calculated: 61  HDL Cholesterol, Serum: 53  Total Cholesterol/HDL Ration Measurement: --  Triglycerides, Serum: 79

## 2024-04-04 NOTE — BH PSYCHOLOGY ASSESSMENT - NSBHPTASSESSDT_PSY_A_CORE
04-Apr-2024 15:00
03-Apr-2024 13:24
02-Apr-2024 13:16
13-Mar-2024 11:20
21-Mar-2024 09:45
18-Mar-2024 10:50

## 2024-04-04 NOTE — BH INPATIENT PSYCHIATRY PROGRESS NOTE - NSBHCHARTREVIEWVS_PSY_A_CORE FT
Vital Signs Last 24 Hrs  T(C): 36.6 (04-04-24 @ 08:28), Max: 36.6 (04-04-24 @ 08:28)  T(F): 97.9 (04-04-24 @ 08:28), Max: 97.9 (04-04-24 @ 08:28)  HR: --  BP: --  BP(mean): --  RR: --  SpO2: --    Orthostatic VS  04-04-24 @ 08:28  Lying BP: --/-- HR: --  Sitting BP: 117/90 HR: 105  Standing BP: 114/68 HR: 118  Site: --  Mode: --  Orthostatic VS  04-03-24 @ 08:01  Lying BP: --/-- HR: --  Sitting BP: 106/81 HR: 120  Standing BP: 114/85 HR: 115  Site: --  Mode: --
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
## 2024-04-04 NOTE — BH PSYCHOLOGY ASSESSMENT - NSBHPSYCHOLSUMMARY_PSY_A_CORE FT
Ms. Neil is a 38-year-old, single, , cis-gender female, non-caregiver, with a history of intellectual disability and residual schizophrenia. She was referred for a psychological assessment to characterize her current intellectual and adaptive functioning.     Ms. Neil’s performance on adaptive and intellectual functioning measures is consistent with a diagnosis of Intellectual Disability, Moderate. It is likely that her limitations in intellectual and adaptive functioning emerged during the developmental period based on her previous psychological reports dated in 1992 and 2004 and psycho-social summary dated in 2007 along with her consistent enrollment in special education. Her low IQ score and the severe impairments in domains of adaptive functioning taken together attest to substantive deficits in intellectual and functional abilities that impede her ability to function effectively in everyday life. Specifically, her intellectual function fell in the Extremely Low Range, as indicated by her profound difficulty with measures of verbal comprehension, perceptual reasoning, processing speed and working memory. Although the nonverbal IQ test indicated a higher intellectual function, it still fell in the Below Average Range. The assessment results also suggest that Ms. Neil experiences difficulties in listening, understanding, and expressing herself through speech, reading, and writing as well as performing practical, everyday tasks of living that are appropriate for her age. Her adaptive functioning scores fell in the Moderately Low Range. In summary, the results of this assessment are consistent with deficits in multiple intellectual domains and adaptive functioning.

## 2024-04-04 NOTE — BH INPATIENT PSYCHIATRY PROGRESS NOTE - NSBHFUPINTERVALHXFT_PSY_A_CORE
Chart reviewed. Case d/w interdisciplinary team. Patient seen and examined for follow up of IDD. No acute events overnight. Pt refused standing meds o/n, no prns requested or required. 3h per sleep log. Pt took AM standing Risperdal this morning with encouragement. At first appeared to be in a bad mood, but quickly appeared happy/more friendly towards writer. Could not say why she refused medications last night, only that she was in a bad mood. Difficult to follow, but pt mentioned thinking about how her biological mother was a single mother and she wanted to be one as well. Asked when she could get pregnancy tests to help her get pregnant. Writer asked if pt was bored and wanted to engage in more activities, Amanda declined and said she is able to entertain herself. No physical complaints, no medication side effects reported.

## 2024-04-05 PROCEDURE — 99232 SBSQ HOSP IP/OBS MODERATE 35: CPT | Mod: GC

## 2024-04-05 RX ADMIN — Medication 650 MILLIGRAM(S): at 09:10

## 2024-04-05 RX ADMIN — Medication 2 MILLIGRAM(S): at 09:45

## 2024-04-05 RX ADMIN — Medication 50 MILLIGRAM(S): at 21:13

## 2024-04-05 RX ADMIN — Medication 2 MILLIGRAM(S): at 21:13

## 2024-04-05 NOTE — BH INPATIENT PSYCHIATRY PROGRESS NOTE - NSBHFUPINTERVALHXFT_PSY_A_CORE
Chart reviewed. Case d/w interdisciplinary team. Patient seen and examined for follow up of IDD. No acute events overnight. Pt compliant with standing meds, no prns requested or required. 4h per sleep log. Per nursing pt began menstrual period last night. On exam today pt excitedly began talking to writer about her plans to have children with a "tall mix of famer" who also gets his period. Pt denied menstural cramps. No other physical sxs. Suggested that pt change her bedding and do laundry, pt hesitently agreed.  Chart reviewed. Case d/w interdisciplinary team. Patient seen and examined for follow up of IDD. No acute events overnight. Pt compliant with standing meds, no prns requested or required. 4h per sleep log. Per nursing pt began menstrual period last night. On exam today pt excitedly began talking to writer about her plans to have children with "mix of fame"  [employer]. Pt requested Tylenol prn for cramps. No other physical sxs. Suggested that pt change her bedding and do laundry, pt hesitantly agreed.

## 2024-04-05 NOTE — BH INPATIENT PSYCHIATRY PROGRESS NOTE - NSBHMETABOLIC_PSY_ALL_CORE_FT
BMI: BMI (kg/m2): 23.5 (02-10-24 @ 02:59)  HbA1c: A1C with Estimated Average Glucose Result: 6.1 % (01-14-24 @ 04:30)    Glucose: POCT Blood Glucose.: 115 mg/dL (01-04-24 @ 00:46)    BP: --Vital Signs Last 24 Hrs  T(C): 36.9 (04-05-24 @ 07:37), Max: 36.9 (04-05-24 @ 07:37)  T(F): 98.4 (04-05-24 @ 07:37), Max: 98.4 (04-05-24 @ 07:37)  HR: --  BP: --  BP(mean): --  RR: --  SpO2: --    Orthostatic VS  04-05-24 @ 07:37  Lying BP: --/-- HR: --  Sitting BP: 136/70 HR: 93  Standing BP: 117/78 HR: 107  Site: --  Mode: --  Orthostatic VS  04-04-24 @ 08:28  Lying BP: --/-- HR: --  Sitting BP: 117/90 HR: 105  Standing BP: 114/68 HR: 118  Site: --  Mode: --    Lipid Panel: Date/Time: 01-14-24 @ 04:30  Cholesterol, Serum: 130  LDL Cholesterol Calculated: 61  HDL Cholesterol, Serum: 53  Total Cholesterol/HDL Ration Measurement: --  Triglycerides, Serum: 79
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
## 2024-04-05 NOTE — BH INPATIENT PSYCHIATRY PROGRESS NOTE - NSBHCHARTREVIEWVS_PSY_A_CORE FT
Vital Signs Last 24 Hrs  T(C): 36.9 (04-05-24 @ 07:37), Max: 36.9 (04-05-24 @ 07:37)  T(F): 98.4 (04-05-24 @ 07:37), Max: 98.4 (04-05-24 @ 07:37)  HR: --  BP: --  BP(mean): --  RR: --  SpO2: --    Orthostatic VS  04-05-24 @ 07:37  Lying BP: --/-- HR: --  Sitting BP: 136/70 HR: 93  Standing BP: 117/78 HR: 107  Site: --  Mode: --  Orthostatic VS  04-04-24 @ 08:28  Lying BP: --/-- HR: --  Sitting BP: 117/90 HR: 105  Standing BP: 114/68 HR: 118  Site: --  Mode: --

## 2024-04-05 NOTE — BH INPATIENT PSYCHIATRY PROGRESS NOTE - MSE UNSTRUCTURED FT
Appearance: Dressed appropriately. Fair hygiene/grooming.  Behavior: Cooperative and calm. Childlike, friendly.   Motor: No abnormal movements, no psychomotor slowing or activation.  Speech: Regular rate. Loud at times.   Mood: "Good."  Affect: Perplexed.  Thought Process: [place].   Associations: Fair.  Thought Content: No AVH, SIIP, HIIP.  Elaborate fantasies involving romantic relationships and plans for children. Discharge focused.   Insight: Limited.  Judgment: Limited on interview.  Attention: Fair.  Language: Fluent.  Gait/station: Intact.

## 2024-04-06 RX ADMIN — Medication 2 MILLIGRAM(S): at 08:11

## 2024-04-08 ENCOUNTER — APPOINTMENT (OUTPATIENT)
Dept: FAMILY MEDICINE | Facility: CLINIC | Age: 39
End: 2024-04-08

## 2024-04-08 PROCEDURE — 99231 SBSQ HOSP IP/OBS SF/LOW 25: CPT

## 2024-04-08 RX ADMIN — Medication 50 MILLIGRAM(S): at 20:36

## 2024-04-08 RX ADMIN — Medication 2 MILLIGRAM(S): at 08:32

## 2024-04-08 RX ADMIN — Medication 2 MILLIGRAM(S): at 20:36

## 2024-04-08 RX ADMIN — Medication 50 MILLIGRAM(S): at 20:35

## 2024-04-08 RX ADMIN — HALOPERIDOL 5 MILLIGRAM(S): 10 TABLET ORAL at 20:35
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
## 2024-04-08 NOTE — BH INPATIENT PSYCHIATRY PROGRESS NOTE - NSBHCHARTREVIEWVS_PSY_A_CORE FT
Vital Signs Last 24 Hrs  T(C): 36.9 (04-08-24 @ 08:08), Max: 36.9 (04-08-24 @ 08:08)  T(F): 98.4 (04-08-24 @ 08:08), Max: 98.4 (04-08-24 @ 08:08)  HR: --  BP: --  BP(mean): --  RR: --  SpO2: --    Orthostatic VS  04-08-24 @ 08:08  Lying BP: --/-- HR: --  Sitting BP: 115/77 HR: 80  Standing BP: 122/82 HR: 82  Site: --  Mode: --  Orthostatic VS  04-07-24 @ 07:52  Lying BP: --/-- HR: --  Sitting BP: 105/85 HR: 93  Standing BP: 105/69 HR: 102  Site: --  Mode: --

## 2024-04-08 NOTE — BH INPATIENT PSYCHIATRY PROGRESS NOTE - NSBHMETABOLIC_PSY_ALL_CORE_FT
BMI: BMI (kg/m2): 23.5 (02-10-24 @ 02:59)  HbA1c: A1C with Estimated Average Glucose Result: 6.1 % (01-14-24 @ 04:30)    Glucose: POCT Blood Glucose.: 115 mg/dL (01-04-24 @ 00:46)    BP: --Vital Signs Last 24 Hrs  T(C): 36.9 (04-08-24 @ 08:08), Max: 36.9 (04-08-24 @ 08:08)  T(F): 98.4 (04-08-24 @ 08:08), Max: 98.4 (04-08-24 @ 08:08)  HR: --  BP: --  BP(mean): --  RR: --  SpO2: --    Orthostatic VS  04-08-24 @ 08:08  Lying BP: --/-- HR: --  Sitting BP: 115/77 HR: 80  Standing BP: 122/82 HR: 82  Site: --  Mode: --  Orthostatic VS  04-07-24 @ 07:52  Lying BP: --/-- HR: --  Sitting BP: 105/85 HR: 93  Standing BP: 105/69 HR: 102  Site: --  Mode: --    Lipid Panel: Date/Time: 01-14-24 @ 04:30  Cholesterol, Serum: 130  LDL Cholesterol Calculated: 61  HDL Cholesterol, Serum: 53  Total Cholesterol/HDL Ration Measurement: --  Triglycerides, Serum: 79

## 2024-04-08 NOTE — BH INPATIENT PSYCHIATRY PROGRESS NOTE - MSE UNSTRUCTURED FT
Appearance: Dressed appropriately. Fair hygiene/grooming.  Behavior: Cooperative and calm. Childlike, friendly.   Motor: No abnormal movements, no psychomotor slowing or activation.  Speech: Regular rate. Loud at times.   Mood: "Good."  Affect: Elevated.   Thought Process: [place].   Associations: Fair.  Thought Content: No AVH, SIIP, HIIP.    Insight: Limited.  Judgment: Limited on interview.  Attention: Fair.  Language: Fluent.  Gait/station: Intact.

## 2024-04-08 NOTE — BH INPATIENT PSYCHIATRY PROGRESS NOTE - NSBHFUPINTERVALHXFT_PSY_A_CORE
Pt today talks about having children, a boy and a girl.  Pt talks about Jg and Castro, and moving to [place].

## 2024-04-09 PROCEDURE — 99231 SBSQ HOSP IP/OBS SF/LOW 25: CPT

## 2024-04-09 RX ORDER — RISPERIDONE 4 MG
2 TABLET ORAL
Refills: 0 | Status: DISCONTINUED | OUTPATIENT
Start: 2024-04-09 | End: 2024-05-16

## 2024-04-09 RX ORDER — LORAZEPAM 4 MG/ML
2 VIAL (ML) INJECTION ONCE
Refills: 0 | Status: DISCONTINUED | OUTPATIENT
Start: 2024-04-09 | End: 2024-04-16

## 2024-04-09 RX ORDER — LORAZEPAM 4 MG/ML
2 VIAL (ML) INJECTION EVERY 6 HOURS
Refills: 0 | Status: DISCONTINUED | OUTPATIENT
Start: 2024-04-09 | End: 2024-04-11

## 2024-04-09 RX ADMIN — Medication 2 MILLIGRAM(S): at 09:01

## 2024-04-09 RX ADMIN — Medication 2 MILLIGRAM(S): at 17:43
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
## 2024-04-09 NOTE — BH INPATIENT PSYCHIATRY PROGRESS NOTE - NSBHCHARTREVIEWVS_PSY_A_CORE FT
Vital Signs Last 24 Hrs  T(C): 36.4 (04-09-24 @ 08:21), Max: 36.4 (04-09-24 @ 08:21)  T(F): 97.5 (04-09-24 @ 08:21), Max: 97.5 (04-09-24 @ 08:21)  HR: --  BP: --  BP(mean): --  RR: --  SpO2: --    Orthostatic VS  04-09-24 @ 08:21  Lying BP: --/-- HR: --  Sitting BP: 98/67 HR: 70  Standing BP: 103/61 HR: 78  Site: --  Mode: --  Orthostatic VS  04-08-24 @ 08:08  Lying BP: --/-- HR: --  Sitting BP: 115/77 HR: 80  Standing BP: 122/82 HR: 82  Site: --  Mode: --

## 2024-04-09 NOTE — BH INPATIENT PSYCHIATRY PROGRESS NOTE - MSE UNSTRUCTURED FT
Appearance: Dressed appropriately. Fair hygiene/grooming.  Behavior: Cooperative and calm. Childlike, friendly.   Motor: No abnormal movements, no psychomotor slowing or activation.  Speech: Regular rate. Loud at times.   Mood: "Good."  Affect: Elevated.   Thought Process: [place].   Associations: Fair.  Thought Content: No AVH, SIIP, HIIP.    Insight: Limited.  Judgment: Limited on interview.  Attention: Fair.  Language: Fluent.  Gait/station: Intact.

## 2024-04-09 NOTE — BH INPATIENT PSYCHIATRY PROGRESS NOTE - NSBHMETABOLIC_PSY_ALL_CORE_FT
BMI: BMI (kg/m2): 23.5 (02-10-24 @ 02:59)  HbA1c: A1C with Estimated Average Glucose Result: 6.1 % (01-14-24 @ 04:30)    Glucose: POCT Blood Glucose.: 115 mg/dL (01-04-24 @ 00:46)    BP: --Vital Signs Last 24 Hrs  T(C): 36.4 (04-09-24 @ 08:21), Max: 36.4 (04-09-24 @ 08:21)  T(F): 97.5 (04-09-24 @ 08:21), Max: 97.5 (04-09-24 @ 08:21)  HR: --  BP: --  BP(mean): --  RR: --  SpO2: --    Orthostatic VS  04-09-24 @ 08:21  Lying BP: --/-- HR: --  Sitting BP: 98/67 HR: 70  Standing BP: 103/61 HR: 78  Site: --  Mode: --  Orthostatic VS  04-08-24 @ 08:08  Lying BP: --/-- HR: --  Sitting BP: 115/77 HR: 80  Standing BP: 122/82 HR: 82  Site: --  Mode: --    Lipid Panel: Date/Time: 01-14-24 @ 04:30  Cholesterol, Serum: 130  LDL Cholesterol Calculated: 61  HDL Cholesterol, Serum: 53  Total Cholesterol/HDL Ration Measurement: --  Triglycerides, Serum: 79

## 2024-04-09 NOTE — BH INPATIENT PSYCHIATRY PROGRESS NOTE - NSBHFUPINTERVALHXFT_PSY_A_CORE
Pt reports she is [place] an apt [place] [facility].  Talks again about Jhon and Jg.  Asks about tablet and also pregnancy test.

## 2024-04-10 PROCEDURE — 90853 GROUP PSYCHOTHERAPY: CPT

## 2024-04-10 PROCEDURE — 99231 SBSQ HOSP IP/OBS SF/LOW 25: CPT

## 2024-04-10 RX ADMIN — Medication 2 MILLIGRAM(S): at 17:20

## 2024-04-10 NOTE — BH INPATIENT PSYCHIATRY PROGRESS NOTE - MSE UNSTRUCTURED FT
Appearance: Dressed appropriately. Fair hygiene/grooming.  Behavior: Cooperative and calm. Childlike, friendly.   Motor: No abnormal movements, no psychomotor slowing or activation.  Speech: Laconic.  Mood: "Good."  Affect: Frustrated.   Thought Process: [place].   Associations: Fair.  Thought Content: No AVH, SIIP, HIIP.    Insight: Limited.  Judgment: Limited on interview.  Attention: Fair.  Language: Fluent.  Gait/station: Intact.

## 2024-04-10 NOTE — BH INPATIENT PSYCHIATRY PROGRESS NOTE - NSBHMETABOLIC_PSY_ALL_CORE_FT
BMI: BMI (kg/m2): 23.5 (02-10-24 @ 02:59)  HbA1c: A1C with Estimated Average Glucose Result: 6.1 % (01-14-24 @ 04:30)    Glucose: POCT Blood Glucose.: 115 mg/dL (01-04-24 @ 00:46)    BP: --Vital Signs Last 24 Hrs  T(C): 36.3 (04-10-24 @ 08:03), Max: 36.3 (04-10-24 @ 08:03)  T(F): 97.3 (04-10-24 @ 08:03), Max: 97.3 (04-10-24 @ 08:03)  HR: --  BP: --  BP(mean): --  RR: --  SpO2: --    Orthostatic VS  04-10-24 @ 08:03  Lying BP: --/-- HR: --  Sitting BP: 120/62 HR: 87  Standing BP: 114/72 HR: 83  Site: --  Mode: --  Orthostatic VS  04-09-24 @ 08:21  Lying BP: --/-- HR: --  Sitting BP: 98/67 HR: 70  Standing BP: 103/61 HR: 78  Site: --  Mode: --    Lipid Panel: Date/Time: 01-14-24 @ 04:30  Cholesterol, Serum: 130  LDL Cholesterol Calculated: 61  HDL Cholesterol, Serum: 53  Total Cholesterol/HDL Ration Measurement: --  Triglycerides, Serum: 79

## 2024-04-10 NOTE — BH INPATIENT PSYCHIATRY PROGRESS NOTE - NSBHFUPINTERVALHXFT_PSY_A_CORE
Pt found sitting on bench outside of room.  Pt today states she does not want to take meds but does not give a clear reason.

## 2024-04-10 NOTE — BH INPATIENT PSYCHIATRY PROGRESS NOTE - CURRENT MEDICATION
MEDICATIONS  (STANDING):  risperiDONE   Tablet 2 milliGRAM(s) Oral <User Schedule>    MEDICATIONS  (PRN):  acetaminophen     Tablet .. 650 milliGRAM(s) Oral every 6 hours PRN Temp greater or equal to 38C (100.4F), Mild Pain (1 - 3)  aluminum hydroxide/magnesium hydroxide/simethicone Suspension 30 milliLiter(s) Oral every 6 hours PRN Dyspepsia  diphenhydrAMINE 50 milliGRAM(s) Oral every 6 hours PRN Extrapyramidal symptoms or prophylaxis  diphenhydrAMINE Injectable 50 milliGRAM(s) IntraMuscular once PRN Extrapyramidal prophylaxis  haloperidol     Tablet 5 milliGRAM(s) Oral every 6 hours PRN agitation  haloperidol    Injectable 5 milliGRAM(s) IntraMuscular once PRN aggression  LORazepam     Tablet 2 milliGRAM(s) Oral every 6 hours PRN severe anxiety, agitation  LORazepam   Injectable 2 milliGRAM(s) IntraMuscular once PRN severe agitation  magnesium hydroxide Suspension 30 milliLiter(s) Oral daily PRN Constipation  traZODone 50 milliGRAM(s) Oral at bedtime PRN insomnia

## 2024-04-10 NOTE — BH PSYCHOLOGY - GROUP THERAPY NOTE - NSBHPSYCHOLRESPCOMMENT_PSY_A_CORE FT
The patient appeared adequately groomed and casually dressed. Pt appeared fairly engaged in the group as evidenced by her willingness to verbally communicate during the discussion when prompted. Pt shared during check-in, noting that the best thing she gave others were “clothing donations.” Pt also shared that she likes to drink tea close to bedtime to help her relax. Speech was slurred and at times difficult to understand. PT was oriented X3. Pt was appropriate and supportive with peers.

## 2024-04-10 NOTE — BH PSYCHOLOGY - GROUP THERAPY NOTE - NSPSYCHOLGRPCOGPT_PSY_A_CORE FT
Patient attended Cognitive Behavioral Therapy Group incorporating ACT-based concepts. The group started with a brief check-in asking Pts to share the best gift they had given and received from others. Group members then engaged in a mindful breathing exercise and were asked to share their thoughts/reactions to this. Lastly, Group focused on engaging in a discussion about sleep hygiene tips (associating bed with sleep, avoiding alcohol/caffeine/nicotine, using a sleep diary, developing/practicing sleep rituals, creating a comfortable space for sleep, helpful eating/exercise habits); then exploring ways to incorporate these into a routine. Group facilitator explained concepts, reinforced participation, and engaged patients in the discussion.

## 2024-04-10 NOTE — BH INPATIENT PSYCHIATRY PROGRESS NOTE - NSBHCHARTREVIEWVS_PSY_A_CORE FT
Vital Signs Last 24 Hrs  T(C): 36.3 (04-10-24 @ 08:03), Max: 36.3 (04-10-24 @ 08:03)  T(F): 97.3 (04-10-24 @ 08:03), Max: 97.3 (04-10-24 @ 08:03)  HR: --  BP: --  BP(mean): --  RR: --  SpO2: --    Orthostatic VS  04-10-24 @ 08:03  Lying BP: --/-- HR: --  Sitting BP: 120/62 HR: 87  Standing BP: 114/72 HR: 83  Site: --  Mode: --  Orthostatic VS  04-09-24 @ 08:21  Lying BP: --/-- HR: --  Sitting BP: 98/67 HR: 70  Standing BP: 103/61 HR: 78  Site: --  Mode: --
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
## 2024-04-11 PROCEDURE — 99231 SBSQ HOSP IP/OBS SF/LOW 25: CPT

## 2024-04-11 RX ADMIN — Medication 2 MILLIGRAM(S): at 09:38

## 2024-04-11 RX ADMIN — Medication 2 MILLIGRAM(S): at 01:10

## 2024-04-11 RX ADMIN — Medication 2 MILLIGRAM(S): at 18:08

## 2024-04-11 RX ADMIN — Medication 50 MILLIGRAM(S): at 01:10

## 2024-04-11 NOTE — BH INPATIENT PSYCHIATRY PROGRESS NOTE - NSBHCHARTREVIEWVS_PSY_A_CORE FT
Vital Signs Last 24 Hrs  T(C): --  T(F): --  HR: --  BP: --  BP(mean): --  RR: --  SpO2: --    Orthostatic VS  04-10-24 @ 08:03  Lying BP: --/-- HR: --  Sitting BP: 120/62 HR: 87  Standing BP: 114/72 HR: 83  Site: --  Mode: --

## 2024-04-11 NOTE — BH INPATIENT PSYCHIATRY PROGRESS NOTE - MSE UNSTRUCTURED FT
Appearance: Dressed appropriately. Fair hygiene/grooming.  Behavior: Cooperative and calm. Childlike, friendly.   Motor: No abnormal movements, no psychomotor slowing or activation.  Speech: Laconic.  Mood: Does not give a mood  Affect: Neutral.  Thought Process: [place].   Associations: Fair.  Thought Content: No AVH, SIIP, HIIP.    Insight: Limited.  Judgment: Limited on interview.  Attention: Fair.  Language: Fluent.  Gait/station: Intact.

## 2024-04-11 NOTE — BH INPATIENT PSYCHIATRY PROGRESS NOTE - NSBHMETABOLIC_PSY_ALL_CORE_FT
BMI: BMI (kg/m2): 23.5 (02-10-24 @ 02:59)  HbA1c: A1C with Estimated Average Glucose Result: 6.1 % (01-14-24 @ 04:30)    Glucose: POCT Blood Glucose.: 115 mg/dL (01-04-24 @ 00:46)    BP: --Vital Signs Last 24 Hrs  T(C): --  T(F): --  HR: --  BP: --  BP(mean): --  RR: --  SpO2: --    Orthostatic VS  04-10-24 @ 08:03  Lying BP: --/-- HR: --  Sitting BP: 120/62 HR: 87  Standing BP: 114/72 HR: 83  Site: --  Mode: --    Lipid Panel: Date/Time: 01-14-24 @ 04:30  Cholesterol, Serum: 130  LDL Cholesterol Calculated: 61  HDL Cholesterol, Serum: 53  Total Cholesterol/HDL Ration Measurement: --  Triglycerides, Serum: 79
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
## 2024-04-12 PROCEDURE — 99231 SBSQ HOSP IP/OBS SF/LOW 25: CPT

## 2024-04-12 RX ADMIN — Medication 2 MILLIGRAM(S): at 17:45

## 2024-04-12 NOTE — BH INPATIENT PSYCHIATRY PROGRESS NOTE - NSBHMETABOLIC_PSY_ALL_CORE_FT
BMI: BMI (kg/m2): 23.5 (02-10-24 @ 02:59)  HbA1c: A1C with Estimated Average Glucose Result: 6.1 % (01-14-24 @ 04:30)    Glucose: POCT Blood Glucose.: 115 mg/dL (01-04-24 @ 00:46)    BP: --Vital Signs Last 24 Hrs  T(C): 36.7 (04-12-24 @ 07:56), Max: 36.7 (04-12-24 @ 07:56)  T(F): 98 (04-12-24 @ 07:56), Max: 98 (04-12-24 @ 07:56)  HR: --  BP: --  BP(mean): --  RR: --  SpO2: --    Orthostatic VS  04-12-24 @ 07:56  Lying BP: --/-- HR: --  Sitting BP: 118/74 HR: 89  Standing BP: 120/73 HR: 84  Site: --  Mode: --    Lipid Panel: Date/Time: 01-14-24 @ 04:30  Cholesterol, Serum: 130  LDL Cholesterol Calculated: 61  HDL Cholesterol, Serum: 53  Total Cholesterol/HDL Ration Measurement: --  Triglycerides, Serum: 79

## 2024-04-12 NOTE — BH INPATIENT PSYCHIATRY PROGRESS NOTE - MSE UNSTRUCTURED FT
Appearance: Dressed appropriately. Fair hygiene/grooming.  Behavior: Cooperative and calm. Childlike, friendly.   Motor: No abnormal movements, no psychomotor slowing or activation.  Speech: Laconic.  Mood: Does not give a mood  Affect: Pleasant.   Thought Process: [place].   Associations: Fair.  Thought Content: No AVH, SIIP, HIIP.    Insight: Limited.  Judgment: Limited on interview.  Attention: Fair.  Language: Fluent.  Gait/station: Intact.
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
## 2024-04-12 NOTE — BH INPATIENT PSYCHIATRY PROGRESS NOTE - NSBHCHARTREVIEWVS_PSY_A_CORE FT
Vital Signs Last 24 Hrs  T(C): 36.7 (04-12-24 @ 07:56), Max: 36.7 (04-12-24 @ 07:56)  T(F): 98 (04-12-24 @ 07:56), Max: 98 (04-12-24 @ 07:56)  HR: --  BP: --  BP(mean): --  RR: --  SpO2: --    Orthostatic VS  04-12-24 @ 07:56  Lying BP: --/-- HR: --  Sitting BP: 118/74 HR: 89  Standing BP: 120/73 HR: 84  Site: --  Mode: --

## 2024-04-15 PROCEDURE — 90853 GROUP PSYCHOTHERAPY: CPT

## 2024-04-15 PROCEDURE — 99231 SBSQ HOSP IP/OBS SF/LOW 25: CPT

## 2024-04-15 NOTE — BH INPATIENT PSYCHIATRY PROGRESS NOTE - NSBHCHARTREVIEWVS_PSY_A_CORE FT
Vital Signs Last 24 Hrs  T(C): 36.3 (04-15-24 @ 08:13), Max: 36.3 (04-15-24 @ 08:13)  T(F): 97.3 (04-15-24 @ 08:13), Max: 97.3 (04-15-24 @ 08:13)  HR: --  BP: --  BP(mean): --  RR: --  SpO2: --    Orthostatic VS  04-15-24 @ 08:13  Lying BP: --/-- HR: --  Sitting BP: 116/74 HR: 78  Standing BP: 118/75 HR: 81  Site: --  Mode: --

## 2024-04-15 NOTE — BH INPATIENT PSYCHIATRY PROGRESS NOTE - NSBHFUPINTERVALHXFT_PSY_A_CORE
Pt today found socializing with peers, laughing loudly, pointing at others, states she is feeling ok, but otherwise unclear responses to subsequent questions.

## 2024-04-15 NOTE — BH PSYCHOLOGY - GROUP THERAPY NOTE - NSBHPSYCHOLRESPCOMMENT_PSY_A_CORE FT
The patient appeared adequately groomed and casually dressed. Pt appeared fairly engaged in the group as evidenced by her willingness to verbally communicate during the discussion when prompted. Pt shared that she values others who “care about their family” and “want to have kids.” Speech was slurred and at times difficult to understand. PT was oriented X3. Pt needed reminders not to talk over others and responded well to redirection. Pt was appropriate and supportive with peers overall.

## 2024-04-15 NOTE — BH INPATIENT PSYCHIATRY PROGRESS NOTE - MSE UNSTRUCTURED FT
Appearance: Dressed appropriately. Fair hygiene/grooming.  Behavior: Cooperative and calm. Childlike, friendly.  Found sitting with peers in day room.   Motor: No abnormal movements, no psychomotor slowing or activation.  Speech: Productive.   Mood: Ok  Affect: Elated, laughing freuqently and loudly.   Thought Process: [place].   Associations: Fair.  Thought Content: Nonsensical ideas.  No AVH, SIIP, HIIP.    Insight: Limited.  Judgment: Limited on interview.  Attention: Fair.  Language: Fluent.  Gait/station: Intact.

## 2024-04-15 NOTE — BH INPATIENT PSYCHIATRY PROGRESS NOTE - NSBHMETABOLIC_PSY_ALL_CORE_FT
BMI: BMI (kg/m2): 23.5 (02-10-24 @ 02:59)  HbA1c: A1C with Estimated Average Glucose Result: 6.1 % (01-14-24 @ 04:30)    Glucose: POCT Blood Glucose.: 57 mg/dL (04-15-24 @ 09:20)    BP: --Vital Signs Last 24 Hrs  T(C): 36.3 (04-15-24 @ 08:13), Max: 36.3 (04-15-24 @ 08:13)  T(F): 97.3 (04-15-24 @ 08:13), Max: 97.3 (04-15-24 @ 08:13)  HR: --  BP: --  BP(mean): --  RR: --  SpO2: --    Orthostatic VS  04-15-24 @ 08:13  Lying BP: --/-- HR: --  Sitting BP: 116/74 HR: 78  Standing BP: 118/75 HR: 81  Site: --  Mode: --    Lipid Panel: Date/Time: 01-14-24 @ 04:30  Cholesterol, Serum: 130  LDL Cholesterol Calculated: 61  HDL Cholesterol, Serum: 53  Total Cholesterol/HDL Ration Measurement: --  Triglycerides, Serum: 79

## 2024-04-15 NOTE — BH PSYCHOLOGY - GROUP THERAPY NOTE - NSPSYCHOLGRPCOGPT_PSY_A_CORE FT
Patient attended Cognitive Behavioral Therapy Group incorporating ACT-based concepts. The group started with a brief check-in asking Pts to share their favorite qualities in friendships. Group members then engaged in a candle visualization exercise and were asked to share their thoughts/reactions to this. Lastly, Group focused on engaging in a discussion about their values, how these values have changed throughout different stages of life/how they differ from others/society, what they value about others, and how sometimes our actions do not align with our values/the impacts of this. Group facilitator explained concepts, reinforced participation, and engaged patients in the discussion.  I will STOP taking the medications listed below when I get home from the hospital:  None
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
## 2024-04-16 PROCEDURE — 90853 GROUP PSYCHOTHERAPY: CPT

## 2024-04-16 PROCEDURE — 99231 SBSQ HOSP IP/OBS SF/LOW 25: CPT

## 2024-04-16 NOTE — BH PSYCHOLOGY - GROUP THERAPY NOTE - NSPSYCHOLGRPCOGPT_PSY_A_CORE FT
Patient attended Cognitive Behavioral Therapy Group incorporating ACT-based concepts. The group started with a brief check-in asking about their favorite game. The group was then engaged in mindfulness visualization, as well as a self-care assessment. Group facilitator explained concepts, reinforced participation, and engaged patients in the discussion.

## 2024-04-16 NOTE — BH INPATIENT PSYCHIATRY PROGRESS NOTE - MSE UNSTRUCTURED FT
Appearance: Dressed appropriately. Fair hygiene/grooming.  Behavior: Cooperative and calm. Childlike, friendly.  Found walking through hallway.  Motor: Psychomotor activated.   Speech: Productive.   Mood: Ok  Affect: Elated.   Thought Process: [place].   Associations: Fair.  Thought Content: Nonsensical ideas.  No AVH, SIIP, HIIP.    Insight: Limited.  Judgment: Limited on interview.  Attention: Fair.  Language: Fluent.  Gait/station: Intact.

## 2024-04-16 NOTE — BH INPATIENT PSYCHIATRY PROGRESS NOTE - NSBHMETABOLIC_PSY_ALL_CORE_FT
BMI: BMI (kg/m2): 23.5 (02-10-24 @ 02:59)  HbA1c: A1C with Estimated Average Glucose Result: 6.1 % (01-14-24 @ 04:30)    Glucose: POCT Blood Glucose.: 57 mg/dL (04-15-24 @ 09:20)    BP: --Vital Signs Last 24 Hrs  T(C): 35.8 (04-16-24 @ 08:24), Max: 35.8 (04-16-24 @ 08:24)  T(F): 96.4 (04-16-24 @ 08:24), Max: 96.4 (04-16-24 @ 08:24)  HR: --  BP: --  BP(mean): --  RR: --  SpO2: --    Orthostatic VS  04-16-24 @ 08:24  Lying BP: --/-- HR: --  Sitting BP: 108/85 HR: 72  Standing BP: 100/75 HR: 76  Site: --  Mode: --  Orthostatic VS  04-15-24 @ 08:13  Lying BP: --/-- HR: --  Sitting BP: 116/74 HR: 78  Standing BP: 118/75 HR: 81  Site: --  Mode: --    Lipid Panel: Date/Time: 01-14-24 @ 04:30  Cholesterol, Serum: 130  LDL Cholesterol Calculated: 61  HDL Cholesterol, Serum: 53  Total Cholesterol/HDL Ration Measurement: --  Triglycerides, Serum: 79
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
## 2024-04-16 NOTE — BH INPATIENT PSYCHIATRY PROGRESS NOTE - MSE OPTIONS
Quality 226: Preventive Care And Screening: Tobacco Use: Screening And Cessation Intervention: Patient screened for tobacco use and is an ex/non-smoker Quality 431: Preventive Care And Screening: Unhealthy Alcohol Use - Screening: Patient screened for unhealthy alcohol use using a single question and scores less than 2 times per year Quality 130: Documentation Of Current Medications In The Medical Record: Current Medications Documented Detail Level: Detailed Unstructured MSE

## 2024-04-16 NOTE — BH PSYCHOLOGY - GROUP THERAPY NOTE - NSBHPSYCHOLRESPCOMMENT_PSY_A_CORE FT
The patient appeared adequately groomed and casually dressed. Pt offered verbal support to pts when sharing, was reading from worksheet and attempted to participate in mindfulness meditation. Pt needed reminders not to talk over others and responded well to redirection, however was unable to maintain remaining quiet for more than a few minutes at a time. Pt was appropriate and supportive with peers overall.

## 2024-04-16 NOTE — BH INPATIENT PSYCHIATRY PROGRESS NOTE - NSBHFUPINTERVALHXFT_PSY_A_CORE
Pt today asks about a team and also repeats 9AM several times.  She laughs loudly and repeatedly in response to questions.

## 2024-04-17 PROCEDURE — 90832 PSYTX W PT 30 MINUTES: CPT

## 2024-04-17 PROCEDURE — 99231 SBSQ HOSP IP/OBS SF/LOW 25: CPT
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
## 2024-04-17 NOTE — BH INPATIENT PSYCHIATRY PROGRESS NOTE - NSBHMETABOLIC_PSY_ALL_CORE_FT
BMI: BMI (kg/m2): 23.5 (02-10-24 @ 02:59)  HbA1c: A1C with Estimated Average Glucose Result: 6.1 % (01-14-24 @ 04:30)    Glucose: POCT Blood Glucose.: 57 mg/dL (04-15-24 @ 09:20)    BP: --Vital Signs Last 24 Hrs  T(C): 36.3 (04-17-24 @ 08:00), Max: 36.3 (04-17-24 @ 08:00)  T(F): 97.3 (04-17-24 @ 08:00), Max: 97.3 (04-17-24 @ 08:00)  HR: --  BP: --  BP(mean): --  RR: --  SpO2: --    Orthostatic VS  04-17-24 @ 08:00  Lying BP: 115/61 HR: 73  Sitting BP: --/-- HR: --  Standing BP: --/-- HR: --  Site: --  Mode: --  Orthostatic VS  04-16-24 @ 08:24  Lying BP: --/-- HR: --  Sitting BP: 108/85 HR: 72  Standing BP: 100/75 HR: 76  Site: --  Mode: --    Lipid Panel: Date/Time: 01-14-24 @ 04:30  Cholesterol, Serum: 130  LDL Cholesterol Calculated: 61  HDL Cholesterol, Serum: 53  Total Cholesterol/HDL Ration Measurement: --  Triglycerides, Serum: 79

## 2024-04-17 NOTE — BH INPATIENT PSYCHIATRY PROGRESS NOTE - NSBHCHARTREVIEWVS_PSY_A_CORE FT
Vital Signs Last 24 Hrs  T(C): 36.3 (04-17-24 @ 08:00), Max: 36.3 (04-17-24 @ 08:00)  T(F): 97.3 (04-17-24 @ 08:00), Max: 97.3 (04-17-24 @ 08:00)  HR: --  BP: --  BP(mean): --  RR: --  SpO2: --    Orthostatic VS  04-17-24 @ 08:00  Lying BP: 115/61 HR: 73  Sitting BP: --/-- HR: --  Standing BP: --/-- HR: --  Site: --  Mode: --  Orthostatic VS  04-16-24 @ 08:24  Lying BP: --/-- HR: --  Sitting BP: 108/85 HR: 72  Standing BP: 100/75 HR: 76  Site: --  Mode: --

## 2024-04-17 NOTE — BH INPATIENT PSYCHIATRY PROGRESS NOTE - CURRENT MEDICATION
MEDICATIONS  (STANDING):  risperiDONE   Tablet 2 milliGRAM(s) Oral <User Schedule>    MEDICATIONS  (PRN):  acetaminophen     Tablet .. 650 milliGRAM(s) Oral every 6 hours PRN Temp greater or equal to 38C (100.4F), Mild Pain (1 - 3)  aluminum hydroxide/magnesium hydroxide/simethicone Suspension 30 milliLiter(s) Oral every 6 hours PRN Dyspepsia  diphenhydrAMINE 50 milliGRAM(s) Oral every 6 hours PRN Extrapyramidal symptoms or prophylaxis  diphenhydrAMINE Injectable 50 milliGRAM(s) IntraMuscular once PRN Extrapyramidal prophylaxis  haloperidol     Tablet 5 milliGRAM(s) Oral every 6 hours PRN agitation  haloperidol    Injectable 5 milliGRAM(s) IntraMuscular once PRN aggression  magnesium hydroxide Suspension 30 milliLiter(s) Oral daily PRN Constipation  traZODone 50 milliGRAM(s) Oral at bedtime PRN insomnia

## 2024-04-17 NOTE — BH INPATIENT PSYCHIATRY PROGRESS NOTE - NSBHFUPINTERVALHXFT_PSY_A_CORE
As per staff report, pt has been refusing meds and focused on getting a pregnancy test.  Pt today repeats that she is good and laughs loudly and walks away.

## 2024-04-17 NOTE — BH INPATIENT PSYCHIATRY PROGRESS NOTE - PRN MEDS
MEDICATIONS  (PRN):  acetaminophen     Tablet .. 650 milliGRAM(s) Oral every 6 hours PRN Temp greater or equal to 38C (100.4F), Mild Pain (1 - 3)  aluminum hydroxide/magnesium hydroxide/simethicone Suspension 30 milliLiter(s) Oral every 6 hours PRN Dyspepsia  diphenhydrAMINE 50 milliGRAM(s) Oral every 6 hours PRN Extrapyramidal symptoms or prophylaxis  diphenhydrAMINE Injectable 50 milliGRAM(s) IntraMuscular once PRN Extrapyramidal prophylaxis  haloperidol     Tablet 5 milliGRAM(s) Oral every 6 hours PRN agitation  haloperidol    Injectable 5 milliGRAM(s) IntraMuscular once PRN aggression  magnesium hydroxide Suspension 30 milliLiter(s) Oral daily PRN Constipation  traZODone 50 milliGRAM(s) Oral at bedtime PRN insomnia

## 2024-04-18 PROCEDURE — 99231 SBSQ HOSP IP/OBS SF/LOW 25: CPT

## 2024-04-18 NOTE — BH INPATIENT PSYCHIATRY PROGRESS NOTE - MSE UNSTRUCTURED FT
Appearance: Dressed appropriately. Fair hygiene/grooming.  Behavior: Cooperative and calm. Childlike, friendly.  Found walking through hallway.  Motor: Psychomotor activated.   Speech: Laconic.   Mood: Good.  Affect: Initially pleasant.   Thought Process: [place].   Associations: Fair.  Thought Content: Repetitions.    Insight: Limited.  Judgment: Limited on interview.  Attention: Fair.  Language: Fluent.  Gait/station: Intact.

## 2024-04-18 NOTE — BH INPATIENT PSYCHIATRY PROGRESS NOTE - NSBHFUPINTERVALHXFT_PSY_A_CORE
Pt today repeats that she needs to get to her [facility] apartment.  When meds are mentioned, pt yells NO then calmly walks away.

## 2024-04-18 NOTE — BH INPATIENT PSYCHIATRY PROGRESS NOTE - NSBHCHARTREVIEWVS_PSY_A_CORE FT
Vital Signs Last 24 Hrs  T(C): --  T(F): --  HR: --  BP: --  BP(mean): --  RR: --  SpO2: --    Orthostatic VS  04-17-24 @ 08:00  Lying BP: 115/61 HR: 73  Sitting BP: --/-- HR: --  Standing BP: --/-- HR: --  Site: --  Mode: --

## 2024-04-18 NOTE — BH INPATIENT PSYCHIATRY PROGRESS NOTE - NSBHMETABOLIC_PSY_ALL_CORE_FT
BMI: BMI (kg/m2): 23.5 (02-10-24 @ 02:59)  HbA1c: A1C with Estimated Average Glucose Result: 6.1 % (01-14-24 @ 04:30)    Glucose: POCT Blood Glucose.: 57 mg/dL (04-15-24 @ 09:20)    BP: --Vital Signs Last 24 Hrs  T(C): --  T(F): --  HR: --  BP: --  BP(mean): --  RR: --  SpO2: --    Orthostatic VS  04-17-24 @ 08:00  Lying BP: 115/61 HR: 73  Sitting BP: --/-- HR: --  Standing BP: --/-- HR: --  Site: --  Mode: --    Lipid Panel: Date/Time: 01-14-24 @ 04:30  Cholesterol, Serum: 130  LDL Cholesterol Calculated: 61  HDL Cholesterol, Serum: 53  Total Cholesterol/HDL Ration Measurement: --  Triglycerides, Serum: 79
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
## 2024-04-19 PROCEDURE — 99231 SBSQ HOSP IP/OBS SF/LOW 25: CPT

## 2024-04-19 NOTE — BH INPATIENT PSYCHIATRY PROGRESS NOTE - MSE UNSTRUCTURED FT
Appearance: Dressed appropriately. Fair hygiene/grooming.  Behavior: Not cooperative. Childlike. Found sitting in day room.  Motor: No abnormalities.   Speech: Laconic.   Mood: Does not give mood.  Affect: Tearful.  Thought Process: [place].   Associations: Fair.  Thought Content: [place]overty of [place]  Insight: Limited.  Judgment: Limited on interview.  Attention: Fair.  Language: Fluent.  Gait/station: Intact.

## 2024-04-19 NOTE — BH INPATIENT PSYCHIATRY PROGRESS NOTE - NSBHMETABOLIC_PSY_ALL_CORE_FT
BMI: BMI (kg/m2): 23.5 (02-10-24 @ 02:59)  HbA1c: A1C with Estimated Average Glucose Result: 6.1 % (01-14-24 @ 04:30)    Glucose: POCT Blood Glucose.: 57 mg/dL (04-15-24 @ 09:20)    BP: --Vital Signs Last 24 Hrs  T(C): 36.4 (04-19-24 @ 08:04), Max: 36.4 (04-19-24 @ 08:04)  T(F): 97.6 (04-19-24 @ 08:04), Max: 97.6 (04-19-24 @ 08:04)  HR: --  BP: --  BP(mean): --  RR: --  SpO2: --    Orthostatic VS  04-19-24 @ 08:04  Lying BP: --/-- HR: --  Sitting BP: 121/63 HR: 93  Standing BP: 111/77 HR: 91  Site: --  Mode: --    Lipid Panel: Date/Time: 01-14-24 @ 04:30  Cholesterol, Serum: 130  LDL Cholesterol Calculated: 61  HDL Cholesterol, Serum: 53  Total Cholesterol/HDL Ration Measurement: --  Triglycerides, Serum: 79

## 2024-04-19 NOTE — BH INPATIENT PSYCHIATRY PROGRESS NOTE - NSBHCHARTREVIEWVS_PSY_A_CORE FT
Vital Signs Last 24 Hrs  T(C): 36.4 (04-19-24 @ 08:04), Max: 36.4 (04-19-24 @ 08:04)  T(F): 97.6 (04-19-24 @ 08:04), Max: 97.6 (04-19-24 @ 08:04)  HR: --  BP: --  BP(mean): --  RR: --  SpO2: --    Orthostatic VS  04-19-24 @ 08:04  Lying BP: --/-- HR: --  Sitting BP: 121/63 HR: 93  Standing BP: 111/77 HR: 91  Site: --  Mode: --
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
## 2024-04-22 PROCEDURE — 99231 SBSQ HOSP IP/OBS SF/LOW 25: CPT

## 2024-04-22 NOTE — BH INPATIENT PSYCHIATRY PROGRESS NOTE - MSE UNSTRUCTURED FT
Appearance: Dressed appropriately.  Behavior: Sedated, not cooperative.    Motor: No abnormal movements, no psychomotor slowing or activation.  Speech: Unable to assess.  Mood: Unable to assess.  Affect: Neutral.  Thought Process: Unable to assess.  Associations: Unable to assess.  Thought Content: Unable to assess.  Insight: Unable to assess.  Judgment: Unable to assess.  Attention: Unable to assess.  Language: Unable to assess.  Gait/station: Supine.

## 2024-04-22 NOTE — BH INPATIENT PSYCHIATRY PROGRESS NOTE - NSBHCHARTREVIEWVS_PSY_A_CORE FT
Vital Signs Last 24 Hrs  T(C): 36.7 (04-22-24 @ 07:48), Max: 36.7 (04-22-24 @ 07:48)  T(F): 98.1 (04-22-24 @ 07:48), Max: 98.1 (04-22-24 @ 07:48)  HR: --  BP: --  BP(mean): --  RR: --  SpO2: --    Orthostatic VS  04-22-24 @ 07:48  Lying BP: --/-- HR: --  Sitting BP: 128/85 HR: 81  Standing BP: 133/90 HR: 83  Site: --  Mode: --
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
## 2024-04-22 NOTE — BH INPATIENT PSYCHIATRY PROGRESS NOTE - NSBHMETABOLIC_PSY_ALL_CORE_FT
BMI: BMI (kg/m2): 23.5 (02-10-24 @ 02:59)  HbA1c: A1C with Estimated Average Glucose Result: 6.1 % (01-14-24 @ 04:30)    Glucose: POCT Blood Glucose.: 57 mg/dL (04-15-24 @ 09:20)    BP: --Vital Signs Last 24 Hrs  T(C): 36.7 (04-22-24 @ 07:48), Max: 36.7 (04-22-24 @ 07:48)  T(F): 98.1 (04-22-24 @ 07:48), Max: 98.1 (04-22-24 @ 07:48)  HR: --  BP: --  BP(mean): --  RR: --  SpO2: --    Orthostatic VS  04-22-24 @ 07:48  Lying BP: --/-- HR: --  Sitting BP: 128/85 HR: 81  Standing BP: 133/90 HR: 83  Site: --  Mode: --    Lipid Panel: Date/Time: 01-14-24 @ 04:30  Cholesterol, Serum: 130  LDL Cholesterol Calculated: 61  HDL Cholesterol, Serum: 53  Total Cholesterol/HDL Ration Measurement: --  Triglycerides, Serum: 79

## 2024-04-23 PROCEDURE — 99231 SBSQ HOSP IP/OBS SF/LOW 25: CPT

## 2024-04-23 RX ORDER — LORAZEPAM 4 MG/ML
2 VIAL (ML) INJECTION EVERY 6 HOURS
Refills: 0 | Status: DISCONTINUED | OUTPATIENT
Start: 2024-04-23 | End: 2024-04-23

## 2024-04-23 RX ORDER — LORAZEPAM 4 MG/ML
2 VIAL (ML) INJECTION ONCE
Refills: 0 | Status: DISCONTINUED | OUTPATIENT
Start: 2024-04-23 | End: 2024-04-30

## 2024-04-23 NOTE — BH INPATIENT PSYCHIATRY PROGRESS NOTE - NSBHCHARTREVIEWVS_PSY_A_CORE FT
Vital Signs Last 24 Hrs  T(C): 36.3 (04-23-24 @ 07:50), Max: 36.3 (04-23-24 @ 07:50)  T(F): 97.3 (04-23-24 @ 07:50), Max: 97.3 (04-23-24 @ 07:50)  HR: --  BP: --  BP(mean): --  RR: --  SpO2: --    Orthostatic VS  04-23-24 @ 07:50  Lying BP: --/-- HR: --  Sitting BP: 129/82 HR: 92  Standing BP: 124/88 HR: 101  Site: --  Mode: --  Orthostatic VS  04-22-24 @ 07:48  Lying BP: --/-- HR: --  Sitting BP: 128/85 HR: 81  Standing BP: 133/90 HR: 83  Site: --  Mode: --

## 2024-04-23 NOTE — BH INPATIENT PSYCHIATRY PROGRESS NOTE - NSBHFUPINTERVALHXFT_PSY_A_CORE
Pt today continues to refuse meds.  On interview when asked why, pt gives nonsensical answer.  Pt states she will be ready to run out the door on day of discharge.  Pt then runs towards other staff members and socializes with them playfully.

## 2024-04-23 NOTE — BH INPATIENT PSYCHIATRY PROGRESS NOTE - NSBHMETABOLIC_PSY_ALL_CORE_FT
BMI: BMI (kg/m2): 23.5 (02-10-24 @ 02:59)  HbA1c: A1C with Estimated Average Glucose Result: 6.1 % (01-14-24 @ 04:30)    Glucose: POCT Blood Glucose.: 57 mg/dL (04-15-24 @ 09:20)    BP: --Vital Signs Last 24 Hrs  T(C): 36.3 (04-23-24 @ 07:50), Max: 36.3 (04-23-24 @ 07:50)  T(F): 97.3 (04-23-24 @ 07:50), Max: 97.3 (04-23-24 @ 07:50)  HR: --  BP: --  BP(mean): --  RR: --  SpO2: --    Orthostatic VS  04-23-24 @ 07:50  Lying BP: --/-- HR: --  Sitting BP: 129/82 HR: 92  Standing BP: 124/88 HR: 101  Site: --  Mode: --  Orthostatic VS  04-22-24 @ 07:48  Lying BP: --/-- HR: --  Sitting BP: 128/85 HR: 81  Standing BP: 133/90 HR: 83  Site: --  Mode: --    Lipid Panel: Date/Time: 01-14-24 @ 04:30  Cholesterol, Serum: 130  LDL Cholesterol Calculated: 61  HDL Cholesterol, Serum: 53  Total Cholesterol/HDL Ration Measurement: --  Triglycerides, Serum: 79
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
## 2024-04-23 NOTE — BH INPATIENT PSYCHIATRY PROGRESS NOTE - MSE UNSTRUCTURED FT
Appearance: Dressed appropriately.  Behavior: Cooperative.  Childlike demeanor.   Motor: No abnormal movements, no psychomotor slowing or activation.  Speech: Regular rate.  Mood: "Fine."  Affect: Elated.   Thought Process: [place].   Associations: Fair.  Thought Content: No christiano delusions elicited.  Insight: Limited.  Judgment: Limited.   Attention: Fair.  Language: Fluent.  Gait: Intact.

## 2024-04-24 RX ORDER — ACETAMINOPHEN 500 MG/5ML
650 LIQUID (ML) ORAL ONCE
Refills: 0 | Status: COMPLETED | OUTPATIENT
Start: 2024-04-24 | End: 2024-04-24

## 2024-04-24 RX ADMIN — Medication 650 MILLIGRAM(S): at 16:45

## 2024-04-24 RX ADMIN — Medication 650 MILLIGRAM(S): at 12:49

## 2024-04-24 RX ADMIN — Medication 650 MILLIGRAM(S): at 13:19

## 2024-04-25 PROCEDURE — 99231 SBSQ HOSP IP/OBS SF/LOW 25: CPT

## 2024-04-25 NOTE — BH INPATIENT PSYCHIATRY PROGRESS NOTE - NSBHMETABOLIC_PSY_ALL_CORE_FT
BMI: BMI (kg/m2): 23.5 (02-10-24 @ 02:59)  HbA1c: A1C with Estimated Average Glucose Result: 6.1 % (01-14-24 @ 04:30)    Glucose: POCT Blood Glucose.: 57 mg/dL (04-15-24 @ 09:20)    BP: --Vital Signs Last 24 Hrs  T(C): --  T(F): --  HR: --  BP: --  BP(mean): --  RR: --  SpO2: --      Lipid Panel: Date/Time: 01-14-24 @ 04:30  Cholesterol, Serum: 130  LDL Cholesterol Calculated: 61  HDL Cholesterol, Serum: 53  Total Cholesterol/HDL Ration Measurement: --  Triglycerides, Serum: 79

## 2024-04-25 NOTE — BH INPATIENT PSYCHIATRY PROGRESS NOTE - MSE UNSTRUCTURED FT
Appearance: Dressed appropriately.  Behavior: Cooperative.  Childlike demeanor.   Motor: No abnormal movements, no psychomotor slowing or activation.  Speech: Regular rate.  Mood: "Fine."  Affect: Elated.   Thought Process: [place].   Associations: Fair.  Thought Content: No christiano delusions elicited.  Insight: Limited.  Judgment: Limited.   Attention: Fair.  Language: Fluent.  Gait: Intact.

## 2024-04-25 NOTE — BH PSYCHOLOGY - GROUP THERAPY NOTE - NSBHPSYCHOLRESPCOMMENT_PSY_A_CORE FT
The patient appeared adequately groomed and casually dressed. The patient appeared distracted in the group, demonstrated by her occasional giggling and talking to herself. She participated in the activity, but sometimes her responses were not related to the questions. Speech was slurred. PT was oriented X3. Pt was appropriate with peers.

## 2024-04-25 NOTE — BH PSYCHOLOGY - GROUP THERAPY NOTE - NSPSYCHOLGRPCOGPT_PSY_A_CORE FT
Patient attended Cognitive Behavioral Therapy Group incorporating ACT-based concepts. The group started with a brief check-in, asking participants about something they would like to tell their younger self. Members then engaged in a mindfulness meditation exercise and were encouraged to share any thoughts/reactions. Afterward, the group focused on an activity of sentence completion, which they took turns to share their fond memories, feelings, struggles, wishes, fears, etc. Group facilitator explained concepts, reinforced participation, and engaged patients in the discussion.

## 2024-04-25 NOTE — BH INPATIENT PSYCHIATRY PROGRESS NOTE - NSBHFUPINTERVALHXFT_PSY_A_CORE
Pt continues to refuse meds as per staff report.  Pt shows interviewer a story in which she writes about discharge and not wanting to take meds because she needs a pregnancy test.
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
## 2024-04-26 PROCEDURE — 99231 SBSQ HOSP IP/OBS SF/LOW 25: CPT
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
## 2024-04-26 NOTE — BH INPATIENT PSYCHIATRY PROGRESS NOTE - NSBHFUPINTERVALHXFT_PSY_A_CORE
As per staff report, pt continues to refuse meds.  Pt today upset with specific staff member, although unable to explain why (as per staff report, is because pt was asked to do vitals and take meds).  Pt states she wants to leave the hospital and that it will go out of business.

## 2024-04-26 NOTE — BH INPATIENT PSYCHIATRY PROGRESS NOTE - MSE UNSTRUCTURED FT
Appearance: Dressed appropriately.  Behavior: Cooperative.  Childlike demeanor.  Hugging a stuffed animal.  Motor: No abnormal movements, no psychomotor slowing or activation.  Speech: Regular rate.  Mood: Does not give a mood.   Affect: Frustrated.   Thought Process: [place].   Associations: Fair.  Thought Content: [place].   Insight: Limited.  Judgment: Limited.   Attention: [place].  Language: Fluent, at times incomprehensible.  Gait: Intact.

## 2024-04-27 RX ORDER — LORAZEPAM 4 MG/ML
2 VIAL (ML) INJECTION ONCE
Refills: 0 | Status: DISCONTINUED | OUTPATIENT
Start: 2024-04-27 | End: 2024-04-27

## 2024-04-27 RX ORDER — DIPHENHYDRAMINE HCL 12.5MG/5ML
50 ELIXIR ORAL ONCE
Refills: 0 | Status: COMPLETED | OUTPATIENT
Start: 2024-04-27 | End: 2024-04-27

## 2024-04-27 RX ORDER — HALOPERIDOL 10 MG/1
5 TABLET ORAL ONCE
Refills: 0 | Status: COMPLETED | OUTPATIENT
Start: 2024-04-27 | End: 2024-04-27

## 2024-04-27 RX ADMIN — Medication 30 MILLILITER(S): at 23:52

## 2024-04-27 RX ADMIN — Medication 2 MILLIGRAM(S): at 13:20

## 2024-04-27 RX ADMIN — HALOPERIDOL 5 MILLIGRAM(S): 10 TABLET ORAL at 13:20

## 2024-04-27 RX ADMIN — Medication 50 MILLIGRAM(S): at 13:20

## 2024-04-27 NOTE — BH CHART NOTE - NSEVENTNOTEFT_PSY_ALL_CORE
[facility]ERO[facility] called for activation of IM medication due to patient agitation. RN report patient disorganized, screaming and disruptive on unit. Pt placed herself on the floor by unit doors and not responding to verbal redirection. Pt refusing PO PRNs. Ativan 2 mg IM, Haldol 5 mg IM and Benadryl 50 mg IM x1 activated. Psych emergency called around 13:20. Patient seen after IM administered, noted to be resting comfortably in NAD, IM with fair effect. Advised RN staff to notify L[facility] if patient becomes agitated again.

## 2024-04-29 PROCEDURE — 99232 SBSQ HOSP IP/OBS MODERATE 35: CPT

## 2024-04-29 RX ORDER — DIPHENHYDRAMINE HCL 12.5MG/5ML
50 ELIXIR ORAL ONCE
Refills: 0 | Status: COMPLETED | OUTPATIENT
Start: 2024-04-29 | End: 2024-04-29

## 2024-04-29 RX ORDER — HALOPERIDOL 10 MG/1
5 TABLET ORAL ONCE
Refills: 0 | Status: COMPLETED | OUTPATIENT
Start: 2024-04-29 | End: 2024-04-29

## 2024-04-29 RX ORDER — LORAZEPAM 4 MG/ML
2 VIAL (ML) INJECTION ONCE
Refills: 0 | Status: DISCONTINUED | OUTPATIENT
Start: 2024-04-29 | End: 2024-04-29

## 2024-04-29 RX ADMIN — Medication 50 MILLIGRAM(S): at 09:30

## 2024-04-29 RX ADMIN — HALOPERIDOL 5 MILLIGRAM(S): 10 TABLET ORAL at 09:30

## 2024-04-29 RX ADMIN — Medication 2 MILLIGRAM(S): at 09:30

## 2024-04-29 NOTE — BH INPATIENT PSYCHIATRY PROGRESS NOTE - NSBHFUPINTERVALHXFT_PSY_A_CORE
Pt required IM psychotropics for agitation this morning.  Multiple attempts to interview pt afterwards, pt remains sedated for most of afternoon, stirs in response to verbal stimuli but does not wake for interview.
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
## 2024-04-30 PROCEDURE — 99232 SBSQ HOSP IP/OBS MODERATE 35: CPT

## 2024-04-30 RX ORDER — DIPHENHYDRAMINE HCL 12.5MG/5ML
50 ELIXIR ORAL ONCE
Refills: 0 | Status: COMPLETED | OUTPATIENT
Start: 2024-04-30 | End: 2024-04-30

## 2024-04-30 RX ORDER — HALOPERIDOL 10 MG/1
5 TABLET ORAL ONCE
Refills: 0 | Status: DISCONTINUED | OUTPATIENT
Start: 2024-04-30 | End: 2024-05-02

## 2024-04-30 RX ORDER — LORAZEPAM 4 MG/ML
2 VIAL (ML) INJECTION ONCE
Refills: 0 | Status: DISCONTINUED | OUTPATIENT
Start: 2024-04-30 | End: 2024-05-02

## 2024-04-30 RX ADMIN — Medication 50 MILLIGRAM(S): at 13:18

## 2024-04-30 NOTE — BH INPATIENT PSYCHIATRY PROGRESS NOTE - MSE UNSTRUCTURED FT
Appearance: Dressed appropriately.  Behavior: Limited cooperation.  Motor: No abnormal movements, no psychomotor slowing or activation.  Speech: Soft volume, mumbling.  Mood: Does not give a mood.   Affect: Neutral.  Thought Process: [place].   Associations: Fair.  Thought Content: Denies AVH.  Denies SIIP or HIIP.  Insight: Limited.  Judgment: Fair on interview.  Attention: Fair.  Language: Fluent.  Gait: Intact.    (4) no limitation

## 2024-04-30 NOTE — BH PSYCHOLOGY - GROUP THERAPY NOTE - NSPSYCHOLGRPCOGPT_PSY_A_CORE FT
Patient attended Cognitive Behavioral Therapy Group incorporating ACT-based concepts. The group started with a brief check-in asking pts to reflect on one coping skill they have utilized today. The group was then engaged in mindfulness exercise and were given the choice to pick between sleep hygiene, emotion awareness card game or lj analysis, pts picked lj analysis as a group activity. Group facilitator explained concepts, reinforced participation, and engaged patients in the discussion.
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
## 2024-04-30 NOTE — BH PSYCHOLOGY - GROUP THERAPY NOTE - NSBHPSYCHOLRESPCOMMENT_PSY_A_CORE FT
The patient appeared adequately groomed and casually dressed. The patient appeared distracted in the group, demonstrated by her occasional giggling and talking to herself. She reflected on how a certain song makes her feel happy and calm, as well as how she misses her friends. She participated in the activity, but sometimes her responses were not related to the questions. Speech was slurred. PT was oriented X3. Pt was appropriate with peers but had to be reminded to respect physical boundaries.

## 2024-04-30 NOTE — BH INPATIENT PSYCHIATRY PROGRESS NOTE - NSBHFUPINTERVALHXFT_PSY_A_CORE
Pt today mumbles incoherently during interview, makes reference to a demon, and when asked to take medications, pt loudly states no, walks away and gives middle finger.

## 2024-05-01 PROCEDURE — 99232 SBSQ HOSP IP/OBS MODERATE 35: CPT

## 2024-05-01 NOTE — BH INPATIENT PSYCHIATRY PROGRESS NOTE - MSE UNSTRUCTURED FT
Appearance: Dressed appropriately.  Behavior: Limited cooperation.  Oddly related, childlike demeanor.  Motor: No abnormal movements, no psychomotor slowing or activation.  Speech: Soft volume, mumbling.  Mood: Does not give a mood.   Affect: Elated.   Thought Process: [place].   Associations: Fair.  Thought Content: Illogical.  Insight: Limited.  Judgment: Fair on interview.  Attention: Fair.  Language: Fluent.  Gait: Intact.

## 2024-05-01 NOTE — BH INPATIENT PSYCHIATRY PROGRESS NOTE - NSBHFUPINTERVALHXFT_PSY_A_CORE
As per staff report, pt has not been aggressive, however is somewhat disruptive in groups with inappropriate language, also continues to refuse meds.  Pt today approaches interviewers, mumbles incoherently, mentions something about pregnancy.

## 2024-05-01 NOTE — BH INPATIENT PSYCHIATRY PROGRESS NOTE - CURRENT MEDICATION
MEDICATIONS  (STANDING):  risperiDONE   Tablet 2 milliGRAM(s) Oral <User Schedule>    MEDICATIONS  (PRN):  acetaminophen     Tablet .. 650 milliGRAM(s) Oral every 6 hours PRN Temp greater or equal to 38C (100.4F), Mild Pain (1 - 3)  aluminum hydroxide/magnesium hydroxide/simethicone Suspension 30 milliLiter(s) Oral every 6 hours PRN Dyspepsia  diphenhydrAMINE 50 milliGRAM(s) Oral every 6 hours PRN Extrapyramidal symptoms or prophylaxis  diphenhydrAMINE Injectable 50 milliGRAM(s) IntraMuscular once PRN Extrapyramidal prophylaxis  haloperidol     Tablet 5 milliGRAM(s) Oral every 6 hours PRN agitation  haloperidol    Injectable 5 milliGRAM(s) IntraMuscular once PRN aggression  haloperidol    Injectable 5 milliGRAM(s) IntraMuscular once PRN aggression  LORazepam   Injectable 2 milliGRAM(s) IntraMuscular once PRN severe agitation  magnesium hydroxide Suspension 30 milliLiter(s) Oral daily PRN Constipation  traZODone 50 milliGRAM(s) Oral at bedtime PRN insomnia
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
## 2024-05-01 NOTE — BH INPATIENT PSYCHIATRY PROGRESS NOTE - PRN MEDS
MEDICATIONS  (PRN):  acetaminophen     Tablet .. 650 milliGRAM(s) Oral every 6 hours PRN Temp greater or equal to 38C (100.4F), Mild Pain (1 - 3)  aluminum hydroxide/magnesium hydroxide/simethicone Suspension 30 milliLiter(s) Oral every 6 hours PRN Dyspepsia  diphenhydrAMINE 50 milliGRAM(s) Oral every 6 hours PRN Extrapyramidal symptoms or prophylaxis  diphenhydrAMINE Injectable 50 milliGRAM(s) IntraMuscular once PRN Extrapyramidal prophylaxis  haloperidol     Tablet 5 milliGRAM(s) Oral every 6 hours PRN agitation  haloperidol    Injectable 5 milliGRAM(s) IntraMuscular once PRN aggression  haloperidol    Injectable 5 milliGRAM(s) IntraMuscular once PRN aggression  LORazepam   Injectable 2 milliGRAM(s) IntraMuscular once PRN severe agitation  magnesium hydroxide Suspension 30 milliLiter(s) Oral daily PRN Constipation  traZODone 50 milliGRAM(s) Oral at bedtime PRN insomnia

## 2024-05-02 PROCEDURE — 99231 SBSQ HOSP IP/OBS SF/LOW 25: CPT

## 2024-05-02 RX ORDER — LORAZEPAM 4 MG/ML
2 VIAL (ML) INJECTION ONCE
Refills: 0 | Status: DISCONTINUED | OUTPATIENT
Start: 2024-05-02 | End: 2024-05-07

## 2024-05-02 RX ORDER — LORAZEPAM 4 MG/ML
2 VIAL (ML) INJECTION EVERY 6 HOURS
Refills: 0 | Status: DISCONTINUED | OUTPATIENT
Start: 2024-05-02 | End: 2024-05-07

## 2024-05-02 NOTE — BH INPATIENT PSYCHIATRY PROGRESS NOTE - NSBHFUPINTERVALHXFT_PSY_A_CORE
Pt today lists what she ate for breakfast.  Asks if writer has lost weight.  States it [birth year] her birthday tomorrow and that it is someone else's birthday today.

## 2024-05-02 NOTE — BH INPATIENT PSYCHIATRY PROGRESS NOTE - MSE UNSTRUCTURED FT
Appearance: Dressed appropriately.  Behavior: Limited cooperation.  Childlike demeanor.  Motor: No abnormal movements, no psychomotor slowing or activation.  Speech: Regular rate.   Mood: Does not give a mood.   Affect: Elated.   Thought Process: [place].   Associations: Fair.  Thought Content: Odd ideas.  Insight: Limited.  Judgment: Fair on interview.  Attention: Fair.  Language: Fluent.  Gait: Intact.
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
## 2024-05-03 PROCEDURE — 99231 SBSQ HOSP IP/OBS SF/LOW 25: CPT
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
## 2024-05-03 NOTE — BH INPATIENT PSYCHIATRY PROGRESS NOTE - MSE UNSTRUCTURED FT
Appearance: Dressed appropriately.  Behavior: Limited cooperation.  Childlike demeanor.  Motor: No abnormal movements, no psychomotor slowing or activation.  Speech: Regular rate.   Mood: Ok  Affect: Elated, laughing frequently.  Thought Process: [place].   Associations: Fair.  Thought Content: [place].  Insight: Limited.  Judgment: Fair on interview.  Attention: Fair.  Language: Fluent.  Gait: Intact.

## 2024-05-03 NOTE — BH INPATIENT PSYCHIATRY PROGRESS NOTE - NSBHFUPINTERVALHXFT_PSY_A_CORE
As per staff report, pt this morning was disrobing in her room, multiple staff members redirecting pt to put on clothing.  Pt on interview states she is doing ok, acknowledges she had a teleconference this morning, appears to understand that it was about housing.

## 2024-05-03 NOTE — BH INPATIENT PSYCHIATRY PROGRESS NOTE - NSBHFUPINTERVALCCFT_PSY_A_CORE
Due to COVID-19 ACTION PLAN, the patient's office visit was converted to a phone visit.    This patient verbally consents to a telephone visit.  Patient states they are Garima Vidal and that they are speaking to me from their home.       Medical issues discussed:    Ms. Vidal is a pleasant 84-year-old lady with a history of dyslipidemia and hypertension.  She has minimal carotid disease and normal left ventricular function with at most mild to moderate aortic stenosis.  Cardiac stress testing (2017)  has been normal and she is doing well.  She denies any angina or heart failure symptoms.  She saw Dr. Healy for moderate infrageniculate peripheral vascular disease.  There was no peripheral vascular disease in the SFA, popliteal or iliac regions and she has been asymptomatic and without wounds.  Given that,  he recommended diet, exercise and risk factor modification and no need for any intervention at this time.            Review of Systems   Constitution: Negative for chills, fever, weight gain and weight loss.   HENT: Negative for hearing loss.    Eyes:        Patient denies significant visual changes   Cardiovascular: Negative for chest pain and claudication.        Negative except for what's indicated in HPI   Respiratory: Negative for cough and hemoptysis.    Hematologic/Lymphatic: Does not bruise/bleed easily.   Skin: Negative for rash and suspicious lesions.   Musculoskeletal: Negative for arthritis.   Gastrointestinal: Negative for hematochezia and melena.   Genitourinary: Negative for hematuria.   Neurological:        No localized deficits   Allergic/Immunologic: Negative for environmental allergies.        No new food allergies          Assessment and Plan:    Ms. Rigoberto Aguilar is a pleasant 84-year-old lady with a history of mild to moderate aortic stenosis as well as hypertension and dyslipidemia.  Her next echocardiogram will be in 1 year.  We wrote for some blood work and will call her with  results on that.  She is taking care of her  who has cancer.  We will continue to recommend a low-fat low-cholesterol diet as well as a regular exercise program as she is able to tolerate and will follow this very nice lady in office.  Of note, she has had a history of peripheral vascular disease as outlined above this been medically managed and can consider follow-up vascular ultrasound in 2021.    Medication reconciliation completed.     New prescriptions / refills sent to the pharmacy.    Patient's next in-office follow up will be in 6 months.     The following testing should be completed prior to next visit:     Total amount of time spent on this telephone visit 15 minutes    Patient was advised to call if any new or worsening symptoms.     The telephone-only visit was being conducted for the purpose of providing treatment advice during a public health emergency.  The treatment advice that was being provided was based on what was reported by the patient.  Without the patient being seen and evaluated in person, there would be some risk that the information and/or assessment provided by the [facility] provider might be incomplete or inaccurate.   f/u behavioral disturbance, intellectual and developmental disability

## 2024-05-06 PROCEDURE — 90832 PSYTX W PT 30 MINUTES: CPT

## 2024-05-06 PROCEDURE — 99231 SBSQ HOSP IP/OBS SF/LOW 25: CPT
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
## 2024-05-06 NOTE — BH INPATIENT PSYCHIATRY PROGRESS NOTE - NSBHFUPINTERVALHXFT_PSY_A_CORE
Pt reports she is going to get an apartment in [facility].  She states she hates this hospital, and strongly dislikes one of her peers on unit.

## 2024-05-06 NOTE — BH INPATIENT PSYCHIATRY PROGRESS NOTE - NSBHCHARTREVIEWVS_PSY_A_CORE FT
Vital Signs Last 24 Hrs  T(C): 36.8 (05-06-24 @ 07:55), Max: 36.8 (05-06-24 @ 07:55)  T(F): 98.3 (05-06-24 @ 07:55), Max: 98.3 (05-06-24 @ 07:55)  HR: --  BP: --  BP(mean): --  RR: --  SpO2: --    Orthostatic VS  05-06-24 @ 07:55  Lying BP: --/-- HR: --  Sitting BP: 132/82 HR: 103  Standing BP: --/-- HR: --  Site: --  Mode: --

## 2024-05-06 NOTE — BH INPATIENT PSYCHIATRY PROGRESS NOTE - MSE UNSTRUCTURED FT
Appearance: Dressed appropriately.  Behavior: Limited cooperation.  Childlike demeanor.  Motor: No abnormal movements, no psychomotor slowing or activation.  Speech: Regular rate.   Mood: Does not give a mood.  Affect: Neutral to frustrated.   Thought Process: [place].   Associations: Fair.  Thought Content: [place].  Insight: Limited.  Judgment: Fair on interview.  Attention: Fair.  Language: Fluent.  Gait: Intact.

## 2024-05-06 NOTE — BH INPATIENT PSYCHIATRY PROGRESS NOTE - NSICDXBHSECONDARYDX_PSY_ALL_CORE
Patient on high flow and nonrebreather, still awake, says he is comfortable    HPI:     53 M with pmh HLD, dm, htn presents with 8 days onset of covid symptoms which included, cough, fevers, sob, hypoxia, fevers, diarrhea, chills and myalgias. tested positive 7/15.   Patient was unvaccinated. He thinks he got it going to [facility] week of July 4 th.  Wife also had covid but her case was mild.  Admitted for Hypoxia to  on 7/21.   Antibody test negative.  DESTINEY cratinine stable at 1.4  Remdisivir started 7/22 now day 3  Got dose of Tocalimanab on 7/23  Steroid day 4(went from solumedrol to decadron, now   echo - ef 50%., had LHC 2019 with normal coronaries  transfered to ICU on 7/24 on high flow oxygen  now on high flow and NRB sat 90%, desaturates with movements      PAST MEDICAL/SURGICAL/FAMILY/SOCIAL HISTORY:    Past Medical History:  Diabetes    HTN (hypertension).  No surgeries    ROS slight dyspnea, no chest pain still has appetite, no fevers, no chills, no abdominal pain      MEDICATIONS  (STANDING):  aspirin  chewable 81 milliGRAM(s) Oral daily  atorvastatin 80 milliGRAM(s) Oral at bedtime  dexAMETHasone  Injectable 6 milliGRAM(s) IV Push daily  dextrose 40% Gel 15 Gram(s) Oral once  dextrose 5%. 1000 milliLiter(s) (50 mL/Hr) IV Continuous <Continuous>  dextrose 5%. 1000 milliLiter(s) (100 mL/Hr) IV Continuous <Continuous>  dextrose 50% Injectable 25 Gram(s) IV Push once  dextrose 50% Injectable 12.5 Gram(s) IV Push once  dextrose 50% Injectable 25 Gram(s) IV Push once  enoxaparin Injectable 40 milliGRAM(s) SubCutaneous every 12 hours  glucagon  Injectable 1 milliGRAM(s) IntraMuscular once  insulin glargine Injectable (LANTUS) 35 Unit(s) SubCutaneous at bedtime  insulin lispro (ADMELOG) corrective regimen sliding scale   SubCutaneous three times a day before meals  insulin lispro (ADMELOG) corrective regimen sliding scale   SubCutaneous at bedtime  insulin lispro Injectable (ADMELOG) 7 Unit(s) SubCutaneous three times a day before meals  labetalol 200 milliGRAM(s) Oral every 12 hours  losartan 50 milliGRAM(s) Oral daily  remdesivir  IVPB   IV Intermittent   remdesivir  IVPB 100 milliGRAM(s) IV Intermittent every 24 hours    MEDICATIONS  (PRN):  acetaminophen   Tablet .. 650 milliGRAM(s) Oral once PRN Temp greater or equal to 38C (100.4F), Mild Pain (1 - 3)  acetaminophen   Tablet .. 650 milliGRAM(s) Oral every 4 hours PRN Temp greater or equal to 38C (100.4F), Mild Pain (1 - 3)  ALBUTerol    90 MICROgram(s) HFA Inhaler 2 Puff(s) Inhalation every 4 hours PRN Shortness of Breath and/or Wheezing  benzonatate 100 milliGRAM(s) Oral three times a day PRN Cough    ICU Vital Signs Last 24 Hrs  T(C): 36.2 (25 Jul 2021 04:00), Max: 37 (25 Jul 2021 00:00)  T(F): 97.1 (25 Jul 2021 04:00), Max: 98.6 (25 Jul 2021 00:00)  HR: 73 (25 Jul 2021 07:00) (61 - 89)  BP: 140/74 (25 Jul 2021 07:00) (103/79 - 152/83)  BP(mean): 91 (25 Jul 2021 07:00) (67 - 99)  ABP: --  ABP(mean): --  RR: 35 (25 Jul 2021 07:00) (15 - 46)  SpO2: 90% (25 Jul 2021 07:00) (82% - 94%)      I&O's Summary    24 Jul 2021 07:01  -  25 Jul 2021 07:00  --------------------------------------------------------  IN: 880 mL / OUT: 3000 mL / NET: -2120 mL                        13.1   12.51 )-----------( 348      ( 25 Jul 2021 05:42 )             38.8     Physical Exam:  genera obese, slightly dyspneic  HEENT nc/at  neck no jvd  lungs bialteral scattered crackles  cv rrr  abdomen soft non tender  extremities - no calf tenderness    07-25    136  |  104  |  39<H>  ----------------------------<  318<H>  4.7   |  25  |  1.37<H>    Ca    8.4<L>      25 Jul 2021 05:41    TPro  6.4  /  Alb  2.3<L>  /  TBili  0.4  /  DBili  x   /  AST  159<H>  /  ALT  146<H>  /  AlkPhos  200<H>  07-25    ABG - ( 24 Jul 2021 10:46 )  pH, Arterial: 7.45  pH, Blood: x     /  pCO2: 28    /  pO2: 63    / HCO3: 19    / Base Excess: -3.2  /  SaO2: 92        DVT Prophylaxis:     Lovenox 40 bid                                                            Advanced Directives:  Full Code                      History of schizophrenia   Z86.59

## 2024-05-06 NOTE — BH INPATIENT PSYCHIATRY PROGRESS NOTE - NSBHMETABOLIC_PSY_ALL_CORE_FT
BMI: BMI (kg/m2): 23.5 (02-10-24 @ 02:59)  HbA1c: A1C with Estimated Average Glucose Result: 6.1 % (01-14-24 @ 04:30)    Glucose: POCT Blood Glucose.: 57 mg/dL (04-15-24 @ 09:20)    BP: --Vital Signs Last 24 Hrs  T(C): 36.8 (05-06-24 @ 07:55), Max: 36.8 (05-06-24 @ 07:55)  T(F): 98.3 (05-06-24 @ 07:55), Max: 98.3 (05-06-24 @ 07:55)  HR: --  BP: --  BP(mean): --  RR: --  SpO2: --    Orthostatic VS  05-06-24 @ 07:55  Lying BP: --/-- HR: --  Sitting BP: 132/82 HR: 103  Standing BP: --/-- HR: --  Site: --  Mode: --    Lipid Panel: Date/Time: 01-14-24 @ 04:30  Cholesterol, Serum: 130  LDL Cholesterol Calculated: 61  HDL Cholesterol, Serum: 53  Total Cholesterol/HDL Ration Measurement: --  Triglycerides, Serum: 79

## 2024-05-07 PROCEDURE — 99231 SBSQ HOSP IP/OBS SF/LOW 25: CPT

## 2024-05-07 RX ORDER — LORAZEPAM 4 MG/ML
2 VIAL (ML) INJECTION EVERY 6 HOURS
Refills: 0 | Status: DISCONTINUED | OUTPATIENT
Start: 2024-05-07 | End: 2024-05-14

## 2024-05-07 RX ORDER — LORAZEPAM 4 MG/ML
2 VIAL (ML) INJECTION ONCE
Refills: 0 | Status: DISCONTINUED | OUTPATIENT
Start: 2024-05-07 | End: 2024-05-14

## 2024-05-07 RX ADMIN — Medication 2 MILLIGRAM(S): at 22:40

## 2024-05-07 NOTE — BH INPATIENT PSYCHIATRY PROGRESS NOTE - NSBHFUPINTERVALHXFT_PSY_A_CORE
Pt was found yelling in her room to herself.  Pt states she was moved into current room by staff.  States she is fine with her roommate.

## 2024-05-07 NOTE — BH INPATIENT PSYCHIATRY PROGRESS NOTE - NSBHFUPINTERVALCCFT_PSY_A_CORE
f/u behavioral disturbance, intellectual and developmental disability  9/10/2019 [facility]3] FT (M) 8#5
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
## 2024-05-07 NOTE — BH INPATIENT PSYCHIATRY PROGRESS NOTE - NSBHCHARTREVIEWVS_PSY_A_CORE FT
Vital Signs Last 24 Hrs  T(C): --  T(F): --  HR: --  BP: --  BP(mean): --  RR: --  SpO2: --    Orthostatic VS  05-06-24 @ 07:55  Lying BP: --/-- HR: --  Sitting BP: 132/82 HR: 103  Standing BP: --/-- HR: --  Site: --  Mode: --

## 2024-05-07 NOTE — BH INPATIENT PSYCHIATRY PROGRESS NOTE - NSBHMETABOLIC_PSY_ALL_CORE_FT
BMI: BMI (kg/m2): 23.5 (02-10-24 @ 02:59)  HbA1c: A1C with Estimated Average Glucose Result: 6.1 % (01-14-24 @ 04:30)    Glucose: POCT Blood Glucose.: 57 mg/dL (04-15-24 @ 09:20)    BP: --Vital Signs Last 24 Hrs  T(C): --  T(F): --  HR: --  BP: --  BP(mean): --  RR: --  SpO2: --    Orthostatic VS  05-06-24 @ 07:55  Lying BP: --/-- HR: --  Sitting BP: 132/82 HR: 103  Standing BP: --/-- HR: --  Site: --  Mode: --    Lipid Panel: Date/Time: 01-14-24 @ 04:30  Cholesterol, Serum: 130  LDL Cholesterol Calculated: 61  HDL Cholesterol, Serum: 53  Total Cholesterol/HDL Ration Measurement: --  Triglycerides, Serum: 79

## 2024-05-07 NOTE — BH INPATIENT PSYCHIATRY PROGRESS NOTE - MSE UNSTRUCTURED FT
Appearance: Dressed appropriately.  Behavior: Limited cooperation.  Childlike demeanor.  Motor: No abnormal movements, no psychomotor slowing or activation.  Speech: Regular rate.   Mood: Ok  Affect: Elated.   Thought Process: [place].   Associations: Fair.  Thought Content: [place] of [place].   Insight: Limited.  Judgment: Fair on interview.  Attention: Fair.  Language: Fluent.  Gait: Intact.

## 2024-05-08 PROCEDURE — 99231 SBSQ HOSP IP/OBS SF/LOW 25: CPT

## 2024-05-08 RX ADMIN — Medication 650 MILLIGRAM(S): at 08:32

## 2024-05-08 NOTE — BH INPATIENT PSYCHIATRY PROGRESS NOTE - NSBHFUPINTERVALHXFT_PSY_A_CORE
Pt sleeping for most of day in day room across two chairs pushed together.  Multiple attempts to interview pt who does not wake up fully for interview.
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
## 2024-05-09 PROCEDURE — 99231 SBSQ HOSP IP/OBS SF/LOW 25: CPT

## 2024-05-09 RX ADMIN — Medication 50 MILLIGRAM(S): at 20:45

## 2024-05-09 RX ADMIN — Medication 2 MILLIGRAM(S): at 20:46

## 2024-05-09 RX ADMIN — HALOPERIDOL 5 MILLIGRAM(S): 10 TABLET ORAL at 20:45

## 2024-05-09 NOTE — BH INPATIENT PSYCHIATRY PROGRESS NOTE - NSBHFUPINTERVALHXFT_PSY_A_CORE
As per staff report, pt spent part of morning sitting on floor of bathroom in dark.  Pt found later on unit, once again sleeping for most of afternoon in day room across two chairs pushed together.  Pt again does not wake fully for interview.
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
## 2024-05-10 PROCEDURE — 99231 SBSQ HOSP IP/OBS SF/LOW 25: CPT

## 2024-05-10 PROCEDURE — 90832 PSYTX W PT 30 MINUTES: CPT
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
## 2024-05-10 NOTE — BH INPATIENT PSYCHIATRY PROGRESS NOTE - NSBHFUPINTERVALHXFT_PSY_A_CORE
Pt awake today, in day room, states that she is doing ok and that she has been sleeping in day room instead of her room because she does not like her roommate.

## 2024-05-10 NOTE — BH INPATIENT PSYCHIATRY PROGRESS NOTE - MSE UNSTRUCTURED FT
Appearance: Dressed appropriately.  Behavior: Cooperative.  Calm.  Well related.   Motor: No abnormal movements, no psychomotor slowing or activation.  Speech: Regular rate.  Mood: "ok."  Affect: Pleasant.   Thought Process: [place].   Associations: Fair.  Thought Content: No AVH, SIIP, HIIP.  Insight: Limited.  Judgment: Fair on interview.  Attention: Fair.  Language: Fluent.  Gait/station: seated.

## 2024-05-11 RX ADMIN — Medication 650 MILLIGRAM(S): at 20:12

## 2024-05-12 RX ADMIN — Medication 650 MILLIGRAM(S): at 02:41

## 2024-05-12 RX ADMIN — Medication 650 MILLIGRAM(S): at 06:31

## 2024-05-13 PROCEDURE — 99232 SBSQ HOSP IP/OBS MODERATE 35: CPT

## 2024-05-13 RX ADMIN — HALOPERIDOL 5 MILLIGRAM(S): 10 TABLET ORAL at 20:46

## 2024-05-13 RX ADMIN — Medication 2 MILLIGRAM(S): at 20:46

## 2024-05-13 RX ADMIN — Medication 50 MILLIGRAM(S): at 20:47
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
## 2024-05-13 NOTE — BH INPATIENT PSYCHIATRY PROGRESS NOTE - NSBHFUPINTERVALHXFT_PSY_A_CORE
Pt initially states she is not doing ok and does not want to talk.  Then states that she wants to go to a group [facility] and wants to take a [employer] bus there.

## 2024-05-13 NOTE — BH INPATIENT PSYCHIATRY PROGRESS NOTE - MSE UNSTRUCTURED FT
Appearance: Dressed appropriately.  Behavior: Cooperative.  Calm.  Well related.   Motor: No abnormal movements, no psychomotor slowing or activation.  Speech: Regular rate.  Mood: "ok."  Affect: Pleasant.   Thought Process: [place].   Associations: Fair.  Thought Content: No AVH, SIIP, HIIP.  Insight: Limited.  Judgment: Fair on interview.  Attention: Fair.  Language: Fluent.  Gait/station: seated.

## 2024-05-14 PROCEDURE — 99232 SBSQ HOSP IP/OBS MODERATE 35: CPT

## 2024-05-14 RX ORDER — LORAZEPAM 4 MG/ML
2 VIAL (ML) INJECTION EVERY 6 HOURS
Refills: 0 | Status: DISCONTINUED | OUTPATIENT
Start: 2024-05-14 | End: 2024-05-21

## 2024-05-14 RX ORDER — LORAZEPAM 4 MG/ML
2 VIAL (ML) INJECTION ONCE
Refills: 0 | Status: DISCONTINUED | OUTPATIENT
Start: 2024-05-14 | End: 2024-05-21

## 2024-05-14 RX ADMIN — Medication 2 MILLIGRAM(S): at 23:20

## 2024-05-14 RX ADMIN — HALOPERIDOL 5 MILLIGRAM(S): 10 TABLET ORAL at 23:20

## 2024-05-14 NOTE — BH INPATIENT PSYCHIATRY PROGRESS NOTE - NSBHFUPINTERVALHXFT_PSY_A_CORE
Patient agreed to meet with writer. Per nursing patient continues to refuse medications. Patient was elevated, running through the unit, hair was dripping with water. Patient was hard to understand at times. Patient reported "good" mood, sleep and appetite are good. Looking forward to going to "supportive group home", believes staff has made arrangements for her to be discharged on May 29th. Patient also believe her doctor  from Covid. Patient denied SIIP and AVH.

## 2024-05-14 NOTE — BH INPATIENT PSYCHIATRY PROGRESS NOTE - MSE UNSTRUCTURED FT
Appearance: Dressed appropriately.  Behavior: Cooperative.  Calm.  Well related.   Motor: No abnormal movements, no psychomotor slowing or activation.  Speech: Regular rate.  Mood: "ok."  Affect: Pleasant.   Thought Process: [place].   Associations: Fair.  Thought Content: No AVH, SIIP, HIIP.  Insight: Limited.  Judgment: Fair on interview.  Attention: Fair.  Language: Fluent.  Gait/station: seated.
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
## 2024-05-15 PROCEDURE — 99231 SBSQ HOSP IP/OBS SF/LOW 25: CPT

## 2024-05-15 PROCEDURE — 90832 PSYTX W PT 30 MINUTES: CPT

## 2024-05-15 RX ADMIN — HALOPERIDOL 5 MILLIGRAM(S): 10 TABLET ORAL at 20:41

## 2024-05-15 RX ADMIN — Medication 2 MILLIGRAM(S): at 20:46
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
## 2024-05-15 NOTE — BH INPATIENT PSYCHIATRY PROGRESS NOTE - MSE UNSTRUCTURED FT
Appearance: Dressed appropriately.  Behavior: Cooperative.  Calm.  Well related.   Motor: No abnormal movements, no psychomotor slowing or activation.  Speech: Regular rate.  Mood: "Ok."  Affect: Pleasant.   Thought Process: [place], tangential at times.   Associations: Fair.  Thought Content: No AVH, SIIP, HIIP.  Insight: Limited.  Judgment: Fair on interview.  Attention: Fair.  Language: Fluent.  Gait/station: seated.

## 2024-05-16 PROCEDURE — 90853 GROUP PSYCHOTHERAPY: CPT

## 2024-05-16 PROCEDURE — 99232 SBSQ HOSP IP/OBS MODERATE 35: CPT

## 2024-05-16 RX ORDER — RISPERIDONE 4 MG
2 TABLET ORAL AT BEDTIME
Refills: 0 | Status: DISCONTINUED | OUTPATIENT
Start: 2024-05-16 | End: 2024-06-13

## 2024-05-16 RX ADMIN — Medication 650 MILLIGRAM(S): at 21:06

## 2024-05-16 RX ADMIN — Medication 650 MILLIGRAM(S): at 21:36
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
## 2024-05-16 NOTE — BH PSYCHOLOGY - GROUP THERAPY NOTE - NSBHPSYCHOLRESPCOMMENT_PSY_A_CORE FT
The patient appeared adequately groomed and casually dressed. The patient appeared distracted in the group, demonstrated by her occasional giggling and talking to herself. Speech was slurred. PT was oriented X3. Pt was appropriate with peers.

## 2024-05-16 NOTE — BH PSYCHOLOGY - GROUP THERAPY NOTE - NSPSYCHOLGRPCOGPT_PSY_A_CORE FT
Patient attended Cognitive Behavioral Therapy Group incorporating ACT-based concepts. The group started with a brief check-in asking pts about something they are worried about. The group was then engaged in a body scan exercise and a worksheet exploring worries and coping skills to manage such (“what could happen vs. What will happen’). Group facilitator explained concepts, reinforced participation, and engaged patients in the discussion.

## 2024-05-16 NOTE — BH INPATIENT PSYCHIATRY PROGRESS NOTE - MSE UNSTRUCTURED FT
Appearance: Dressed appropriately.  Behavior: Cooperative.  Calm.  Well related.   Motor: No abnormal movements, no psychomotor slowing or activation.  Speech: Regular rate.  Mood: "Ok."  Affect: Pleasant.   Thought Process: [place], tangential at times.   Associations: Fair.  Thought Content: No AVH, SIIP, HIIP.  Insight: Limited.  Judgment: Fair on interview.  Attention: Fair.  Language: Fluent.  Gait: Intact.

## 2024-05-17 PROCEDURE — 99232 SBSQ HOSP IP/OBS MODERATE 35: CPT
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
## 2024-05-17 NOTE — BH INPATIENT PSYCHIATRY PROGRESS NOTE - CURRENT MEDICATION
MEDICATIONS  (STANDING):  risperiDONE   Tablet 2 milliGRAM(s) Oral at bedtime    MEDICATIONS  (PRN):  acetaminophen     Tablet .. 650 milliGRAM(s) Oral every 6 hours PRN Temp greater or equal to 38C (100.4F), Mild Pain (1 - 3)  aluminum hydroxide/magnesium hydroxide/simethicone Suspension 30 milliLiter(s) Oral every 6 hours PRN Dyspepsia  diphenhydrAMINE 50 milliGRAM(s) Oral every 6 hours PRN Extrapyramidal symptoms or prophylaxis  diphenhydrAMINE Injectable 50 milliGRAM(s) IntraMuscular once PRN Extrapyramidal prophylaxis  haloperidol     Tablet 5 milliGRAM(s) Oral every 6 hours PRN agitation  haloperidol    Injectable 5 milliGRAM(s) IntraMuscular once PRN aggression  LORazepam     Tablet 2 milliGRAM(s) Oral every 6 hours PRN severe anxiety, agitation  LORazepam   Injectable 2 milliGRAM(s) IntraMuscular once PRN severe agitation  magnesium hydroxide Suspension 30 milliLiter(s) Oral daily PRN Constipation  traZODone 50 milliGRAM(s) Oral at bedtime PRN insomnia

## 2024-05-17 NOTE — BH INPATIENT PSYCHIATRY PROGRESS NOTE - MSE UNSTRUCTURED FT
Appearance: Dressed appropriately.  Behavior: Cooperative.    Motor: Psychomotor activated.   Speech: Garbled.   Mood: Unable to assess  Affect: Elated  Thought Process: Unable to assess  Associations: Unable to assess  Thought Content: Unable to assess  Insight: Limited.  Judgment: Poor.   Attention: Poor.   Language: nonsensical  Gait: Intact.

## 2024-05-19 RX ADMIN — Medication 650 MILLIGRAM(S): at 17:25

## 2024-05-20 PROCEDURE — 90832 PSYTX W PT 30 MINUTES: CPT

## 2024-05-20 PROCEDURE — 99232 SBSQ HOSP IP/OBS MODERATE 35: CPT

## 2024-05-20 RX ADMIN — Medication 2 MILLIGRAM(S): at 20:45

## 2024-05-20 RX ADMIN — HALOPERIDOL 5 MILLIGRAM(S): 10 TABLET ORAL at 20:45

## 2024-05-20 NOTE — BH INPATIENT PSYCHIATRY PROGRESS NOTE - MSE UNSTRUCTURED FT
Appearance: Dressed appropriately.  Behavior: Cooperative.  Childlike demeanor.  Motor: Mild psychomotor activated.   Speech: Regular rate, soft volume.  Mood: Unable to assess  Affect: Elated  Thought Process: Tangential.  Associations: Loose.  Thought Content: Odd disjointed ideas.  Insight: Limited.  Judgment: Fair on interview.   Attention: Fair.  Language: nonsensical at times.  Gait: Intact.
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
## 2024-05-21 PROCEDURE — 90853 GROUP PSYCHOTHERAPY: CPT

## 2024-05-21 PROCEDURE — 99231 SBSQ HOSP IP/OBS SF/LOW 25: CPT

## 2024-05-21 RX ADMIN — HALOPERIDOL 5 MILLIGRAM(S): 10 TABLET ORAL at 21:01

## 2024-05-21 RX ADMIN — Medication 2 MILLIGRAM(S): at 21:02

## 2024-05-21 NOTE — BH INPATIENT PSYCHIATRY PROGRESS NOTE - NSBHCHARTREVIEWVS_PSY_A_CORE FT
Vital Signs Last 24 Hrs  T(C): 36.2 (05-21-24 @ 06:16), Max: 36.2 (05-21-24 @ 06:16)  T(F): 97.1 (05-21-24 @ 06:16), Max: 97.1 (05-21-24 @ 06:16)  HR: --  BP: --  BP(mean): --  RR: --  SpO2: --    Orthostatic VS  05-21-24 @ 06:16  Lying BP: --/-- HR: --  Sitting BP: 125/62 HR: 88  Standing BP: 110/65 HR: 99  Site: --  Mode: --

## 2024-05-21 NOTE — BH PSYCHOLOGY - GROUP THERAPY NOTE - NSPSYCHOLGRPCOGPT_PSY_A_CORE FT
Patient attended Cognitive Behavioral Therapy Group incorporating ACT-based concepts. The group started with a brief check-in, prompting participants to think about their favorite thing about summer. Members then engaged in a mindfulness exercise and were encouraged to share any thoughts/reactions. Afterward, the group focused on engaging in a worksheet with several prompts such as something that would scare others but would not scare them, things that are changing in their life, two things they have never done and would like to try, three things that mean a lot to them etc. Group facilitator explained concepts, reinforced participation, and engaged patients in the discussion.
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
## 2024-05-21 NOTE — BH INPATIENT PSYCHIATRY PROGRESS NOTE - NSBHFUPINTERVALHXFT_PSY_A_CORE
Pt today states that everyone is spreading rumors about her.  States she is going to move into an apartment tomorrow.  Talks about kickboxing.

## 2024-05-21 NOTE — BH INPATIENT PSYCHIATRY PROGRESS NOTE - MSE UNSTRUCTURED FT
Appearance: Dressed appropriately.  Behavior: Cooperative.  Childlike demeanor.  Motor: No abnormalities.   Speech: Regular rate, soft volume.  Mood: Ok  Affect: Elated  Thought Process: Tangential.  Associations: Loose.  Thought Content: Odd unrelated ideas.  Insight: Limited.  Judgment: Fair on interview.   Attention: Fair.  Language: nonsensical at times.  Gait: Intact.

## 2024-05-21 NOTE — BH INPATIENT PSYCHIATRY PROGRESS NOTE - NSBHMETABOLIC_PSY_ALL_CORE_FT
BMI: BMI (kg/m2): 23.5 (02-10-24 @ 02:59)  HbA1c: A1C with Estimated Average Glucose Result: 6.1 % (01-14-24 @ 04:30)    Glucose: POCT Blood Glucose.: 57 mg/dL (04-15-24 @ 09:20)    BP: --Vital Signs Last 24 Hrs  T(C): 36.2 (05-21-24 @ 06:16), Max: 36.2 (05-21-24 @ 06:16)  T(F): 97.1 (05-21-24 @ 06:16), Max: 97.1 (05-21-24 @ 06:16)  HR: --  BP: --  BP(mean): --  RR: --  SpO2: --    Orthostatic VS  05-21-24 @ 06:16  Lying BP: --/-- HR: --  Sitting BP: 125/62 HR: 88  Standing BP: 110/65 HR: 99  Site: --  Mode: --    Lipid Panel: Date/Time: 01-14-24 @ 04:30  Cholesterol, Serum: 130  LDL Cholesterol Calculated: 61  HDL Cholesterol, Serum: 53  Total Cholesterol/HDL Ration Measurement: --  Triglycerides, Serum: 79

## 2024-05-21 NOTE — BH PSYCHOLOGY - GROUP THERAPY NOTE - NSBHPSYCHOLRESPCOMMENT_PSY_A_CORE FT
The patient appeared adequately groomed and casually dressed. The patient appeared distracted in the group, demonstrated by her occasional giggling and talking to herself. Speech was slurred. PT was oriented X3. Pt was appropriate with peers, but needed several reminders to not disrupt the group.

## 2024-05-21 NOTE — BH PSYCHOLOGY - GROUP THERAPY NOTE - NSBHPSYCHOLPARTICIPCOMMENT_PSY_A_CORE FT
Pt participated in check in but did not participate for the remainder of the group and at times seemed distracted

## 2024-05-22 PROCEDURE — 99231 SBSQ HOSP IP/OBS SF/LOW 25: CPT

## 2024-05-22 RX ORDER — LORAZEPAM 4 MG/ML
2 VIAL (ML) INJECTION ONCE
Refills: 0 | Status: DISCONTINUED | OUTPATIENT
Start: 2024-05-22 | End: 2024-05-28

## 2024-05-22 RX ORDER — LORAZEPAM 4 MG/ML
2 VIAL (ML) INJECTION EVERY 6 HOURS
Refills: 0 | Status: DISCONTINUED | OUTPATIENT
Start: 2024-05-22 | End: 2024-05-28

## 2024-05-22 RX ADMIN — Medication 650 MILLIGRAM(S): at 16:28

## 2024-05-22 RX ADMIN — HALOPERIDOL 5 MILLIGRAM(S): 10 TABLET ORAL at 16:28

## 2024-05-22 RX ADMIN — Medication 2 MILLIGRAM(S): at 16:28

## 2024-05-22 RX ADMIN — Medication 650 MILLIGRAM(S): at 22:49

## 2024-05-22 RX ADMIN — Medication 650 MILLIGRAM(S): at 17:30

## 2024-05-22 RX ADMIN — Medication 650 MILLIGRAM(S): at 23:40

## 2024-05-22 NOTE — BH INPATIENT PSYCHIATRY PROGRESS NOTE - NSBHCHARTREVIEWVS_PSY_A_CORE FT
Vital Signs Last 24 Hrs  T(C): --  T(F): --  HR: --  BP: --  BP(mean): --  RR: --  SpO2: --    Orthostatic VS  05-21-24 @ 06:16  Lying BP: --/-- HR: --  Sitting BP: 125/62 HR: 88  Standing BP: 110/65 HR: 99  Site: --  Mode: --

## 2024-05-22 NOTE — BH INPATIENT PSYCHIATRY PROGRESS NOTE - NSBHFUPINTERVALHXFT_PSY_A_CORE
Pt today states she is leaving for a group home today.  States this to multiple staff members this morning.
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
## 2024-05-22 NOTE — BH INPATIENT PSYCHIATRY PROGRESS NOTE - MSE UNSTRUCTURED FT
Appearance: Dressed appropriately.  Behavior: Cooperative.  Childlike demeanor.  Motor: No abnormalities.   Speech: Regular rate, soft volume.  Mood: Ok  Affect: Elated  Thought Process: Repetitive   Associations: Limited.   Thought Content: Ruminating on discharge.  Insight: Limited.  Judgment: Fair on interview.   Attention: Fair.  Language: nonsensical at times.  Gait: Intact.

## 2024-05-22 NOTE — BH INPATIENT PSYCHIATRY PROGRESS NOTE - NSBHMETABOLIC_PSY_ALL_CORE_FT
BMI: BMI (kg/m2): 23.5 (02-10-24 @ 02:59)  HbA1c: A1C with Estimated Average Glucose Result: 6.1 % (01-14-24 @ 04:30)    Glucose: POCT Blood Glucose.: 57 mg/dL (04-15-24 @ 09:20)    BP: --Vital Signs Last 24 Hrs  T(C): --  T(F): --  HR: --  BP: --  BP(mean): --  RR: --  SpO2: --    Orthostatic VS  05-21-24 @ 06:16  Lying BP: --/-- HR: --  Sitting BP: 125/62 HR: 88  Standing BP: 110/65 HR: 99  Site: --  Mode: --    Lipid Panel: Date/Time: 01-14-24 @ 04:30  Cholesterol, Serum: 130  LDL Cholesterol Calculated: 61  HDL Cholesterol, Serum: 53  Total Cholesterol/HDL Ration Measurement: --  Triglycerides, Serum: 79

## 2024-05-23 PROCEDURE — 99231 SBSQ HOSP IP/OBS SF/LOW 25: CPT

## 2024-05-23 PROCEDURE — 90832 PSYTX W PT 30 MINUTES: CPT

## 2024-05-23 NOTE — BH INPATIENT PSYCHIATRY PROGRESS NOTE - MSE UNSTRUCTURED FT
Appearance: Dressed appropriately.  Behavior: Cooperative.  Childlike demeanor.  Motor: No abnormalities.   Speech: Regular rate, Overproductive. soft volume.  Mood: Does not give a mood.  Affect: Anxious.  Thought Process: Tangential.  Associations: Loose.   Thought Content: Some mild flight of ideas.   Insight: Limited.  Judgment: Fair on interview.   Attention: Fair.  Language: fluent  Gait: Intact.
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
## 2024-05-23 NOTE — BH INPATIENT PSYCHIATRY PROGRESS NOTE - NSBHFUPINTERVALHXFT_PSY_A_CORE
Pt today asks for a .  States when she is 89 years old, she will have many medical problems.  Asks about her apartment.

## 2024-05-24 ENCOUNTER — EMERGENCY (EMERGENCY)
Facility: HOSPITAL | Age: 39
LOS: 1 days | Discharge: OTHER INSTITUTION | End: 2024-05-24
Admitting: EMERGENCY MEDICINE
Payer: MEDICAID

## 2024-05-24 VITALS
RESPIRATION RATE: 16 BRPM | DIASTOLIC BLOOD PRESSURE: 96 MMHG | TEMPERATURE: 98 F | SYSTOLIC BLOOD PRESSURE: 136 MMHG | OXYGEN SATURATION: 100 % | HEART RATE: 99 BPM

## 2024-05-24 PROBLEM — F81.9 DEVELOPMENTAL DISORDER OF SCHOLASTIC SKILLS, UNSPECIFIED: Chronic | Status: ACTIVE | Noted: 2024-02-09

## 2024-05-24 PROBLEM — F20.9 SCHIZOPHRENIA, UNSPECIFIED: Chronic | Status: ACTIVE | Noted: 2024-02-09

## 2024-05-24 PROCEDURE — 70450 CT HEAD/BRAIN W/O DYE: CPT | Mod: 26,MC

## 2024-05-24 PROCEDURE — 99231 SBSQ HOSP IP/OBS SF/LOW 25: CPT

## 2024-05-24 PROCEDURE — 99284 EMERGENCY DEPT VISIT MOD MDM: CPT

## 2024-05-24 RX ADMIN — Medication 650 MILLIGRAM(S): at 09:35

## 2024-05-24 RX ADMIN — Medication 650 MILLIGRAM(S): at 01:04

## 2024-05-24 RX ADMIN — HALOPERIDOL 5 MILLIGRAM(S): 10 TABLET ORAL at 00:28

## 2024-05-24 RX ADMIN — Medication 650 MILLIGRAM(S): at 00:34

## 2024-05-24 RX ADMIN — Medication 650 MILLIGRAM(S): at 10:35

## 2024-05-24 NOTE — BH INPATIENT PSYCHIATRY PROGRESS NOTE - NSBHFUPINTERVALHXFT_PSY_A_CORE
As per staff report, pt fell yesterday, cleared medically by ER and returned to unit.  Pt today asks about 2 biological children.  She also asks about an apartment. As per staff report, pt fell, cleared medically by ER and returned to unit.  Pt today asks about 2 biological children.  She also asks about an apartment.

## 2024-05-24 NOTE — ED PROVIDER NOTE - TEMPLATE, MLM
General decreased weight-shifting ability/wide KAMALA, UE at mid guard/trunk rotation decreased/hip/knee flexion decreased

## 2024-05-24 NOTE — ED ADULT TRIAGE NOTE - CHIEF COMPLAINT QUOTE
pt coming in from [facility] c/o posterior head pain s/p throwing self on ground per EMS/[facility] staff. pt states she fell off chair. Hx. Schizophrenia. Intellectual delay. pt calm and cooperative, no obvious injuries observed to head. No LOC, vision changes or blood thinner use. ebony staff member at bedside.

## 2024-05-24 NOTE — CHART NOTE - NSCHARTNOTEFT_GEN_A_CORE
Health system Inpatient to ED Transfer Summary    Reason for Transfer/Medical Summary: 38-year-old woman admitted to [facility] for Residual schizophrenia with PPHx schizophrenia and intellectual disability. Patient threw herself on the floor with head strike.  Pt. denies LOC. On exam, swelling to back of head noted and tender to touch. Tylenol administered for pain. Refused vitals. No acute trauma to body noted; patient is ambulating without difficulties. Patient being transferred to the ED for a CTH and further assessment. ALFONSO HARPER, and ED Dr. Dubin notified.     PAST MEDICAL & SURGICAL HISTORY:  Schizophrenia    Intellectual delay    No significant past surgical history    Allergies    No Known Allergies    Intolerances      MEDICATIONS  (STANDING):  risperiDONE   Tablet 2 milliGRAM(s) Oral at bedtime    MEDICATIONS  (PRN):  acetaminophen     Tablet .. 650 milliGRAM(s) Oral every 6 hours PRN Temp greater or equal to 38C (100.4F), Mild Pain (1 - 3)  aluminum hydroxide/magnesium hydroxide/simethicone Suspension 30 milliLiter(s) Oral every 6 hours PRN Dyspepsia  diphenhydrAMINE 50 milliGRAM(s) Oral every 6 hours PRN Extrapyramidal symptoms or prophylaxis  diphenhydrAMINE Injectable 50 milliGRAM(s) IntraMuscular once PRN Extrapyramidal prophylaxis  haloperidol     Tablet 5 milliGRAM(s) Oral every 6 hours PRN agitation  haloperidol    Injectable 5 milliGRAM(s) IntraMuscular once PRN aggression  LORazepam     Tablet 2 milliGRAM(s) Oral every 6 hours PRN severe anxiety, agitation  LORazepam   Injectable 2 milliGRAM(s) IntraMuscular once PRN severe agitation  magnesium hydroxide Suspension 30 milliLiter(s) Oral daily PRN Constipation  traZODone 50 milliGRAM(s) Oral at bedtime PRN insomnia      CAPILLARY BLOOD GLUCOSE    PHYSICAL EXAM:  GENERAL: NAD, well-developed  HEAD:  Atraumatic, Normocephalic  EYES: EOMI, PERRLA, conjunctiva and sclera clear  NECK: Supple, No JVD  CHEST/LUNG: Clear to auscultation bilaterally; No wheeze  HEART: Regular rate and rhythm; No murmurs, rubs, or gallops  ABDOMEN: Soft, Nontender, Nondistended; Bowel sounds present  EXTREMITIES:  2+ Peripheral Pulses, No clubbing, cyanosis, or edema  PSYCH: AAOx3  NEUROLOGY: non-focal  SKIN: No rashes or lesions    LABS:    Psychiatry Section:  Psychiatric Summary/[facility] admitting diagnosis:    Psychiatric Recommendations:    Observation status (check one):   ( ) Constant Observation  ( ) Enhanced care  ( ) Routine checks    Risk Status (check all that apply if present):  ( ) at risk for suicide/self-injury  ( ) at risk for aggressive behavior  ( ) at risk for elopement  ( ) other risk: [facility] Inpatient to ED Transfer Summary    Reason for Transfer/Medical Summary: 38-year-old woman admitted to [facility] for Residual schizophrenia with PPHx schizophrenia and intellectual disability. Patient threw herself on the floor with head strike.  Pt. denies LOC. On exam, swelling to back of head noted and tender to touch. Tylenol administered for pain. Refused vitals. No acute trauma to body noted; patient is ambulating without difficulties. Patient being transferred to the ED for a CTH and further assessment. ALOFNSO HARPER, and ED Dr. Dubin notified.     PAST MEDICAL & SURGICAL HISTORY:  Schizophrenia    Intellectual delay    No significant past surgical history    Allergies    No Known Allergies    Intolerances      MEDICATIONS  (STANDING):  risperiDONE   Tablet 2 milliGRAM(s) Oral at bedtime    MEDICATIONS  (PRN):  acetaminophen     Tablet .. 650 milliGRAM(s) Oral every 6 hours PRN Temp greater or equal to 38C (100.4F), Mild Pain (1 - 3)  aluminum hydroxide/magnesium hydroxide/simethicone Suspension 30 milliLiter(s) Oral every 6 hours PRN Dyspepsia  diphenhydrAMINE 50 milliGRAM(s) Oral every 6 hours PRN Extrapyramidal symptoms or prophylaxis  diphenhydrAMINE Injectable 50 milliGRAM(s) IntraMuscular once PRN Extrapyramidal prophylaxis  haloperidol     Tablet 5 milliGRAM(s) Oral every 6 hours PRN agitation  haloperidol    Injectable 5 milliGRAM(s) IntraMuscular once PRN aggression  LORazepam     Tablet 2 milliGRAM(s) Oral every 6 hours PRN severe anxiety, agitation  LORazepam   Injectable 2 milliGRAM(s) IntraMuscular once PRN severe agitation  magnesium hydroxide Suspension 30 milliLiter(s) Oral daily PRN Constipation  traZODone 50 milliGRAM(s) Oral at bedtime PRN insomnia      CAPILLARY BLOOD GLUCOSE    PHYSICAL EXAM:  GENERAL: NAD, well-developed  HEAD:  Atraumatic, Normocephalic  EYES: EOMI, PERRLA, conjunctiva and sclera clear  NECK: Supple, No JVD  CHEST/LUNG: Clear to auscultation bilaterally; No wheeze  HEART: Regular rate and rhythm; No murmurs, rubs, or gallops  ABDOMEN: Soft, Nontender, Nondistended; Bowel sounds present  EXTREMITIES:  2+ Peripheral Pulses, No clubbing, cyanosis, or edema  PSYCH: AAOx3  NEUROLOGY: non-focal  SKIN: No rashes or lesions    LABS:    Psychiatry Section:  Psychiatric Summary/[facility] admitting diagnosis: schizophrenia    Psychiatric Recommendations:  - Haldol 5/Ativan 2/Benadryl 50mg PO/IM for agitation   - Behavioral interventions will be more effective than meds, if feasible   - Risperidone 2mg BID given longterm efficacy (>20 yrs) + stable asymptomatic hyperprolactinemia - pt has been refusing, but today is requesting for meds to be given at [facility], will reorder accordingly.  - olanzapine discontinued 2/20.    Observation status (check one):   ( x) Constant Observation  ( ) Enhanced care  ( ) Routine checks    Risk Status (check all that apply if present):  ( ) at risk for suicide/self-injury  ( ) at risk for aggressive behavior  ( ) at risk for elopement  (x ) other risk: [facility] patient, disorganized Thalidomide Counseling: I discussed with the patient the risks of thalidomide including but not limited to birth defects, anxiety, weakness, chest pain, dizziness, cough and severe allergy.
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
## 2024-05-24 NOTE — ED PROVIDER NOTE - CLINICAL SUMMARY MEDICAL DECISION MAKING FREE TEXT BOX
38-year-old female admitted to [facility].  Schizophrenia presents to the emergency department with head injury after falling off a chair.  Per documentation from [facility] patient threw herself on the floor and hit her head on the floor.  No LOC.  Patient reporting pain to back of head.  No other trauma reported.  Patient well-appearing alert and oriented    plan  - ct head

## 2024-05-24 NOTE — ED PROVIDER NOTE - PATIENT PORTAL LINK FT
You can access the FollowMyHealth Patient Portal offered by [facility] by registering at the following website: [email or web address]. By joining [employer]’s FollowMyHealth portal, you will also be able to view your health information using other applications (apps) compatible with our system.

## 2024-05-24 NOTE — BH INPATIENT PSYCHIATRY PROGRESS NOTE - NSBHMETABOLIC_PSY_ALL_CORE_FT
BMI: BMI (kg/m2): 23.5 (02-10-24 @ 02:59)  HbA1c: A1C with Estimated Average Glucose Result: 6.1 % (01-14-24 @ 04:30)    Glucose: POCT Blood Glucose.: 57 mg/dL (04-15-24 @ 09:20)    BP: --Vital Signs Last 24 Hrs  T(C): 36.7 (05-24-24 @ 01:47), Max: 36.7 (05-24-24 @ 01:47)  T(F): 98 (05-24-24 @ 01:47), Max: 98 (05-24-24 @ 01:47)  HR: 99 (05-24-24 @ 01:47) (99 - 99)  BP: 136/96 (05-24-24 @ 01:47) (136/96 - 136/96)  BP(mean): --  RR: 16 (05-24-24 @ 01:47) (16 - 16)  SpO2: 100% (05-24-24 @ 01:47) (100% - 100%)      Lipid Panel: Date/Time: 01-14-24 @ 04:30  Cholesterol, Serum: 130  LDL Cholesterol Calculated: 61  HDL Cholesterol, Serum: 53  Total Cholesterol/HDL Ration Measurement: --  Triglycerides, Serum: 79

## 2024-05-24 NOTE — ED PROVIDER NOTE - OBJECTIVE STATEMENT
38-year-old female admitted to [facility].  Schizophrenia presents to the emergency department with head injury after falling off a chair.  Per documentation from [facility] patient threw herself on the floor and hit her head on the floor.  No LOC.  Patient reporting pain to back of head.  No other trauma reported.  Patient well-appearing alert and oriented

## 2024-05-24 NOTE — ED PROVIDER NOTE - MUSCULOSKELETAL, MLM
Notes recorded by Pooja Hebert DO on 5/9/2020 at 10:32 AM CDT  See above patient to go to ER if pain persists. ------    Notes recorded by Pooja Hebetr DO on 5/9/2020 at 10:31 AM CDT  Urine culture negative for infection. Spine appears normal, range of motion is not limited, no muscle or joint tenderness

## 2024-05-24 NOTE — BH INPATIENT PSYCHIATRY PROGRESS NOTE - NSBHCHARTREVIEWVS_PSY_A_CORE FT
Vital Signs Last 24 Hrs  T(C): 36.7 (05-24-24 @ 01:47), Max: 36.7 (05-24-24 @ 01:47)  T(F): 98 (05-24-24 @ 01:47), Max: 98 (05-24-24 @ 01:47)  HR: 99 (05-24-24 @ 01:47) (99 - 99)  BP: 136/96 (05-24-24 @ 01:47) (136/96 - 136/96)  BP(mean): --  RR: 16 (05-24-24 @ 01:47) (16 - 16)  SpO2: 100% (05-24-24 @ 01:47) (100% - 100%)

## 2024-05-24 NOTE — ED PROVIDER NOTE - NSFOLLOWUPINSTRUCTIONS_ED_ALL_ED_FT
CT head negative for acute intracranial abnormality     Return to the ER for worsening or persistent symptoms, including but not limited to worsening/persistent pain, shortness of breath, fevers, vomiting, lightheadedness, passing out and/or ANY NEW OR CONCERNING SYMPTOMS. If you have issues obtaining follow up, please call: [phone] to obtain a doctor or specialist who takes your insurance in your area.  You may call [phone] to make an appointment with the internal medicine clinic.

## 2024-05-25 RX ORDER — LORAZEPAM 4 MG/ML
2 VIAL (ML) INJECTION ONCE
Refills: 0 | Status: DISCONTINUED | OUTPATIENT
Start: 2024-05-25 | End: 2024-05-25

## 2024-05-25 RX ORDER — HALOPERIDOL 10 MG/1
5 TABLET ORAL ONCE
Refills: 0 | Status: COMPLETED | OUTPATIENT
Start: 2024-05-25 | End: 2024-05-25

## 2024-05-25 RX ORDER — DIPHENHYDRAMINE HCL 12.5MG/5ML
50 ELIXIR ORAL ONCE
Refills: 0 | Status: COMPLETED | OUTPATIENT
Start: 2024-05-25 | End: 2024-05-25

## 2024-05-25 RX ADMIN — HALOPERIDOL 5 MILLIGRAM(S): 10 TABLET ORAL at 10:39

## 2024-05-25 RX ADMIN — Medication 2 MILLIGRAM(S): at 10:39

## 2024-05-25 RX ADMIN — Medication 50 MILLIGRAM(S): at 10:39

## 2024-05-26 RX ADMIN — Medication 650 MILLIGRAM(S): at 20:33

## 2024-05-26 RX ADMIN — HALOPERIDOL 5 MILLIGRAM(S): 10 TABLET ORAL at 20:01

## 2024-05-26 RX ADMIN — Medication 650 MILLIGRAM(S): at 20:00

## 2024-05-27 RX ADMIN — HALOPERIDOL 5 MILLIGRAM(S): 10 TABLET ORAL at 23:43

## 2024-05-27 RX ADMIN — Medication 2 MILLIGRAM(S): at 23:43

## 2024-05-28 PROCEDURE — 90832 PSYTX W PT 30 MINUTES: CPT | Mod: 59

## 2024-05-28 PROCEDURE — 99231 SBSQ HOSP IP/OBS SF/LOW 25: CPT

## 2024-05-28 PROCEDURE — 90853 GROUP PSYCHOTHERAPY: CPT

## 2024-05-28 RX ORDER — LORAZEPAM 4 MG/ML
2 VIAL (ML) INJECTION ONCE
Refills: 0 | Status: DISCONTINUED | OUTPATIENT
Start: 2024-05-28 | End: 2024-06-04

## 2024-05-28 RX ORDER — LORAZEPAM 4 MG/ML
2 VIAL (ML) INJECTION EVERY 6 HOURS
Refills: 0 | Status: DISCONTINUED | OUTPATIENT
Start: 2024-05-28 | End: 2024-06-04

## 2024-05-28 RX ADMIN — Medication 650 MILLIGRAM(S): at 19:53

## 2024-05-28 RX ADMIN — Medication 650 MILLIGRAM(S): at 21:03

## 2024-05-28 RX ADMIN — HALOPERIDOL 5 MILLIGRAM(S): 10 TABLET ORAL at 19:52

## 2024-05-28 RX ADMIN — Medication 2 MILLIGRAM(S): at 19:52

## 2024-05-28 NOTE — BH INPATIENT PSYCHIATRY PROGRESS NOTE - NSBHFUPINTERVALHXFT_PSY_A_CORE
No weekend events.  Pt today repeats Radha several times, then states that COVID went in her eye, and that CARMENZA needs to be called.

## 2024-05-28 NOTE — BH PSYCHOLOGY - GROUP THERAPY NOTE - NSBHPSYCHOLRESPCOMMENT_PSY_A_CORE FT
The patient appeared adequately groomed and casually dressed. The patient appeared distracted in the group, demonstrated by her occasional giggling and talking to herself, however participated in group and reflected on something that may make her feel irritated and anxious. Speech was slurred. PT was oriented X3. Pt was appropriate with peers.

## 2024-05-28 NOTE — BH INPATIENT PSYCHIATRY PROGRESS NOTE - MSE UNSTRUCTURED FT
Appearance: Dressed appropriately.  Behavior: Cooperative.  Childlike demeanor.  Motor: No abnormalities.   Speech: Regular rate, overproductive. soft volume.  Mood: Does not give a mood.  Affect: Pleasant.   Thought Process: Tangential.  Associations: Loose.   Thought Content: Some flight of ideas.   Insight: Limited.  Judgment: Fair on interview.   Attention: Fair.  Language: fluent  Gait: Intact.

## 2024-05-28 NOTE — BH PSYCHOLOGY - GROUP THERAPY NOTE - NSPSYCHOLGRPCOGPT_PSY_A_CORE FT
Patient attended Cognitive Behavioral Therapy Group incorporating ACT-based concepts. The group started with a brief check-in asking pts about which superpower they would like to have if they could pick any. The group was then engaged in a mindfulness breathing exercise and a card game during which they each had to answer prompts/reflections to various emotions. Group facilitator explained concepts, reinforced participation, and engaged patients in the discussion.
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
## 2024-05-29 PROCEDURE — 99231 SBSQ HOSP IP/OBS SF/LOW 25: CPT

## 2024-05-29 RX ADMIN — HALOPERIDOL 5 MILLIGRAM(S): 10 TABLET ORAL at 21:49

## 2024-05-29 RX ADMIN — Medication 650 MILLIGRAM(S): at 07:03

## 2024-05-29 RX ADMIN — Medication 650 MILLIGRAM(S): at 23:47

## 2024-05-29 RX ADMIN — Medication 650 MILLIGRAM(S): at 06:33

## 2024-05-29 RX ADMIN — Medication 650 MILLIGRAM(S): at 21:49

## 2024-05-29 NOTE — BH INPATIENT PSYCHIATRY PROGRESS NOTE - MSE UNSTRUCTURED FT
Appearance: Dressed appropriately.  Behavior: Cooperative.  Childlike demeanor.  Motor: No abnormalities.   Speech: Regular rate, overproductive. soft volume.  Mood: Ok.  Affect: Pleasant.   Thought Process: Tangential.  Associations: Loose.   Thought Content: Focused on [facility].  Insight: Limited.  Judgment: Fair on interview.   Attention: Fair.  Language: fluent  Gait: Intact.

## 2024-05-29 NOTE — BH INPATIENT PSYCHIATRY PROGRESS NOTE - NSBHFUPINTERVALHXFT_PSY_A_CORE
As per staff report, pt yesterday was putting soap on walls of bathroom and disrobed.  Pt today states she needs to go back to [facility].
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
## 2024-05-30 PROCEDURE — 99231 SBSQ HOSP IP/OBS SF/LOW 25: CPT

## 2024-05-30 RX ADMIN — HALOPERIDOL 5 MILLIGRAM(S): 10 TABLET ORAL at 21:31

## 2024-05-30 RX ADMIN — Medication 2 MILLIGRAM(S): at 21:31

## 2024-05-30 RX ADMIN — Medication 650 MILLIGRAM(S): at 10:52
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
## 2024-05-30 NOTE — BH INPATIENT PSYCHIATRY PROGRESS NOTE - NSBHFUPINTERVALHXFT_PSY_A_CORE
Pt today states she undid her hairstyle so that she could have a patch of skin imaged with CT Scan, though she cannot explain why.

## 2024-05-30 NOTE — BH INPATIENT PSYCHIATRY PROGRESS NOTE - MSE UNSTRUCTURED FT
Appearance: Dressed appropriately.  Behavior: Cooperative.  Childlike demeanor.  Motor: No abnormalities.   Speech: Regular rate.  Soft volume.   Mood: Ok.  Affect: Pleasant.   Thought Process: Tangential.  Associations: Loose.   Thought Content: Somatic.   Insight: Limited.  Judgment: Fair on interview.   Attention: Fair.  Language: fluent  Gait: Intact.

## 2024-05-31 PROCEDURE — 90832 PSYTX W PT 30 MINUTES: CPT

## 2024-05-31 PROCEDURE — 99231 SBSQ HOSP IP/OBS SF/LOW 25: CPT

## 2024-05-31 RX ORDER — LORAZEPAM 4 MG/ML
2 VIAL (ML) INJECTION ONCE
Refills: 0 | Status: DISCONTINUED | OUTPATIENT
Start: 2024-05-31 | End: 2024-05-31

## 2024-05-31 RX ORDER — HALOPERIDOL 10 MG/1
5 TABLET ORAL ONCE
Refills: 0 | Status: COMPLETED | OUTPATIENT
Start: 2024-05-31 | End: 2024-05-31

## 2024-05-31 RX ORDER — DIPHENHYDRAMINE HCL 12.5MG/5ML
50 ELIXIR ORAL ONCE
Refills: 0 | Status: COMPLETED | OUTPATIENT
Start: 2024-05-31 | End: 2024-05-31

## 2024-05-31 RX ADMIN — Medication 50 MILLIGRAM(S): at 23:46

## 2024-05-31 RX ADMIN — Medication 650 MILLIGRAM(S): at 08:47

## 2024-05-31 RX ADMIN — HALOPERIDOL 5 MILLIGRAM(S): 10 TABLET ORAL at 23:46

## 2024-05-31 RX ADMIN — Medication 2 MILLIGRAM(S): at 23:46

## 2024-05-31 NOTE — BH INPATIENT PSYCHIATRY PROGRESS NOTE - MSE UNSTRUCTURED FT
Appearance: Dressed appropriately.  Behavior: Cooperative.  Childlike demeanor.  Motor: No abnormalities.   Speech: Mumbling at times, low volume.   Mood: Ok.  Affect: Pleasant.   Thought Process: Tangential.  Associations: Loose.   Thought Content: Odd illogical thoughts at times.  Insight: Limited.  Judgment: Fair on interview.   Attention: Fair.  Language: fluent  Gait: Intact.
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
## 2024-05-31 NOTE — BH INPATIENT PSYCHIATRY PROGRESS NOTE - NSBHFUPINTERVALHXFT_PSY_A_CORE
Pt today states she wants to move [facility] to [facility].  She repeats something about "[employer]" but does not elaborate.
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
## 2024-06-01 RX ADMIN — Medication 2 MILLIGRAM(S): at 19:58

## 2024-06-01 RX ADMIN — Medication 2 MILLIGRAM(S): at 10:40

## 2024-06-01 RX ADMIN — HALOPERIDOL 5 MILLIGRAM(S): 10 TABLET ORAL at 19:58

## 2024-06-01 RX ADMIN — HALOPERIDOL 5 MILLIGRAM(S): 10 TABLET ORAL at 10:40

## 2024-06-02 RX ADMIN — Medication 650 MILLIGRAM(S): at 20:46

## 2024-06-02 RX ADMIN — Medication 650 MILLIGRAM(S): at 20:16

## 2024-06-02 RX ADMIN — Medication 2 MILLIGRAM(S): at 08:08

## 2024-06-02 RX ADMIN — Medication 2 MILLIGRAM(S): at 20:16

## 2024-06-02 RX ADMIN — HALOPERIDOL 5 MILLIGRAM(S): 10 TABLET ORAL at 08:08

## 2024-06-03 ENCOUNTER — APPOINTMENT (OUTPATIENT)
Dept: OBGYN | Facility: CLINIC | Age: 39
End: 2024-06-03

## 2024-06-03 PROCEDURE — 90832 PSYTX W PT 30 MINUTES: CPT

## 2024-06-03 PROCEDURE — 99231 SBSQ HOSP IP/OBS SF/LOW 25: CPT

## 2024-06-03 RX ADMIN — HALOPERIDOL 5 MILLIGRAM(S): 10 TABLET ORAL at 20:32

## 2024-06-03 RX ADMIN — Medication 50 MILLIGRAM(S): at 20:32

## 2024-06-03 RX ADMIN — Medication 2 MILLIGRAM(S): at 20:32

## 2024-06-03 NOTE — BH INPATIENT PSYCHIATRY PROGRESS NOTE - MSE UNSTRUCTURED FT
Appearance: Dressed appropriately.  Behavior: Cooperative.  Childlike demeanor.  Found sitting in day room.  Motor: No abnormalities.   Speech: Mumbling at times, low volume.   Mood: Ok.  Affect: Neutral   Thought Process: Tangential.  Associations: Loose.   Thought Content: Odd illogical thoughts at times.  Insight: Limited.  Judgment: Fair on interview.   Attention: Fair.  Language: fluent  Gait/station: Seated.    PHYSICAL:  Extremities: Healing superficial scratches to L deltoid, L forearm, R forearm.  No bleeding, no swelling.
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
## 2024-06-04 PROCEDURE — 99231 SBSQ HOSP IP/OBS SF/LOW 25: CPT

## 2024-06-04 RX ORDER — LORAZEPAM 4 MG/ML
2 VIAL (ML) INJECTION ONCE
Refills: 0 | Status: DISCONTINUED | OUTPATIENT
Start: 2024-06-04 | End: 2024-06-11

## 2024-06-04 RX ORDER — LORAZEPAM 4 MG/ML
2 VIAL (ML) INJECTION EVERY 6 HOURS
Refills: 0 | Status: DISCONTINUED | OUTPATIENT
Start: 2024-06-04 | End: 2024-06-11

## 2024-06-04 RX ADMIN — Medication 650 MILLIGRAM(S): at 08:47

## 2024-06-04 RX ADMIN — Medication 650 MILLIGRAM(S): at 20:36

## 2024-06-04 RX ADMIN — Medication 650 MILLIGRAM(S): at 21:06

## 2024-06-04 RX ADMIN — Medication 2 MILLIGRAM(S): at 20:38
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
## 2024-06-04 NOTE — BH INPATIENT PSYCHIATRY PROGRESS NOTE - NSBHMETABOLIC_PSY_ALL_CORE_FT
BMI: BMI (kg/m2): 23.5 (02-10-24 @ 02:59)  HbA1c: A1C with Estimated Average Glucose Result: 6.1 % (01-14-24 @ 04:30)    Glucose: POCT Blood Glucose.: 57 mg/dL (04-15-24 @ 09:20)    BP: 111/78 (06-04-24 @ 08:24) (111/78 - 111/78)Vital Signs Last 24 Hrs  T(C): 36.4 (06-04-24 @ 08:24), Max: 36.4 (06-04-24 @ 08:24)  T(F): 97.5 (06-04-24 @ 08:24), Max: 97.5 (06-04-24 @ 08:24)  HR: --  BP: 111/78 (06-04-24 @ 08:24) (111/78 - 111/78)  BP(mean): 89 (06-04-24 @ 08:24) (89 - 89)  RR: --  SpO2: --      Lipid Panel: Date/Time: 01-14-24 @ 04:30  Cholesterol, Serum: 130  LDL Cholesterol Calculated: 61  HDL Cholesterol, Serum: 53  Total Cholesterol/HDL Ration Measurement: --  Triglycerides, Serum: 79

## 2024-06-04 NOTE — BH INPATIENT PSYCHIATRY PROGRESS NOTE - MSE UNSTRUCTURED FT
Appearance: Dressed appropriately.  Behavior: Cooperative.  Childlike demeanor.  Found walking hallway  Motor: No abnormalities.   Speech: Mumbling at times, low volume.   Mood: Ok.  Affect: Pleasant.   Thought Process: Tangential.  Associations: Loose.   Thought Content: Odd illogical thoughts at times.  Insight: Limited.  Judgment: Fair on interview.   Attention: Fair.  Language: fluent  Gait: intact.     PHYSICAL:  Extremities: Healing superficial scratches to L deltoid, L forearm, R forearm.  Still no bleeding, no swelling.

## 2024-06-04 NOTE — BH INPATIENT PSYCHIATRY PROGRESS NOTE - NSBHCHARTREVIEWVS_PSY_A_CORE FT
Vital Signs Last 24 Hrs  T(C): 36.4 (06-04-24 @ 08:24), Max: 36.4 (06-04-24 @ 08:24)  T(F): 97.5 (06-04-24 @ 08:24), Max: 97.5 (06-04-24 @ 08:24)  HR: --  BP: 111/78 (06-04-24 @ 08:24) (111/78 - 111/78)  BP(mean): 89 (06-04-24 @ 08:24) (89 - 89)  RR: --  SpO2: --

## 2024-06-05 PROCEDURE — 99231 SBSQ HOSP IP/OBS SF/LOW 25: CPT

## 2024-06-05 RX ADMIN — Medication 2 MILLIGRAM(S): at 18:02

## 2024-06-05 RX ADMIN — Medication 650 MILLIGRAM(S): at 18:01
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
## 2024-06-06 PROCEDURE — 99231 SBSQ HOSP IP/OBS SF/LOW 25: CPT

## 2024-06-06 RX ADMIN — Medication 2 MILLIGRAM(S): at 08:27

## 2024-06-06 RX ADMIN — Medication 650 MILLIGRAM(S): at 08:41

## 2024-06-06 RX ADMIN — HALOPERIDOL 5 MILLIGRAM(S): 10 TABLET ORAL at 08:26

## 2024-06-06 RX ADMIN — Medication 650 MILLIGRAM(S): at 18:00

## 2024-06-06 RX ADMIN — Medication 2 MILLIGRAM(S): at 23:15

## 2024-06-06 RX ADMIN — Medication 650 MILLIGRAM(S): at 09:25

## 2024-06-06 RX ADMIN — HALOPERIDOL 5 MILLIGRAM(S): 10 TABLET ORAL at 23:15

## 2024-06-06 NOTE — BH TREATMENT PLAN - NSTXCAREGIVERPARTICIPATE_PSY_P_CORE
Family/Caregiver participated in identification of needs/problems/goals for treatment/Family/Caregiver participated in defining interventions/Family/Caregiver participated in development of after care plan
No, patient unwilling to involve family/caregiver
Family/Caregiver participated in identification of needs/problems/goals for treatment
No, patient unwilling to involve family/caregiver

## 2024-06-06 NOTE — BH INPATIENT PSYCHIATRY PROGRESS NOTE - NSBHMETABOLIC_PSY_ALL_CORE_FT
BMI: BMI (kg/m2): 23.5 (02-10-24 @ 02:59)  HbA1c: A1C with Estimated Average Glucose Result: 6.1 % (01-14-24 @ 04:30)    Glucose: POCT Blood Glucose.: 57 mg/dL (04-15-24 @ 09:20)    BP: 111/78 (06-04-24 @ 08:24) (111/78 - 111/78)Vital Signs Last 24 Hrs  T(C): 36.4 (06-06-24 @ 08:30), Max: 36.4 (06-06-24 @ 08:30)  T(F): 97.5 (06-06-24 @ 08:30), Max: 97.5 (06-06-24 @ 08:30)  HR: --  BP: --  BP(mean): --  RR: --  SpO2: --    Orthostatic VS  06-06-24 @ 08:30  Lying BP: --/-- HR: --  Sitting BP: 107/57 HR: 83  Standing BP: 94/59 HR: 87  Site: --  Mode: --    Lipid Panel: Date/Time: 01-14-24 @ 04:30  Cholesterol, Serum: 130  LDL Cholesterol Calculated: 61  HDL Cholesterol, Serum: 53  Total Cholesterol/HDL Ration Measurement: --  Triglycerides, Serum: 79

## 2024-06-06 NOTE — BH INPATIENT PSYCHIATRY PROGRESS NOTE - MSE UNSTRUCTURED FT
Appearance: Dressed appropriately.  Behavior: Cooperative.  Childlike demeanor.  Found at  desk.  Motor: No abnormalities.   Speech: Mumbling at times, low volume.   Mood: Ok.  Affect: Neutral.  Thought Process: Repetitive  Associations: Limited  Thought Content: Somatic.  Insight: Limited.  Judgment: Fair on interview.   Attention: Fair.  Language: fluent  Gait: intact.     PHYSICAL:  Extremities: Healing superficial scratches to L deltoid, L forearm, R forearm.
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
## 2024-06-06 NOTE — BH INPATIENT PSYCHIATRY PROGRESS NOTE - NSBHCHARTREVIEWVS_PSY_A_CORE FT
Vital Signs Last 24 Hrs  T(C): 36.4 (06-06-24 @ 08:30), Max: 36.4 (06-06-24 @ 08:30)  T(F): 97.5 (06-06-24 @ 08:30), Max: 97.5 (06-06-24 @ 08:30)  HR: --  BP: --  BP(mean): --  RR: --  SpO2: --    Orthostatic VS  06-06-24 @ 08:30  Lying BP: --/-- HR: --  Sitting BP: 107/57 HR: 83  Standing BP: 94/59 HR: 87  Site: --  Mode: --

## 2024-06-07 PROCEDURE — 99231 SBSQ HOSP IP/OBS SF/LOW 25: CPT

## 2024-06-07 NOTE — BH INPATIENT PSYCHIATRY PROGRESS NOTE - MSE UNSTRUCTURED FT
Appearance: Dressed appropriately.  Behavior: Cooperative.  Childlike demeanor.  Found at  desk.  Motor: No abnormalities.   Speech: Mumbling at times, fluent.   Mood: Ok.  Affect: Neutral, stable.  Thought Process: Repetitive  Associations: Limited  Thought Content: Somatic.  Insight: Limited.  Judgment: Fair on interview.   Attention: Fair.  Language: fluent  Gait: intact.     PHYSICAL:  Extremities: Healing superficial scratches to L deltoid, L forearm, R forearm.

## 2024-06-07 NOTE — BH INPATIENT PSYCHIATRY PROGRESS NOTE - NSBHFUPINTERVALHXFT_PSY_A_CORE
Patient seen for follow up of behavioral disturbance, chart reviewed, and case discussed with treatment team.  Patient become upset, agitated overnight, received prn medication with calming effect.  This morning, she is calm, denies depression, no SI.  She is preoccupied with discharge, asking about many apartments.  She also continues to talk about having COVID in her eyes.  The patient has been visible on the unit, social with staff.  The patient reports eating and sleeping well.  She has been refusing medication.

## 2024-06-07 NOTE — BH INPATIENT PSYCHIATRY PROGRESS NOTE - NSBHCHARTREVIEWVS_PSY_A_CORE FT
Vital Signs Last 24 Hrs  T(C): --  T(F): --  HR: --  BP: --  BP(mean): --  RR: --  SpO2: --    Orthostatic VS  06-06-24 @ 08:30  Lying BP: --/-- HR: --  Sitting BP: 107/57 HR: 83  Standing BP: 94/59 HR: 87  Site: --  Mode: --

## 2024-06-07 NOTE — BH INPATIENT PSYCHIATRY PROGRESS NOTE - NSBHMETABOLIC_PSY_ALL_CORE_FT
BMI: BMI (kg/m2): 23.5 (02-10-24 @ 02:59)  HbA1c: A1C with Estimated Average Glucose Result: 6.1 % (01-14-24 @ 04:30)    Glucose: POCT Blood Glucose.: 57 mg/dL (04-15-24 @ 09:20)    BP: --Vital Signs Last 24 Hrs  T(C): --  T(F): --  HR: --  BP: --  BP(mean): --  RR: --  SpO2: --    Orthostatic VS  06-06-24 @ 08:30  Lying BP: --/-- HR: --  Sitting BP: 107/57 HR: 83  Standing BP: 94/59 HR: 87  Site: --  Mode: --    Lipid Panel: Date/Time: 01-14-24 @ 04:30  Cholesterol, Serum: 130  LDL Cholesterol Calculated: 61  HDL Cholesterol, Serum: 53  Total Cholesterol/HDL Ration Measurement: --  Triglycerides, Serum: 79
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
## 2024-06-08 PROCEDURE — 99231 SBSQ HOSP IP/OBS SF/LOW 25: CPT

## 2024-06-08 RX ADMIN — Medication 2 MILLIGRAM(S): at 20:11

## 2024-06-08 RX ADMIN — Medication 650 MILLIGRAM(S): at 20:10

## 2024-06-08 RX ADMIN — Medication 650 MILLIGRAM(S): at 05:43

## 2024-06-08 RX ADMIN — Medication 650 MILLIGRAM(S): at 05:13

## 2024-06-08 NOTE — BH INPATIENT PSYCHIATRY PROGRESS NOTE - NSBHCHARTREVIEWVS_PSY_A_CORE FT
Vital Signs Last 24 Hrs  T(C): 36.5 (06-08-24 @ 07:41), Max: 36.5 (06-08-24 @ 07:41)  T(F): 97.7 (06-08-24 @ 07:41), Max: 97.7 (06-08-24 @ 07:41)  HR: --  BP: --  BP(mean): --  RR: --  SpO2: --    Orthostatic VS  06-08-24 @ 07:41  Lying BP: --/-- HR: --  Sitting BP: 124/71 HR: 86  Standing BP: 120/79 HR: 85  Site: --  Mode: --

## 2024-06-08 NOTE — BH INPATIENT PSYCHIATRY PROGRESS NOTE - NSBHFUPINTERVALHXFT_PSY_A_CORE
Patient seen for follow up of behavioral disturbance, chart reviewed, and case discussed with treatment team.  Patient following writer around on the unit, forcefully walking up behind writer.  Writer asked patient if she needed anything, pt pulls down her face masks and mouths something to writer (writer could not hear her despite asking patient to repeat herself).  Writer walks to nurses station and patient follows, standing at nurses station motioning writer to exit nurses station.  Patient is asked again if she needed anything, pt spoke but not clearly, mumbled, inaudible. The patient presented odd, bizarre and profoundly disorganized.

## 2024-06-08 NOTE — BH INPATIENT PSYCHIATRY PROGRESS NOTE - NSBHMETABOLIC_PSY_ALL_CORE_FT
BMI: BMI (kg/m2): 23.5 (02-10-24 @ 02:59)  HbA1c: A1C with Estimated Average Glucose Result: 6.1 % (01-14-24 @ 04:30)    Glucose: POCT Blood Glucose.: 57 mg/dL (04-15-24 @ 09:20)    BP: --Vital Signs Last 24 Hrs  T(C): 36.5 (06-08-24 @ 07:41), Max: 36.5 (06-08-24 @ 07:41)  T(F): 97.7 (06-08-24 @ 07:41), Max: 97.7 (06-08-24 @ 07:41)  HR: --  BP: --  BP(mean): --  RR: --  SpO2: --    Orthostatic VS  06-08-24 @ 07:41  Lying BP: --/-- HR: --  Sitting BP: 124/71 HR: 86  Standing BP: 120/79 HR: 85  Site: --  Mode: --    Lipid Panel: Date/Time: 01-14-24 @ 04:30  Cholesterol, Serum: 130  LDL Cholesterol Calculated: 61  HDL Cholesterol, Serum: 53  Total Cholesterol/HDL Ration Measurement: --  Triglycerides, Serum: 79
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
## 2024-06-09 RX ADMIN — Medication 650 MILLIGRAM(S): at 08:55

## 2024-06-09 RX ADMIN — Medication 650 MILLIGRAM(S): at 19:31

## 2024-06-09 RX ADMIN — Medication 2 MILLIGRAM(S): at 08:56

## 2024-06-09 RX ADMIN — Medication 2 MILLIGRAM(S): at 19:31

## 2024-06-10 PROCEDURE — 99231 SBSQ HOSP IP/OBS SF/LOW 25: CPT

## 2024-06-10 PROCEDURE — 90832 PSYTX W PT 30 MINUTES: CPT

## 2024-06-10 RX ADMIN — Medication 650 MILLIGRAM(S): at 23:11
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
## 2024-06-10 NOTE — BH INPATIENT PSYCHIATRY PROGRESS NOTE - MSE UNSTRUCTURED FT
Appearance: Dressed appropriately.  Behavior: Cooperative.  Childlike demeanor.  Found at  desk.  Motor: No abnormalities.   Speech: Soft volume.  Mood: Ok.  Affect: Neutral, stable.  Thought Process: Repetitive  Associations: Limited  Thought Content: Fixated on various hospitals.  Insight: Limited.  Judgment: Fair on interview.   Attention: Fair.  Language: fluent  Gait: intact.

## 2024-06-10 NOTE — BH INPATIENT PSYCHIATRY PROGRESS NOTE - NSBHFUPINTERVALHXFT_PSY_A_CORE
Pt states today that she would like writer to accompany her to [facility] and also another hospital in [place].

## 2024-06-11 PROCEDURE — 99232 SBSQ HOSP IP/OBS MODERATE 35: CPT | Mod: GC

## 2024-06-11 RX ORDER — HALOPERIDOL 10 MG/1
5 TABLET ORAL EVERY 6 HOURS
Refills: 0 | Status: DISCONTINUED | OUTPATIENT
Start: 2024-06-11 | End: 2024-12-18

## 2024-06-11 RX ORDER — LORAZEPAM 4 MG/ML
2 VIAL (ML) INJECTION EVERY 6 HOURS
Refills: 0 | Status: DISCONTINUED | OUTPATIENT
Start: 2024-06-11 | End: 2024-06-18

## 2024-06-11 RX ORDER — LORAZEPAM 4 MG/ML
2 VIAL (ML) INJECTION ONCE
Refills: 0 | Status: DISCONTINUED | OUTPATIENT
Start: 2024-06-11 | End: 2024-06-18

## 2024-06-11 RX ADMIN — Medication 2 MILLIGRAM(S): at 08:07

## 2024-06-11 RX ADMIN — Medication 650 MILLIGRAM(S): at 08:07

## 2024-06-11 RX ADMIN — Medication 650 MILLIGRAM(S): at 18:37

## 2024-06-11 RX ADMIN — Medication 2 MILLIGRAM(S): at 18:37

## 2024-06-11 NOTE — BH INPATIENT PSYCHIATRY PROGRESS NOTE - NSBHATTESTCOMMENTATTENDFT_PSY_A_CORE
No change in clinical presentation, pt remains in behavioral control.  Continue to encourage medications.

## 2024-06-11 NOTE — BH INPATIENT PSYCHIATRY PROGRESS NOTE - NSBHFUPINTERVALHXFT_PSY_A_CORE
Case discussed with interdisciplinary team, described as calm on the unit, at times mimicking other patients' behavioral patterns. Took Ativan 2 mg at 8 am. Otherwise, no episodes of acute anxiety/agitation noted overnight.    Pt seen in day room, sleeping when interviewer approaches her. Pt is calm and pleasant, asks if interviewer can see what is going on with her eye but then denies any issues. Mentions that she feels itchiness around her belly but denies associated pain. Confirms that she would like writer to accompany her to [facility] but does not elaborate.
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
## 2024-06-12 PROCEDURE — 99232 SBSQ HOSP IP/OBS MODERATE 35: CPT | Mod: GC

## 2024-06-12 PROCEDURE — 90832 PSYTX W PT 30 MINUTES: CPT

## 2024-06-12 RX ADMIN — Medication 50 MILLIGRAM(S): at 23:44

## 2024-06-12 RX ADMIN — Medication 650 MILLIGRAM(S): at 19:56

## 2024-06-12 RX ADMIN — Medication 650 MILLIGRAM(S): at 09:47

## 2024-06-12 RX ADMIN — Medication 50 MILLIGRAM(S): at 23:45

## 2024-06-12 RX ADMIN — Medication 2 MILLIGRAM(S): at 09:15

## 2024-06-12 RX ADMIN — HALOPERIDOL 5 MILLIGRAM(S): 10 TABLET ORAL at 19:56

## 2024-06-12 RX ADMIN — Medication 650 MILLIGRAM(S): at 20:34

## 2024-06-12 RX ADMIN — Medication 650 MILLIGRAM(S): at 09:15

## 2024-06-12 RX ADMIN — Medication 2 MILLIGRAM(S): at 23:44

## 2024-06-12 NOTE — BH INPATIENT PSYCHIATRY PROGRESS NOTE - NSBHFUPINTERVALHXFT_PSY_A_CORE
Case discussed with interdisciplinary team, described as calm on the unit, at times mimicking other patients' behavioral patterns. No PRNs required overnight, no episodes of acute anxiety/agitation noted.    Pt seen in day room, talking to herself mentioning attending. Pt is calm and pleasant, says that she took Tylenol and would also take risperidone, did not confirm whether she had taken risperidone last night or not stating that she will take it tonight along with Tylenol. Reports good mood, denies auditory hallucinations saying that the voices she experienced before would not exist anymore. States that she would take Tylenol at [facility] and asks if she can be discharged. Denies urges to harm herself or others, feeling peaceful.
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
## 2024-06-12 NOTE — BH INPATIENT PSYCHIATRY PROGRESS NOTE - NSBHATTESTCOMMENTATTENDFT_PSY_A_CORE
Pt continues to be childlike, engaging in self stimulation and imaginary play at times, and also with fantasy-like thought content that appears to be heavily influenced by thought content of others.  Continues to refuse medication, but remains in behavioral control.  Awaiting housing, no safe dispo at this time.

## 2024-06-12 NOTE — BH INPATIENT PSYCHIATRY PROGRESS NOTE - MSE UNSTRUCTURED FT
Appearance: Dressed appropriately.  Behavior: Cooperative. Childlike demeanor. Found talking to herself in day room.  Motor: No abnormalities.   Speech: Soft volume.  Mood: Ok.  Affect: Neutral, stable.  Thought Process: Repetitive  Associations: Limited  Thought Content: Fixated on various hospitals. Somatic. Possibly reacting to internal stimuli, seen talking to herself.  Insight: Limited.  Judgment: Fair on interview.   Attention: Fair.  Language: fluent  Gait: intact.      Appearance: Dressed appropriately.  Behavior: Cooperative. Childlike demeanor. Found talking to herself in day room.  Motor: No abnormalities.   Speech: Soft volume.  Mood: Ok.  Affect: Neutral, stable.  Thought Process: Repetitive  Associations: Limited  Thought Content: Fixated on various hospitals. Somatic. Possibly responding to internal stimuli, seen talking to herself.  Insight: Limited.  Judgment: Fair on interview.   Attention: Fair.  Language: fluent  Gait: intact.      Appearance: Dressed appropriately.  Behavior: Cooperative. Childlike demeanor. Found talking to herself in day room.  Motor: No abnormalities.   Speech: Soft volume.  Mood: Ok.  Affect: Neutral, stable.  Thought Process: Repetitive  Associations: Limited  Thought Content: Fixated on various hospitals. Somatic.   Insight: Limited.  Judgment: Fair on interview.   Attention: Fair.  Language: fluent  Gait: intact.

## 2024-06-13 PROCEDURE — 99232 SBSQ HOSP IP/OBS MODERATE 35: CPT | Mod: GC

## 2024-06-13 RX ORDER — RISPERIDONE 4 MG
2 TABLET ORAL ONCE
Refills: 0 | Status: COMPLETED | OUTPATIENT
Start: 2024-06-13 | End: 2024-06-13

## 2024-06-13 RX ORDER — RISPERIDONE 4 MG
2 TABLET ORAL DAILY
Refills: 0 | Status: DISCONTINUED | OUTPATIENT
Start: 2024-06-13 | End: 2024-07-22

## 2024-06-13 RX ADMIN — Medication 2 MILLIGRAM(S): at 19:40

## 2024-06-13 RX ADMIN — HALOPERIDOL 5 MILLIGRAM(S): 10 TABLET ORAL at 19:40

## 2024-06-13 RX ADMIN — Medication 650 MILLIGRAM(S): at 07:57

## 2024-06-13 RX ADMIN — Medication 2 MILLIGRAM(S): at 14:36
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
## 2024-06-13 NOTE — BH INPATIENT PSYCHIATRY PROGRESS NOTE - CURRENT MEDICATION
MEDICATIONS  (STANDING):  risperiDONE   Tablet 2 milliGRAM(s) Oral at bedtime    MEDICATIONS  (PRN):  acetaminophen     Tablet .. 650 milliGRAM(s) Oral every 6 hours PRN Temp greater or equal to 38C (100.4F), Mild Pain (1 - 3)  aluminum hydroxide/magnesium hydroxide/simethicone Suspension 30 milliLiter(s) Oral every 6 hours PRN Dyspepsia  diphenhydrAMINE 50 milliGRAM(s) Oral every 6 hours PRN Extrapyramidal symptoms or prophylaxis  diphenhydrAMINE Injectable 50 milliGRAM(s) IntraMuscular once PRN Extrapyramidal prophylaxis  haloperidol     Tablet 5 milliGRAM(s) Oral every 6 hours PRN agitation  haloperidol    Injectable 5 milliGRAM(s) IntraMuscular once PRN aggression  LORazepam     Tablet 2 milliGRAM(s) Oral every 6 hours PRN severe anxiety, agitation  LORazepam   Injectable 2 milliGRAM(s) IntraMuscular once PRN severe agitation  magnesium hydroxide Suspension 30 milliLiter(s) Oral daily PRN Constipation  traZODone 50 milliGRAM(s) Oral at bedtime PRN insomnia   MEDICATIONS  (STANDING):  risperiDONE   Tablet 2 milliGRAM(s) Oral daily    MEDICATIONS  (PRN):  acetaminophen     Tablet .. 650 milliGRAM(s) Oral every 6 hours PRN Temp greater or equal to 38C (100.4F), Mild Pain (1 - 3)  aluminum hydroxide/magnesium hydroxide/simethicone Suspension 30 milliLiter(s) Oral every 6 hours PRN Dyspepsia  diphenhydrAMINE 50 milliGRAM(s) Oral every 6 hours PRN Extrapyramidal symptoms or prophylaxis  diphenhydrAMINE Injectable 50 milliGRAM(s) IntraMuscular once PRN Extrapyramidal prophylaxis  haloperidol     Tablet 5 milliGRAM(s) Oral every 6 hours PRN agitation  haloperidol    Injectable 5 milliGRAM(s) IntraMuscular once PRN aggression  LORazepam     Tablet 2 milliGRAM(s) Oral every 6 hours PRN severe anxiety, agitation  LORazepam   Injectable 2 milliGRAM(s) IntraMuscular once PRN severe agitation  magnesium hydroxide Suspension 30 milliLiter(s) Oral daily PRN Constipation  traZODone 50 milliGRAM(s) Oral at bedtime PRN insomnia

## 2024-06-13 NOTE — BH INPATIENT PSYCHIATRY PROGRESS NOTE - MSE UNSTRUCTURED FT
Appearance: Dressed appropriately.  Behavior: Cooperative. Childlike demeanor.  Motor: No abnormalities.   Speech: Soft volume.  Mood: Ok.  Affect: Neutral, stable.  Thought Process: Repetitive  Associations: Limited  Thought Content: Fixated on various hospitals. Somatic.   Insight: Limited.  Judgment: Fair on interview.   Attention: Fair.  Language: fluent  Gait: intact.

## 2024-06-13 NOTE — BH INPATIENT PSYCHIATRY PROGRESS NOTE - NSBHATTESTCOMMENTATTENDFT_PSY_A_CORE
Pt yesterday evening was banging on nurses station and required oral PRNs.  Suspect that pt has been reacting to acuity of milieu, behavior is very similar to other disruptive peer on unit, pt is known to copy peers' behaviors as ways of acting out.  Continue to encourage compliance with risperidone, pt took dose this afternoon.

## 2024-06-13 NOTE — BH INPATIENT PSYCHIATRY PROGRESS NOTE - NSBHFUPINTERVALHXFT_PSY_A_CORE
Case discussed with interdisciplinary team, described as calm on the unit, at times mimicking other patients' behavioral patterns. Required Haldol 5 mg po at 7 pm and PO Ativan 2 mg, Benadryl 2 mg, and trazodone 50 mg at 11 pm yesterday.    Pt seen in day room, calm and pleasant, says that she feels like pancakes in the morning are too heavy for her and that she would prefer eating salad and apples, along with sparkling grapefruit juice. Reports good mood. States that she would like to go to [place] and resume working when after her discharge. Repeatedly asking for her discharge date. She confirms that she refused last risperidone doses but does not provide a specific reason. Says she can take 1 dose of risperidone in the evening this time. No SIIP/HIIP noted.

## 2024-06-14 PROCEDURE — 90853 GROUP PSYCHOTHERAPY: CPT

## 2024-06-14 PROCEDURE — 99232 SBSQ HOSP IP/OBS MODERATE 35: CPT | Mod: GC

## 2024-06-14 RX ADMIN — HALOPERIDOL 5 MILLIGRAM(S): 10 TABLET ORAL at 21:17

## 2024-06-14 RX ADMIN — Medication 650 MILLIGRAM(S): at 21:57

## 2024-06-14 RX ADMIN — Medication 650 MILLIGRAM(S): at 21:17

## 2024-06-14 RX ADMIN — Medication 50 MILLIGRAM(S): at 21:17

## 2024-06-14 RX ADMIN — Medication 2 MILLIGRAM(S): at 09:27

## 2024-06-14 NOTE — BH INPATIENT PSYCHIATRY PROGRESS NOTE - NSBHFUPINTERVALHXFT_PSY_A_CORE
Case discussed with interdisciplinary team, described as calm on the unit, at times mimicking other patients' behavioral patterns. Took Haldol 5mg and Ativan 2 mg yesterday at 7 pm. Otherwise, no acute episodes of anxiety or agitation noted.    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 Keeps asking for discharge but then gets distracted by other aspects on the unit. Reports good mood. Denies AH. States that she is willing to take her morning dose of risperidone, which she then takes shortly after the interview. Reports food sleep pranav appetite. Denies SIIP/HIIP.

## 2024-06-14 NOTE — BH PSYCHOLOGY - GROUP THERAPY NOTE - NSPSYCHOLGRPCOGPT_PSY_A_CORE FT
Patient attended Cognitive Behavioral Therapy Group incorporating ACT-based concepts. The group started with a brief check-in asking Pts to share something they’ve been meaning to do but keep putting off, and why. Group members then engaged in a loving kindness meditation and were encouraged to share their thoughts/reactions. Lastly, Group focused on engaging in a discussion about rethinking the labels placed on certain emotions, the impacts of labeling emotions as “good or bad”, how we react to “negative” emotions vs. “positive” ones, and the notion of taking a neutral stance on feelings/accepting the full spectrum of human emotions. Group facilitator explained concepts, reinforced participation, and engaged patients in the discussion.

## 2024-06-14 NOTE — BH PSYCHOLOGY - GROUP THERAPY NOTE - NSBHPSYCHOLPARTICIPCOMMENT_PSY_A_CORE FT
Pt had some difficulty articulating herself during check in and did not participate for the remainder of the group; at times seemed distracted
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
## 2024-06-14 NOTE — BH INPATIENT PSYCHIATRY PROGRESS NOTE - NSBHATTESTCOMMENTATTENDFT_PSY_A_CORE
Pt in behavioral control, taking meds, pending dispo. Pt in behavioral control, taking meds.  Legals expiring, pt remains acute risk due to inabillity safely care for self, requires CR/safe dispo plan to modify this risk however placement still pending.

## 2024-06-14 NOTE — BH INPATIENT PSYCHIATRY PROGRESS NOTE - CURRENT MEDICATION
MEDICATIONS  (STANDING):  risperiDONE   Tablet 2 milliGRAM(s) Oral daily    MEDICATIONS  (PRN):  acetaminophen     Tablet .. 650 milliGRAM(s) Oral every 6 hours PRN Temp greater or equal to 38C (100.4F), Mild Pain (1 - 3)  aluminum hydroxide/magnesium hydroxide/simethicone Suspension 30 milliLiter(s) Oral every 6 hours PRN Dyspepsia  diphenhydrAMINE 50 milliGRAM(s) Oral every 6 hours PRN Extrapyramidal symptoms or prophylaxis  diphenhydrAMINE Injectable 50 milliGRAM(s) IntraMuscular once PRN Extrapyramidal prophylaxis  haloperidol     Tablet 5 milliGRAM(s) Oral every 6 hours PRN agitation  haloperidol    Injectable 5 milliGRAM(s) IntraMuscular once PRN aggression  LORazepam     Tablet 2 milliGRAM(s) Oral every 6 hours PRN severe anxiety, agitation  LORazepam   Injectable 2 milliGRAM(s) IntraMuscular once PRN severe agitation  magnesium hydroxide Suspension 30 milliLiter(s) Oral daily PRN Constipation  traZODone 50 milliGRAM(s) Oral at bedtime PRN insomnia

## 2024-06-14 NOTE — BH PSYCHOLOGY - GROUP THERAPY NOTE - NSBHPSYCHOLRESPCOMMENT_PSY_A_CORE FT
The patient appeared adequately groomed and casually dressed. Pt appeared somewhat engaged in the group as evidenced by her ability to appropriately listen to what others were sharing, though she was quiet throughout the discussion and looked distracted at times. Pt appeared to have some difficulty articulating herself when attempting to share during the check-in. Speech was slurred and difficult to understand. PT was oriented X3. Pt was appropriate with peers and did not talk over others.

## 2024-06-15 RX ADMIN — Medication 650 MILLIGRAM(S): at 22:00

## 2024-06-15 RX ADMIN — Medication 2 MILLIGRAM(S): at 08:06

## 2024-06-16 RX ADMIN — Medication 2 MILLIGRAM(S): at 20:02

## 2024-06-16 RX ADMIN — Medication 2 MILLIGRAM(S): at 08:02

## 2024-06-16 RX ADMIN — HALOPERIDOL 5 MILLIGRAM(S): 10 TABLET ORAL at 20:02

## 2024-06-17 PROCEDURE — 99231 SBSQ HOSP IP/OBS SF/LOW 25: CPT | Mod: GC

## 2024-06-17 RX ADMIN — Medication 2 MILLIGRAM(S): at 19:52

## 2024-06-17 RX ADMIN — HALOPERIDOL 5 MILLIGRAM(S): 10 TABLET ORAL at 19:52

## 2024-06-17 RX ADMIN — Medication 650 MILLIGRAM(S): at 09:14

## 2024-06-17 RX ADMIN — Medication 650 MILLIGRAM(S): at 09:35

## 2024-06-17 RX ADMIN — Medication 2 MILLIGRAM(S): at 09:35

## 2024-06-17 RX ADMIN — Medication 2 MILLIGRAM(S): at 09:15

## 2024-06-17 NOTE — BH INPATIENT PSYCHIATRY PROGRESS NOTE - NSBHATTESTCOMMENTATTENDFT_PSY_A_CORE
No weekend events.  Pt in better behavioral control, likely coincides with decrease in unit acuity over last several days.  Continue meds as ordered.  Awaiting placement.
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
## 2024-06-17 NOTE — BH INPATIENT PSYCHIATRY PROGRESS NOTE - NSBHFUPINTERVALHXFT_PSY_A_CORE
Case discussed with interdisciplinary team, described as calm on the unit. Took Haldol 5mg and Ativan 2 mg yesterday at 8 pm. Otherwise, no acute episodes of anxiety or agitation noted. Pt compliant with risperidone since June 13.    Pt seen in day room, calm and pleasant, says that she was supposed to go to [facility] because of dry eyes accompanied by pain. Does not currently endorse significant pain but keeps asking for "Vi" who would have seen the pt yesterday. Interviewer tells pt that eyes will be checked daily and that the pt needs to report any changes to the treatment team. Reports good mood. Willing to take her morning dose of risperidone, which she then takes shortly after the interview after hesitating first. Reports good sleep and appetite. Denies SIIP/HIIP.

## 2024-06-17 NOTE — BH INPATIENT PSYCHIATRY PROGRESS NOTE - MSE UNSTRUCTURED FT
Appearance: Dressed appropriately.  Behavior: Cooperative. Childlike demeanor.  Motor: No abnormalities.   Speech: Soft volume.  Mood: good.  Affect: Neutral, stable.  Thought Process: Repetitive  Associations: Limited  Thought Content: Fixated on various hospitals. Somatic.   Insight: Limited.  Judgment: Fair on interview.   Attention: Fair.  Language: fluent  Gait: intact.

## 2024-06-18 PROCEDURE — 99232 SBSQ HOSP IP/OBS MODERATE 35: CPT | Mod: GC

## 2024-06-18 PROCEDURE — 90832 PSYTX W PT 30 MINUTES: CPT

## 2024-06-18 RX ORDER — LORAZEPAM 4 MG/ML
2 VIAL (ML) INJECTION ONCE
Refills: 0 | Status: DISCONTINUED | OUTPATIENT
Start: 2024-06-18 | End: 2024-06-25

## 2024-06-18 RX ORDER — LORAZEPAM 4 MG/ML
2 VIAL (ML) INJECTION EVERY 6 HOURS
Refills: 0 | Status: DISCONTINUED | OUTPATIENT
Start: 2024-06-18 | End: 2024-06-25

## 2024-06-18 RX ADMIN — Medication 2 MILLIGRAM(S): at 11:02

## 2024-06-18 RX ADMIN — Medication 650 MILLIGRAM(S): at 19:37

## 2024-06-18 RX ADMIN — Medication 650 MILLIGRAM(S): at 06:21
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
## 2024-06-18 NOTE — BH INPATIENT PSYCHIATRY PROGRESS NOTE - MSE UNSTRUCTURED FT
Appearance: Dressed appropriately.  Behavior: Cooperative, but more irritable, ends interview. Childlike demeanor.  Motor: No abnormalities.   Speech: Soft volume.  Mood: angry  Affect: irritable  Thought Process: Repetitive  Associations: Limited  Thought Content: Fixated on various hospitals. Somatic.   Insight: Limited.  Judgment: Fair on interview.   Attention: Fair.  Language: fluent  Gait: intact.

## 2024-06-18 NOTE — BH INPATIENT PSYCHIATRY PROGRESS NOTE - NSBHATTESTCOMMENTATTENDFT_PSY_A_CORE
Somatic, but compliant with meds, in behavioral control.  Continue plan as above. Somatic (complaining of eye discharge, however none on physical exam), but compliant with meds, in behavioral control.  Continue plan as above.

## 2024-06-18 NOTE — BH INPATIENT PSYCHIATRY PROGRESS NOTE - NSBHFUPINTERVALHXFT_PSY_A_CORE
Case discussed with interdisciplinary team, described as calm on the unit. Took Haldol 5mg and Ativan 2 mg yesterday at 7 pm. Otherwise, no acute episodes of anxiety or agitation noted. Pt compliant with risperidone since June 13, however declined morning dose today.    Pt seen in day room, more irritable, says that she needs to go to [facility] because of her oozing eyes and experienced eye pressure and pain bilaterally. Interviewer tells pt that team will monitor her eye pain and that pt can take Tylenol. Reports feeling angry because of her eye pain. When interviewer asks why patient is not taking her morning dose of risperidone she mentions that she already took Tylenol and says she does not want to talk anymore. No other physical symptoms reported. SIIP/HIIP not assessed. Case discussed with interdisciplinary team, described as calm on the unit. Took Haldol 5mg and Ativan 2 mg yesterday at 7 pm. Otherwise, no acute episodes of anxiety or agitation noted. Pt compliant with risperidone since June 13, more ambivalent towards medication today but took morning meds.    Pt seen in day room, more irritable, says that she needs to go to [facility] because of her oozing eyes and experienced eye pressure and pain bilaterally. Interviewer tells pt that team will monitor her eye pain and that pt can take Tylenol. Reports feeling angry because of her eye pain. When interviewer asks why patient is not taking her morning dose of risperidone she mentions that she already took Tylenol and says she does not want to talk anymore. Later, the pt approaches the interviewer again saying that she is now taking risperidone. No other physical symptoms reported. SIIP/HIIP not assessed. Case discussed with interdisciplinary team, described as calm on the unit. Took Haldol 5mg and Ativan 2 mg yesterday at 7 pm. Otherwise, no acute episodes of anxiety or agitation noted. Pt compliant with risperidone since June 13, more ambivalent towards medication today but took morning meds.    Pt seen in day room, more irritable, says that she needs to go to [facility] because of her oozing eyes and experienced eye pressure and pain bilaterally. Interviewer tells pt that team will monitor her eye pain and that pt can take Tylenol. Reports feeling angry because of her eye pain. When interviewer asks why patient is not taking her morning dose of risperidone she mentions that she already took Tylenol and says she does not want to talk anymore. Later, the pt approaches the interviewer again saying that she is now taking risperidone. No other physical symptoms reported.

## 2024-06-19 PROCEDURE — 99232 SBSQ HOSP IP/OBS MODERATE 35: CPT | Mod: GC

## 2024-06-19 RX ADMIN — Medication 650 MILLIGRAM(S): at 22:17

## 2024-06-19 RX ADMIN — Medication 2 MILLIGRAM(S): at 22:18

## 2024-06-19 RX ADMIN — Medication 50 MILLIGRAM(S): at 22:18

## 2024-06-19 RX ADMIN — Medication 650 MILLIGRAM(S): at 06:06
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
## 2024-06-19 NOTE — BH INPATIENT PSYCHIATRY PROGRESS NOTE - NSBHFUPINTERVALHXFT_PSY_A_CORE
Case discussed with interdisciplinary team, described as calm on the unit. No PRNs required overnight. No acute episodes of anxiety or agitation noted. Pt compliant with risperidone since June 13, more ambivalent towards medication today, refused risperidone.    Pt seen in day room, repeats how she needs to go to [facility] to be checked out by an ophthalmologist for her oozing eyes. Interviewer explains that allergy may contribute to such symptoms and that pt can take Tylenol. Pt says she took Tylenol but does not feel considerable improvement. Reports good mood, as well as normal appetite and sleep. Denies SIIP/HIIP. Denies other physical symptoms.  Case discussed with interdisciplinary team, described as calm on the unit. No PRNs required overnight. No acute episodes of anxiety or agitation noted. Pt compliant with risperidone since June 13, more ambivalent towards medication today, refused risperidone.    Pt seen in day room, repeats how she needs to go to [facility] to be checked out by an ophthalmologist for her oozing eyes. Interviewer explains that allergy may contribute to such symptoms and that pt can take Tylenol or allergy meds. Pt says she took Tylenol but does not feel considerable improvement. Reports good mood, as well as normal appetite and sleep. Denies SIIP/HIIP. Denies other physical symptoms.

## 2024-06-19 NOTE — BH INPATIENT PSYCHIATRY PROGRESS NOTE - MSE UNSTRUCTURED FT
Appearance: Dressed appropriately.  Behavior: Cooperative. Childlike demeanor.  Motor: No abnormalities.   Speech: Soft volume.  Mood: angry  Affect: irritable  Thought Process: Repetitive  Associations: Limited  Thought Content: Fixated on various hospitals. Somatic.   Insight: Limited.  Judgment: Fair on interview.   Attention: Fair.  Language: fluent  Gait: intact.

## 2024-06-20 PROCEDURE — 99232 SBSQ HOSP IP/OBS MODERATE 35: CPT

## 2024-06-20 RX ADMIN — HALOPERIDOL 5 MILLIGRAM(S): 10 TABLET ORAL at 10:00

## 2024-06-20 RX ADMIN — Medication 2 MILLIGRAM(S): at 10:00

## 2024-06-20 RX ADMIN — Medication 2 MILLIGRAM(S): at 08:31

## 2024-06-20 RX ADMIN — Medication 650 MILLIGRAM(S): at 18:31

## 2024-06-20 NOTE — BH INPATIENT PSYCHIATRY PROGRESS NOTE - NSBHFUPINTERVALHXFT_PSY_A_CORE
Case discussed with interdisciplinary team, described as calm on the unit. No PRNs required overnight. No acute episodes of anxiety or agitation noted. Pt mostly  compliant with risperidone since June 13, took risperidone this morning as well after non-adherence yesterday morning.    Pt seen in day room, says she felt tearful yesterday night but would otherwise feel ok. Mentions that she took risperidone and laughs. States that she needs to get to [facility] tomorrow for an eye check-up. Denies any changes in mood or any episodes of anxiety or agitation. Reports good appetite and sleep. Denies SIIP/HIIP. Denies other physical symptoms.  Case discussed with interdisciplinary team, described as calm on the unit. Took Atarax 50 mg po and Ativan 2 mg po at 10 pm. Pt mostly compliant with risperidone since June 13, took risperidone this morning as well after non-adherence yesterday morning.    Pt seen in day room, says she felt tearful yesterday night but would otherwise feel ok. Mentions that she took risperidone and laughs. States that she needs to get to [facility] tomorrow for an eye check-up. Denies any changes in mood or any episodes of anxiety or agitation. Reports good appetite and sleep. Denies SIIP/HIIP. Denies other physical symptoms.  Case discussed with interdisciplinary team, described as calm on the unit. Took Atarax 50 mg po and Ativan 2 mg po at 10 pm. Pt mostly compliant with risperidone since June 13, took risperidone this morning as well after non-adherence yesterday morning.    Pt seen in day room, says she felt tearful yesterday night but would otherwise feel ok. Mentions that she took risperidone and laughs. States that she needs to get to [facility] tomorrow for an eye check-up. Denies any changes in mood or any episodes of anxiety or agitation. Reports good appetite and sleep. Denies SIIP/HIIP. Denies other physical symptoms. Took PO PRNS for agitation in late AM.
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
## 2024-06-20 NOTE — BH INPATIENT PSYCHIATRY PROGRESS NOTE - MSE UNSTRUCTURED FT
Appearance: Dressed appropriately.  Behavior: Cooperative. Childlike demeanor.  Motor: No abnormalities.   Speech: Soft volume.  Mood: good  Affect: euthymic  Thought Process: Repetitive  Associations: Limited  Thought Content: Fixated on various hospitals. Somatic.   Insight: Limited.  Judgment: Fair on interview.   Attention: Fair.  Language: fluent  Gait: intact.

## 2024-06-21 RX ADMIN — Medication 2 MILLIGRAM(S): at 09:00

## 2024-06-21 RX ADMIN — Medication 650 MILLIGRAM(S): at 03:51

## 2024-06-21 RX ADMIN — Medication 2 MILLIGRAM(S): at 03:51

## 2024-06-21 RX ADMIN — Medication 650 MILLIGRAM(S): at 04:35

## 2024-06-21 NOTE — BH INPATIENT PSYCHIATRY PROGRESS NOTE - NSBHFUPINTERVALHXFT_PSY_A_CORE
Case discussed with interdisciplinary team, described as calm on the unit. No PRNs required. Pt mostly compliant with risperidone since June 13. Awake most of the night according to sleep log.    Pt seen in day room, says she would go to [facility] next week to have her eyes checked. Also mentions that she would like to go to a post office at [facility] to sent a letter to someone, but does not elaborate. Reports good mood. Denies recent episodes of anxiety or agitation. States that she took Tylenol and risperidone. Reports good appetite and sleep although she says she slept quite late. Denies SIIP/HIIP. Denies other physical symptoms.
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
## 2024-06-22 RX ADMIN — Medication 650 MILLIGRAM(S): at 09:30

## 2024-06-22 RX ADMIN — HALOPERIDOL 5 MILLIGRAM(S): 10 TABLET ORAL at 21:52

## 2024-06-22 RX ADMIN — Medication 650 MILLIGRAM(S): at 08:31

## 2024-06-22 RX ADMIN — Medication 650 MILLIGRAM(S): at 22:21

## 2024-06-22 RX ADMIN — Medication 2 MILLIGRAM(S): at 21:51

## 2024-06-22 RX ADMIN — Medication 2 MILLIGRAM(S): at 08:31

## 2024-06-22 RX ADMIN — Medication 650 MILLIGRAM(S): at 21:51

## 2024-06-23 RX ADMIN — Medication 650 MILLIGRAM(S): at 09:36

## 2024-06-23 RX ADMIN — Medication 2 MILLIGRAM(S): at 09:05

## 2024-06-23 RX ADMIN — Medication 2 MILLIGRAM(S): at 09:03

## 2024-06-23 RX ADMIN — Medication 650 MILLIGRAM(S): at 09:03

## 2024-06-24 PROCEDURE — 99231 SBSQ HOSP IP/OBS SF/LOW 25: CPT | Mod: GC

## 2024-06-24 RX ADMIN — Medication 650 MILLIGRAM(S): at 08:07

## 2024-06-24 RX ADMIN — HALOPERIDOL 5 MILLIGRAM(S): 10 TABLET ORAL at 01:01

## 2024-06-24 RX ADMIN — Medication 650 MILLIGRAM(S): at 10:19

## 2024-06-24 RX ADMIN — Medication 50 MILLIGRAM(S): at 01:02

## 2024-06-24 RX ADMIN — Medication 2 MILLIGRAM(S): at 08:07

## 2024-06-24 NOTE — BH INPATIENT PSYCHIATRY PROGRESS NOTE - MSE UNSTRUCTURED FT
Appearance: Dressed appropriately.  Behavior: Cooperative. Childlike demeanor.  Motor: No abnormalities.   Speech: Soft volume.  Mood: good  Affect: euthymic  Thought Process: Repetitive  Associations: Limited  Thought Content: Fixated on various housing facilities. Somatic.   Insight: Limited.  Judgment: Fair on interview.   Attention: Fair.  Language: fluent  Gait: intact.
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
## 2024-06-24 NOTE — BH INPATIENT PSYCHIATRY PROGRESS NOTE - NSBHFUPINTERVALHXFT_PSY_A_CORE
Case discussed with interdisciplinary team, described as calm on the unit. Took Benadryl 50 mg po, Haldol 5 mg po, and trazodone 50 mg po at 1 am for insomnia and anxiety, as well as Ativan 2 mg in the morning (8 am). Pt mostly compliant with risperidone since June 13. Took all doses during the weekend. Awake since 5 am (with interrupted sleep), according to sleep log.    Pt seen in her room, pleasant and josefa, says she would like to go to [facility] and mentions [facility], does not refer to any specific housing facility. Reports good mood. Did not do anything in particular on the weekend. Denies feeling disrupted by other patients or having felt upset at any point during the last couple of days. Reports good appetite and sleep. Denies SIIP/HIIP. Mentions that her eyes still feel like oozing but did not mention any need to be brought to [facility] ED. Denies other physical symptoms or side effects from medication.

## 2024-06-25 PROCEDURE — 99231 SBSQ HOSP IP/OBS SF/LOW 25: CPT | Mod: GC

## 2024-06-25 PROCEDURE — 90832 PSYTX W PT 30 MINUTES: CPT

## 2024-06-25 RX ORDER — LORAZEPAM 4 MG/ML
2 VIAL (ML) INJECTION ONCE
Refills: 0 | Status: DISCONTINUED | OUTPATIENT
Start: 2024-06-25 | End: 2024-07-02

## 2024-06-25 RX ORDER — LORAZEPAM 4 MG/ML
2 VIAL (ML) INJECTION EVERY 6 HOURS
Refills: 0 | Status: DISCONTINUED | OUTPATIENT
Start: 2024-06-25 | End: 2024-07-02

## 2024-06-25 RX ADMIN — Medication 650 MILLIGRAM(S): at 21:35

## 2024-06-25 RX ADMIN — Medication 2 MILLIGRAM(S): at 08:59

## 2024-06-25 RX ADMIN — Medication 650 MILLIGRAM(S): at 20:17

## 2024-06-25 RX ADMIN — Medication 2 MILLIGRAM(S): at 23:10

## 2024-06-25 RX ADMIN — Medication 650 MILLIGRAM(S): at 08:58

## 2024-06-25 RX ADMIN — Medication 50 MILLIGRAM(S): at 09:01

## 2024-06-25 RX ADMIN — Medication 2 MILLIGRAM(S): at 08:58

## 2024-06-25 RX ADMIN — HALOPERIDOL 5 MILLIGRAM(S): 10 TABLET ORAL at 23:11

## 2024-06-25 RX ADMIN — Medication 650 MILLIGRAM(S): at 09:52

## 2024-06-25 NOTE — BH INPATIENT PSYCHIATRY PROGRESS NOTE - NSBHATTESTTYPEVISIT_PSY_A_CORE
Attending Only Detail Level: Detailed Depth Of Biopsy: dermis Was A Bandage Applied: Yes Size Of Lesion In Cm: 0 Biopsy Type: H and E Biopsy Method: double edge Personna blade Anesthesia Type: 1% lidocaine with epinephrine Anesthesia Volume In Cc: 0.5 Hemostasis: Chris's Wound Care: Petrolatum Dressing: bandage Destruction After The Procedure: No Type Of Destruction Used: Curettage Curettage Text: The wound bed was treated with curettage after the biopsy was performed. Cryotherapy Text: The wound bed was treated with cryotherapy after the biopsy was performed. Electrodesiccation Text: The wound bed was treated with electrodesiccation after the biopsy was performed. Electrodesiccation And Curettage Text: The wound bed was treated with electrodesiccation and curettage after the biopsy was performed. Silver Nitrate Text: The wound bed was treated with silver nitrate after the biopsy was performed. Lab: 6 Lab Facility: 3 Consent: Written consent was obtained and risks were reviewed including but not limited to scarring, infection, bleeding, scabbing, incomplete removal, nerve damage and allergy to anesthesia. Post-Care Instructions: I reviewed with the patient in detail post-care instructions. Patient is to keep the biopsy site dry overnight, and then apply vasoline twice daily until healed. Notification Instructions: Patient will be notified of biopsy results. However, patient instructed to call the office if not contacted within 2 weeks. Billing Type: Third-Party Bill Information: Selecting Yes will display possible errors in your note based on the variables you have selected. This validation is only offered as a suggestion for you. PLEASE NOTE THAT THE VALIDATION TEXT WILL BE REMOVED WHEN YOU FINALIZE YOUR NOTE. IF YOU WANT TO FAX A PRELIMINARY NOTE YOU WILL NEED TO TOGGLE THIS TO 'NO' IF YOU DO NOT WANT IT IN YOUR FAXED NOTE.
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
## 2024-06-25 NOTE — BH INPATIENT PSYCHIATRY PROGRESS NOTE - NSBHATTESTCOMMENTATTENDFT_PSY_A_CORE
Pt has been sleeping in her room at night (instead of day room), was seen journaling in her room today, is tolerating having a roommate.  Pt therefore with improved unit functioning recently.  May be reflective of medication compliance and efficacy, but could also be natural fluctuation of her neurodevelopmental diagnosis (or both).  Continue treatment plan as ordered.

## 2024-06-25 NOTE — BH INPATIENT PSYCHIATRY PROGRESS NOTE - NSBHFUPINTERVALHXFT_PSY_A_CORE
Case discussed with interdisciplinary team, described as calm on the unit. Took Benadryl 50 mg po, Haldol 5 mg po, and trazodone 50 mg po at 1 am for insomnia and anxiety, as well as Ativan 2 mg in the morning (8 am). Pt mostly compliant with risperidone since June 13. Took all doses during the weekend. Awake since 5 am (with interrupted sleep), according to sleep log.    Pt seen in her room, pleasant and josefa, says she would like to go to [facility] and mentions [facility], does not refer to any specific housing facility. Reports good mood. Did not do anything in particular on the weekend. Denies feeling disrupted by other patients or having felt upset at any point during the last couple of days. Reports good appetite and sleep. Denies SIIP/HIIP. Mentions that her eyes still feel like oozing but did not mention any need to be brought to [facility] ED. Denies other physical symptoms or side effects from medication. Case discussed with interdisciplinary team, described as calm on the unit. Took Benadryl 50 mg po at 9 am and Ativan 2 mg po at 8 am. Pt mostly compliant with risperidone since June 13. Took all doses during last weekend and yesterday. Interrupted sleep, according to sleep log.    Pt seen in her room, sitting on her bed and writing sentences (does not disclose content), pleasant and calm, mentions perpetually how she would like to go to [facility], also refers to some housing in [facility]. Reports good mood. Denies episodes of anxiety or frustration. Reports good appetite and sleep. Denies SIIP/HIIP. Denies pain or dizziness. No side effects from medications noted.

## 2024-06-26 PROCEDURE — 99232 SBSQ HOSP IP/OBS MODERATE 35: CPT | Mod: GC

## 2024-06-26 RX ADMIN — HALOPERIDOL 5 MILLIGRAM(S): 10 TABLET ORAL at 21:05

## 2024-06-26 RX ADMIN — Medication 50 MILLIGRAM(S): at 21:04

## 2024-06-26 RX ADMIN — Medication 2 MILLIGRAM(S): at 09:00

## 2024-06-26 RX ADMIN — Medication 650 MILLIGRAM(S): at 18:45

## 2024-06-26 RX ADMIN — Medication 650 MILLIGRAM(S): at 17:45

## 2024-06-26 RX ADMIN — Medication 2 MILLIGRAM(S): at 21:04
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
## 2024-06-26 NOTE — BH INPATIENT PSYCHIATRY PROGRESS NOTE - MSE UNSTRUCTURED FT
Appearance: Dressed appropriately.  Behavior: Cooperative. Childlike demeanor.  Motor: No abnormalities.   Speech: Soft volume.  Mood: good  Affect: euthymic  Thought Process: Repetitive  Associations: Limited  Thought Content: Fixated on various housing facilities. Somatic. Pseudocyesis noted.  Insight: Limited.  Judgment: Fair on interview.   Attention: Fair.  Language: fluent  Gait: intact.

## 2024-06-26 NOTE — BH INPATIENT PSYCHIATRY PROGRESS NOTE - NSBHATTESTCOMMENTATTENDFT_PSY_A_CORE
Pt continues to be calm, childlike, pleasant.  Thought content mostly fantasies and overvalued ideas, no overt delusions.  No perceptual disturbances.  Continue meds as ordered.  Awaiting [facility] housing placement, pt is aware of this and in agreement.

## 2024-06-26 NOTE — BH INPATIENT PSYCHIATRY PROGRESS NOTE - NSBHFUPINTERVALHXFT_PSY_A_CORE
Case discussed with interdisciplinary team, described as calm on the unit. Took Haldol mg and Ativan 2 mg at 11 pm yesterday, and Ativan 2 mg at 9 am today.  Pt mostly compliant with risperidone since June 13. Fair sleep, according to sleep log.    Pt seen in her room, sitting on her bed, pleasant and calm, asking interviewer to close the door as she wants to show her notes. Refers to her list of possible apartments she would like to go to after discharge including [facility] group homes at various places. Also shows a list of different meals that she would like to have on the unit and that she would consider appropriate giving her self-reported diabetes. Also points to her belly saying she feels like she is pregnant with twins and would also feel nauseous, therefore wants a pregnancy test to be done in the morning. Reports good mood. Denies episodes of anxiety or frustration. Reports good appetite and sleep. Denies SIIP/HIIP. Denies pain or dizziness. No side effects from medications noted.

## 2024-06-27 PROCEDURE — 90832 PSYTX W PT 30 MINUTES: CPT

## 2024-06-27 PROCEDURE — 99231 SBSQ HOSP IP/OBS SF/LOW 25: CPT | Mod: GC

## 2024-06-27 RX ADMIN — Medication 650 MILLIGRAM(S): at 21:17

## 2024-06-27 RX ADMIN — HALOPERIDOL 5 MILLIGRAM(S): 10 TABLET ORAL at 19:57

## 2024-06-27 RX ADMIN — Medication 650 MILLIGRAM(S): at 19:56

## 2024-06-27 RX ADMIN — Medication 50 MILLIGRAM(S): at 19:55

## 2024-06-27 RX ADMIN — Medication 2 MILLIGRAM(S): at 09:14

## 2024-06-27 RX ADMIN — Medication 650 MILLIGRAM(S): at 06:15

## 2024-06-27 NOTE — BH INPATIENT PSYCHIATRY PROGRESS NOTE - NSBHFUPINTERVALHXFT_PSY_A_CORE
Case discussed with interdisciplinary team, described as calm on the unit. Took Haldol mg and Ativan 2 mg at 11 pm yesterday, and Ativan 2 mg at 9 am today.  Pt mostly compliant with risperidone since June 13. Fair sleep, according to sleep log.    Pt seen in her room, sitting on her bed, pleasant and calm, asking interviewer to close the door as she wants to show her notes. Refers to her list of possible apartments she would like to go to after discharge including [facility] group homes at various places. Also shows a list of different meals that she would like to have on the unit and that she would consider appropriate giving her self-reported diabetes. Also points to her belly saying she feels like she is pregnant with twins and would also feel nauseous, therefore wants a pregnancy test to be done in the morning. Reports good mood. Denies episodes of anxiety or frustration. Reports good appetite and sleep. Denies SIIP/HIIP. Denies pain or dizziness. No side effects from medications noted. Case discussed with interdisciplinary team, described as calm on the unit. Took PO Haldol 5 mg, Ativan 2 mg, and Benadryl 50 mg at 9 pm yesterday. Pt mostly compliant with risperidone since June 13. Slept only until 3:15 am, according to sleep log.    Pt seen in day room, pleasant and calm, eager to talk, asking interviewer perpetually if she can go to [facility]. Repeats that she is pregnant with twins and would also feel nauseous, insists on having pregnancy test done as soon as possible. Mentions that she wants "two tabs" as she has twins. Interviwer explains that pregnancy would need to be positive first before further steps can be taken. Reports good mood. Denies episodes of anxiety, agitation or frustration. Reports good appetite and sleep. Denies SIIP/HIIP. Denies pain or dizziness. No side effects from medications noted.

## 2024-06-27 NOTE — BH INPATIENT PSYCHIATRY PROGRESS NOTE - MSE UNSTRUCTURED FT
Appearance: Dressed appropriately.  Behavior: Cooperative. Childlike demeanor.  Motor: No abnormalities.   Speech: Soft volume.  Mood: good  Affect: euthymic  Thought Process: Repetitive  Associations: Limited  Thought Content: Fixated on various housing facilities. Somatic. Pseudocyesis noted.  Insight: Limited.  Judgment: Fair on interview.   Attention: Fair.  Language: fluent  Gait: intact.      Appearance: Dressed appropriately.  Behavior: Cooperative. Childlike demeanor.  Motor: No abnormalities.   Speech: Soft volume.  Mood: Good  Affect: Euthymic  Thought Process: Repetitive  Associations: Limited  Thought Content: Fixated on various housing facilities. Somatic.  Ideas of fantasy.  Insight: Limited.  Judgment: Fair on interview.   Attention: Fair.  Language: fluent  Gait: intact.
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
## 2024-06-28 PROCEDURE — 99231 SBSQ HOSP IP/OBS SF/LOW 25: CPT | Mod: GC

## 2024-06-28 RX ADMIN — Medication 650 MILLIGRAM(S): at 21:49

## 2024-06-28 RX ADMIN — Medication 50 MILLIGRAM(S): at 21:50

## 2024-06-28 RX ADMIN — Medication 650 MILLIGRAM(S): at 21:53

## 2024-06-28 RX ADMIN — Medication 2 MILLIGRAM(S): at 10:42

## 2024-06-28 NOTE — BH INPATIENT PSYCHIATRY PROGRESS NOTE - MSE UNSTRUCTURED FT
Appearance: Dressed appropriately.  Behavior: Cooperative. Childlike demeanor.  Motor: No abnormalities.   Speech: Soft volume.  Mood: Good  Affect: Euthymic  Thought Process: Repetitive  Associations: Limited  Thought Content: Fixated on various housing facilities. Somatic.  Ideas of fantasy.  Insight: Limited.  Judgment: Fair on interview.   Attention: Fair.  Language: fluent  Gait: intact.      Appearance: Dressed appropriately.  Behavior: Cooperative. Childlike demeanor.  Motor: No abnormalities.   Speech: Soft volume.  Mood: Good  Affect: Euthymic  Thought Process: Repetitive  Associations: Limited  Thought Content: Fixated on various housing facilities. Somatic.  Insight: Limited.  Judgment: Fair on interview.   Attention: Fair.  Language: fluent  Gait: intact.

## 2024-06-28 NOTE — BH INPATIENT PSYCHIATRY PROGRESS NOTE - NSBHATTESTCOMMENTATTENDFT_PSY_A_CORE
Pt continues to be in behavioral control.  No overt psychosis or miguel.  Has abnormal thought content but is more ideas of fantasy rather than true delusion.  Intellectual disability remains primary diagnosis.  Continue meds.  Awaiting housing.

## 2024-06-28 NOTE — BH INPATIENT PSYCHIATRY PROGRESS NOTE - NSBHFUPINTERVALHXFT_PSY_A_CORE
Case discussed with interdisciplinary team, described as calm on the unit. Took PO Haldol 5 mg and Benadryl 50 mg at 5 pm yesterday. Pt mostly compliant with risperidone since June 13. Slept only until around 2 am, according to sleep log.    Pt seen in day room, pleasant and calm, eager to talk, asking interviewer how he is doing. Repeats that she is pregnant with twins and therefore feels sick, insists on having "2 taps" done which she clarifies by writing it on paper. Interviewer roughly explains procedure of amniocentesis saying that a pregnancy would need to come out positive first before any such invasive procedure would be preformed. Reports good mood. Denies episodes of anxiety, agitation or frustration. Reports disrupted sleep but good appetite. Denies urges to hurt herself or others. Denies pain or dizziness. No side effects from medications noted.
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
## 2024-06-29 RX ADMIN — Medication 2 MILLIGRAM(S): at 08:27

## 2024-06-30 RX ADMIN — Medication 2 MILLIGRAM(S): at 09:09

## 2024-06-30 RX ADMIN — Medication 650 MILLIGRAM(S): at 20:10

## 2024-07-01 PROCEDURE — 99231 SBSQ HOSP IP/OBS SF/LOW 25: CPT

## 2024-07-01 RX ADMIN — Medication 2 MILLIGRAM(S): at 08:56
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
## 2024-07-01 NOTE — BH INPATIENT PSYCHIATRY PROGRESS NOTE - MSE UNSTRUCTURED FT
Appearance: Dressed appropriately.  Behavior: Cooperative. Childlike demeanor.  Motor: No abnormalities.   Speech: Soft volume.  Mood: Good  Affect: Euthymic  Thought Process: Repetitive  Associations: Limited  Thought Content: Somatic.  Insight: Limited.  Judgment: Fair on interview.   Attention: Fair.  Language: fluent  Gait: intact.

## 2024-07-01 NOTE — BH INPATIENT PSYCHIATRY PROGRESS NOTE - NSBHMETABOLIC_PSY_ALL_CORE_FT
BMI: BMI (kg/m2): 23.5 (02-10-24 @ 02:59)  HbA1c: A1C with Estimated Average Glucose Result: 6.1 % (01-14-24 @ 04:30)    Glucose: POCT Blood Glucose.: 57 mg/dL (04-15-24 @ 09:20)    BP: 119/82 (06-30-24 @ 08:14) (119/82 - 119/82)Vital Signs Last 24 Hrs  T(C): --  T(F): --  HR: --  BP: --  BP(mean): --  RR: --  SpO2: --      Lipid Panel: Date/Time: 01-14-24 @ 04:30  Cholesterol, Serum: 130  LDL Cholesterol Calculated: 61  HDL Cholesterol, Serum: 53  Total Cholesterol/HDL Ration Measurement: --  Triglycerides, Serum: 79

## 2024-07-02 PROCEDURE — 99231 SBSQ HOSP IP/OBS SF/LOW 25: CPT

## 2024-07-02 RX ADMIN — Medication 650 MILLIGRAM(S): at 07:44

## 2024-07-02 RX ADMIN — Medication 2 MILLIGRAM(S): at 07:44

## 2024-07-02 NOTE — BH INPATIENT PSYCHIATRY PROGRESS NOTE - NSBHFUPINTERVALHXFT_PSY_A_CORE
No overnight events.  Pt today states she will be moving to an apartment in [facility] tomorrow.
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
## 2024-07-02 NOTE — BH INPATIENT PSYCHIATRY PROGRESS NOTE - MSE UNSTRUCTURED FT
Subjective:   Darrel Sotomayor is a 48 y.o. female who presents for Cough (With body aches and loss of voice x 2 days. )        Cough  This is a new problem. Episode onset: 3 days. The problem has been gradually worsening. The problem occurs every few minutes. The cough is Productive of sputum. Associated symptoms include chest pain (upper, with coughing only), headaches, myalgias, nasal congestion, rhinorrhea, a sore throat (last night- resolved) and shortness of breath. Pertinent negatives include no ear congestion, ear pain, fever or hemoptysis. Associated symptoms comments: Hoarse voice. Treatments tried: mucinex cough drops, tylenol. The treatment provided moderate relief. There is no history of asthma, bronchitis or pneumonia.     Review of Systems   Constitutional:  Negative for fever.   HENT:  Positive for rhinorrhea and sore throat (last night- resolved). Negative for ear pain.    Respiratory:  Positive for cough and shortness of breath. Negative for hemoptysis.    Cardiovascular:  Positive for chest pain (upper, with coughing only).   Musculoskeletal:  Positive for myalgias.   Neurological:  Positive for headaches.       PMH:  has no past medical history on file.  MEDS:   Current Outpatient Medications:     methylPREDNISolone (MEDROL DOSEPAK) 4 MG Tablet Therapy Pack, Follow schedule on package instructions., Disp: 21 Tablet, Rfl: 0    guaiFENesin ER (MUCINEX) 600 MG TABLET SR 12 HR, Take 1 Tablet by mouth every 12 hours., Disp: 28 Tablet, Rfl: 0    promethazine-dextromethorphan (PROMETHAZINE-DM) 6.25-15 MG/5ML syrup, Take 5 mL by mouth 4 times a day as needed for Cough., Disp: 118 mL, Rfl: 0  ALLERGIES: No Known Allergies  SURGHX: History reviewed. No pertinent surgical history.  SOCHX:  reports that she has never smoked. She has never used smokeless tobacco. She reports that she does not drink alcohol and does not use drugs.  FH: Family history was reviewed, no pertinent findings to report    "Objective:   /88 (BP Location: Left arm, Patient Position: Sitting, BP Cuff Size: Adult long)   Pulse 76   Temp 37 °C (98.6 °F) (Temporal)   Resp 16   Ht 1.6 m (5' 3\")   Wt 69 kg (152 lb 1.9 oz)   SpO2 98%   BMI 26.95 kg/m²   Physical Exam  Vitals reviewed.   Constitutional:       General: She is not in acute distress.     Appearance: Normal appearance. She is well-developed. She is not toxic-appearing.   HENT:      Head: Normocephalic and atraumatic.      Right Ear: External ear normal.      Left Ear: External ear normal.      Nose: Congestion and rhinorrhea present. Rhinorrhea is clear.      Mouth/Throat:      Lips: Pink.      Mouth: Mucous membranes are moist.      Pharynx: Oropharynx is clear. Uvula midline.      Comments: Hoarse voice  Cardiovascular:      Rate and Rhythm: Normal rate and regular rhythm.      Heart sounds: Normal heart sounds, S1 normal and S2 normal.   Pulmonary:      Effort: Pulmonary effort is normal. No respiratory distress.      Breath sounds: No stridor.      Comments: No tachypnea.  Patient speaks in full sentences.  Patient has scattered rhonchi bilaterally.  No wheezes or rales.  Skin:     General: Skin is dry.   Neurological:      Comments: Alert and oriented.    Psychiatric:         Speech: Speech normal.         Behavior: Behavior normal.           Assessment/Plan:   1. Acute cough    2. Bronchitis  - methylPREDNISolone (MEDROL DOSEPAK) 4 MG Tablet Therapy Pack; Follow schedule on package instructions.  Dispense: 21 Tablet; Refill: 0  - guaiFENesin ER (MUCINEX) 600 MG TABLET SR 12 HR; Take 1 Tablet by mouth every 12 hours.  Dispense: 28 Tablet; Refill: 0  - promethazine-dextromethorphan (PROMETHAZINE-DM) 6.25-15 MG/5ML syrup; Take 5 mL by mouth 4 times a day as needed for Cough.  Dispense: 118 mL; Refill: 0    Considerations include but not limited to bronchitis, pneumonia, viral URI, allergic rhinitis, COVID-19, RSV, influenza.    History and exam today consistent with " bronchitis.  Low clinical suspicion for pneumonia based on exam today.  Patient started on a Medrol Dosepak and 12-hour Mucinex.  May take antitussive as needed.  Rest and ensure adequate hydration.  If no improvement in the next 3 to 4 days, new symptoms develop at any point, or symptoms worsen return to clinic or see PCP for reevaluation.   Appearance: Dressed appropriately.  Behavior: Cooperative. Childlike demeanor.  Motor: No abnormalities.   Speech: Soft volume.  Mood: Good  Affect: Euthymic  Thought Process: Repetitive  Associations: Limited  Thought Content: Recurrent fantasy about moving.  Insight: Limited.  Judgment: Fair on interview.   Attention: Fair.  Language: fluent  Gait: intact.

## 2024-07-03 PROCEDURE — 90832 PSYTX W PT 30 MINUTES: CPT

## 2024-07-03 PROCEDURE — 99231 SBSQ HOSP IP/OBS SF/LOW 25: CPT

## 2024-07-03 RX ORDER — LORAZEPAM 4 MG/ML
2 VIAL (ML) INJECTION ONCE
Refills: 0 | Status: DISCONTINUED | OUTPATIENT
Start: 2024-07-03 | End: 2024-07-10

## 2024-07-03 RX ORDER — LORAZEPAM 4 MG/ML
2 VIAL (ML) INJECTION EVERY 6 HOURS
Refills: 0 | Status: DISCONTINUED | OUTPATIENT
Start: 2024-07-03 | End: 2024-07-10

## 2024-07-03 RX ADMIN — Medication 650 MILLIGRAM(S): at 21:42

## 2024-07-03 RX ADMIN — Medication 650 MILLIGRAM(S): at 22:12

## 2024-07-03 RX ADMIN — Medication 2 MILLIGRAM(S): at 08:27

## 2024-07-03 NOTE — BH INPATIENT PSYCHIATRY PROGRESS NOTE - MSE UNSTRUCTURED FT
Appearance: Dressed appropriately.  Behavior: Cooperative. Childlike demeanor.  Motor: No abnormalities.   Speech: Whispering.  Mood: Does not give mood  Affect: Worried  Thought Process: Repetitive  Associations: Limited  Thought Content: Recurrent fantasy about moving.  Insight: Limited.  Judgment: Fair on interview.   Attention: Fair.  Language: fluent  Gait: intact.

## 2024-07-03 NOTE — BH INPATIENT PSYCHIATRY PROGRESS NOTE - CURRENT MEDICATION
MEDICATIONS  (STANDING):  risperiDONE   Tablet 2 milliGRAM(s) Oral daily    MEDICATIONS  (PRN):  acetaminophen     Tablet .. 650 milliGRAM(s) Oral every 6 hours PRN Temp greater or equal to 38C (100.4F), Mild Pain (1 - 3)  aluminum hydroxide/magnesium hydroxide/simethicone Suspension 30 milliLiter(s) Oral every 6 hours PRN Dyspepsia  diphenhydrAMINE 50 milliGRAM(s) Oral every 6 hours PRN Extrapyramidal symptoms or prophylaxis  diphenhydrAMINE Injectable 50 milliGRAM(s) IntraMuscular once PRN Extrapyramidal prophylaxis  haloperidol     Tablet 5 milliGRAM(s) Oral every 6 hours PRN agitation  haloperidol    Injectable 5 milliGRAM(s) IntraMuscular once PRN aggression  magnesium hydroxide Suspension 30 milliLiter(s) Oral daily PRN Constipation  traZODone 50 milliGRAM(s) Oral at bedtime PRN insomnia
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
## 2024-07-03 NOTE — BH INPATIENT PSYCHIATRY PROGRESS NOTE - NSBHFUPINTERVALHXFT_PSY_A_CORE
No overnight events.  Pt today states that she will be moving to [facility], other apartments, and some places that were nonsensical.

## 2024-07-04 RX ADMIN — Medication 2 MILLIGRAM(S): at 09:09

## 2024-07-04 RX ADMIN — HALOPERIDOL 5 MILLIGRAM(S): 10 TABLET ORAL at 21:29

## 2024-07-04 RX ADMIN — Medication 50 MILLIGRAM(S): at 21:29

## 2024-07-04 RX ADMIN — Medication 650 MILLIGRAM(S): at 21:30

## 2024-07-04 RX ADMIN — Medication 650 MILLIGRAM(S): at 22:00

## 2024-07-04 RX ADMIN — Medication 650 MILLIGRAM(S): at 09:09

## 2024-07-05 PROCEDURE — 99231 SBSQ HOSP IP/OBS SF/LOW 25: CPT

## 2024-07-05 RX ADMIN — HALOPERIDOL 5 MILLIGRAM(S): 10 TABLET ORAL at 08:10

## 2024-07-05 RX ADMIN — Medication 650 MILLIGRAM(S): at 09:10

## 2024-07-05 RX ADMIN — Medication 2 MILLIGRAM(S): at 19:37

## 2024-07-05 RX ADMIN — Medication 650 MILLIGRAM(S): at 08:10

## 2024-07-05 RX ADMIN — Medication 650 MILLIGRAM(S): at 20:34

## 2024-07-05 RX ADMIN — Medication 2 MILLIGRAM(S): at 08:02

## 2024-07-05 RX ADMIN — Medication 650 MILLIGRAM(S): at 19:37

## 2024-07-05 RX ADMIN — Medication 2 MILLIGRAM(S): at 08:10

## 2024-07-05 NOTE — BH INPATIENT PSYCHIATRY PROGRESS NOTE - NSBHFUPINTERVALHXFT_PSY_A_CORE
No overnight events.  Pt has been sleeping for most of morning, but awoke for lunch, states she is doing ok.

## 2024-07-05 NOTE — BH INPATIENT PSYCHIATRY PROGRESS NOTE - MSE UNSTRUCTURED FT
Appearance: Dressed appropriately.  Behavior: Cooperative. Childlike demeanor.  Motor: No abnormalities.   Speech: Soft volume.   Mood: "Ok"  Affect: Sleepy  Thought Process: [place].   Associations: Limited  Thought Content: Poverty of [place].  Insight: Limited.  Judgment: Fair on interview.   Attention: Fair.  Language: fluent  Gait: intact.
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
## 2024-07-06 RX ADMIN — Medication 650 MILLIGRAM(S): at 08:32

## 2024-07-07 RX ORDER — LORAZEPAM 4 MG/ML
2 VIAL (ML) INJECTION ONCE
Refills: 0 | Status: DISCONTINUED | OUTPATIENT
Start: 2024-07-07 | End: 2024-07-07

## 2024-07-07 RX ORDER — HALOPERIDOL 10 MG/1
5 TABLET ORAL ONCE
Refills: 0 | Status: COMPLETED | OUTPATIENT
Start: 2024-07-07 | End: 2024-07-07

## 2024-07-07 RX ORDER — DIPHENHYDRAMINE HCL 12.5MG/5ML
50 ELIXIR ORAL ONCE
Refills: 0 | Status: COMPLETED | OUTPATIENT
Start: 2024-07-07 | End: 2024-07-07

## 2024-07-07 RX ADMIN — Medication 50 MILLIGRAM(S): at 07:50

## 2024-07-07 RX ADMIN — Medication 650 MILLIGRAM(S): at 22:29

## 2024-07-07 RX ADMIN — Medication 2 MILLIGRAM(S): at 07:50

## 2024-07-07 RX ADMIN — HALOPERIDOL 5 MILLIGRAM(S): 10 TABLET ORAL at 07:50

## 2024-07-07 RX ADMIN — Medication 2 MILLIGRAM(S): at 22:29

## 2024-07-08 PROCEDURE — 99232 SBSQ HOSP IP/OBS MODERATE 35: CPT

## 2024-07-08 RX ORDER — ACETAMINOPHEN 500 MG/5ML
650 LIQUID (ML) ORAL
Refills: 0 | Status: DISCONTINUED | OUTPATIENT
Start: 2024-07-08 | End: 2024-07-22

## 2024-07-08 RX ORDER — RISPERIDONE 4 MG
2 TABLET ORAL AT BEDTIME
Refills: 0 | Status: DISCONTINUED | OUTPATIENT
Start: 2024-07-08 | End: 2024-07-22

## 2024-07-08 RX ADMIN — Medication 650 MILLIGRAM(S): at 00:31

## 2024-07-08 RX ADMIN — Medication 2 MILLIGRAM(S): at 11:28

## 2024-07-08 NOTE — BH INPATIENT PSYCHIATRY PROGRESS NOTE - CURRENT MEDICATION
MEDICATIONS  (STANDING):  acetaminophen     Tablet .. 650 milliGRAM(s) Oral <User Schedule>  risperiDONE   Tablet 2 milliGRAM(s) Oral daily  risperiDONE  Disintegrating Tablet 2 milliGRAM(s) Oral at bedtime    MEDICATIONS  (PRN):  acetaminophen     Tablet .. 650 milliGRAM(s) Oral every 6 hours PRN Temp greater or equal to 38C (100.4F), Mild Pain (1 - 3)  aluminum hydroxide/magnesium hydroxide/simethicone Suspension 30 milliLiter(s) Oral every 6 hours PRN Dyspepsia  diphenhydrAMINE 50 milliGRAM(s) Oral every 6 hours PRN Extrapyramidal symptoms or prophylaxis  diphenhydrAMINE Injectable 50 milliGRAM(s) IntraMuscular once PRN Extrapyramidal prophylaxis  haloperidol     Tablet 5 milliGRAM(s) Oral every 6 hours PRN agitation  haloperidol    Injectable 5 milliGRAM(s) IntraMuscular once PRN aggression  LORazepam     Tablet 2 milliGRAM(s) Oral every 6 hours PRN severe anxiety, agitation  LORazepam   Injectable 2 milliGRAM(s) IntraMuscular once PRN severe agitation  magnesium hydroxide Suspension 30 milliLiter(s) Oral daily PRN Constipation  traZODone 50 milliGRAM(s) Oral at bedtime PRN insomnia

## 2024-07-08 NOTE — BH INPATIENT PSYCHIATRY PROGRESS NOTE - MSE UNSTRUCTURED FT
The patient appears stated age, with limited hygiene, and is dressed in gowns.  She was calm and makes attempts to be cooperative with the interview.  She maintained appropriate eye contact.  No restlessness or slowing of movements observed.  Gait is steady.  The patient’s speech was poorly articulated at times, normal in tone, rate, and volume.  The patient’s mood is “okay.”  Her affect is constricted, stable, appropriate.  The patient’s thoughts are disorganized, tangential and loose at times.  She does not have any clear delusions or hallucinations.  She does not have any suicidal or homicidal ideation, intent, or plan.  Insight is poor.  Judgment is impaired.  Impulse control has been tenuous on the unit.
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
## 2024-07-08 NOTE — BH INPATIENT PSYCHIATRY PROGRESS NOTE - NSBHFUPINTERVALHXFT_PSY_A_CORE
Patient seen for follow up of agitation, chart reviewed, and case discussed with treatment team.  Patient required IM prn on Sunday to maintain safety.  This morning, patient refused po medications.  When asked why, she states it makes her tired.  Discussed taking it at hs, and patient was agreeable, but asked to take it with Tylenol.  She was unable to specify reasoning, but feels it will be better tolerated.  The patient has been visible on the unit, social with peers, and attending groups.  The patient reports eating and sleeping well.

## 2024-07-09 PROCEDURE — 99231 SBSQ HOSP IP/OBS SF/LOW 25: CPT

## 2024-07-09 RX ADMIN — HALOPERIDOL 5 MILLIGRAM(S): 10 TABLET ORAL at 20:04

## 2024-07-09 RX ADMIN — Medication 2 MILLIGRAM(S): at 20:04

## 2024-07-09 RX ADMIN — Medication 650 MILLIGRAM(S): at 01:04

## 2024-07-09 RX ADMIN — Medication 2 MILLIGRAM(S): at 09:58

## 2024-07-09 RX ADMIN — Medication 650 MILLIGRAM(S): at 00:34

## 2024-07-09 RX ADMIN — Medication 2 MILLIGRAM(S): at 00:34

## 2024-07-09 RX ADMIN — Medication 650 MILLIGRAM(S): at 20:38

## 2024-07-09 RX ADMIN — Medication 2 MILLIGRAM(S): at 10:08
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
## 2024-07-09 NOTE — BH INPATIENT PSYCHIATRY PROGRESS NOTE - MSE UNSTRUCTURED FT
The patient appears stated age, with limited hygiene, and is dressed in gowns.  She was calm and makes attempts to be cooperative with the interview.  She maintained appropriate eye contact.  No restlessness or slowing of movements observed.  Gait is steady.  The patient’s speech was poorly articulated at times, normal in tone, rate, and volume.  The patient’s mood is “okay.”  Her affect is constricted, stable, appropriate.  The patient’s thoughts are disorganized, tangential perseverate on discharge information.  She does not have any clear delusions or hallucinations.  She does not have any suicidal or homicidal ideation, intent, or plan.  Insight is poor.  Judgment is impaired.  Impulse control has been tenuous on the unit.

## 2024-07-09 NOTE — BH INPATIENT PSYCHIATRY PROGRESS NOTE - NSBHFUPINTERVALHXFT_PSY_A_CORE
Patient seen for follow up of agitation, chart reviewed, and case discussed with treatment team.  No events reported overnight.  The patient did not take her hs Risperdal, but took it this morning.  The patient denies any problems today.  She remains fixated and perseverative on discharge and outpatient program options.  She states her mood is good, no SI.  The patient has been visible on the unit, social with peers, and attending groups.  The patient reports eating and sleeping well.  Tolerating medications well.

## 2024-07-10 PROCEDURE — 90832 PSYTX W PT 30 MINUTES: CPT

## 2024-07-10 PROCEDURE — 99231 SBSQ HOSP IP/OBS SF/LOW 25: CPT

## 2024-07-10 RX ORDER — LORAZEPAM 4 MG/ML
2 VIAL (ML) INJECTION ONCE
Refills: 0 | Status: DISCONTINUED | OUTPATIENT
Start: 2024-07-10 | End: 2024-07-17

## 2024-07-10 RX ORDER — LORAZEPAM 4 MG/ML
2 VIAL (ML) INJECTION EVERY 6 HOURS
Refills: 0 | Status: DISCONTINUED | OUTPATIENT
Start: 2024-07-10 | End: 2024-07-17

## 2024-07-10 RX ADMIN — Medication 2 MILLIGRAM(S): at 08:00

## 2024-07-10 RX ADMIN — Medication 650 MILLIGRAM(S): at 21:21

## 2024-07-10 RX ADMIN — Medication 650 MILLIGRAM(S): at 20:30

## 2024-07-10 RX ADMIN — Medication 650 MILLIGRAM(S): at 07:45

## 2024-07-10 RX ADMIN — Medication 2 MILLIGRAM(S): at 20:31

## 2024-07-10 NOTE — BH INPATIENT PSYCHIATRY PROGRESS NOTE - NSBHFUPINTERVALHXFT_PSY_A_CORE
Patient seen for follow up of agitation, chart reviewed, and case discussed with treatment team.  No events reported overnight.  The patient did not take her hs Risperdal, but took it this morning.  She is in good spirits today, denies any problems.  She continues to be fixated on discharge.  She continues to have an active imagination.  She states her mood is good, no SI.  The patient has been visible on the unit, social with peers, and attending groups.  The patient reports eating and sleeping well.  Tolerating medications well.
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
## 2024-07-10 NOTE — BH INPATIENT PSYCHIATRY PROGRESS NOTE - MSE UNSTRUCTURED FT
The patient appears stated age, with limited hygiene, and is dressed in gowns.  She was calm and makes attempts to be cooperative with the interview.  She maintained appropriate eye contact.  No restlessness or slowing of movements observed.  Gait is steady.  The patient’s speech was poorly articulated at times, normal in tone, rate, and volume.  The patient’s mood is “good.”  Her affect is full, brighter today, stable, appropriate.  The patient’s thoughts are disorganized, tangential perseverate on discharge information.  She does not have any delusions or hallucinations.  She does not have any suicidal or homicidal ideation, intent, or plan.  Insight is poor.  Judgment is impaired.  Impulse control has been tenuous on the unit.

## 2024-07-11 PROCEDURE — 99231 SBSQ HOSP IP/OBS SF/LOW 25: CPT

## 2024-07-11 RX ADMIN — Medication 650 MILLIGRAM(S): at 20:10

## 2024-07-11 RX ADMIN — Medication 2 MILLIGRAM(S): at 09:25

## 2024-07-11 RX ADMIN — Medication 2 MILLIGRAM(S): at 20:10

## 2024-07-11 RX ADMIN — Medication 650 MILLIGRAM(S): at 09:25

## 2024-07-11 RX ADMIN — Medication 650 MILLIGRAM(S): at 21:24

## 2024-07-11 NOTE — BH INPATIENT PSYCHIATRY PROGRESS NOTE - MSE UNSTRUCTURED FT
The patient appears stated age, with limited hygiene, and is dressed in gowns.  She was calm and makes attempts to be cooperative with the interview.  She maintained appropriate eye contact.  No restlessness or slowing of movements observed.  Gait is steady.  The patient’s speech was poorly articulated at times, normal in tone, rate, and volume.  The patient’s mood is “good.”  Her affect is full, bright, stable, appropriate.  The patient’s thoughts are disorganized, loose at times, perseverative on discharge.  She does not have any delusions or hallucinations.  She does not have any suicidal or homicidal ideation, intent, or plan.  Insight is poor.  Judgment is impaired.  Impulse control has been improving on the unit.

## 2024-07-11 NOTE — BH INPATIENT PSYCHIATRY PROGRESS NOTE - NSBHFUPINTERVALHXFT_PSY_A_CORE
Patient seen for follow up of agitation, chart reviewed, and case discussed with treatment team.  No events reported overnight.  The patient took meds last night and this morning, denies any side effects.  She continues to be in good spirits, denies any problems.  She continues to be fixated on discharge.  She continues to have an active imagination.  She states her mood is good, no SI.  The patient has been visible on the unit, social with peers, and attending groups.  The patient reports eating and sleeping well.  Tolerating medications well.
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
## 2024-07-12 PROCEDURE — 99231 SBSQ HOSP IP/OBS SF/LOW 25: CPT

## 2024-07-12 RX ADMIN — Medication 650 MILLIGRAM(S): at 22:43

## 2024-07-12 RX ADMIN — Medication 2 MILLIGRAM(S): at 21:24

## 2024-07-12 RX ADMIN — Medication 650 MILLIGRAM(S): at 21:25

## 2024-07-12 NOTE — BH INPATIENT PSYCHIATRY PROGRESS NOTE - NSBHFUPINTERVALHXFT_PSY_A_CORE
Patient seen for follow up of agitation, chart reviewed, and case discussed with treatment team.  No events reported overnight.  The patient refused medications this morning, but took them last night.  She is more irritable this morning, states a peer has been bothering her.  Support provided.  She continues to be fixated on discharge.  Continues to talk about things related to her imagination.  She denies any SI, behavior has been well controlled.  The patient has been visible on the unit, social with peers, and attending groups.  The patient reports eating and sleeping well.  Tolerating medications well.

## 2024-07-12 NOTE — BH INPATIENT PSYCHIATRY PROGRESS NOTE - MSE UNSTRUCTURED FT
The patient appears stated age, with limited hygiene, and is dressed in gowns.  She was calm, but more irritable today, cooperative with the interview.  She maintained appropriate eye contact.  No restlessness or slowing of movements observed.  Gait is steady.  The patient’s speech was poorly articulated at times, normal in tone, rate, and volume.  The patient’s mood is “not good.”  Her affect is irritable, constricted, stable, appropriate.  The patient’s thoughts are disorganized, perseverative on discharge.  She does not have any delusions or hallucinations, but has an active imagination.  She does not have any suicidal or homicidal ideation, intent, or plan.  Insight is poor.  Judgment is impaired.  Impulse control has been improving on the unit.
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
## 2024-07-13 RX ADMIN — Medication 650 MILLIGRAM(S): at 21:21

## 2024-07-13 RX ADMIN — Medication 2 MILLIGRAM(S): at 08:44

## 2024-07-13 RX ADMIN — Medication 2 MILLIGRAM(S): at 20:34

## 2024-07-13 RX ADMIN — Medication 650 MILLIGRAM(S): at 20:34

## 2024-07-14 RX ADMIN — Medication 650 MILLIGRAM(S): at 20:21

## 2024-07-14 RX ADMIN — Medication 2 MILLIGRAM(S): at 08:13

## 2024-07-14 RX ADMIN — Medication 2 MILLIGRAM(S): at 20:21

## 2024-07-15 PROCEDURE — 99231 SBSQ HOSP IP/OBS SF/LOW 25: CPT

## 2024-07-15 RX ADMIN — Medication 650 MILLIGRAM(S): at 20:25

## 2024-07-15 RX ADMIN — Medication 650 MILLIGRAM(S): at 10:46
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
## 2024-07-15 NOTE — BH INPATIENT PSYCHIATRY PROGRESS NOTE - NSBHFUPINTERVALHXFT_PSY_A_CORE
Patient seen for follow up of agitation, chart reviewed, and case discussed with treatment team.  No events reported overnight.  The patient was compliant with medication over the weekend, tolerating them well.  Overall, patient has been showing some improvement, with less agitation.  Still irritable at times, but in control.  She denies any problems this morning.  Mood is okay, no SI.  She continues to be fixated on discharge and spends most of the interview talking about options.  The patient has been visible on the unit, social with peers, and attending groups.  The patient reports eating and sleeping well.  Tolerating medications well.

## 2024-07-15 NOTE — BH INPATIENT PSYCHIATRY PROGRESS NOTE - MSE UNSTRUCTURED FT
The patient appears stated age, with limited hygiene, and is dressed in gowns.  She was calm, cooperative with the interview.  She maintained appropriate eye contact.  No restlessness or slowing of movements observed.  Gait is steady.  The patient’s speech was poorly articulated at times, normal in tone, rate, and volume.  The patient’s mood is “okay.”  Her affect is calm, stable, appropriate.  The patient’s thoughts are disorganized, perseverative on discharge.  She does not have any delusions or hallucinations, but has an active imagination.  She does not have any suicidal or homicidal ideation, intent, or plan.  Insight is poor.  Judgment is impaired.  Impulse control has been fair on the unit.

## 2024-07-16 PROCEDURE — 99231 SBSQ HOSP IP/OBS SF/LOW 25: CPT

## 2024-07-16 RX ADMIN — Medication 650 MILLIGRAM(S): at 20:23

## 2024-07-16 RX ADMIN — Medication 2 MILLIGRAM(S): at 20:24

## 2024-07-16 NOTE — BH INPATIENT PSYCHIATRY PROGRESS NOTE - NSBHFUPINTERVALHXFT_PSY_A_CORE
Patient seen for follow up of agitation, chart reviewed, and case discussed with treatment team.  No events reported overnight.  The patient did not take medication last night.  Patient is little changed.  Her behavior has been better controlled, without agitation.  She remains discharge focused, with imagination focused on discharge plans.  She denies any problems, mood is okay, no SI.  The patient has been visible on the unit, social with peers, and attending groups.  The patient reports eating and sleeping well.  Tolerating medications well.

## 2024-07-16 NOTE — BH INPATIENT PSYCHIATRY PROGRESS NOTE - MSE UNSTRUCTURED FT
The patient appears stated age, with limited hygiene, and is dressed in gowns.  She was calm, cooperative with the interview.  She maintained appropriate eye contact.  No restlessness or slowing of movements observed.  Gait is steady.  The patient’s speech was poorly articulated at times, normal in tone, rate, and volume.  The patient’s mood is “okay.”  Her affect is calm, stable, appropriate.  The patient’s thoughts are disorganized, tangential, perseverative on discharge.  She does not have any delusions or hallucinations, but has an active imagination.  She does not have any suicidal or homicidal ideation, intent, or plan.  Insight is poor.  Judgment is impaired.  Impulse control has been fair on the unit.

## 2024-07-17 PROCEDURE — 99231 SBSQ HOSP IP/OBS SF/LOW 25: CPT

## 2024-07-17 RX ORDER — LORAZEPAM 4 MG/ML
2 VIAL (ML) INJECTION EVERY 6 HOURS
Refills: 0 | Status: DISCONTINUED | OUTPATIENT
Start: 2024-07-17 | End: 2024-07-24

## 2024-07-17 RX ORDER — LORAZEPAM 4 MG/ML
2 VIAL (ML) INJECTION ONCE
Refills: 0 | Status: DISCONTINUED | OUTPATIENT
Start: 2024-07-17 | End: 2024-07-24

## 2024-07-17 RX ADMIN — Medication 650 MILLIGRAM(S): at 19:49

## 2024-07-17 RX ADMIN — Medication 2 MILLIGRAM(S): at 19:49

## 2024-07-17 NOTE — BH INPATIENT PSYCHIATRY PROGRESS NOTE - NSBHFUPINTERVALHXFT_PSY_A_CORE
Patient seen for follow up of agitation, chart reviewed, and case discussed with treatment team.  No events reported overnight.  The patient did not take medication again yesterday, but behavior has been in good control, without recent episodes of agitation.  She remains discharge focused, with imagination focused on discharge options.  She denies any problems, mood is okay, no SI.  The patient has been visible on the unit, social with peers, and attending groups.  The patient reports eating and sleeping well.  Tolerating medications well.
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
## 2024-07-18 PROCEDURE — 99231 SBSQ HOSP IP/OBS SF/LOW 25: CPT

## 2024-07-18 RX ORDER — PALIPERIDONE 9 MG/1
234 TABLET, EXTENDED RELEASE ORAL
Qty: 1 | Refills: 0
Start: 2024-07-18

## 2024-07-18 RX ADMIN — Medication 650 MILLIGRAM(S): at 22:39

## 2024-07-18 RX ADMIN — Medication 2 MILLIGRAM(S): at 21:04

## 2024-07-18 RX ADMIN — Medication 50 MILLIGRAM(S): at 19:58

## 2024-07-18 RX ADMIN — Medication 650 MILLIGRAM(S): at 02:01

## 2024-07-18 RX ADMIN — Medication 650 MILLIGRAM(S): at 21:03

## 2024-07-18 NOTE — BH INPATIENT PSYCHIATRY PROGRESS NOTE - NSBHFUPINTERVALHXFT_PSY_A_CORE
Patient seen for follow up of agitation, chart reviewed, and case discussed with treatment team.  No events reported overnight.  The patient did not take medication again yesterday.  She states she feels more tired after taking them.  Discussed the possibility of SILVESTRE, which should have less immediate side effects, and patient was in agreement.  Her behavior has been in good control, without any recent episodes of agitation.  She remains discharge focused, with imagination focused on discharge options.  She denies any problems, mood is okay, no SI.  The patient has been visible on the unit, social with some peers, and attending groups.  The patient reports eating and sleeping well.

## 2024-07-18 NOTE — BH INPATIENT PSYCHIATRY PROGRESS NOTE - MSE UNSTRUCTURED FT
The patient appears stated age, with limited hygiene, and is dressed in gowns.  She was calm, cooperative with the interview.  She maintained appropriate eye contact.  No restlessness or slowing of movements observed.  Gait is steady.  The patient’s speech was poorly articulated, difficult to understand at times, normal in tone, rate, and volume.  The patient’s mood is “okay.”  Her affect is calm, stable, appropriate.  The patient’s thoughts are disorganized, tangential, perseverative on discharge.  She does not have any delusions or hallucinations, but has an active imagination.  She does not have any suicidal or homicidal ideation, intent, or plan.  Insight is poor.  Judgment is impaired.  Impulse control has been fair on the unit.
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
## 2024-07-19 PROCEDURE — 99231 SBSQ HOSP IP/OBS SF/LOW 25: CPT

## 2024-07-19 RX ORDER — PALIPERIDONE 9 MG/1
234 TABLET, EXTENDED RELEASE ORAL ONCE
Refills: 0 | Status: COMPLETED | OUTPATIENT
Start: 2024-07-19 | End: 2024-07-19

## 2024-07-19 RX ADMIN — PALIPERIDONE 234 MILLIGRAM(S): 9 TABLET, EXTENDED RELEASE ORAL at 13:40

## 2024-07-19 NOTE — BH INPATIENT PSYCHIATRY PROGRESS NOTE - CURRENT MEDICATION
MEDICATIONS  (STANDING):  acetaminophen     Tablet .. 650 milliGRAM(s) Oral <User Schedule>  paliperidone Injectable, Long Acting 234 milliGRAM(s) IntraMuscular once  risperiDONE   Tablet 2 milliGRAM(s) Oral daily  risperiDONE  Disintegrating Tablet 2 milliGRAM(s) Oral at bedtime    MEDICATIONS  (PRN):  acetaminophen     Tablet .. 650 milliGRAM(s) Oral every 6 hours PRN Temp greater or equal to 38C (100.4F), Mild Pain (1 - 3)  aluminum hydroxide/magnesium hydroxide/simethicone Suspension 30 milliLiter(s) Oral every 6 hours PRN Dyspepsia  diphenhydrAMINE 50 milliGRAM(s) Oral every 6 hours PRN Extrapyramidal symptoms or prophylaxis  diphenhydrAMINE Injectable 50 milliGRAM(s) IntraMuscular once PRN Extrapyramidal prophylaxis  haloperidol     Tablet 5 milliGRAM(s) Oral every 6 hours PRN agitation  haloperidol    Injectable 5 milliGRAM(s) IntraMuscular once PRN aggression  LORazepam     Tablet 2 milliGRAM(s) Oral every 6 hours PRN severe anxiety, agitation  LORazepam   Injectable 2 milliGRAM(s) IntraMuscular once PRN severe agitation  magnesium hydroxide Suspension 30 milliLiter(s) Oral daily PRN Constipation  traZODone 50 milliGRAM(s) Oral at bedtime PRN insomnia
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
## 2024-07-19 NOTE — BH INPATIENT PSYCHIATRY PROGRESS NOTE - MSE UNSTRUCTURED FT
The patient appears stated age, with fair hygiene, and is dressed in gowns.  She was calm, cooperative with the interview.  She maintained appropriate eye contact.  No restlessness or slowing of movements observed.  Gait is steady.  The patient’s speech was poorly articulated, difficult to understand at times, normal in tone, rate, and volume.  The patient’s mood is “okay.”  Her affect is calm, stable, appropriate.  The patient’s thoughts are disorganized, tangential, perseverative on discharge.  She does not have any delusions or hallucinations, but has an active imagination.  She does not have any suicidal or homicidal ideation, intent, or plan.  Insight is poor.  Judgment is impaired.  Impulse control has been fair on the unit.

## 2024-07-19 NOTE — BH INPATIENT PSYCHIATRY PROGRESS NOTE - NSBHFUPINTERVALHXFT_PSY_A_CORE
Patient seen for follow up of agitation, chart reviewed, and case discussed with treatment team.  No events reported overnight.  The patient took medication last night, tolerating it well.  Spoke to her again about SILVESTRE today, and she was in agreement.  Verified that Sustenna is covered by her insurance.  She is in good spirits today.  Her behavior has been in good control, without any recent episodes of agitation.  She continues to talk about fantastical discharge options.  She denies any depression, no SI.  The patient has been visible on the unit, social with some peers, and attending groups.  The patient reports eating and sleeping well.

## 2024-07-21 RX ORDER — HALOPERIDOL 10 MG/1
5 TABLET ORAL ONCE
Refills: 0 | Status: COMPLETED | OUTPATIENT
Start: 2024-07-21 | End: 2024-07-21

## 2024-07-21 RX ORDER — LORAZEPAM 4 MG/ML
2 VIAL (ML) INJECTION ONCE
Refills: 0 | Status: DISCONTINUED | OUTPATIENT
Start: 2024-07-21 | End: 2024-07-21

## 2024-07-21 RX ORDER — DIPHENHYDRAMINE HCL 12.5MG/5ML
50 ELIXIR ORAL ONCE
Refills: 0 | Status: COMPLETED | OUTPATIENT
Start: 2024-07-21 | End: 2024-07-21

## 2024-07-21 RX ADMIN — HALOPERIDOL 5 MILLIGRAM(S): 10 TABLET ORAL at 12:15

## 2024-07-21 RX ADMIN — Medication 2 MILLIGRAM(S): at 12:15

## 2024-07-21 RX ADMIN — Medication 50 MILLIGRAM(S): at 12:15

## 2024-07-22 PROCEDURE — 99231 SBSQ HOSP IP/OBS SF/LOW 25: CPT

## 2024-07-22 PROCEDURE — 90832 PSYTX W PT 30 MINUTES: CPT

## 2024-07-22 NOTE — BH INPATIENT PSYCHIATRY PROGRESS NOTE - NSBHMETABOLIC_PSY_ALL_CORE_FT
BMI: BMI (kg/m2): 24.9 (07-20-24 @ 08:35)  HbA1c: A1C with Estimated Average Glucose Result: 6.1 % (01-14-24 @ 04:30)    Glucose: POCT Blood Glucose.: 57 mg/dL (04-15-24 @ 09:20)    BP: --Vital Signs Last 24 Hrs  T(C): --  T(F): --  HR: --  BP: --  BP(mean): --  RR: --  SpO2: --      Lipid Panel: Date/Time: 01-14-24 @ 04:30  Cholesterol, Serum: 130  LDL Cholesterol Calculated: 61  HDL Cholesterol, Serum: 53  Total Cholesterol/HDL Ration Measurement: --  Triglycerides, Serum: 79

## 2024-07-22 NOTE — BH INPATIENT PSYCHIATRY PROGRESS NOTE - CURRENT MEDICATION
MEDICATIONS  (STANDING):    MEDICATIONS  (PRN):  acetaminophen     Tablet .. 650 milliGRAM(s) Oral every 6 hours PRN Temp greater or equal to 38C (100.4F), Mild Pain (1 - 3)  aluminum hydroxide/magnesium hydroxide/simethicone Suspension 30 milliLiter(s) Oral every 6 hours PRN Dyspepsia  diphenhydrAMINE 50 milliGRAM(s) Oral every 6 hours PRN Extrapyramidal symptoms or prophylaxis  diphenhydrAMINE Injectable 50 milliGRAM(s) IntraMuscular once PRN Extrapyramidal prophylaxis  haloperidol     Tablet 5 milliGRAM(s) Oral every 6 hours PRN agitation  haloperidol    Injectable 5 milliGRAM(s) IntraMuscular once PRN aggression  LORazepam     Tablet 2 milliGRAM(s) Oral every 6 hours PRN severe anxiety, agitation  LORazepam   Injectable 2 milliGRAM(s) IntraMuscular once PRN severe agitation  magnesium hydroxide Suspension 30 milliLiter(s) Oral daily PRN Constipation  traZODone 50 milliGRAM(s) Oral at bedtime PRN insomnia
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
## 2024-07-22 NOTE — BH INPATIENT PSYCHIATRY PROGRESS NOTE - MSE UNSTRUCTURED FT
The patient appears stated age, with fair hygiene, and is dressed in gowns.  She was calm, cooperative with the interview.  She maintained appropriate eye contact.  No restlessness or slowing of movements observed.  Gait is steady.  The patient’s speech was poorly articulated, difficult to understand at times, normal in tone, rate, and volume.  The patient’s mood is “okay.”  Her affect is calm, stable, appropriate.  The patient’s thoughts are disorganized, tangential, perseverative on discharge and fantasies.  She does not have any delusions or hallucinations, but has an active imagination.  She does not have any suicidal or homicidal ideation, intent, or plan.  Insight is poor.  Judgment is impaired.  Impulse control has been fair on the unit.

## 2024-07-23 PROCEDURE — 99232 SBSQ HOSP IP/OBS MODERATE 35: CPT

## 2024-07-23 RX ORDER — PALIPERIDONE 9 MG/1
156 TABLET, EXTENDED RELEASE ORAL ONCE
Refills: 0 | Status: COMPLETED | OUTPATIENT
Start: 2024-07-24 | End: 2024-07-24

## 2024-07-23 RX ADMIN — HALOPERIDOL 5 MILLIGRAM(S): 10 TABLET ORAL at 21:02

## 2024-07-23 RX ADMIN — Medication 50 MILLIGRAM(S): at 21:02

## 2024-07-23 RX ADMIN — Medication 2 MILLIGRAM(S): at 21:02
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
## 2024-07-23 NOTE — BH INPATIENT PSYCHIATRY PROGRESS NOTE - MSE UNSTRUCTURED FT
The patient appears stated age, with fair hygiene, and is dressed in gowns.  She was calm, cooperative with the interview.  She maintained appropriate eye contact.  No restlessness or slowing of movements observed.  Gait is steady.  The patient’s speech was poorly articulated, difficult to understand at times, normal in tone, rate, and volume.  The patient’s mood is “good.”  Her affect is bright, smiling, stable, appropriate.  The patient’s thoughts are disorganized, tangential, perseverative on discharge fantasies.  She does not have any delusions or hallucinations, but has an active imagination.  She does not have any suicidal or homicidal ideation, intent, or plan.  Insight is limited.  Judgment is fair.  Impulse control has been fair on the unit.

## 2024-07-23 NOTE — BH INPATIENT PSYCHIATRY PROGRESS NOTE - NSBHFUPINTERVALHXFT_PSY_A_CORE
Patient seen for follow up of agitation, chart reviewed, and case discussed with treatment team.  No events reported overnight.  This morning, she is calm, in good spirits, smiling.  The patient denies any problems, denies depression, no SI, and behavior has been well controlled.  She continues to be preoccupied with discharge, and speaks about her fantasies about discharge including having multiple jobs.  She is tolerating SILVESTRE received  last week well, agrees to second SILVESTRE tomorrow.  The patient has been visible on the unit, social with select peers, and attending groups.  The patient reports eating and sleeping well.

## 2024-07-24 PROCEDURE — 90853 GROUP PSYCHOTHERAPY: CPT

## 2024-07-24 PROCEDURE — 99232 SBSQ HOSP IP/OBS MODERATE 35: CPT

## 2024-07-24 RX ORDER — LORAZEPAM 4 MG/ML
2 VIAL (ML) INJECTION ONCE
Refills: 0 | Status: DISCONTINUED | OUTPATIENT
Start: 2024-07-24 | End: 2024-07-29

## 2024-07-24 RX ORDER — LORAZEPAM 4 MG/ML
2 VIAL (ML) INJECTION EVERY 6 HOURS
Refills: 0 | Status: DISCONTINUED | OUTPATIENT
Start: 2024-07-24 | End: 2024-07-29

## 2024-07-24 RX ADMIN — PALIPERIDONE 156 MILLIGRAM(S): 9 TABLET, EXTENDED RELEASE ORAL at 10:57

## 2024-07-24 NOTE — BH INPATIENT PSYCHIATRY PROGRESS NOTE - MSE UNSTRUCTURED FT
The patient appears stated age, with fair hygiene, and is dressed in gowns.  She was irritable, but cooperative with the interview.  She maintained appropriate eye contact.  No restlessness or slowing of movements observed.  Gait is steady.  The patient’s speech was poorly articulated, difficult to understand at times, normal in tone, rate, and volume.  The patient’s mood is “okay.”  Her affect is irritable, stable, appropriate.  The patient’s thoughts are disorganized, tangential, perseverative on discharge.  She does not have any delusions or hallucinations, but has an active imagination.  She does not have any suicidal or homicidal ideation, intent, or plan.  Insight is limited.  Judgment is fair.  Impulse control has been fair on the unit.

## 2024-07-24 NOTE — BH TREATMENT PLAN - NSTXCAREGIVERAGREEMENT_PSY_P_CORE
Patient does not have family/caregiver involved in care
Yes
Patient does not have family/caregiver involved in care
Yes
Patient does not have family/caregiver involved in care

## 2024-07-24 NOTE — BH INPATIENT PSYCHIATRY PROGRESS NOTE - CURRENT MEDICATION
MEDICATIONS  (STANDING):  paliperidone Injectable, Long Acting 156 milliGRAM(s) IntraMuscular once    MEDICATIONS  (PRN):  acetaminophen     Tablet .. 650 milliGRAM(s) Oral every 6 hours PRN Temp greater or equal to 38C (100.4F), Mild Pain (1 - 3)  aluminum hydroxide/magnesium hydroxide/simethicone Suspension 30 milliLiter(s) Oral every 6 hours PRN Dyspepsia  diphenhydrAMINE 50 milliGRAM(s) Oral every 6 hours PRN Extrapyramidal symptoms or prophylaxis  diphenhydrAMINE Injectable 50 milliGRAM(s) IntraMuscular once PRN Extrapyramidal prophylaxis  haloperidol     Tablet 5 milliGRAM(s) Oral every 6 hours PRN agitation  haloperidol    Injectable 5 milliGRAM(s) IntraMuscular once PRN aggression  LORazepam     Tablet 2 milliGRAM(s) Oral every 6 hours PRN severe anxiety, agitation  LORazepam   Injectable 2 milliGRAM(s) IntraMuscular once PRN severe agitation  magnesium hydroxide Suspension 30 milliLiter(s) Oral daily PRN Constipation  traZODone 50 milliGRAM(s) Oral at bedtime PRN insomnia

## 2024-07-24 NOTE — BH INPATIENT PSYCHIATRY PROGRESS NOTE - NSBHFUPINTERVALHXFT_PSY_A_CORE
Patient seen for follow up of agitation, chart reviewed, and case discussed with treatment team.  The patient was agitated last night, accepted po prn medication with calming effect.  This morning, she remains somewhat more irritable.  She is focused on discharge planning.  Support and education provided.  The patient denies any problems, denies depression, no SI.  She is tolerating SILVESTRE received last week well, agrees to second SILVESTRE today.  The patient has been visible on the unit, social with select peers, and attending some groups.  The patient reports eating and sleeping well.

## 2024-07-24 NOTE — BH PSYCHOLOGY - GROUP THERAPY NOTE - NSPSYCHOLGRPCOGPT_PSY_A_CORE FT
Patient attended Cognitive Behavioral Therapy Group incorporating ACT-based concepts. The group started with a brief check-in asking Pts to share one of their favorite memories during their school years. Members then engaged in a 5-senses mindfulness exercise and were encouraged to share their thoughts/reactions to this. Lastly, group focused on engaging in a discussion about what it means to have a growth mindset vs. fixed mindset, particularly the thoughts that encourage an openness to change (embracing challenges/novelty, focusing on process rather than outcome, and accepting constructive feedback) and the ones that limit us (giving up easily, avoiding new experiences due to fear of failure, seeing our abilities as fixed). Group facilitator explained concepts, reinforced participation, and engaged patients in the discussion.
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
## 2024-07-24 NOTE — BH PSYCHOLOGY - GROUP THERAPY NOTE - NSBHPSYCHOLRESPCOMMENT_PSY_A_CORE FT
The patient appeared appropriately groomed and casually dressed. Pt struggled to engage in group discussion, appearing distracted and agitated at times. Pt was observed to be talking to herself, getting out of her seat, and struggling to respect the personal space of group members, though she responded well to redirection and stayed for the entirety of discussion. Speech was mumbled. PT was oriented X3.

## 2024-07-25 PROCEDURE — 99232 SBSQ HOSP IP/OBS MODERATE 35: CPT

## 2024-07-25 RX ADMIN — Medication 650 MILLIGRAM(S): at 01:15

## 2024-07-25 RX ADMIN — Medication 50 MILLIGRAM(S): at 01:15

## 2024-07-25 RX ADMIN — Medication 2 MILLIGRAM(S): at 21:40

## 2024-07-25 RX ADMIN — Medication 650 MILLIGRAM(S): at 02:01

## 2024-07-25 RX ADMIN — Medication 2 MILLIGRAM(S): at 01:15

## 2024-07-25 RX ADMIN — HALOPERIDOL 5 MILLIGRAM(S): 10 TABLET ORAL at 01:15

## 2024-07-25 NOTE — BH INPATIENT PSYCHIATRY PROGRESS NOTE - NSBHPROGMEDSE_PSY_A_CORE FT
PRL 50 in Jan '24 while pt on risperdal. 

## 2024-07-25 NOTE — BH INPATIENT PSYCHIATRY PROGRESS NOTE - NSBHMETABOLICLABS_PSY_ALL_CORE
Labs within last 12 months

## 2024-07-25 NOTE — BH INPATIENT PSYCHIATRY PROGRESS NOTE - NSBHCHARTREVIEWINVESTIGATE_PSY_A_CORE FT
2/8 QTC<500

## 2024-07-25 NOTE — BH INPATIENT PSYCHIATRY PROGRESS NOTE - MSE UNSTRUCTURED FT
The patient appears stated age, with fair hygiene, and is dressed in gowns.  She was calm, cooperative with the interview.  She maintained appropriate eye contact.  No restlessness or slowing of movements observed.  Gait is steady.  The patient’s speech was poorly articulated, difficult to understand at times, normal in tone, rate, and volume.  The patient’s mood is “okay.”  Her affect is full, stable, appropriate.  The patient’s thoughts are disorganized, tangential, perseverative on discharge.  She does not have any delusions or hallucinations, but has an active imagination.  She does not have any suicidal or homicidal ideation, intent, or plan.  Insight is limited.  Judgment is fair.  Impulse control has been fair on the unit.

## 2024-07-25 NOTE — BH INPATIENT PSYCHIATRY PROGRESS NOTE - NSBHFUPINTERVALHXFT_PSY_A_CORE
Patient seen for follow up of agitation, chart reviewed, and case discussed with treatment team.  No events reported overnight, no prn's required.  This morning, the patient is on the phone, but not speaking with anyone.  She continues to be focused on discharge planning, and discusses various fantasy disposition options.  The patient denies any problems, less irritable today.  She denies depression, no SI.  She is tolerating SILVESTRE well.  The patient is visible on the unit, social with select peers, and attending some groups.  The patient reports eating and sleeping well.

## 2024-07-25 NOTE — BH INPATIENT PSYCHIATRY PROGRESS NOTE - NSBHANTIPSYCHOTIC_PSY_ALL_CORE
Yes...
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
## 2024-07-26 PROCEDURE — 99231 SBSQ HOSP IP/OBS SF/LOW 25: CPT

## 2024-07-26 RX ORDER — HALOPERIDOL 10 MG/1
5 TABLET ORAL ONCE
Refills: 0 | Status: COMPLETED | OUTPATIENT
Start: 2024-07-26 | End: 2024-07-26

## 2024-07-26 RX ORDER — DIPHENHYDRAMINE HCL 12.5MG/5ML
50 ELIXIR ORAL ONCE
Refills: 0 | Status: COMPLETED | OUTPATIENT
Start: 2024-07-26 | End: 2024-07-26

## 2024-07-26 RX ORDER — LORAZEPAM 4 MG/ML
2 VIAL (ML) INJECTION ONCE
Refills: 0 | Status: DISCONTINUED | OUTPATIENT
Start: 2024-07-26 | End: 2024-07-26

## 2024-07-26 RX ADMIN — Medication 2 MILLIGRAM(S): at 22:31

## 2024-07-26 RX ADMIN — HALOPERIDOL 5 MILLIGRAM(S): 10 TABLET ORAL at 22:31

## 2024-07-26 RX ADMIN — Medication 50 MILLIGRAM(S): at 22:31

## 2024-07-26 NOTE — BH INPATIENT PSYCHIATRY PROGRESS NOTE - NSBHFUPINTERVALHXFT_PSY_A_CORE
Chart reviewed including pertinent labs, imaging, ekg. case discussed with treatment team.  Over this interval: no behavioral issues. pt speaks in a manner that is nonsensical. no agitation over this interval. pt talking to self. adherent to treatment. no a/e
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
## 2024-07-26 NOTE — BH INPATIENT PSYCHIATRY PROGRESS NOTE - MSE UNSTRUCTURED FT
The patient appears stated age, with fair hygiene, and is dressed in gowns.  She was calm, cooperative with the interview. oddly-related. She maintained appropriate eye contact.  No restlessness or slowing of movements observed.  Gait is steady.  The patient’s speech was poorly articulated, difficult to understand at times, normal in tone, rate, and volume.  The patient’s mood is “fine"  Her affect is full, stable, appropriate.  The patient’s thoughts are disorganized, tangential, perseverative on discharge.  She does not have any delusions or hallucinations, but has an active imagination.  She does not have any suicidal or homicidal ideation, intent, or plan.  Insight is limited.  Judgment is fair.  Impulse control has been fair on the unit.

## 2024-07-27 RX ADMIN — Medication 650 MILLIGRAM(S): at 20:25

## 2024-07-27 RX ADMIN — Medication 650 MILLIGRAM(S): at 22:02

## 2024-07-27 RX ADMIN — Medication 2 MILLIGRAM(S): at 20:25

## 2024-07-28 RX ADMIN — Medication 50 MILLIGRAM(S): at 20:08

## 2024-07-28 RX ADMIN — Medication 2 MILLIGRAM(S): at 20:08

## 2024-07-28 RX ADMIN — Medication 650 MILLIGRAM(S): at 08:58

## 2024-07-28 RX ADMIN — Medication 650 MILLIGRAM(S): at 09:30

## 2024-07-29 PROCEDURE — 99231 SBSQ HOSP IP/OBS SF/LOW 25: CPT

## 2024-07-29 RX ORDER — LORAZEPAM 4 MG/ML
2 VIAL (ML) INJECTION EVERY 6 HOURS
Refills: 0 | Status: DISCONTINUED | OUTPATIENT
Start: 2024-07-29 | End: 2024-08-02

## 2024-07-29 RX ORDER — LORAZEPAM 4 MG/ML
2 VIAL (ML) INJECTION ONCE
Refills: 0 | Status: DISCONTINUED | OUTPATIENT
Start: 2024-07-29 | End: 2024-08-02

## 2024-07-29 RX ADMIN — Medication 650 MILLIGRAM(S): at 04:21

## 2024-07-29 NOTE — BH INPATIENT PSYCHIATRY PROGRESS NOTE - MSE UNSTRUCTURED FT
Appearance: Dressed appropriately hospital gown. fair hygiene + grooming   Attitude / Behavior / relatedness: superficial; calm   Eye contact: fair   Motor: No abnormal movements, no psychomotor slowing or activation.  Speech: Regular rate.  Mood: "ok"   Affect: neutral   Thought Process: loose associations; tangential   Thought Content: suggestive of impairments in reality testing. no suicidal ideation and no hi   Perceptual: talking to self from time to time   Insight: poor   Judgment: fair   Impulse control: fair    Cognition: grossly intact  Gait: Intact.

## 2024-07-29 NOTE — BH INPATIENT PSYCHIATRY PROGRESS NOTE - NSBHFUPINTERVALHXFT_PSY_A_CORE
Chart reviewed including pertinent labs, imaging, ekg. case discussed with treatment team.  Over this interval: pt with episode of agitation over the weekend warranting prn medications. this AM, patient whispers something that I couldn't make out, pointed at a peer before scurrying away and getting on the phone and starting to talk to someone.
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
## 2024-07-30 PROCEDURE — 99232 SBSQ HOSP IP/OBS MODERATE 35: CPT

## 2024-07-30 RX ADMIN — Medication 650 MILLIGRAM(S): at 15:09

## 2024-07-30 RX ADMIN — Medication 650 MILLIGRAM(S): at 15:45

## 2024-07-30 NOTE — BH INPATIENT PSYCHIATRY PROGRESS NOTE - NSBHFUPINTERVALHXFT_PSY_A_CORE
Chart reviewed including pertinent labs, imaging, ekg. case discussed with treatment team.  Over this interval: patient pulls me aside and whispers to me as if she is sharing a secret, saying she is going home tomorrow and today is her and her boyfriend's anniversary. denies ah
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
## 2024-07-31 PROCEDURE — 99231 SBSQ HOSP IP/OBS SF/LOW 25: CPT

## 2024-07-31 RX ADMIN — Medication 2 MILLIGRAM(S): at 01:14

## 2024-07-31 RX ADMIN — Medication 50 MILLIGRAM(S): at 01:14

## 2024-07-31 RX ADMIN — Medication 2 MILLIGRAM(S): at 20:54

## 2024-07-31 NOTE — BH INPATIENT PSYCHIATRY PROGRESS NOTE - NSBHFUPINTERVALHXFT_PSY_A_CORE
Chart reviewed including pertinent labs, imaging, ekg. case discussed with treatment team.  Over this interval: no behavioral issues. occasionally screams on the unit but no recent PRNs required. she pulls me aside again and whispers a few things that were difficult to understand and asks about an apartment to go to for 25 years?? seems to have delusional beliefs around disposition

## 2024-07-31 NOTE — BH INPATIENT PSYCHIATRY PROGRESS NOTE - MSE UNSTRUCTURED FT
Appearance: Dressed appropriately hospital gown. fair hygiene + grooming   Attitude / Behavior / relatedness: superficial; calm. child-like   Eye contact: fair   Motor: No abnormal movements, no psychomotor slowing or activation.  Speech: Regular rate.  Mood: "fine"  Affect: neutral   Thought Process: loose associations; tangential   Thought Content: suggestive of impairments in reality testing. no suicidal ideation and no hi   Perceptual: talking to self from time to time   Insight: poor   Judgment: fair   Impulse control: fair    Cognition: grossly intact  Gait: Intact.
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
## 2024-08-01 PROCEDURE — 99232 SBSQ HOSP IP/OBS MODERATE 35: CPT

## 2024-08-01 RX ADMIN — Medication 2 MILLIGRAM(S): at 20:55
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
## 2024-08-01 NOTE — BH INPATIENT PSYCHIATRY PROGRESS NOTE - NSBHFUPINTERVALHXFT_PSY_A_CORE
Chart reviewed including pertinent labs, imaging, ekg. case discussed with treatment team.  Over this interval: no behavioral issues. pt pleasant, child-like, whispers about going to an apartment for 25 years. no STAT medications required over this interval. sleeping well

## 2024-08-02 PROCEDURE — 99232 SBSQ HOSP IP/OBS MODERATE 35: CPT

## 2024-08-02 RX ORDER — LORAZEPAM 4 MG/ML
2 VIAL (ML) INJECTION EVERY 6 HOURS
Refills: 0 | Status: DISCONTINUED | OUTPATIENT
Start: 2024-08-02 | End: 2024-08-06

## 2024-08-02 RX ORDER — LORAZEPAM 4 MG/ML
2 VIAL (ML) INJECTION ONCE
Refills: 0 | Status: DISCONTINUED | OUTPATIENT
Start: 2024-08-02 | End: 2024-08-06

## 2024-08-02 RX ADMIN — Medication 650 MILLIGRAM(S): at 05:36

## 2024-08-02 RX ADMIN — Medication 2 MILLIGRAM(S): at 19:56

## 2024-08-02 RX ADMIN — Medication 50 MILLIGRAM(S): at 19:56
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
## 2024-08-02 NOTE — BH INPATIENT PSYCHIATRY PROGRESS NOTE - NSBHFUPINTERVALCCFT_PSY_A_CORE
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  -S&S reassessed on 4/28 and felt pt did better, but not well enough to be recommended for po intake -- still recommend NPO, but now recommend to perform a FEES study on Tuesday for the next reassessment as feel pt has higher chance to pass the swallow test  -pt with history of myeloproliferative disorder per discussion with hematology, so unclear the significance of the leukocytosis -had acute blood loss anemia due to acute perforated bowel and extensive abdominal surgery early in the admission requiring 2un PRBC  -Pt now s/p additional 4 units PRBC with new GIB, 1 more unit PRBC ordered this AM as pt still having small amount of bloody output and poor response to last unit PRBC given last night as Hgb 7.7 this AM  -retacrit per nephro for renal failure  -serial CBCs, transfuse as needed with goal Hgb >8 now given bleeding  -NGT off suction today, ok to give meds through tube  -GI ([facility]), recs appreciated -- if pt continues to bleed, may need further imaging and/or IR involvement -- performing CTA has significant risks as pt is just starting to recover from severe ATN from septic shock and still has significantly impaired renal function; if needs imaging, will pursue NM bleeding scan follow up disorganization

## 2024-08-02 NOTE — BH INPATIENT PSYCHIATRY PROGRESS NOTE - NSBHFUPINTERVALHXFT_PSY_A_CORE
Chart reviewed including pertinent labs, imaging, ekg. case discussed with treatment team.  Over this interval: no behavioral issues. pt presents child-like, appears to be in good spirits, asks to speak to me privately, tells me she is going [place]" [place] years. When I ask her what that means, she says it's a place where people have fun

## 2024-08-03 RX ADMIN — Medication 2 MILLIGRAM(S): at 23:51

## 2024-08-03 RX ADMIN — Medication 50 MILLIGRAM(S): at 23:51

## 2024-08-04 RX ADMIN — Medication 650 MILLIGRAM(S): at 22:12

## 2024-08-04 RX ADMIN — Medication 650 MILLIGRAM(S): at 20:27

## 2024-08-04 RX ADMIN — Medication 2 MILLIGRAM(S): at 20:28

## 2024-08-04 RX ADMIN — Medication 650 MILLIGRAM(S): at 09:56

## 2024-08-04 RX ADMIN — Medication 650 MILLIGRAM(S): at 09:26

## 2024-08-05 PROCEDURE — 99231 SBSQ HOSP IP/OBS SF/LOW 25: CPT

## 2024-08-05 RX ADMIN — Medication 650 MILLIGRAM(S): at 20:06

## 2024-08-05 RX ADMIN — Medication 2 MILLIGRAM(S): at 23:24

## 2024-08-05 NOTE — BH INPATIENT PSYCHIATRY PROGRESS NOTE - NSBHFUPINTERVALHXFT_PSY_A_CORE
No weekend events.  Pt today states that she is moving out of [place] and to [facility] to live in a purple apartment.

## 2024-08-05 NOTE — BH INPATIENT PSYCHIATRY PROGRESS NOTE - MSE UNSTRUCTURED FT
Appearance: Dressed appropriately.  Behavior: Cooperative.  Childlike.  Motor: No abnormal movements, no psychomotor slowing or activation.  Speech: Regular rate.  Mood: "Good."  Affect: Pleasant.  Thought Process: [place], repetitive.  Associations: Fair.  Thought Content: Focused on moving.  Insight: Limited.  Judgment: Fair on interview.  Attention: Fair.  Language: Fluent.  Gait: Intact.
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
## 2024-08-05 NOTE — BH INPATIENT PSYCHIATRY PROGRESS NOTE - NSBHMETABOLIC_PSY_ALL_CORE_FT
BMI: BMI (kg/m2): 22.2 (08-03-24 @ 08:24)  HbA1c: A1C with Estimated Average Glucose Result: 6.1 % (01-14-24 @ 04:30)    Glucose: POCT Blood Glucose.: 57 mg/dL (04-15-24 @ 09:20)    BP: --Vital Signs Last 24 Hrs  T(C): --  T(F): --  HR: --  BP: --  BP(mean): --  RR: --  SpO2: --      Lipid Panel: Date/Time: 01-14-24 @ 04:30  Cholesterol, Serum: 130  LDL Cholesterol Calculated: 61  HDL Cholesterol, Serum: 53  Total Cholesterol/HDL Ration Measurement: --  Triglycerides, Serum: 79

## 2024-08-05 NOTE — PHARMACOTHERAPY INTERVENTION NOTE - COMMENTS
Pharmacy received automated report alerting of a discrepancy of patient's recent recorded weights. Patient had prior documented weight of 66.2 kG on 1/27/2024 and has a current documented weight of 219 kG on 8/3/2024.  Spoke with nurse on unit in order to correct and update the patient's weight that is used for medication dosing as well as displayed on the header.     Relayed to nurse manager for continued education.    **When logging patient's weights, please remember to enter in [employer]**    Glenny Buitrago, ConstanceD, BCPP

## 2024-08-06 PROCEDURE — 99231 SBSQ HOSP IP/OBS SF/LOW 25: CPT

## 2024-08-06 RX ORDER — LORAZEPAM 4 MG/ML
2 VIAL (ML) INJECTION ONCE
Refills: 0 | Status: DISCONTINUED | OUTPATIENT
Start: 2024-08-06 | End: 2024-08-13

## 2024-08-06 RX ORDER — LORAZEPAM 4 MG/ML
2 VIAL (ML) INJECTION EVERY 6 HOURS
Refills: 0 | Status: DISCONTINUED | OUTPATIENT
Start: 2024-08-06 | End: 2024-08-13

## 2024-08-06 NOTE — BH INPATIENT PSYCHIATRY PROGRESS NOTE - NSBHMETABOLIC_PSY_ALL_CORE_FT
BMI: BMI (kg/m2): 22.2 (08-03-24 @ 08:24)  HbA1c: A1C with Estimated Average Glucose Result: 6.1 % (01-14-24 @ 04:30)    Glucose: POCT Blood Glucose.: 57 mg/dL (04-15-24 @ 09:20)    BP: --Vital Signs Last 24 Hrs  T(C): 36.1 (08-06-24 @ 08:17), Max: 36.1 (08-06-24 @ 08:17)  T(F): 97 (08-06-24 @ 08:17), Max: 97 (08-06-24 @ 08:17)  HR: --  BP: --  BP(mean): --  RR: --  SpO2: --    Orthostatic VS  08-06-24 @ 08:17  Lying BP: --/-- HR: --  Sitting BP: --/-- HR: --  Standing BP: 145/90 HR: 85  Site: --  Mode: --    Lipid Panel: Date/Time: 01-14-24 @ 04:30  Cholesterol, Serum: 130  LDL Cholesterol Calculated: 61  HDL Cholesterol, Serum: 53  Total Cholesterol/HDL Ration Measurement: --  Triglycerides, Serum: 79

## 2024-08-06 NOTE — BH INPATIENT PSYCHIATRY PROGRESS NOTE - NSBHFUPINTERVALHXFT_PSY_A_CORE
No weekend events.   Pt today states she is moving on August 18th to a [employer] apartment and asks writer to wish her luck.

## 2024-08-06 NOTE — BH INPATIENT PSYCHIATRY PROGRESS NOTE - NSBHCHARTREVIEWVS_PSY_A_CORE FT
Vital Signs Last 24 Hrs  T(C): 36.1 (08-06-24 @ 08:17), Max: 36.1 (08-06-24 @ 08:17)  T(F): 97 (08-06-24 @ 08:17), Max: 97 (08-06-24 @ 08:17)  HR: --  BP: --  BP(mean): --  RR: --  SpO2: --    Orthostatic VS  08-06-24 @ 08:17  Lying BP: --/-- HR: --  Sitting BP: --/-- HR: --  Standing BP: 145/90 HR: 85  Site: --  Mode: --
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
## 2024-08-06 NOTE — BH INPATIENT PSYCHIATRY PROGRESS NOTE - MSE UNSTRUCTURED FT
Appearance: Dressed appropriately.  Behavior: Cooperative.  Childlike.  Somewhat intrusive.  Motor: No abnormal movements, no psychomotor slowing or activation.  Speech: Regular rate.  Mood: "Good."  Affect: Pleasant.  Thought Process: [place], repetitive.  Associations: Fair.  Thought Content: Focused on moving into a new apartment.  Insight: Limited.  Judgment: Fair on interview.  Attention: Fair.  Language: Fluent.  Gait: Intact.

## 2024-08-07 PROCEDURE — 90832 PSYTX W PT 30 MINUTES: CPT

## 2024-08-07 PROCEDURE — 99231 SBSQ HOSP IP/OBS SF/LOW 25: CPT

## 2024-08-07 NOTE — BH INPATIENT PSYCHIATRY PROGRESS NOTE - NSBHMETABOLIC_PSY_ALL_CORE_FT
BMI: BMI (kg/m2): 22.2 (08-03-24 @ 08:24)  HbA1c: A1C with Estimated Average Glucose Result: 6.1 % (01-14-24 @ 04:30)    Glucose: POCT Blood Glucose.: 57 mg/dL (04-15-24 @ 09:20)    BP: --Vital Signs Last 24 Hrs  T(C): --  T(F): --  HR: --  BP: --  BP(mean): --  RR: --  SpO2: --    Orthostatic VS  08-06-24 @ 08:17  Lying BP: --/-- HR: --  Sitting BP: --/-- HR: --  Standing BP: 145/90 HR: 85  Site: --  Mode: --    Lipid Panel: Date/Time: 01-14-24 @ 04:30  Cholesterol, Serum: 130  LDL Cholesterol Calculated: 61  HDL Cholesterol, Serum: 53  Total Cholesterol/HDL Ration Measurement: --  Triglycerides, Serum: 79
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
## 2024-08-07 NOTE — BH INPATIENT PSYCHIATRY PROGRESS NOTE - NSBHCHARTREVIEWVS_PSY_A_CORE FT
Vital Signs Last 24 Hrs  T(C): --  T(F): --  HR: --  BP: --  BP(mean): --  RR: --  SpO2: --    Orthostatic VS  08-06-24 @ 08:17  Lying BP: --/-- HR: --  Sitting BP: --/-- HR: --  Standing BP: 145/90 HR: 85  Site: --  Mode: --

## 2024-08-07 NOTE — BH INPATIENT PSYCHIATRY PROGRESS NOTE - NSBHFUPINTERVALHXFT_PSY_A_CORE
No overnight events.  Pt states she is going to move to an apartment in [place].  She states she is pregnant with two boys.

## 2024-08-07 NOTE — BH INPATIENT PSYCHIATRY PROGRESS NOTE - MSE UNSTRUCTURED FT
Appearance: Dressed appropriately.  Behavior: Cooperative.  Childlike.  Somewhat intrusive.  Motor: No abnormal movements, no psychomotor slowing or activation.  Speech: Regular rate.  Mood: "Good."  Affect: Pleasant.  Thought Process: [facility], repetitive.  Associations: Fair.  Thought Content: Ideas of fantasy, no christiano delusions.  Insight: Limited.  Judgment: Fair on interview.  Attention: Fair.  Language: Fluent.  Gait: Intact.

## 2024-08-08 PROCEDURE — 99231 SBSQ HOSP IP/OBS SF/LOW 25: CPT

## 2024-08-08 RX ADMIN — Medication 2 MILLIGRAM(S): at 19:59

## 2024-08-08 RX ADMIN — Medication 650 MILLIGRAM(S): at 05:19

## 2024-08-08 RX ADMIN — Medication 650 MILLIGRAM(S): at 05:49

## 2024-08-08 RX ADMIN — Medication 50 MILLIGRAM(S): at 19:59

## 2024-08-08 RX ADMIN — Medication 2 MILLIGRAM(S): at 05:19
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
## 2024-08-08 NOTE — BH INPATIENT PSYCHIATRY PROGRESS NOTE - NSDCCRITERIA_PSY_ALL_CORE
36.7 When pt is no longer an acute or imminent risk of harm to self or others, and is able to care for self safely, pt may then be discharged.

## 2024-08-08 NOTE — BH INPATIENT PSYCHIATRY PROGRESS NOTE - NSBHFUPINTERVALHXFT_PSY_A_CORE
No overnight events.  Pt today states she wants to go to a government apartment in [place].  Remainder of conversation largely nonsensical.

## 2024-08-08 NOTE — BH INPATIENT PSYCHIATRY PROGRESS NOTE - MSE UNSTRUCTURED FT
Appearance: Dressed appropriately.  Behavior: Cooperative.  Childlike.    Motor: No abnormal movements, no psychomotor slowing or activation.  Speech: Continuous soft volume muttering.  Mood: "Good."  Affect: Pleasant.  Thought Process: Tangential.  Associations: Loose.  Thought Content: nonsensical ideas.  Insight: Limited.  Judgment: Fair on interview.  Attention: Fair.  Language: Fluent.  Gait: Intact.

## 2024-08-09 PROCEDURE — 99231 SBSQ HOSP IP/OBS SF/LOW 25: CPT

## 2024-08-09 RX ADMIN — Medication 650 MILLIGRAM(S): at 11:30

## 2024-08-09 RX ADMIN — Medication 650 MILLIGRAM(S): at 10:46

## 2024-08-09 NOTE — BH INPATIENT PSYCHIATRY PROGRESS NOTE - NSBHFUPINTERVALHXFT_PSY_A_CORE
No overnight events.  Pt again today repeats that she is going into a government apartment in [facility].  Then mumbles incoherently smiling.

## 2024-08-09 NOTE — BH INPATIENT PSYCHIATRY PROGRESS NOTE - MSE UNSTRUCTURED FT
Appearance: Dressed appropriately.  Behavior: Cooperative.  Childlike.    Motor: No abnormal movements, no psychomotor slowing or activation.  Speech: Regular rate.  Mood: Does not give a mood.  Affect: Pleasant.  Thought Process: Tangential.  Associations: Loose.  Thought Content: Focused on apartment.  Insight: Limited.  Judgment: Fair on interview.  Attention: Fair.  Language: Fluent.  Gait: Intact.
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
## 2024-08-10 RX ADMIN — Medication 650 MILLIGRAM(S): at 03:58

## 2024-08-10 RX ADMIN — Medication 650 MILLIGRAM(S): at 02:56

## 2024-08-11 RX ORDER — LORAZEPAM 4 MG/ML
2 VIAL (ML) INJECTION ONCE
Refills: 0 | Status: DISCONTINUED | OUTPATIENT
Start: 2024-08-11 | End: 2024-08-11

## 2024-08-11 RX ORDER — HALOPERIDOL 10 MG/1
5 TABLET ORAL ONCE
Refills: 0 | Status: COMPLETED | OUTPATIENT
Start: 2024-08-11 | End: 2024-08-11

## 2024-08-11 RX ORDER — DIPHENHYDRAMINE HCL 12.5MG/5ML
50 ELIXIR ORAL ONCE
Refills: 0 | Status: COMPLETED | OUTPATIENT
Start: 2024-08-11 | End: 2024-08-11

## 2024-08-11 RX ADMIN — HALOPERIDOL 5 MILLIGRAM(S): 10 TABLET ORAL at 14:43

## 2024-08-11 RX ADMIN — Medication 650 MILLIGRAM(S): at 12:45

## 2024-08-11 RX ADMIN — Medication 50 MILLIGRAM(S): at 14:43

## 2024-08-11 RX ADMIN — Medication 650 MILLIGRAM(S): at 13:15

## 2024-08-11 RX ADMIN — Medication 2 MILLIGRAM(S): at 14:43

## 2024-08-11 RX ADMIN — Medication 650 MILLIGRAM(S): at 04:15

## 2024-08-12 PROCEDURE — 99231 SBSQ HOSP IP/OBS SF/LOW 25: CPT

## 2024-08-12 RX ADMIN — Medication 650 MILLIGRAM(S): at 10:07

## 2024-08-12 RX ADMIN — Medication 650 MILLIGRAM(S): at 09:48

## 2024-08-12 RX ADMIN — Medication 2 MILLIGRAM(S): at 09:49

## 2024-08-12 NOTE — BH INPATIENT PSYCHIATRY PROGRESS NOTE - NSBHFUPINTERVALHXFT_PSY_A_CORE
Pt had episode of agitation over weekend.  Pt today is tearful about it.  States again she wants to go to an apartment in [place].

## 2024-08-12 NOTE — BH INPATIENT PSYCHIATRY PROGRESS NOTE - MSE UNSTRUCTURED FT
Ok to stop Metformin.  Replace with glipizide twice daily before breakfast and dinner.  Keep checking blood sugars and follow-up after 12/2/2020 so we can recheck labs and review her glucose log.    Elmer Emmanuel, CNP   Appearance: Dressed appropriately.  Behavior: Cooperative.  Childlike.    Motor: No abnormal movements, no psychomotor slowing or activation.  Speech: Regular rate.  Mood: Does not give a mood.  Affect: Tearful.  Thought Process: Tangential.  Associations: Loose.  Thought Content: Focused on moving.  Insight: Limited.  Judgment: Fair on interview.  Attention: Fair.  Language: Fluent.  Gait/station: Seated.
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
## 2024-08-13 PROCEDURE — 99231 SBSQ HOSP IP/OBS SF/LOW 25: CPT

## 2024-08-13 RX ADMIN — Medication 2 MILLIGRAM(S): at 22:35

## 2024-08-13 RX ADMIN — Medication 50 MILLIGRAM(S): at 01:27

## 2024-08-13 RX ADMIN — Medication 650 MILLIGRAM(S): at 22:35

## 2024-08-13 NOTE — BH INPATIENT PSYCHIATRY PROGRESS NOTE - NSBHFUPINTERVALHXFT_PSY_A_CORE
Pt today states hello and mumbles incoherently, then returns to her phone call.  As per staff report, pt has been on phone for extended periods of time today.

## 2024-08-13 NOTE — BH INPATIENT PSYCHIATRY PROGRESS NOTE - MSE UNSTRUCTURED FT
Appearance: Dressed appropriately.  Behavior: Cooperative.  Childlike.  Found talking on phone.  Motor: No abnormal movements, no psychomotor slowing or activation.  Speech: Regular rate.  Mood: Does not give a mood.  Affect: Elated  Thought Process: Tangential.  Associations: Loose.  Thought Content: Poverty of [place] today.  Insight: Limited.  Judgment: Fair on interview.  Attention: Fair.  Language: Fluent.  Gait/station: Seated.
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
## 2024-08-14 PROCEDURE — 99231 SBSQ HOSP IP/OBS SF/LOW 25: CPT

## 2024-08-14 RX ORDER — LORAZEPAM 4 MG/ML
2 VIAL (ML) INJECTION ONCE
Refills: 0 | Status: DISCONTINUED | OUTPATIENT
Start: 2024-08-14 | End: 2024-08-20

## 2024-08-14 RX ORDER — LORAZEPAM 4 MG/ML
2 VIAL (ML) INJECTION EVERY 6 HOURS
Refills: 0 | Status: DISCONTINUED | OUTPATIENT
Start: 2024-08-14 | End: 2024-08-20

## 2024-08-14 RX ADMIN — Medication 650 MILLIGRAM(S): at 00:30

## 2024-08-14 RX ADMIN — Medication 650 MILLIGRAM(S): at 07:30

## 2024-08-14 RX ADMIN — Medication 650 MILLIGRAM(S): at 08:00

## 2024-08-14 NOTE — BH INPATIENT PSYCHIATRY PROGRESS NOTE - MSE UNSTRUCTURED FT
Appearance: Dressed appropriately.  Behavior: Cooperative.  Childlike.  Found talking on phone.  Motor: No abnormal movements, no psychomotor slowing or activation.  Speech: Regular rate.  Mood: Does not give a mood.  Affect: Neutral.  Thought Process: Tangential.  Associations: Loose.  Thought Content: Poverty of [place] today.  Insight: Limited.  Judgment: Fair on interview.  Attention: Fair.  Language: Fluent.  Gait/station: Seated.
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
## 2024-08-14 NOTE — BH INPATIENT PSYCHIATRY PROGRESS NOTE - NSBHFUPINTERVALHXFT_PSY_A_CORE
As per staff report, pt has been on phone for extended parts of the day, team is fairly certain that no one is on the line speaking to pt and that this is imaginary play.  Pt today is found once again on phone, briefly stops her conversation to say hello and to ask writer to also make various phone calls.  Pt then returns to phone.

## 2024-08-15 PROCEDURE — 99231 SBSQ HOSP IP/OBS SF/LOW 25: CPT
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
## 2024-08-15 NOTE — BH INPATIENT PSYCHIATRY PROGRESS NOTE - PRN MEDS
MEDICATIONS  (PRN):  acetaminophen     Tablet .. 650 milliGRAM(s) Oral every 6 hours PRN Temp greater or equal to 38C (100.4F), Mild Pain (1 - 3)  aluminum hydroxide/magnesium hydroxide/simethicone Suspension 30 milliLiter(s) Oral every 6 hours PRN Dyspepsia  diphenhydrAMINE 50 milliGRAM(s) Oral every 6 hours PRN Extrapyramidal symptoms or prophylaxis  diphenhydrAMINE Injectable 50 milliGRAM(s) IntraMuscular once PRN Extrapyramidal prophylaxis  haloperidol     Tablet 5 milliGRAM(s) Oral every 6 hours PRN agitation  haloperidol    Injectable 5 milliGRAM(s) IntraMuscular once PRN aggression  LORazepam     Tablet 2 milliGRAM(s) Oral every 6 hours PRN severe anxiety, agitation  LORazepam   Injectable 2 milliGRAM(s) IntraMuscular once PRN severe agitation  magnesium hydroxide Suspension 30 milliLiter(s) Oral daily PRN Constipation  traZODone 50 milliGRAM(s) Oral at bedtime PRN insomnia   [Initial Consultation] : an initial consultation for [S/P Cardiac Surgery] : status post cardiac surgery [Patent Ductus Arteriosus] : a patent ductus arteriosus [Ventricular Septal Defect] : a ventricular septal defect [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

## 2024-08-15 NOTE — BH INPATIENT PSYCHIATRY PROGRESS NOTE - NSBHATTESTTYPEVISIT_PSY_A_CORE
Attending Only Interpreted by TELMA Markham   chest x-ray, 2 views  Lungs clear, heart shadow normal, bony structures normal, no free air under diaphragm, no PTX

## 2024-08-15 NOTE — BH INPATIENT PSYCHIATRY PROGRESS NOTE - NSBHFUPINTERVALHXFT_PSY_A_CORE
Pt today is found in her room, awake, sitting on bed, looks at writer, but does not answer any questions.

## 2024-08-15 NOTE — BH INPATIENT PSYCHIATRY PROGRESS NOTE - MSE UNSTRUCTURED FT
Appearance: Dressed appropriately.  Behavior: Cooperative.  Childlike.  Found sitting quietly in her room.  Motor: No abnormal movements, no psychomotor slowing or activation.  Speech: Does not speak.  Mood: Does not give a mood.  Affect: Neutral.  Thought Process: Unable to fully assess.  Associations: Unable to fully assess.  Thought Content: Unable to fully assess.  Insight: Unable to fully assess.  Judgment: Unable to fully assess.  Attention: Unable to fully assess.  Language: Unable to fully assess.  Gait/station: Seated.

## 2024-08-16 PROCEDURE — 99231 SBSQ HOSP IP/OBS SF/LOW 25: CPT
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
## 2024-08-16 NOTE — BH INPATIENT PSYCHIATRY PROGRESS NOTE - MSE UNSTRUCTURED FT
Appearance: Dressed appropriately.  Behavior: Cooperative.  Childlike.  Found sitting quietly in her room.  Motor: No abnormal movements, no psychomotor slowing or activation.  Speech: Regular rate.  Mood: Good  Affect: Pleasant.  Thought Process: Tangential at times.  Associations: Can be loose.  Thought Content: Odd nonsensical ideas at times.  Denies AVH.  Denies SIIP or HIIP.  Insight: Limited.  Judgment: Fair on interview.  Attention: Fair.  Language: Fluent.  Gait: Intact.

## 2024-08-19 PROCEDURE — 99231 SBSQ HOSP IP/OBS SF/LOW 25: CPT

## 2024-08-19 NOTE — BH INPATIENT PSYCHIATRY PROGRESS NOTE - MSE UNSTRUCTURED FT
Appearance: Dressed appropriately.  Behavior: Cooperative.  Childlike.  Found in hallway.  Motor: No abnormal movements, no psychomotor slowing or activation.  Speech: Regular rate.  Mood: Good  Affect: Pleasant.  Thought Process: Tangential at times.  Associations: Can be loose.  Thought Content: Odd nonsensical ideas at times.  Denies AVH.  Denies SIIP or HIIP.  Insight: Limited.  Judgment: Fair on interview.  Attention: Fair.  Language: Fluent.  Gait: Intact.

## 2024-08-19 NOTE — BH INPATIENT PSYCHIATRY PROGRESS NOTE - NSBHMETABOLIC_PSY_ALL_CORE_FT
BMI: BMI (kg/m2): 22.2 (08-03-24 @ 08:24)  HbA1c: A1C with Estimated Average Glucose Result: 6.1 % (01-14-24 @ 04:30)    Glucose: POCT Blood Glucose.: 57 mg/dL (04-15-24 @ 09:20)    BP: 128/64 (08-18-24 @ 08:29) (128/64 - 128/64)Vital Signs Last 24 Hrs  T(C): --  T(F): --  HR: --  BP: --  BP(mean): --  RR: --  SpO2: --      Lipid Panel: Date/Time: 01-14-24 @ 04:30  Cholesterol, Serum: 130  LDL Cholesterol Calculated: 61  HDL Cholesterol, Serum: 53  Total Cholesterol/HDL Ration Measurement: --  Triglycerides, Serum: 79
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
## 2024-08-19 NOTE — BH INPATIENT PSYCHIATRY PROGRESS NOTE - NSBHFUPINTERVALHXFT_PSY_A_CORE
Pt today states that Aug 29th is the last day she will be at [facility].  Pt states she has a job to get to.

## 2024-08-20 PROCEDURE — 99231 SBSQ HOSP IP/OBS SF/LOW 25: CPT

## 2024-08-20 RX ORDER — LORAZEPAM 4 MG/ML
2 VIAL (ML) INJECTION ONCE
Refills: 0 | Status: DISCONTINUED | OUTPATIENT
Start: 2024-08-20 | End: 2024-08-27

## 2024-08-20 RX ORDER — LORAZEPAM 4 MG/ML
2 VIAL (ML) INJECTION EVERY 6 HOURS
Refills: 0 | Status: DISCONTINUED | OUTPATIENT
Start: 2024-08-20 | End: 2024-08-27

## 2024-08-20 RX ORDER — LORAZEPAM 4 MG/ML
2 VIAL (ML) INJECTION ONCE
Refills: 0 | Status: DISCONTINUED | OUTPATIENT
Start: 2024-08-20 | End: 2024-08-20
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
## 2024-08-20 NOTE — BH INPATIENT PSYCHIATRY PROGRESS NOTE - MSE UNSTRUCTURED FT
Appearance: Dressed appropriately.  Behavior: Limited cooperation.  Childlike.  Found in hallway.  Motor: No abnormal movements, no psychomotor slowing or activation.  Speech: Regular rate.  Mood: Ok  Affect: Pleasant.  Thought Process: Tangential.  Associations: Can be loose.  Thought Content: Poverty of TC.  Insight: Limited.  Judgment: Fair on interview.  Attention: Fair.  Language: Fluent.  Gait: Intact.

## 2024-08-20 NOTE — BH INPATIENT PSYCHIATRY PROGRESS NOTE - PRN MEDS
1/26: Transferred to [facility]. Hypertension w/ SBP's 200s. Chest xray shows development of small left pleural effusion. Repeat abdominal xray after gastrografin administered shows partial SBO. NG tube in place. Receiving IV Ceftriaxone.     1/27: Continues w/ NG tube. Continues w/ IV Ceftriaxone.     Continue to follow for any discharge needs/recommendations. From home alone. Plan is to return when medically stable.     Discharge barriers: NG tube removal. Start diet. Discharge date undetermined at this time.     Shania Rowe RN BSN  ICU/[facility] Inpatient      MEDICATIONS  (PRN):  acetaminophen     Tablet .. 650 milliGRAM(s) Oral every 6 hours PRN Temp greater or equal to 38C (100.4F), Mild Pain (1 - 3)  aluminum hydroxide/magnesium hydroxide/simethicone Suspension 30 milliLiter(s) Oral every 6 hours PRN Dyspepsia  diphenhydrAMINE 50 milliGRAM(s) Oral every 6 hours PRN Extrapyramidal symptoms or prophylaxis  diphenhydrAMINE Injectable 50 milliGRAM(s) IntraMuscular once PRN Extrapyramidal prophylaxis  haloperidol     Tablet 5 milliGRAM(s) Oral every 6 hours PRN agitation  haloperidol    Injectable 5 milliGRAM(s) IntraMuscular once PRN aggression  LORazepam     Tablet 2 milliGRAM(s) Oral every 6 hours PRN severe anxiety, agitation  LORazepam   Injectable 2 milliGRAM(s) IntraMuscular once PRN severe agitation  magnesium hydroxide Suspension 30 milliLiter(s) Oral daily PRN Constipation  traZODone 50 milliGRAM(s) Oral at bedtime PRN insomnia

## 2024-08-21 PROCEDURE — 99231 SBSQ HOSP IP/OBS SF/LOW 25: CPT

## 2024-08-21 NOTE — BH INPATIENT PSYCHIATRY PROGRESS NOTE - MSE UNSTRUCTURED FT
Appearance: Dressed appropriately.  Behavior: Limited cooperation.  Childlike.  Found in hallway.  Motor: No abnormal movements, no psychomotor slowing or activation.  Speech: Regular rate.  Mood: Ok  Affect: Pleasant.  Thought Process: At times can be tangential.  Associations: Can be loose.  Thought Content: Focused on wishes.  No AVH, SIIP, HIIP.  Insight: Limited.  Judgment: Fair on interview.  Attention: Fair.  Language: Fluent.  Gait: Intact.

## 2024-08-21 NOTE — BH INPATIENT PSYCHIATRY PROGRESS NOTE - CURRENT MEDICATION
MEDICATIONS  (STANDING):    MEDICATIONS  (PRN):  acetaminophen     Tablet .. 650 milliGRAM(s) Oral every 6 hours PRN Temp greater or equal to 38C (100.4F), Mild Pain (1 - 3)  aluminum hydroxide/magnesium hydroxide/simethicone Suspension 30 milliLiter(s) Oral every 6 hours PRN Dyspepsia  diphenhydrAMINE 50 milliGRAM(s) Oral every 6 hours PRN Extrapyramidal symptoms or prophylaxis  diphenhydrAMINE Injectable 50 milliGRAM(s) IntraMuscular once PRN Extrapyramidal prophylaxis  haloperidol     Tablet 5 milliGRAM(s) Oral every 6 hours PRN agitation  haloperidol    Injectable 5 milliGRAM(s) IntraMuscular once PRN aggression  LORazepam     Tablet 2 milliGRAM(s) Oral every 6 hours PRN severe anxiety, agitation  LORazepam   Injectable 2 milliGRAM(s) IntraMuscular once PRN agitation  magnesium hydroxide Suspension 30 milliLiter(s) Oral daily PRN Constipation  traZODone 50 milliGRAM(s) Oral at bedtime PRN insomnia

## 2024-08-21 NOTE — BH INPATIENT PSYCHIATRY PROGRESS NOTE - PRN MEDS
MEDICATIONS  (PRN):  acetaminophen     Tablet .. 650 milliGRAM(s) Oral every 6 hours PRN Temp greater or equal to 38C (100.4F), Mild Pain (1 - 3)  aluminum hydroxide/magnesium hydroxide/simethicone Suspension 30 milliLiter(s) Oral every 6 hours PRN Dyspepsia  diphenhydrAMINE 50 milliGRAM(s) Oral every 6 hours PRN Extrapyramidal symptoms or prophylaxis  diphenhydrAMINE Injectable 50 milliGRAM(s) IntraMuscular once PRN Extrapyramidal prophylaxis  haloperidol     Tablet 5 milliGRAM(s) Oral every 6 hours PRN agitation  haloperidol    Injectable 5 milliGRAM(s) IntraMuscular once PRN aggression  LORazepam     Tablet 2 milliGRAM(s) Oral every 6 hours PRN severe anxiety, agitation  LORazepam   Injectable 2 milliGRAM(s) IntraMuscular once PRN agitation  magnesium hydroxide Suspension 30 milliLiter(s) Oral daily PRN Constipation  traZODone 50 milliGRAM(s) Oral at bedtime PRN insomnia

## 2024-08-21 NOTE — BH INPATIENT PSYCHIATRY PROGRESS NOTE - NSBHFUPINTERVALHXFT_PSY_A_CORE
Pt today states she hopes she can get out in a few days.  She states she wants to travel the world and get a job.
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
## 2024-08-22 PROCEDURE — 99231 SBSQ HOSP IP/OBS SF/LOW 25: CPT

## 2024-08-22 RX ADMIN — Medication 2 MILLIGRAM(S): at 23:39

## 2024-08-22 NOTE — BH INPATIENT PSYCHIATRY PROGRESS NOTE - MSE UNSTRUCTURED FT
Appearance: Dressed appropriately.  Behavior: Limited cooperation.  Childlike.  Found in hallway.  Motor: No abnormal movements, no psychomotor slowing or activation.  Speech: Regular rate.  Mood: Ok  Affect: Pleasant.  Thought Process: At times can be tangential.  Associations: Can be loose.  Thought Content: Focused on discharge.  No AVH, SIIP, HIIP.  Insight: Limited.  Judgment: Fair on interview.  Attention: Fair.  Language: Fluent.  Gait: Intact.
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
## 2024-08-23 PROCEDURE — 90832 PSYTX W PT 30 MINUTES: CPT

## 2024-08-23 PROCEDURE — 99232 SBSQ HOSP IP/OBS MODERATE 35: CPT

## 2024-08-23 RX ORDER — PALIPERIDONE 9 MG/1
156 TABLET, EXTENDED RELEASE ORAL ONCE
Refills: 0 | Status: COMPLETED | OUTPATIENT
Start: 2024-08-23 | End: 2024-08-23

## 2024-08-23 RX ADMIN — PALIPERIDONE 156 MILLIGRAM(S): 9 TABLET, EXTENDED RELEASE ORAL at 11:26

## 2024-08-23 NOTE — BH INPATIENT PSYCHIATRY PROGRESS NOTE - NSBHFUPINTERVALHXFT_PSY_A_CORE
Pt today states she wants a [employer] apartment, then mumbles another request, when asked to repeat, she talks again about a [employer] apartment, and then walks away.

## 2024-08-23 NOTE — BH INPATIENT PSYCHIATRY PROGRESS NOTE - NSBHMETABOLIC_PSY_ALL_CORE_FT
BMI: BMI (kg/m2): 22.2 (08-03-24 @ 08:24)  HbA1c: A1C with Estimated Average Glucose Result: 6.1 % (01-14-24 @ 04:30)    Glucose: POCT Blood Glucose.: 57 mg/dL (04-15-24 @ 09:20)    BP: --Vital Signs Last 24 Hrs  T(C): --  T(F): --  HR: --  BP: --  BP(mean): --  RR: --  SpO2: --      Lipid Panel: Date/Time: 01-14-24 @ 04:30  Cholesterol, Serum: 130  LDL Cholesterol Calculated: 61  HDL Cholesterol, Serum: 53  Total Cholesterol/HDL Ration Measurement: --  Triglycerides, Serum: 79   walking/walks with tripod cane

## 2024-08-23 NOTE — BH INPATIENT PSYCHIATRY PROGRESS NOTE - MSE UNSTRUCTURED FT
Appearance: Dressed appropriately.  Behavior: Limited cooperation.  Childlike.  Found in hallway.  Motor: No abnormal movements, no psychomotor slowing or activation.  Speech: Regular rate.  Mood: Ok  Affect: Pleasant.  Thought Process: At times can be tangential.  Associations: Can be loose.  Thought Content: Focused on apartments.  No AVH, SIIP, HIIP.  Insight: Limited.  Judgment: Fair on interview.  Attention: Fair.  Language: Fluent.  Gait: Intact.
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
## 2024-08-23 NOTE — BH INPATIENT PSYCHIATRY PROGRESS NOTE - NSBHATTESTBILLING_PSY_A_CORE
62383-Fuaifaocyp OBS or IP - low complexity OR 25-34 mins 34166-Uiefpmclse OBS or IP - moderate complexity OR 35-49 mins

## 2024-08-23 NOTE — BH INPATIENT PSYCHIATRY PROGRESS NOTE - CURRENT MEDICATION
MEDICATIONS  (STANDING):    MEDICATIONS  (PRN):  acetaminophen     Tablet .. 650 milliGRAM(s) Oral every 6 hours PRN Temp greater or equal to 38C (100.4F), Mild Pain (1 - 3)  aluminum hydroxide/magnesium hydroxide/simethicone Suspension 30 milliLiter(s) Oral every 6 hours PRN Dyspepsia  diphenhydrAMINE 50 milliGRAM(s) Oral every 6 hours PRN Extrapyramidal symptoms or prophylaxis  diphenhydrAMINE Injectable 50 milliGRAM(s) IntraMuscular once PRN Extrapyramidal prophylaxis  haloperidol     Tablet 5 milliGRAM(s) Oral every 6 hours PRN agitation  haloperidol    Injectable 5 milliGRAM(s) IntraMuscular once PRN aggression  LORazepam     Tablet 2 milliGRAM(s) Oral every 6 hours PRN severe anxiety, agitation  LORazepam   Injectable 2 milliGRAM(s) IntraMuscular once PRN agitation  magnesium hydroxide Suspension 30 milliLiter(s) Oral daily PRN Constipation  traZODone 50 milliGRAM(s) Oral at bedtime PRN insomnia   MEDICATIONS  (STANDING):  paliperidone Injectable, Long Acting 156 milliGRAM(s) IntraMuscular once    MEDICATIONS  (PRN):  acetaminophen     Tablet .. 650 milliGRAM(s) Oral every 6 hours PRN Temp greater or equal to 38C (100.4F), Mild Pain (1 - 3)  aluminum hydroxide/magnesium hydroxide/simethicone Suspension 30 milliLiter(s) Oral every 6 hours PRN Dyspepsia  diphenhydrAMINE 50 milliGRAM(s) Oral every 6 hours PRN Extrapyramidal symptoms or prophylaxis  diphenhydrAMINE Injectable 50 milliGRAM(s) IntraMuscular once PRN Extrapyramidal prophylaxis  haloperidol     Tablet 5 milliGRAM(s) Oral every 6 hours PRN agitation  haloperidol    Injectable 5 milliGRAM(s) IntraMuscular once PRN aggression  LORazepam     Tablet 2 milliGRAM(s) Oral every 6 hours PRN severe anxiety, agitation  LORazepam   Injectable 2 milliGRAM(s) IntraMuscular once PRN agitation  magnesium hydroxide Suspension 30 milliLiter(s) Oral daily PRN Constipation  traZODone 50 milliGRAM(s) Oral at bedtime PRN insomnia

## 2024-08-26 PROCEDURE — 99231 SBSQ HOSP IP/OBS SF/LOW 25: CPT

## 2024-08-26 NOTE — BH INPATIENT PSYCHIATRY PROGRESS NOTE - MSE UNSTRUCTURED FT
Appearance: Dressed appropriately.  Behavior: Limited cooperation.  Childlike.  Found sitting eating breakfast in dining area.  Motor: No abnormal movements, no psychomotor slowing or activation.  Speech: Regular rate.  Mood: Ok  Affect: Pleasant.  Thought Process: At times can be tangential.  Associations: Can be loose.  Thought Content: No AVH, SIIP, HIIP.  Insight: Limited.  Judgment: Fair on interview.  Attention: Fair.  Language: Fluent.  Gait: Intact.

## 2024-08-26 NOTE — BH INPATIENT PSYCHIATRY PROGRESS NOTE - NSBHFUPINTERVALHXFT_PSY_A_CORE
Pt today states she is going to [place] tomorrow.  Wants to eat pizza there.  Repeatedly asks writer's age.
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
## 2024-08-27 PROCEDURE — 99231 SBSQ HOSP IP/OBS SF/LOW 25: CPT

## 2024-08-27 RX ORDER — LORAZEPAM 4 MG/ML
2 VIAL (ML) INJECTION ONCE
Refills: 0 | Status: DISCONTINUED | OUTPATIENT
Start: 2024-08-27 | End: 2024-09-03

## 2024-08-27 RX ORDER — LORAZEPAM 4 MG/ML
2 VIAL (ML) INJECTION EVERY 6 HOURS
Refills: 0 | Status: DISCONTINUED | OUTPATIENT
Start: 2024-08-27 | End: 2024-09-03

## 2024-08-27 NOTE — BH INPATIENT PSYCHIATRY PROGRESS NOTE - NSBHFUPINTERVALHXFT_PSY_A_CORE
Pt states she is leaving soon and wants a party where she has a whale shaped cake, sugar pizza, sugar ice cream, and sugar cookies.

## 2024-08-27 NOTE — BH INPATIENT PSYCHIATRY PROGRESS NOTE - MSE UNSTRUCTURED FT
Appearance: Dressed appropriately.  Behavior: Limited cooperation.  Childlike.  Found sitting in day room.  Motor: No abnormal movements, no psychomotor slowing or activation.  Speech: Regular rate.  Mood: Ok  Affect: Pleasant.  Thought Process: At times can be tangential.  Associations: Can be loose.  Thought Content: No AVH, SIIP, HIIP.  Insight: Limited.  Judgment: Fair on interview.  Attention: Fair.  Language: Fluent.  Gait: Intact.
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
## 2024-08-28 RX ADMIN — Medication 2 MILLIGRAM(S): at 23:39

## 2024-08-28 NOTE — BH INPATIENT PSYCHIATRY PROGRESS NOTE - MSE UNSTRUCTURED FT
Appearance: Dressed appropriately.  Behavior: Limited cooperation.  Childlike.  Found sitting in phone area.  Motor: No abnormal movements, no psychomotor slowing or activation.  Speech: Regular rate.  Mood: Ok  Affect: Pleasant.  Thought Process: At times can be tangential.  Associations: Can be loose.  Thought Content: No AVH, SIIP, HIIP.  Insight: Limited.  Judgment: Fair on interview.  Attention: Fair.  Language: Fluent.  Gait: Intact.
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
## 2024-08-29 PROCEDURE — 99231 SBSQ HOSP IP/OBS SF/LOW 25: CPT

## 2024-08-29 RX ADMIN — Medication 2 MILLIGRAM(S): at 23:47

## 2024-08-29 RX ADMIN — Medication 650 MILLIGRAM(S): at 23:47

## 2024-08-29 NOTE — BH INPATIENT PSYCHIATRY PROGRESS NOTE - NSBHFUPINTERVALHXFT_PSY_A_CORE
Pt states she is getting  tomorrow and she will be moving to an apartment in [place] where it is cold and warm at the same time.
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
## 2024-08-30 PROCEDURE — 99231 SBSQ HOSP IP/OBS SF/LOW 25: CPT

## 2024-08-30 RX ADMIN — Medication 650 MILLIGRAM(S): at 00:49

## 2024-08-30 NOTE — BH INPATIENT PSYCHIATRY PROGRESS NOTE - NSBHFUPINTERVALHXFT_PSY_A_CORE
Pt states she needs a [facility] apartment [facility] and has to leave [facility].  She also speaks again about her engagement, anniversary dinner, and various sugar filled desserts.
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
## 2024-08-30 NOTE — BH INPATIENT PSYCHIATRY PROGRESS NOTE - MSE UNSTRUCTURED FT
Appearance: Dressed appropriately.  Behavior: Limited cooperation.  Childlike.  Found sitting in phone area.  Motor: No abnormal movements, no psychomotor slowing or activation.  Speech: Regular rate.  Mood: Ok  Affect: Pleasant.  Thought Process: Tangential.  Associations: Loosening.  Thought Content: Various odd and unrelated ideas.  No AVH, SIIP, HIIP.  Insight: Limited.  Judgment: Fair on interview.  Attention: Fair.  Language: Fluent.  Gait: Intact.

## 2024-09-03 PROCEDURE — 99231 SBSQ HOSP IP/OBS SF/LOW 25: CPT | Mod: GC

## 2024-09-03 RX ORDER — LORAZEPAM 4 MG/ML
2 VIAL (ML) INJECTION ONCE
Refills: 0 | Status: DISCONTINUED | OUTPATIENT
Start: 2024-09-03 | End: 2024-09-10

## 2024-09-03 RX ORDER — LORAZEPAM 4 MG/ML
2 VIAL (ML) INJECTION EVERY 6 HOURS
Refills: 0 | Status: DISCONTINUED | OUTPATIENT
Start: 2024-09-03 | End: 2024-09-10
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
## 2024-09-03 NOTE — BH INPATIENT PSYCHIATRY PROGRESS NOTE - NSBHFUPINTERVALHXFT_PSY_A_CORE
Pt states she needs a [facility] apartment [facility] and has to leave [facility].  She also speaks again about her engagement, anniversary dinner, and various sugar filled desserts. Before the visit, patient was on the floor's phone imitating that she is talking with someone. Upon seeing her psychiatrist, she reacted happily and greeted the new intern with a fist bump. During interview, she talked agrammatically. Her speech was consisted of words and phrases juxtaposed without definite syntactic structure, interspersed with fits of loud laughter associated with emotionally loaded thought contents, for example, after saying that her , with whom she got  recently, is now passed away. She often grimaced while speaking as she was struggling to retrieve words to convey her thoughts.

## 2024-09-03 NOTE — BH INPATIENT PSYCHIATRY PROGRESS NOTE - NSBHMETABOLIC_PSY_ALL_CORE_FT
BMI: BMI (kg/m2): 24.7 (08-31-24 @ 08:02)  HbA1c: A1C with Estimated Average Glucose Result: 6.1 % (01-14-24 @ 04:30)    Glucose: POCT Blood Glucose.: 57 mg/dL (04-15-24 @ 09:20)    BP: --Vital Signs Last 24 Hrs  T(C): --  T(F): --  HR: --  BP: --  BP(mean): --  RR: --  SpO2: --      Lipid Panel: Date/Time: 01-14-24 @ 04:30  Cholesterol, Serum: 130  LDL Cholesterol Calculated: 61  HDL Cholesterol, Serum: 53  Total Cholesterol/HDL Ration Measurement: --  Triglycerides, Serum: 79

## 2024-09-04 PROCEDURE — 99231 SBSQ HOSP IP/OBS SF/LOW 25: CPT | Mod: GC

## 2024-09-04 PROCEDURE — 90832 PSYTX W PT 30 MINUTES: CPT

## 2024-09-04 NOTE — BH INPATIENT PSYCHIATRY PROGRESS NOTE - NSBHFUPINTERVALHXFT_PSY_A_CORE
No acute incident happened since yesterday. No PRN medication given. The patient slept ~5h last night. DASA score was 0. She refused to get her v/s checked.  This morning, the patient was sitting in her bed and writing on a paper with a blue marker. She was ushered to the interview room. When asked about what she was writing, she read it as "Mr. Neil moves to [place]". She pauses and thinks while reading the paper as if she is unsure about what is written. The paper was inspected with her consent. There is no verb written in the script (reads as 'Mr. Neil [facility]', but she reads it with the verb 'moves'). She said that she talked with her  last night. She says her  is [place] and she herself is [place]. The words '[employer]' and '[place] [employer]' were visible on the paper. When asked about the fact that she said her  passed away last night, she says that "they couldn't find his body".   The patient made unintelligible complaints about another patient, whispering to the doctor while moving out of the interview room.
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
## 2024-09-04 NOTE — BH INPATIENT PSYCHIATRY PROGRESS NOTE - NSBHATTESTCOMMENTATTENDFT_PSY_A_CORE
Pt continues to exhibit presentation most consistent with IDD.  Continue plan above. Emotionally regulated, in good behavioral control, continue plan above.

## 2024-09-04 NOTE — BH INPATIENT PSYCHIATRY PROGRESS NOTE - MSE UNSTRUCTURED FT
Appearance: Dressed appropriately.  Behavior: Limited cooperation.  Childlike.  Found in her bed writing with a marker on a paper.  Motor: No abnormal movements, no psychomotor slowing or activation.  Speech: Normal rate, volume, and tone.  Mood: Ok  Affect: Pleasant.  Thought Process: Tangential.  Associations: Loosening.  Thought Content: Various odd and unrelated ideas.  No AVH, SIIP, HIIP.  Insight: Limited.  Judgment: Fair on interview.  Attention: Fair.  Language: Fluent.  Gait: Intact.

## 2024-09-05 PROCEDURE — 99231 SBSQ HOSP IP/OBS SF/LOW 25: CPT | Mod: GC
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
## 2024-09-05 NOTE — BH INPATIENT PSYCHIATRY PROGRESS NOTE - NSBHFUPINTERVALHXFT_PSY_A_CORE
No acute incident happened since yesterday. No acute complaint by the patient. No PRN medication given. Refused to get her v/s checked. She was on phone imitating to talk with her . When asked she says that court said that there will be no discharge. Indicated that her  is also hospitalized sometimes in this floor and sometimes in another floor. Otherwise, the patient is at her baseline with often unintelligible speech, whispering, and laughters while talking.

## 2024-09-05 NOTE — BH INPATIENT PSYCHIATRY PROGRESS NOTE - MSE UNSTRUCTURED FT
Appearance: Dressed appropriately.  Behavior: Limited cooperation.  Childlike.  Found on phone.  Motor: No abnormal movements, no psychomotor slowing or activation.  Speech: Normal rate, volume, and tone.  Mood: Ok  Affect: Pleasant.  Thought Process: Tangential.  Associations: Loosening.  Thought Content: Various odd and unrelated ideas.  No AVH, SIIP, HIIP.  Insight: Limited.  Judgment: Fair on interview.  Attention: Fair.  Language: Fluent.  Gait: Intact.

## 2024-09-05 NOTE — BH INPATIENT PSYCHIATRY PROGRESS NOTE - NSBHATTESTCOMMENTATTENDFT_PSY_A_CORE
Emotionally regulated, in good behavioral control, continue plan above. Presentation unchanged, pt engaging in imaginary play and fantastical ideas, in good spirits, remains well related and visible in milieu.  Continue plan as above.

## 2024-09-06 PROCEDURE — 99232 SBSQ HOSP IP/OBS MODERATE 35: CPT | Mod: GC

## 2024-09-06 PROCEDURE — 90832 PSYTX W PT 30 MINUTES: CPT

## 2024-09-06 NOTE — BH INPATIENT PSYCHIATRY PROGRESS NOTE - NSBHFUPINTERVALHXFT_PSY_A_CORE
No acute incident happened since yesterday. No acute complaint by the patient. No PRN medication given. She was on phone imitating to talk with her . When asked how's she doing, she said that she is planning to go to [place] with her  and have kids. Shouts 'Oh Dude' while talking. No change noted compared to the baseline.

## 2024-09-06 NOTE — BH INPATIENT PSYCHIATRY PROGRESS NOTE - NSBHATTESTBILLING_PSY_A_CORE
39906-Oiijenravr OBS or IP - low complexity OR 25-34 mins 92926-Tvduybztux OBS or IP - moderate complexity OR 35-49 mins

## 2024-09-06 NOTE — BH INPATIENT PSYCHIATRY PROGRESS NOTE - MSE UNSTRUCTURED FT
[Patient reported mammogram was normal] : Patient reported mammogram was normal [Patient reported PAP Smear was normal] : Patient reported PAP Smear was normal [Patient reported bone density results were normal] : Patient reported bone density results were normal [Patient reported colonoscopy was normal] : Patient reported colonoscopy was normal [With Family] : lives with family [] :  [# Of Children ___] : has [unfilled] children [MammogramDate] : 12/2020 [PapSmearDate] : 3 years ago  [BoneDensityDate] : 2 years ago  [ColonoscopyDate] : 2 years ago  Appearance: Dressed appropriately.  Behavior: Limited cooperation.  Childlike.  Found on phone.  Motor: No abnormal movements, no psychomotor slowing or activation.  Speech: Normal rate, volume, and tone.  Mood: Ok  Affect: Pleasant.  Thought Process: Tangential.  Associations: Loosening.  Thought Content: Various odd and unrelated ideas.  No AVH, SIIP, HIIP.  Insight: Limited.  Judgment: Fair on interview.  Attention: Fair.  Language: Fluent.  Gait: Intact.
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
## 2024-09-06 NOTE — BH INPATIENT PSYCHIATRY PROGRESS NOTE - NSBHATTESTCOMMENTATTENDFT_PSY_A_CORE
Presentation unchanged, pt engaging in imaginary play and fantastical ideas, in good spirits, remains well related and visible in milieu.  Continue plan as above. Whispers various incoherent and delusional contents. MSE largely unchanged. No overt behavioral problems. Ongoing hospitalization due to dispo placement.

## 2024-09-09 PROCEDURE — 99232 SBSQ HOSP IP/OBS MODERATE 35: CPT | Mod: GC
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
## 2024-09-09 NOTE — BH INPATIENT PSYCHIATRY PROGRESS NOTE - MSE UNSTRUCTURED FT
Appearance: Dressed appropriately.  Behavior: Limited cooperation.  Childlike.  Found on phone.  Motor: No abnormal movements, no psychomotor slowing or activation.  Speech: Normal rate, volume, and tone.  Mood: Ok  Affect: Pleasant.  Thought Process: ilogical.  Associations: Loosening.  Thought Content: Various odd and unrelated ideas.  No AVH, SIIP, HIIP.  Insight: Limited.  Judgment: Fair on interview.  Attention: Fair.  Language: Fluent.  Gait: Intact.

## 2024-09-09 NOTE — BH INPATIENT PSYCHIATRY PROGRESS NOTE - NSBHFUPINTERVALHXFT_PSY_A_CORE
No acute incident happened since yesterday. No acute complaint by the patient. No PRN medication given. She was on phone imitating to talk with her . When asked how's she doing, she said that she was talking with her . Sticks out her tongue, makes childish facial expressions and occasionally mumbles and whispers during the interview. No change noted compared to the baseline.

## 2024-09-10 PROCEDURE — 99232 SBSQ HOSP IP/OBS MODERATE 35: CPT | Mod: GC

## 2024-09-10 RX ORDER — LORAZEPAM 4 MG/ML
2 VIAL (ML) INJECTION ONCE
Refills: 0 | Status: DISCONTINUED | OUTPATIENT
Start: 2024-09-10 | End: 2024-09-17

## 2024-09-10 RX ORDER — LORAZEPAM 4 MG/ML
2 VIAL (ML) INJECTION EVERY 6 HOURS
Refills: 0 | Status: DISCONTINUED | OUTPATIENT
Start: 2024-09-10 | End: 2024-09-17

## 2024-09-10 RX ADMIN — Medication 650 MILLIGRAM(S): at 23:28
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
## 2024-09-10 NOTE — BH INPATIENT PSYCHIATRY PROGRESS NOTE - MSE UNSTRUCTURED FT
Appearance: Dressed appropriately.  Behavior: Limited cooperation.  Childlike.   Motor: No abnormal movements, no psychomotor slowing or activation.  Speech: Normal rate, volume, and tone.  Mood: Ok  Affect: Pleasant.  Thought Process: ilogical.  Associations: Loosening. Neologism. Idiosyncratic word use.   Thought Content: Various odd and unrelated ideas.  No AVH, SIIP, HIIP.  Insight: Limited.  Judgment: Fair on interview.  Attention: Fair.  Language: Fluent.  Gait: Intact.

## 2024-09-10 NOTE — BH INPATIENT PSYCHIATRY PROGRESS NOTE - NSBHATTESTCOMMENTATTENDFT_PSY_A_CORE
Pt remains childlike, highly impressionable as evidenced by thought content that is likely to be influenced by peers on unit, i.e. requests to be transferred to [facility].  Pt otherwise continues to engage in imaginary play on unit, i.e. using patient phones after peers but likely not actually calling anyone.  Continue meds as ordered.  Awaiting Housing.

## 2024-09-10 NOTE — BH INPATIENT PSYCHIATRY PROGRESS NOTE - NSBHFUPINTERVALHXFT_PSY_A_CORE
No acute incident happened since yesterday. No acute complaint by the patient. No PRN medication given.   In the morning she knocked on the nurse station window and asked to talk with her psychiatrist. When she was approached, she was found to be in rush to share some news, but was not agitated. She said that she needs to go to the [facility], saying that she has two jobs, one being [employer] and the other being a little provider. 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 Of note, the patient came directly from the breakfast table with other patients, and thought content is most probably influenced by other patients conversations. No emotional, cognitive, or behavioral change noted compared to her baseline.

## 2024-09-11 PROCEDURE — 99232 SBSQ HOSP IP/OBS MODERATE 35: CPT | Mod: GC
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
## 2024-09-11 NOTE — BH INPATIENT PSYCHIATRY PROGRESS NOTE - MSE UNSTRUCTURED FT
Appearance: Dressed appropriately.  Behavior: Limited cooperation.  Childlike.   Motor: No abnormal movements, no psychomotor slowing or activation.  Speech: Normal rate, volume, and tone.  Mood: Ok  Affect: Pleasant.  Thought Process: ilogical.  Associations: Loosening. Neologism. Idiosyncratic word use.   Thought Content: Various odd and unrelated ideas.  No AVH, SIIP, HIIP.  Insight: Limited.  Judgment: Fair on interview.  Attention: Fair.  Language: Fluent.  Gait: Intact.  Appearance: Dressed appropriately.  Behavior: Limited cooperation.  Childlike.   Motor: No abnormal movements, no psychomotor slowing or activation.  Speech: Normal rate, volume, and tone.  Mood: Ok  Affect: Pleasant.  Thought Process: illogical.  Associations: Loosening. Neologism. Idiosyncratic word use.   Thought Content: Various odd and unrelated ideas.  No AVH, SIIP, HIIP.  Insight: Limited.  Judgment: Fair on interview.  Attention: Fair.  Language: Fluent.  Gait: Intact.
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
## 2024-09-11 NOTE — BH INPATIENT PSYCHIATRY PROGRESS NOTE - NSBHATTESTCOMMENTATTENDFT_PSY_A_CORE
Presentation continues to be consistent with IDD.  In behavioral control.  Continue meds.  Awaiting group home placement.

## 2024-09-12 PROCEDURE — 99231 SBSQ HOSP IP/OBS SF/LOW 25: CPT | Mod: GC

## 2024-09-12 RX ADMIN — Medication 650 MILLIGRAM(S): at 01:10

## 2024-09-12 RX ADMIN — Medication 650 MILLIGRAM(S): at 00:09

## 2024-09-12 NOTE — BH INPATIENT PSYCHIATRY PROGRESS NOTE - MSE UNSTRUCTURED FT
Appearance: Dressed appropriately.  Behavior: Limited cooperation.  Childlike.   Motor: No abnormal movements, no psychomotor slowing or activation.  Speech: Normal rate, volume, and tone.  Mood: Ok  Affect: Pleasant.  Thought Process: illogical.  Associations: Loosening. Neologism. Idiosyncratic word use.   Thought Content: Various odd and unrelated ideas.  No AVH, SIIP, HIIP.  Insight: Limited.  Judgment: Fair on interview.  Attention: Fair.  Language: Fluent.  Gait: Intact.

## 2024-09-12 NOTE — BH INPATIENT PSYCHIATRY PROGRESS NOTE - NSBHFUPINTERVALHXFT_PSY_A_CORE
No acute incident happened since yesterday. No acute complaint by the patient. No PRN medication given.   The patient was calm and pleasant during the interview. She was emotionally regulated, without agitation. Still has incoherent speech and preoccupation with her  and babies. No change compared to her baseline noted.

## 2024-09-12 NOTE — BH SCALES AND SCREENS - NSBPRSBLUNTAFFECT_PSY_ALL_CORE
3 = Mild – e.g., somewhat diminished facial expression, or somewhat monotonous voice or somewhat restricted gestures
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
## 2024-09-13 PROCEDURE — 99231 SBSQ HOSP IP/OBS SF/LOW 25: CPT | Mod: GC

## 2024-09-13 RX ORDER — INFLUENZA A VIRUS A/IDAHO/07/2018 (H1N1) ANTIGEN (MDCK CELL DERIVED, PROPIOLACTONE INACTIVATED, INFLUENZA A VIRUS A/INDIANA/08/2018 (H3N2) ANTIGEN (MDCK CELL DERIVED, PROPIOLACTONE INACTIVATED), INFLUENZA B VIRUS B/SINGAPORE/INFTT-16-0610/2016 ANTIGEN (MDCK CELL DERIVED, PROPIOLACTONE INACTIVATED), INFLUENZA B VIRUS B/IOWA/06/2017 ANTIGEN (MDCK CELL DERIVED, PROPIOLACTONE INACTIVATED) 15; 15; 15; 15 UG/.5ML; UG/.5ML; UG/.5ML; UG/.5ML
0.5 INJECTION, SUSPENSION INTRAMUSCULAR ONCE
Refills: 0 | Status: COMPLETED | OUTPATIENT
Start: 2024-09-13 | End: 2024-09-13

## 2024-09-13 NOTE — BH INPATIENT PSYCHIATRY PROGRESS NOTE - CURRENT MEDICATION
MEDICATIONS  (STANDING):    MEDICATIONS  (PRN):  acetaminophen     Tablet .. 650 milliGRAM(s) Oral every 6 hours PRN Temp greater or equal to 38C (100.4F), Mild Pain (1 - 3)  aluminum hydroxide/magnesium hydroxide/simethicone Suspension 30 milliLiter(s) Oral every 6 hours PRN Dyspepsia  diphenhydrAMINE 50 milliGRAM(s) Oral every 6 hours PRN Extrapyramidal symptoms or prophylaxis  diphenhydrAMINE Injectable 50 milliGRAM(s) IntraMuscular once PRN Extrapyramidal prophylaxis  haloperidol     Tablet 5 milliGRAM(s) Oral every 6 hours PRN agitation  haloperidol    Injectable 5 milliGRAM(s) IntraMuscular once PRN aggression  LORazepam     Tablet 2 milliGRAM(s) Oral every 6 hours PRN severe anxiety, agitation  LORazepam   Injectable 2 milliGRAM(s) IntraMuscular once PRN agitation  magnesium hydroxide Suspension 30 milliLiter(s) Oral daily PRN Constipation  traZODone 50 milliGRAM(s) Oral at bedtime PRN insomnia   MEDICATIONS  (STANDING):  influenza   Vaccine 0.5 milliLiter(s) IntraMuscular once    MEDICATIONS  (PRN):  acetaminophen     Tablet .. 650 milliGRAM(s) Oral every 6 hours PRN Temp greater or equal to 38C (100.4F), Mild Pain (1 - 3)  aluminum hydroxide/magnesium hydroxide/simethicone Suspension 30 milliLiter(s) Oral every 6 hours PRN Dyspepsia  diphenhydrAMINE 50 milliGRAM(s) Oral every 6 hours PRN Extrapyramidal symptoms or prophylaxis  diphenhydrAMINE Injectable 50 milliGRAM(s) IntraMuscular once PRN Extrapyramidal prophylaxis  haloperidol     Tablet 5 milliGRAM(s) Oral every 6 hours PRN agitation  haloperidol    Injectable 5 milliGRAM(s) IntraMuscular once PRN aggression  LORazepam     Tablet 2 milliGRAM(s) Oral every 6 hours PRN severe anxiety, agitation  LORazepam   Injectable 2 milliGRAM(s) IntraMuscular once PRN agitation  magnesium hydroxide Suspension 30 milliLiter(s) Oral daily PRN Constipation  traZODone 50 milliGRAM(s) Oral at bedtime PRN insomnia

## 2024-09-13 NOTE — BH INPATIENT PSYCHIATRY PROGRESS NOTE - NSBHFUPINTERVALHXFT_PSY_A_CORE
No acute incident happened since yesterday. No acute complaint by the patient. No PRN medication given. Found in a corner of the hallway talking to herself.   The patient was calm and pleasant during the interview. She was emotionally regulated, without agitation. Still has incoherent speech, often whispers, mentions that she will be discharged today. Expresses that she is a joyful person. No change compared to her baseline noted.
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
## 2024-09-13 NOTE — BH INPATIENT PSYCHIATRY PROGRESS NOTE - NSBHATTESTCOMMENTATTENDFT_PSY_A_CORE
Childlike demeanor, continues to engage in imaginary play, fantastical thoughts, remains impressionable and thought content is influenced by peers on unit.  Otherwise in relative behavioral control, no agitation/aggression.  Continue meds, awaiting housing.

## 2024-09-15 RX ORDER — LORAZEPAM 4 MG/ML
2 VIAL (ML) INJECTION ONCE
Refills: 0 | Status: DISCONTINUED | OUTPATIENT
Start: 2024-09-15 | End: 2024-09-15

## 2024-09-15 RX ORDER — HALOPERIDOL 10 MG/1
5 TABLET ORAL ONCE
Refills: 0 | Status: COMPLETED | OUTPATIENT
Start: 2024-09-15 | End: 2024-09-15

## 2024-09-15 RX ADMIN — HALOPERIDOL 5 MILLIGRAM(S): 10 TABLET ORAL at 11:18

## 2024-09-15 RX ADMIN — Medication 2 MILLIGRAM(S): at 11:15

## 2024-09-15 NOTE — BH CHART NOTE - NSEVENTNOTEFT_PSY_ALL_CORE
[facility] called at 11:01 for activation of IM medication due to patient agitation. Per staff patient yelling at staff, banging on walls, unable to be redirected, refusing po prn medications. Haldol 5mg IM and Ativan 2mg IM activated for threat to others. Patient seen after IM administered, noted to be resting comfortably in NAD in room, IM with fair effect. Advised RN staff to notify [facility] if patient continues to be agitated.

## 2024-09-16 PROCEDURE — 90832 PSYTX W PT 30 MINUTES: CPT

## 2024-09-16 PROCEDURE — 99232 SBSQ HOSP IP/OBS MODERATE 35: CPT | Mod: GC

## 2024-09-16 NOTE — BH INPATIENT PSYCHIATRY PROGRESS NOTE - NSBHFUPINTERVALHXFT_PSY_A_CORE
The patient received IM ativan/halodol over the weekend d/t agitated behavior (yelling at staff, banging on walls and unable to be redirected) and refusing PO PRN, with adequate response. Per staff, this episode is coincident with patient's menstruation. Today the patient was found in the hallway. Found to be more irritable than her baseline, possibly due to the change make up of patients on the floor and several behavioral episodes recently. During the interview, she was calm and pleasant. Didn't show any sign of agitation. Has incoherent speech and mumbling consistent to her baseline.

## 2024-09-16 NOTE — BH INPATIENT PSYCHIATRY PROGRESS NOTE - NSBHCONTPROVIDER_PSY_ALL_CORE
Patient with deficits in ADL status and functional mobility due to impaired cognition, balance, strength, ROM, coordination Yes...

## 2024-09-16 NOTE — BH INPATIENT PSYCHIATRY PROGRESS NOTE - CURRENT MEDICATION
MEDICATIONS  (STANDING):  influenza   Vaccine 0.5 milliLiter(s) IntraMuscular once    MEDICATIONS  (PRN):  acetaminophen     Tablet .. 650 milliGRAM(s) Oral every 6 hours PRN Temp greater or equal to 38C (100.4F), Mild Pain (1 - 3)  aluminum hydroxide/magnesium hydroxide/simethicone Suspension 30 milliLiter(s) Oral every 6 hours PRN Dyspepsia  diphenhydrAMINE 50 milliGRAM(s) Oral every 6 hours PRN Extrapyramidal symptoms or prophylaxis  diphenhydrAMINE Injectable 50 milliGRAM(s) IntraMuscular once PRN Extrapyramidal prophylaxis  haloperidol     Tablet 5 milliGRAM(s) Oral every 6 hours PRN agitation  haloperidol    Injectable 5 milliGRAM(s) IntraMuscular once PRN aggression  LORazepam     Tablet 2 milliGRAM(s) Oral every 6 hours PRN severe anxiety, agitation  LORazepam   Injectable 2 milliGRAM(s) IntraMuscular once PRN agitation  magnesium hydroxide Suspension 30 milliLiter(s) Oral daily PRN Constipation  traZODone 50 milliGRAM(s) Oral at bedtime PRN insomnia
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
## 2024-09-16 NOTE — BH INPATIENT PSYCHIATRY PROGRESS NOTE - NSBHATTESTCOMMENTATTENDFT_PSY_A_CORE
Pt with agitation over weekend, however likely attributable to unit acuity and also menstrual cycle.  Today pt is pleasant and childlike, speaking about discharge, having a job, children, and other previously noted topics/ideas of fantasy.  Continue meds as ordered.  Awaiting housing.

## 2024-09-16 NOTE — BH INPATIENT PSYCHIATRY PROGRESS NOTE - MSE UNSTRUCTURED FT
Appearance: Dressed appropriately.  Behavior: Limited cooperation.  Childlike.   Motor: No abnormal movements, no psychomotor slowing or activation.  Speech: Normal rate, volume, and tone.  Mood: Ok  Affect: Irritable.  Thought Process: illogical.  Associations: Loosening. Neologism. Idiosyncratic word use.   Thought Content: Various odd and unrelated ideas.  No AVH, SIIP, HIIP.  Insight: Limited.  Judgment: Fair on interview.  Attention: Fair.  Language: Fluent.  Gait: Intact.

## 2024-09-17 PROCEDURE — 99231 SBSQ HOSP IP/OBS SF/LOW 25: CPT | Mod: GC

## 2024-09-17 RX ORDER — LORAZEPAM 4 MG/ML
2 VIAL (ML) INJECTION ONCE
Refills: 0 | Status: DISCONTINUED | OUTPATIENT
Start: 2024-09-17 | End: 2024-09-24

## 2024-09-17 RX ORDER — LORAZEPAM 4 MG/ML
2 VIAL (ML) INJECTION EVERY 6 HOURS
Refills: 0 | Status: DISCONTINUED | OUTPATIENT
Start: 2024-09-17 | End: 2024-09-24

## 2024-09-17 NOTE — BH INPATIENT PSYCHIATRY PROGRESS NOTE - NSBHATTESTCOMMENTATTENDFT_PSY_A_CORE
Presentation continues to be unchanged and most consistent with IDD.  Some increase in behaviors due to acuity of unit.  Continue meds as ordered.

## 2024-09-17 NOTE — BH INPATIENT PSYCHIATRY PROGRESS NOTE - MSE UNSTRUCTURED FT
Appearance: Dressed appropriately.  Behavior: Limited cooperation.  Childlike.   Motor: No abnormal movements, no psychomotor slowing or activation.  Speech: Normal rate, volume, and tone.  Mood: Ok  Affect: Euthymic.  Thought Process: illogical.  Associations: Loosening. Neologism. Idiosyncratic word use.   Thought Content: Various odd and unrelated ideas.  No AVH, SIIP, HIIP.  Insight: Limited.  Judgment: Fair on interview.  Attention: Fair.  Language: Fluent.  Gait: Intact.
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
## 2024-09-17 NOTE — BH INPATIENT PSYCHIATRY PROGRESS NOTE - NSBHFUPINTERVALHXFT_PSY_A_CORE
The patient was seen today. Found in the hallway. She was calm and cooperative during the interview. Didn't show any sign of agitation. Has incoherent speech and mumbling consistent to her baseline, with thought contents related to her , being a [place]American, herself being an American, having babies, and planning to move together.

## 2024-09-18 PROCEDURE — 99232 SBSQ HOSP IP/OBS MODERATE 35: CPT

## 2024-09-18 NOTE — BH INPATIENT PSYCHIATRY PROGRESS NOTE - NSBHFUPINTERVALHXFT_PSY_A_CORE
Pt today states that there will be a surprise for her on Saturday.  Pt then yelps loudly and ends interview and returns to phones.
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
## 2024-09-18 NOTE — BH INPATIENT PSYCHIATRY PROGRESS NOTE - MSE UNSTRUCTURED FT
Appearance: Dressed appropriately.  Behavior: Limited cooperation.  Childlike.   Motor: No abnormal movements, no psychomotor slowing or activation.  Speech: Soft volume, mumbling incoherently at times.  Mood: Ok  Affect: Somewhat elevated.  Thought Process: Tangential.   Associations: Loosening.  Thought Content: Continues to have ideas of fantasy with recurring themes (transfer to hospital, discharge, pregnancy, etc).    Insight: Limited.  Judgment: Fair on interview.  Attention: Fair.  Language: Fluent.  Gait: Intact.

## 2024-09-19 PROCEDURE — 99232 SBSQ HOSP IP/OBS MODERATE 35: CPT | Mod: GC

## 2024-09-19 RX ORDER — PALIPERIDONE 9 MG/1
156 TABLET, EXTENDED RELEASE ORAL ONCE
Refills: 0 | Status: COMPLETED | OUTPATIENT
Start: 2024-09-23 | End: 2024-09-23

## 2024-09-19 NOTE — BH INPATIENT PSYCHIATRY PROGRESS NOTE - NSBHATTESTCOMMENTATTENDFT_PSY_A_CORE
Continues to be childlike with presentation consistent with IDD.  Will be due soon for [facility].  Awaiting placement.

## 2024-09-19 NOTE — BH INPATIENT PSYCHIATRY PROGRESS NOTE - NSBHFUPINTERVALHXFT_PSY_A_CORE
Pt was found on phone, stating that she is talking with her , offered writer to talk with her , no one was on line. She said that her son has a new girlfriend. When asked, says that her son is 28 year old. Smiles, laughs and shows positive sentiment during the interview.

## 2024-09-19 NOTE — BH INPATIENT PSYCHIATRY PROGRESS NOTE - NS ED BHA REVIEW OF ED CHART AVAILABLE INVESTIGATIONS REVIEWED
Yes
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
## 2024-09-20 PROCEDURE — 99232 SBSQ HOSP IP/OBS MODERATE 35: CPT

## 2024-09-20 NOTE — BH INPATIENT PSYCHIATRY PROGRESS NOTE - MSE UNSTRUCTURED FT
Appearance: Dressed appropriately.  Behavior: Limited cooperation.  Childlike.   Motor: No abnormal movements, no psychomotor slowing or activation.  Speech: Soft volume, mumbling incoherently at times.  Mood: fine  Affect: neutral   Thought Process: Tangential.   Associations: Loosening.  Thought Content: Continues to have ideas of fantasy with recurring themes (transfer to hospital, discharge, pregnancy, etc).    Insight: Limited.  Judgment: Fair on interview.  Attention: Fair.  Language: Fluent.  Gait: Intact.
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
## 2024-09-20 NOTE — BH INPATIENT PSYCHIATRY PROGRESS NOTE - NSBHFUPINTERVALHXFT_PSY_A_CORE
No overt behavioral issues over this interval. Adherent to treatment. asks me if I could help her get an apartment. Seen talking to self on the unit.

## 2024-09-23 PROCEDURE — 99232 SBSQ HOSP IP/OBS MODERATE 35: CPT | Mod: GC

## 2024-09-23 RX ADMIN — PALIPERIDONE 156 MILLIGRAM(S): 9 TABLET, EXTENDED RELEASE ORAL at 09:11

## 2024-09-23 NOTE — BH INPATIENT PSYCHIATRY PROGRESS NOTE - MSE UNSTRUCTURED FT
Appearance: Dressed appropriately.  Behavior: Limited cooperation.  Childlike.   Motor: No abnormal movements, psychomotor retardation.  Speech: Soft volume, mumbling incoherently at times.  Mood: fine  Affect: neutral   Thought Process: Tangential.   Associations: Loosening.  Thought Content: Continues to have ideas of fantasy with recurring themes (transfer to hospital, discharge, pregnancy, [place] boyfriend etc).    Insight: Limited.  Judgment: Fair on interview.  Attention: Fair.  Language: Fluent.  Gait: Intact.
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
## 2024-09-23 NOTE — BH INPATIENT PSYCHIATRY PROGRESS NOTE - NSBHATTESTCOMMENTATTENDFT_PSY_A_CORE
no overt behavioral issues. in good behavioral control over this interval. adherent to treatment. thought disordered and says bizarre and incoherent things from time to time. plan as above
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
## 2024-09-24 PROCEDURE — 99232 SBSQ HOSP IP/OBS MODERATE 35: CPT

## 2024-09-24 RX ORDER — LORAZEPAM 4 MG/ML
2 VIAL (ML) INJECTION EVERY 6 HOURS
Refills: 0 | Status: DISCONTINUED | OUTPATIENT
Start: 2024-09-24 | End: 2024-10-01

## 2024-09-24 RX ORDER — LORAZEPAM 4 MG/ML
2 VIAL (ML) INJECTION ONCE
Refills: 0 | Status: DISCONTINUED | OUTPATIENT
Start: 2024-09-24 | End: 2024-10-01
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
## 2024-09-24 NOTE — BH INPATIENT PSYCHIATRY PROGRESS NOTE - NSBHFUPINTERVALHXFT_PSY_A_CORE
No overnight events.  Pt today talks about getting a [place] apartment that has cholesterol and sugar free snacks.  Pt also again talks about boyfriends and girlfriends.

## 2024-09-24 NOTE — BH INPATIENT PSYCHIATRY PROGRESS NOTE - MSE UNSTRUCTURED FT
Appearance: Dressed appropriately in gowns/hospital clothing.  Short hair with patches of hair loss.  Behavior: Calm, cooperative.  Childlike demeanor.   Motor: No abnormal involuntary movements.  No psychomotor agitation or slowing.    Speech: Soft volume, mumbling incoherently at times.  Mood: Ok.   Affect: Full range, elevated at times, mostly neutral, reactive.   Thought Process: Tangential.   Associations: Loosening.  Thought Content: Continues to have ideas of fantasy with recurring themes (transfer to hospital, discharge, pregnancy, [place] boyfriend etc).    Insight: Limited.  Judgment: Fair on interview.  Attention: Fair.  Language: Fluent.  Gait: Intact.

## 2024-09-25 PROCEDURE — 99232 SBSQ HOSP IP/OBS MODERATE 35: CPT | Mod: GC

## 2024-09-25 NOTE — BH INPATIENT PSYCHIATRY PROGRESS NOTE - MSE UNSTRUCTURED FT
Appearance: Dressed appropriately in gowns/hospital clothing.  Short hair with patches of hair loss.  Behavior: Calm, cooperative.  Childlike demeanor.   Motor: No abnormal involuntary movements.  No psychomotor agitation or slowing.    Speech: Soft volume, mumbling incoherently at times.  Mood: Ok.   Affect: Full range, elevated at times, mostly neutral, reactive.   Thought Process: Tangential.   Associations: Loosening.  Thought Content: Continues to have ideas of fantasy with recurring themes (transfer to hospital, discharge, pregnancy, [place] boyfriend etc).    Insight: Limited.  Judgment: Fair on interview.  Attention: Fair.  Language: Fluent.  Gait: Intact.

## 2024-09-25 NOTE — BH INPATIENT PSYCHIATRY PROGRESS NOTE - NSBHATTESTCOMMENTATTENDFT_PSY_A_CORE
IDD presentation.  Pt continues to be childlike, mimicking peers, expressing ideas of fantasy.  No overt mood episode/psychosis.  CR screening this Friday.

## 2024-09-25 NOTE — BH INPATIENT PSYCHIATRY PROGRESS NOTE - NSBHFUPINTERVALHXFT_PSY_A_CORE
No overnight events.  Pt today talks about travelling the whole world and [place] lockdown.  Pt also again talks about boyfriends and girlfriends. Mumbling and incoherent speech present.
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
## 2024-09-26 PROCEDURE — 99232 SBSQ HOSP IP/OBS MODERATE 35: CPT | Mod: GC

## 2024-09-26 NOTE — BH TREATMENT PLAN - NSCMSPTSTRENGTHS_PSY_ALL_CORE
Unable to obtain (specify)
Expressive of emotions
Expressive of emotions/Physically healthy
Expressive of emotions
Able to adapt/Compliance to treatment/Physically healthy
Expressive of emotions/Physically healthy
Expressive of emotions
Expressive of emotions/Physically healthy

## 2024-09-26 NOTE — BH TREATMENT PLAN - NSPTSTATEDGOAL_PSY_ALL_CORE
Get an apartment
"I want to move out of [facility]. To [place] or [place]. I don't want to go back to [place]."
Be discharged to a program
Be discharged to a program
[place]
"Go home".
Be discharged to a program
[place]
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
## 2024-09-26 NOTE — BH INPATIENT PSYCHIATRY PROGRESS NOTE - NSBHFUPINTERVALHXFT_PSY_A_CORE
No overnight events. Patient was found on phone talking to the imaginary /boyfriend. When asked, she says that 'all is good'. The patient is at her baseline cognitive and emotional state. Mumbling and incoherent speech present.

## 2024-09-26 NOTE — BH INPATIENT PSYCHIATRY PROGRESS NOTE - MSE UNSTRUCTURED FT
Appearance: Dressed appropriately in gowns/hospital clothing. Found on phone.  Behavior: Calm, cooperative.  Childlike demeanor.   Motor: No abnormal involuntary movements.  No psychomotor agitation or slowing.    Speech: Soft volume, mumbling incoherently at times.  Mood: Ok.   Affect: Full range, elevated at times, mostly neutral, reactive.   Thought Process: Tangential.   Associations: Loosening.  Thought Content: Continues to have ideas of fantasy with recurring themes (transfer to hospital, discharge, pregnancy, [place] boyfriend etc).    Insight: Limited.  Judgment: Fair on interview.  Attention: Fair.  Language: Fluent.  Gait: Intact.

## 2024-09-26 NOTE — BH INPATIENT PSYCHIATRY PROGRESS NOTE - NSBHATTESTCOMMENTATTENDFT_PSY_A_CORE
Pt today found standing in day room area, saluting.  Pt smiles and does not answer any questions.  Continue treatment plan as ordered.  Screening for housing tomorrow.

## 2024-09-27 PROCEDURE — 99232 SBSQ HOSP IP/OBS MODERATE 35: CPT

## 2024-09-27 PROCEDURE — 90832 PSYTX W PT 30 MINUTES: CPT

## 2024-09-27 NOTE — BH INPATIENT PSYCHIATRY PROGRESS NOTE - NSBHFUPINTERVALHXFT_PSY_A_CORE
chart reviewed including pertinent labs. Case discussed with nursing staff. compliant to medications. no behavioral issues. remains oddly related making bizarre nonsensical statements, at times talking to self and laughing to self. pulls me aside to whisper things I couldn't understand in a question format.
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
## 2024-09-27 NOTE — BH INPATIENT PSYCHIATRY PROGRESS NOTE - MSE UNSTRUCTURED FT
Appearance: Dressed appropriately in gowns/hospital clothing. Found on phone.  Behavior: Calm, cooperative.  Childlike demeanor.   Motor: No abnormal involuntary movements.  No psychomotor agitation or slowing.    Speech: Soft volume, mumbling incoherently at times.  Mood: Ok.   Affect: Full range, elevated at times, mostly neutral, reactive.   Thought Process: Tangential.   Associations: Loosening.  Thought Content: Continues to have ideas of fantasy with recurring themes (transfer to hospital, discharge, pregnancy, Sinhala boyfriend etc).    Insight: Limited.  Judgment: Fair on interview.  Attention: Fair.  Language: Fluent.  Gait: Intact.

## 2024-09-30 PROCEDURE — 99232 SBSQ HOSP IP/OBS MODERATE 35: CPT | Mod: GC

## 2024-09-30 NOTE — BH INPATIENT PSYCHIATRY PROGRESS NOTE - NSBHATTESTCOMMENTATTENDFT_PSY_A_CORE
Pt today appeared to be flirtatious with security, talking to self at times, discusses various previously noted themes and ideas about apartments and marriage.  May be in response to recent acuity on unit but also in response to housing screening this past Friday.  Will continue to monitor.  Still likely to be most consistent with IDD.  Continue SILVESTRE as ordered.

## 2024-09-30 NOTE — BH INPATIENT PSYCHIATRY PROGRESS NOTE - NSBHMETABOLIC_PSY_ALL_CORE_FT
BMI: BMI (kg/m2): 24.7 (08-31-24 @ 08:02)  HbA1c: A1C with Estimated Average Glucose Result: 6.1 % (01-14-24 @ 04:30)    Glucose: POCT Blood Glucose.: 57 mg/dL (04-15-24 @ 09:20)    BP: --Vital Signs Last 24 Hrs  T(C): 36.4 (09-30-24 @ 07:43), Max: 36.4 (09-30-24 @ 07:43)  T(F): 97.6 (09-30-24 @ 07:43), Max: 97.6 (09-30-24 @ 07:43)  HR: --  BP: --  BP(mean): --  RR: --  SpO2: --    Orthostatic VS  09-30-24 @ 07:43  Lying BP: --/-- HR: --  Sitting BP: 102/75 HR: 81  Standing BP: 116/70 HR: 81  Site: --  Mode: --    Lipid Panel: Date/Time: 01-14-24 @ 04:30  Cholesterol, Serum: 130  LDL Cholesterol Calculated: 61  HDL Cholesterol, Serum: 53  Total Cholesterol/HDL Ration Measurement: --  Triglycerides, Serum: 79

## 2024-09-30 NOTE — BH INPATIENT PSYCHIATRY PROGRESS NOTE - MSE UNSTRUCTURED FT
Appearance: Dressed appropriately in gowns/hospital clothing. Found on floor talking with security.  Behavior: Calm, cooperative.  Childlike demeanor.   Motor: No abnormal involuntary movements.  No psychomotor agitation or slowing.    Speech: Soft volume, mumbling incoherently at times.  Mood: Ok.   Affect: Full range, elevated at times, mostly neutral, reactive.   Thought Process: Tangential.   Associations: Loosening.  Thought Content: Continues to have ideas of fantasy with recurring themes (transfer to hospital, discharge, pregnancy, Indonesian boyfriend etc).    Insight: Limited.  Judgment: Fair on interview.  Attention: Fair.  Language: Fluent.  Gait: Intact.  Appearance: Dressed appropriately in gowns/hospital clothing. Found on floor talking with security.  Behavior: Calm, cooperative.  Childlike demeanor.  At times responding to internal stimuli.  Motor: No abnormal involuntary movements.  No psychomotor agitation or slowing.    Speech: Soft volume, mumbling incoherently at times.  Mood: Ok.   Affect: Full range, elevated at times, mostly neutral, reactive.   Thought Process: Tangential.   Associations: Loosening.  Thought Content: Continues to have ideas of fantasy with recurring themes (transfer to hospital, discharge, pregnancy, [place] boyfriend etc).    Insight: Limited.  Judgment: Fair on interview.  Attention: Fair.  Language: Fluent.  Gait: Intact.
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
## 2024-09-30 NOTE — BH INPATIENT PSYCHIATRY PROGRESS NOTE - NSBHCHARTREVIEWVS_PSY_A_CORE FT
Vital Signs Last 24 Hrs  T(C): 36.4 (09-30-24 @ 07:43), Max: 36.4 (09-30-24 @ 07:43)  T(F): 97.6 (09-30-24 @ 07:43), Max: 97.6 (09-30-24 @ 07:43)  HR: --  BP: --  BP(mean): --  RR: --  SpO2: --    Orthostatic VS  09-30-24 @ 07:43  Lying BP: --/-- HR: --  Sitting BP: 102/75 HR: 81  Standing BP: 116/70 HR: 81  Site: --  Mode: --

## 2024-09-30 NOTE — BH INPATIENT PSYCHIATRY PROGRESS NOTE - NSBHFUPINTERVALHXFT_PSY_A_CORE
Chart reviewed including pertinent labs. Case discussed with nursing staff. Compliant to medications. No behavioral issues over the weekend. On Friday (9/27), the patient met with the group home representatives. Per unit psychologists, the patient is excited about the move and said 'group home first' in his session. Today, the patient was found on the floor, with notable responses to internal stimuli during interview, turning her head away from the interviewer, gazing at the other direction and talking.  Chart reviewed including pertinent labs. Case discussed with nursing staff. Compliant to medications. No behavioral issues over the weekend. On Friday (9/27), the patient met with the group home representatives. Per unit psychologists, the patient is excited about the move and said 'group home first' in his session.

## 2024-10-01 PROCEDURE — 99232 SBSQ HOSP IP/OBS MODERATE 35: CPT

## 2024-10-01 RX ORDER — LORAZEPAM 4 MG/ML
2 VIAL (ML) INJECTION EVERY 6 HOURS
Refills: 0 | Status: DISCONTINUED | OUTPATIENT
Start: 2024-10-01 | End: 2024-10-08

## 2024-10-01 RX ORDER — LORAZEPAM 4 MG/ML
2 VIAL (ML) INJECTION ONCE
Refills: 0 | Status: DISCONTINUED | OUTPATIENT
Start: 2024-10-02 | End: 2024-10-08

## 2024-10-01 NOTE — BH INPATIENT PSYCHIATRY PROGRESS NOTE - MSE UNSTRUCTURED FT
Appearance: Dressed appropriately in gowns/hospital clothing. Found on phone.  Behavior: Calm, cooperative.  Childlike demeanor.  At times responding to internal stimuli.  Motor: No abnormal involuntary movements.  No psychomotor agitation or slowing.    Speech: Soft volume, mumbling incoherently at times.  Mood: Ok.   Affect: Full range, elevated at times, mostly neutral, reactive.   Thought Process: Tangential.   Associations: Loosening.  Thought Content: Continues to have ideas of fantasy with recurring themes (transfer to hospital, discharge, pregnancy, Kiswahili boyfriend etc).    Insight: Limited.  Judgment: Fair on interview.  Attention: Fair.  Language: Fluent.  Gait: Intact.

## 2024-10-01 NOTE — BH INPATIENT PSYCHIATRY PROGRESS NOTE - NSBHATTESTCOMMENTATTENDFT_PSY_A_CORE
Pt at this time has unchanged presentation, likely due to IDD.  Anticipate that pt may have acting out behaviors over next week, given that pt is at point in menstrual cycle where previous acting out was temporally correlated, also unit acuity has risen, and pt has new roommate.  Staff aware and will closely monitor.

## 2024-10-01 NOTE — BH INPATIENT PSYCHIATRY PROGRESS NOTE - NSBHMETABOLIC_PSY_ALL_CORE_FT
BMI: BMI (kg/m2): 24.7 (08-31-24 @ 08:02)  HbA1c: A1C with Estimated Average Glucose Result: 6.1 % (01-14-24 @ 04:30)    Glucose: POCT Blood Glucose.: 57 mg/dL (04-15-24 @ 09:20)    BP: --Vital Signs Last 24 Hrs  T(C): --  T(F): --  HR: --  BP: --  BP(mean): --  RR: --  SpO2: --    Orthostatic VS  09-30-24 @ 07:43  Lying BP: --/-- HR: --  Sitting BP: 102/75 HR: 81  Standing BP: 116/70 HR: 81  Site: --  Mode: --    Lipid Panel: Date/Time: 01-14-24 @ 04:30  Cholesterol, Serum: 130  LDL Cholesterol Calculated: 61  HDL Cholesterol, Serum: 53  Total Cholesterol/HDL Ration Measurement: --  Triglycerides, Serum: 79

## 2024-10-01 NOTE — BH INPATIENT PSYCHIATRY PROGRESS NOTE - NSBHCHARTREVIEWVS_PSY_A_CORE FT
Vital Signs Last 24 Hrs  T(C): --  T(F): --  HR: --  BP: --  BP(mean): --  RR: --  SpO2: --    Orthostatic VS  09-30-24 @ 07:43  Lying BP: --/-- HR: --  Sitting BP: 102/75 HR: 81  Standing BP: 116/70 HR: 81  Site: --  Mode: --
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
## 2024-10-01 NOTE — BH INPATIENT PSYCHIATRY PROGRESS NOTE - NSBHFUPINTERVALHXFT_PSY_A_CORE
Chart reviewed including pertinent labs. Case discussed with nursing staff. Compliant to medications. No behavioral issues reported. The patient was found on phone saying that she is talking with her  (no one was on line). When prompted, she shows excitement about the 'group home'. Shares loosely connected thought contents about 'seeing the whole world', '[place] boyfriend' and their apartment.

## 2024-10-02 PROCEDURE — 99232 SBSQ HOSP IP/OBS MODERATE 35: CPT | Mod: GC

## 2024-10-02 NOTE — BH INPATIENT PSYCHIATRY PROGRESS NOTE - NSBHFUPINTERVALHXFT_PSY_A_CORE
Chart reviewed including pertinent labs. Case discussed with nursing staff. Compliant to medications. No behavioral issues reported. The patient was found at her baseline cognitive and emotional functioning. Talks unintelligibly about her apartment, team meeting, and daily events.

## 2024-10-02 NOTE — BH INPATIENT PSYCHIATRY PROGRESS NOTE - NSBHATTESTCOMMENTATTENDFT_PSY_A_CORE
No new changes in presentation in this interval.  Etiology still remains IDD.  Continue plan as above.
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
## 2024-10-02 NOTE — BH INPATIENT PSYCHIATRY PROGRESS NOTE - MSE UNSTRUCTURED FT
Appearance: Dressed appropriately in gowns/hospital clothing. Found on phone.  Behavior: Calm, cooperative.  Childlike demeanor.  At times responding to internal stimuli.  Motor: No abnormal involuntary movements.  No psychomotor agitation or slowing.    Speech: Soft volume, mumbling incoherently at times.  Mood: Ok.   Affect: Full range, elevated at times, mostly neutral, reactive.   Thought Process: Tangential.   Associations: Loosening.  Thought Content: Continues to have ideas of fantasy with recurring themes (transfer to hospital, discharge, pregnancy, Sinhala boyfriend etc).    Insight: Limited.  Judgment: Fair on interview.  Attention: Fair.  Language: Fluent.  Gait: Intact.

## 2024-10-03 PROCEDURE — 99232 SBSQ HOSP IP/OBS MODERATE 35: CPT | Mod: GC
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
## 2024-10-03 NOTE — BH INPATIENT PSYCHIATRY PROGRESS NOTE - MSE UNSTRUCTURED FT
Appearance: Dressed appropriately in gowns/hospital clothing. Found on the table eating breakfast.  Behavior: Calm, cooperative.  Childlike demeanor.  At times responding to internal stimuli.  Motor: No abnormal involuntary movements.  No psychomotor agitation or slowing.    Speech: Soft volume, mumbling incoherently at times.  Mood: Ok.   Affect: Full range, elevated at times, mostly neutral, reactive.   Thought Process: Tangential.   Associations: Loosening.  Thought Content: Continues to have ideas of fantasy with recurring themes (moving to apartment with boyfriend)    Insight: Limited.  Judgment: Fair on interview.  Attention: Fair.  Language: Fluent.  Gait: Intact.

## 2024-10-03 NOTE — BH INPATIENT PSYCHIATRY PROGRESS NOTE - NSBHFUPINTERVALHXFT_PSY_A_CORE
Chart reviewed including pertinent labs. Case discussed with nursing staff. Compliant to medications. No behavioral issues reported. The patient was found at her baseline cognitive and emotional functioning. Found in the hallway eating breakfast. Mumbles and whispers mostly unintelligibly. Found to be at baseline emotional and cognitive state.   Chart reviewed including pertinent labs. Case discussed with nursing staff. Compliant to medications. No behavioral issues reported. Pt today talked about her upcoming discharge, writer's shoes, and whether staff were going through divorces.

## 2024-10-03 NOTE — BH INPATIENT PSYCHIATRY PROGRESS NOTE - NSBHATTESTCOMMENTATTENDFT_PSY_A_CORE
Pt continues to have disorganization and odd thought content most attributable to IDD.  Continue meds.  [facility] not accepted to [facility] that recently screened [facility].  Unclear dispo at this time.

## 2024-10-03 NOTE — BH INPATIENT PSYCHIATRY PROGRESS NOTE - NSBHMETABOLIC_PSY_ALL_CORE_FT
BMI: BMI (kg/m2): 24.7 (08-31-24 @ 08:02)  HbA1c: A1C with Estimated Average Glucose Result: 6.1 % (01-14-24 @ 04:30)    Glucose: POCT Blood Glucose.: 57 mg/dL (04-15-24 @ 09:20)    BP: --Vital Signs Last 24 Hrs  T(C): 36.3 (10-03-24 @ 08:01), Max: 36.3 (10-03-24 @ 08:01)  T(F): 97.4 (10-03-24 @ 08:01), Max: 97.4 (10-03-24 @ 08:01)  HR: --  BP: --  BP(mean): --  RR: --  SpO2: --    Orthostatic VS  10-03-24 @ 08:01  Lying BP: --/-- HR: --  Sitting BP: 110/64 HR: 83  Standing BP: 110/66 HR: 93  Site: --  Mode: --    Lipid Panel: Date/Time: 01-14-24 @ 04:30  Cholesterol, Serum: 130  LDL Cholesterol Calculated: 61  HDL Cholesterol, Serum: 53  Total Cholesterol/HDL Ration Measurement: --  Triglycerides, Serum: 79

## 2024-10-03 NOTE — BH INPATIENT PSYCHIATRY PROGRESS NOTE - MSE UNSTRUCTURED FT
Paperwork is on my desk.She does need to complete the top portion still before it can be sent in.  Appearance: Dressed appropriately.    Behavior: Cooperative.    Motor: No abnormal movements, no psychomotor slowing or activation.  Speech: Regular rate.  Mood: Good  Affect: Pleasant.  Thought Process: Lee but linear.   Associations: Fair.  Thought Content: No AVH, SIIP, HIIP.  Insight: Limited.  Judgment: Fair on interview.  Attention: Fair.  Language: Fluent.  Gait/station: seated.

## 2024-10-03 NOTE — BH INPATIENT PSYCHIATRY PROGRESS NOTE - NSBHCHARTREVIEWVS_PSY_A_CORE FT
Vital Signs Last 24 Hrs  T(C): 36.3 (10-03-24 @ 08:01), Max: 36.3 (10-03-24 @ 08:01)  T(F): 97.4 (10-03-24 @ 08:01), Max: 97.4 (10-03-24 @ 08:01)  HR: --  BP: --  BP(mean): --  RR: --  SpO2: --    Orthostatic VS  10-03-24 @ 08:01  Lying BP: --/-- HR: --  Sitting BP: 110/64 HR: 83  Standing BP: 110/66 HR: 93  Site: --  Mode: --

## 2024-10-04 PROCEDURE — 99232 SBSQ HOSP IP/OBS MODERATE 35: CPT

## 2024-10-04 NOTE — BH INPATIENT PSYCHIATRY PROGRESS NOTE - NSBHMETABOLIC_PSY_ALL_CORE_FT
BMI: BMI (kg/m2): 24.7 (08-31-24 @ 08:02)  HbA1c: A1C with Estimated Average Glucose Result: 6.1 % (01-14-24 @ 04:30)    Glucose: POCT Blood Glucose.: 57 mg/dL (04-15-24 @ 09:20)    BP: --Vital Signs Last 24 Hrs  T(C): --  T(F): --  HR: --  BP: --  BP(mean): --  RR: --  SpO2: --    Orthostatic VS  10-03-24 @ 08:01  Lying BP: --/-- HR: --  Sitting BP: 110/64 HR: 83  Standing BP: 110/66 HR: 93  Site: --  Mode: --    Lipid Panel: Date/Time: 01-14-24 @ 04:30  Cholesterol, Serum: 130  LDL Cholesterol Calculated: 61  HDL Cholesterol, Serum: 53  Total Cholesterol/HDL Ration Measurement: --  Triglycerides, Serum: 79

## 2024-10-04 NOTE — BH INPATIENT PSYCHIATRY PROGRESS NOTE - MSE UNSTRUCTURED FT
Appearance: Dressed appropriately in gowns/hospital clothing. Found standing next to phones.  Behavior: Not cooperative.  Childlike demeanor.  Internally preoccupied.  Motor: No abnormal involuntary movements.  No psychomotor agitation or slowing.    Speech: Does not speak.  Mood: Does not give mood.  Affect: Appears frustrated.   Thought Process: Unable to assess.  Associations: Unable to assess.  Thought Content: Unable to assess.  Insight: Unable to assess.  Judgment: Poor.   Attention: Poor.   Language: Unable to assess.  Gait: Intact.

## 2024-10-04 NOTE — BH INPATIENT PSYCHIATRY PROGRESS NOTE - NSBHFUPINTERVALHXFT_PSY_A_CORE
No overnight events.  Pt today found at phones area.  When approached and greeted, pt looks at writer, looks at phone, then turns away and does not answer any questions.

## 2024-10-04 NOTE — BH INPATIENT PSYCHIATRY PROGRESS NOTE - NSBHCHARTREVIEWVS_PSY_A_CORE FT
Vital Signs Last 24 Hrs  T(C): --  T(F): --  HR: --  BP: --  BP(mean): --  RR: --  SpO2: --    Orthostatic VS  10-03-24 @ 08:01  Lying BP: --/-- HR: --  Sitting BP: 110/64 HR: 83  Standing BP: 110/66 HR: 93  Site: --  Mode: --
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
## 2024-10-07 PROCEDURE — 99232 SBSQ HOSP IP/OBS MODERATE 35: CPT
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
## 2024-10-07 NOTE — BH INPATIENT PSYCHIATRY PROGRESS NOTE - MSE UNSTRUCTURED FT
Appearance: Dressed appropriately in gowns/hospital clothing. Found sitting on her bed.  Behavior: Not cooperative.  Childlike demeanor.  Internally preoccupied.  Motor: No abnormal involuntary movements.  No psychomotor agitation or slowing.    Speech: Does not speak.  Mood: Does not give mood.  Affect: Appears calm.   Thought Process: Unable to assess.  Associations: Unable to assess.  Thought Content: Unable to assess.  Insight: Poor.  Judgment: Poor.   Attention: Poor.   Language: Unable to assess.  Gait: Intact.  Appearance: Dressed appropriately in gowns/hospital clothing. Found sitting on her bed.  Behavior: Not cooperative.  Childlike demeanor.  Internally preoccupied.  Motor: No abnormal involuntary movements.  No psychomotor agitation or slowing.    Speech: Impoverished. Normal rate. Often loud.   Mood: 'Good'.  Affect: Appears calm.   Thought Process: Incoherent.  Associations: Loose.  Thought Content: Influenced by milieu including constructions on the unit.  Insight: Poor.  Judgment: Poor.   Attention: Poor.   Language: Unable to assess.  Gait: Intact.

## 2024-10-07 NOTE — BH INPATIENT PSYCHIATRY PROGRESS NOTE - NSBHATTESTCOMMENTATTENDFT_PSY_A_CORE
Today pt continues to be focused on getting a new apartment.  Remains childlike with ideas of fantasy.  Continue meds as ordered.  Unclear dispo at this time.

## 2024-10-07 NOTE — BH INPATIENT PSYCHIATRY PROGRESS NOTE - NSBHFUPINTERVALHXFT_PSY_A_CORE
No weekend events. Patient was seen and the case discussed with the staff. Patient found in her room hesitant to come out to the hallway. When asked why she stays in the room, laughs loudly and does not respond to questions.  No weekend events. Patient was seen and the case discussed with the staff. Patient found in her room hesitant to come out to the hallway. When asked why she stays in the room, laughs loudly. Points to the ceiling and says 'temperature'.

## 2024-10-08 PROCEDURE — 99232 SBSQ HOSP IP/OBS MODERATE 35: CPT | Mod: GC

## 2024-10-08 RX ORDER — LORAZEPAM 4 MG/ML
2 VIAL (ML) INJECTION EVERY 6 HOURS
Refills: 0 | Status: DISCONTINUED | OUTPATIENT
Start: 2024-10-08 | End: 2024-10-15

## 2024-10-08 RX ORDER — LORAZEPAM 4 MG/ML
2 VIAL (ML) INJECTION ONCE
Refills: 0 | Status: DISCONTINUED | OUTPATIENT
Start: 2024-10-08 | End: 2024-10-15

## 2024-10-08 NOTE — BH INPATIENT PSYCHIATRY PROGRESS NOTE - NSBHATTESTCOMMENTATTENDFT_PSY_A_CORE
Pt currently fixated on [place], stating she also wants a [place] apartment.  Presentation continues to be most consistent with IDD.  Continue tx plan as ordered.

## 2024-10-08 NOTE — BH INPATIENT PSYCHIATRY PROGRESS NOTE - MSE UNSTRUCTURED FT
Appearance: Dressed appropriately in gowns/hospital clothing. Found sitting on her bed.  Behavior: Not cooperative.  Childlike demeanor.  Internally preoccupied.  Motor: No abnormal involuntary movements.  No psychomotor agitation or slowing.    Speech: Normal rate. Often loud.   Mood: 'Good'.  Affect: Appears calm.   Thought Process: Incoherent. Word salad.   Associations: Loose.  Thought Content: Influenced by milieu including constructions on the unit.  Insight: Poor.  Judgment: Poor.   Attention: Poor.   Language: Unable to assess.  Gait: Intact.
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
## 2024-10-08 NOTE — BH INPATIENT PSYCHIATRY PROGRESS NOTE - NSBHMETABOLIC_PSY_ALL_CORE_FT
BMI: BMI (kg/m2): 24.7 (08-31-24 @ 08:02)  HbA1c: A1C with Estimated Average Glucose Result: 6.1 % (01-14-24 @ 04:30)    Glucose: POCT Blood Glucose.: 57 mg/dL (04-15-24 @ 09:20)    BP: --Vital Signs Last 24 Hrs  T(C): 37 (10-08-24 @ 07:48), Max: 37 (10-08-24 @ 07:48)  T(F): 98.6 (10-08-24 @ 07:48), Max: 98.6 (10-08-24 @ 07:48)  HR: --  BP: --  BP(mean): --  RR: --  SpO2: --    Orthostatic VS  10-08-24 @ 07:48  Lying BP: --/-- HR: --  Sitting BP: 120/76 HR: 90  Standing BP: 121/75 HR: 88  Site: --  Mode: --    Lipid Panel: Date/Time: 01-14-24 @ 04:30  Cholesterol, Serum: 130  LDL Cholesterol Calculated: 61  HDL Cholesterol, Serum: 53  Total Cholesterol/HDL Ration Measurement: --  Triglycerides, Serum: 79

## 2024-10-08 NOTE — BH INPATIENT PSYCHIATRY PROGRESS NOTE - NSBHCHARTREVIEWVS_PSY_A_CORE FT
Vital Signs Last 24 Hrs  T(C): 37 (10-08-24 @ 07:48), Max: 37 (10-08-24 @ 07:48)  T(F): 98.6 (10-08-24 @ 07:48), Max: 98.6 (10-08-24 @ 07:48)  HR: --  BP: --  BP(mean): --  RR: --  SpO2: --    Orthostatic VS  10-08-24 @ 07:48  Lying BP: --/-- HR: --  Sitting BP: 120/76 HR: 90  Standing BP: 121/75 HR: 88  Site: --  Mode: --
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
## 2024-10-09 PROCEDURE — 99232 SBSQ HOSP IP/OBS MODERATE 35: CPT

## 2024-10-09 NOTE — BH INPATIENT PSYCHIATRY PROGRESS NOTE - NSBHMETABOLIC_PSY_ALL_CORE_FT
BMI: BMI (kg/m2): 24.7 (08-31-24 @ 08:02)  HbA1c: A1C with Estimated Average Glucose Result: 6.1 % (01-14-24 @ 04:30)    Glucose: POCT Blood Glucose.: 57 mg/dL (04-15-24 @ 09:20)    BP: --Vital Signs Last 24 Hrs  T(C): --  T(F): --  HR: --  BP: --  BP(mean): --  RR: --  SpO2: --    Orthostatic VS  10-08-24 @ 07:48  Lying BP: --/-- HR: --  Sitting BP: 120/76 HR: 90  Standing BP: 121/75 HR: 88  Site: --  Mode: --    Lipid Panel: Date/Time: 01-14-24 @ 04:30  Cholesterol, Serum: 130  LDL Cholesterol Calculated: 61  HDL Cholesterol, Serum: 53  Total Cholesterol/HDL Ration Measurement: --  Triglycerides, Serum: 79

## 2024-10-09 NOTE — BH INPATIENT PSYCHIATRY PROGRESS NOTE - MSE UNSTRUCTURED FT
Appearance: Dressed appropriately in gowns/hospital clothing.  Behavior: Not cooperative.  Childlike demeanor.  Found in phone area.  Motor: No abnormal involuntary movements.  No psychomotor agitation or slowing.    Speech: Normal rate, soft volume.   Mood: Does not give mood.  Affect: Elated, stable.   Thought Process: [place] but repetitive.   Associations: Intact.   Thought Content: Ideas of fantasy.  Insight: Poor.  Judgment: Limited.   Attention: Limited.   Language: Fluent today.   Gait: Intact.

## 2024-10-09 NOTE — BH INPATIENT PSYCHIATRY PROGRESS NOTE - NSBHFUPINTERVALHXFT_PSY_A_CORE
No overnight events.  Pt today states that she will be leaving at end of month to move into a new apartment with her  and kids.

## 2024-10-09 NOTE — BH INPATIENT PSYCHIATRY PROGRESS NOTE - NSBHCHARTREVIEWVS_PSY_A_CORE FT
Vital Signs Last 24 Hrs  T(C): --  T(F): --  HR: --  BP: --  BP(mean): --  RR: --  SpO2: --    Orthostatic VS  10-08-24 @ 07:48  Lying BP: --/-- HR: --  Sitting BP: 120/76 HR: 90  Standing BP: 121/75 HR: 88  Site: --  Mode: --

## 2024-10-10 PROCEDURE — 99232 SBSQ HOSP IP/OBS MODERATE 35: CPT | Mod: GC

## 2024-10-10 PROCEDURE — 99232 SBSQ HOSP IP/OBS MODERATE 35: CPT

## 2024-10-10 NOTE — BH TREATMENT PLAN - NSTXIMPULSDATETRGT_PSY_ALL_CORE
10-Apr-2024
01-May-2024
02-Oct-2024
07-Mar-2024
04-Apr-2024
28-Aug-2024
06-Mar-2024
04-Apr-2024
06-Jun-2024
07-Mar-2024
10-Apr-2024
16-Feb-2024
31-Jul-2024
02-Oct-2024
01-May-2024
06-Jun-2024
16-Feb-2024
17-Oct-2024
08-Aug-2024
08-Aug-2024
25-Jun-2024
02-Oct-2024
06-Jun-2024
31-Jul-2024
01-May-2024
23-May-2024
31-Jul-2024
22-Aug-2024
06-Jun-2024
07-Mar-2024

## 2024-10-10 NOTE — BH TREATMENT PLAN - NSTXPATIENTPARTICIPATE_PSY_ALL_CORE
No, patient unwilling to participate
Patient participated in identification of needs/problems/goals for treatment/Patient participated in defining interventions
Patient participated in identification of needs/problems/goals for treatment
No, patient unwilling to participate
No, patient unwilling to participate
Patient participated in identification of needs/problems/goals for treatment/Patient participated in defining interventions/Patient participated in development of after care plan
No, patient unwilling to participate
Patient participated in identification of needs/problems/goals for treatment
No, patient unwilling to participate
Patient participated in identification of needs/problems/goals for treatment
Patient participated in identification of needs/problems/goals for treatment/Patient participated in defining interventions
No, patient unwilling to participate
Patient participated in identification of needs/problems/goals for treatment
Patient participated in identification of needs/problems/goals for treatment
Patient participated in identification of needs/problems/goals for treatment/Patient participated in defining interventions/Patient participated in development of after care plan
Patient participated in identification of needs/problems/goals for treatment
No, patient unwilling to participate
No, patient unwilling to participate
Patient participated in identification of needs/problems/goals for treatment
Patient participated in identification of needs/problems/goals for treatment/Patient participated in defining interventions
Patient participated in identification of needs/problems/goals for treatment/Patient participated in defining interventions/Patient participated in development of after care plan
Patient participated in identification of needs/problems/goals for treatment
Patient participated in identification of needs/problems/goals for treatment
No, patient unwilling to participate
Patient participated in defining interventions
Patient participated in identification of needs/problems/goals for treatment
Patient participated in identification of needs/problems/goals for treatment/Patient participated in defining interventions/Patient participated in development of after care plan
Patient participated in identification of needs/problems/goals for treatment/Patient participated in defining interventions
Patient participated in identification of needs/problems/goals for treatment/Patient participated in defining interventions/Patient participated in development of after care plan

## 2024-10-10 NOTE — BH TREATMENT PLAN - NSBHPRIMARYDX_PSY_ALL_CORE
Intellectual disability    
Mood disorder    
Intellectual disability    
Mood disorder    
Intellectual disability    

## 2024-10-10 NOTE — BH TREATMENT PLAN - NSTXPSYCHOINTERRN_PSY_ALL_CORE
RNH will assess patient for s/s of psychosis and orientate patient as needed
Nurse will monitor pt for alterations in behavior and maintain safety on the unit.
nurse will monitor pt for changes in behavior and redirect pt as needed.
RN will provide safe environment and redirection during psychotic episode
nurse will monitor pt for changes in behavior and redirect pt as needed.
RN will assist patient in identifying coping skills to help manage stress
RN will assess patient and orientate patient as needed
provided redirection and reality orientation as tolerated
Nurse will monitor pt for changes in behavior and maintain safety on the unit.
Nurse will monitor pt for changes in behavior and redirect pt as needed.
RN will provide safe environment and redirection during psychotic episode
Nurse will monitor pt for changes in behavior and maintain safety on the unit.
Pt educated to take all medications to reduce psychotic symptoms.
RN will assess patient and orientate patient as needed
Nurse will monitor pt for changes in behavior and redirect pt as needed.
provided redirection and reality orientation as tolerated
Nurse will monitor pt for changes in behavior and maintain safety on the unit.
Nurse will monitor pt for changes in behavior and maintain safety on the unit.

## 2024-10-10 NOTE — BH TREATMENT PLAN - NSTXPATIENTAGREEMENT_PSY_ALL_CORE
Patient unable to participate
Yes
Patient unable to participate
No
Yes
Yes
Patient unable to participate
Patient unable to participate
Yes
Patient unable to participate
Yes
Patient unable to participate
No
Patient unable to participate
Patient unable to participate
Yes
Yes
Patient unable to participate
Yes
Yes
Patient unable to participate
Yes

## 2024-10-10 NOTE — BH TREATMENT PLAN - NSTXVIOLNTDATETRGT_PSY_ALL_CORE
28-Aug-2024
06-Jun-2024
10-Apr-2024
17-Oct-2024
24-Jul-2024
10-Oct-2024
23-May-2024
06-Mar-2024
07-Mar-2024
01-May-2024
08-Aug-2024
22-Aug-2024
08-Aug-2024
02-Oct-2024
06-Jun-2024
10-Apr-2024
01-May-2024
04-Apr-2024
07-Mar-2024
10-Apr-2024
24-Jul-2024
24-Jul-2024
16-Feb-2024
01-May-2024
19-Sep-2024
06-Jun-2024
07-Mar-2024
25-Jun-2024
06-Jun-2024
16-Feb-2024

## 2024-10-10 NOTE — BH TREATMENT PLAN - NSTXDCHOUSDATEEST_PSY_ALL_CORE
17-Apr-2024
17-Apr-2024
29-Aug-2024
17-Apr-2024
28-Mar-2024
12-Feb-2024
29-Aug-2024
12-Feb-2024
29-Aug-2024
17-Apr-2024
21-Feb-2024
29-Aug-2024
12-Feb-2024
17-Apr-2024
20-Mar-2024
29-Aug-2024
12-Feb-2024
17-Apr-2024
17-Apr-2024
12-Feb-2024
17-Apr-2024
12-Feb-2024
17-Apr-2024

## 2024-10-10 NOTE — BH TREATMENT PLAN - NSTXPSYCHOPROGRES_PSY_ALL_CORE
Met - goal discontinued
Improving
Met - goal discontinued
No Change
Met - goal discontinued
Improving
Met - goal discontinued

## 2024-10-10 NOTE — BH TREATMENT PLAN - NSTXIMPULSGOAL_PSY_ALL_CORE
Will be able to demonstrate the ability to pause before acting out negatively

## 2024-10-10 NOTE — BH TREATMENT PLAN - NSTXDCHOUSDATETRGT_PSY_ALL_CORE
02-Oct-2024
06-Mar-2024
14-Aug-2024
18-Jun-2024
24-Jul-2024
03-Oct-2024
05-Mar-2024
10-Apr-2024
07-Aug-2024
04-Jun-2024
19-Feb-2024
14-Mar-2024
11-Jun-2024
14-May-2024
16-Oct-2024
22-Aug-2024
03-Apr-2024
05-Sep-2024
03-Oct-2024
10-Apr-2024
28-May-2024
25-Jun-2024
30-Apr-2024
07-May-2024
17-Apr-2024
02-Jul-2024
20-Mar-2024
31-Jul-2024
31-Jul-2024

## 2024-10-10 NOTE — BH TREATMENT PLAN - NSTXPSYCHODATEEST_PSY_ALL_CORE
17-Apr-2024
17-Apr-2024
21-Feb-2024
21-Feb-2024
20-Mar-2024
09-Feb-2024
17-Apr-2024
28-Mar-2024
17-Apr-2024
17-Apr-2024
28-Mar-2024
17-Apr-2024
28-Mar-2024
17-Apr-2024
17-Apr-2024
09-Feb-2024
21-Feb-2024
17-Apr-2024
21-Feb-2024
17-Apr-2024

## 2024-10-10 NOTE — BH TREATMENT PLAN - NSDCCRITERIA_PSY_ALL_CORE
Safe discharge planning. 
When pt is no longer an acute or imminent risk of harm to self or others, and is able to care for self safely, pt may then be discharged. 
When pt is no longer an acute or imminent risk of harm to self or others, and is able to care for self safely, pt may then be discharged. 
Safe discharge planning. 
When pt is no longer an acute or imminent risk of harm to self or others, and is able to care for self safely, pt may then be discharged. 
Safe discharge planning. 
Safe discharge planning. 
CGI < 3 
When pt is no longer an acute or imminent risk of harm to self or others, and is able to care for self safely, pt may then be discharged. 
Safe discharge. 
When pt is no longer an acute or imminent risk of harm to self or others, and is able to care for self safely, pt may then be discharged. 
CGI < 3 
When pt is no longer an acute or imminent risk of harm to self or others, and is able to care for self safely, pt may then be discharged. 
Safe discharge planning. 
When pt is no longer an acute or imminent risk of harm to self or others, and is able to care for self safely, pt may then be discharged. 
Safe discharge planning. 
Safe discharge planning. 
When pt is no longer an acute or imminent risk of harm to self or others, and is able to care for self safely, pt may then be discharged. 
When pt is no longer an acute or imminent risk of harm to self or others, and is able to care for self safely, pt may then be discharged. 
Safe discharge planning. 
When pt is no longer an acute or imminent risk of harm to self or others, and is able to care for self safely, pt may then be discharged. 
Safe discharge planning. 
CGI < 3

## 2024-10-10 NOTE — BH TREATMENT PLAN - NSTXVIOLNTGOAL_PSY_ALL_CORE
Will be able to express understanding of at least one trigger to their aggressive behavior

## 2024-10-10 NOTE — BH TREATMENT PLAN - NSTXDCHOUSINTERSW_PSY_ALL_CORE
SW will work with Family Care Coordinator on appropriate housing.
SW will work with Family Care Coordinator on appropriate housing.
SW will continue to follow up on housing process.
SW will work with Family Care Coordinator on appropriate housing.
SW will make proper referrals so that pt can obtain appropriate housing.
SW will work with Family Care Coordinator on appropriate housing.
SW will work with Family Care Coordinator, [facility] and admins on securing appropriate housing.
SW will work with Family Care Coordinator on appropriate housing.
SW will work with Family Care Coordinator on appropriate housing.
SW will coordinate with [facility] so pt can obtain appropriate housing.
SW will work with Family Care Coordinator on appropriate housing.
SW will work with Family Care Coordinator, [facility] and admins on appropriate housing.
SW will work with Family Care Coordinator on appropriate housing.
SW will work with Family Care Coordinator, [facility] and admins on securing appropriate housing.
SW will coordinate with [facility] so pt can obtain appropriate housing and involve pt.
SW will work with Family Care Coordinator on appropriate housing.
SW will work with Family Care Coordinator, [facility] and admins on securing appropriate housing.
SW will work with Family Care Coordinator on appropriate housing.
SW will work with Family Care Coordinator on appropriate housing.
SW will coordinate with [facility] so pt can obtain appropriate housing.
SW will work with Family Care Coordinator, [facility] and admins on appropriate housing.
SW will work with Family Care Coordinator on appropriate housing.
SW will make proper referrals so that pt can obtain appropriate housing.
SW will make proper referrals so that pt can obtain appropriate housing.

## 2024-10-10 NOTE — BH TREATMENT PLAN - NSTXDCHOUSPROGRES_PSY_ALL_CORE
No Change
Improving
No Change
Improving
No Change
Improving
No Change
No Change
Improving
Improving
No Change
Improving
No Change

## 2024-10-10 NOTE — BH TREATMENT PLAN - NSTXCONDUCINTERPR_PSY_ALL_CORE
Patient has demonstrated minimal progress towards this goal. Patient has been observed to be slightly less confrontational towards peers when she becomes stressed/agitated. Psych rehab recommends patient demonstrates treatment compliance in order to facilitate symptom reduction over the next seven days.
Psych rehab recommends that patient attend individual and group therapy for support, psychoeducation and skill-integration as well as to facilitate progress towards specified goal.
Patient continues to present disorganized and was not receptive to meeting with writer today, telling writer to “go away”. Patient was dressed in hospital gown and appeared poorly groomed and malodorous. Upon review, patient was demonstrated no progress towards this goal. Throughout the week, patient has been observed to become easily agitated by other peers but was receptive to staff redirection. Patient has attended minimal groups over the past seven days. In group sessions, patient has trouble tolerating group structure and requires redirection. Psych rehab staff will continue to support patient in both individual and group therapy sessions to facilitate continued progress towards goal.
Patient continues to present disorganized, irritable and was not receptive to meeting with writer today. At writers’ approach, patient did not respond to writer and only stared. Patient was dressed in hospital gown and appeared poorly groomed and malodorous. Upon review, patient has demonstrated no progress towards this goal. Throughout the week, patient has been observed to become easily agitated by other peers and has been more irritable with staff, especially when redirected and prompted to attend to ADL’s. Patient has attended minimal groups over the past seven days. In group sessions, patient has trouble tolerating group structure and requires redirection. Psych rehab staff will continue to support patient in both individual and group therapy sessions to facilitate continued progress towards goal.
Patient will be encouraged to attend daily groups and develop effective coping skills. Psych rehab staff will continue to meet with patient regularly in order to provide ongoing support and encouragement.
During today’s meeting, patient presented disorganized and irritable. Patient was dressed in hospital gown and appeared poorly groomed and malodorous. Upon review, patient was demonstrated no progress towards this goal. Patient was unable to identify adaptive coping skills when faced with stress and demonstrated poor insight into behaviors when faced with stress. Patient would not answer any of writers qyuestions and dennyiuld instead tell writer about patients "upcoming discharge" on Tuesday (patient is not scheduled for discharged). Throughout the week, patient was observed yelling and to have banged on the nursing station window. Patient has attended minimal groups over the past seven days. In group sessions, patient has trouble tolerating group structure and requires redirection. Psych rehab staff will continue to support patient in both individual and group therapy sessions to facilitate continued progress towards goal.
Writer attempted to meet with patient to assess patients progress towards specified goal of developing more adaptive coping skills to manage stress. Patient refused to engage with writer and kept her head turned away when writers approached her. Patient continues to be observed to be in fair behavioral control. At times of stress, patient has been observed to be irritable, raise voice at others or walk away from interactions. Patient has not attended any psychiatric rehabilitation groups over the past seven days. Psych rehab will continue to work with patient to provide support.
Psych rehab recommends that patient attend individual and group therapy for support, psychoeducation and skill-integration as well as to facilitate progress towards specified goal.
Patient has demonstrated minimal progress towards this goal. Psych rehab recommends patient demonstrates treatment compliance in order to facilitate symptom reduction over the next seven days.
Patient has not demonstrated progress towards this goal. Psych rehab recommends patient demonstrates treatment compliance in order to facilitate symptom reduction over the next seven days.
Patient remains essentially the same this week. Patient continues to soend much of her time in her room with minimal contact with peers. Patient attends leisure groups on occasion. Patient continues to appear unkempt in hospital gowns. Patient was asleep on approach and therefore was unable to provide a CGI rating.
Psychiatric rehabilitation staff will provide encouragement, support, and psychoeducation to assist the patient in the progression of her treatment goal.
Patient has been observed to be increasingly worse with coping when stress. Psych rehab recommends patient increases engagement in individual and group therapy sessions in order for patient to gain psychoeducation, psychotherapy and support.
Psychiatric rehabilitation staff will provide encouragement, support, and psychoeducation to assist the patient in the progression of her treatment goal.
During today’s meeting, patient presented disorganized and irritable. Patient was dressed in hospital gown and appeared poorly grooms. Upon review, patient was demonstrated no progress towards this goal. Patient was unable to identify adaptive coping skills when faced with stress and demonstrated poor insight into behaviors when faced with stress. Patient was also observed to yell loudly throughout unit, which resulted in patient requiring IM medications. Patient has attended minimal groups over the past seven days. In group sessions, patient has trouble tolerating group structure and requires redirection. Psych rehab staff will continue to support patient in both individual and group therapy sessions to facilitate continued progress towards goal.
Patient has not demonstrated progress towards this goal. Psych rehab recommends patient demonstrates treatment compliance in order to facilitate symptom reduction over the next seven days.
Psych rehab recommends that patient attend individual and group therapy for support, psychoeducation and skill-integration as well as to facilitate progress towards specified goal.
Writer attempted to meet with patient to assess progress towards psychiatric rehabilitation goal. Patient declined to speak with writer, therefore, the following will be presented from patient's chart and writer's observations over the past seven days. Patient has made minimal progress towards psychiatric rehabilitation goal of developing more adaptive coping skills to manage stress. Patient minimally attends psychiatric rehabilitation groupsdespite staff encouragement. Psychiatric rehabilitation recommends patient continue to attend groups and activities in the milieu and participate in 1:1 engagement to continue exploring coping skills to manage symptoms.
Patient has demonstrated some progress towards this goal. Psych rehab recommends patient continues to demonstrated treatment compliance in order for patient to experience symptom reduction over the next seven days.
During time of engagement, patient presented with elevated mood and disorganized thought content. Patient was dressed in hospital gown and was unkempt.  Upon review, patient has made no change towards specified goal as evidenced by patient's difficulty to identify coping strategies to cope with stress. Within the group setting, patient has been rarely participatory, and at times requires redirection. Patient has attended few groups. Psychiatric rehabilitation staff will continue to support patient via 1:1 engagement and encourage programming attendance in effort to facilitate continued progress towards goal.
During today’s meeting, patient presented disorganized. Patient was dressed in hospital gown and appeared poorly grooms. Upon review, patient was demonstrated minimal progress towards this goal. Patient was unable to identify adaptive coping skills when faced with stress and demonstrated poor insight into behaviors when faced with stress. Patient has attended minimal groups over the past seven days. In group sessions, patient has trouble tolerating group structure and requires redirection. Psych rehab staff will continue to support patient in both individual and group therapy sessions to facilitate continued progress towards goal.
Writer met with patient to assess progress towards psychiatric rehabilitation goal. Patient was receptive to meeting with writer and superficially engaged in session. Patient has demonstrated minimal progress towards psychiatric rehabilitation goal of developing more adpative coping skills to manage stress. Patient minimally attends psychiatric rehabilitation groups despite staff encouragement. Psychiatric rehabilitation staff recommends patient continue to attend groups and activities on the milieu and participate in 1:1 engagement to continue exploring coping skills to manage symptoms.
Patient has not demonstrated progress towards this goal. Psych rehab recommends patient demonstrates treatment compliance in order to facilitate symptom reduction over the next seven days.
Psych rehab recommends that patient attend individual and group therapy for support, psychoeducation and skill-integration as well as to facilitate progress towards specified goal.
Psych rehab recommends that patient attend individual and group therapy for support, psychoeducation and skill-integration as well as to facilitate progress towards specified goal.
Patient has not demonstrated progress towards this goal. Psych rehab recommends patient demonstrates treatment compliance in order to facilitate symptom reduction over the next seven days.
Patient has not demonstrated progress towards this goal. Psych rehab recommends patient demonstrates treatment compliance in order to facilitate symptom reduction over the next seven days.
Pt was unable/unwilling to discuss coping skills related to pt's symptoms, however was able to report coping skills related to providing relaxation. Pt reported watching television, listening to music, utilizing a stress ball, and socializing.
Patient has demonstrated progress towards this goal. Psych rehab recommends patients increases treatment compliance in order for patient to experience symptom reduction over the next seven days.
Writer met with patient to assess patients progress towards specified goal of developing more adaptive coping skills to manage stress. Patient continues to present disorganized and focused on being discharged to “a chemo apartment”. Patient appeared unkempt and was malodorous. Upon review, patient was demonstrated minimal progress towards this goal. Throughout the week, patient has been observed to become easily agitated by other peers but was receptive to staff redirection. During interaction with patient today, writer observed patient to become dysregulated due to another peer being near her and observed patient yelled “NO” and put her head down. Patient has attended minimal groups over the past seven days. In group sessions, patient has trouble tolerating group structure and requires redirection. Psych rehab staff will continue to support patient in both individual and group therapy sessions to facilitate continued progress towards goal.

## 2024-10-10 NOTE — BH TREATMENT PLAN - NSTXVIOLNTPROGRES_PSY_ALL_CORE
Improving
Met - goal discontinued
Improving
Improving
Met - goal discontinued
Improving
Met - goal discontinued
Improving
Improving
Met - goal discontinued
No Change

## 2024-10-10 NOTE — BH INPATIENT PSYCHIATRY PROGRESS NOTE - NSBHFUPINTERVALHXFT_PSY_A_CORE
No overnight events... No overnight events. No IM PRN use. Found on phone talking. When saw the writer hangs up and says 'I was having a conversation', then laughs loudly, and says 'just kidding'. Continues to say 'kidding' and starts talking with phone again while no one is on line.

## 2024-10-10 NOTE — BH TREATMENT PLAN - NSTXPSYCHODATETRGT_PSY_ALL_CORE
06-Jun-2024
24-Jul-2024
16-Feb-2024
08-Aug-2024
10-Oct-2024
04-Apr-2024
10-Oct-2024
10-Apr-2024
24-Jul-2024
23-May-2024
06-Mar-2024
08-Aug-2024
16-Feb-2024
02-Oct-2024
22-Aug-2024
28-Aug-2024
24-Jul-2024
01-May-2024
07-Mar-2024
02-Oct-2024
10-Apr-2024
01-May-2024
06-Jun-2024
06-Jun-2024
07-Mar-2024
01-May-2024
06-Jun-2024
07-Mar-2024
25-Jun-2024
10-Apr-2024

## 2024-10-10 NOTE — BH TREATMENT PLAN - NSTXPROBCONDUC_PSY_ALL_CORE
CONDUCT PROBLEM

## 2024-10-10 NOTE — BH TREATMENT PLAN - NSTXIMPULSINTERRN_PSY_ALL_CORE
Nurse will recognize early signs of agitation and use de-escalation techniques and redirection as needed.
Nurse will redirect pt as needed and maintain safety on the unit.
RN will assist patiet in identifying coping skills to help with feelings og agitation and impulsivity.
Nurse will recognize early signs of agitation and use de-escalation techniques and redirection as needed.
Nurse will identify early signs of agitiation and provide redirection, therapeutic communication and PRN medication.
Nurse will redirect patient as needed and maintain safety on the unit.
RN will provide a safe environment, isolation, PRN meds, and therapeutic communication to help pt manage impulsivity
provided limit setting as needed and redirection
Nurse will monitor pt for early signs of agitation and attempt to de-escalate as needed.
Nurse will monitor pt for early signs of agitation and attempt to de-escalate as needed.
Nurse will recognize early signs of agitation and use de-escalation techniques and redirection as needed.
RN will assess patient for signs of aggression and assist patient in identifying coping skills
RN will set limits on unacceptable behavior, provide safe environment, and prn meds to help pt manage impulsivity
Pt educated to take all medications and to utilize coping skills when upset/agitated.
Nurse will recognize early signs of agitation and use de-escalation techniques and redirection as needed.
Nurse will redirect pt as needed and maintain safety on the unit.

## 2024-10-10 NOTE — BH TREATMENT PLAN - NSTXCONDUCDATETRGT_PSY_ALL_CORE
21-Mar-2024
25-Aug-2024
01-May-2024
17-Oct-2024
15-May-2024
24-Jul-2024
01-Mar-2024
02-Oct-2024
17-Feb-2024
29-May-2024
18-Jun-2024
01-Oct-2024
18-Apr-2024
05-Jun-2024
24-Jun-2024
28-Jul-2024
10-Apr-2024
21-Jul-2024
08-Aug-2024
08-May-2024
10-Oct-2024
12-Jun-2024
11-Sep-2024
17-Feb-2024
01-Jul-2024
18-Aug-2024
10-Apr-2024
06-Mar-2024
07-Mar-2024
22-Mar-2024

## 2024-10-10 NOTE — BH TREATMENT PLAN - NSTXDCHOUSINTERMD_PSY_ALL_CORE
Work with interdisciplinary team to find appropriate housing. Stabilize pt on meds.
Work with interdisciplinary team to find appropriate housing. Stabilize pt on meds if appropriate. 
Work with interdisciplinary team to find appropriate housing. Stabilize pt on meds.
Work with interdisciplinary team to find appropriate housing. Stabilize pt on meds if appropriate. 
Work with interdisciplinary team to find appropriate housing. Stabilize pt on meds.

## 2024-10-10 NOTE — BH INPATIENT PSYCHIATRY PROGRESS NOTE - NSBHATTESTCOMMENTATTENDFT_PSY_A_CORE
Agree with resident. Patient continues to display symptoms consistent with IDD. Will make no medication changes today and continue plan as above.

## 2024-10-10 NOTE — BH TREATMENT PLAN - NSTXCOPEINTERRN_PSY_ALL_CORE
Nurse will assist pt in learning at least 2 new coping mechanisms.
Nurse will assist in identifying at least 2 new coping skills.
Nurse will assist pt in learning at least 2 new coping skills.
Nurse will assist in identifying at least 2 new coping skills.
Nurse will assist pt in learning at least 2 new coping skills.
encouraged use of support object, encouraged patient to express needs prior to getting frustrated, encouraged relaxation techniques and diversion activities such as word search puzzles.
Nurse will assist pt in learning at least 2 new coping mechanisms.
Nurse will assist the pt in learning 2 new coping mechanisms.
RN will assist patient in finding strategies that can be utilized as needed
Nurse will assist pt in learning at least 2 new coping mechanisms.
Pt will establish effective coping mechanisms.
Nurse will assist pt in learning at least 2 new coping mechanisms.
RN will assist patient in identifying healthy coping skills
Pt educated on importance of groups to learn and utilize coping skills.
Rn will assist pt in establishing effective coping mechanisms
RN will assist patient in identifying healthy coping skills.

## 2024-10-10 NOTE — BH TREATMENT PLAN - NSTXCONDUCGOAL_PSY_ALL_CORE
Will develop more adaptive coping strategies to manage stress

## 2024-10-10 NOTE — BH TREATMENT PLAN - NSTXPLANSTARTDATE_PSY_ALL_CORE
06-Jun-2024 09:07
18-Jul-2024 13:53
14-Mar-2024 08:41
26-Sep-2024 11:46
10-Oct-2024 10:46
28-Mar-2024 09:10
13-Jun-2024 10:04
11-Jul-2024 13:06
24-Jul-2024 12:43
27-Jun-2024 09:33
21-Mar-2024 08:24
22-Feb-2024 09:54
09-May-2024 16:24
30-May-2024 13:59
20-Jun-2024 09:36
19-Sep-2024 08:57
29-Feb-2024 08:43
12-Feb-2024 11:51
23-May-2024 15:51
31-Aug-2024 08:21
15-Feb-2024 08:49
02-May-2024 09:19
03-Oct-2024 13:41
07-Mar-2024 08:22
04-Apr-2024 09:02

## 2024-10-10 NOTE — BH TREATMENT PLAN - NSTXPSYCHOGOAL_PSY_ALL_CORE
Will be able to report experiencing hallucinations to staff

## 2024-10-10 NOTE — BH TREATMENT PLAN - NSTXCOPEDATETRGT_PSY_ALL_CORE
16-Feb-2024
02-Oct-2024
10-Apr-2024
06-Jun-2024
02-Oct-2024
22-Aug-2024
24-Jul-2024
10-Oct-2024
06-Jun-2024
28-Aug-2024
06-Mar-2024
06-Jun-2024
24-Jul-2024
08-Mar-2024
16-Feb-2024
24-Jul-2024
23-May-2024
24-Jul-2024
10-Apr-2024
24-Jun-2024
10-Apr-2024
01-May-2024
07-Mar-2024
08-Mar-2024
24-Jul-2024
10-Oct-2024
01-May-2024
01-May-2024
28-Mar-2024
06-Jun-2024

## 2024-10-10 NOTE — BH TREATMENT PLAN - NSTXPSYCHOINTERMD_PSY_ALL_CORE
Work with interdisciplinary team to enhance coping skills, optimize meds, observe further for diagnostic clarification. 
Work with interdisciplinary team to enhance coping skills, optimize meds, observe further for diagnostic clarification. Treat with risperidone and ensure adherence.
Work with interdisciplinary team to enhance coping skills, optimize meds, observe further for diagnostic clarification. 
Work with interdisciplinary team to enhance coping skills, optimize meds, observe further for diagnostic clarification. Offer risperidone regularly to ensure adherence.
Work with interdisciplinary team to enhance coping skills, optimize meds, observe further for diagnostic clarification. Treat with risperidone and ensure adherence.
Work with interdisciplinary team to enhance coping skills, optimize meds, observe further for diagnostic clarification. 
Work with interdisciplinary team to enhance coping skills, optimize meds, observe further for diagnostic clarification. Treat with risperidone and ensure adherence.
Work with interdisciplinary team to enhance coping skills, optimize meds, observe further for diagnostic clarification. 
Work with interdisciplinary team to enhance coping skills, optimize meds, observe further for diagnostic clarification. Treat with risperidone and ensure adherence.
Work with interdisciplinary team to enhance coping skills, optimize meds, observe further for diagnostic clarification. Treat with risperidone and ensure adherence.
Work with interdisciplinary team to enhance coping skills, optimize meds, observe further for diagnostic clarification. 
Work with interdisciplinary team to enhance coping skills, optimize meds, observe further for diagnostic clarification. 
Work with interdisciplinary team to enhance coping skills, optimize meds, observe further for diagnostic clarification. Treat with risperidone and ensure adherence.
Work with interdisciplinary team to enhance coping skills, optimize meds, observe further for diagnostic clarification. Treat with risperidone and ensure adherence.
Work with interdisciplinary team to enhance coping skills, optimize meds, observe further for diagnostic clarification. 
Work with interdisciplinary team to enhance coping skills, optimize meds. 
Work with interdisciplinary team to enhance coping skills, optimize meds, observe further for diagnostic clarification. Treat with risperidone and ensure adherence.
Work with interdisciplinary team to enhance coping skills, optimize meds, observe further for diagnostic clarification. Treat with risperidone and ensure adherence.
Work with interdisciplinary team to enhance coping skills, optimize meds, observe further for diagnostic clarification. 
Work with interdisciplinary team to enhance coping skills, optimize meds, observe further for diagnostic clarification. Treat with risperidone and ensure adherence.
Work with interdisciplinary team to enhance coping skills, optimize meds, observe further for diagnostic clarification. 
Work with interdisciplinary team to enhance coping skills, optimize meds, observe further for diagnostic clarification. 
Work with interdisciplinary team to enhance coping skills, optimize meds, observe further for diagnostic clarification. Treat with risperidone and ensure adherence.
Work with interdisciplinary team to enhance coping skills, optimize meds, observe further for diagnostic clarification.

## 2024-10-10 NOTE — BH TREATMENT PLAN - NSTXCONDUCPROGRES_PSY_ALL_CORE
Improving
No Change
No Change
Improving
No Change
No Change
Worsening
No Change
Improving
No Change
No Change
Met - goal discontinued
No Change
No Change
Improving
No Change
Improving
Improving
No Change
No Change
Met - goal discontinued
No Change

## 2024-10-10 NOTE — BH TREATMENT PLAN - NSTXPLANTYPE_PSY_ALL_CORE
Initial
Ongoing

## 2024-10-10 NOTE — BH INPATIENT PSYCHIATRY PROGRESS NOTE - NSBHATTESTTYPESTAFF_PSY_A_CORE
Resident

## 2024-10-10 NOTE — BH TREATMENT PLAN - NSTXPROBDCHOUS_PSY_ALL_CORE
DISCHARGE ISSUE - LACK OF APPROPRIATE HOUSING

## 2024-10-10 NOTE — BH TREATMENT PLAN - NSTXCOPEPROGRES_PSY_ALL_CORE
No Change
Improving
Met - goal discontinued
Met - goal discontinued
Improving
Met - goal discontinued
Improving
Met - goal discontinued
No Change
Improving
Met - goal discontinued
Improving
Met - goal discontinued
Met - goal discontinued
Improving
Met - goal discontinued
No Change

## 2024-10-10 NOTE — BH TREATMENT PLAN - NSTXDCHOUSGOAL_PSY_ALL_CORE
Will agree to participate in housing process

## 2024-10-10 NOTE — BH TREATMENT PLAN - NSTXVIOLNTINTERRN_PSY_ALL_CORE
RN will decrease stimuli, provide safe environment, and therapeutic communication to manage aggressive behavior
RN will ensure safe environment, isolation, therapeutic communication, and PRN meds to manage violent/aggressive behavior
RN will assist patient in identifying triggers that lead to aggerssive behaviors and provide education on coping skills
Nurse will monitor pt for increase agitation and maintain safety on the unit.
Nurse will redirect pt when showing signs of aggression and maintain safety on the unit.
Nurse will practice de-escalation techniques when the patient becomes increasingly agitated and maintain patient safety on the unit.
Encouraged compliance with behavior plan and uise of coping strategies
Nurse will monitor pt for increase agitation and maintain safety on the unit.
Nurse will monitor pt for warning signs of aggression and attempt to de-escalate as needed.
provided limit setting, encouraged compliance with behavior plan.
Nurse will monitor pt for warning signs of aggression and attempt to de-escalate as needed.
Nurse will redirect pt when showing signs of aggression and maintain safety on the unit.
Pt educated to take all medications and to utilize coping skills when upset/agitated.

## 2024-10-10 NOTE — BH TREATMENT PLAN - NSTXCOPEDATEEST_PSY_ALL_CORE
17-Apr-2024
20-Mar-2024
28-Mar-2024
28-Mar-2024
17-Apr-2024
21-Feb-2024
17-Apr-2024
09-Feb-2024
17-Apr-2024
17-Apr-2024
09-Feb-2024
28-Mar-2024
17-Apr-2024
01-Mar-2024
17-Apr-2024
21-Feb-2024
17-Apr-2024
17-Apr-2024
01-Mar-2024

## 2024-10-10 NOTE — BH INPATIENT PSYCHIATRY PROGRESS NOTE - NSBHATTESTSTAFFAMEND_PSY_A_CORE
I have personally seen and examined this patient. I fully participated in the care of this patient. I have made amendments to the documentation where appropriate and otherwise agree with the history, physical exam, and plan as documented by the

## 2024-10-10 NOTE — BH INPATIENT PSYCHIATRY PROGRESS NOTE - MSE UNSTRUCTURED FT
Appearance: Dressed appropriately in gowns/hospital clothing.  Behavior: Not cooperative.  Childlike demeanor.  Found in phone area.  Motor: No abnormal involuntary movements.  No psychomotor agitation or slowing.    Speech: Normal rate, soft volume.   Mood: Does not give mood.  Affect: Elated, stable.   Thought Process: [place] but repetitive.   Associations: Intact.   Thought Content: Ideas of fantasy.  Insight: Poor.  Judgment: Limited.   Attention: Limited.   Language: Fluent today.   Gait: Intact.  Appearance: Dressed appropriately in gowns/hospital clothing.   Behavior: Not cooperative.  Childlike demeanor.  Found talking on phone.  Motor: No abnormal involuntary movements.  No psychomotor agitation or slowing.    Speech: Normal rate, soft volume.   Mood: Does not give mood.  Affect: Elated, stable.   Thought Process: [place] but repetitive/perseverative.  Associations: Intact.   Thought Content: Ideas of fantasy.   Insight: Poor.  Judgment: Limited.   Attention: Limited.   Language: Fluent today.   Gait: Intact.
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
## 2024-10-10 NOTE — BH TREATMENT PLAN - NSTXCONDUCINTERMD_PSY_ALL_CORE
Optimize medication regimen, provide psychoed. 
Optimize medication regimen (incl. risperidone), provide psychoed. 
Optimize medication regimen, provide psychoed. 
Optimize medication regimen (incl. risperidone), provide psychoed. 
Optimize medication regimen, provide psychoed. 
Optimize medication regimen (incl. risperidone), provide psychoed. 
Optimize medication regimen, provide psychoed. 
Optimize medication regimen (incl. risperidone), provide psychoed. 
Optimize medication regimen, provide psychoed. 
Optimize medication regimen (incl. risperidone), provide psychoed. 
Optimize medication regimen, provide psychoed. 
Optimize medication regimen (incl. risperidone), provide psychoed. 
Optimize medication regimen, provide psychoed. 
Optimize medication regimen (incl. risperidone), provide psychoed. 
Optimize medication regimen (incl. risperidone), provide psychoed. 
Optimize medication regimen, provide psychoed. 
Optimize medication regimen (incl. risperidone), provide psychoed. 
Optimize medication regimen (incl. risperidone), provide psychoed. 
Optimize medication regimen, provide psychoed. 

## 2024-10-10 NOTE — BH TREATMENT PLAN - NSTXIMPULSDATEEST_PSY_ALL_CORE
17-Apr-2024
21-Feb-2024
17-Apr-2024
09-Feb-2024
21-Feb-2024
17-Apr-2024
21-Feb-2024
20-Mar-2024
17-Apr-2024
21-Feb-2024
20-Mar-2024
20-Mar-2024
17-Apr-2024
20-Mar-2024
17-Apr-2024
09-Feb-2024

## 2024-10-10 NOTE — BH TREATMENT PLAN - NSTXCOPEINTERMD_PSY_ALL_CORE
Work with interdisciplinary team to enhance coping skills, optimize meds. 
Work with interdisciplinary team to enhance coping skills, optimize meds (incl. risperidone), provide psychoed.  
Work with interdisciplinary team to enhance coping skills, optimize meds (incl. risperidone), provide psychoed.  
Work with interdisciplinary team to enhance coping skills, optimize meds. 
Work with interdisciplinary team to enhance coping skills, optimize meds (incl. risperidone), provide psychoed.  
Work with interdisciplinary team to enhance coping skills, optimize meds (incl. risperidone), provide psychoed.  
Work with interdisciplinary team to enhance coping skills, optimize meds. 
Work with interdisciplinary team to enhance coping skills, optimize meds (incl. risperidone), provide psychoed.  
Work with interdisciplinary team to enhance coping skills, optimize meds. 
Work with interdisciplinary team to enhance coping skills, optimize meds. 
Work with interdisciplinary team to enhance coping skills, optimize meds (incl. risperidone), provide psychoed.  
Work with interdisciplinary team to enhance coping skills, optimize meds. 
Work with interdisciplinary team to enhance coping skills, optimize meds (incl. risperidone), provide psychoed.  
Work with interdisciplinary team to enhance coping skills, optimize meds (incl. risperidone), provide psychoed.  
Work with interdisciplinary team to enhance coping skills, optimize meds. 
Work with interdisciplinary team to enhance coping skills, optimize meds. 
Work with interdisciplinary team to enhance coping skills, optimize meds (incl. risperidone), provide psychoed.  
Work with interdisciplinary team to enhance coping skills, optimize meds. 
Work with interdisciplinary team to enhance coping skills, optimize meds (incl. risperidone), provide psychoed.

## 2024-10-10 NOTE — BH TREATMENT PLAN - NSTXCONDUCINTERRN_PSY_ALL_CORE
Nurse will set limits with patient and redirect as needed.
RN will assist pt to develop proper coping mechanisms and redirect pt to proper social behavioral conduct.
Nurse will set limits with patient and redirect as needed.
RN will establish boundaries and provide redirection, therapeutic communicaiton, help pt establish coping mechanisms to correct conduct problems
Patient will be redirected to promote safety.
Nurse will encourage will redirect pt as needed and set limits.
RN will assist patient in identifying coping skills to help manage stress
Nurse will set limits with pt and explore consequences of actions.
Pt educated to utilize coping skills when pt is upset or irritable and to request PRNS when needed.
RN will assist patient in finding strategies to help manage stress
RN will assist patient in identifying coping skills to help manage stress
RN will assist patient in identifying coping skills to help manage stress
provided daily activities to encourage good behavior, encouraged patient to explore effective communication skills and coping skills to avoid outbursts, re-educated patient on behavior plan.
Nurse will set limits with patient and redirect as needed.
Nurse will set limits with pt and explore consequences of actions.
RN will assist patiet in indentifying coping skills to helop manage stress.
Nurse will set limits with patient and redirect as needed.

## 2024-10-10 NOTE — BH TREATMENT PLAN - NSTXVIOLNTDATEEST_PSY_ALL_CORE
20-Mar-2024
28-Mar-2024
28-Mar-2024
21-Feb-2024
28-Mar-2024
09-Feb-2024
28-Mar-2024
21-Feb-2024
28-Mar-2024
09-Feb-2024
28-Mar-2024
21-Feb-2024
28-Mar-2024
21-Feb-2024
28-Mar-2024

## 2024-10-10 NOTE — BH TREATMENT PLAN - NSTXIMPULSINTERMD_PSY_ALL_CORE
Work with interdisciplinary team to enhance coping skills, optimize meds. 

## 2024-10-10 NOTE — BH TREATMENT PLAN - NSTXVIOLNTINTERMD_PSY_ALL_CORE
Work with interdisciplinary team to enhance coping skills, optimize meds. 
Work with interdisciplinary team to enhance coping skills, optimize meds. Treat with risperidone and ensure adherence.
Work with interdisciplinary team to enhance coping skills, optimize meds. 
Work with interdisciplinary team to enhance coping skills, optimize meds. Treat with risperidone and ensure adherence.
Work with interdisciplinary team to enhance coping skills, optimize meds. 
Work with interdisciplinary team to enhance coping skills, optimize meds. Treat with risperidone and ensure adherence.
Work with interdisciplinary team to enhance coping skills, optimize meds. 
Work with interdisciplinary team to enhance coping skills, optimize meds. 
Work with interdisciplinary team to enhance coping skills, optimize meds. Treat with risperidone and ensure adherence.
Work with interdisciplinary team to enhance coping skills, optimize meds. 
Work with interdisciplinary team to enhance coping skills, optimize meds. Treat with risperidone and ensure adherence.
Work with interdisciplinary team to enhance coping skills, optimize meds. 
Work with interdisciplinary team to enhance coping skills, optimize meds. Treat with risperidone and ensure adherence.
Work with interdisciplinary team to enhance coping skills, optimize meds. Offer risperidone regularly to ensure adherence.

## 2024-10-10 NOTE — BH TREATMENT PLAN - NSTXCONDUCDATEEST_PSY_ALL_CORE
28-Mar-2024
09-Feb-2024
21-Feb-2024
09-Feb-2024
10-Feb-2024
09-Feb-2024
10-Feb-2024
09-Feb-2024
19-Feb-2024
09-Feb-2024

## 2024-10-10 NOTE — BH SCALES AND SCREENS - NSBPRSNOTDONE_PSY_ALL_CORE
Patient unable to cooperate; Cannot evaluate

## 2024-10-11 NOTE — BH INPATIENT PSYCHIATRY PROGRESS NOTE - NSBHFUPINTERVALHXFT_PSY_A_CORE
Pt today found talking to self in day room.  Pt thanks writer for coming to [place].  Pt mentions something about 200 years passing.
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
## 2024-10-12 RX ADMIN — Medication 50 MILLIGRAM(S): at 20:32
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
## 2024-10-13 RX ADMIN — Medication 50 MILLIGRAM(S): at 20:12

## 2024-10-13 RX ADMIN — HALOPERIDOL 5 MILLIGRAM(S): 10 TABLET ORAL at 03:04

## 2024-10-13 RX ADMIN — Medication 2 MILLIGRAM(S): at 03:04

## 2024-10-14 PROCEDURE — 99232 SBSQ HOSP IP/OBS MODERATE 35: CPT

## 2024-10-14 PROCEDURE — 90832 PSYTX W PT 30 MINUTES: CPT

## 2024-10-14 RX ADMIN — Medication 50 MILLIGRAM(S): at 20:18

## 2024-10-14 RX ADMIN — Medication 2 MILLIGRAM(S): at 20:18

## 2024-10-14 RX ADMIN — HALOPERIDOL 5 MILLIGRAM(S): 10 TABLET ORAL at 20:18

## 2024-10-14 NOTE — BH INPATIENT PSYCHIATRY PROGRESS NOTE - NSBHMETABOLIC_PSY_ALL_CORE_FT
BMI: BMI (kg/m2): 26 (10-12-24 @ 15:43)  HbA1c: A1C with Estimated Average Glucose Result: 6.1 % (01-14-24 @ 04:30)    Glucose: POCT Blood Glucose.: 57 mg/dL (04-15-24 @ 09:20)    BP: 125/77 (10-13-24 @ 09:00) (123/74 - 134/95)Vital Signs Last 24 Hrs  T(C): 36.5 (10-14-24 @ 07:45), Max: 36.5 (10-14-24 @ 07:45)  T(F): 97.7 (10-14-24 @ 07:45), Max: 97.7 (10-14-24 @ 07:45)  HR: --  BP: --  BP(mean): --  RR: --  SpO2: --    Orthostatic VS  10-14-24 @ 07:45  Lying BP: --/-- HR: --  Sitting BP: 111/87 HR: 115  Standing BP: 101/77 HR: 105  Site: --  Mode: --  Orthostatic VS  10-12-24 @ 15:43  Lying BP: --/-- HR: --  Sitting BP: 133/91 HR: 100  Standing BP: 133/98 HR: 115  Site: upper left arm  Mode: electronic    Lipid Panel: Date/Time: 01-14-24 @ 04:30  Cholesterol, Serum: 130  LDL Cholesterol Calculated: 61  HDL Cholesterol, Serum: 53  Total Cholesterol/HDL Ration Measurement: --  Triglycerides, Serum: 79

## 2024-10-14 NOTE — BH INPATIENT PSYCHIATRY PROGRESS NOTE - MSE UNSTRUCTURED FT
Appearance: Dressed appropriately in gowns/hospital clothing.   Behavior: Not cooperative.  Childlike demeanor.  Found talking to wall in day room.  Motor: No abnormal involuntary movements.  No psychomotor agitation or slowing.    Speech: Normal rate, soft volume.   Mood: Does not give mood.  Affect: Elated, stable.   Thought Process: [place] but repetitive/perseverative.  Associations: Intact.   Thought Content: Ideas of fantasy.   Insight: Poor.  Judgment: Limited.   Attention: Limited.   Language: Fluent today.   Gait: Intact.
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
## 2024-10-14 NOTE — BH INPATIENT PSYCHIATRY PROGRESS NOTE - NSBHCHARTREVIEWVS_PSY_A_CORE FT
Vital Signs Last 24 Hrs  T(C): 36.5 (10-14-24 @ 07:45), Max: 36.5 (10-14-24 @ 07:45)  T(F): 97.7 (10-14-24 @ 07:45), Max: 97.7 (10-14-24 @ 07:45)  HR: --  BP: --  BP(mean): --  RR: --  SpO2: --    Orthostatic VS  10-14-24 @ 07:45  Lying BP: --/-- HR: --  Sitting BP: 111/87 HR: 115  Standing BP: 101/77 HR: 105  Site: --  Mode: --  Orthostatic VS  10-12-24 @ 15:43  Lying BP: --/-- HR: --  Sitting BP: 133/91 HR: 100  Standing BP: 133/98 HR: 115  Site: upper left arm  Mode: electronic
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
## 2024-10-15 PROCEDURE — 99232 SBSQ HOSP IP/OBS MODERATE 35: CPT

## 2024-10-15 RX ORDER — LORAZEPAM 4 MG/ML
2 VIAL (ML) INJECTION ONCE
Refills: 0 | Status: ACTIVE | OUTPATIENT
Start: 2024-10-15 | End: 2024-10-22

## 2024-10-15 RX ORDER — LORAZEPAM 4 MG/ML
2 VIAL (ML) INJECTION EVERY 6 HOURS
Refills: 0 | Status: ACTIVE | OUTPATIENT
Start: 2024-10-15 | End: 2024-10-22

## 2024-10-15 RX ADMIN — HALOPERIDOL 5 MILLIGRAM(S): 10 TABLET ORAL at 20:35

## 2024-10-15 RX ADMIN — Medication 50 MILLIGRAM(S): at 20:35

## 2024-10-15 RX ADMIN — Medication 2 MILLIGRAM(S): at 20:35

## 2024-10-15 NOTE — BH INPATIENT PSYCHIATRY PROGRESS NOTE - MSE UNSTRUCTURED FT
Appearance: Dressed appropriately in gowns/hospital clothing.   Behavior: Not cooperative.  Childlike demeanor.  Found talking to wall in day room.  Motor: No abnormal involuntary movements.  No psychomotor agitation or slowing.    Speech: Normal rate, soft volume.   Mood: Does not give mood.  Affect: Elated, stable.   Thought Process: [place] but repetitive/perseverative.  Associations: Intact.   Thought Content: Ideas of fantasy.   Insight: Poor.  Judgment: Limited.   Attention: Limited.   Language: Fluent today.   Gait: Intact.
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
## 2024-10-15 NOTE — BH INPATIENT PSYCHIATRY PROGRESS NOTE - NSBHFUPINTERVALHXFT_PSY_A_CORE
Patient is followed up for NDD. Chart, medications and labs reviewed. Patient is discussed during morning brief, no overnight events, has been in fair behavioral control.   Patient was seen and is evaluated on the unit. She is noted to be intrusive at times, often standing near the phone or nursing station. She states that she is "good", then laughs out loudly, exhibits childlike behaviors. She is currently awaiting placement in [facility] housing. She has been compliant with standing medications, denies SE. No acute medical concerns, VSS

## 2024-10-15 NOTE — BH INPATIENT PSYCHIATRY PROGRESS NOTE - NSBHMETABOLIC_PSY_ALL_CORE_FT
BMI: BMI (kg/m2): 26 (10-12-24 @ 15:43)  HbA1c: A1C with Estimated Average Glucose Result: 6.1 % (01-14-24 @ 04:30)    Glucose: POCT Blood Glucose.: 57 mg/dL (04-15-24 @ 09:20)    BP: 125/77 (10-13-24 @ 09:00) (123/74 - 125/77)Vital Signs Last 24 Hrs  T(C): 36.4 (10-15-24 @ 08:38), Max: 36.4 (10-15-24 @ 08:38)  T(F): 97.6 (10-15-24 @ 08:38), Max: 97.6 (10-15-24 @ 08:38)  HR: --  BP: --  BP(mean): --  RR: --  SpO2: --    Orthostatic VS  10-15-24 @ 08:38  Lying BP: --/-- HR: --  Sitting BP: 109/64 HR: 90  Standing BP: 108/69 HR: 97  Site: upper left arm  Mode: electronic  Orthostatic VS  10-14-24 @ 07:45  Lying BP: --/-- HR: --  Sitting BP: 111/87 HR: 115  Standing BP: 101/77 HR: 105  Site: --  Mode: --    Lipid Panel: Date/Time: 01-14-24 @ 04:30  Cholesterol, Serum: 130  LDL Cholesterol Calculated: 61  HDL Cholesterol, Serum: 53  Total Cholesterol/HDL Ration Measurement: --  Triglycerides, Serum: 79   BMI: BMI (kg/m2): 26 (10-12-24 @ 15:43)  HbA1c: A1C with Estimated Average Glucose Result: 6.1 % (01-14-24 @ 04:30)    Glucose: POCT Blood Glucose.: 57 mg/dL (04-15-24 @ 09:20)    BP: --Vital Signs Last 24 Hrs  T(C): 36.5 (10-15-24 @ 19:45), Max: 36.5 (10-15-24 @ 19:45)  T(F): 97.7 (10-15-24 @ 19:45), Max: 97.7 (10-15-24 @ 19:45)  HR: --  BP: --  BP(mean): --  RR: --  SpO2: --    Orthostatic VS  10-15-24 @ 19:45  Lying BP: --/-- HR: --  Sitting BP: 107/76 HR: 96  Standing BP: 103/65 HR: 102  Site: --  Mode: --  Orthostatic VS  10-15-24 @ 08:38  Lying BP: --/-- HR: --  Sitting BP: 109/64 HR: 90  Standing BP: 108/69 HR: 97  Site: upper left arm  Mode: electronic    Lipid Panel: Date/Time: 01-14-24 @ 04:30  Cholesterol, Serum: 130  LDL Cholesterol Calculated: 61  HDL Cholesterol, Serum: 53  Total Cholesterol/HDL Ration Measurement: --  Triglycerides, Serum: 79

## 2024-10-15 NOTE — BH INPATIENT PSYCHIATRY PROGRESS NOTE - NSBHCHARTREVIEWVS_PSY_A_CORE FT
Vital Signs Last 24 Hrs  T(C): 36.4 (10-15-24 @ 08:38), Max: 36.4 (10-15-24 @ 08:38)  T(F): 97.6 (10-15-24 @ 08:38), Max: 97.6 (10-15-24 @ 08:38)  HR: --  BP: --  BP(mean): --  RR: --  SpO2: --    Orthostatic VS  10-15-24 @ 08:38  Lying BP: --/-- HR: --  Sitting BP: 109/64 HR: 90  Standing BP: 108/69 HR: 97  Site: upper left arm  Mode: electronic  Orthostatic VS  10-14-24 @ 07:45  Lying BP: --/-- HR: --  Sitting BP: 111/87 HR: 115  Standing BP: 101/77 HR: 105  Site: --  Mode: --   Vital Signs Last 24 Hrs  T(C): 36.5 (10-15-24 @ 19:45), Max: 36.5 (10-15-24 @ 19:45)  T(F): 97.7 (10-15-24 @ 19:45), Max: 97.7 (10-15-24 @ 19:45)  HR: --  BP: --  BP(mean): --  RR: --  SpO2: --    Orthostatic VS  10-15-24 @ 19:45  Lying BP: --/-- HR: --  Sitting BP: 107/76 HR: 96  Standing BP: 103/65 HR: 102  Site: --  Mode: --  Orthostatic VS  10-15-24 @ 08:38  Lying BP: --/-- HR: --  Sitting BP: 109/64 HR: 90  Standing BP: 108/69 HR: 97  Site: upper left arm  Mode: electronic

## 2024-10-16 PROCEDURE — 99232 SBSQ HOSP IP/OBS MODERATE 35: CPT

## 2024-10-16 RX ADMIN — Medication 2 MILLIGRAM(S): at 22:29

## 2024-10-16 RX ADMIN — Medication 50 MILLIGRAM(S): at 22:29

## 2024-10-16 RX ADMIN — HALOPERIDOL 5 MILLIGRAM(S): 10 TABLET ORAL at 22:29

## 2024-10-16 RX ADMIN — Medication 650 MILLIGRAM(S): at 09:53

## 2024-10-16 NOTE — BH INPATIENT PSYCHIATRY PROGRESS NOTE - MSE UNSTRUCTURED FT
Appearance: Dressed appropriately in gowns/hospital clothing.   Behavior: Not cooperative.  Childlike demeanor.  Found talking to wall in day room.  Motor: No abnormal involuntary movements.  No psychomotor agitation or slowing.    Speech: Normal rate, soft volume.   Mood: Does not give mood.  Affect: Elated, stable.   Thought Process: [place] but repetitive/perseverative.  Associations: Intact.   Thought Content: Ideas of fantasy.   Insight: Poor.  Judgment: Limited.   Attention: Limited.   Language: Fluent today.   Gait: Intact.

## 2024-10-16 NOTE — BH INPATIENT PSYCHIATRY PROGRESS NOTE - NSBHFUPINTERVALHXFT_PSY_A_CORE
Patient is followed up for NDD. Chart, medications and labs reviewed. Patient is discussed during morning brief, no overnight events, has been in fair behavioral control.   Patient was seen and is evaluated on the unit. She continues to exhibit childlike behaviors and remains intrusive at times. She tells writer that she is menstruating, expresses wanting to be placed in housing soon. She is currently awaiting placement in [facility] housing. She has been compliant with standing medications, denies SE. No acute medical concerns, VSS

## 2024-10-16 NOTE — BH INPATIENT PSYCHIATRY PROGRESS NOTE - NSBHCHARTREVIEWVS_PSY_A_CORE FT
Vital Signs Last 24 Hrs  T(C): 36.5 (10-15-24 @ 19:45), Max: 36.5 (10-15-24 @ 19:45)  T(F): 97.7 (10-15-24 @ 19:45), Max: 97.7 (10-15-24 @ 19:45)  HR: --  BP: --  BP(mean): --  RR: --  SpO2: --    Orthostatic VS  10-15-24 @ 19:45  Lying BP: --/-- HR: --  Sitting BP: 107/76 HR: 96  Standing BP: 103/65 HR: 102  Site: --  Mode: --  Orthostatic VS  10-15-24 @ 08:38  Lying BP: --/-- HR: --  Sitting BP: 109/64 HR: 90  Standing BP: 108/69 HR: 97  Site: upper left arm  Mode: electronic   Vital Signs Last 24 Hrs  T(C): 36.7 (10-16-24 @ 19:52), Max: 36.7 (10-16-24 @ 19:52)  T(F): 98.1 (10-16-24 @ 19:52), Max: 98.1 (10-16-24 @ 19:52)  HR: --  BP: --  BP(mean): --  RR: --  SpO2: --    Orthostatic VS  10-16-24 @ 19:52  Lying BP: --/-- HR: --  Sitting BP: 113/67 HR: 78  Standing BP: 124/67 HR: 90  Site: --  Mode: --  Orthostatic VS  10-16-24 @ 09:15  Lying BP: --/-- HR: --  Sitting BP: --/-- HR: --  Standing BP: 122/74 HR: 87  Site: --  Mode: --  Orthostatic VS  10-15-24 @ 19:45  Lying BP: --/-- HR: --  Sitting BP: 107/76 HR: 96  Standing BP: 103/65 HR: 102  Site: --  Mode: --
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
## 2024-10-16 NOTE — BH INPATIENT PSYCHIATRY PROGRESS NOTE - NSBHMETABOLIC_PSY_ALL_CORE_FT
BMI: BMI (kg/m2): 26 (10-12-24 @ 15:43)  HbA1c: A1C with Estimated Average Glucose Result: 6.1 % (01-14-24 @ 04:30)    Glucose: POCT Blood Glucose.: 57 mg/dL (04-15-24 @ 09:20)    BP: --Vital Signs Last 24 Hrs  T(C): 36.5 (10-15-24 @ 19:45), Max: 36.5 (10-15-24 @ 19:45)  T(F): 97.7 (10-15-24 @ 19:45), Max: 97.7 (10-15-24 @ 19:45)  HR: --  BP: --  BP(mean): --  RR: --  SpO2: --    Orthostatic VS  10-15-24 @ 19:45  Lying BP: --/-- HR: --  Sitting BP: 107/76 HR: 96  Standing BP: 103/65 HR: 102  Site: --  Mode: --  Orthostatic VS  10-15-24 @ 08:38  Lying BP: --/-- HR: --  Sitting BP: 109/64 HR: 90  Standing BP: 108/69 HR: 97  Site: upper left arm  Mode: electronic    Lipid Panel: Date/Time: 01-14-24 @ 04:30  Cholesterol, Serum: 130  LDL Cholesterol Calculated: 61  HDL Cholesterol, Serum: 53  Total Cholesterol/HDL Ration Measurement: --  Triglycerides, Serum: 79   BMI: BMI (kg/m2): 26 (10-12-24 @ 15:43)  HbA1c: A1C with Estimated Average Glucose Result: 6.1 % (01-14-24 @ 04:30)    Glucose: POCT Blood Glucose.: 57 mg/dL (04-15-24 @ 09:20)    BP: --Vital Signs Last 24 Hrs  T(C): 36.7 (10-16-24 @ 19:52), Max: 36.7 (10-16-24 @ 19:52)  T(F): 98.1 (10-16-24 @ 19:52), Max: 98.1 (10-16-24 @ 19:52)  HR: --  BP: --  BP(mean): --  RR: --  SpO2: --    Orthostatic VS  10-16-24 @ 19:52  Lying BP: --/-- HR: --  Sitting BP: 113/67 HR: 78  Standing BP: 124/67 HR: 90  Site: --  Mode: --  Orthostatic VS  10-16-24 @ 09:15  Lying BP: --/-- HR: --  Sitting BP: --/-- HR: --  Standing BP: 122/74 HR: 87  Site: --  Mode: --  Orthostatic VS  10-15-24 @ 19:45  Lying BP: --/-- HR: --  Sitting BP: 107/76 HR: 96  Standing BP: 103/65 HR: 102  Site: --  Mode: --    Lipid Panel: Date/Time: 01-14-24 @ 04:30  Cholesterol, Serum: 130  LDL Cholesterol Calculated: 61  HDL Cholesterol, Serum: 53  Total Cholesterol/HDL Ration Measurement: --  Triglycerides, Serum: 79

## 2024-10-17 PROCEDURE — 99232 SBSQ HOSP IP/OBS MODERATE 35: CPT

## 2024-10-17 PROCEDURE — 90832 PSYTX W PT 30 MINUTES: CPT

## 2024-10-17 RX ORDER — LORAZEPAM 4 MG/ML
2 VIAL (ML) INJECTION ONCE
Refills: 0 | Status: DISCONTINUED | OUTPATIENT
Start: 2024-10-17 | End: 2024-10-24

## 2024-10-17 RX ORDER — LORAZEPAM 4 MG/ML
2 VIAL (ML) INJECTION EVERY 6 HOURS
Refills: 0 | Status: DISCONTINUED | OUTPATIENT
Start: 2024-10-17 | End: 2024-10-24

## 2024-10-17 RX ADMIN — HALOPERIDOL 5 MILLIGRAM(S): 10 TABLET ORAL at 12:10

## 2024-10-17 RX ADMIN — HALOPERIDOL 5 MILLIGRAM(S): 10 TABLET ORAL at 23:03

## 2024-10-17 RX ADMIN — Medication 2 MILLIGRAM(S): at 12:10

## 2024-10-17 RX ADMIN — Medication 50 MILLIGRAM(S): at 23:02

## 2024-10-17 RX ADMIN — Medication 2 MILLIGRAM(S): at 23:02

## 2024-10-17 NOTE — BH INPATIENT PSYCHIATRY PROGRESS NOTE - NSBHFUPINTERVALHXFT_PSY_A_CORE
Patient is followed up for NDD. Chart, medications and labs reviewed. Patient is discussed during morning brief, no overnight events, has been in fair behavioral control.   Patient was seen and is evaluated on the unit. She continues to exhibit childlike behaviors, often talking on the phone to self, intrusive but has been able to maintain behavioral control. She is currently awaiting placement in housing. Meeting to be scheduled w/ [facility]. She has been compliant with standing medications, denies SE. No acute medical concerns, VSS.

## 2024-10-17 NOTE — BH INPATIENT PSYCHIATRY PROGRESS NOTE - MSE UNSTRUCTURED FT
Appearance: Dressed appropriately in gowns/hospital clothing.   Behavior: Not cooperative.  Childlike demeanor.  Found talking to wall in day room.  Motor: No abnormal involuntary movements.  No psychomotor agitation or slowing.    Speech: Normal rate, soft volume.   Mood: Does not give mood.  Affect: Elated, stable.   Thought Process: [place] but repetitive/perseverative.  Associations: Intact.   Thought Content: Ideas of fantasy.   Insight: Poor.  Judgment: Limited.   Attention: Limited.   Language: Fluent today.   Gait: Intact.
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
## 2024-10-17 NOTE — BH INPATIENT PSYCHIATRY PROGRESS NOTE - NSBHMETABOLIC_PSY_ALL_CORE_FT
BMI: BMI (kg/m2): 26 (10-12-24 @ 15:43)  HbA1c: A1C with Estimated Average Glucose Result: 6.1 % (01-14-24 @ 04:30)    Glucose: POCT Blood Glucose.: 57 mg/dL (04-15-24 @ 09:20)    BP: --Vital Signs Last 24 Hrs  T(C): 36.9 (10-17-24 @ 09:45), Max: 36.9 (10-17-24 @ 09:45)  T(F): 98.5 (10-17-24 @ 09:45), Max: 98.5 (10-17-24 @ 09:45)  HR: --  BP: --  BP(mean): --  RR: 18 (10-17-24 @ 09:45) (18 - 18)  SpO2: --    Orthostatic VS  10-17-24 @ 09:45  Lying BP: --/-- HR: --  Sitting BP: 109/61 HR: 104  Standing BP: 111/60 HR: 107  Site: --  Mode: --  Orthostatic VS  10-16-24 @ 19:52  Lying BP: --/-- HR: --  Sitting BP: 113/67 HR: 78  Standing BP: 124/67 HR: 90  Site: --  Mode: --  Orthostatic VS  10-16-24 @ 09:15  Lying BP: --/-- HR: --  Sitting BP: --/-- HR: --  Standing BP: 122/74 HR: 87  Site: --  Mode: --  Orthostatic VS  10-15-24 @ 19:45  Lying BP: --/-- HR: --  Sitting BP: 107/76 HR: 96  Standing BP: 103/65 HR: 102  Site: --  Mode: --    Lipid Panel: Date/Time: 01-14-24 @ 04:30  Cholesterol, Serum: 130  LDL Cholesterol Calculated: 61  HDL Cholesterol, Serum: 53  Total Cholesterol/HDL Ration Measurement: --  Triglycerides, Serum: 79   BMI: BMI (kg/m2): 26 (10-12-24 @ 15:43)  HbA1c: A1C with Estimated Average Glucose Result: 6.1 % (01-14-24 @ 04:30)    Glucose: POCT Blood Glucose.: 57 mg/dL (04-15-24 @ 09:20)    BP: --Vital Signs Last 24 Hrs  T(C): 36.5 (10-18-24 @ 08:16), Max: 36.9 (10-17-24 @ 09:45)  T(F): 97.7 (10-18-24 @ 08:16), Max: 98.5 (10-17-24 @ 09:45)  HR: --  BP: --  BP(mean): --  RR: 18 (10-17-24 @ 09:45) (18 - 18)  SpO2: --    Orthostatic VS  10-18-24 @ 08:16  Lying BP: --/-- HR: --  Sitting BP: 103/58 HR: 76  Standing BP: 102/62 HR: 97  Site: upper left arm  Mode: electronic  Orthostatic VS  10-17-24 @ 19:29  Lying BP: --/-- HR: --  Sitting BP: 115/70 HR: 78  Standing BP: 110/68 HR: 84  Site: --  Mode: --  Orthostatic VS  10-17-24 @ 09:45  Lying BP: --/-- HR: --  Sitting BP: 109/61 HR: 104  Standing BP: 111/60 HR: 107  Site: --  Mode: --  Orthostatic VS  10-16-24 @ 19:52  Lying BP: --/-- HR: --  Sitting BP: 113/67 HR: 78  Standing BP: 124/67 HR: 90  Site: --  Mode: --    Lipid Panel: Date/Time: 01-14-24 @ 04:30  Cholesterol, Serum: 130  LDL Cholesterol Calculated: 61  HDL Cholesterol, Serum: 53  Total Cholesterol/HDL Ration Measurement: --  Triglycerides, Serum: 79

## 2024-10-17 NOTE — BH INPATIENT PSYCHIATRY PROGRESS NOTE - NSBHCHARTREVIEWVS_PSY_A_CORE FT
Vital Signs Last 24 Hrs  T(C): 36.9 (10-17-24 @ 09:45), Max: 36.9 (10-17-24 @ 09:45)  T(F): 98.5 (10-17-24 @ 09:45), Max: 98.5 (10-17-24 @ 09:45)  HR: --  BP: --  BP(mean): --  RR: 18 (10-17-24 @ 09:45) (18 - 18)  SpO2: --    Orthostatic VS  10-17-24 @ 09:45  Lying BP: --/-- HR: --  Sitting BP: 109/61 HR: 104  Standing BP: 111/60 HR: 107  Site: --  Mode: --  Orthostatic VS  10-16-24 @ 19:52  Lying BP: --/-- HR: --  Sitting BP: 113/67 HR: 78  Standing BP: 124/67 HR: 90  Site: --  Mode: --  Orthostatic VS  10-16-24 @ 09:15  Lying BP: --/-- HR: --  Sitting BP: --/-- HR: --  Standing BP: 122/74 HR: 87  Site: --  Mode: --  Orthostatic VS  10-15-24 @ 19:45  Lying BP: --/-- HR: --  Sitting BP: 107/76 HR: 96  Standing BP: 103/65 HR: 102  Site: --  Mode: --   Vital Signs Last 24 Hrs  T(C): 36.5 (10-18-24 @ 08:16), Max: 36.9 (10-17-24 @ 09:45)  T(F): 97.7 (10-18-24 @ 08:16), Max: 98.5 (10-17-24 @ 09:45)  HR: --  BP: --  BP(mean): --  RR: 18 (10-17-24 @ 09:45) (18 - 18)  SpO2: --    Orthostatic VS  10-18-24 @ 08:16  Lying BP: --/-- HR: --  Sitting BP: 103/58 HR: 76  Standing BP: 102/62 HR: 97  Site: upper left arm  Mode: electronic  Orthostatic VS  10-17-24 @ 19:29  Lying BP: --/-- HR: --  Sitting BP: 115/70 HR: 78  Standing BP: 110/68 HR: 84  Site: --  Mode: --  Orthostatic VS  10-17-24 @ 09:45  Lying BP: --/-- HR: --  Sitting BP: 109/61 HR: 104  Standing BP: 111/60 HR: 107  Site: --  Mode: --  Orthostatic VS  10-16-24 @ 19:52  Lying BP: --/-- HR: --  Sitting BP: 113/67 HR: 78  Standing BP: 124/67 HR: 90  Site: --  Mode: --

## 2024-10-18 LAB
A1C WITH ESTIMATED AVERAGE GLUCOSE RESULT: 6.1 % — HIGH (ref 4–5.6)
ALBUMIN SERPL ELPH-MCNC: 3.2 G/DL — LOW (ref 3.3–5)
ALP SERPL-CCNC: 59 U/L — SIGNIFICANT CHANGE UP (ref 40–120)
ALT FLD-CCNC: 19 U/L — SIGNIFICANT CHANGE UP (ref 4–33)
ANION GAP SERPL CALC-SCNC: 12 MMOL/L — SIGNIFICANT CHANGE UP (ref 7–14)
AST SERPL-CCNC: 27 U/L — SIGNIFICANT CHANGE UP (ref 4–32)
BASOPHILS # BLD AUTO: 0.04 K/UL — SIGNIFICANT CHANGE UP (ref 0–0.2)
BASOPHILS NFR BLD AUTO: 1 % — SIGNIFICANT CHANGE UP (ref 0–2)
BILIRUB SERPL-MCNC: <0.2 MG/DL — SIGNIFICANT CHANGE UP (ref 0.2–1.2)
BUN SERPL-MCNC: 14 MG/DL — SIGNIFICANT CHANGE UP (ref 7–23)
CALCIUM SERPL-MCNC: 8.4 MG/DL — SIGNIFICANT CHANGE UP (ref 8.4–10.5)
CHLORIDE SERPL-SCNC: 103 MMOL/L — SIGNIFICANT CHANGE UP (ref 98–107)
CHOLEST SERPL-MCNC: 106 MG/DL — SIGNIFICANT CHANGE UP
CO2 SERPL-SCNC: 17 MMOL/L — LOW (ref 22–31)
CREAT SERPL-MCNC: 0.64 MG/DL — SIGNIFICANT CHANGE UP (ref 0.5–1.3)
EGFR: 116 ML/MIN/1.73M2 — SIGNIFICANT CHANGE UP
EGFR: 116 ML/MIN/1.73M2 — SIGNIFICANT CHANGE UP
EOSINOPHIL # BLD AUTO: 0.06 K/UL — SIGNIFICANT CHANGE UP (ref 0–0.5)
EOSINOPHIL NFR BLD AUTO: 1.4 % — SIGNIFICANT CHANGE UP (ref 0–6)
ESTIMATED AVERAGE GLUCOSE: 128 — SIGNIFICANT CHANGE UP
GLUCOSE SERPL-MCNC: 74 MG/DL — SIGNIFICANT CHANGE UP (ref 70–99)
HCT VFR BLD CALC: 30.3 % — LOW (ref 34.5–45)
HDLC SERPL-MCNC: 42 MG/DL — LOW
HGB BLD-MCNC: 9.2 G/DL — LOW (ref 11.5–15.5)
IANC: 2 K/UL — SIGNIFICANT CHANGE UP (ref 1.8–7.4)
IMM GRANULOCYTES NFR BLD AUTO: 0.2 % — SIGNIFICANT CHANGE UP (ref 0–0.9)
LDLC SERPL-MCNC: 53 MG/DL — SIGNIFICANT CHANGE UP
LIPID PNL WITH DIRECT LDL SERPL: 53 MG/DL — SIGNIFICANT CHANGE UP
LYMPHOCYTES # BLD AUTO: 1.59 K/UL — SIGNIFICANT CHANGE UP (ref 1–3.3)
LYMPHOCYTES # BLD AUTO: 38 % — SIGNIFICANT CHANGE UP (ref 13–44)
MCHC RBC-ENTMCNC: 22.3 PG — LOW (ref 27–34)
MCHC RBC-ENTMCNC: 30.4 GM/DL — LOW (ref 32–36)
MCV RBC AUTO: 73.4 FL — LOW (ref 80–100)
MONOCYTES # BLD AUTO: 0.48 K/UL — SIGNIFICANT CHANGE UP (ref 0–0.9)
MONOCYTES NFR BLD AUTO: 11.5 % — SIGNIFICANT CHANGE UP (ref 2–14)
NEUTROPHILS # BLD AUTO: 2 K/UL — SIGNIFICANT CHANGE UP (ref 1.8–7.4)
NEUTROPHILS NFR BLD AUTO: 47.9 % — SIGNIFICANT CHANGE UP (ref 43–77)
NONHDLC SERPL-MCNC: 64 MG/DL — SIGNIFICANT CHANGE UP
NRBC # BLD AUTO: 0 K/UL — SIGNIFICANT CHANGE UP (ref 0–0)
NRBC # BLD: 0 /100 WBCS — SIGNIFICANT CHANGE UP (ref 0–0)
NRBC # FLD: 0 K/UL — SIGNIFICANT CHANGE UP (ref 0–0)
NRBC BLD-RTO: 0 /100 WBCS — SIGNIFICANT CHANGE UP (ref 0–0)
PLATELET # BLD AUTO: 297 K/UL — SIGNIFICANT CHANGE UP (ref 150–400)
POTASSIUM SERPL-MCNC: 4.8 MMOL/L — SIGNIFICANT CHANGE UP (ref 3.5–5.3)
POTASSIUM SERPL-SCNC: 4.8 MMOL/L — SIGNIFICANT CHANGE UP (ref 3.5–5.3)
PROT SERPL-MCNC: 6.6 G/DL — SIGNIFICANT CHANGE UP (ref 6–8.3)
RBC # BLD: 4.13 M/UL — SIGNIFICANT CHANGE UP (ref 3.8–5.2)
RBC # FLD: 17.4 % — HIGH (ref 10.3–14.5)
SODIUM SERPL-SCNC: 132 MMOL/L — LOW (ref 135–145)
TRIGL SERPL-MCNC: 53 MG/DL — SIGNIFICANT CHANGE UP
WBC # BLD: 4.18 K/UL — SIGNIFICANT CHANGE UP (ref 3.8–10.5)
WBC # FLD AUTO: 4.18 K/UL — SIGNIFICANT CHANGE UP (ref 3.8–10.5)

## 2024-10-18 PROCEDURE — 99232 SBSQ HOSP IP/OBS MODERATE 35: CPT

## 2024-10-18 NOTE — BH INPATIENT PSYCHIATRY PROGRESS NOTE - MSE UNSTRUCTURED FT
Appearance: Dressed appropriately in gowns/hospital clothing.   Behavior: Not cooperative.  Childlike demeanor.  Found talking to wall in day room.  Motor: No abnormal involuntary movements.  No psychomotor agitation or slowing.    Speech: Normal rate, soft volume.   Mood: Does not give mood.  Affect: Elated, stable.   Thought Process: [place] but repetitive/perseverative.  Associations: Intact.   Thought Content: Ideas of fantasy.   Insight: Poor.  Judgment: Limited.   Attention: Limited.   Language: Fluent today.   Gait: Intact.

## 2024-10-18 NOTE — BH INPATIENT PSYCHIATRY PROGRESS NOTE - NSBHFUPINTERVALHXFT_PSY_A_CORE
Patient is followed up for NDD. Chart, medications and labs reviewed. Patient is discussed during morning brief, no overnight events, has been in fair behavioral control.   Patient was seen and is evaluated on the unit. She continues to exhibit childlike behaviors, laughs to self, intrusive, but maintaining behavioral control. She is currently awaiting placement in housing. Meeting to be scheduled w/ [facility]. She has been compliant with standing medications, denies SE. No acute medical concerns, VSS.

## 2024-10-18 NOTE — BH INPATIENT PSYCHIATRY PROGRESS NOTE - NSBHCHARTREVIEWVS_PSY_A_CORE FT
Vital Signs Last 24 Hrs  T(C): 36.5 (10-18-24 @ 08:16), Max: 36.5 (10-18-24 @ 08:16)  T(F): 97.7 (10-18-24 @ 08:16), Max: 97.7 (10-18-24 @ 08:16)  HR: --  BP: --  BP(mean): --  RR: 18 (10-18-24 @ 08:16) (18 - 18)  SpO2: --    Orthostatic VS  10-18-24 @ 08:16  Lying BP: --/-- HR: --  Sitting BP: 103/58 HR: 76  Standing BP: 102/62 HR: 97  Site: upper left arm  Mode: electronic  Orthostatic VS  10-17-24 @ 19:29  Lying BP: --/-- HR: --  Sitting BP: 115/70 HR: 78  Standing BP: 110/68 HR: 84  Site: --  Mode: --  Orthostatic VS  10-17-24 @ 09:45  Lying BP: --/-- HR: --  Sitting BP: 109/61 HR: 104  Standing BP: 111/60 HR: 107  Site: --  Mode: --  Orthostatic VS  10-16-24 @ 19:52  Lying BP: --/-- HR: --  Sitting BP: 113/67 HR: 78  Standing BP: 124/67 HR: 90  Site: --  Mode: --   Vital Signs Last 24 Hrs  T(C): 36.7 (10-21-24 @ 08:50), Max: 36.7 (10-21-24 @ 08:50)  T(F): 98 (10-21-24 @ 08:50), Max: 98 (10-21-24 @ 08:50)  HR: --  BP: --  BP(mean): --  RR: --  SpO2: --    Orthostatic VS  10-21-24 @ 08:50  Lying BP: --/-- HR: --  Sitting BP: 119/71 HR: 86  Standing BP: 111/76 HR: 88  Site: upper right arm  Mode: electronic  Orthostatic VS  10-20-24 @ 08:41  Lying BP: --/-- HR: --  Sitting BP: 119/60 HR: 80  Standing BP: 108/60 HR: 101  Site: --  Mode: --  Orthostatic VS  10-19-24 @ 19:47  Lying BP: --/-- HR: --  Sitting BP: 116/70 HR: 98  Standing BP: 114/74 HR: 107  Site: --  Mode: --

## 2024-10-18 NOTE — BH INPATIENT PSYCHIATRY PROGRESS NOTE - NSBHMETABOLIC_PSY_ALL_CORE_FT
BMI: BMI (kg/m2): 26 (10-12-24 @ 15:43)  HbA1c: A1C with Estimated Average Glucose Result: 6.1 % (10-18-24 @ 08:00)    Glucose: POCT Blood Glucose.: 57 mg/dL (04-15-24 @ 09:20)    BP: --Vital Signs Last 24 Hrs  T(C): 36.5 (10-18-24 @ 08:16), Max: 36.5 (10-18-24 @ 08:16)  T(F): 97.7 (10-18-24 @ 08:16), Max: 97.7 (10-18-24 @ 08:16)  HR: --  BP: --  BP(mean): --  RR: 18 (10-18-24 @ 08:16) (18 - 18)  SpO2: --    Orthostatic VS  10-18-24 @ 08:16  Lying BP: --/-- HR: --  Sitting BP: 103/58 HR: 76  Standing BP: 102/62 HR: 97  Site: upper left arm  Mode: electronic  Orthostatic VS  10-17-24 @ 19:29  Lying BP: --/-- HR: --  Sitting BP: 115/70 HR: 78  Standing BP: 110/68 HR: 84  Site: --  Mode: --  Orthostatic VS  10-17-24 @ 09:45  Lying BP: --/-- HR: --  Sitting BP: 109/61 HR: 104  Standing BP: 111/60 HR: 107  Site: --  Mode: --  Orthostatic VS  10-16-24 @ 19:52  Lying BP: --/-- HR: --  Sitting BP: 113/67 HR: 78  Standing BP: 124/67 HR: 90  Site: --  Mode: --    Lipid Panel: Date/Time: 10-18-24 @ 08:00  Cholesterol, Serum: 106  LDL Cholesterol Calculated: 53  HDL Cholesterol, Serum: 42  Total Cholesterol/HDL Ration Measurement: --  Triglycerides, Serum: 53   BMI: BMI (kg/m2): 26 (10-12-24 @ 15:43)  HbA1c: A1C with Estimated Average Glucose Result: 6.1 % (10-18-24 @ 08:00)    Glucose: POCT Blood Glucose.: 57 mg/dL (04-15-24 @ 09:20)    BP: --Vital Signs Last 24 Hrs  T(C): 36.7 (10-21-24 @ 08:50), Max: 36.7 (10-21-24 @ 08:50)  T(F): 98 (10-21-24 @ 08:50), Max: 98 (10-21-24 @ 08:50)  HR: --  BP: --  BP(mean): --  RR: --  SpO2: --    Orthostatic VS  10-21-24 @ 08:50  Lying BP: --/-- HR: --  Sitting BP: 119/71 HR: 86  Standing BP: 111/76 HR: 88  Site: upper right arm  Mode: electronic  Orthostatic VS  10-20-24 @ 08:41  Lying BP: --/-- HR: --  Sitting BP: 119/60 HR: 80  Standing BP: 108/60 HR: 101  Site: --  Mode: --  Orthostatic VS  10-19-24 @ 19:47  Lying BP: --/-- HR: --  Sitting BP: 116/70 HR: 98  Standing BP: 114/74 HR: 107  Site: --  Mode: --    Lipid Panel: Date/Time: 10-18-24 @ 08:00  Cholesterol, Serum: 106  LDL Cholesterol Calculated: 53  HDL Cholesterol, Serum: 42  Total Cholesterol/HDL Ration Measurement: --  Triglycerides, Serum: 53
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
## 2024-10-19 RX ADMIN — Medication 2 MILLIGRAM(S): at 20:59

## 2024-10-19 RX ADMIN — Medication 50 MILLIGRAM(S): at 20:59

## 2024-10-19 RX ADMIN — HALOPERIDOL 5 MILLIGRAM(S): 10 TABLET ORAL at 20:59

## 2024-10-20 RX ADMIN — Medication 2 MILLIGRAM(S): at 20:22

## 2024-10-20 RX ADMIN — HALOPERIDOL 5 MILLIGRAM(S): 10 TABLET ORAL at 20:22

## 2024-10-20 RX ADMIN — Medication 50 MILLIGRAM(S): at 20:22

## 2024-10-21 PROCEDURE — 99232 SBSQ HOSP IP/OBS MODERATE 35: CPT

## 2024-10-21 RX ADMIN — Medication 2 MILLIGRAM(S): at 21:07

## 2024-10-21 RX ADMIN — HALOPERIDOL 5 MILLIGRAM(S): 10 TABLET ORAL at 21:07

## 2024-10-21 RX ADMIN — Medication 50 MILLIGRAM(S): at 21:08

## 2024-10-21 NOTE — BH INPATIENT PSYCHIATRY PROGRESS NOTE - NSBHMETABOLIC_PSY_ALL_CORE_FT
BMI: BMI (kg/m2): 26 (10-12-24 @ 15:43)  HbA1c: A1C with Estimated Average Glucose Result: 6.1 % (10-18-24 @ 08:00)    Glucose: POCT Blood Glucose.: 57 mg/dL (04-15-24 @ 09:20)    BP: --Vital Signs Last 24 Hrs  T(C): 36.7 (10-21-24 @ 08:50), Max: 36.7 (10-21-24 @ 08:50)  T(F): 98 (10-21-24 @ 08:50), Max: 98 (10-21-24 @ 08:50)  HR: --  BP: --  BP(mean): --  RR: --  SpO2: --    Orthostatic VS  10-21-24 @ 08:50  Lying BP: --/-- HR: --  Sitting BP: 119/71 HR: 86  Standing BP: 111/76 HR: 88  Site: upper right arm  Mode: electronic  Orthostatic VS  10-20-24 @ 08:41  Lying BP: --/-- HR: --  Sitting BP: 119/60 HR: 80  Standing BP: 108/60 HR: 101  Site: --  Mode: --  Orthostatic VS  10-19-24 @ 19:47  Lying BP: --/-- HR: --  Sitting BP: 116/70 HR: 98  Standing BP: 114/74 HR: 107  Site: --  Mode: --    Lipid Panel: Date/Time: 10-18-24 @ 08:00  Cholesterol, Serum: 106  LDL Cholesterol Calculated: 53  HDL Cholesterol, Serum: 42  Total Cholesterol/HDL Ration Measurement: --  Triglycerides, Serum: 53   BMI: BMI (kg/m2): 26 (10-12-24 @ 15:43)  HbA1c: A1C with Estimated Average Glucose Result: 6.1 % (10-18-24 @ 08:00)    Glucose: POCT Blood Glucose.: 57 mg/dL (04-15-24 @ 09:20)    BP: --Vital Signs Last 24 Hrs  T(C): 36.6 (10-22-24 @ 08:51), Max: 36.6 (10-22-24 @ 08:51)  T(F): 97.9 (10-22-24 @ 08:51), Max: 97.9 (10-22-24 @ 08:51)  HR: --  BP: --  BP(mean): --  RR: --  SpO2: --    Orthostatic VS  10-22-24 @ 08:51  Lying BP: --/-- HR: --  Sitting BP: 111/65 HR: 75  Standing BP: 109/69 HR: 82  Site: --  Mode: --  Orthostatic VS  10-21-24 @ 08:50  Lying BP: --/-- HR: --  Sitting BP: 119/71 HR: 86  Standing BP: 111/76 HR: 88  Site: upper right arm  Mode: electronic    Lipid Panel: Date/Time: 10-18-24 @ 08:00  Cholesterol, Serum: 106  LDL Cholesterol Calculated: 53  HDL Cholesterol, Serum: 42  Total Cholesterol/HDL Ration Measurement: --  Triglycerides, Serum: 53
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
## 2024-10-21 NOTE — BH INPATIENT PSYCHIATRY PROGRESS NOTE - NSBHFUPINTERVALHXFT_PSY_A_CORE
Patient is followed up for NDD. Chart, medications and labs reviewed. Patient is discussed during morning brief, no overnight events, has been in fair behavioral control.   Patient was seen and is evaluated on the unit. She continues to exhibit childlike behaviors, laughs to self, intrusive, but maintaining behavioral control. She states that she wants to leave [place] as there is "no fun" and would like to find an apartment in [place]. Pt has been awaiting [employer] housing. She has been compliant with standing medications, denies SE. No acute medical concerns, VSS.

## 2024-10-21 NOTE — BH INPATIENT PSYCHIATRY PROGRESS NOTE - NSBHCHARTREVIEWVS_PSY_A_CORE FT
Vital Signs Last 24 Hrs  T(C): 36.7 (10-21-24 @ 08:50), Max: 36.7 (10-21-24 @ 08:50)  T(F): 98 (10-21-24 @ 08:50), Max: 98 (10-21-24 @ 08:50)  HR: --  BP: --  BP(mean): --  RR: --  SpO2: --    Orthostatic VS  10-21-24 @ 08:50  Lying BP: --/-- HR: --  Sitting BP: 119/71 HR: 86  Standing BP: 111/76 HR: 88  Site: upper right arm  Mode: electronic  Orthostatic VS  10-20-24 @ 08:41  Lying BP: --/-- HR: --  Sitting BP: 119/60 HR: 80  Standing BP: 108/60 HR: 101  Site: --  Mode: --  Orthostatic VS  10-19-24 @ 19:47  Lying BP: --/-- HR: --  Sitting BP: 116/70 HR: 98  Standing BP: 114/74 HR: 107  Site: --  Mode: --   Vital Signs Last 24 Hrs  T(C): 36.6 (10-22-24 @ 08:51), Max: 36.6 (10-22-24 @ 08:51)  T(F): 97.9 (10-22-24 @ 08:51), Max: 97.9 (10-22-24 @ 08:51)  HR: --  BP: --  BP(mean): --  RR: --  SpO2: --    Orthostatic VS  10-22-24 @ 08:51  Lying BP: --/-- HR: --  Sitting BP: 111/65 HR: 75  Standing BP: 109/69 HR: 82  Site: --  Mode: --  Orthostatic VS  10-21-24 @ 08:50  Lying BP: --/-- HR: --  Sitting BP: 119/71 HR: 86  Standing BP: 111/76 HR: 88  Site: upper right arm  Mode: electronic

## 2024-10-21 NOTE — BH INPATIENT PSYCHIATRY PROGRESS NOTE - MSE UNSTRUCTURED FT
Appearance: Dressed appropriately in gowns/hospital clothing.   Behavior: Not cooperative.  Childlike demeanor.  Found talking to wall in day room.  Motor: No abnormal involuntary movements.  No psychomotor agitation or slowing.    Speech: Normal rate, soft volume.   Mood: Does not give mood.  Affect: Elated, stable.   Thought Process: [place] but repetitive/perseverative.  Associations: Intact.   Thought Content: Ideas of fantasy.   Insight: Poor.  Judgment: Limited.   Attention: Limited.   Language: Fluent today.   Gait: Intact.

## 2024-10-22 PROCEDURE — 99232 SBSQ HOSP IP/OBS MODERATE 35: CPT

## 2024-10-22 PROCEDURE — 90832 PSYTX W PT 30 MINUTES: CPT

## 2024-10-22 RX ORDER — PALIPERIDONE 9 MG/1
156 TABLET, EXTENDED RELEASE ORAL ONCE
Refills: 0 | Status: COMPLETED | OUTPATIENT
Start: 2024-10-23 | End: 2024-10-23

## 2024-10-22 RX ADMIN — HALOPERIDOL 5 MILLIGRAM(S): 10 TABLET ORAL at 21:11

## 2024-10-22 RX ADMIN — Medication 50 MILLIGRAM(S): at 21:11

## 2024-10-22 RX ADMIN — Medication 2 MILLIGRAM(S): at 21:11

## 2024-10-22 NOTE — BH INPATIENT PSYCHIATRY PROGRESS NOTE - NSBHMETABOLIC_PSY_ALL_CORE_FT
BMI: BMI (kg/m2): 26 (10-12-24 @ 15:43)  HbA1c: A1C with Estimated Average Glucose Result: 6.1 % (10-18-24 @ 08:00)    Glucose: POCT Blood Glucose.: 57 mg/dL (04-15-24 @ 09:20)    BP: --Vital Signs Last 24 Hrs  T(C): 36.6 (10-22-24 @ 08:51), Max: 36.6 (10-22-24 @ 08:51)  T(F): 97.9 (10-22-24 @ 08:51), Max: 97.9 (10-22-24 @ 08:51)  HR: --  BP: --  BP(mean): --  RR: 17 (10-22-24 @ 08:51) (17 - 17)  SpO2: --    Orthostatic VS  10-22-24 @ 08:51  Lying BP: --/-- HR: --  Sitting BP: 111/65 HR: 75  Standing BP: 109/69 HR: 82  Site: --  Mode: --  Orthostatic VS  10-21-24 @ 08:50  Lying BP: --/-- HR: --  Sitting BP: 119/71 HR: 86  Standing BP: 111/76 HR: 88  Site: upper right arm  Mode: electronic    Lipid Panel: Date/Time: 10-18-24 @ 08:00  Cholesterol, Serum: 106  LDL Cholesterol Calculated: 53  HDL Cholesterol, Serum: 42  Total Cholesterol/HDL Ration Measurement: --  Triglycerides, Serum: 53

## 2024-10-22 NOTE — BH INPATIENT PSYCHIATRY PROGRESS NOTE - MSE UNSTRUCTURED FT
Appearance: Dressed appropriately in gowns/hospital clothing.   Behavior: Not cooperative.  Childlike demeanor.  Found talking to wall in day room.  Motor: No abnormal involuntary movements.  No psychomotor agitation or slowing.    Speech: Normal rate, soft volume.   Mood: Does not give mood.  Affect: Elated, stable.   Thought Process: [place] but repetitive/perseverative.  Associations: Intact.   Thought Content: Ideas of fantasy.   Insight: Poor.  Judgment: Limited.   Attention: Limited.   Language: Fluent today.   Gait: Intact.

## 2024-10-22 NOTE — BH INPATIENT PSYCHIATRY PROGRESS NOTE - NSBHCHARTREVIEWVS_PSY_A_CORE FT
Vital Signs Last 24 Hrs  T(C): 36.6 (10-22-24 @ 08:51), Max: 36.6 (10-22-24 @ 08:51)  T(F): 97.9 (10-22-24 @ 08:51), Max: 97.9 (10-22-24 @ 08:51)  HR: --  BP: --  BP(mean): --  RR: 17 (10-22-24 @ 08:51) (17 - 17)  SpO2: --    Orthostatic VS  10-22-24 @ 08:51  Lying BP: --/-- HR: --  Sitting BP: 111/65 HR: 75  Standing BP: 109/69 HR: 82  Site: --  Mode: --  Orthostatic VS  10-21-24 @ 08:50  Lying BP: --/-- HR: --  Sitting BP: 119/71 HR: 86  Standing BP: 111/76 HR: 88  Site: upper right arm  Mode: electronic

## 2024-10-22 NOTE — BH INPATIENT PSYCHIATRY PROGRESS NOTE - NSBHFUPINTERVALHXFT_PSY_A_CORE
Patient is followed up for NDD. Chart, medications and labs reviewed. Patient is discussed during morning brief, no overnight events, has been in fair behavioral control.   Patient was seen and is evaluated on the unit. She continues to exhibit childlike behaviors, intrusive, but maintaining behavioral control. She states that she used to live at [address] [place] [address], continues to discuss wanting to move to [place], [place]. Pt has been awaiting [facility] housing, w/ meeting scheduled for 10/29. She is scheduled for next maintenance dose of Invega Sustenna 156mg tomorrow. No acute medical concerns, VSS.
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
## 2024-10-23 PROCEDURE — 99232 SBSQ HOSP IP/OBS MODERATE 35: CPT

## 2024-10-23 RX ADMIN — HALOPERIDOL 5 MILLIGRAM(S): 10 TABLET ORAL at 20:29

## 2024-10-23 RX ADMIN — PALIPERIDONE 156 MILLIGRAM(S): 9 TABLET, EXTENDED RELEASE ORAL at 09:44

## 2024-10-23 RX ADMIN — Medication 50 MILLIGRAM(S): at 20:29

## 2024-10-23 RX ADMIN — Medication 2 MILLIGRAM(S): at 20:29

## 2024-10-23 NOTE — BH INPATIENT PSYCHIATRY PROGRESS NOTE - NSBHMETABOLIC_PSY_ALL_CORE_FT
BMI: BMI (kg/m2): 26 (10-12-24 @ 15:43)  HbA1c: A1C with Estimated Average Glucose Result: 6.1 % (10-18-24 @ 08:00)    Glucose: POCT Blood Glucose.: 57 mg/dL (04-15-24 @ 09:20)    BP: --Vital Signs Last 24 Hrs  T(C): 36.3 (10-23-24 @ 08:24), Max: 36.3 (10-22-24 @ 19:36)  T(F): 97.3 (10-23-24 @ 08:24), Max: 97.3 (10-22-24 @ 19:36)  HR: --  BP: --  BP(mean): --  RR: 17 (10-23-24 @ 08:24) (16 - 17)  SpO2: --    Orthostatic VS  10-23-24 @ 08:24  Lying BP: --/-- HR: --  Sitting BP: 118/69 HR: 82  Standing BP: 111/73 HR: 84  Site: --  Mode: --  Orthostatic VS  10-22-24 @ 19:36  Lying BP: --/-- HR: --  Sitting BP: 117/63 HR: 94  Standing BP: 120/75 HR: 89  Site: --  Mode: --  Orthostatic VS  10-22-24 @ 08:51  Lying BP: --/-- HR: --  Sitting BP: 111/65 HR: 75  Standing BP: 109/69 HR: 82  Site: --  Mode: --    Lipid Panel: Date/Time: 10-18-24 @ 08:00  Cholesterol, Serum: 106  LDL Cholesterol Calculated: 53  HDL Cholesterol, Serum: 42  Total Cholesterol/HDL Ration Measurement: --  Triglycerides, Serum: 53
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
## 2024-10-23 NOTE — BH INPATIENT PSYCHIATRY PROGRESS NOTE - NSBHFUPINTERVALHXFT_PSY_A_CORE
Patient is followed up for NDD. Chart, medications and labs reviewed. Patient is discussed during morning brief, no overnight events, has been in fair behavioral control.   Patient was seen and is evaluated on the unit. She continues to exhibit childlike behaviors, intrusive, but maintaining behavioral control. She states that she would like to have another interview so she can leave the hospital. Pt has been awaiting [facility] housing, w/ meeting scheduled for 10/29. She was able to receive maintenance dose of Invega Sustenna 156mg today. No acute medical concerns, VSS.

## 2024-10-23 NOTE — BH INPATIENT PSYCHIATRY PROGRESS NOTE - MSE UNSTRUCTURED FT
Appearance: Dressed appropriately in gowns/hospital clothing.   Behavior: Not cooperative.  Childlike demeanor.  Found talking to wall in day room.  Motor: No abnormal involuntary movements.  No psychomotor agitation or slowing.    Speech: Normal rate, soft volume.   Mood: Does not give mood.  Affect: Elated, stable.   Thought Process: [place] but repetitive/perseverative.  Associations: Intact.   Thought Content: Ideas of fantasy.   Insight: Poor.  Judgment: Limited.   Attention: Limited.   Language: Fluent today.   Gait: Intact.

## 2024-10-23 NOTE — BH INPATIENT PSYCHIATRY PROGRESS NOTE - NSBHCHARTREVIEWVS_PSY_A_CORE FT
Vital Signs Last 24 Hrs  T(C): 36.3 (10-23-24 @ 08:24), Max: 36.3 (10-22-24 @ 19:36)  T(F): 97.3 (10-23-24 @ 08:24), Max: 97.3 (10-22-24 @ 19:36)  HR: --  BP: --  BP(mean): --  RR: 17 (10-23-24 @ 08:24) (16 - 17)  SpO2: --    Orthostatic VS  10-23-24 @ 08:24  Lying BP: --/-- HR: --  Sitting BP: 118/69 HR: 82  Standing BP: 111/73 HR: 84  Site: --  Mode: --  Orthostatic VS  10-22-24 @ 19:36  Lying BP: --/-- HR: --  Sitting BP: 117/63 HR: 94  Standing BP: 120/75 HR: 89  Site: --  Mode: --  Orthostatic VS  10-22-24 @ 08:51  Lying BP: --/-- HR: --  Sitting BP: 111/65 HR: 75  Standing BP: 109/69 HR: 82  Site: --  Mode: --

## 2024-10-24 PROCEDURE — 99232 SBSQ HOSP IP/OBS MODERATE 35: CPT

## 2024-10-24 RX ORDER — LORAZEPAM 4 MG/ML
2 VIAL (ML) INJECTION ONCE
Refills: 0 | Status: DISCONTINUED | OUTPATIENT
Start: 2024-10-24 | End: 2024-10-31

## 2024-10-24 RX ORDER — LORAZEPAM 4 MG/ML
2 VIAL (ML) INJECTION EVERY 6 HOURS
Refills: 0 | Status: DISCONTINUED | OUTPATIENT
Start: 2024-10-24 | End: 2024-10-31

## 2024-10-24 RX ADMIN — Medication 2 MILLIGRAM(S): at 20:18

## 2024-10-24 RX ADMIN — Medication 50 MILLIGRAM(S): at 20:18

## 2024-10-24 RX ADMIN — HALOPERIDOL 5 MILLIGRAM(S): 10 TABLET ORAL at 20:18

## 2024-10-24 NOTE — BH INPATIENT PSYCHIATRY PROGRESS NOTE - NSBHCHARTREVIEWVS_PSY_A_CORE FT
Vital Signs Last 24 Hrs  T(C): 36.3 (10-24-24 @ 08:49), Max: 36.4 (10-23-24 @ 19:01)  T(F): 97.3 (10-24-24 @ 08:49), Max: 97.5 (10-23-24 @ 19:01)  HR: --  BP: --  BP(mean): --  RR: --  SpO2: --    Orthostatic VS  10-24-24 @ 08:49  Lying BP: --/-- HR: --  Sitting BP: 126/71 HR: 81  Standing BP: 126/71 HR: 89  Site: upper left arm  Mode: electronic  Orthostatic VS  10-23-24 @ 19:01  Lying BP: --/-- HR: --  Sitting BP: 114/68 HR: 85  Standing BP: 110/65 HR: 89  Site: --  Mode: --  Orthostatic VS  10-23-24 @ 08:24  Lying BP: --/-- HR: --  Sitting BP: 118/69 HR: 82  Standing BP: 111/73 HR: 84  Site: --  Mode: --  Orthostatic VS  10-22-24 @ 19:36  Lying BP: --/-- HR: --  Sitting BP: 117/63 HR: 94  Standing BP: 120/75 HR: 89  Site: --  Mode: --   Vital Signs Last 24 Hrs  T(C): 36.6 (10-25-24 @ 08:46), Max: 36.6 (10-25-24 @ 08:46)  T(F): 97.9 (10-25-24 @ 08:46), Max: 97.9 (10-25-24 @ 08:46)  HR: --  BP: --  BP(mean): --  RR: --  SpO2: --    Orthostatic VS  10-25-24 @ 08:46  Lying BP: --/-- HR: --  Sitting BP: 95/51 HR: 84  Standing BP: 94/65 HR: 94  Site: upper left arm  Mode: electronic  Orthostatic VS  10-24-24 @ 19:15  Lying BP: --/-- HR: --  Sitting BP: 120/82 HR: 80  Standing BP: 119/78 HR: 79  Site: --  Mode: --  Orthostatic VS  10-24-24 @ 08:49  Lying BP: --/-- HR: --  Sitting BP: 126/71 HR: 81  Standing BP: 126/71 HR: 89  Site: upper left arm  Mode: electronic  Orthostatic VS  10-23-24 @ 19:01  Lying BP: --/-- HR: --  Sitting BP: 114/68 HR: 85  Standing BP: 110/65 HR: 89  Site: --  Mode: --

## 2024-10-24 NOTE — BH INPATIENT PSYCHIATRY PROGRESS NOTE - MSE UNSTRUCTURED FT
Appearance: Dressed appropriately in gowns/hospital clothing.   Behavior: Not cooperative.  Childlike demeanor.  Found talking to wall in day room.  Motor: No abnormal involuntary movements.  No psychomotor agitation or slowing.    Speech: Normal rate, soft volume.   Mood: Does not give mood.  Affect: Elated, stable.   Thought Process: [place] but repetitive/perseverative.  Associations: Intact.   Thought Content: Ideas of fantasy.   Insight: Poor.  Judgment: Limited.   Attention: Limited.   Language: Fluent today.   Gait: Intact.
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
## 2024-10-24 NOTE — BH INPATIENT PSYCHIATRY PROGRESS NOTE - NSBHMETABOLIC_PSY_ALL_CORE_FT
BMI: BMI (kg/m2): 26 (10-12-24 @ 15:43)  HbA1c: A1C with Estimated Average Glucose Result: 6.1 % (10-18-24 @ 08:00)    Glucose: POCT Blood Glucose.: 57 mg/dL (04-15-24 @ 09:20)    BP: --Vital Signs Last 24 Hrs  T(C): 36.3 (10-24-24 @ 08:49), Max: 36.4 (10-23-24 @ 19:01)  T(F): 97.3 (10-24-24 @ 08:49), Max: 97.5 (10-23-24 @ 19:01)  HR: --  BP: --  BP(mean): --  RR: --  SpO2: --    Orthostatic VS  10-24-24 @ 08:49  Lying BP: --/-- HR: --  Sitting BP: 126/71 HR: 81  Standing BP: 126/71 HR: 89  Site: upper left arm  Mode: electronic  Orthostatic VS  10-23-24 @ 19:01  Lying BP: --/-- HR: --  Sitting BP: 114/68 HR: 85  Standing BP: 110/65 HR: 89  Site: --  Mode: --  Orthostatic VS  10-23-24 @ 08:24  Lying BP: --/-- HR: --  Sitting BP: 118/69 HR: 82  Standing BP: 111/73 HR: 84  Site: --  Mode: --  Orthostatic VS  10-22-24 @ 19:36  Lying BP: --/-- HR: --  Sitting BP: 117/63 HR: 94  Standing BP: 120/75 HR: 89  Site: --  Mode: --    Lipid Panel: Date/Time: 10-18-24 @ 08:00  Cholesterol, Serum: 106  LDL Cholesterol Calculated: 53  HDL Cholesterol, Serum: 42  Total Cholesterol/HDL Ration Measurement: --  Triglycerides, Serum: 53   BMI: BMI (kg/m2): 26 (10-12-24 @ 15:43)  HbA1c: A1C with Estimated Average Glucose Result: 6.1 % (10-18-24 @ 08:00)    Glucose: POCT Blood Glucose.: 57 mg/dL (04-15-24 @ 09:20)    BP: --Vital Signs Last 24 Hrs  T(C): 36.6 (10-25-24 @ 08:46), Max: 36.6 (10-25-24 @ 08:46)  T(F): 97.9 (10-25-24 @ 08:46), Max: 97.9 (10-25-24 @ 08:46)  HR: --  BP: --  BP(mean): --  RR: --  SpO2: --    Orthostatic VS  10-25-24 @ 08:46  Lying BP: --/-- HR: --  Sitting BP: 95/51 HR: 84  Standing BP: 94/65 HR: 94  Site: upper left arm  Mode: electronic  Orthostatic VS  10-24-24 @ 19:15  Lying BP: --/-- HR: --  Sitting BP: 120/82 HR: 80  Standing BP: 119/78 HR: 79  Site: --  Mode: --  Orthostatic VS  10-24-24 @ 08:49  Lying BP: --/-- HR: --  Sitting BP: 126/71 HR: 81  Standing BP: 126/71 HR: 89  Site: upper left arm  Mode: electronic  Orthostatic VS  10-23-24 @ 19:01  Lying BP: --/-- HR: --  Sitting BP: 114/68 HR: 85  Standing BP: 110/65 HR: 89  Site: --  Mode: --    Lipid Panel: Date/Time: 10-18-24 @ 08:00  Cholesterol, Serum: 106  LDL Cholesterol Calculated: 53  HDL Cholesterol, Serum: 42  Total Cholesterol/HDL Ration Measurement: --  Triglycerides, Serum: 53

## 2024-10-24 NOTE — BH INPATIENT PSYCHIATRY PROGRESS NOTE - NSBHFUPINTERVALHXFT_PSY_A_CORE
97.5 Patient is followed up for NDD. Chart, medications and labs reviewed. Patient is discussed during morning brief, no overnight events, has been in fair behavioral control.   Patient was seen and is evaluated on the unit. She continues to exhibit childlike behaviors, intrusive, but maintaining behavioral control. She laughs and says that she is "good", continues to inquire about housing interview. Pt has been awaiting [facility] housing, w/ meeting scheduled for 10/29. She was able to receive maintenance dose of Invega Sustenna 156mg yesterday, tolerating well w/o complaints. No acute medical concerns, VSS.

## 2024-10-25 PROCEDURE — 99232 SBSQ HOSP IP/OBS MODERATE 35: CPT

## 2024-10-25 RX ADMIN — Medication 50 MILLIGRAM(S): at 20:31

## 2024-10-25 RX ADMIN — Medication 2 MILLIGRAM(S): at 20:31

## 2024-10-25 RX ADMIN — HALOPERIDOL 5 MILLIGRAM(S): 10 TABLET ORAL at 20:31

## 2024-10-25 NOTE — BH INPATIENT PSYCHIATRY PROGRESS NOTE - MSE UNSTRUCTURED FT
Appearance: Dressed appropriately in gowns/hospital clothing.   Behavior: Not cooperative.  Childlike demeanor.  Found talking to wall in day room.  Motor: No abnormal involuntary movements.  No psychomotor agitation or slowing.    Speech: Normal rate, soft volume.   Mood: Does not give mood.  Affect: Elated, stable.   Thought Process: [place] but repetitive/perseverative.  Associations: Intact.   Thought Content: Ideas of fantasy.   Insight: Poor.  Judgment: Limited.   Attention: Limited.   Language: Fluent today.   Gait: Intact.
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
## 2024-10-25 NOTE — BH INPATIENT PSYCHIATRY PROGRESS NOTE - NSBHMETABOLIC_PSY_ALL_CORE_FT
BMI: BMI (kg/m2): 26 (10-12-24 @ 15:43)  HbA1c: A1C with Estimated Average Glucose Result: 6.1 % (10-18-24 @ 08:00)    Glucose: POCT Blood Glucose.: 57 mg/dL (04-15-24 @ 09:20)    BP: --Vital Signs Last 24 Hrs  T(C): 36.6 (10-25-24 @ 08:46), Max: 36.6 (10-25-24 @ 08:46)  T(F): 97.9 (10-25-24 @ 08:46), Max: 97.9 (10-25-24 @ 08:46)  HR: --  BP: --  BP(mean): --  RR: --  SpO2: --    Orthostatic VS  10-25-24 @ 08:46  Lying BP: --/-- HR: --  Sitting BP: 95/51 HR: 84  Standing BP: 94/65 HR: 94  Site: upper left arm  Mode: electronic  Orthostatic VS  10-24-24 @ 19:15  Lying BP: --/-- HR: --  Sitting BP: 120/82 HR: 80  Standing BP: 119/78 HR: 79  Site: --  Mode: --  Orthostatic VS  10-24-24 @ 08:49  Lying BP: --/-- HR: --  Sitting BP: 126/71 HR: 81  Standing BP: 126/71 HR: 89  Site: upper left arm  Mode: electronic  Orthostatic VS  10-23-24 @ 19:01  Lying BP: --/-- HR: --  Sitting BP: 114/68 HR: 85  Standing BP: 110/65 HR: 89  Site: --  Mode: --    Lipid Panel: Date/Time: 10-18-24 @ 08:00  Cholesterol, Serum: 106  LDL Cholesterol Calculated: 53  HDL Cholesterol, Serum: 42  Total Cholesterol/HDL Ration Measurement: --  Triglycerides, Serum: 53

## 2024-10-25 NOTE — BH INPATIENT PSYCHIATRY PROGRESS NOTE - NSBHCHARTREVIEWVS_PSY_A_CORE FT
Vital Signs Last 24 Hrs  T(C): 36.6 (10-25-24 @ 08:46), Max: 36.6 (10-25-24 @ 08:46)  T(F): 97.9 (10-25-24 @ 08:46), Max: 97.9 (10-25-24 @ 08:46)  HR: --  BP: --  BP(mean): --  RR: --  SpO2: --    Orthostatic VS  10-25-24 @ 08:46  Lying BP: --/-- HR: --  Sitting BP: 95/51 HR: 84  Standing BP: 94/65 HR: 94  Site: upper left arm  Mode: electronic  Orthostatic VS  10-24-24 @ 19:15  Lying BP: --/-- HR: --  Sitting BP: 120/82 HR: 80  Standing BP: 119/78 HR: 79  Site: --  Mode: --  Orthostatic VS  10-24-24 @ 08:49  Lying BP: --/-- HR: --  Sitting BP: 126/71 HR: 81  Standing BP: 126/71 HR: 89  Site: upper left arm  Mode: electronic  Orthostatic VS  10-23-24 @ 19:01  Lying BP: --/-- HR: --  Sitting BP: 114/68 HR: 85  Standing BP: 110/65 HR: 89  Site: --  Mode: --

## 2024-10-25 NOTE — BH INPATIENT PSYCHIATRY PROGRESS NOTE - NSBHFUPINTERVALHXFT_PSY_A_CORE
Patient is followed up for NDD. Chart, medications and labs reviewed. Patient is discussed during morning brief, no overnight events, has been in fair behavioral control.   Patient was seen and is evaluated on the unit. She continues to exhibit childlike behaviors, intrusive, but maintaining behavioral control. She is observed speaking into phone that is out of order. Pt has been awaiting [facility] housing, w/ meeting scheduled for 10/29. She was able to receive maintenance dose of Invega Sustenna 156mg on 10/23, tolerating well w/o complaints. No acute medical concerns, VSS.

## 2024-10-26 RX ADMIN — HALOPERIDOL 5 MILLIGRAM(S): 10 TABLET ORAL at 20:00

## 2024-10-26 RX ADMIN — Medication 50 MILLIGRAM(S): at 19:59

## 2024-10-26 RX ADMIN — Medication 2 MILLIGRAM(S): at 19:59

## 2024-10-27 RX ADMIN — Medication 650 MILLIGRAM(S): at 10:06

## 2024-10-27 RX ADMIN — HALOPERIDOL 5 MILLIGRAM(S): 10 TABLET ORAL at 23:06

## 2024-10-27 RX ADMIN — Medication 2 MILLIGRAM(S): at 23:05

## 2024-10-27 RX ADMIN — HALOPERIDOL 5 MILLIGRAM(S): 10 TABLET ORAL at 10:06

## 2024-10-27 RX ADMIN — Medication 2 MILLIGRAM(S): at 10:06

## 2024-10-27 RX ADMIN — Medication 50 MILLIGRAM(S): at 23:05

## 2024-10-28 PROCEDURE — 90832 PSYTX W PT 30 MINUTES: CPT

## 2024-10-28 PROCEDURE — 99232 SBSQ HOSP IP/OBS MODERATE 35: CPT

## 2024-10-28 RX ADMIN — Medication 50 MILLIGRAM(S): at 20:11

## 2024-10-28 RX ADMIN — Medication 2 MILLIGRAM(S): at 20:12

## 2024-10-28 RX ADMIN — HALOPERIDOL 5 MILLIGRAM(S): 10 TABLET ORAL at 20:11
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
## 2024-10-28 NOTE — BH INPATIENT PSYCHIATRY PROGRESS NOTE - MSE UNSTRUCTURED FT
Appearance: Dressed appropriately in gowns/hospital clothing.   Behavior: Not cooperative.  Childlike demeanor.  Found talking to wall in day room.  Motor: No abnormal involuntary movements.  No psychomotor agitation or slowing.    Speech: Normal rate, soft volume.   Mood: Does not give mood.  Affect: Elated, stable.   Thought Process: [place] but repetitive/perseverative.  Associations: Intact.   Thought Content: Ideas of fantasy.   Insight: Poor.  Judgment: Limited.   Attention: Limited.   Language: Fluent today.   Gait: Intact.

## 2024-10-28 NOTE — BH INPATIENT PSYCHIATRY PROGRESS NOTE - NSBHMETABOLIC_PSY_ALL_CORE_FT
BMI: BMI (kg/m2): 26 (10-12-24 @ 15:43)  HbA1c: A1C with Estimated Average Glucose Result: 6.1 % (10-18-24 @ 08:00)    Glucose: POCT Blood Glucose.: 57 mg/dL (04-15-24 @ 09:20)    BP: --Vital Signs Last 24 Hrs  T(C): 36.2 (10-28-24 @ 08:47), Max: 36.4 (10-27-24 @ 19:56)  T(F): 97.2 (10-28-24 @ 08:47), Max: 97.5 (10-27-24 @ 19:56)  HR: --  BP: --  BP(mean): --  RR: --  SpO2: --    Orthostatic VS  10-28-24 @ 08:47  Lying BP: --/-- HR: --  Sitting BP: 103/65 HR: 90  Standing BP: 111/67 HR: 99  Site: --  Mode: --  Orthostatic VS  10-27-24 @ 19:56  Lying BP: --/-- HR: --  Sitting BP: 111/63 HR: 87  Standing BP: 109/74 HR: 90  Site: --  Mode: --  Orthostatic VS  10-27-24 @ 08:47  Lying BP: --/-- HR: --  Sitting BP: 116/71 HR: 86  Standing BP: 114/74 HR: 88  Site: --  Mode: electronic  Orthostatic VS  10-26-24 @ 19:43  Lying BP: --/-- HR: --  Sitting BP: 114/70 HR: 80  Standing BP: 120/70 HR: 85  Site: --  Mode: --    Lipid Panel: Date/Time: 10-18-24 @ 08:00  Cholesterol, Serum: 106  LDL Cholesterol Calculated: 53  HDL Cholesterol, Serum: 42  Total Cholesterol/HDL Ration Measurement: --  Triglycerides, Serum: 53

## 2024-10-28 NOTE — BH INPATIENT PSYCHIATRY PROGRESS NOTE - NSBHFUPINTERVALHXFT_PSY_A_CORE
Patient is followed up for NDD. Chart, medications and labs reviewed. Patient is discussed during morning brief, no overnight events, has been in fair behavioral control.   Patient was seen and is evaluated on the unit. She continues to exhibit childlike behaviors, intrusive, but maintaining behavioral control. She remains preoccupied with going to a group home, team discussed meeting scheduled with [facility] tomorrow. Pt report having good appetite, able to shower this morning. She was able to receive maintenance dose of Invega Sustenna 156mg on 10/23, tolerating well w/o complaints. No acute medical concerns, VSS.

## 2024-10-28 NOTE — BH INPATIENT PSYCHIATRY PROGRESS NOTE - NSBHCHARTREVIEWVS_PSY_A_CORE FT
Vital Signs Last 24 Hrs  T(C): 36.2 (10-28-24 @ 08:47), Max: 36.4 (10-27-24 @ 19:56)  T(F): 97.2 (10-28-24 @ 08:47), Max: 97.5 (10-27-24 @ 19:56)  HR: --  BP: --  BP(mean): --  RR: --  SpO2: --    Orthostatic VS  10-28-24 @ 08:47  Lying BP: --/-- HR: --  Sitting BP: 103/65 HR: 90  Standing BP: 111/67 HR: 99  Site: --  Mode: --  Orthostatic VS  10-27-24 @ 19:56  Lying BP: --/-- HR: --  Sitting BP: 111/63 HR: 87  Standing BP: 109/74 HR: 90  Site: --  Mode: --  Orthostatic VS  10-27-24 @ 08:47  Lying BP: --/-- HR: --  Sitting BP: 116/71 HR: 86  Standing BP: 114/74 HR: 88  Site: --  Mode: electronic  Orthostatic VS  10-26-24 @ 19:43  Lying BP: --/-- HR: --  Sitting BP: 114/70 HR: 80  Standing BP: 120/70 HR: 85  Site: --  Mode: --

## 2024-10-29 PROCEDURE — 90853 GROUP PSYCHOTHERAPY: CPT

## 2024-10-29 PROCEDURE — 99232 SBSQ HOSP IP/OBS MODERATE 35: CPT

## 2024-10-29 RX ORDER — TRAZODONE HCL 100 MG
50 TABLET ORAL AT BEDTIME
Refills: 0 | Status: DISCONTINUED | OUTPATIENT
Start: 2024-10-29 | End: 2024-11-04

## 2024-10-29 RX ADMIN — Medication 2 MILLIGRAM(S): at 19:55

## 2024-10-29 RX ADMIN — Medication 50 MILLIGRAM(S): at 19:55

## 2024-10-29 RX ADMIN — HALOPERIDOL 5 MILLIGRAM(S): 10 TABLET ORAL at 19:54

## 2024-10-29 NOTE — BH INPATIENT PSYCHIATRY PROGRESS NOTE - NSBHCHARTREVIEWVS_PSY_A_CORE FT
Vital Signs Last 24 Hrs  T(C): 36.7 (10-29-24 @ 07:17), Max: 36.7 (10-29-24 @ 07:17)  T(F): 98 (10-29-24 @ 07:17), Max: 98 (10-29-24 @ 07:17)  HR: --  BP: --  BP(mean): --  RR: 18 (10-29-24 @ 07:17) (18 - 18)  SpO2: --    Orthostatic VS  10-29-24 @ 07:17  Lying BP: --/-- HR: --  Sitting BP: 100/72 HR: 80  Standing BP: 108/75 HR: 85  Site: --  Mode: --  Orthostatic VS  10-29-24 @ 06:49  Lying BP: --/-- HR: --  Sitting BP: 108/73 HR: 83  Standing BP: 108/78 HR: 89  Site: --  Mode: --  Orthostatic VS  10-28-24 @ 19:40  Lying BP: --/-- HR: --  Sitting BP: 102/68 HR: 80  Standing BP: 110/65 HR: 91  Site: --  Mode: --  Orthostatic VS  10-28-24 @ 08:47  Lying BP: --/-- HR: --  Sitting BP: 103/65 HR: 90  Standing BP: 111/67 HR: 99  Site: --  Mode: --  Orthostatic VS  10-27-24 @ 19:56  Lying BP: --/-- HR: --  Sitting BP: 111/63 HR: 87  Standing BP: 109/74 HR: 90  Site: --  Mode: --   Vital Signs Last 24 Hrs  T(C): 36.5 (10-30-24 @ 08:21), Max: 36.6 (10-29-24 @ 19:25)  T(F): 97.7 (10-30-24 @ 08:21), Max: 97.8 (10-29-24 @ 19:25)  HR: --  BP: --  BP(mean): --  RR: 17 (10-30-24 @ 08:21) (17 - 17)  SpO2: --    Orthostatic VS  10-30-24 @ 08:21  Lying BP: --/-- HR: --  Sitting BP: 112/60 HR: 81  Standing BP: 122/65 HR: 93  Site: --  Mode: --  Orthostatic VS  10-29-24 @ 19:25  Lying BP: --/-- HR: --  Sitting BP: 125/82 HR: 89  Standing BP: 117/79 HR: 88  Site: --  Mode: --  Orthostatic VS  10-29-24 @ 07:17  Lying BP: --/-- HR: --  Sitting BP: 100/72 HR: 80  Standing BP: 108/75 HR: 85  Site: --  Mode: --  Orthostatic VS  10-29-24 @ 06:49  Lying BP: --/-- HR: --  Sitting BP: 108/73 HR: 83  Standing BP: 108/78 HR: 89  Site: --  Mode: --  Orthostatic VS  10-28-24 @ 19:40  Lying BP: --/-- HR: --  Sitting BP: 102/68 HR: 80  Standing BP: 110/65 HR: 91  Site: --  Mode: --

## 2024-10-29 NOTE — BH INPATIENT PSYCHIATRY PROGRESS NOTE - NSBHFUPINTERVALHXFT_PSY_A_CORE
Patient is followed up for NDD. Chart, medications and labs reviewed. Patient is discussed during morning brief, no overnight events, has been in fair behavioral control.   Patient was seen and is evaluated on the unit. She continues to exhibit childlike behaviors, intrusive, but maintaining behavioral control. She remains preoccupied with going to a group home, meeting scheduled w/ [facility] this afternoon. Pt reports having good appetite & sleep remains well, able to shower this morning and tends to ADL's. She received maintenance dose of Invega Sustenna 156mg on 10/23, tolerating well w/o complaints. No acute medical concerns, VSS.

## 2024-10-29 NOTE — BH INPATIENT PSYCHIATRY PROGRESS NOTE - MSE UNSTRUCTURED FT
Appearance: Dressed appropriately in gowns/hospital clothing.   Behavior: Not cooperative.  Childlike demeanor.  Found talking to wall in day room.  Motor: No abnormal involuntary movements.  No psychomotor agitation or slowing.    Speech: Normal rate, soft volume.   Mood: Does not give mood.  Affect: Elated, stable.   Thought Process: [place] but repetitive/perseverative.  Associations: Intact.   Thought Content: Ideas of fantasy.   Insight: Poor.  Judgment: Limited.   Attention: Limited.   Language: Fluent today.   Gait: Intact.

## 2024-10-29 NOTE — BH PSYCHOLOGY - GROUP THERAPY NOTE - NSPSYCHOLGRPCOGPT_PSY_A_CORE FT
Patient attended Cognitive Behavioral Therapy Group incorporating ACT-based concepts. The group started with a brief check-in asking Pts to share if they could have dinner with anyone who would it be. Pt’s then engaged in a mindfulness image exercise where they were asked to provide reflection on the “mind full or mindful” image. Lastly, Pt’s engaged in a discussion about self-care. Pt's were prompted to engage in a self-care assessment worksheet and share areas of their physical, emotional/psychological, and spiritual needs they would like to improve upon. Group facilitator(s) explained concepts, reinforced participation, and engaged patients in the discussion.

## 2024-10-29 NOTE — BH INPATIENT PSYCHIATRY PROGRESS NOTE - CURRENT MEDICATION
MEDICATIONS  (STANDING):  influenza   Vaccine 0.5 milliLiter(s) IntraMuscular once    MEDICATIONS  (PRN):  acetaminophen     Tablet .. 650 milliGRAM(s) Oral every 6 hours PRN Temp greater or equal to 38C (100.4F), Mild Pain (1 - 3)  aluminum hydroxide/magnesium hydroxide/simethicone Suspension 30 milliLiter(s) Oral every 6 hours PRN Dyspepsia  diphenhydrAMINE 50 milliGRAM(s) Oral every 6 hours PRN Extrapyramidal symptoms or prophylaxis  diphenhydrAMINE Injectable 50 milliGRAM(s) IntraMuscular once PRN Extrapyramidal prophylaxis  haloperidol     Tablet 5 milliGRAM(s) Oral every 6 hours PRN agitation  haloperidol    Injectable 5 milliGRAM(s) IntraMuscular once PRN aggression  LORazepam     Tablet 2 milliGRAM(s) Oral every 6 hours PRN severe anxiety, agitation  LORazepam   Injectable 2 milliGRAM(s) IntraMuscular once PRN agitation  magnesium hydroxide Suspension 30 milliLiter(s) Oral daily PRN Constipation  traZODone 50 milliGRAM(s) Oral at bedtime PRN insomnia   MEDICATIONS  (STANDING):  influenza   Vaccine 0.5 milliLiter(s) IntraMuscular once  traZODone 50 milliGRAM(s) Oral at bedtime    MEDICATIONS  (PRN):  acetaminophen     Tablet .. 650 milliGRAM(s) Oral every 6 hours PRN Temp greater or equal to 38C (100.4F), Mild Pain (1 - 3)  aluminum hydroxide/magnesium hydroxide/simethicone Suspension 30 milliLiter(s) Oral every 6 hours PRN Dyspepsia  diphenhydrAMINE 50 milliGRAM(s) Oral every 6 hours PRN Extrapyramidal symptoms or prophylaxis  diphenhydrAMINE Injectable 50 milliGRAM(s) IntraMuscular once PRN Extrapyramidal prophylaxis  haloperidol     Tablet 5 milliGRAM(s) Oral every 6 hours PRN agitation  haloperidol    Injectable 5 milliGRAM(s) IntraMuscular once PRN aggression  LORazepam     Tablet 2 milliGRAM(s) Oral every 6 hours PRN severe anxiety, agitation  LORazepam   Injectable 2 milliGRAM(s) IntraMuscular once PRN agitation  magnesium hydroxide Suspension 30 milliLiter(s) Oral daily PRN Constipation

## 2024-10-29 NOTE — BH INPATIENT PSYCHIATRY PROGRESS NOTE - PRN MEDS
MEDICATIONS  (PRN):  acetaminophen     Tablet .. 650 milliGRAM(s) Oral every 6 hours PRN Temp greater or equal to 38C (100.4F), Mild Pain (1 - 3)  aluminum hydroxide/magnesium hydroxide/simethicone Suspension 30 milliLiter(s) Oral every 6 hours PRN Dyspepsia  diphenhydrAMINE 50 milliGRAM(s) Oral every 6 hours PRN Extrapyramidal symptoms or prophylaxis  diphenhydrAMINE Injectable 50 milliGRAM(s) IntraMuscular once PRN Extrapyramidal prophylaxis  haloperidol     Tablet 5 milliGRAM(s) Oral every 6 hours PRN agitation  haloperidol    Injectable 5 milliGRAM(s) IntraMuscular once PRN aggression  LORazepam     Tablet 2 milliGRAM(s) Oral every 6 hours PRN severe anxiety, agitation  LORazepam   Injectable 2 milliGRAM(s) IntraMuscular once PRN agitation  magnesium hydroxide Suspension 30 milliLiter(s) Oral daily PRN Constipation  traZODone 50 milliGRAM(s) Oral at bedtime PRN insomnia   MEDICATIONS  (PRN):  acetaminophen     Tablet .. 650 milliGRAM(s) Oral every 6 hours PRN Temp greater or equal to 38C (100.4F), Mild Pain (1 - 3)  aluminum hydroxide/magnesium hydroxide/simethicone Suspension 30 milliLiter(s) Oral every 6 hours PRN Dyspepsia  diphenhydrAMINE 50 milliGRAM(s) Oral every 6 hours PRN Extrapyramidal symptoms or prophylaxis  diphenhydrAMINE Injectable 50 milliGRAM(s) IntraMuscular once PRN Extrapyramidal prophylaxis  haloperidol     Tablet 5 milliGRAM(s) Oral every 6 hours PRN agitation  haloperidol    Injectable 5 milliGRAM(s) IntraMuscular once PRN aggression  LORazepam     Tablet 2 milliGRAM(s) Oral every 6 hours PRN severe anxiety, agitation  LORazepam   Injectable 2 milliGRAM(s) IntraMuscular once PRN agitation  magnesium hydroxide Suspension 30 milliLiter(s) Oral daily PRN Constipation
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
## 2024-10-29 NOTE — BH PSYCHOLOGY - GROUP THERAPY NOTE - NSBHPSYCHOLRESPCOMMENT_PSY_A_CORE FT
The patient appeared appropriately groomed and casually dressed. Pt struggled to engage in group discussion, appearing distracted at times. Pt was observed to be talking to herself, but remained seated for the entirety of the group discussion. Speech was mumbled. PT was oriented X3.

## 2024-10-30 PROCEDURE — 99232 SBSQ HOSP IP/OBS MODERATE 35: CPT

## 2024-10-30 RX ADMIN — Medication 50 MILLIGRAM(S): at 20:28

## 2024-10-30 NOTE — BH INPATIENT PSYCHIATRY PROGRESS NOTE - NSBHFUPINTERVALHXFT_PSY_A_CORE
Patient is followed up for NDD. Chart, medications and labs reviewed. Patient is discussed during morning brief, no overnight events, has been in fair behavioral control.   Patient was seen and is evaluated on the unit. She continues to exhibit childlike behaviors, intrusive, but maintaining behavioral control. She remains preoccupied with going to a group home, team able to have meeting w/ [facility] yesterday & discussed pt's current condition and need for group home placement. Pt reports having good appetite & sleep remains well, able to shower this morning and tends to ADL's. She received maintenance dose of Invega Sustenna 156mg on 10/23, tolerating well w/o complaints. Seen attempting to engage with other peers on the unit. No acute medical concerns, VSS.

## 2024-10-30 NOTE — BH INPATIENT PSYCHIATRY PROGRESS NOTE - NSBHCHARTREVIEWVS_PSY_A_CORE FT
Vital Signs Last 24 Hrs  T(C): 36.5 (10-30-24 @ 08:21), Max: 36.6 (10-29-24 @ 19:25)  T(F): 97.7 (10-30-24 @ 08:21), Max: 97.8 (10-29-24 @ 19:25)  HR: --  BP: --  BP(mean): --  RR: 17 (10-30-24 @ 08:21) (17 - 17)  SpO2: --    Orthostatic VS  10-30-24 @ 08:21  Lying BP: --/-- HR: --  Sitting BP: 112/60 HR: 81  Standing BP: 122/65 HR: 93  Site: --  Mode: --  Orthostatic VS  10-29-24 @ 19:25  Lying BP: --/-- HR: --  Sitting BP: 125/82 HR: 89  Standing BP: 117/79 HR: 88  Site: --  Mode: --  Orthostatic VS  10-29-24 @ 07:17  Lying BP: --/-- HR: --  Sitting BP: 100/72 HR: 80  Standing BP: 108/75 HR: 85  Site: --  Mode: --  Orthostatic VS  10-29-24 @ 06:49  Lying BP: --/-- HR: --  Sitting BP: 108/73 HR: 83  Standing BP: 108/78 HR: 89  Site: --  Mode: --  Orthostatic VS  10-28-24 @ 19:40  Lying BP: --/-- HR: --  Sitting BP: 102/68 HR: 80  Standing BP: 110/65 HR: 91  Site: --  Mode: --   Vital Signs Last 24 Hrs  T(C): 36.5 (10-31-24 @ 07:50), Max: 36.5 (10-30-24 @ 19:31)  T(F): 97.7 (10-31-24 @ 07:50), Max: 97.7 (10-30-24 @ 19:31)  HR: --  BP: --  BP(mean): --  RR: 18 (10-31-24 @ 07:50) (18 - 18)  SpO2: --    Orthostatic VS  10-31-24 @ 07:50  Lying BP: --/-- HR: --  Sitting BP: 107/72 HR: 94  Standing BP: 112/76 HR: 98  Site: --  Mode: --  Orthostatic VS  10-31-24 @ 06:27  Lying BP: --/-- HR: --  Sitting BP: 106/67 HR: 93  Standing BP: 107/73 HR: 95  Site: --  Mode: --  Orthostatic VS  10-30-24 @ 19:31  Lying BP: --/-- HR: --  Sitting BP: 121/80 HR: 93  Standing BP: 113/67 HR: 105  Site: --  Mode: --  Orthostatic VS  10-30-24 @ 08:21  Lying BP: --/-- HR: --  Sitting BP: 112/60 HR: 81  Standing BP: 122/65 HR: 93  Site: --  Mode: --  Orthostatic VS  10-29-24 @ 19:25  Lying BP: --/-- HR: --  Sitting BP: 125/82 HR: 89  Standing BP: 117/79 HR: 88  Site: --  Mode: --

## 2024-10-30 NOTE — BH INPATIENT PSYCHIATRY PROGRESS NOTE - CURRENT MEDICATION
MEDICATIONS  (STANDING):  influenza   Vaccine 0.5 milliLiter(s) IntraMuscular once  traZODone 50 milliGRAM(s) Oral at bedtime    MEDICATIONS  (PRN):  acetaminophen     Tablet .. 650 milliGRAM(s) Oral every 6 hours PRN Temp greater or equal to 38C (100.4F), Mild Pain (1 - 3)  aluminum hydroxide/magnesium hydroxide/simethicone Suspension 30 milliLiter(s) Oral every 6 hours PRN Dyspepsia  diphenhydrAMINE 50 milliGRAM(s) Oral every 6 hours PRN Extrapyramidal symptoms or prophylaxis  diphenhydrAMINE Injectable 50 milliGRAM(s) IntraMuscular once PRN Extrapyramidal prophylaxis  haloperidol     Tablet 5 milliGRAM(s) Oral every 6 hours PRN agitation  haloperidol    Injectable 5 milliGRAM(s) IntraMuscular once PRN aggression  LORazepam     Tablet 2 milliGRAM(s) Oral every 6 hours PRN severe anxiety, agitation  LORazepam   Injectable 2 milliGRAM(s) IntraMuscular once PRN agitation  magnesium hydroxide Suspension 30 milliLiter(s) Oral daily PRN Constipation

## 2024-10-30 NOTE — BH INPATIENT PSYCHIATRY PROGRESS NOTE - PRN MEDS
MEDICATIONS  (PRN):  acetaminophen     Tablet .. 650 milliGRAM(s) Oral every 6 hours PRN Temp greater or equal to 38C (100.4F), Mild Pain (1 - 3)  aluminum hydroxide/magnesium hydroxide/simethicone Suspension 30 milliLiter(s) Oral every 6 hours PRN Dyspepsia  diphenhydrAMINE 50 milliGRAM(s) Oral every 6 hours PRN Extrapyramidal symptoms or prophylaxis  diphenhydrAMINE Injectable 50 milliGRAM(s) IntraMuscular once PRN Extrapyramidal prophylaxis  haloperidol     Tablet 5 milliGRAM(s) Oral every 6 hours PRN agitation  haloperidol    Injectable 5 milliGRAM(s) IntraMuscular once PRN aggression  LORazepam     Tablet 2 milliGRAM(s) Oral every 6 hours PRN severe anxiety, agitation  LORazepam   Injectable 2 milliGRAM(s) IntraMuscular once PRN agitation  magnesium hydroxide Suspension 30 milliLiter(s) Oral daily PRN Constipation
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
## 2024-10-30 NOTE — BH INPATIENT PSYCHIATRY PROGRESS NOTE - MSE UNSTRUCTURED FT
Appearance: Dressed appropriately in gowns/hospital clothing.   Behavior: Not cooperative.  Childlike demeanor.  Found talking to wall in day room.  Motor: No abnormal involuntary movements.  No psychomotor agitation or slowing.    Speech: Normal rate, soft volume.   Mood: Does not give mood.  Affect: Elated, stable.   Thought Process: [place] but repetitive/perseverative.  Associations: Intact.   Thought Content: Ideas of fantasy.   Insight: Poor.  Judgment: Limited.   Attention: Limited.   Language: Fluent today.   Gait: Intact.

## 2024-10-31 PROCEDURE — 90853 GROUP PSYCHOTHERAPY: CPT

## 2024-10-31 PROCEDURE — 99232 SBSQ HOSP IP/OBS MODERATE 35: CPT

## 2024-10-31 RX ORDER — LORAZEPAM 4 MG/ML
2 VIAL (ML) INJECTION EVERY 6 HOURS
Refills: 0 | Status: DISCONTINUED | OUTPATIENT
Start: 2024-10-31 | End: 2024-11-07

## 2024-10-31 RX ORDER — LORAZEPAM 4 MG/ML
2 VIAL (ML) INJECTION ONCE
Refills: 0 | Status: DISCONTINUED | OUTPATIENT
Start: 2024-10-31 | End: 2024-11-07

## 2024-10-31 RX ADMIN — Medication 50 MILLIGRAM(S): at 20:20

## 2024-10-31 NOTE — BH INPATIENT PSYCHIATRY PROGRESS NOTE - NSBHFUPINTERVALHXFT_PSY_A_CORE
Patient is followed up for NDD. Chart, medications and labs reviewed. Patient is discussed during morning brief, no overnight events, has been in fair behavioral control.   Patient was seen and is evaluated on the unit. She continues to exhibit childlike behaviors, intrusive, but maintaining behavioral control. She remains preoccupied with going to a group home, tells writer that tomorrow is her birthday and she is turning 39. Pt reports having good appetite & sleep remains well, and tends to ADL's. She received maintenance dose of Invega Sustenna 156mg on 10/23, tolerating well w/o complaints. No acute medical concerns, VSS.

## 2024-10-31 NOTE — BH PSYCHOLOGY - GROUP THERAPY NOTE - NSPSYCHOLGRPCOGPT_PSY_A_CORE FT
Patient attended Cognitive Behavioral Therapy Group incorporating ACT-based concepts. The group started with a brief check-in asking Pts to share how they typically handle failure/making mistakes. Members then engaged in a mindful eating exercise and were encouraged to share their thoughts/reactions. Lastly, Group focused on engaging in a card game exercise eliciting discussion about their values (i.e. how other’s see them, their prized possessions, ideal day, how they would spend lottery winnings, and how all the above reflects these values). Group facilitator explained concepts, reinforced participation, and engaged patients in the discussion.

## 2024-10-31 NOTE — BH INPATIENT PSYCHIATRY PROGRESS NOTE - NSBHMETABOLIC_PSY_ALL_CORE_FT
BMI: BMI (kg/m2): 26 (10-12-24 @ 15:43)  HbA1c: A1C with Estimated Average Glucose Result: 6.1 % (10-18-24 @ 08:00)    Glucose: POCT Blood Glucose.: 57 mg/dL (04-15-24 @ 09:20)    BP: --Vital Signs Last 24 Hrs  T(C): 36.2 (10-28-24 @ 08:47), Max: 36.4 (10-27-24 @ 19:56)  T(F): 97.2 (10-28-24 @ 08:47), Max: 97.5 (10-27-24 @ 19:56)  HR: --  BP: --  BP(mean): --  RR: --  SpO2: --    Orthostatic VS  10-28-24 @ 08:47  Lying BP: --/-- HR: --  Sitting BP: 103/65 HR: 90  Standing BP: 111/67 HR: 99  Site: --  Mode: --  Orthostatic VS  10-27-24 @ 19:56  Lying BP: --/-- HR: --  Sitting BP: 111/63 HR: 87  Standing BP: 109/74 HR: 90  Site: --  Mode: --  Orthostatic VS  10-27-24 @ 08:47  Lying BP: --/-- HR: --  Sitting BP: 116/71 HR: 86  Standing BP: 114/74 HR: 88  Site: --  Mode: electronic  Orthostatic VS  10-26-24 @ 19:43  Lying BP: --/-- HR: --  Sitting BP: 114/70 HR: 80  Standing BP: 120/70 HR: 85  Site: --  Mode: --    Lipid Panel: Date/Time: 10-18-24 @ 08:00  Cholesterol, Serum: 106  LDL Cholesterol Calculated: 53  HDL Cholesterol, Serum: 42  Total Cholesterol/HDL Ration Measurement: --  Triglycerides, Serum: 53   BMI: BMI (kg/m2): 26 (10-12-24 @ 15:43)  HbA1c: A1C with Estimated Average Glucose Result: 6.1 % (10-18-24 @ 08:00)    Glucose: POCT Blood Glucose.: 57 mg/dL (04-15-24 @ 09:20)    BP: --Vital Signs Last 24 Hrs  T(C): 36.8 (11-01-24 @ 07:42), Max: 36.8 (11-01-24 @ 07:42)  T(F): 98.2 (11-01-24 @ 07:42), Max: 98.2 (11-01-24 @ 07:42)  HR: --  BP: --  BP(mean): --  RR: --  SpO2: --    Orthostatic VS  11-01-24 @ 07:42  Lying BP: --/-- HR: --  Sitting BP: 127/77 HR: 90  Standing BP: 121/80 HR: 92  Site: upper left arm  Mode: electronic  Orthostatic VS  10-31-24 @ 07:50  Lying BP: --/-- HR: --  Sitting BP: 107/72 HR: 94  Standing BP: 112/76 HR: 98  Site: --  Mode: --  Orthostatic VS  10-31-24 @ 06:27  Lying BP: --/-- HR: --  Sitting BP: 106/67 HR: 93  Standing BP: 107/73 HR: 95  Site: --  Mode: --  Orthostatic VS  10-30-24 @ 19:31  Lying BP: --/-- HR: --  Sitting BP: 121/80 HR: 93  Standing BP: 113/67 HR: 105  Site: --  Mode: --    Lipid Panel: Date/Time: 10-18-24 @ 08:00  Cholesterol, Serum: 106  LDL Cholesterol Calculated: 53  HDL Cholesterol, Serum: 42  Total Cholesterol/HDL Ration Measurement: --  Triglycerides, Serum: 53

## 2024-10-31 NOTE — BH PSYCHOLOGY - GROUP THERAPY NOTE - NSBHPSYCHOLRESPCOMMENT_PSY_A_CORE FT
The patient appeared adequately groomed and casually dressed. Pt appeared somewhat engaged in the group as evidenced by her ability to appropriately listen to what others were sharing, though she was quiet throughout the discussion and looked distracted at times. Pt shared during check-in, stating that music helps her during frustrations/bad days, and engaged in mindful eating exercise. Pt also identified that her prized possession would be “an apartment” reflecting her values of having more independence. Speech was slurred/mumbled. PT was oriented X3. Pt was appropriate with peers and did not talk over others.
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
## 2024-10-31 NOTE — BH INPATIENT PSYCHIATRY PROGRESS NOTE - NSBHCHARTREVIEWVS_PSY_A_CORE FT
Vital Signs Last 24 Hrs  T(C): 36.5 (10-31-24 @ 07:50), Max: 36.5 (10-30-24 @ 19:31)  T(F): 97.7 (10-31-24 @ 07:50), Max: 97.7 (10-30-24 @ 19:31)  HR: --  BP: --  BP(mean): --  RR: 18 (10-31-24 @ 07:50) (18 - 18)  SpO2: --    Orthostatic VS  10-31-24 @ 07:50  Lying BP: --/-- HR: --  Sitting BP: 107/72 HR: 94  Standing BP: 112/76 HR: 98  Site: --  Mode: --  Orthostatic VS  10-31-24 @ 06:27  Lying BP: --/-- HR: --  Sitting BP: 106/67 HR: 93  Standing BP: 107/73 HR: 95  Site: --  Mode: --  Orthostatic VS  10-30-24 @ 19:31  Lying BP: --/-- HR: --  Sitting BP: 121/80 HR: 93  Standing BP: 113/67 HR: 105  Site: --  Mode: --  Orthostatic VS  10-30-24 @ 08:21  Lying BP: --/-- HR: --  Sitting BP: 112/60 HR: 81  Standing BP: 122/65 HR: 93  Site: --  Mode: --  Orthostatic VS  10-29-24 @ 19:25  Lying BP: --/-- HR: --  Sitting BP: 125/82 HR: 89  Standing BP: 117/79 HR: 88  Site: --  Mode: --   Vital Signs Last 24 Hrs  T(C): 36.8 (11-01-24 @ 07:42), Max: 36.8 (11-01-24 @ 07:42)  T(F): 98.2 (11-01-24 @ 07:42), Max: 98.2 (11-01-24 @ 07:42)  HR: --  BP: --  BP(mean): --  RR: --  SpO2: --    Orthostatic VS  11-01-24 @ 07:42  Lying BP: --/-- HR: --  Sitting BP: 127/77 HR: 90  Standing BP: 121/80 HR: 92  Site: upper left arm  Mode: electronic  Orthostatic VS  10-31-24 @ 07:50  Lying BP: --/-- HR: --  Sitting BP: 107/72 HR: 94  Standing BP: 112/76 HR: 98  Site: --  Mode: --  Orthostatic VS  10-31-24 @ 06:27  Lying BP: --/-- HR: --  Sitting BP: 106/67 HR: 93  Standing BP: 107/73 HR: 95  Site: --  Mode: --  Orthostatic VS  10-30-24 @ 19:31  Lying BP: --/-- HR: --  Sitting BP: 121/80 HR: 93  Standing BP: 113/67 HR: 105  Site: --  Mode: --

## 2024-10-31 NOTE — BH INPATIENT PSYCHIATRY PROGRESS NOTE - MSE UNSTRUCTURED FT
Appearance: Dressed appropriately in gowns/hospital clothing.   Behavior: Not cooperative.  Childlike demeanor.  Found talking to wall in day room.  Motor: No abnormal involuntary movements.  No psychomotor agitation or slowing.    Speech: Normal rate, soft volume.   Mood: Does not give mood.  Affect: Elated, stable.   Thought Process: [place] but repetitive/perseverative.  Associations: Intact.   Thought Content: Ideas of fantasy.   Insight: Poor.  Judgment: Limited.   Attention: Limited.   Language: Fluent today.   Gait: Intact.

## 2024-11-01 PROCEDURE — 99232 SBSQ HOSP IP/OBS MODERATE 35: CPT

## 2024-11-01 RX ADMIN — Medication 50 MILLIGRAM(S): at 20:41

## 2024-11-01 NOTE — BH INPATIENT PSYCHIATRY PROGRESS NOTE - MSE UNSTRUCTURED FT
Appearance: Dressed appropriately in gowns/hospital clothing.   Behavior: Not cooperative.  Childlike demeanor.  Found talking to wall in day room.  Motor: No abnormal involuntary movements.  No psychomotor agitation or slowing.    Speech: Normal rate, soft volume.   Mood: Does not give mood.  Affect: Elated, stable.   Thought Process: [place] but repetitive/perseverative.  Associations: Intact.   Thought Content: Ideas of fantasy.   Insight: Poor.  Judgment: Limited.   Attention: Limited.   Language: Fluent today.   Gait: Intact.

## 2024-11-01 NOTE — BH INPATIENT PSYCHIATRY PROGRESS NOTE - NSBHCHARTREVIEWVS_PSY_A_CORE FT
Vital Signs Last 24 Hrs  T(C): 36.8 (11-01-24 @ 07:42), Max: 36.8 (11-01-24 @ 07:42)  T(F): 98.2 (11-01-24 @ 07:42), Max: 98.2 (11-01-24 @ 07:42)  HR: --  BP: --  BP(mean): --  RR: 17 (11-01-24 @ 07:42) (17 - 17)  SpO2: --    Orthostatic VS  11-01-24 @ 07:42  Lying BP: --/-- HR: --  Sitting BP: 127/77 HR: 90  Standing BP: 121/80 HR: 92  Site: upper left arm  Mode: electronic  Orthostatic VS  10-31-24 @ 07:50  Lying BP: --/-- HR: --  Sitting BP: 107/72 HR: 94  Standing BP: 112/76 HR: 98  Site: --  Mode: --  Orthostatic VS  10-31-24 @ 06:27  Lying BP: --/-- HR: --  Sitting BP: 106/67 HR: 93  Standing BP: 107/73 HR: 95  Site: --  Mode: --  Orthostatic VS  10-30-24 @ 19:31  Lying BP: --/-- HR: --  Sitting BP: 121/80 HR: 93  Standing BP: 113/67 HR: 105  Site: --  Mode: --
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
## 2024-11-01 NOTE — BH INPATIENT PSYCHIATRY PROGRESS NOTE - NSBHMETABOLIC_PSY_ALL_CORE_FT
BMI: BMI (kg/m2): 26 (10-12-24 @ 15:43)  HbA1c: A1C with Estimated Average Glucose Result: 6.1 % (10-18-24 @ 08:00)    Glucose: POCT Blood Glucose.: 57 mg/dL (04-15-24 @ 09:20)    BP: --Vital Signs Last 24 Hrs  T(C): 36.8 (11-01-24 @ 07:42), Max: 36.8 (11-01-24 @ 07:42)  T(F): 98.2 (11-01-24 @ 07:42), Max: 98.2 (11-01-24 @ 07:42)  HR: --  BP: --  BP(mean): --  RR: 17 (11-01-24 @ 07:42) (17 - 17)  SpO2: --    Orthostatic VS  11-01-24 @ 07:42  Lying BP: --/-- HR: --  Sitting BP: 127/77 HR: 90  Standing BP: 121/80 HR: 92  Site: upper left arm  Mode: electronic  Orthostatic VS  10-31-24 @ 07:50  Lying BP: --/-- HR: --  Sitting BP: 107/72 HR: 94  Standing BP: 112/76 HR: 98  Site: --  Mode: --  Orthostatic VS  10-31-24 @ 06:27  Lying BP: --/-- HR: --  Sitting BP: 106/67 HR: 93  Standing BP: 107/73 HR: 95  Site: --  Mode: --  Orthostatic VS  10-30-24 @ 19:31  Lying BP: --/-- HR: --  Sitting BP: 121/80 HR: 93  Standing BP: 113/67 HR: 105  Site: --  Mode: --    Lipid Panel: Date/Time: 10-18-24 @ 08:00  Cholesterol, Serum: 106  LDL Cholesterol Calculated: 53  HDL Cholesterol, Serum: 42  Total Cholesterol/HDL Ration Measurement: --  Triglycerides, Serum: 53

## 2024-11-01 NOTE — BH INPATIENT PSYCHIATRY PROGRESS NOTE - NSBHFUPINTERVALHXFT_PSY_A_CORE
Patient is followed up for NDD. Chart, medications and labs reviewed. Patient is discussed during morning brief, no overnight events, has been in fair behavioral control.   Patient was seen and is evaluated on the unit. She continues to exhibit childlike behaviors, intrusive, but maintaining behavioral control. She remains preoccupied with going to a group home, meeting scheduled w/ [facility] for 11/14. Pt reports having good appetite & sleep remains well, and tends to ADL's. She received maintenance dose of Invega Sustenna 156mg on 10/23, tolerating well w/o complaints. No acute medical concerns, VSS.

## 2024-11-02 RX ADMIN — Medication 50 MILLIGRAM(S): at 20:49

## 2024-11-03 RX ADMIN — Medication 50 MILLIGRAM(S): at 21:03

## 2024-11-04 PROCEDURE — 99232 SBSQ HOSP IP/OBS MODERATE 35: CPT

## 2024-11-04 RX ORDER — TRAZODONE HCL 100 MG
100 TABLET ORAL AT BEDTIME
Refills: 0 | Status: DISCONTINUED | OUTPATIENT
Start: 2024-11-04 | End: 2025-04-01

## 2024-11-04 RX ADMIN — Medication 100 MILLIGRAM(S): at 20:08

## 2024-11-04 NOTE — BH INPATIENT PSYCHIATRY PROGRESS NOTE - NSBHCHARTREVIEWVS_PSY_A_CORE FT
Vital Signs Last 24 Hrs  T(C): 36.7 (11-04-24 @ 06:53), Max: 36.8 (11-03-24 @ 19:31)  T(F): 98.1 (11-04-24 @ 06:53), Max: 98.3 (11-03-24 @ 19:31)  HR: --  BP: --  BP(mean): --  RR: 17 (11-04-24 @ 06:53) (17 - 17)  SpO2: --    Orthostatic VS  11-04-24 @ 06:53  Lying BP: --/-- HR: --  Sitting BP: 131/67 HR: 104  Standing BP: 116/74 HR: 119  Site: --  Mode: --  Orthostatic VS  11-03-24 @ 19:31  Lying BP: --/-- HR: --  Sitting BP: 119/74 HR: 104  Standing BP: 102/62 HR: 114  Site: upper left arm  Mode: electronic  Orthostatic VS  11-03-24 @ 09:37  Lying BP: --/-- HR: --  Sitting BP: 128/79 HR: 96  Standing BP: 113/72 HR: 98  Site: --  Mode: --  Orthostatic VS  11-03-24 @ 05:46  Lying BP: --/-- HR: --  Sitting BP: 125/74 HR: 100  Standing BP: 109/68 HR: 104  Site: --  Mode: --  Orthostatic VS  11-02-24 @ 19:41  Lying BP: --/-- HR: --  Sitting BP: 120/81 HR: 95  Standing BP: 117/69 HR: 111  Site: --  Mode: --   Vital Signs Last 24 Hrs  T(C): 36.6 (11-06-24 @ 08:47), Max: 36.6 (11-05-24 @ 19:35)  T(F): 97.9 (11-06-24 @ 08:47), Max: 97.9 (11-05-24 @ 19:35)  HR: --  BP: --  BP(mean): --  RR: --  SpO2: --    Orthostatic VS  11-06-24 @ 08:47  Lying BP: --/-- HR: --  Sitting BP: 112/66 HR: 100  Standing BP: 116/71 HR: 100  Site: --  Mode: --  Orthostatic VS  11-05-24 @ 19:35  Lying BP: --/-- HR: --  Sitting BP: 129/79 HR: 92  Standing BP: 140/90 HR: --  Site: --  Mode: --  Orthostatic VS  11-05-24 @ 08:32  Lying BP: --/-- HR: --  Sitting BP: 107/65 HR: 87  Standing BP: 111/56 HR: 99  Site: --  Mode: --  Orthostatic VS  11-04-24 @ 19:17  Lying BP: --/-- HR: --  Sitting BP: 124/95 HR: 110  Standing BP: 112/88 HR: 76  Site: upper right arm  Mode: electronic

## 2024-11-04 NOTE — BH INPATIENT PSYCHIATRY PROGRESS NOTE - CURRENT MEDICATION
MEDICATIONS  (STANDING):  influenza   Vaccine 0.5 milliLiter(s) IntraMuscular once  traZODone 100 milliGRAM(s) Oral at bedtime    MEDICATIONS  (PRN):  acetaminophen     Tablet .. 650 milliGRAM(s) Oral every 6 hours PRN Temp greater or equal to 38C (100.4F), Mild Pain (1 - 3)  aluminum hydroxide/magnesium hydroxide/simethicone Suspension 30 milliLiter(s) Oral every 6 hours PRN Dyspepsia  diphenhydrAMINE 50 milliGRAM(s) Oral every 6 hours PRN Extrapyramidal symptoms or prophylaxis  diphenhydrAMINE Injectable 50 milliGRAM(s) IntraMuscular once PRN Extrapyramidal prophylaxis  haloperidol     Tablet 5 milliGRAM(s) Oral every 6 hours PRN agitation  haloperidol    Injectable 5 milliGRAM(s) IntraMuscular once PRN aggression  LORazepam     Tablet 2 milliGRAM(s) Oral every 6 hours PRN severe anxiety, agitation  LORazepam   Injectable 2 milliGRAM(s) IntraMuscular once PRN agitation  magnesium hydroxide Suspension 30 milliLiter(s) Oral daily PRN Constipation

## 2024-11-04 NOTE — BH INPATIENT PSYCHIATRY PROGRESS NOTE - NSBHMETABOLIC_PSY_ALL_CORE_FT
BMI: BMI (kg/m2): 26 (10-12-24 @ 15:43)  HbA1c: A1C with Estimated Average Glucose Result: 6.1 % (10-18-24 @ 08:00)    Glucose: POCT Blood Glucose.: 57 mg/dL (04-15-24 @ 09:20)    BP: --Vital Signs Last 24 Hrs  T(C): 36.8 (11-01-24 @ 07:42), Max: 36.8 (11-01-24 @ 07:42)  T(F): 98.2 (11-01-24 @ 07:42), Max: 98.2 (11-01-24 @ 07:42)  HR: --  BP: --  BP(mean): --  RR: 17 (11-01-24 @ 07:42) (17 - 17)  SpO2: --    Orthostatic VS  11-01-24 @ 07:42  Lying BP: --/-- HR: --  Sitting BP: 127/77 HR: 90  Standing BP: 121/80 HR: 92  Site: upper left arm  Mode: electronic  Orthostatic VS  10-31-24 @ 07:50  Lying BP: --/-- HR: --  Sitting BP: 107/72 HR: 94  Standing BP: 112/76 HR: 98  Site: --  Mode: --  Orthostatic VS  10-31-24 @ 06:27  Lying BP: --/-- HR: --  Sitting BP: 106/67 HR: 93  Standing BP: 107/73 HR: 95  Site: --  Mode: --  Orthostatic VS  10-30-24 @ 19:31  Lying BP: --/-- HR: --  Sitting BP: 121/80 HR: 93  Standing BP: 113/67 HR: 105  Site: --  Mode: --    Lipid Panel: Date/Time: 10-18-24 @ 08:00  Cholesterol, Serum: 106  LDL Cholesterol Calculated: 53  HDL Cholesterol, Serum: 42  Total Cholesterol/HDL Ration Measurement: --  Triglycerides, Serum: 53   BMI: BMI (kg/m2): 26 (10-12-24 @ 15:43)  HbA1c: A1C with Estimated Average Glucose Result: 6.1 % (10-18-24 @ 08:00)    Glucose: POCT Blood Glucose.: 57 mg/dL (04-15-24 @ 09:20)    BP: --Vital Signs Last 24 Hrs  T(C): 36.6 (11-06-24 @ 08:47), Max: 36.6 (11-05-24 @ 19:35)  T(F): 97.9 (11-06-24 @ 08:47), Max: 97.9 (11-05-24 @ 19:35)  HR: --  BP: --  BP(mean): --  RR: --  SpO2: --    Orthostatic VS  11-06-24 @ 08:47  Lying BP: --/-- HR: --  Sitting BP: 112/66 HR: 100  Standing BP: 116/71 HR: 100  Site: --  Mode: --  Orthostatic VS  11-05-24 @ 19:35  Lying BP: --/-- HR: --  Sitting BP: 129/79 HR: 92  Standing BP: 140/90 HR: --  Site: --  Mode: --  Orthostatic VS  11-05-24 @ 08:32  Lying BP: --/-- HR: --  Sitting BP: 107/65 HR: 87  Standing BP: 111/56 HR: 99  Site: --  Mode: --  Orthostatic VS  11-04-24 @ 19:17  Lying BP: --/-- HR: --  Sitting BP: 124/95 HR: 110  Standing BP: 112/88 HR: 76  Site: upper right arm  Mode: electronic    Lipid Panel: Date/Time: 10-18-24 @ 08:00  Cholesterol, Serum: 106  LDL Cholesterol Calculated: 53  HDL Cholesterol, Serum: 42  Total Cholesterol/HDL Ration Measurement: --  Triglycerides, Serum: 53

## 2024-11-04 NOTE — BH INPATIENT PSYCHIATRY PROGRESS NOTE - NSBHFUPINTERVALHXFT_PSY_A_CORE
Patient is followed up for NDD. Chart, medications and labs reviewed. Patient is discussed during morning brief, no overnight events, has been in good behavioral control.   Patient was seen and is evaluated on the unit. She continues to exhibit childlike behaviors, has been less intrusive and is maintaining behavioral control. She remains preoccupied with going to a group home, meeting scheduled w/ [facility] for 11/14. Pt reports having good appetite, sleep remains fair, and she tends to ADL's. She received maintenance dose of Invega Sustenna 156mg on 10/23, tolerating well w/o complaints. No acute medical concerns, VSS.
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
## 2024-11-05 PROCEDURE — 99232 SBSQ HOSP IP/OBS MODERATE 35: CPT

## 2024-11-05 PROCEDURE — 90832 PSYTX W PT 30 MINUTES: CPT

## 2024-11-05 RX ADMIN — Medication 100 MILLIGRAM(S): at 20:22
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
## 2024-11-05 NOTE — BH INPATIENT PSYCHIATRY PROGRESS NOTE - NSBHCHARTREVIEWVS_PSY_A_CORE FT
Vital Signs Last 24 Hrs  T(C): 36.7 (11-05-24 @ 08:32), Max: 36.8 (11-04-24 @ 19:17)  T(F): 98 (11-05-24 @ 08:32), Max: 98.2 (11-04-24 @ 19:17)  HR: --  BP: --  BP(mean): --  RR: 17 (11-05-24 @ 08:32) (17 - 17)  SpO2: --    Orthostatic VS  11-05-24 @ 08:32  Lying BP: --/-- HR: --  Sitting BP: 107/65 HR: 87  Standing BP: 111/56 HR: 99  Site: --  Mode: --  Orthostatic VS  11-04-24 @ 19:17  Lying BP: --/-- HR: --  Sitting BP: 124/95 HR: 110  Standing BP: 112/88 HR: 76  Site: upper right arm  Mode: electronic  Orthostatic VS  11-04-24 @ 06:53  Lying BP: --/-- HR: --  Sitting BP: 131/67 HR: 104  Standing BP: 116/74 HR: 119  Site: --  Mode: --  Orthostatic VS  11-03-24 @ 19:31  Lying BP: --/-- HR: --  Sitting BP: 119/74 HR: 104  Standing BP: 102/62 HR: 114  Site: upper left arm  Mode: electronic   Vital Signs Last 24 Hrs  T(C): 36.6 (11-06-24 @ 08:47), Max: 36.6 (11-05-24 @ 19:35)  T(F): 97.9 (11-06-24 @ 08:47), Max: 97.9 (11-05-24 @ 19:35)  HR: --  BP: --  BP(mean): --  RR: --  SpO2: --    Orthostatic VS  11-06-24 @ 08:47  Lying BP: --/-- HR: --  Sitting BP: 112/66 HR: 100  Standing BP: 116/71 HR: 100  Site: --  Mode: --  Orthostatic VS  11-05-24 @ 19:35  Lying BP: --/-- HR: --  Sitting BP: 129/79 HR: 92  Standing BP: 140/90 HR: --  Site: --  Mode: --  Orthostatic VS  11-05-24 @ 08:32  Lying BP: --/-- HR: --  Sitting BP: 107/65 HR: 87  Standing BP: 111/56 HR: 99  Site: --  Mode: --  Orthostatic VS  11-04-24 @ 19:17  Lying BP: --/-- HR: --  Sitting BP: 124/95 HR: 110  Standing BP: 112/88 HR: 76  Site: upper right arm  Mode: electronic

## 2024-11-05 NOTE — BH INPATIENT PSYCHIATRY PROGRESS NOTE - NSBHFUPINTERVALHXFT_PSY_A_CORE
None Patient is followed up for NDD. Chart, medications and labs reviewed. Patient is discussed during morning brief, no overnight events, has been in good behavioral control.   Patient was seen and is evaluated on the unit. She continues to exhibit childlike behaviors, has been less intrusive and is maintaining behavioral control. She remains preoccupied with going to a group home, meeting scheduled w/ [facility] for 11/14. Pt reports having good appetite, sleep improved overnight w/ increase in Trazodone dose to 100mg, she has been tending to ADL's. She received maintenance dose of Invega Sustenna 156mg on 10/23, tolerating well w/o complaints. No acute medical concerns, VSS.

## 2024-11-05 NOTE — BH INPATIENT PSYCHIATRY PROGRESS NOTE - NSBHMETABOLIC_PSY_ALL_CORE_FT
BMI: BMI (kg/m2): 26 (10-12-24 @ 15:43)  HbA1c: A1C with Estimated Average Glucose Result: 6.1 % (10-18-24 @ 08:00)    Glucose: POCT Blood Glucose.: 57 mg/dL (04-15-24 @ 09:20)    BP: --Vital Signs Last 24 Hrs  T(C): 36.7 (11-05-24 @ 08:32), Max: 36.8 (11-04-24 @ 19:17)  T(F): 98 (11-05-24 @ 08:32), Max: 98.2 (11-04-24 @ 19:17)  HR: --  BP: --  BP(mean): --  RR: 17 (11-05-24 @ 08:32) (17 - 17)  SpO2: --    Orthostatic VS  11-05-24 @ 08:32  Lying BP: --/-- HR: --  Sitting BP: 107/65 HR: 87  Standing BP: 111/56 HR: 99  Site: --  Mode: --  Orthostatic VS  11-04-24 @ 19:17  Lying BP: --/-- HR: --  Sitting BP: 124/95 HR: 110  Standing BP: 112/88 HR: 76  Site: upper right arm  Mode: electronic  Orthostatic VS  11-04-24 @ 06:53  Lying BP: --/-- HR: --  Sitting BP: 131/67 HR: 104  Standing BP: 116/74 HR: 119  Site: --  Mode: --  Orthostatic VS  11-03-24 @ 19:31  Lying BP: --/-- HR: --  Sitting BP: 119/74 HR: 104  Standing BP: 102/62 HR: 114  Site: upper left arm  Mode: electronic    Lipid Panel: Date/Time: 10-18-24 @ 08:00  Cholesterol, Serum: 106  LDL Cholesterol Calculated: 53  HDL Cholesterol, Serum: 42  Total Cholesterol/HDL Ration Measurement: --  Triglycerides, Serum: 53   BMI: BMI (kg/m2): 26 (10-12-24 @ 15:43)  HbA1c: A1C with Estimated Average Glucose Result: 6.1 % (10-18-24 @ 08:00)    Glucose: POCT Blood Glucose.: 57 mg/dL (04-15-24 @ 09:20)    BP: --Vital Signs Last 24 Hrs  T(C): 36.6 (11-06-24 @ 08:47), Max: 36.6 (11-05-24 @ 19:35)  T(F): 97.9 (11-06-24 @ 08:47), Max: 97.9 (11-05-24 @ 19:35)  HR: --  BP: --  BP(mean): --  RR: --  SpO2: --    Orthostatic VS  11-06-24 @ 08:47  Lying BP: --/-- HR: --  Sitting BP: 112/66 HR: 100  Standing BP: 116/71 HR: 100  Site: --  Mode: --  Orthostatic VS  11-05-24 @ 19:35  Lying BP: --/-- HR: --  Sitting BP: 129/79 HR: 92  Standing BP: 140/90 HR: --  Site: --  Mode: --  Orthostatic VS  11-05-24 @ 08:32  Lying BP: --/-- HR: --  Sitting BP: 107/65 HR: 87  Standing BP: 111/56 HR: 99  Site: --  Mode: --  Orthostatic VS  11-04-24 @ 19:17  Lying BP: --/-- HR: --  Sitting BP: 124/95 HR: 110  Standing BP: 112/88 HR: 76  Site: upper right arm  Mode: electronic    Lipid Panel: Date/Time: 10-18-24 @ 08:00  Cholesterol, Serum: 106  LDL Cholesterol Calculated: 53  HDL Cholesterol, Serum: 42  Total Cholesterol/HDL Ration Measurement: --  Triglycerides, Serum: 53

## 2024-11-06 PROCEDURE — 99232 SBSQ HOSP IP/OBS MODERATE 35: CPT

## 2024-11-06 RX ADMIN — Medication 100 MILLIGRAM(S): at 21:56

## 2024-11-06 NOTE — BH INPATIENT PSYCHIATRY PROGRESS NOTE - NSBHCHARTREVIEWVS_PSY_A_CORE FT
Vital Signs Last 24 Hrs  T(C): 36.6 (11-06-24 @ 08:47), Max: 36.6 (11-05-24 @ 19:35)  T(F): 97.9 (11-06-24 @ 08:47), Max: 97.9 (11-05-24 @ 19:35)  HR: --  BP: --  BP(mean): --  RR: 16 (11-06-24 @ 08:47) (16 - 16)  SpO2: --    Orthostatic VS  11-06-24 @ 08:47  Lying BP: --/-- HR: --  Sitting BP: 112/66 HR: 100  Standing BP: 116/71 HR: 100  Site: --  Mode: --  Orthostatic VS  11-05-24 @ 19:35  Lying BP: --/-- HR: --  Sitting BP: 129/79 HR: 92  Standing BP: 140/90 HR: --  Site: --  Mode: --  Orthostatic VS  11-05-24 @ 08:32  Lying BP: --/-- HR: --  Sitting BP: 107/65 HR: 87  Standing BP: 111/56 HR: 99  Site: --  Mode: --  Orthostatic VS  11-04-24 @ 19:17  Lying BP: --/-- HR: --  Sitting BP: 124/95 HR: 110  Standing BP: 112/88 HR: 76  Site: upper right arm  Mode: electronic   Vital Signs Last 24 Hrs  T(C): 36.3 (11-07-24 @ 08:23), Max: 36.7 (11-06-24 @ 19:08)  T(F): 97.3 (11-07-24 @ 08:23), Max: 98.1 (11-06-24 @ 19:08)  HR: --  BP: --  BP(mean): --  RR: 17 (11-07-24 @ 08:23) (16 - 17)  SpO2: --    Orthostatic VS  11-07-24 @ 08:23  Lying BP: --/-- HR: --  Sitting BP: 120/79 HR: 92  Standing BP: 129/74 HR: 93  Site: --  Mode: --  Orthostatic VS  11-06-24 @ 19:08  Lying BP: --/-- HR: --  Sitting BP: 128/83 HR: 89  Standing BP: --/-- HR: --  Site: --  Mode: --  Orthostatic VS  11-06-24 @ 08:47  Lying BP: --/-- HR: --  Sitting BP: 112/66 HR: 100  Standing BP: 116/71 HR: 100  Site: --  Mode: --  Orthostatic VS  11-05-24 @ 19:35  Lying BP: --/-- HR: --  Sitting BP: 129/79 HR: 92  Standing BP: 140/90 HR: --  Site: --  Mode: --
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
## 2024-11-06 NOTE — BH INPATIENT PSYCHIATRY PROGRESS NOTE - NSBHMETABOLIC_PSY_ALL_CORE_FT
BMI: BMI (kg/m2): 26 (10-12-24 @ 15:43)  HbA1c: A1C with Estimated Average Glucose Result: 6.1 % (10-18-24 @ 08:00)    Glucose: POCT Blood Glucose.: 57 mg/dL (04-15-24 @ 09:20)    BP: --Vital Signs Last 24 Hrs  T(C): 36.6 (11-06-24 @ 08:47), Max: 36.6 (11-05-24 @ 19:35)  T(F): 97.9 (11-06-24 @ 08:47), Max: 97.9 (11-05-24 @ 19:35)  HR: --  BP: --  BP(mean): --  RR: 16 (11-06-24 @ 08:47) (16 - 16)  SpO2: --    Orthostatic VS  11-06-24 @ 08:47  Lying BP: --/-- HR: --  Sitting BP: 112/66 HR: 100  Standing BP: 116/71 HR: 100  Site: --  Mode: --  Orthostatic VS  11-05-24 @ 19:35  Lying BP: --/-- HR: --  Sitting BP: 129/79 HR: 92  Standing BP: 140/90 HR: --  Site: --  Mode: --  Orthostatic VS  11-05-24 @ 08:32  Lying BP: --/-- HR: --  Sitting BP: 107/65 HR: 87  Standing BP: 111/56 HR: 99  Site: --  Mode: --  Orthostatic VS  11-04-24 @ 19:17  Lying BP: --/-- HR: --  Sitting BP: 124/95 HR: 110  Standing BP: 112/88 HR: 76  Site: upper right arm  Mode: electronic    Lipid Panel: Date/Time: 10-18-24 @ 08:00  Cholesterol, Serum: 106  LDL Cholesterol Calculated: 53  HDL Cholesterol, Serum: 42  Total Cholesterol/HDL Ration Measurement: --  Triglycerides, Serum: 53   BMI: BMI (kg/m2): 26 (10-12-24 @ 15:43)  HbA1c: A1C with Estimated Average Glucose Result: 6.1 % (10-18-24 @ 08:00)    Glucose: POCT Blood Glucose.: 57 mg/dL (04-15-24 @ 09:20)    BP: --Vital Signs Last 24 Hrs  T(C): 36.3 (11-07-24 @ 08:23), Max: 36.7 (11-06-24 @ 19:08)  T(F): 97.3 (11-07-24 @ 08:23), Max: 98.1 (11-06-24 @ 19:08)  HR: --  BP: --  BP(mean): --  RR: 17 (11-07-24 @ 08:23) (16 - 17)  SpO2: --    Orthostatic VS  11-07-24 @ 08:23  Lying BP: --/-- HR: --  Sitting BP: 120/79 HR: 92  Standing BP: 129/74 HR: 93  Site: --  Mode: --  Orthostatic VS  11-06-24 @ 19:08  Lying BP: --/-- HR: --  Sitting BP: 128/83 HR: 89  Standing BP: --/-- HR: --  Site: --  Mode: --  Orthostatic VS  11-06-24 @ 08:47  Lying BP: --/-- HR: --  Sitting BP: 112/66 HR: 100  Standing BP: 116/71 HR: 100  Site: --  Mode: --  Orthostatic VS  11-05-24 @ 19:35  Lying BP: --/-- HR: --  Sitting BP: 129/79 HR: 92  Standing BP: 140/90 HR: --  Site: --  Mode: --    Lipid Panel: Date/Time: 10-18-24 @ 08:00  Cholesterol, Serum: 106  LDL Cholesterol Calculated: 53  HDL Cholesterol, Serum: 42  Total Cholesterol/HDL Ration Measurement: --  Triglycerides, Serum: 53

## 2024-11-07 PROCEDURE — 99232 SBSQ HOSP IP/OBS MODERATE 35: CPT

## 2024-11-07 RX ORDER — LORAZEPAM 4 MG/ML
2 VIAL (ML) INJECTION ONCE
Refills: 0 | Status: DISCONTINUED | OUTPATIENT
Start: 2024-11-07 | End: 2024-11-14

## 2024-11-07 RX ORDER — LORAZEPAM 4 MG/ML
2 VIAL (ML) INJECTION EVERY 6 HOURS
Refills: 0 | Status: DISCONTINUED | OUTPATIENT
Start: 2024-11-07 | End: 2024-11-14

## 2024-11-07 RX ADMIN — Medication 100 MILLIGRAM(S): at 20:57

## 2024-11-07 NOTE — BH INPATIENT PSYCHIATRY PROGRESS NOTE - NSBHFUPINTERVALHXFT_PSY_A_CORE
Patient is followed up for NDD. Chart, medications and labs reviewed. Patient is discussed during morning brief, no overnight events, has been in good behavioral control.   Patient was seen and is evaluated on the unit. She continues to exhibit childlike behaviors, has been less intrusive and is maintaining behavioral control. Was given room change yesterday and now in single room, reports sleeping well overnight. She remains preoccupied with going to a group home, meeting scheduled w/ [facility] for 11/14. She received maintenance dose of Invega Sustenna 156mg on 10/23, tolerating well w/o complaints. No acute medical concerns, VSS.

## 2024-11-07 NOTE — BH INPATIENT PSYCHIATRY PROGRESS NOTE - NSBHMETABOLIC_PSY_ALL_CORE_FT
BMI: BMI (kg/m2): 26 (10-12-24 @ 15:43)  HbA1c: A1C with Estimated Average Glucose Result: 6.1 % (10-18-24 @ 08:00)    Glucose: POCT Blood Glucose.: 57 mg/dL (04-15-24 @ 09:20)    BP: --Vital Signs Last 24 Hrs  T(C): 36.3 (11-07-24 @ 08:23), Max: 36.7 (11-06-24 @ 19:08)  T(F): 97.3 (11-07-24 @ 08:23), Max: 98.1 (11-06-24 @ 19:08)  HR: --  BP: --  BP(mean): --  RR: 17 (11-07-24 @ 08:23) (16 - 17)  SpO2: --    Orthostatic VS  11-07-24 @ 08:23  Lying BP: --/-- HR: --  Sitting BP: 120/79 HR: 92  Standing BP: 129/74 HR: 93  Site: --  Mode: --  Orthostatic VS  11-06-24 @ 19:08  Lying BP: --/-- HR: --  Sitting BP: 128/83 HR: 89  Standing BP: --/-- HR: --  Site: --  Mode: --  Orthostatic VS  11-06-24 @ 08:47  Lying BP: --/-- HR: --  Sitting BP: 112/66 HR: 100  Standing BP: 116/71 HR: 100  Site: --  Mode: --  Orthostatic VS  11-05-24 @ 19:35  Lying BP: --/-- HR: --  Sitting BP: 129/79 HR: 92  Standing BP: 140/90 HR: --  Site: --  Mode: --    Lipid Panel: Date/Time: 10-18-24 @ 08:00  Cholesterol, Serum: 106  LDL Cholesterol Calculated: 53  HDL Cholesterol, Serum: 42  Total Cholesterol/HDL Ration Measurement: --  Triglycerides, Serum: 53   BMI: BMI (kg/m2): 26 (10-12-24 @ 15:43)  HbA1c: A1C with Estimated Average Glucose Result: 6.1 % (10-18-24 @ 08:00)    Glucose: POCT Blood Glucose.: 57 mg/dL (04-15-24 @ 09:20)    BP: --Vital Signs Last 24 Hrs  T(C): 35.8 (11-08-24 @ 08:29), Max: 36.7 (11-07-24 @ 19:27)  T(F): 96.4 (11-08-24 @ 08:29), Max: 98 (11-07-24 @ 19:27)  HR: --  BP: --  BP(mean): --  RR: 18 (11-08-24 @ 08:29) (18 - 18)  SpO2: --    Orthostatic VS  11-08-24 @ 08:29  Lying BP: 120/66 HR: 83  Sitting BP: 116/74 HR: 90  Standing BP: --/-- HR: --  Site: --  Mode: electronic  Orthostatic VS  11-07-24 @ 19:27  Lying BP: --/-- HR: --  Sitting BP: 107/76 HR: 74  Standing BP: 112/76 HR: 79  Site: --  Mode: --  Orthostatic VS  11-07-24 @ 08:23  Lying BP: --/-- HR: --  Sitting BP: 120/79 HR: 92  Standing BP: 129/74 HR: 93  Site: --  Mode: --  Orthostatic VS  11-06-24 @ 19:08  Lying BP: --/-- HR: --  Sitting BP: 128/83 HR: 89  Standing BP: --/-- HR: --  Site: --  Mode: --    Lipid Panel: Date/Time: 10-18-24 @ 08:00  Cholesterol, Serum: 106  LDL Cholesterol Calculated: 53  HDL Cholesterol, Serum: 42  Total Cholesterol/HDL Ration Measurement: --  Triglycerides, Serum: 53

## 2024-11-07 NOTE — BH INPATIENT PSYCHIATRY PROGRESS NOTE - NSBHCHARTREVIEWVS_PSY_A_CORE FT
Vital Signs Last 24 Hrs  T(C): 36.3 (11-07-24 @ 08:23), Max: 36.7 (11-06-24 @ 19:08)  T(F): 97.3 (11-07-24 @ 08:23), Max: 98.1 (11-06-24 @ 19:08)  HR: --  BP: --  BP(mean): --  RR: 17 (11-07-24 @ 08:23) (16 - 17)  SpO2: --    Orthostatic VS  11-07-24 @ 08:23  Lying BP: --/-- HR: --  Sitting BP: 120/79 HR: 92  Standing BP: 129/74 HR: 93  Site: --  Mode: --  Orthostatic VS  11-06-24 @ 19:08  Lying BP: --/-- HR: --  Sitting BP: 128/83 HR: 89  Standing BP: --/-- HR: --  Site: --  Mode: --  Orthostatic VS  11-06-24 @ 08:47  Lying BP: --/-- HR: --  Sitting BP: 112/66 HR: 100  Standing BP: 116/71 HR: 100  Site: --  Mode: --  Orthostatic VS  11-05-24 @ 19:35  Lying BP: --/-- HR: --  Sitting BP: 129/79 HR: 92  Standing BP: 140/90 HR: --  Site: --  Mode: --   Vital Signs Last 24 Hrs  T(C): 35.8 (11-08-24 @ 08:29), Max: 36.7 (11-07-24 @ 19:27)  T(F): 96.4 (11-08-24 @ 08:29), Max: 98 (11-07-24 @ 19:27)  HR: --  BP: --  BP(mean): --  RR: 18 (11-08-24 @ 08:29) (18 - 18)  SpO2: --    Orthostatic VS  11-08-24 @ 08:29  Lying BP: 120/66 HR: 83  Sitting BP: 116/74 HR: 90  Standing BP: --/-- HR: --  Site: --  Mode: electronic  Orthostatic VS  11-07-24 @ 19:27  Lying BP: --/-- HR: --  Sitting BP: 107/76 HR: 74  Standing BP: 112/76 HR: 79  Site: --  Mode: --  Orthostatic VS  11-07-24 @ 08:23  Lying BP: --/-- HR: --  Sitting BP: 120/79 HR: 92  Standing BP: 129/74 HR: 93  Site: --  Mode: --  Orthostatic VS  11-06-24 @ 19:08  Lying BP: --/-- HR: --  Sitting BP: 128/83 HR: 89  Standing BP: --/-- HR: --  Site: --  Mode: --
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
## 2024-11-08 PROCEDURE — 99232 SBSQ HOSP IP/OBS MODERATE 35: CPT

## 2024-11-08 RX ADMIN — Medication 100 MILLIGRAM(S): at 20:47
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
## 2024-11-08 NOTE — BH INPATIENT PSYCHIATRY PROGRESS NOTE - NSBHMETABOLIC_PSY_ALL_CORE_FT
BMI: BMI (kg/m2): 26 (10-12-24 @ 15:43)  HbA1c: A1C with Estimated Average Glucose Result: 6.1 % (10-18-24 @ 08:00)    Glucose: POCT Blood Glucose.: 57 mg/dL (04-15-24 @ 09:20)    BP: --Vital Signs Last 24 Hrs  T(C): 35.8 (11-08-24 @ 08:29), Max: 36.7 (11-07-24 @ 19:27)  T(F): 96.4 (11-08-24 @ 08:29), Max: 98 (11-07-24 @ 19:27)  HR: --  BP: --  BP(mean): --  RR: 18 (11-08-24 @ 08:29) (18 - 18)  SpO2: --    Orthostatic VS  11-08-24 @ 08:29  Lying BP: 120/66 HR: 83  Sitting BP: 116/74 HR: 90  Standing BP: --/-- HR: --  Site: --  Mode: electronic  Orthostatic VS  11-07-24 @ 19:27  Lying BP: --/-- HR: --  Sitting BP: 107/76 HR: 74  Standing BP: 112/76 HR: 79  Site: --  Mode: --  Orthostatic VS  11-07-24 @ 08:23  Lying BP: --/-- HR: --  Sitting BP: 120/79 HR: 92  Standing BP: 129/74 HR: 93  Site: --  Mode: --  Orthostatic VS  11-06-24 @ 19:08  Lying BP: --/-- HR: --  Sitting BP: 128/83 HR: 89  Standing BP: --/-- HR: --  Site: --  Mode: --    Lipid Panel: Date/Time: 10-18-24 @ 08:00  Cholesterol, Serum: 106  LDL Cholesterol Calculated: 53  HDL Cholesterol, Serum: 42  Total Cholesterol/HDL Ration Measurement: --  Triglycerides, Serum: 53

## 2024-11-08 NOTE — BH INPATIENT PSYCHIATRY PROGRESS NOTE - NSBHFUPINTERVALHXFT_PSY_A_CORE
Patient is followed up for NDD. Chart, medications and labs reviewed. Patient is discussed during morning brief, no overnight events, has been in good behavioral control.   Patient was seen and is evaluated on the unit. She continues to exhibit childlike behaviors, has been less intrusive and is maintaining behavioral control. On interview, states she is doing "good", does not offer any concerns. She remains preoccupied with going to a group home, meeting scheduled w/ [facility] for 11/14. She received maintenance dose of Invega Sustenna 156mg on 10/23, tolerating well w/o complaints. No acute medical concerns, VSS.

## 2024-11-08 NOTE — BH INPATIENT PSYCHIATRY PROGRESS NOTE - NSBHCHARTREVIEWVS_PSY_A_CORE FT
Vital Signs Last 24 Hrs  T(C): 35.8 (11-08-24 @ 08:29), Max: 36.7 (11-07-24 @ 19:27)  T(F): 96.4 (11-08-24 @ 08:29), Max: 98 (11-07-24 @ 19:27)  HR: --  BP: --  BP(mean): --  RR: 18 (11-08-24 @ 08:29) (18 - 18)  SpO2: --    Orthostatic VS  11-08-24 @ 08:29  Lying BP: 120/66 HR: 83  Sitting BP: 116/74 HR: 90  Standing BP: --/-- HR: --  Site: --  Mode: electronic  Orthostatic VS  11-07-24 @ 19:27  Lying BP: --/-- HR: --  Sitting BP: 107/76 HR: 74  Standing BP: 112/76 HR: 79  Site: --  Mode: --  Orthostatic VS  11-07-24 @ 08:23  Lying BP: --/-- HR: --  Sitting BP: 120/79 HR: 92  Standing BP: 129/74 HR: 93  Site: --  Mode: --  Orthostatic VS  11-06-24 @ 19:08  Lying BP: --/-- HR: --  Sitting BP: 128/83 HR: 89  Standing BP: --/-- HR: --  Site: --  Mode: --

## 2024-11-09 RX ADMIN — Medication 100 MILLIGRAM(S): at 20:49

## 2024-11-10 RX ADMIN — HALOPERIDOL 5 MILLIGRAM(S): 10 TABLET ORAL at 22:54

## 2024-11-10 RX ADMIN — Medication 50 MILLIGRAM(S): at 22:54

## 2024-11-10 RX ADMIN — Medication 100 MILLIGRAM(S): at 20:15

## 2024-11-10 RX ADMIN — Medication 2 MILLIGRAM(S): at 22:55

## 2024-11-11 PROCEDURE — 99232 SBSQ HOSP IP/OBS MODERATE 35: CPT

## 2024-11-11 RX ADMIN — HALOPERIDOL 5 MILLIGRAM(S): 10 TABLET ORAL at 19:31

## 2024-11-11 RX ADMIN — Medication 2 MILLIGRAM(S): at 19:31

## 2024-11-11 RX ADMIN — Medication 50 MILLIGRAM(S): at 19:31

## 2024-11-11 RX ADMIN — Medication 100 MILLIGRAM(S): at 20:07
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
## 2024-11-11 NOTE — BH INPATIENT PSYCHIATRY PROGRESS NOTE - NSBHMETABOLIC_PSY_ALL_CORE_FT
BMI: BMI (kg/m2): 26 (10-12-24 @ 15:43)  HbA1c: A1C with Estimated Average Glucose Result: 6.1 % (10-18-24 @ 08:00)    Glucose: POCT Blood Glucose.: 57 mg/dL (04-15-24 @ 09:20)    BP: --Vital Signs Last 24 Hrs  T(C): 36.8 (11-11-24 @ 08:18), Max: 36.8 (11-11-24 @ 08:18)  T(F): 98.3 (11-11-24 @ 08:18), Max: 98.3 (11-11-24 @ 08:18)  HR: --  BP: --  BP(mean): --  RR: 16 (11-11-24 @ 08:18) (16 - 17)  SpO2: --    Orthostatic VS  11-11-24 @ 08:18  Lying BP: --/-- HR: --  Sitting BP: 119/80 HR: 87  Standing BP: 129/74 HR: 105  Site: upper left arm  Mode: electronic  Orthostatic VS  11-10-24 @ 19:48  Lying BP: --/-- HR: --  Sitting BP: 121/84 HR: 99  Standing BP: 114/78 HR: 107  Site: --  Mode: --  Orthostatic VS  11-10-24 @ 07:57  Lying BP: --/-- HR: --  Sitting BP: 113/70 HR: 82  Standing BP: 112/76 HR: 93  Site: --  Mode: --    Lipid Panel: Date/Time: 10-18-24 @ 08:00  Cholesterol, Serum: 106  LDL Cholesterol Calculated: 53  HDL Cholesterol, Serum: 42  Total Cholesterol/HDL Ration Measurement: --  Triglycerides, Serum: 53

## 2024-11-11 NOTE — BH INPATIENT PSYCHIATRY PROGRESS NOTE - NSBHCHARTREVIEWVS_PSY_A_CORE FT
Vital Signs Last 24 Hrs  T(C): 36.8 (11-11-24 @ 08:18), Max: 36.8 (11-11-24 @ 08:18)  T(F): 98.3 (11-11-24 @ 08:18), Max: 98.3 (11-11-24 @ 08:18)  HR: --  BP: --  BP(mean): --  RR: 16 (11-11-24 @ 08:18) (16 - 17)  SpO2: --    Orthostatic VS  11-11-24 @ 08:18  Lying BP: --/-- HR: --  Sitting BP: 119/80 HR: 87  Standing BP: 129/74 HR: 105  Site: upper left arm  Mode: electronic  Orthostatic VS  11-10-24 @ 19:48  Lying BP: --/-- HR: --  Sitting BP: 121/84 HR: 99  Standing BP: 114/78 HR: 107  Site: --  Mode: --  Orthostatic VS  11-10-24 @ 07:57  Lying BP: --/-- HR: --  Sitting BP: 113/70 HR: 82  Standing BP: 112/76 HR: 93  Site: --  Mode: --

## 2024-11-11 NOTE — BH INPATIENT PSYCHIATRY PROGRESS NOTE - NSBHFUPINTERVALHXFT_PSY_A_CORE
Patient is followed up for NDD. Chart, medications and labs reviewed. Patient is discussed during morning brief, no overnight events, has been in good behavioral control.   Patient was seen and is evaluated on the unit. She continues to exhibit childlike behaviors, has been less intrusive towards staff/peers. Of note pt was switched out of single room yesterday to accommodate another peer on the unit, pt became more dysregulated overnight and received oral PRN's. She remains preoccupied with going to a group home, meeting scheduled w/ [facility] for 11/14. She received maintenance dose of Invega Sustenna 156mg on 10/23, tolerating well w/o complaints. No acute medical concerns, VSS.

## 2024-11-12 PROCEDURE — 99232 SBSQ HOSP IP/OBS MODERATE 35: CPT

## 2024-11-12 RX ADMIN — Medication 100 MILLIGRAM(S): at 20:00

## 2024-11-12 RX ADMIN — Medication 650 MILLIGRAM(S): at 14:12

## 2024-11-12 RX ADMIN — Medication 650 MILLIGRAM(S): at 13:12

## 2024-11-12 RX ADMIN — Medication 50 MILLIGRAM(S): at 20:01

## 2024-11-12 RX ADMIN — Medication 2 MILLIGRAM(S): at 20:00

## 2024-11-12 RX ADMIN — HALOPERIDOL 5 MILLIGRAM(S): 10 TABLET ORAL at 20:00

## 2024-11-12 NOTE — BH INPATIENT PSYCHIATRY PROGRESS NOTE - NSBHMETABOLIC_PSY_ALL_CORE_FT
BMI: BMI (kg/m2): 26 (10-12-24 @ 15:43)  HbA1c: A1C with Estimated Average Glucose Result: 6.1 % (10-18-24 @ 08:00)    Glucose: POCT Blood Glucose.: 57 mg/dL (04-15-24 @ 09:20)    BP: --Vital Signs Last 24 Hrs  T(C): 36.3 (11-11-24 @ 19:14), Max: 36.3 (11-11-24 @ 19:14)  T(F): 97.3 (11-11-24 @ 19:14), Max: 97.3 (11-11-24 @ 19:14)  HR: --  BP: --  BP(mean): --  RR: 16 (11-12-24 @ 08:00) (16 - 17)  SpO2: --    Orthostatic VS  11-11-24 @ 19:14  Lying BP: --/-- HR: --  Sitting BP: 105/76 HR: 101  Standing BP: 113/93 HR: 110  Site: --  Mode: --  Orthostatic VS  11-11-24 @ 08:18  Lying BP: --/-- HR: --  Sitting BP: 119/80 HR: 87  Standing BP: 129/74 HR: 105  Site: upper left arm  Mode: electronic  Orthostatic VS  11-10-24 @ 19:48  Lying BP: --/-- HR: --  Sitting BP: 121/84 HR: 99  Standing BP: 114/78 HR: 107  Site: --  Mode: --    Lipid Panel: Date/Time: 10-18-24 @ 08:00  Cholesterol, Serum: 106  LDL Cholesterol Calculated: 53  HDL Cholesterol, Serum: 42  Total Cholesterol/HDL Ration Measurement: --  Triglycerides, Serum: 53   BMI: BMI (kg/m2): 26 (10-12-24 @ 15:43)  HbA1c: A1C with Estimated Average Glucose Result: 6.1 % (10-18-24 @ 08:00)    Glucose: POCT Blood Glucose.: 57 mg/dL (04-15-24 @ 09:20)    BP: --Vital Signs Last 24 Hrs  T(C): 36.7 (11-13-24 @ 08:24), Max: 36.7 (11-13-24 @ 08:24)  T(F): 98.1 (11-13-24 @ 08:24), Max: 98.1 (11-13-24 @ 08:24)  HR: --  BP: --  BP(mean): --  RR: --  SpO2: --    Orthostatic VS  11-13-24 @ 08:24  Lying BP: --/-- HR: --  Sitting BP: 115/76 HR: 87  Standing BP: 111/78 HR: 83  Site: --  Mode: --  Orthostatic VS  11-12-24 @ 19:37  Lying BP: --/-- HR: --  Sitting BP: 114/77 HR: 87  Standing BP: 115/73 HR: 101  Site: --  Mode: --  Orthostatic VS  11-11-24 @ 19:14  Lying BP: --/-- HR: --  Sitting BP: 105/76 HR: 101  Standing BP: 113/93 HR: 110  Site: --  Mode: --    Lipid Panel: Date/Time: 10-18-24 @ 08:00  Cholesterol, Serum: 106  LDL Cholesterol Calculated: 53  HDL Cholesterol, Serum: 42  Total Cholesterol/HDL Ration Measurement: --  Triglycerides, Serum: 53

## 2024-11-12 NOTE — BH INPATIENT PSYCHIATRY PROGRESS NOTE - NSBHFUPINTERVALHXFT_PSY_A_CORE
Patient is followed up for NDD. Chart, medications and labs reviewed. Patient is discussed during morning brief, no overnight events, has been in good behavioral control.   Patient was seen and is evaluated on the unit. She continues to exhibit childlike behaviors, has been less intrusive towards staff/peers, seen standing near phone perez. She remains preoccupied with going to a group home, meeting scheduled w/ [facility] on Thursday 11/14. She received maintenance dose of Invega Sustenna 156mg on 10/23, tolerating well w/o complaints. No acute medical concerns, VSS.
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
## 2024-11-12 NOTE — BH INPATIENT PSYCHIATRY PROGRESS NOTE - NSBHCHARTREVIEWVS_PSY_A_CORE FT
Vital Signs Last 24 Hrs  T(C): 36.3 (11-11-24 @ 19:14), Max: 36.3 (11-11-24 @ 19:14)  T(F): 97.3 (11-11-24 @ 19:14), Max: 97.3 (11-11-24 @ 19:14)  HR: --  BP: --  BP(mean): --  RR: 16 (11-12-24 @ 08:00) (16 - 17)  SpO2: --    Orthostatic VS  11-11-24 @ 19:14  Lying BP: --/-- HR: --  Sitting BP: 105/76 HR: 101  Standing BP: 113/93 HR: 110  Site: --  Mode: --  Orthostatic VS  11-11-24 @ 08:18  Lying BP: --/-- HR: --  Sitting BP: 119/80 HR: 87  Standing BP: 129/74 HR: 105  Site: upper left arm  Mode: electronic  Orthostatic VS  11-10-24 @ 19:48  Lying BP: --/-- HR: --  Sitting BP: 121/84 HR: 99  Standing BP: 114/78 HR: 107  Site: --  Mode: --   Vital Signs Last 24 Hrs  T(C): 36.7 (11-13-24 @ 08:24), Max: 36.7 (11-13-24 @ 08:24)  T(F): 98.1 (11-13-24 @ 08:24), Max: 98.1 (11-13-24 @ 08:24)  HR: --  BP: --  BP(mean): --  RR: --  SpO2: --    Orthostatic VS  11-13-24 @ 08:24  Lying BP: --/-- HR: --  Sitting BP: 115/76 HR: 87  Standing BP: 111/78 HR: 83  Site: --  Mode: --  Orthostatic VS  11-12-24 @ 19:37  Lying BP: --/-- HR: --  Sitting BP: 114/77 HR: 87  Standing BP: 115/73 HR: 101  Site: --  Mode: --  Orthostatic VS  11-11-24 @ 19:14  Lying BP: --/-- HR: --  Sitting BP: 105/76 HR: 101  Standing BP: 113/93 HR: 110  Site: --  Mode: --

## 2024-11-13 PROCEDURE — 99232 SBSQ HOSP IP/OBS MODERATE 35: CPT

## 2024-11-13 RX ADMIN — Medication 50 MILLIGRAM(S): at 19:29

## 2024-11-13 RX ADMIN — Medication 100 MILLIGRAM(S): at 20:02

## 2024-11-13 RX ADMIN — Medication 650 MILLIGRAM(S): at 09:39

## 2024-11-13 RX ADMIN — Medication 650 MILLIGRAM(S): at 10:30

## 2024-11-13 RX ADMIN — HALOPERIDOL 5 MILLIGRAM(S): 10 TABLET ORAL at 19:29

## 2024-11-13 RX ADMIN — Medication 2 MILLIGRAM(S): at 19:29
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
## 2024-11-13 NOTE — BH INPATIENT PSYCHIATRY PROGRESS NOTE - NSBHCHARTREVIEWVS_PSY_A_CORE FT
Vital Signs Last 24 Hrs  T(C): 36.7 (11-13-24 @ 08:24), Max: 36.7 (11-13-24 @ 08:24)  T(F): 98.1 (11-13-24 @ 08:24), Max: 98.1 (11-13-24 @ 08:24)  HR: --  BP: --  BP(mean): --  RR: 15 (11-13-24 @ 08:24) (15 - 15)  SpO2: --    Orthostatic VS  11-13-24 @ 08:24  Lying BP: --/-- HR: --  Sitting BP: 115/76 HR: 87  Standing BP: 111/78 HR: 83  Site: --  Mode: --  Orthostatic VS  11-12-24 @ 19:37  Lying BP: --/-- HR: --  Sitting BP: 114/77 HR: 87  Standing BP: 115/73 HR: 101  Site: --  Mode: --  Orthostatic VS  11-11-24 @ 19:14  Lying BP: --/-- HR: --  Sitting BP: 105/76 HR: 101  Standing BP: 113/93 HR: 110  Site: --  Mode: --

## 2024-11-13 NOTE — BH INPATIENT PSYCHIATRY PROGRESS NOTE - NSBHFUPINTERVALHXFT_PSY_A_CORE
Patient is followed up for NDD. Chart, medications and labs reviewed. Patient is discussed during morning brief, no overnight events, has been in good behavioral control.   Patient was seen and is evaluated on the unit. She continues to exhibit childlike behaviors, has been less intrusive towards staff/peers, seen standing near phone perez. Does verbalize feeling tired and slept periodically during the day yesterday. She remains preoccupied with going to a group home, meeting scheduled w/ [facility] on Thursday 11/14. She received maintenance dose of Invega Sustenna 156mg on 10/23, tolerating well w/o complaints. No acute medical concerns, VSS.

## 2024-11-13 NOTE — BH INPATIENT PSYCHIATRY PROGRESS NOTE - NSBHMETABOLIC_PSY_ALL_CORE_FT
BMI: BMI (kg/m2): 26 (10-12-24 @ 15:43)  HbA1c: A1C with Estimated Average Glucose Result: 6.1 % (10-18-24 @ 08:00)    Glucose: POCT Blood Glucose.: 57 mg/dL (04-15-24 @ 09:20)    BP: --Vital Signs Last 24 Hrs  T(C): 36.7 (11-13-24 @ 08:24), Max: 36.7 (11-13-24 @ 08:24)  T(F): 98.1 (11-13-24 @ 08:24), Max: 98.1 (11-13-24 @ 08:24)  HR: --  BP: --  BP(mean): --  RR: 15 (11-13-24 @ 08:24) (15 - 15)  SpO2: --    Orthostatic VS  11-13-24 @ 08:24  Lying BP: --/-- HR: --  Sitting BP: 115/76 HR: 87  Standing BP: 111/78 HR: 83  Site: --  Mode: --  Orthostatic VS  11-12-24 @ 19:37  Lying BP: --/-- HR: --  Sitting BP: 114/77 HR: 87  Standing BP: 115/73 HR: 101  Site: --  Mode: --  Orthostatic VS  11-11-24 @ 19:14  Lying BP: --/-- HR: --  Sitting BP: 105/76 HR: 101  Standing BP: 113/93 HR: 110  Site: --  Mode: --    Lipid Panel: Date/Time: 10-18-24 @ 08:00  Cholesterol, Serum: 106  LDL Cholesterol Calculated: 53  HDL Cholesterol, Serum: 42  Total Cholesterol/HDL Ration Measurement: --  Triglycerides, Serum: 53

## 2024-11-14 PROCEDURE — 99232 SBSQ HOSP IP/OBS MODERATE 35: CPT

## 2024-11-14 RX ORDER — LORAZEPAM 4 MG/ML
2 VIAL (ML) INJECTION ONCE
Refills: 0 | Status: DISCONTINUED | OUTPATIENT
Start: 2024-11-14 | End: 2024-11-21

## 2024-11-14 RX ORDER — LORAZEPAM 4 MG/ML
2 VIAL (ML) INJECTION EVERY 6 HOURS
Refills: 0 | Status: DISCONTINUED | OUTPATIENT
Start: 2024-11-14 | End: 2024-11-21

## 2024-11-14 RX ADMIN — Medication 100 MILLIGRAM(S): at 20:22

## 2024-11-14 RX ADMIN — HALOPERIDOL 5 MILLIGRAM(S): 10 TABLET ORAL at 20:23

## 2024-11-14 RX ADMIN — Medication 2 MILLIGRAM(S): at 20:22

## 2024-11-14 RX ADMIN — Medication 50 MILLIGRAM(S): at 20:23

## 2024-11-14 NOTE — BH INPATIENT PSYCHIATRY PROGRESS NOTE - NSBHFUPINTERVALHXFT_PSY_A_CORE
Patient is followed up for NDD. Chart, medications and labs reviewed. Patient is discussed during morning brief, no overnight events, has been in good behavioral control.   Patient was seen and is evaluated on the unit. She continues to exhibit childlike behaviors, not noted to be intrusive towards staff/peers, often seen standing near phone perez. She inquires about discharge and remains preoccupied with going to a group home, meeting held today w/ [facility]. She received maintenance dose of Invega Sustenna 156mg on 10/23. No acute medical concerns, VSS.

## 2024-11-14 NOTE — BH INPATIENT PSYCHIATRY PROGRESS NOTE - NSBHMETABOLIC_PSY_ALL_CORE_FT
BMI: BMI (kg/m2): 26 (10-12-24 @ 15:43)  HbA1c: A1C with Estimated Average Glucose Result: 6.1 % (10-18-24 @ 08:00)    Glucose: POCT Blood Glucose.: 57 mg/dL (04-15-24 @ 09:20)    BP: --Vital Signs Last 24 Hrs  T(C): 36.7 (11-14-24 @ 07:39), Max: 36.7 (11-14-24 @ 07:39)  T(F): 98 (11-14-24 @ 07:39), Max: 98 (11-14-24 @ 07:39)  HR: --  BP: --  BP(mean): --  RR: 17 (11-14-24 @ 07:39) (17 - 17)  SpO2: --    Orthostatic VS  11-14-24 @ 07:39  Lying BP: --/-- HR: --  Sitting BP: 104/58 HR: 89  Standing BP: 103/54 HR: 100  Site: upper right arm  Mode: electronic  Orthostatic VS  11-13-24 @ 19:12  Lying BP: --/-- HR: --  Sitting BP: 120/88 HR: 99  Standing BP: 119/92 HR: 103  Site: --  Mode: --  Orthostatic VS  11-13-24 @ 08:24  Lying BP: --/-- HR: --  Sitting BP: 115/76 HR: 87  Standing BP: 111/78 HR: 83  Site: --  Mode: --  Orthostatic VS  11-12-24 @ 19:37  Lying BP: --/-- HR: --  Sitting BP: 114/77 HR: 87  Standing BP: 115/73 HR: 101  Site: --  Mode: --    Lipid Panel: Date/Time: 10-18-24 @ 08:00  Cholesterol, Serum: 106  LDL Cholesterol Calculated: 53  HDL Cholesterol, Serum: 42  Total Cholesterol/HDL Ration Measurement: --  Triglycerides, Serum: 53   BMI: BMI (kg/m2): 26 (10-12-24 @ 15:43)  HbA1c: A1C with Estimated Average Glucose Result: 6.1 % (10-18-24 @ 08:00)    Glucose: POCT Blood Glucose.: 57 mg/dL (04-15-24 @ 09:20)    BP: --Vital Signs Last 24 Hrs  T(C): 36.7 (11-15-24 @ 08:30), Max: 36.7 (11-14-24 @ 19:52)  T(F): 98 (11-15-24 @ 08:30), Max: 98 (11-14-24 @ 19:52)  HR: --  BP: --  BP(mean): --  RR: 17 (11-15-24 @ 08:30) (17 - 17)  SpO2: --    Orthostatic VS  11-15-24 @ 08:30  Lying BP: --/-- HR: --  Sitting BP: 109/61 HR: 66  Standing BP: 109/67 HR: 88  Site: --  Mode: --  Orthostatic VS  11-14-24 @ 19:52  Lying BP: --/-- HR: --  Sitting BP: 108/69 HR: 77  Standing BP: 97/72 HR: 84  Site: --  Mode: --  Orthostatic VS  11-14-24 @ 07:39  Lying BP: --/-- HR: --  Sitting BP: 104/58 HR: 89  Standing BP: 103/54 HR: 100  Site: upper right arm  Mode: electronic  Orthostatic VS  11-13-24 @ 19:12  Lying BP: --/-- HR: --  Sitting BP: 120/88 HR: 99  Standing BP: 119/92 HR: 103  Site: --  Mode: --    Lipid Panel: Date/Time: 10-18-24 @ 08:00  Cholesterol, Serum: 106  LDL Cholesterol Calculated: 53  HDL Cholesterol, Serum: 42  Total Cholesterol/HDL Ration Measurement: --  Triglycerides, Serum: 53

## 2024-11-14 NOTE — BH INPATIENT PSYCHIATRY PROGRESS NOTE - NSBHCHARTREVIEWVS_PSY_A_CORE FT
Vital Signs Last 24 Hrs  T(C): 36.7 (11-14-24 @ 07:39), Max: 36.7 (11-14-24 @ 07:39)  T(F): 98 (11-14-24 @ 07:39), Max: 98 (11-14-24 @ 07:39)  HR: --  BP: --  BP(mean): --  RR: 17 (11-14-24 @ 07:39) (17 - 17)  SpO2: --    Orthostatic VS  11-14-24 @ 07:39  Lying BP: --/-- HR: --  Sitting BP: 104/58 HR: 89  Standing BP: 103/54 HR: 100  Site: upper right arm  Mode: electronic  Orthostatic VS  11-13-24 @ 19:12  Lying BP: --/-- HR: --  Sitting BP: 120/88 HR: 99  Standing BP: 119/92 HR: 103  Site: --  Mode: --  Orthostatic VS  11-13-24 @ 08:24  Lying BP: --/-- HR: --  Sitting BP: 115/76 HR: 87  Standing BP: 111/78 HR: 83  Site: --  Mode: --  Orthostatic VS  11-12-24 @ 19:37  Lying BP: --/-- HR: --  Sitting BP: 114/77 HR: 87  Standing BP: 115/73 HR: 101  Site: --  Mode: --   Vital Signs Last 24 Hrs  T(C): 36.7 (11-15-24 @ 08:30), Max: 36.7 (11-14-24 @ 19:52)  T(F): 98 (11-15-24 @ 08:30), Max: 98 (11-14-24 @ 19:52)  HR: --  BP: --  BP(mean): --  RR: 17 (11-15-24 @ 08:30) (17 - 17)  SpO2: --    Orthostatic VS  11-15-24 @ 08:30  Lying BP: --/-- HR: --  Sitting BP: 109/61 HR: 66  Standing BP: 109/67 HR: 88  Site: --  Mode: --  Orthostatic VS  11-14-24 @ 19:52  Lying BP: --/-- HR: --  Sitting BP: 108/69 HR: 77  Standing BP: 97/72 HR: 84  Site: --  Mode: --  Orthostatic VS  11-14-24 @ 07:39  Lying BP: --/-- HR: --  Sitting BP: 104/58 HR: 89  Standing BP: 103/54 HR: 100  Site: upper right arm  Mode: electronic  Orthostatic VS  11-13-24 @ 19:12  Lying BP: --/-- HR: --  Sitting BP: 120/88 HR: 99  Standing BP: 119/92 HR: 103  Site: --  Mode: --
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
## 2024-11-15 PROCEDURE — 99232 SBSQ HOSP IP/OBS MODERATE 35: CPT

## 2024-11-15 PROCEDURE — 90832 PSYTX W PT 30 MINUTES: CPT

## 2024-11-15 RX ADMIN — Medication 100 MILLIGRAM(S): at 20:07

## 2024-11-15 NOTE — BH INPATIENT PSYCHIATRY PROGRESS NOTE - NSBHFUPINTERVALHXFT_PSY_A_CORE
Patient is followed up for NDD. Chart, medications and labs reviewed. Patient is discussed during morning brief, no overnight events, has been in good behavioral control.   Patient was seen and is evaluated on the unit. She continues to exhibit childlike behaviors, not noted to be intrusive towards staff/peers, often seen standing near phone perez. She inquires about discharge and states that she would like to be released next week. Writer discussed meeting w/ [facility] yesterday and next meeting scheduled for 12/5. She received maintenance dose of Invega Sustenna 156mg on 10/23. She was able to attend groups this morning. No acute medical concerns, VSS.
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
## 2024-11-15 NOTE — BH INPATIENT PSYCHIATRY PROGRESS NOTE - NSBHMETABOLIC_PSY_ALL_CORE_FT
BMI: BMI (kg/m2): 26 (10-12-24 @ 15:43)  HbA1c: A1C with Estimated Average Glucose Result: 6.1 % (10-18-24 @ 08:00)    Glucose: POCT Blood Glucose.: 57 mg/dL (04-15-24 @ 09:20)    BP: --Vital Signs Last 24 Hrs  T(C): 36.7 (11-15-24 @ 08:30), Max: 36.7 (11-14-24 @ 19:52)  T(F): 98 (11-15-24 @ 08:30), Max: 98 (11-14-24 @ 19:52)  HR: --  BP: --  BP(mean): --  RR: 17 (11-15-24 @ 08:30) (17 - 17)  SpO2: --    Orthostatic VS  11-15-24 @ 08:30  Lying BP: --/-- HR: --  Sitting BP: 109/61 HR: 66  Standing BP: 109/67 HR: 88  Site: --  Mode: --  Orthostatic VS  11-14-24 @ 19:52  Lying BP: --/-- HR: --  Sitting BP: 108/69 HR: 77  Standing BP: 97/72 HR: 84  Site: --  Mode: --  Orthostatic VS  11-14-24 @ 07:39  Lying BP: --/-- HR: --  Sitting BP: 104/58 HR: 89  Standing BP: 103/54 HR: 100  Site: upper right arm  Mode: electronic  Orthostatic VS  11-13-24 @ 19:12  Lying BP: --/-- HR: --  Sitting BP: 120/88 HR: 99  Standing BP: 119/92 HR: 103  Site: --  Mode: --    Lipid Panel: Date/Time: 10-18-24 @ 08:00  Cholesterol, Serum: 106  LDL Cholesterol Calculated: 53  HDL Cholesterol, Serum: 42  Total Cholesterol/HDL Ration Measurement: --  Triglycerides, Serum: 53

## 2024-11-15 NOTE — BH INPATIENT PSYCHIATRY PROGRESS NOTE - NSBHCHARTREVIEWVS_PSY_A_CORE FT
Vital Signs Last 24 Hrs  T(C): 36.7 (11-15-24 @ 08:30), Max: 36.7 (11-14-24 @ 19:52)  T(F): 98 (11-15-24 @ 08:30), Max: 98 (11-14-24 @ 19:52)  HR: --  BP: --  BP(mean): --  RR: 17 (11-15-24 @ 08:30) (17 - 17)  SpO2: --    Orthostatic VS  11-15-24 @ 08:30  Lying BP: --/-- HR: --  Sitting BP: 109/61 HR: 66  Standing BP: 109/67 HR: 88  Site: --  Mode: --  Orthostatic VS  11-14-24 @ 19:52  Lying BP: --/-- HR: --  Sitting BP: 108/69 HR: 77  Standing BP: 97/72 HR: 84  Site: --  Mode: --  Orthostatic VS  11-14-24 @ 07:39  Lying BP: --/-- HR: --  Sitting BP: 104/58 HR: 89  Standing BP: 103/54 HR: 100  Site: upper right arm  Mode: electronic  Orthostatic VS  11-13-24 @ 19:12  Lying BP: --/-- HR: --  Sitting BP: 120/88 HR: 99  Standing BP: 119/92 HR: 103  Site: --  Mode: --

## 2024-11-16 RX ADMIN — Medication 100 MILLIGRAM(S): at 21:22

## 2024-11-17 RX ADMIN — Medication 50 MILLIGRAM(S): at 19:28

## 2024-11-17 RX ADMIN — HALOPERIDOL 5 MILLIGRAM(S): 10 TABLET ORAL at 19:29

## 2024-11-17 RX ADMIN — Medication 100 MILLIGRAM(S): at 20:10

## 2024-11-17 RX ADMIN — Medication 2 MILLIGRAM(S): at 19:28

## 2024-11-18 PROCEDURE — 99232 SBSQ HOSP IP/OBS MODERATE 35: CPT

## 2024-11-18 RX ADMIN — Medication 100 MILLIGRAM(S): at 21:37

## 2024-11-18 NOTE — BH INPATIENT PSYCHIATRY PROGRESS NOTE - NSBHFUPINTERVALHXFT_PSY_A_CORE
Patient is followed up for NDD. Chart, medications and labs reviewed. Patient is discussed during morning brief, no overnight events, has been in good behavioral control.   Patient was seen and is evaluated on the unit. She continues to exhibit childlike behaviors, w/o intrusiveness towards staff/peers, often seen standing near phone perez. She tells writer that she showered and brushed her hair this morning, seen wearing donated clothing. She inquires about receiving a jacket from donation due to colder weather. Writer discussed meeting w/ [facility] yesterday and next meeting scheduled for 12/5. She received maintenance dose of Invega Sustenna 156mg on 10/23, scheduled for next dose on 11/22. No acute medical concerns, VSS.

## 2024-11-18 NOTE — BH INPATIENT PSYCHIATRY PROGRESS NOTE - CURRENT MEDICATION
MEDICATIONS  (STANDING):  influenza   Vaccine 0.5 milliLiter(s) IntraMuscular once  traZODone 100 milliGRAM(s) Oral at bedtime    MEDICATIONS  (PRN):  acetaminophen     Tablet .. 650 milliGRAM(s) Oral every 6 hours PRN Temp greater or equal to 38C (100.4F), Mild Pain (1 - 3)  aluminum hydroxide/magnesium hydroxide/simethicone Suspension 30 milliLiter(s) Oral every 6 hours PRN Dyspepsia  diphenhydrAMINE 50 milliGRAM(s) Oral every 6 hours PRN Extrapyramidal symptoms or prophylaxis  diphenhydrAMINE Injectable 50 milliGRAM(s) IntraMuscular once PRN Extrapyramidal prophylaxis  haloperidol     Tablet 5 milliGRAM(s) Oral every 6 hours PRN agitation  haloperidol    Injectable 5 milliGRAM(s) IntraMuscular once PRN aggression  LORazepam     Tablet 2 milliGRAM(s) Oral every 6 hours PRN severe anxiety, agitation  LORazepam   Injectable 2 milliGRAM(s) IntraMuscular once PRN agitation  magnesium hydroxide Suspension 30 milliLiter(s) Oral daily PRN Constipation   MEDICATIONS  (STANDING):  traZODone 100 milliGRAM(s) Oral at bedtime    MEDICATIONS  (PRN):  acetaminophen     Tablet .. 650 milliGRAM(s) Oral every 6 hours PRN Temp greater or equal to 38C (100.4F), Mild Pain (1 - 3)  aluminum hydroxide/magnesium hydroxide/simethicone Suspension 30 milliLiter(s) Oral every 6 hours PRN Dyspepsia  diphenhydrAMINE 50 milliGRAM(s) Oral every 6 hours PRN Extrapyramidal symptoms or prophylaxis  diphenhydrAMINE Injectable 50 milliGRAM(s) IntraMuscular once PRN Extrapyramidal prophylaxis  haloperidol     Tablet 5 milliGRAM(s) Oral every 6 hours PRN agitation  haloperidol    Injectable 5 milliGRAM(s) IntraMuscular once PRN aggression  LORazepam     Tablet 2 milliGRAM(s) Oral every 6 hours PRN severe anxiety, agitation  LORazepam   Injectable 2 milliGRAM(s) IntraMuscular once PRN agitation  magnesium hydroxide Suspension 30 milliLiter(s) Oral daily PRN Constipation

## 2024-11-18 NOTE — BH INPATIENT PSYCHIATRY PROGRESS NOTE - NSBHCHARTREVIEWVS_PSY_A_CORE FT
Vital Signs Last 24 Hrs  T(C): 36.7 (11-15-24 @ 08:30), Max: 36.7 (11-14-24 @ 19:52)  T(F): 98 (11-15-24 @ 08:30), Max: 98 (11-14-24 @ 19:52)  HR: --  BP: --  BP(mean): --  RR: 17 (11-15-24 @ 08:30) (17 - 17)  SpO2: --    Orthostatic VS  11-15-24 @ 08:30  Lying BP: --/-- HR: --  Sitting BP: 109/61 HR: 66  Standing BP: 109/67 HR: 88  Site: --  Mode: --  Orthostatic VS  11-14-24 @ 19:52  Lying BP: --/-- HR: --  Sitting BP: 108/69 HR: 77  Standing BP: 97/72 HR: 84  Site: --  Mode: --  Orthostatic VS  11-14-24 @ 07:39  Lying BP: --/-- HR: --  Sitting BP: 104/58 HR: 89  Standing BP: 103/54 HR: 100  Site: upper right arm  Mode: electronic  Orthostatic VS  11-13-24 @ 19:12  Lying BP: --/-- HR: --  Sitting BP: 120/88 HR: 99  Standing BP: 119/92 HR: 103  Site: --  Mode: --   Vital Signs Last 24 Hrs  T(C): 36.5 (11-20-24 @ 08:42), Max: 36.5 (11-19-24 @ 19:39)  T(F): 97.7 (11-20-24 @ 08:42), Max: 97.7 (11-19-24 @ 19:39)  HR: --  BP: --  BP(mean): --  RR: 16 (11-20-24 @ 08:42) (16 - 17)  SpO2: --    Orthostatic VS  11-20-24 @ 08:42  Lying BP: --/-- HR: --  Sitting BP: 115/75 HR: 95  Standing BP: 107/74 HR: 101  Site: --  Mode: --  Orthostatic VS  11-19-24 @ 19:39  Lying BP: --/-- HR: --  Sitting BP: 128/88 HR: 89  Standing BP: 110/89 HR: 97  Site: --  Mode: --  Orthostatic VS  11-19-24 @ 06:42  Lying BP: --/-- HR: --  Sitting BP: 111/82 HR: 77  Standing BP: 112/86 HR: 90  Site: --  Mode: --  Orthostatic VS  11-18-24 @ 19:33  Lying BP: --/-- HR: --  Sitting BP: 124/82 HR: 90  Standing BP: 124/80 HR: 88  Site: --  Mode: --
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
## 2024-11-18 NOTE — BH INPATIENT PSYCHIATRY PROGRESS NOTE - NSBHMETABOLIC_PSY_ALL_CORE_FT
BMI: BMI (kg/m2): 26 (10-12-24 @ 15:43)  HbA1c: A1C with Estimated Average Glucose Result: 6.1 % (10-18-24 @ 08:00)    Glucose: POCT Blood Glucose.: 57 mg/dL (04-15-24 @ 09:20)    BP: --Vital Signs Last 24 Hrs  T(C): 36.7 (11-15-24 @ 08:30), Max: 36.7 (11-14-24 @ 19:52)  T(F): 98 (11-15-24 @ 08:30), Max: 98 (11-14-24 @ 19:52)  HR: --  BP: --  BP(mean): --  RR: 17 (11-15-24 @ 08:30) (17 - 17)  SpO2: --    Orthostatic VS  11-15-24 @ 08:30  Lying BP: --/-- HR: --  Sitting BP: 109/61 HR: 66  Standing BP: 109/67 HR: 88  Site: --  Mode: --  Orthostatic VS  11-14-24 @ 19:52  Lying BP: --/-- HR: --  Sitting BP: 108/69 HR: 77  Standing BP: 97/72 HR: 84  Site: --  Mode: --  Orthostatic VS  11-14-24 @ 07:39  Lying BP: --/-- HR: --  Sitting BP: 104/58 HR: 89  Standing BP: 103/54 HR: 100  Site: upper right arm  Mode: electronic  Orthostatic VS  11-13-24 @ 19:12  Lying BP: --/-- HR: --  Sitting BP: 120/88 HR: 99  Standing BP: 119/92 HR: 103  Site: --  Mode: --    Lipid Panel: Date/Time: 10-18-24 @ 08:00  Cholesterol, Serum: 106  LDL Cholesterol Calculated: 53  HDL Cholesterol, Serum: 42  Total Cholesterol/HDL Ration Measurement: --  Triglycerides, Serum: 53   BMI: BMI (kg/m2): 26 (10-12-24 @ 15:43)  HbA1c: A1C with Estimated Average Glucose Result: 6.1 % (10-18-24 @ 08:00)    Glucose: POCT Blood Glucose.: 57 mg/dL (04-15-24 @ 09:20)    BP: --Vital Signs Last 24 Hrs  T(C): 36.5 (11-20-24 @ 08:42), Max: 36.5 (11-19-24 @ 19:39)  T(F): 97.7 (11-20-24 @ 08:42), Max: 97.7 (11-19-24 @ 19:39)  HR: --  BP: --  BP(mean): --  RR: 16 (11-20-24 @ 08:42) (16 - 17)  SpO2: --    Orthostatic VS  11-20-24 @ 08:42  Lying BP: --/-- HR: --  Sitting BP: 115/75 HR: 95  Standing BP: 107/74 HR: 101  Site: --  Mode: --  Orthostatic VS  11-19-24 @ 19:39  Lying BP: --/-- HR: --  Sitting BP: 128/88 HR: 89  Standing BP: 110/89 HR: 97  Site: --  Mode: --  Orthostatic VS  11-19-24 @ 06:42  Lying BP: --/-- HR: --  Sitting BP: 111/82 HR: 77  Standing BP: 112/86 HR: 90  Site: --  Mode: --  Orthostatic VS  11-18-24 @ 19:33  Lying BP: --/-- HR: --  Sitting BP: 124/82 HR: 90  Standing BP: 124/80 HR: 88  Site: --  Mode: --    Lipid Panel: Date/Time: 10-18-24 @ 08:00  Cholesterol, Serum: 106  LDL Cholesterol Calculated: 53  HDL Cholesterol, Serum: 42  Total Cholesterol/HDL Ration Measurement: --  Triglycerides, Serum: 53

## 2024-11-19 PROCEDURE — 99232 SBSQ HOSP IP/OBS MODERATE 35: CPT

## 2024-11-19 RX ADMIN — HALOPERIDOL 5 MILLIGRAM(S): 10 TABLET ORAL at 20:05

## 2024-11-19 RX ADMIN — Medication 50 MILLIGRAM(S): at 20:05

## 2024-11-19 RX ADMIN — Medication 100 MILLIGRAM(S): at 20:05

## 2024-11-19 NOTE — BH INPATIENT PSYCHIATRY PROGRESS NOTE - NSBHCHARTREVIEWVS_PSY_A_CORE FT
Vital Signs Last 24 Hrs  T(C): 36.3 (11-19-24 @ 06:42), Max: 36.6 (11-18-24 @ 19:33)  T(F): 97.4 (11-19-24 @ 06:42), Max: 97.8 (11-18-24 @ 19:33)  HR: --  BP: --  BP(mean): --  RR: 16 (11-19-24 @ 06:42) (16 - 18)  SpO2: --    Orthostatic VS  11-19-24 @ 06:42  Lying BP: --/-- HR: --  Sitting BP: 111/82 HR: 77  Standing BP: 112/86 HR: 90  Site: --  Mode: --  Orthostatic VS  11-18-24 @ 19:33  Lying BP: --/-- HR: --  Sitting BP: 124/82 HR: 90  Standing BP: 124/80 HR: 88  Site: --  Mode: --  Orthostatic VS  11-18-24 @ 08:00  Lying BP: --/-- HR: --  Sitting BP: 115/79 HR: 93  Standing BP: 112/76 HR: 98  Site: --  Mode: --  Orthostatic VS  11-18-24 @ 06:30  Lying BP: --/-- HR: --  Sitting BP: 110/75 HR: 90  Standing BP: 112/70 HR: 102  Site: --  Mode: --  Orthostatic VS  11-17-24 @ 19:30  Lying BP: --/-- HR: --  Sitting BP: 103/68 HR: 76  Standing BP: 104/69 HR: 79  Site: --  Mode: --   Vital Signs Last 24 Hrs  T(C): 36.5 (11-20-24 @ 08:42), Max: 36.5 (11-19-24 @ 19:39)  T(F): 97.7 (11-20-24 @ 08:42), Max: 97.7 (11-19-24 @ 19:39)  HR: --  BP: --  BP(mean): --  RR: 16 (11-20-24 @ 08:42) (16 - 17)  SpO2: --    Orthostatic VS  11-20-24 @ 08:42  Lying BP: --/-- HR: --  Sitting BP: 115/75 HR: 95  Standing BP: 107/74 HR: 101  Site: --  Mode: --  Orthostatic VS  11-19-24 @ 19:39  Lying BP: --/-- HR: --  Sitting BP: 128/88 HR: 89  Standing BP: 110/89 HR: 97  Site: --  Mode: --  Orthostatic VS  11-19-24 @ 06:42  Lying BP: --/-- HR: --  Sitting BP: 111/82 HR: 77  Standing BP: 112/86 HR: 90  Site: --  Mode: --  Orthostatic VS  11-18-24 @ 19:33  Lying BP: --/-- HR: --  Sitting BP: 124/82 HR: 90  Standing BP: 124/80 HR: 88  Site: --  Mode: --

## 2024-11-19 NOTE — BH INPATIENT PSYCHIATRY PROGRESS NOTE - NSBHFUPINTERVALHXFT_PSY_A_CORE
Patient is followed up for NDD. Chart, medications and labs reviewed. Patient is discussed during morning brief, no overnight events, has been in good behavioral control.   Patient was seen and is evaluated on the unit. She continues to exhibit childlike behaviors, w/o intrusiveness towards staff/peers, often seen standing near phone perez. Pt was able to speak w/ VERENICE  today. Writer discussed next meeting w/ [facility] scheduled for 12/5. She received maintenance dose of Invega Sustenna 156mg on 10/23, scheduled for next dose on 11/22. No acute medical concerns, VSS.
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
## 2024-11-20 PROCEDURE — 99232 SBSQ HOSP IP/OBS MODERATE 35: CPT

## 2024-11-20 RX ORDER — RISPERIDONE 4 MG
1 TABLET ORAL AT BEDTIME
Refills: 0 | Status: DISCONTINUED | OUTPATIENT
Start: 2024-11-20 | End: 2024-12-23

## 2024-11-20 RX ORDER — PALIPERIDONE 9 MG/1
156 TABLET, EXTENDED RELEASE ORAL ONCE
Refills: 0 | Status: COMPLETED | OUTPATIENT
Start: 2024-11-22 | End: 2024-11-22

## 2024-11-20 RX ADMIN — Medication 100 MILLIGRAM(S): at 20:15

## 2024-11-20 RX ADMIN — Medication 1 MILLIGRAM(S): at 20:15

## 2024-11-20 NOTE — BH INPATIENT PSYCHIATRY PROGRESS NOTE - NSBHMETABOLIC_PSY_ALL_CORE_FT
BMI: BMI (kg/m2): 26 (10-12-24 @ 15:43)  HbA1c: A1C with Estimated Average Glucose Result: 6.1 % (10-18-24 @ 08:00)    Glucose: POCT Blood Glucose.: 57 mg/dL (04-15-24 @ 09:20)    BP: --Vital Signs Last 24 Hrs  T(C): 36.5 (11-20-24 @ 08:42), Max: 36.5 (11-19-24 @ 19:39)  T(F): 97.7 (11-20-24 @ 08:42), Max: 97.7 (11-19-24 @ 19:39)  HR: --  BP: --  BP(mean): --  RR: 16 (11-20-24 @ 08:42) (16 - 17)  SpO2: --    Orthostatic VS  11-20-24 @ 08:42  Lying BP: --/-- HR: --  Sitting BP: 115/75 HR: 95  Standing BP: 107/74 HR: 101  Site: --  Mode: --  Orthostatic VS  11-19-24 @ 19:39  Lying BP: --/-- HR: --  Sitting BP: 128/88 HR: 89  Standing BP: 110/89 HR: 97  Site: --  Mode: --  Orthostatic VS  11-19-24 @ 06:42  Lying BP: --/-- HR: --  Sitting BP: 111/82 HR: 77  Standing BP: 112/86 HR: 90  Site: --  Mode: --  Orthostatic VS  11-18-24 @ 19:33  Lying BP: --/-- HR: --  Sitting BP: 124/82 HR: 90  Standing BP: 124/80 HR: 88  Site: --  Mode: --    Lipid Panel: Date/Time: 10-18-24 @ 08:00  Cholesterol, Serum: 106  LDL Cholesterol Calculated: 53  HDL Cholesterol, Serum: 42  Total Cholesterol/HDL Ration Measurement: --  Triglycerides, Serum: 53   BMI: BMI (kg/m2): 26 (10-12-24 @ 15:43)  HbA1c: A1C with Estimated Average Glucose Result: 6.1 % (10-18-24 @ 08:00)    Glucose: POCT Blood Glucose.: 57 mg/dL (04-15-24 @ 09:20)    BP: --Vital Signs Last 24 Hrs  T(C): 36.7 (11-21-24 @ 08:58), Max: 36.7 (11-21-24 @ 08:58)  T(F): 98.1 (11-21-24 @ 08:58), Max: 98.1 (11-21-24 @ 08:58)  HR: --  BP: --  BP(mean): --  RR: 18 (11-21-24 @ 08:58) (16 - 18)  SpO2: --    Orthostatic VS  11-21-24 @ 08:58  Lying BP: --/-- HR: --  Sitting BP: 112/60 HR: 92  Standing BP: 112/78 HR: 93  Site: --  Mode: --  Orthostatic VS  11-20-24 @ 19:16  Lying BP: --/-- HR: --  Sitting BP: 115/82 HR: 86  Standing BP: 129/81 HR: 89  Site: --  Mode: --  Orthostatic VS  11-20-24 @ 08:42  Lying BP: --/-- HR: --  Sitting BP: 115/75 HR: 95  Standing BP: 107/74 HR: 101  Site: --  Mode: --  Orthostatic VS  11-19-24 @ 19:39  Lying BP: --/-- HR: --  Sitting BP: 128/88 HR: 89  Standing BP: 110/89 HR: 97  Site: --  Mode: --    Lipid Panel: Date/Time: 10-18-24 @ 08:00  Cholesterol, Serum: 106  LDL Cholesterol Calculated: 53  HDL Cholesterol, Serum: 42  Total Cholesterol/HDL Ration Measurement: --  Triglycerides, Serum: 53

## 2024-11-20 NOTE — BH INPATIENT PSYCHIATRY PROGRESS NOTE - CURRENT MEDICATION
MEDICATIONS  (STANDING):  traZODone 100 milliGRAM(s) Oral at bedtime    MEDICATIONS  (PRN):  acetaminophen     Tablet .. 650 milliGRAM(s) Oral every 6 hours PRN Temp greater or equal to 38C (100.4F), Mild Pain (1 - 3)  aluminum hydroxide/magnesium hydroxide/simethicone Suspension 30 milliLiter(s) Oral every 6 hours PRN Dyspepsia  diphenhydrAMINE 50 milliGRAM(s) Oral every 6 hours PRN Extrapyramidal symptoms or prophylaxis  diphenhydrAMINE Injectable 50 milliGRAM(s) IntraMuscular once PRN Extrapyramidal prophylaxis  haloperidol     Tablet 5 milliGRAM(s) Oral every 6 hours PRN agitation  haloperidol    Injectable 5 milliGRAM(s) IntraMuscular once PRN aggression  LORazepam     Tablet 2 milliGRAM(s) Oral every 6 hours PRN severe anxiety, agitation  LORazepam   Injectable 2 milliGRAM(s) IntraMuscular once PRN agitation  magnesium hydroxide Suspension 30 milliLiter(s) Oral daily PRN Constipation   MEDICATIONS  (STANDING):  risperiDONE   Tablet 1 milliGRAM(s) Oral at bedtime  traZODone 100 milliGRAM(s) Oral at bedtime    MEDICATIONS  (PRN):  acetaminophen     Tablet .. 650 milliGRAM(s) Oral every 6 hours PRN Temp greater or equal to 38C (100.4F), Mild Pain (1 - 3)  aluminum hydroxide/magnesium hydroxide/simethicone Suspension 30 milliLiter(s) Oral every 6 hours PRN Dyspepsia  diphenhydrAMINE 50 milliGRAM(s) Oral every 6 hours PRN Extrapyramidal symptoms or prophylaxis  diphenhydrAMINE Injectable 50 milliGRAM(s) IntraMuscular once PRN Extrapyramidal prophylaxis  haloperidol     Tablet 5 milliGRAM(s) Oral every 6 hours PRN agitation  haloperidol    Injectable 5 milliGRAM(s) IntraMuscular once PRN aggression  LORazepam     Tablet 2 milliGRAM(s) Oral every 6 hours PRN severe anxiety, agitation  LORazepam   Injectable 2 milliGRAM(s) IntraMuscular once PRN agitation  magnesium hydroxide Suspension 30 milliLiter(s) Oral daily PRN Constipation

## 2024-11-20 NOTE — BH INPATIENT PSYCHIATRY PROGRESS NOTE - NSBHCHARTREVIEWVS_PSY_A_CORE FT
Vital Signs Last 24 Hrs  T(C): 36.5 (11-20-24 @ 08:42), Max: 36.5 (11-19-24 @ 19:39)  T(F): 97.7 (11-20-24 @ 08:42), Max: 97.7 (11-19-24 @ 19:39)  HR: --  BP: --  BP(mean): --  RR: 16 (11-20-24 @ 08:42) (16 - 17)  SpO2: --    Orthostatic VS  11-20-24 @ 08:42  Lying BP: --/-- HR: --  Sitting BP: 115/75 HR: 95  Standing BP: 107/74 HR: 101  Site: --  Mode: --  Orthostatic VS  11-19-24 @ 19:39  Lying BP: --/-- HR: --  Sitting BP: 128/88 HR: 89  Standing BP: 110/89 HR: 97  Site: --  Mode: --  Orthostatic VS  11-19-24 @ 06:42  Lying BP: --/-- HR: --  Sitting BP: 111/82 HR: 77  Standing BP: 112/86 HR: 90  Site: --  Mode: --  Orthostatic VS  11-18-24 @ 19:33  Lying BP: --/-- HR: --  Sitting BP: 124/82 HR: 90  Standing BP: 124/80 HR: 88  Site: --  Mode: --   Vital Signs Last 24 Hrs  T(C): 36.7 (11-21-24 @ 08:58), Max: 36.7 (11-21-24 @ 08:58)  T(F): 98.1 (11-21-24 @ 08:58), Max: 98.1 (11-21-24 @ 08:58)  HR: --  BP: --  BP(mean): --  RR: 18 (11-21-24 @ 08:58) (16 - 18)  SpO2: --    Orthostatic VS  11-21-24 @ 08:58  Lying BP: --/-- HR: --  Sitting BP: 112/60 HR: 92  Standing BP: 112/78 HR: 93  Site: --  Mode: --  Orthostatic VS  11-20-24 @ 19:16  Lying BP: --/-- HR: --  Sitting BP: 115/82 HR: 86  Standing BP: 129/81 HR: 89  Site: --  Mode: --  Orthostatic VS  11-20-24 @ 08:42  Lying BP: --/-- HR: --  Sitting BP: 115/75 HR: 95  Standing BP: 107/74 HR: 101  Site: --  Mode: --  Orthostatic VS  11-19-24 @ 19:39  Lying BP: --/-- HR: --  Sitting BP: 128/88 HR: 89  Standing BP: 110/89 HR: 97  Site: --  Mode: --

## 2024-11-20 NOTE — BH INPATIENT PSYCHIATRY PROGRESS NOTE - NSBHFUPINTERVALHXFT_PSY_A_CORE
Patient is followed up for NDD. Chart, medications and labs reviewed. Patient is discussed during morning brief, no overnight events, has been in good behavioral control.   Patient was seen and is evaluated on the unit. She continues to exhibit childlike behaviors, w/o intrusiveness towards staff/peers, often seen standing near phone perez. Team discussed housing interview scheduled for 12/3. Next meeting w/ [facility] scheduled for 12/5. She received maintenance dose of Invega Sustenna 156mg on 10/23, scheduled for next dose on 11/22. No acute medical concerns, VSS.
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
## 2024-11-21 PROCEDURE — 90832 PSYTX W PT 30 MINUTES: CPT

## 2024-11-21 PROCEDURE — 99232 SBSQ HOSP IP/OBS MODERATE 35: CPT

## 2024-11-21 RX ORDER — INFLUENZA A VIRUS A/IDAHO/07/2018 (H1N1) ANTIGEN (MDCK CELL DERIVED, PROPIOLACTONE INACTIVATED, INFLUENZA A VIRUS A/INDIANA/08/2018 (H3N2) ANTIGEN (MDCK CELL DERIVED, PROPIOLACTONE INACTIVATED), INFLUENZA B VIRUS B/SINGAPORE/INFTT-16-0610/2016 ANTIGEN (MDCK CELL DERIVED, PROPIOLACTONE INACTIVATED), INFLUENZA B VIRUS B/IOWA/06/2017 ANTIGEN (MDCK CELL DERIVED, PROPIOLACTONE INACTIVATED) 15; 15; 15; 15 UG/.5ML; UG/.5ML; UG/.5ML; UG/.5ML
0.5 INJECTION, SUSPENSION INTRAMUSCULAR ONCE
Refills: 0 | Status: COMPLETED | OUTPATIENT
Start: 2024-11-21 | End: 2024-11-21

## 2024-11-21 RX ORDER — LORAZEPAM 4 MG/ML
2 VIAL (ML) INJECTION EVERY 6 HOURS
Refills: 0 | Status: DISCONTINUED | OUTPATIENT
Start: 2024-11-21 | End: 2024-11-28

## 2024-11-21 RX ORDER — LORAZEPAM 4 MG/ML
2 VIAL (ML) INJECTION ONCE
Refills: 0 | Status: DISCONTINUED | OUTPATIENT
Start: 2024-11-21 | End: 2024-11-28

## 2024-11-21 RX ADMIN — Medication 100 MILLIGRAM(S): at 20:02

## 2024-11-21 RX ADMIN — Medication 1 MILLIGRAM(S): at 20:02

## 2024-11-21 NOTE — BH INPATIENT PSYCHIATRY PROGRESS NOTE - CURRENT MEDICATION
MEDICATIONS  (STANDING):  influenza   Vaccine 0.5 milliLiter(s) IntraMuscular once  risperiDONE   Tablet 1 milliGRAM(s) Oral at bedtime  traZODone 100 milliGRAM(s) Oral at bedtime    MEDICATIONS  (PRN):  acetaminophen     Tablet .. 650 milliGRAM(s) Oral every 6 hours PRN Temp greater or equal to 38C (100.4F), Mild Pain (1 - 3)  aluminum hydroxide/magnesium hydroxide/simethicone Suspension 30 milliLiter(s) Oral every 6 hours PRN Dyspepsia  diphenhydrAMINE 50 milliGRAM(s) Oral every 6 hours PRN Extrapyramidal symptoms or prophylaxis  diphenhydrAMINE Injectable 50 milliGRAM(s) IntraMuscular once PRN Extrapyramidal prophylaxis  haloperidol     Tablet 5 milliGRAM(s) Oral every 6 hours PRN agitation  haloperidol    Injectable 5 milliGRAM(s) IntraMuscular once PRN aggression  LORazepam     Tablet 2 milliGRAM(s) Oral every 6 hours PRN severe anxiety, agitation  LORazepam   Injectable 2 milliGRAM(s) IntraMuscular once PRN agitation  magnesium hydroxide Suspension 30 milliLiter(s) Oral daily PRN Constipation   MEDICATIONS  (STANDING):  paliperidone Injectable, Long Acting 156 milliGRAM(s) IntraMuscular once  risperiDONE   Tablet 1 milliGRAM(s) Oral at bedtime  traZODone 100 milliGRAM(s) Oral at bedtime    MEDICATIONS  (PRN):  acetaminophen     Tablet .. 650 milliGRAM(s) Oral every 6 hours PRN Temp greater or equal to 38C (100.4F), Mild Pain (1 - 3)  aluminum hydroxide/magnesium hydroxide/simethicone Suspension 30 milliLiter(s) Oral every 6 hours PRN Dyspepsia  diphenhydrAMINE 50 milliGRAM(s) Oral every 6 hours PRN Extrapyramidal symptoms or prophylaxis  diphenhydrAMINE Injectable 50 milliGRAM(s) IntraMuscular once PRN Extrapyramidal prophylaxis  haloperidol     Tablet 5 milliGRAM(s) Oral every 6 hours PRN agitation  haloperidol    Injectable 5 milliGRAM(s) IntraMuscular once PRN aggression  LORazepam     Tablet 2 milliGRAM(s) Oral every 6 hours PRN severe anxiety, agitation  LORazepam   Injectable 2 milliGRAM(s) IntraMuscular once PRN agitation  magnesium hydroxide Suspension 30 milliLiter(s) Oral daily PRN Constipation

## 2024-11-21 NOTE — BH INPATIENT PSYCHIATRY PROGRESS NOTE - NSBHMETABOLIC_PSY_ALL_CORE_FT
BMI: BMI (kg/m2): 26 (10-12-24 @ 15:43)  HbA1c: A1C with Estimated Average Glucose Result: 6.1 % (10-18-24 @ 08:00)    Glucose: POCT Blood Glucose.: 57 mg/dL (04-15-24 @ 09:20)    BP: --Vital Signs Last 24 Hrs  T(C): 36.7 (11-21-24 @ 08:58), Max: 36.7 (11-21-24 @ 08:58)  T(F): 98.1 (11-21-24 @ 08:58), Max: 98.1 (11-21-24 @ 08:58)  HR: --  BP: --  BP(mean): --  RR: 18 (11-21-24 @ 08:58) (16 - 18)  SpO2: --    Orthostatic VS  11-21-24 @ 08:58  Lying BP: --/-- HR: --  Sitting BP: 112/60 HR: 92  Standing BP: 112/78 HR: 93  Site: --  Mode: --  Orthostatic VS  11-20-24 @ 19:16  Lying BP: --/-- HR: --  Sitting BP: 115/82 HR: 86  Standing BP: 129/81 HR: 89  Site: --  Mode: --  Orthostatic VS  11-20-24 @ 08:42  Lying BP: --/-- HR: --  Sitting BP: 115/75 HR: 95  Standing BP: 107/74 HR: 101  Site: --  Mode: --  Orthostatic VS  11-19-24 @ 19:39  Lying BP: --/-- HR: --  Sitting BP: 128/88 HR: 89  Standing BP: 110/89 HR: 97  Site: --  Mode: --    Lipid Panel: Date/Time: 10-18-24 @ 08:00  Cholesterol, Serum: 106  LDL Cholesterol Calculated: 53  HDL Cholesterol, Serum: 42  Total Cholesterol/HDL Ration Measurement: --  Triglycerides, Serum: 53   BMI: BMI (kg/m2): 26 (10-12-24 @ 15:43)  HbA1c: A1C with Estimated Average Glucose Result: 6.1 % (10-18-24 @ 08:00)    Glucose: POCT Blood Glucose.: 57 mg/dL (04-15-24 @ 09:20)    BP: --Vital Signs Last 24 Hrs  T(C): 36.7 (11-22-24 @ 08:32), Max: 36.7 (11-22-24 @ 08:32)  T(F): 98.1 (11-22-24 @ 08:32), Max: 98.1 (11-22-24 @ 08:32)  HR: --  BP: --  BP(mean): --  RR: 18 (11-22-24 @ 08:32) (18 - 18)  SpO2: --    Orthostatic VS  11-22-24 @ 08:32  Lying BP: --/-- HR: --  Sitting BP: 101/70 HR: 84  Standing BP: 119/67 HR: 83  Site: --  Mode: electronic  Orthostatic VS  11-21-24 @ 19:34  Lying BP: --/-- HR: --  Sitting BP: 125/79 HR: 91  Standing BP: 135/92 HR: 95  Site: --  Mode: --  Orthostatic VS  11-21-24 @ 08:58  Lying BP: --/-- HR: --  Sitting BP: 112/60 HR: 92  Standing BP: 112/78 HR: 93  Site: --  Mode: --  Orthostatic VS  11-20-24 @ 19:16  Lying BP: --/-- HR: --  Sitting BP: 115/82 HR: 86  Standing BP: 129/81 HR: 89  Site: --  Mode: --    Lipid Panel: Date/Time: 10-18-24 @ 08:00  Cholesterol, Serum: 106  LDL Cholesterol Calculated: 53  HDL Cholesterol, Serum: 42  Total Cholesterol/HDL Ration Measurement: --  Triglycerides, Serum: 53

## 2024-11-21 NOTE — BH PSYCHOLOGY - GROUP THERAPY NOTE - NSPSYCHOLGRPCOGPT_PSY_A_CORE FT
Patient attended Cognitive Behavioral Therapy Group incorporating ACT-based concepts. The group started with a brief check-in asking Pts to share something about themselves others might not know. Members then reflected on a quote highlighting the significance of making space for difficult emotions while finding values-consistent activities to engage in. Lastly, Group engaged in a more in-depth discussion about their values, how these values have changed throughout different stages of life/how they differ from others, what they value about others, and how sometimes our actions do not align with our values/the impacts of this. Group facilitator explained concepts, reinforced participation, and engaged patients in the discussion.

## 2024-11-21 NOTE — BH INPATIENT PSYCHIATRY PROGRESS NOTE - NSBHFUPINTERVALHXFT_PSY_A_CORE
Patient is followed up for NDD. Chart, medications and labs reviewed. Patient is discussed during morning brief, no overnight events, has been in good behavioral control.   Patient was seen and is evaluated on the unit. She continues to exhibit childlike behaviors, w/o intrusiveness towards staff/peers, often seen standing near phone perez. She inquires about being discharged, writer discussed housing interview scheduled for 12/3. Next meeting w/ [facility] scheduled for 12/5. She received maintenance dose of Invega Sustenna 156mg on 10/23, scheduled for next dose tomorrow 11/22. No acute medical concerns, VSS.

## 2024-11-21 NOTE — BH INPATIENT PSYCHIATRY PROGRESS NOTE - NSBHCHARTREVIEWVS_PSY_A_CORE FT
Vital Signs Last 24 Hrs  T(C): 36.7 (11-21-24 @ 08:58), Max: 36.7 (11-21-24 @ 08:58)  T(F): 98.1 (11-21-24 @ 08:58), Max: 98.1 (11-21-24 @ 08:58)  HR: --  BP: --  BP(mean): --  RR: 18 (11-21-24 @ 08:58) (16 - 18)  SpO2: --    Orthostatic VS  11-21-24 @ 08:58  Lying BP: --/-- HR: --  Sitting BP: 112/60 HR: 92  Standing BP: 112/78 HR: 93  Site: --  Mode: --  Orthostatic VS  11-20-24 @ 19:16  Lying BP: --/-- HR: --  Sitting BP: 115/82 HR: 86  Standing BP: 129/81 HR: 89  Site: --  Mode: --  Orthostatic VS  11-20-24 @ 08:42  Lying BP: --/-- HR: --  Sitting BP: 115/75 HR: 95  Standing BP: 107/74 HR: 101  Site: --  Mode: --  Orthostatic VS  11-19-24 @ 19:39  Lying BP: --/-- HR: --  Sitting BP: 128/88 HR: 89  Standing BP: 110/89 HR: 97  Site: --  Mode: --   Vital Signs Last 24 Hrs  T(C): 36.7 (11-22-24 @ 08:32), Max: 36.7 (11-22-24 @ 08:32)  T(F): 98.1 (11-22-24 @ 08:32), Max: 98.1 (11-22-24 @ 08:32)  HR: --  BP: --  BP(mean): --  RR: 18 (11-22-24 @ 08:32) (18 - 18)  SpO2: --    Orthostatic VS  11-22-24 @ 08:32  Lying BP: --/-- HR: --  Sitting BP: 101/70 HR: 84  Standing BP: 119/67 HR: 83  Site: --  Mode: electronic  Orthostatic VS  11-21-24 @ 19:34  Lying BP: --/-- HR: --  Sitting BP: 125/79 HR: 91  Standing BP: 135/92 HR: 95  Site: --  Mode: --  Orthostatic VS  11-21-24 @ 08:58  Lying BP: --/-- HR: --  Sitting BP: 112/60 HR: 92  Standing BP: 112/78 HR: 93  Site: --  Mode: --  Orthostatic VS  11-20-24 @ 19:16  Lying BP: --/-- HR: --  Sitting BP: 115/82 HR: 86  Standing BP: 129/81 HR: 89  Site: --  Mode: --

## 2024-11-22 PROCEDURE — 99232 SBSQ HOSP IP/OBS MODERATE 35: CPT

## 2024-11-22 RX ADMIN — PALIPERIDONE 156 MILLIGRAM(S): 9 TABLET, EXTENDED RELEASE ORAL at 10:39

## 2024-11-22 RX ADMIN — Medication 650 MILLIGRAM(S): at 08:01

## 2024-11-22 RX ADMIN — Medication 100 MILLIGRAM(S): at 20:25

## 2024-11-22 RX ADMIN — Medication 1 MILLIGRAM(S): at 20:25

## 2024-11-22 RX ADMIN — Medication 50 MILLIGRAM(S): at 20:25

## 2024-11-22 RX ADMIN — Medication 650 MILLIGRAM(S): at 09:01

## 2024-11-22 NOTE — BH INPATIENT PSYCHIATRY PROGRESS NOTE - CURRENT MEDICATION
MEDICATIONS  (STANDING):  risperiDONE   Tablet 1 milliGRAM(s) Oral at bedtime  traZODone 100 milliGRAM(s) Oral at bedtime    MEDICATIONS  (PRN):  acetaminophen     Tablet .. 650 milliGRAM(s) Oral every 6 hours PRN Temp greater or equal to 38C (100.4F), Mild Pain (1 - 3)  aluminum hydroxide/magnesium hydroxide/simethicone Suspension 30 milliLiter(s) Oral every 6 hours PRN Dyspepsia  diphenhydrAMINE 50 milliGRAM(s) Oral every 6 hours PRN Extrapyramidal symptoms or prophylaxis  diphenhydrAMINE Injectable 50 milliGRAM(s) IntraMuscular once PRN Extrapyramidal prophylaxis  haloperidol     Tablet 5 milliGRAM(s) Oral every 6 hours PRN agitation  haloperidol    Injectable 5 milliGRAM(s) IntraMuscular once PRN aggression  LORazepam     Tablet 2 milliGRAM(s) Oral every 6 hours PRN severe anxiety, agitation  LORazepam   Injectable 2 milliGRAM(s) IntraMuscular once PRN agitation  magnesium hydroxide Suspension 30 milliLiter(s) Oral daily PRN Constipation

## 2024-11-22 NOTE — BH INPATIENT PSYCHIATRY PROGRESS NOTE - NSBHFUPINTERVALHXFT_PSY_A_CORE
Patient is followed up for NDD. Chart, medications and labs reviewed. Patient is discussed during morning brief, no overnight events, has been in good behavioral control.   Patient was seen and is evaluated on the unit. She continues to exhibit childlike behaviors, w/o intrusiveness towards staff/peers, often seen standing near phone perez. She states that she is doing "good", discussed housing interview scheduled for 12/3. She was able to receive maintenance dose of Invega Sustenna 156mg today AM. No acute medical concerns, VSS.

## 2024-11-22 NOTE — BH INPATIENT PSYCHIATRY PROGRESS NOTE - NSBHCHARTREVIEWVS_PSY_A_CORE FT
Vital Signs Last 24 Hrs  T(C): 36.7 (11-22-24 @ 08:32), Max: 36.7 (11-22-24 @ 08:32)  T(F): 98.1 (11-22-24 @ 08:32), Max: 98.1 (11-22-24 @ 08:32)  HR: --  BP: --  BP(mean): --  RR: 18 (11-22-24 @ 08:32) (18 - 18)  SpO2: --    Orthostatic VS  11-22-24 @ 08:32  Lying BP: --/-- HR: --  Sitting BP: 101/70 HR: 84  Standing BP: 119/67 HR: 83  Site: --  Mode: electronic  Orthostatic VS  11-21-24 @ 19:34  Lying BP: --/-- HR: --  Sitting BP: 125/79 HR: 91  Standing BP: 135/92 HR: 95  Site: --  Mode: --  Orthostatic VS  11-21-24 @ 08:58  Lying BP: --/-- HR: --  Sitting BP: 112/60 HR: 92  Standing BP: 112/78 HR: 93  Site: --  Mode: --  Orthostatic VS  11-20-24 @ 19:16  Lying BP: --/-- HR: --  Sitting BP: 115/82 HR: 86  Standing BP: 129/81 HR: 89  Site: --  Mode: --
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
## 2024-11-22 NOTE — BH INPATIENT PSYCHIATRY PROGRESS NOTE - NSBHMETABOLIC_PSY_ALL_CORE_FT
BMI: BMI (kg/m2): 26 (10-12-24 @ 15:43)  HbA1c: A1C with Estimated Average Glucose Result: 6.1 % (10-18-24 @ 08:00)    Glucose: POCT Blood Glucose.: 57 mg/dL (04-15-24 @ 09:20)    BP: --Vital Signs Last 24 Hrs  T(C): 36.7 (11-22-24 @ 08:32), Max: 36.7 (11-22-24 @ 08:32)  T(F): 98.1 (11-22-24 @ 08:32), Max: 98.1 (11-22-24 @ 08:32)  HR: --  BP: --  BP(mean): --  RR: 18 (11-22-24 @ 08:32) (18 - 18)  SpO2: --    Orthostatic VS  11-22-24 @ 08:32  Lying BP: --/-- HR: --  Sitting BP: 101/70 HR: 84  Standing BP: 119/67 HR: 83  Site: --  Mode: electronic  Orthostatic VS  11-21-24 @ 19:34  Lying BP: --/-- HR: --  Sitting BP: 125/79 HR: 91  Standing BP: 135/92 HR: 95  Site: --  Mode: --  Orthostatic VS  11-21-24 @ 08:58  Lying BP: --/-- HR: --  Sitting BP: 112/60 HR: 92  Standing BP: 112/78 HR: 93  Site: --  Mode: --  Orthostatic VS  11-20-24 @ 19:16  Lying BP: --/-- HR: --  Sitting BP: 115/82 HR: 86  Standing BP: 129/81 HR: 89  Site: --  Mode: --    Lipid Panel: Date/Time: 10-18-24 @ 08:00  Cholesterol, Serum: 106  LDL Cholesterol Calculated: 53  HDL Cholesterol, Serum: 42  Total Cholesterol/HDL Ration Measurement: --  Triglycerides, Serum: 53

## 2024-11-23 RX ADMIN — Medication 1 MILLIGRAM(S): at 20:33

## 2024-11-23 RX ADMIN — Medication 50 MILLIGRAM(S): at 20:33

## 2024-11-23 RX ADMIN — Medication 100 MILLIGRAM(S): at 20:33

## 2024-11-24 RX ADMIN — Medication 1 MILLIGRAM(S): at 20:08

## 2024-11-24 RX ADMIN — Medication 100 MILLIGRAM(S): at 20:08

## 2024-11-25 PROCEDURE — 99232 SBSQ HOSP IP/OBS MODERATE 35: CPT

## 2024-11-25 RX ADMIN — Medication 100 MILLIGRAM(S): at 20:02

## 2024-11-25 RX ADMIN — Medication 1 MILLIGRAM(S): at 20:02
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
## 2024-11-25 NOTE — BH INPATIENT PSYCHIATRY PROGRESS NOTE - NSBHCHARTREVIEWVS_PSY_A_CORE FT
Vital Signs Last 24 Hrs  T(C): 36.6 (11-24-24 @ 20:34), Max: 36.9 (11-24-24 @ 08:53)  T(F): 97.8 (11-24-24 @ 20:34), Max: 98.5 (11-24-24 @ 08:53)  HR: --  BP: --  BP(mean): --  RR: 16 (11-24-24 @ 08:53) (16 - 16)  SpO2: --    Orthostatic VS  11-24-24 @ 20:34  Lying BP: --/-- HR: --  Sitting BP: 125/94 HR: 90  Standing BP: 132/92 HR: 98  Site: --  Mode: --  Orthostatic VS  11-24-24 @ 08:53  Lying BP: --/-- HR: --  Sitting BP: 126/71 HR: 96  Standing BP: 127/82 HR: 102  Site: --  Mode: --  Orthostatic VS  11-23-24 @ 20:55  Lying BP: --/-- HR: --  Sitting BP: 122/74 HR: 96  Standing BP: 104/74 HR: 107  Site: --  Mode: --  Orthostatic VS  11-23-24 @ 08:52  Lying BP: --/-- HR: --  Sitting BP: 116/61 HR: 82  Standing BP: 124/94 HR: 104  Site: --  Mode: --   Vital Signs Last 24 Hrs  T(C): 36.7 (11-25-24 @ 07:45), Max: 36.7 (11-25-24 @ 07:45)  T(F): 98.1 (11-25-24 @ 07:45), Max: 98.1 (11-25-24 @ 07:45)  HR: --  BP: --  BP(mean): --  RR: 16 (11-25-24 @ 07:45) (16 - 16)  SpO2: --    Orthostatic VS  11-25-24 @ 07:45  Lying BP: --/-- HR: --  Sitting BP: 110/70 HR: 86  Standing BP: 97/78 HR: 95  Site: upper left arm  Mode: electronic  Orthostatic VS  11-24-24 @ 20:34  Lying BP: --/-- HR: --  Sitting BP: 125/94 HR: 90  Standing BP: 132/92 HR: 98  Site: --  Mode: --  Orthostatic VS  11-24-24 @ 08:53  Lying BP: --/-- HR: --  Sitting BP: 126/71 HR: 96  Standing BP: 127/82 HR: 102  Site: --  Mode: --  Orthostatic VS  11-23-24 @ 20:55  Lying BP: --/-- HR: --  Sitting BP: 122/74 HR: 96  Standing BP: 104/74 HR: 107  Site: --  Mode: --   Vital Signs Last 24 Hrs  T(C): 37 (11-27-24 @ 08:29), Max: 37 (11-27-24 @ 08:29)  T(F): 98.6 (11-27-24 @ 08:29), Max: 98.6 (11-27-24 @ 08:29)  HR: --  BP: --  BP(mean): --  RR: 17 (11-26-24 @ 20:37) (17 - 17)  SpO2: --    Orthostatic VS  11-27-24 @ 08:29  Lying BP: --/-- HR: --  Sitting BP: 108/77 HR: 89  Standing BP: 101/75 HR: 95  Site: --  Mode: electronic  Orthostatic VS  11-26-24 @ 20:45  Lying BP: --/-- HR: --  Sitting BP: 116/88 HR: 93  Standing BP: 117/93 HR: 97  Site: --  Mode: --  Orthostatic VS  11-26-24 @ 05:43  Lying BP: --/-- HR: --  Sitting BP: 110/60 HR: 82  Standing BP: 111/79 HR: 81  Site: --  Mode: --  Orthostatic VS  11-25-24 @ 19:12  Lying BP: --/-- HR: --  Sitting BP: 114/69 HR: 105  Standing BP: 114/74 HR: 110  Site: --  Mode: --

## 2024-11-25 NOTE — BH INPATIENT PSYCHIATRY PROGRESS NOTE - NSBHMETABOLIC_PSY_ALL_CORE_FT
BMI: BMI (kg/m2): 26 (10-12-24 @ 15:43)  HbA1c: A1C with Estimated Average Glucose Result: 6.1 % (10-18-24 @ 08:00)    Glucose: POCT Blood Glucose.: 57 mg/dL (04-15-24 @ 09:20)    BP: --Vital Signs Last 24 Hrs  T(C): 36.6 (11-24-24 @ 20:34), Max: 36.9 (11-24-24 @ 08:53)  T(F): 97.8 (11-24-24 @ 20:34), Max: 98.5 (11-24-24 @ 08:53)  HR: --  BP: --  BP(mean): --  RR: 16 (11-24-24 @ 08:53) (16 - 16)  SpO2: --    Orthostatic VS  11-24-24 @ 20:34  Lying BP: --/-- HR: --  Sitting BP: 125/94 HR: 90  Standing BP: 132/92 HR: 98  Site: --  Mode: --  Orthostatic VS  11-24-24 @ 08:53  Lying BP: --/-- HR: --  Sitting BP: 126/71 HR: 96  Standing BP: 127/82 HR: 102  Site: --  Mode: --  Orthostatic VS  11-23-24 @ 20:55  Lying BP: --/-- HR: --  Sitting BP: 122/74 HR: 96  Standing BP: 104/74 HR: 107  Site: --  Mode: --  Orthostatic VS  11-23-24 @ 08:52  Lying BP: --/-- HR: --  Sitting BP: 116/61 HR: 82  Standing BP: 124/94 HR: 104  Site: --  Mode: --    Lipid Panel: Date/Time: 10-18-24 @ 08:00  Cholesterol, Serum: 106  LDL Cholesterol Calculated: 53  HDL Cholesterol, Serum: 42  Total Cholesterol/HDL Ration Measurement: --  Triglycerides, Serum: 53   BMI: BMI (kg/m2): 26 (10-12-24 @ 15:43)  HbA1c: A1C with Estimated Average Glucose Result: 6.1 % (10-18-24 @ 08:00)    Glucose: POCT Blood Glucose.: 57 mg/dL (04-15-24 @ 09:20)    BP: --Vital Signs Last 24 Hrs  T(C): 36.7 (11-25-24 @ 07:45), Max: 36.7 (11-25-24 @ 07:45)  T(F): 98.1 (11-25-24 @ 07:45), Max: 98.1 (11-25-24 @ 07:45)  HR: --  BP: --  BP(mean): --  RR: 16 (11-25-24 @ 07:45) (16 - 16)  SpO2: --    Orthostatic VS  11-25-24 @ 07:45  Lying BP: --/-- HR: --  Sitting BP: 110/70 HR: 86  Standing BP: 97/78 HR: 95  Site: upper left arm  Mode: electronic  Orthostatic VS  11-24-24 @ 20:34  Lying BP: --/-- HR: --  Sitting BP: 125/94 HR: 90  Standing BP: 132/92 HR: 98  Site: --  Mode: --  Orthostatic VS  11-24-24 @ 08:53  Lying BP: --/-- HR: --  Sitting BP: 126/71 HR: 96  Standing BP: 127/82 HR: 102  Site: --  Mode: --  Orthostatic VS  11-23-24 @ 20:55  Lying BP: --/-- HR: --  Sitting BP: 122/74 HR: 96  Standing BP: 104/74 HR: 107  Site: --  Mode: --    Lipid Panel: Date/Time: 10-18-24 @ 08:00  Cholesterol, Serum: 106  LDL Cholesterol Calculated: 53  HDL Cholesterol, Serum: 42  Total Cholesterol/HDL Ration Measurement: --  Triglycerides, Serum: 53   BMI: BMI (kg/m2): 26 (10-12-24 @ 15:43)  HbA1c: A1C with Estimated Average Glucose Result: 6.1 % (10-18-24 @ 08:00)    Glucose: POCT Blood Glucose.: 57 mg/dL (04-15-24 @ 09:20)    BP: --Vital Signs Last 24 Hrs  T(C): 37 (11-27-24 @ 08:29), Max: 37 (11-27-24 @ 08:29)  T(F): 98.6 (11-27-24 @ 08:29), Max: 98.6 (11-27-24 @ 08:29)  HR: --  BP: --  BP(mean): --  RR: 17 (11-26-24 @ 20:37) (17 - 17)  SpO2: --    Orthostatic VS  11-27-24 @ 08:29  Lying BP: --/-- HR: --  Sitting BP: 108/77 HR: 89  Standing BP: 101/75 HR: 95  Site: --  Mode: electronic  Orthostatic VS  11-26-24 @ 20:45  Lying BP: --/-- HR: --  Sitting BP: 116/88 HR: 93  Standing BP: 117/93 HR: 97  Site: --  Mode: --  Orthostatic VS  11-26-24 @ 05:43  Lying BP: --/-- HR: --  Sitting BP: 110/60 HR: 82  Standing BP: 111/79 HR: 81  Site: --  Mode: --  Orthostatic VS  11-25-24 @ 19:12  Lying BP: --/-- HR: --  Sitting BP: 114/69 HR: 105  Standing BP: 114/74 HR: 110  Site: --  Mode: --    Lipid Panel: Date/Time: 10-18-24 @ 08:00  Cholesterol, Serum: 106  LDL Cholesterol Calculated: 53  HDL Cholesterol, Serum: 42  Total Cholesterol/HDL Ration Measurement: --  Triglycerides, Serum: 53

## 2024-11-25 NOTE — BH INPATIENT PSYCHIATRY PROGRESS NOTE - NSBHFUPINTERVALHXFT_PSY_A_CORE
Patient is followed up for NDD. Chart, medications and labs reviewed. Patient is discussed during treatment team,  no events from over the weekend, has been in good behavioral control. No prns for aggression.  Remains compliant with her medications, no SE reported or observed. Eating and sleep maintained.   Patient was seen on the unit. She continues to exhibit childlike behaviors, w/o intrusiveness towards staff/peers, often seen standing near phone perez (pt is constantly talking on pay phone but noone on the other line). She states that she is doing good.  Per  patient has housing interview scheduled for 12/3. She recently received maintenance dose of Invega Sustenna 156mg on 11/22, tolerating well.   No acute medical concerns, VSS. Patient is followed up for NDD. Chart, medications and labs reviewed. Patient is discussed during treatment team,  no events from over the weekend, has been in good behavioral control. No prns for aggression.  Remains compliant with her medications, no SE reported or observed. Eating and sleep maintained.   Patient was seen on the unit. She continues to exhibit childlike behaviors, w/o intrusiveness towards staff/peers, often seen standing near phone perez (pt is constantly talking on pay phone but no one on the other line). Thought process disorganized, ALEIDA. She states that she is doing good.  Per  patient has housing interview scheduled for 12/3. She recently received maintenance dose of Invega Sustenna 156mg on 11/22, tolerating well.   No acute medical concerns, VSS.

## 2024-11-26 PROCEDURE — 99232 SBSQ HOSP IP/OBS MODERATE 35: CPT

## 2024-11-26 RX ADMIN — Medication 1 MILLIGRAM(S): at 20:07

## 2024-11-26 RX ADMIN — Medication 100 MILLIGRAM(S): at 20:07

## 2024-11-26 RX ADMIN — HALOPERIDOL 5 MILLIGRAM(S): 10 TABLET ORAL at 20:08

## 2024-11-26 RX ADMIN — Medication 50 MILLIGRAM(S): at 20:07

## 2024-11-26 NOTE — BH INPATIENT PSYCHIATRY PROGRESS NOTE - NSBHLEGALSTATUSCHANGE_PSY_ALL_CORE
11/9/2024      Dear Omar,    There’s no question about it - preventive care can save lives. Many health problems start out silently without symptoms. Preventive care is often the only way to catch these problems in early stages, when they can be more successfully treated.     Our records show that you are due for the screening(s) listed below. If you have completed these screening(s), please call us so we can update your record.    Screening Colonoscopy   Colonoscopy: Colorectal cancer usually comes from polyps (abnormal growths) in the colon or rectum. Colonoscopy can find the polyps and remove them before they turn to cancer. To schedule your Colonoscopy or to discuss other screening options, call [phone].    Your health is important to us. Please feel free to call my office at [phone] or make an appointment to discuss the best screening options for you.     Sincerely,      George Cardenas MD   [facility], [facility]   [address]  [place] [address]  Dept: [phone]  Dept Fax: [phone]     Enclosures:    Colorectal Cancer Screening  Colorectal cancer starts in cells in the colon or rectum. It's 1 of the main causes of cancer deaths in the [place] But when it's found and treated early, the chances of a full recovery are very good. It needs to be found when it's still small and hasn't spread. This cancer rarely causes symptoms in its early stages. Because of this, screening for it is important. This means looking for signs of the cancer before you have symptoms. Screening is even more important if you have risk factors for this cancer.   Risk factors for colorectal cancer  Your risk of having colorectal cancer is higher if you:   Are age 50 or older, but it can start in people younger than 50  Have a family history or personal history of colorectal cancer or polyps  Are   Are of Eastern  Religion descent (Ashkenazi)  Have type 2 diabetes,  Crohn’s disease, or ulcerative colitis  Have an inherited genetic syndrome like Calero syndrome ([facility]) or familial adenomatous polyposis (FAP)  Are overweight  Are not physically active  Smoke  Drink a lot of alcohol (more than 2 drinks per day for men and 1 drink per day for women)  Eat a lot of red or processed meat  The colon and rectum  The colon and rectum are part of your digestive system. Food goes from your stomach to your small intestine. It then goes into your colon. As it travels through the colon, water is removed. The waste that is left (stool) becomes more solid. The muscles of your intestines push the stool toward the sigmoid colon. This is the last part of the colon. The stool then moves into the rectum. It's stored there until it’s ready to leave your body when you poop.   How colorectal cancer starts  Polyps are growths that can form on the inner lining of the colon and rectum. Most are benign. This means they aren’t cancer. But over time, some polyps can become cancer. These are called malignant. This happens when cells in these polyps start to grow out of control. In time, the cancer cells can spread to more of the colon and rectum. The cancer can spread to nearby organs or lymph nodes. It can spread to other parts of the body, like the liver or lungs. Finding and removing polyps early can help keep cancer from starting.   Colorectal cancer screening  Screening means looking for a health problem before you have symptoms. Screening for colorectal cancer starts with:   Your health history.Your healthcare provider will ask about your health history. They will ask you about possible cancer risk factors. Tell your healthcare provider if you have a family member who has had colorectal cancer or polyps. Tell them about any health problems you have had in the past.  Physical exam. This includes a digital rectal exam (RICARDO). A RICARDO might be done as part of your physical exam. To do it, your healthcare  provider puts a lubricated, gloved finger into your rectum. They check for any lumps or changes that could be cancer. This doesn't hurt and takes less than a minute. RICARDO alone is not enough to screen for colorectal cancer. You'll also need 1 of the tests listed below.  Types of screening tests  The American Cancer Society and the [place] Preventive Services Task Force advise colorectal cancer screening for people at average risk starting at age 45. Talk with your healthcare provider about your risks. Ask when you should start screening tests. It's also important to check with your health insurer about your coverage.   Below are the most common types of colorectal cancer screening tests. How often you should be screened depends on your risk and the test that you and your healthcare provider choose. If you have a family history of colon cancer or are at high risk for other reasons, you may need to have screening earlier or more often.   Stool testing   Fecal occult blood test (FOBT) or fecal immunochemical test (FIT) (every 1 year)   These tests check for blood in stool that you can’t see. This is called hidden or occult blood. Hidden blood may be a sign of colon polyps or cancer. A small sample of stool is sent to a lab where it's tested for blood. Most often, you collect this sample at home using a kit your healthcare provider gives you. Make sure you know what to do and follow the instructions carefully. For example, you might need to not eat certain foods and not take some medicines before collecting stool for this test.   Stool DNA test (every 1 to 3 years)  This test looks for cells in your stool that have changed DNA in them. These DNA changes might be signs of cancer or polyps. This test also looks for hidden blood in stool. For this test, you collect an entire bowel movement. This is done using a container that's put in the toilet. The kit has instructions on how to collect, prepare, and send your stool. It goes  to a lab for testing.   Visual exams  Colonoscopy (every 10 years)  This test allows your healthcare provider to find and remove polyps in your colon or rectum. It is the only screening test that lets your healthcare provider see your entire colon and rectum. This test lets your healthcare provider remove any pieces of tissue that need to be checked for cancer. If you have an abnormal result from any other colorectal cancer screening test, you will likely need a colonoscopy.   One or 2 days before the test, you'll do a bowel prep. The bowel prep cleans out your colon. This is so the lining can be seen during the test. You'll be given instructions on how to do the prep. It will include a liquid diet. You will then use a strong laxative solution or an enema.   Just before the test, you're given medicine to make you sleepy. Then the healthcare provider gently puts a long, flexible, lighted tube (colonoscope) into your rectum. The scope is guided through your entire colon. The provider looks at images of the inside of your colon on a video screen. Any polyps seen are removed. They are sent to a lab for testing. If a polyp can’t be removed, a small piece of it is taken out for testing. If the tests show it might be cancer, the polyp might be removed later during surgery.   Flexible sigmoidoscopy (every 5 years)  This test is a lot like a colonoscopy. But it is done only on the sigmoid colon and rectum. The sigmoid colon is the last 2 feet or so that connects to your rectum. The entire colon is about 5 feet long.   One or 2 days before the test, you'll do a bowel prep. The bowel prep cleans out your colon. This is so the lining can be seen during the test. You'll be given instructions on how to do the prep. It will include a liquid diet. You will then use a strong laxative solution or an enema.   You are awake during the test. But you may be given medicine to help you relax. The healthcare provider guides a thin,  flexible, lighted tube (sigmoidoscope) into your rectum and lower colon. The images are shown on a video screen. Polyps can be removed. They are sent to a lab for testing.   Another option is flexible sigmoidoscopy every 10 years, with a FIT stool test every 1 year. Talk with your healthcare provider to learn more.   Virtual colonoscopy (every 5 years)  This test is also called a CT colonography. It uses a series of X-rays. They make a 3-D image of your colon and rectum.   One or 2 days before the test, you'll do a bowel prep. The bowel prep cleans out your colon. This is so the lining can be seen during the test. You'll be given instructions on how to do the prep. It will include a liquid diet. You will then use a strong laxative solution or an enema. The day before the test, you'll need to do a bowel prep to clean out your colon. Your healthcare provider will give you instructions on how to do this.   During the test, you'll lie on a narrow table that's part of an X-ray machine called a CT scanner. A soft, small tube will be placed into your rectum. This will fill your colon and rectum with air. The table will slide into the CT scanner. A series of X-rays will be taken. A computer will combine these to create a 3-D image. Because the test uses X-rays, it exposes you to a small amount of radiation. This test can be done without sedation. If polyps or any other changes are seen, you'll need a colonoscopy. This is done so the tissue can be removed for testing.   Talking with your healthcare provider  Talk with your healthcare provider about which screening tests might be best for you. Each test has pros and cons. But no matter which test you have, the most important thing is that you get screened. If cancer is found at an early stage during screening, it's easier to treat. And treatment is more likely to work well. Cancer can even be prevented with routine screening tests.   If you have a screening test other than a  colonoscopy and have an abnormal test result, you'll need to follow-up with colonoscopy. This would not be considered a screening colonoscopy. Your deductible and co-pay may apply. Check with your health insurer so you know what to expect.   Ask your provider about your level of risk. You may need to be screened on a different schedule if you are at higher risk of this cancer. Talk with your provider about your health history to decide on the screening plan that's best for you.   Angelica last reviewed this educational content on 8/1/2020  © 4921-9734 [employer]. All rights reserved. This information is not intended as a substitute for professional medical care. Always follow your healthcare professional's instructions.            No

## 2024-11-26 NOTE — BH INPATIENT PSYCHIATRY PROGRESS NOTE - NSBHCHARTREVIEWVS_PSY_A_CORE FT
Vital Signs Last 24 Hrs  T(C): 36.7 (11-26-24 @ 05:43), Max: 36.7 (11-25-24 @ 07:45)  T(F): 98.1 (11-26-24 @ 05:43), Max: 98.1 (11-25-24 @ 07:45)  HR: --  BP: --  BP(mean): --  RR: 17 (11-25-24 @ 19:53) (16 - 17)  SpO2: --    Orthostatic VS  11-26-24 @ 05:43  Lying BP: --/-- HR: --  Sitting BP: 110/60 HR: 82  Standing BP: 111/79 HR: 81  Site: --  Mode: --  Orthostatic VS  11-25-24 @ 19:12  Lying BP: --/-- HR: --  Sitting BP: 114/69 HR: 105  Standing BP: 114/74 HR: 110  Site: --  Mode: --  Orthostatic VS  11-25-24 @ 07:45  Lying BP: --/-- HR: --  Sitting BP: 110/70 HR: 86  Standing BP: 97/78 HR: 95  Site: upper left arm  Mode: electronic  Orthostatic VS  11-24-24 @ 20:34  Lying BP: --/-- HR: --  Sitting BP: 125/94 HR: 90  Standing BP: 132/92 HR: 98  Site: --  Mode: --  Orthostatic VS  11-24-24 @ 08:53  Lying BP: --/-- HR: --  Sitting BP: 126/71 HR: 96  Standing BP: 127/82 HR: 102  Site: --  Mode: --   Vital Signs Last 24 Hrs  T(C): 36.7 (11-26-24 @ 05:43), Max: 36.7 (11-26-24 @ 05:43)  T(F): 98.1 (11-26-24 @ 05:43), Max: 98.1 (11-26-24 @ 05:43)  HR: --  BP: --  BP(mean): --  RR: 17 (11-25-24 @ 19:53) (17 - 17)  SpO2: --    Orthostatic VS  11-26-24 @ 05:43  Lying BP: --/-- HR: --  Sitting BP: 110/60 HR: 82  Standing BP: 111/79 HR: 81  Site: --  Mode: --  Orthostatic VS  11-25-24 @ 19:12  Lying BP: --/-- HR: --  Sitting BP: 114/69 HR: 105  Standing BP: 114/74 HR: 110  Site: --  Mode: --  Orthostatic VS  11-25-24 @ 07:45  Lying BP: --/-- HR: --  Sitting BP: 110/70 HR: 86  Standing BP: 97/78 HR: 95  Site: upper left arm  Mode: electronic  Orthostatic VS  11-24-24 @ 20:34  Lying BP: --/-- HR: --  Sitting BP: 125/94 HR: 90  Standing BP: 132/92 HR: 98  Site: --  Mode: --   Vital Signs Last 24 Hrs  T(C): 37 (11-27-24 @ 08:29), Max: 37 (11-27-24 @ 08:29)  T(F): 98.6 (11-27-24 @ 08:29), Max: 98.6 (11-27-24 @ 08:29)  HR: --  BP: --  BP(mean): --  RR: 17 (11-26-24 @ 20:37) (17 - 17)  SpO2: --    Orthostatic VS  11-27-24 @ 08:29  Lying BP: --/-- HR: --  Sitting BP: 108/77 HR: 89  Standing BP: 101/75 HR: 95  Site: --  Mode: electronic  Orthostatic VS  11-26-24 @ 20:45  Lying BP: --/-- HR: --  Sitting BP: 116/88 HR: 93  Standing BP: 117/93 HR: 97  Site: --  Mode: --  Orthostatic VS  11-26-24 @ 05:43  Lying BP: --/-- HR: --  Sitting BP: 110/60 HR: 82  Standing BP: 111/79 HR: 81  Site: --  Mode: --  Orthostatic VS  11-25-24 @ 19:12  Lying BP: --/-- HR: --  Sitting BP: 114/69 HR: 105  Standing BP: 114/74 HR: 110  Site: --  Mode: --

## 2024-11-26 NOTE — BH INPATIENT PSYCHIATRY PROGRESS NOTE - NSBHMETABOLIC_PSY_ALL_CORE_FT
BMI: BMI (kg/m2): 26 (10-12-24 @ 15:43)  HbA1c: A1C with Estimated Average Glucose Result: 6.1 % (10-18-24 @ 08:00)    Glucose: POCT Blood Glucose.: 57 mg/dL (04-15-24 @ 09:20)    BP: --Vital Signs Last 24 Hrs  T(C): 36.7 (11-25-24 @ 07:45), Max: 36.7 (11-25-24 @ 07:45)  T(F): 98.1 (11-25-24 @ 07:45), Max: 98.1 (11-25-24 @ 07:45)  HR: --  BP: --  BP(mean): --  RR: 16 (11-25-24 @ 07:45) (16 - 16)  SpO2: --    Orthostatic VS  11-25-24 @ 07:45  Lying BP: --/-- HR: --  Sitting BP: 110/70 HR: 86  Standing BP: 97/78 HR: 95  Site: upper left arm  Mode: electronic  Orthostatic VS  11-24-24 @ 20:34  Lying BP: --/-- HR: --  Sitting BP: 125/94 HR: 90  Standing BP: 132/92 HR: 98  Site: --  Mode: --  Orthostatic VS  11-24-24 @ 08:53  Lying BP: --/-- HR: --  Sitting BP: 126/71 HR: 96  Standing BP: 127/82 HR: 102  Site: --  Mode: --  Orthostatic VS  11-23-24 @ 20:55  Lying BP: --/-- HR: --  Sitting BP: 122/74 HR: 96  Standing BP: 104/74 HR: 107  Site: --  Mode: --    Lipid Panel: Date/Time: 10-18-24 @ 08:00  Cholesterol, Serum: 106  LDL Cholesterol Calculated: 53  HDL Cholesterol, Serum: 42  Total Cholesterol/HDL Ration Measurement: --  Triglycerides, Serum: 53   BMI: BMI (kg/m2): 26 (10-12-24 @ 15:43)  HbA1c: A1C with Estimated Average Glucose Result: 6.1 % (10-18-24 @ 08:00)    Glucose: POCT Blood Glucose.: 57 mg/dL (04-15-24 @ 09:20)    BP: --Vital Signs Last 24 Hrs  T(C): 36.7 (11-26-24 @ 05:43), Max: 36.7 (11-26-24 @ 05:43)  T(F): 98.1 (11-26-24 @ 05:43), Max: 98.1 (11-26-24 @ 05:43)  HR: --  BP: --  BP(mean): --  RR: 17 (11-25-24 @ 19:53) (17 - 17)  SpO2: --    Orthostatic VS  11-26-24 @ 05:43  Lying BP: --/-- HR: --  Sitting BP: 110/60 HR: 82  Standing BP: 111/79 HR: 81  Site: --  Mode: --  Orthostatic VS  11-25-24 @ 19:12  Lying BP: --/-- HR: --  Sitting BP: 114/69 HR: 105  Standing BP: 114/74 HR: 110  Site: --  Mode: --  Orthostatic VS  11-25-24 @ 07:45  Lying BP: --/-- HR: --  Sitting BP: 110/70 HR: 86  Standing BP: 97/78 HR: 95  Site: upper left arm  Mode: electronic  Orthostatic VS  11-24-24 @ 20:34  Lying BP: --/-- HR: --  Sitting BP: 125/94 HR: 90  Standing BP: 132/92 HR: 98  Site: --  Mode: --    Lipid Panel: Date/Time: 10-18-24 @ 08:00  Cholesterol, Serum: 106  LDL Cholesterol Calculated: 53  HDL Cholesterol, Serum: 42  Total Cholesterol/HDL Ration Measurement: --  Triglycerides, Serum: 53   BMI: BMI (kg/m2): 26 (10-12-24 @ 15:43)  HbA1c: A1C with Estimated Average Glucose Result: 6.1 % (10-18-24 @ 08:00)    Glucose: POCT Blood Glucose.: 57 mg/dL (04-15-24 @ 09:20)    BP: --Vital Signs Last 24 Hrs  T(C): 37 (11-27-24 @ 08:29), Max: 37 (11-27-24 @ 08:29)  T(F): 98.6 (11-27-24 @ 08:29), Max: 98.6 (11-27-24 @ 08:29)  HR: --  BP: --  BP(mean): --  RR: 17 (11-26-24 @ 20:37) (17 - 17)  SpO2: --    Orthostatic VS  11-27-24 @ 08:29  Lying BP: --/-- HR: --  Sitting BP: 108/77 HR: 89  Standing BP: 101/75 HR: 95  Site: --  Mode: electronic  Orthostatic VS  11-26-24 @ 20:45  Lying BP: --/-- HR: --  Sitting BP: 116/88 HR: 93  Standing BP: 117/93 HR: 97  Site: --  Mode: --  Orthostatic VS  11-26-24 @ 05:43  Lying BP: --/-- HR: --  Sitting BP: 110/60 HR: 82  Standing BP: 111/79 HR: 81  Site: --  Mode: --  Orthostatic VS  11-25-24 @ 19:12  Lying BP: --/-- HR: --  Sitting BP: 114/69 HR: 105  Standing BP: 114/74 HR: 110  Site: --  Mode: --    Lipid Panel: Date/Time: 10-18-24 @ 08:00  Cholesterol, Serum: 106  LDL Cholesterol Calculated: 53  HDL Cholesterol, Serum: 42  Total Cholesterol/HDL Ration Measurement: --  Triglycerides, Serum: 53
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
## 2024-11-26 NOTE — BH INPATIENT PSYCHIATRY PROGRESS NOTE - NSBHFUPINTERVALHXFT_PSY_A_CORE
Pt requesting to take all morning medications together at 0730 to maintain her home routine.       Leon Hilliard RN  03/26/21 [id number] Patient is followed up for NDD. Chart, medications and labs reviewed. Patient is discussed during treatment team,  no interval events.  has been in good behavioral control. No prns for aggression.  Remains compliant with her medications, no SE reported or observed. Eating and sleep maintained.   Patient was seen on the unit. She continues to exhibit childlike behaviors, w/o intrusiveness towards staff/peers, often seen standing near phone perez (pt is constantly talking on pay phone but no one on the other line). Thought process disorganized, nonsensical, derailed. Patient reports she is "ok"  however interview is limited due to profound disorganization and ALEIDA. Per  patient has housing interview scheduled for 12/3. She recently received maintenance dose of Invega Sustenna 156mg on 11/22, tolerating well.   No acute medical concerns.

## 2024-11-27 PROCEDURE — 99232 SBSQ HOSP IP/OBS MODERATE 35: CPT

## 2024-11-27 RX ADMIN — Medication 100 MILLIGRAM(S): at 20:18

## 2024-11-27 RX ADMIN — Medication 1 MILLIGRAM(S): at 20:19

## 2024-11-27 RX ADMIN — HALOPERIDOL 5 MILLIGRAM(S): 10 TABLET ORAL at 20:19

## 2024-11-27 NOTE — BH INPATIENT PSYCHIATRY PROGRESS NOTE - NSBHFUPINTERVALHXFT_PSY_A_CORE
Patient is followed up for NDD. Chart, medications and labs reviewed. Patient is discussed during treatment team,  no interval events.  has been in good behavioral control. No prns for aggression.  Remains compliant with her medications, no SE reported or observed. Eating and sleep maintained.   Patient was seen on the unit.  Thought process disorganized, nonsensical, derailed. Patient reports she is "ok I want a house and an apartment"  however interview is limited due to profound disorganization and ALEIDA. Per  patient has housing interview scheduled for 12/3. She recently received maintenance dose of Invega Sustenna 156mg on 11/22, tolerating well.   No acute medical concerns.
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
## 2024-11-27 NOTE — BH INPATIENT PSYCHIATRY PROGRESS NOTE - NSBHCHARTREVIEWVS_PSY_A_CORE FT
Vital Signs Last 24 Hrs  T(C): 37 (11-27-24 @ 08:29), Max: 37 (11-27-24 @ 08:29)  T(F): 98.6 (11-27-24 @ 08:29), Max: 98.6 (11-27-24 @ 08:29)  HR: --  BP: --  BP(mean): --  RR: 17 (11-27-24 @ 10:24) (17 - 17)  SpO2: --    Orthostatic VS  11-27-24 @ 08:29  Lying BP: --/-- HR: --  Sitting BP: 108/77 HR: 89  Standing BP: 101/75 HR: 95  Site: --  Mode: electronic  Orthostatic VS  11-26-24 @ 20:45  Lying BP: --/-- HR: --  Sitting BP: 116/88 HR: 93  Standing BP: 117/93 HR: 97  Site: --  Mode: --  Orthostatic VS  11-26-24 @ 05:43  Lying BP: --/-- HR: --  Sitting BP: 110/60 HR: 82  Standing BP: 111/79 HR: 81  Site: --  Mode: --  Orthostatic VS  11-25-24 @ 19:12  Lying BP: --/-- HR: --  Sitting BP: 114/69 HR: 105  Standing BP: 114/74 HR: 110  Site: --  Mode: --   Vital Signs Last 24 Hrs  T(C): 36.4 (11-28-24 @ 19:46), Max: 36.4 (11-28-24 @ 19:46)  T(F): 97.5 (11-28-24 @ 19:46), Max: 97.5 (11-28-24 @ 19:46)  HR: --  BP: --  BP(mean): --  RR: 16 (11-29-24 @ 09:12) (16 - 17)  SpO2: --    Orthostatic VS  11-28-24 @ 19:46  Lying BP: --/-- HR: --  Sitting BP: 114/60 HR: 86  Standing BP: 115/72 HR: 87  Site: --  Mode: --  Orthostatic VS  11-28-24 @ 05:50  Lying BP: --/-- HR: --  Sitting BP: 123/79 HR: 88  Standing BP: 124/79 HR: 97  Site: upper left arm  Mode: electronic

## 2024-11-27 NOTE — BH INPATIENT PSYCHIATRY PROGRESS NOTE - CURRENT MEDICATION
MEDICATIONS  (STANDING):  risperiDONE   Tablet 1 milliGRAM(s) Oral at bedtime  traZODone 100 milliGRAM(s) Oral at bedtime    MEDICATIONS  (PRN):  acetaminophen     Tablet .. 650 milliGRAM(s) Oral every 6 hours PRN Temp greater or equal to 38C (100.4F), Mild Pain (1 - 3)  aluminum hydroxide/magnesium hydroxide/simethicone Suspension 30 milliLiter(s) Oral every 6 hours PRN Dyspepsia  diphenhydrAMINE 50 milliGRAM(s) Oral every 6 hours PRN Extrapyramidal symptoms or prophylaxis  diphenhydrAMINE Injectable 50 milliGRAM(s) IntraMuscular once PRN Extrapyramidal prophylaxis  haloperidol     Tablet 5 milliGRAM(s) Oral every 6 hours PRN agitation  haloperidol    Injectable 5 milliGRAM(s) IntraMuscular once PRN aggression  LORazepam     Tablet 2 milliGRAM(s) Oral every 6 hours PRN severe anxiety, agitation  LORazepam   Injectable 2 milliGRAM(s) IntraMuscular once PRN agitation  magnesium hydroxide Suspension 30 milliLiter(s) Oral daily PRN Constipation   MEDICATIONS  (STANDING):  risperiDONE   Tablet 1 milliGRAM(s) Oral at bedtime  traZODone 100 milliGRAM(s) Oral at bedtime    MEDICATIONS  (PRN):  acetaminophen     Tablet .. 650 milliGRAM(s) Oral every 6 hours PRN Temp greater or equal to 38C (100.4F), Mild Pain (1 - 3)  aluminum hydroxide/magnesium hydroxide/simethicone Suspension 30 milliLiter(s) Oral every 6 hours PRN Dyspepsia  diphenhydrAMINE 50 milliGRAM(s) Oral every 6 hours PRN Extrapyramidal symptoms or prophylaxis  diphenhydrAMINE Injectable 50 milliGRAM(s) IntraMuscular once PRN Extrapyramidal prophylaxis  haloperidol     Tablet 5 milliGRAM(s) Oral every 6 hours PRN agitation  haloperidol    Injectable 5 milliGRAM(s) IntraMuscular once PRN aggression  magnesium hydroxide Suspension 30 milliLiter(s) Oral daily PRN Constipation

## 2024-11-27 NOTE — BH INPATIENT PSYCHIATRY PROGRESS NOTE - PRN MEDS
MEDICATIONS  (PRN):  acetaminophen     Tablet .. 650 milliGRAM(s) Oral every 6 hours PRN Temp greater or equal to 38C (100.4F), Mild Pain (1 - 3)  aluminum hydroxide/magnesium hydroxide/simethicone Suspension 30 milliLiter(s) Oral every 6 hours PRN Dyspepsia  diphenhydrAMINE 50 milliGRAM(s) Oral every 6 hours PRN Extrapyramidal symptoms or prophylaxis  diphenhydrAMINE Injectable 50 milliGRAM(s) IntraMuscular once PRN Extrapyramidal prophylaxis  haloperidol     Tablet 5 milliGRAM(s) Oral every 6 hours PRN agitation  haloperidol    Injectable 5 milliGRAM(s) IntraMuscular once PRN aggression  LORazepam     Tablet 2 milliGRAM(s) Oral every 6 hours PRN severe anxiety, agitation  LORazepam   Injectable 2 milliGRAM(s) IntraMuscular once PRN agitation  magnesium hydroxide Suspension 30 milliLiter(s) Oral daily PRN Constipation   MEDICATIONS  (PRN):  acetaminophen     Tablet .. 650 milliGRAM(s) Oral every 6 hours PRN Temp greater or equal to 38C (100.4F), Mild Pain (1 - 3)  aluminum hydroxide/magnesium hydroxide/simethicone Suspension 30 milliLiter(s) Oral every 6 hours PRN Dyspepsia  diphenhydrAMINE 50 milliGRAM(s) Oral every 6 hours PRN Extrapyramidal symptoms or prophylaxis  diphenhydrAMINE Injectable 50 milliGRAM(s) IntraMuscular once PRN Extrapyramidal prophylaxis  haloperidol     Tablet 5 milliGRAM(s) Oral every 6 hours PRN agitation  haloperidol    Injectable 5 milliGRAM(s) IntraMuscular once PRN aggression  magnesium hydroxide Suspension 30 milliLiter(s) Oral daily PRN Constipation

## 2024-11-27 NOTE — BH INPATIENT PSYCHIATRY PROGRESS NOTE - NSBHMETABOLIC_PSY_ALL_CORE_FT
BMI: BMI (kg/m2): 26 (10-12-24 @ 15:43)  HbA1c: A1C with Estimated Average Glucose Result: 6.1 % (10-18-24 @ 08:00)    Glucose: POCT Blood Glucose.: 57 mg/dL (04-15-24 @ 09:20)    BP: --Vital Signs Last 24 Hrs  T(C): 37 (11-27-24 @ 08:29), Max: 37 (11-27-24 @ 08:29)  T(F): 98.6 (11-27-24 @ 08:29), Max: 98.6 (11-27-24 @ 08:29)  HR: --  BP: --  BP(mean): --  RR: 17 (11-27-24 @ 10:24) (17 - 17)  SpO2: --    Orthostatic VS  11-27-24 @ 08:29  Lying BP: --/-- HR: --  Sitting BP: 108/77 HR: 89  Standing BP: 101/75 HR: 95  Site: --  Mode: electronic  Orthostatic VS  11-26-24 @ 20:45  Lying BP: --/-- HR: --  Sitting BP: 116/88 HR: 93  Standing BP: 117/93 HR: 97  Site: --  Mode: --  Orthostatic VS  11-26-24 @ 05:43  Lying BP: --/-- HR: --  Sitting BP: 110/60 HR: 82  Standing BP: 111/79 HR: 81  Site: --  Mode: --  Orthostatic VS  11-25-24 @ 19:12  Lying BP: --/-- HR: --  Sitting BP: 114/69 HR: 105  Standing BP: 114/74 HR: 110  Site: --  Mode: --    Lipid Panel: Date/Time: 10-18-24 @ 08:00  Cholesterol, Serum: 106  LDL Cholesterol Calculated: 53  HDL Cholesterol, Serum: 42  Total Cholesterol/HDL Ration Measurement: --  Triglycerides, Serum: 53   BMI: BMI (kg/m2): 26 (10-12-24 @ 15:43)  HbA1c: A1C with Estimated Average Glucose Result: 6.1 % (10-18-24 @ 08:00)    Glucose: POCT Blood Glucose.: 57 mg/dL (04-15-24 @ 09:20)    BP: --Vital Signs Last 24 Hrs  T(C): 36.4 (11-28-24 @ 19:46), Max: 36.4 (11-28-24 @ 19:46)  T(F): 97.5 (11-28-24 @ 19:46), Max: 97.5 (11-28-24 @ 19:46)  HR: --  BP: --  BP(mean): --  RR: 16 (11-29-24 @ 09:12) (16 - 17)  SpO2: --    Orthostatic VS  11-28-24 @ 19:46  Lying BP: --/-- HR: --  Sitting BP: 114/60 HR: 86  Standing BP: 115/72 HR: 87  Site: --  Mode: --  Orthostatic VS  11-28-24 @ 05:50  Lying BP: --/-- HR: --  Sitting BP: 123/79 HR: 88  Standing BP: 124/79 HR: 97  Site: upper left arm  Mode: electronic    Lipid Panel: Date/Time: 10-18-24 @ 08:00  Cholesterol, Serum: 106  LDL Cholesterol Calculated: 53  HDL Cholesterol, Serum: 42  Total Cholesterol/HDL Ration Measurement: --  Triglycerides, Serum: 53

## 2024-11-28 RX ADMIN — Medication 2 MILLIGRAM(S): at 21:43

## 2024-11-28 RX ADMIN — Medication 1 MILLIGRAM(S): at 21:00

## 2024-11-28 RX ADMIN — Medication 100 MILLIGRAM(S): at 21:00

## 2024-11-28 RX ADMIN — HALOPERIDOL 5 MILLIGRAM(S): 10 TABLET ORAL at 21:43

## 2024-11-29 PROCEDURE — 99232 SBSQ HOSP IP/OBS MODERATE 35: CPT

## 2024-11-29 RX ADMIN — Medication 100 MILLIGRAM(S): at 20:04

## 2024-11-29 RX ADMIN — Medication 1 MILLIGRAM(S): at 20:03

## 2024-11-29 RX ADMIN — HALOPERIDOL 5 MILLIGRAM(S): 10 TABLET ORAL at 20:03

## 2024-11-29 RX ADMIN — Medication 50 MILLIGRAM(S): at 20:04

## 2024-11-29 NOTE — BH INPATIENT PSYCHIATRY PROGRESS NOTE - PRN MEDS
MEDICATIONS  (PRN):  acetaminophen     Tablet .. 650 milliGRAM(s) Oral every 6 hours PRN Temp greater or equal to 38C (100.4F), Mild Pain (1 - 3)  aluminum hydroxide/magnesium hydroxide/simethicone Suspension 30 milliLiter(s) Oral every 6 hours PRN Dyspepsia  diphenhydrAMINE 50 milliGRAM(s) Oral every 6 hours PRN Extrapyramidal symptoms or prophylaxis  diphenhydrAMINE Injectable 50 milliGRAM(s) IntraMuscular once PRN Extrapyramidal prophylaxis  haloperidol     Tablet 5 milliGRAM(s) Oral every 6 hours PRN agitation  haloperidol    Injectable 5 milliGRAM(s) IntraMuscular once PRN aggression  magnesium hydroxide Suspension 30 milliLiter(s) Oral daily PRN Constipation   MEDICATIONS  (PRN):  acetaminophen     Tablet .. 650 milliGRAM(s) Oral every 6 hours PRN Temp greater or equal to 38C (100.4F), Mild Pain (1 - 3)  aluminum hydroxide/magnesium hydroxide/simethicone Suspension 30 milliLiter(s) Oral every 6 hours PRN Dyspepsia  diphenhydrAMINE 50 milliGRAM(s) Oral every 6 hours PRN Extrapyramidal symptoms or prophylaxis  diphenhydrAMINE Injectable 50 milliGRAM(s) IntraMuscular once PRN Extrapyramidal prophylaxis  haloperidol     Tablet 5 milliGRAM(s) Oral every 6 hours PRN agitation  haloperidol    Injectable 5 milliGRAM(s) IntraMuscular once PRN aggression  LORazepam     Tablet 2 milliGRAM(s) Oral every 6 hours PRN severe anxiety, agitation  LORazepam   Injectable 2 milliGRAM(s) IntraMuscular once PRN agitation  magnesium hydroxide Suspension 30 milliLiter(s) Oral daily PRN Constipation

## 2024-11-29 NOTE — BH INPATIENT PSYCHIATRY PROGRESS NOTE - NSBHFUPMEDSE_PSY_A_CORE
Yes
None known
Yes
None known
Yes
None known
Yes
None known
Yes
None known
None known
Yes
None known
None known
Yes
None known
Yes
None known
Yes
None known
Yes
None known
Yes

## 2024-11-29 NOTE — BH INPATIENT PSYCHIATRY PROGRESS NOTE - NSBHFUPINTERVALHXFT_PSY_A_CORE
Patient is followed up for NDD. Chart, medications and labs reviewed. Patient is discussed during treatment team,  no interval events.  has been in good behavioral control. No prns for aggression.  Remains compliant with her medications, no SE reported or observed. Eating and sleep maintained.   Patient was seen on the unit.  Thought process continues to be disorganized, at baseline. Per  patient has housing interview scheduled for 12/3. Discussed upcoming interview, with pt "excited" and "looking forward to finding love." She recently received maintenance dose of Invega Sustenna 156mg on 11/22, tolerating well.   No acute medical concerns.

## 2024-11-29 NOTE — BH INPATIENT PSYCHIATRY PROGRESS NOTE - NSBHCHARTREVIEWVS_PSY_A_CORE FT
Vital Signs Last 24 Hrs  T(C): 36.4 (11-28-24 @ 19:46), Max: 36.4 (11-28-24 @ 19:46)  T(F): 97.5 (11-28-24 @ 19:46), Max: 97.5 (11-28-24 @ 19:46)  HR: --  BP: --  BP(mean): --  RR: 16 (11-29-24 @ 09:12) (16 - 17)  SpO2: --    Orthostatic VS  11-28-24 @ 19:46  Lying BP: --/-- HR: --  Sitting BP: 114/60 HR: 86  Standing BP: 115/72 HR: 87  Site: --  Mode: --  Orthostatic VS  11-28-24 @ 05:50  Lying BP: --/-- HR: --  Sitting BP: 123/79 HR: 88  Standing BP: 124/79 HR: 97  Site: upper left arm  Mode: electronic   Vital Signs Last 24 Hrs  T(C): 36.7 (12-02-24 @ 08:04), Max: 36.7 (12-02-24 @ 08:04)  T(F): 98 (12-02-24 @ 08:04), Max: 98 (12-02-24 @ 08:04)  HR: --  BP: --  BP(mean): --  RR: --  SpO2: --    Orthostatic VS  12-02-24 @ 08:04  Lying BP: --/-- HR: --  Sitting BP: 110/58 HR: 88  Standing BP: 107/72 HR: 102  Site: --  Mode: --  Orthostatic VS  12-01-24 @ 19:33  Lying BP: --/-- HR: --  Sitting BP: 121/83 HR: 87  Standing BP: 124/80 HR: 98  Site: --  Mode: --  Orthostatic VS  12-01-24 @ 08:25  Lying BP: --/-- HR: --  Sitting BP: 112/78 HR: 89  Standing BP: 116/77 HR: 102  Site: upper left arm  Mode: electronic  Orthostatic VS  11-30-24 @ 19:30  Lying BP: --/-- HR: --  Sitting BP: 127/81 HR: 91  Standing BP: 125/94 HR: 94  Site: --  Mode: --

## 2024-11-29 NOTE — BH INPATIENT PSYCHIATRY PROGRESS NOTE - NSBHMETABOLIC_PSY_ALL_CORE_FT
BMI: BMI (kg/m2): 26 (10-12-24 @ 15:43)  HbA1c: A1C with Estimated Average Glucose Result: 6.1 % (10-18-24 @ 08:00)    Glucose: POCT Blood Glucose.: 57 mg/dL (04-15-24 @ 09:20)    BP: --Vital Signs Last 24 Hrs  T(C): 36.4 (11-28-24 @ 19:46), Max: 36.4 (11-28-24 @ 19:46)  T(F): 97.5 (11-28-24 @ 19:46), Max: 97.5 (11-28-24 @ 19:46)  HR: --  BP: --  BP(mean): --  RR: 16 (11-29-24 @ 09:12) (16 - 17)  SpO2: --    Orthostatic VS  11-28-24 @ 19:46  Lying BP: --/-- HR: --  Sitting BP: 114/60 HR: 86  Standing BP: 115/72 HR: 87  Site: --  Mode: --  Orthostatic VS  11-28-24 @ 05:50  Lying BP: --/-- HR: --  Sitting BP: 123/79 HR: 88  Standing BP: 124/79 HR: 97  Site: upper left arm  Mode: electronic    Lipid Panel: Date/Time: 10-18-24 @ 08:00  Cholesterol, Serum: 106  LDL Cholesterol Calculated: 53  HDL Cholesterol, Serum: 42  Total Cholesterol/HDL Ration Measurement: --  Triglycerides, Serum: 53   BMI: BMI (kg/m2): 26 (10-12-24 @ 15:43)  HbA1c: A1C with Estimated Average Glucose Result: 6.1 % (10-18-24 @ 08:00)    Glucose: POCT Blood Glucose.: 57 mg/dL (04-15-24 @ 09:20)    BP: --Vital Signs Last 24 Hrs  T(C): 36.7 (12-02-24 @ 08:04), Max: 36.7 (12-02-24 @ 08:04)  T(F): 98 (12-02-24 @ 08:04), Max: 98 (12-02-24 @ 08:04)  HR: --  BP: --  BP(mean): --  RR: --  SpO2: --    Orthostatic VS  12-02-24 @ 08:04  Lying BP: --/-- HR: --  Sitting BP: 110/58 HR: 88  Standing BP: 107/72 HR: 102  Site: --  Mode: --  Orthostatic VS  12-01-24 @ 19:33  Lying BP: --/-- HR: --  Sitting BP: 121/83 HR: 87  Standing BP: 124/80 HR: 98  Site: --  Mode: --  Orthostatic VS  12-01-24 @ 08:25  Lying BP: --/-- HR: --  Sitting BP: 112/78 HR: 89  Standing BP: 116/77 HR: 102  Site: upper left arm  Mode: electronic  Orthostatic VS  11-30-24 @ 19:30  Lying BP: --/-- HR: --  Sitting BP: 127/81 HR: 91  Standing BP: 125/94 HR: 94  Site: --  Mode: --    Lipid Panel: Date/Time: 10-18-24 @ 08:00  Cholesterol, Serum: 106  LDL Cholesterol Calculated: 53  HDL Cholesterol, Serum: 42  Total Cholesterol/HDL Ration Measurement: --  Triglycerides, Serum: 53

## 2024-11-30 RX ADMIN — Medication 1 MILLIGRAM(S): at 21:02

## 2024-11-30 RX ADMIN — HALOPERIDOL 5 MILLIGRAM(S): 10 TABLET ORAL at 17:44

## 2024-11-30 RX ADMIN — Medication 100 MILLIGRAM(S): at 21:02

## 2024-11-30 RX ADMIN — Medication 50 MILLIGRAM(S): at 21:02

## 2024-12-01 RX ORDER — LORAZEPAM 4 MG/ML
2 VIAL (ML) INJECTION ONCE
Refills: 0 | Status: DISCONTINUED | OUTPATIENT
Start: 2024-12-01 | End: 2024-12-05

## 2024-12-01 RX ORDER — LORAZEPAM 4 MG/ML
2 VIAL (ML) INJECTION EVERY 6 HOURS
Refills: 0 | Status: DISCONTINUED | OUTPATIENT
Start: 2024-12-01 | End: 2024-12-05

## 2024-12-01 RX ADMIN — Medication 100 MILLIGRAM(S): at 20:44

## 2024-12-01 RX ADMIN — HALOPERIDOL 5 MILLIGRAM(S): 10 TABLET ORAL at 20:44

## 2024-12-01 RX ADMIN — Medication 1 MILLIGRAM(S): at 20:44

## 2024-12-01 RX ADMIN — Medication 2 MILLIGRAM(S): at 20:44

## 2024-12-02 PROCEDURE — 99232 SBSQ HOSP IP/OBS MODERATE 35: CPT

## 2024-12-02 RX ADMIN — HALOPERIDOL 5 MILLIGRAM(S): 10 TABLET ORAL at 20:47

## 2024-12-02 RX ADMIN — HALOPERIDOL 5 MILLIGRAM(S): 10 TABLET ORAL at 10:25

## 2024-12-02 RX ADMIN — Medication 100 MILLIGRAM(S): at 20:47

## 2024-12-02 RX ADMIN — Medication 2 MILLIGRAM(S): at 10:26

## 2024-12-02 RX ADMIN — Medication 1 MILLIGRAM(S): at 20:47

## 2024-12-02 NOTE — BH INPATIENT PSYCHIATRY PROGRESS NOTE - NSBHFUPINTERVALHXFT_PSY_A_CORE
Patient is followed up for NDD. Chart, medications and labs reviewed. Patient is discussed during morning brief, no overnight events, has been in good behavioral control.   Patient was seen and is evaluated on the unit. She continues to exhibit childlike behaviors, w/o intrusiveness towards staff/peers, often seen standing near phone perez. She states that she is doing "good", presents with bright affect, writer discussed housing interview scheduled tomorrow 12/3. She was able to receive maintenance dose of Invega Sustenna 156mg on 11/22. Received oral PRN's of Haldol 5mg & Ativan 2mg today on request. No acute medical concerns, VSS.

## 2024-12-02 NOTE — BH INPATIENT PSYCHIATRY PROGRESS NOTE - NSBHCHARTREVIEWVS_PSY_A_CORE FT
Vital Signs Last 24 Hrs  T(C): 36.7 (12-02-24 @ 08:04), Max: 36.7 (12-02-24 @ 08:04)  T(F): 98 (12-02-24 @ 08:04), Max: 98 (12-02-24 @ 08:04)  HR: --  BP: --  BP(mean): --  RR: --  SpO2: --    Orthostatic VS  12-02-24 @ 08:04  Lying BP: --/-- HR: --  Sitting BP: 110/58 HR: 88  Standing BP: 107/72 HR: 102  Site: --  Mode: --  Orthostatic VS  12-01-24 @ 19:33  Lying BP: --/-- HR: --  Sitting BP: 121/83 HR: 87  Standing BP: 124/80 HR: 98  Site: --  Mode: --  Orthostatic VS  12-01-24 @ 08:25  Lying BP: --/-- HR: --  Sitting BP: 112/78 HR: 89  Standing BP: 116/77 HR: 102  Site: upper left arm  Mode: electronic  Orthostatic VS  11-30-24 @ 19:30  Lying BP: --/-- HR: --  Sitting BP: 127/81 HR: 91  Standing BP: 125/94 HR: 94  Site: --  Mode: --

## 2024-12-02 NOTE — BH INPATIENT PSYCHIATRY PROGRESS NOTE - NSBHMETABOLIC_PSY_ALL_CORE_FT
BMI: BMI (kg/m2): 26 (10-12-24 @ 15:43)  HbA1c: A1C with Estimated Average Glucose Result: 6.1 % (10-18-24 @ 08:00)    Glucose: POCT Blood Glucose.: 57 mg/dL (04-15-24 @ 09:20)    BP: --Vital Signs Last 24 Hrs  T(C): 36.7 (12-02-24 @ 08:04), Max: 36.7 (12-02-24 @ 08:04)  T(F): 98 (12-02-24 @ 08:04), Max: 98 (12-02-24 @ 08:04)  HR: --  BP: --  BP(mean): --  RR: --  SpO2: --    Orthostatic VS  12-02-24 @ 08:04  Lying BP: --/-- HR: --  Sitting BP: 110/58 HR: 88  Standing BP: 107/72 HR: 102  Site: --  Mode: --  Orthostatic VS  12-01-24 @ 19:33  Lying BP: --/-- HR: --  Sitting BP: 121/83 HR: 87  Standing BP: 124/80 HR: 98  Site: --  Mode: --  Orthostatic VS  12-01-24 @ 08:25  Lying BP: --/-- HR: --  Sitting BP: 112/78 HR: 89  Standing BP: 116/77 HR: 102  Site: upper left arm  Mode: electronic  Orthostatic VS  11-30-24 @ 19:30  Lying BP: --/-- HR: --  Sitting BP: 127/81 HR: 91  Standing BP: 125/94 HR: 94  Site: --  Mode: --    Lipid Panel: Date/Time: 10-18-24 @ 08:00  Cholesterol, Serum: 106  LDL Cholesterol Calculated: 53  HDL Cholesterol, Serum: 42  Total Cholesterol/HDL Ration Measurement: --  Triglycerides, Serum: 53
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
## 2024-12-03 PROCEDURE — 90832 PSYTX W PT 30 MINUTES: CPT

## 2024-12-03 PROCEDURE — 99232 SBSQ HOSP IP/OBS MODERATE 35: CPT

## 2024-12-03 RX ADMIN — Medication 1 MILLIGRAM(S): at 20:51

## 2024-12-03 RX ADMIN — Medication 100 MILLIGRAM(S): at 20:51

## 2024-12-03 NOTE — BH INPATIENT PSYCHIATRY PROGRESS NOTE - NSBHMETABOLIC_PSY_ALL_CORE_FT
BMI: BMI (kg/m2): 26 (10-12-24 @ 15:43)  HbA1c: A1C with Estimated Average Glucose Result: 6.1 % (10-18-24 @ 08:00)    Glucose: POCT Blood Glucose.: 57 mg/dL (04-15-24 @ 09:20)    BP: --Vital Signs Last 24 Hrs  T(C): 36.6 (12-03-24 @ 08:16), Max: 36.6 (12-03-24 @ 08:16)  T(F): 97.8 (12-03-24 @ 08:16), Max: 97.8 (12-03-24 @ 08:16)  HR: --  BP: --  BP(mean): --  RR: --  SpO2: --    Orthostatic VS  12-03-24 @ 08:16  Lying BP: --/-- HR: --  Sitting BP: 120/66 HR: 79  Standing BP: 107/66 HR: 100  Site: --  Mode: --  Orthostatic VS  12-02-24 @ 21:15  Lying BP: --/-- HR: --  Sitting BP: 115/71 HR: 85  Standing BP: 119/71 HR: 104  Site: --  Mode: --  Orthostatic VS  12-02-24 @ 08:04  Lying BP: --/-- HR: --  Sitting BP: 110/58 HR: 88  Standing BP: 107/72 HR: 102  Site: --  Mode: --  Orthostatic VS  12-01-24 @ 19:33  Lying BP: --/-- HR: --  Sitting BP: 121/83 HR: 87  Standing BP: 124/80 HR: 98  Site: --  Mode: --    Lipid Panel: Date/Time: 10-18-24 @ 08:00  Cholesterol, Serum: 106  LDL Cholesterol Calculated: 53  HDL Cholesterol, Serum: 42  Total Cholesterol/HDL Ration Measurement: --  Triglycerides, Serum: 53

## 2024-12-03 NOTE — BH INPATIENT PSYCHIATRY PROGRESS NOTE - NSBHFUPINTERVALHXFT_PSY_A_CORE
Patient is followed up for NDD. Chart, medications and labs reviewed. Patient is discussed during morning brief, no overnight events, has been in good behavioral control.   Patient was seen and is evaluated on the unit. She continues to exhibit childlike behaviors, w/o intrusiveness towards staff/peers, often seen standing near phone perez. She states that she is doing "good", presents with bright affect and says she is ready for housing interview this afternoon. She was able to receive maintenance dose of Invega Sustenna 156mg on 11/22. No acute medical concerns, VSS.

## 2024-12-03 NOTE — BH INPATIENT PSYCHIATRY PROGRESS NOTE - NSBHCHARTREVIEWVS_PSY_A_CORE FT
Vital Signs Last 24 Hrs  T(C): 36.6 (12-03-24 @ 08:16), Max: 36.6 (12-03-24 @ 08:16)  T(F): 97.8 (12-03-24 @ 08:16), Max: 97.8 (12-03-24 @ 08:16)  HR: --  BP: --  BP(mean): --  RR: --  SpO2: --    Orthostatic VS  12-03-24 @ 08:16  Lying BP: --/-- HR: --  Sitting BP: 120/66 HR: 79  Standing BP: 107/66 HR: 100  Site: --  Mode: --  Orthostatic VS  12-02-24 @ 21:15  Lying BP: --/-- HR: --  Sitting BP: 115/71 HR: 85  Standing BP: 119/71 HR: 104  Site: --  Mode: --  Orthostatic VS  12-02-24 @ 08:04  Lying BP: --/-- HR: --  Sitting BP: 110/58 HR: 88  Standing BP: 107/72 HR: 102  Site: --  Mode: --  Orthostatic VS  12-01-24 @ 19:33  Lying BP: --/-- HR: --  Sitting BP: 121/83 HR: 87  Standing BP: 124/80 HR: 98  Site: --  Mode: --
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
## 2024-12-04 PROCEDURE — 99232 SBSQ HOSP IP/OBS MODERATE 35: CPT

## 2024-12-04 RX ADMIN — Medication 100 MILLIGRAM(S): at 20:17

## 2024-12-04 RX ADMIN — Medication 1 MILLIGRAM(S): at 20:17

## 2024-12-04 NOTE — BH INPATIENT PSYCHIATRY PROGRESS NOTE - NSBHCHARTREVIEWVS_PSY_A_CORE FT
Vital Signs Last 24 Hrs  T(C): 36.4 (12-04-24 @ 08:44), Max: 37 (12-03-24 @ 19:32)  T(F): 97.6 (12-04-24 @ 08:44), Max: 98.6 (12-03-24 @ 19:32)  HR: --  BP: --  BP(mean): --  RR: 16 (12-03-24 @ 19:32) (16 - 16)  SpO2: --    Orthostatic VS  12-04-24 @ 08:44  Lying BP: --/-- HR: --  Sitting BP: 109/71 HR: 81  Standing BP: 112/64 HR: 87  Site: --  Mode: --  Orthostatic VS  12-03-24 @ 19:32  Lying BP: --/-- HR: --  Sitting BP: 120/74 HR: 82  Standing BP: 112/73 HR: 85  Site: --  Mode: --  Orthostatic VS  12-03-24 @ 08:16  Lying BP: --/-- HR: --  Sitting BP: 120/66 HR: 79  Standing BP: 107/66 HR: 100  Site: --  Mode: --  Orthostatic VS  12-02-24 @ 21:15  Lying BP: --/-- HR: --  Sitting BP: 115/71 HR: 85  Standing BP: 119/71 HR: 104  Site: --  Mode: --

## 2024-12-04 NOTE — BH INPATIENT PSYCHIATRY PROGRESS NOTE - NSBHMETABOLIC_PSY_ALL_CORE_FT
BMI: BMI (kg/m2): 26 (10-12-24 @ 15:43)  HbA1c: A1C with Estimated Average Glucose Result: 6.1 % (10-18-24 @ 08:00)    Glucose: POCT Blood Glucose.: 57 mg/dL (04-15-24 @ 09:20)    BP: --Vital Signs Last 24 Hrs  T(C): 36.4 (12-04-24 @ 08:44), Max: 37 (12-03-24 @ 19:32)  T(F): 97.6 (12-04-24 @ 08:44), Max: 98.6 (12-03-24 @ 19:32)  HR: --  BP: --  BP(mean): --  RR: 16 (12-03-24 @ 19:32) (16 - 16)  SpO2: --    Orthostatic VS  12-04-24 @ 08:44  Lying BP: --/-- HR: --  Sitting BP: 109/71 HR: 81  Standing BP: 112/64 HR: 87  Site: --  Mode: --  Orthostatic VS  12-03-24 @ 19:32  Lying BP: --/-- HR: --  Sitting BP: 120/74 HR: 82  Standing BP: 112/73 HR: 85  Site: --  Mode: --  Orthostatic VS  12-03-24 @ 08:16  Lying BP: --/-- HR: --  Sitting BP: 120/66 HR: 79  Standing BP: 107/66 HR: 100  Site: --  Mode: --  Orthostatic VS  12-02-24 @ 21:15  Lying BP: --/-- HR: --  Sitting BP: 115/71 HR: 85  Standing BP: 119/71 HR: 104  Site: --  Mode: --    Lipid Panel: Date/Time: 10-18-24 @ 08:00  Cholesterol, Serum: 106  LDL Cholesterol Calculated: 53  HDL Cholesterol, Serum: 42  Total Cholesterol/HDL Ration Measurement: --  Triglycerides, Serum: 53

## 2024-12-04 NOTE — BH INPATIENT PSYCHIATRY PROGRESS NOTE - NSBHFUPINTERVALHXFT_PSY_A_CORE
Patient is followed up for NDD. Chart, medications and labs reviewed. Patient is discussed during morning brief, no overnight events, has been in good behavioral control.   Patient was seen and is evaluated on the unit. She continues to exhibit childlike behaviors, w/o intrusiveness towards staff/peers, often seen standing near phone perez. She approaches writer this morning with bright affect, asks about group home placement and was able to have interview yesterday. Does not offer any concerns at this time. She was able to receive maintenance dose of Invega Sustenna 156mg on 11/22. No acute medical concerns, VSS.
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
## 2024-12-05 PROCEDURE — 99232 SBSQ HOSP IP/OBS MODERATE 35: CPT

## 2024-12-05 RX ORDER — LORAZEPAM 4 MG/ML
2 VIAL (ML) INJECTION EVERY 6 HOURS
Refills: 0 | Status: DISCONTINUED | OUTPATIENT
Start: 2024-12-05 | End: 2024-12-12

## 2024-12-05 RX ORDER — LORAZEPAM 4 MG/ML
2 VIAL (ML) INJECTION ONCE
Refills: 0 | Status: DISCONTINUED | OUTPATIENT
Start: 2024-12-05 | End: 2024-12-12

## 2024-12-05 RX ADMIN — Medication 1 MILLIGRAM(S): at 20:31

## 2024-12-05 RX ADMIN — Medication 100 MILLIGRAM(S): at 20:31

## 2024-12-05 NOTE — BH INPATIENT PSYCHIATRY PROGRESS NOTE - NSBHMETABOLIC_PSY_ALL_CORE_FT
BMI: BMI (kg/m2): 26 (10-12-24 @ 15:43)  HbA1c: A1C with Estimated Average Glucose Result: 6.1 % (10-18-24 @ 08:00)    Glucose: POCT Blood Glucose.: 57 mg/dL (04-15-24 @ 09:20)    BP: --Vital Signs Last 24 Hrs  T(C): 36.7 (12-05-24 @ 08:27), Max: 36.7 (12-05-24 @ 08:27)  T(F): 98 (12-05-24 @ 08:27), Max: 98 (12-05-24 @ 08:27)  HR: --  BP: --  BP(mean): --  RR: 16 (12-05-24 @ 08:27) (16 - 16)  SpO2: --    Orthostatic VS  12-05-24 @ 08:27  Lying BP: --/-- HR: --  Sitting BP: 107/69 HR: 81  Standing BP: 106/68 HR: 95  Site: --  Mode: --  Orthostatic VS  12-04-24 @ 19:17  Lying BP: --/-- HR: --  Sitting BP: 126/77 HR: 75  Standing BP: 120/85 HR: 84  Site: --  Mode: --  Orthostatic VS  12-04-24 @ 08:44  Lying BP: --/-- HR: --  Sitting BP: 109/71 HR: 81  Standing BP: 112/64 HR: 87  Site: --  Mode: --  Orthostatic VS  12-03-24 @ 19:32  Lying BP: --/-- HR: --  Sitting BP: 120/74 HR: 82  Standing BP: 112/73 HR: 85  Site: --  Mode: --    Lipid Panel: Date/Time: 10-18-24 @ 08:00  Cholesterol, Serum: 106  LDL Cholesterol Calculated: 53  HDL Cholesterol, Serum: 42  Total Cholesterol/HDL Ration Measurement: --  Triglycerides, Serum: 53
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
## 2024-12-05 NOTE — BH INPATIENT PSYCHIATRY PROGRESS NOTE - NSBHFUPINTERVALHXFT_PSY_A_CORE
Patient is followed up for NDD. Chart, medications and labs reviewed. Patient is discussed during morning brief, no overnight events, has been in good behavioral control.   Patient was seen and is evaluated on the unit. She continues to exhibit childlike behaviors, w/o intrusiveness towards staff/peers, at times seen standing near phone perez. She presents w/ bright affect, says that she was able to attend groups this morning and shares the discussed topic. Does not offer any concerns at this time. She was able to receive maintenance dose of Invega Sustenna 156mg on 11/22. No acute medical concerns, VSS.

## 2024-12-05 NOTE — BH INPATIENT PSYCHIATRY PROGRESS NOTE - NSBHCHARTREVIEWVS_PSY_A_CORE FT
Vital Signs Last 24 Hrs  T(C): 36.7 (12-05-24 @ 08:27), Max: 36.7 (12-05-24 @ 08:27)  T(F): 98 (12-05-24 @ 08:27), Max: 98 (12-05-24 @ 08:27)  HR: --  BP: --  BP(mean): --  RR: 16 (12-05-24 @ 08:27) (16 - 16)  SpO2: --    Orthostatic VS  12-05-24 @ 08:27  Lying BP: --/-- HR: --  Sitting BP: 107/69 HR: 81  Standing BP: 106/68 HR: 95  Site: --  Mode: --  Orthostatic VS  12-04-24 @ 19:17  Lying BP: --/-- HR: --  Sitting BP: 126/77 HR: 75  Standing BP: 120/85 HR: 84  Site: --  Mode: --  Orthostatic VS  12-04-24 @ 08:44  Lying BP: --/-- HR: --  Sitting BP: 109/71 HR: 81  Standing BP: 112/64 HR: 87  Site: --  Mode: --  Orthostatic VS  12-03-24 @ 19:32  Lying BP: --/-- HR: --  Sitting BP: 120/74 HR: 82  Standing BP: 112/73 HR: 85  Site: --  Mode: --

## 2024-12-06 PROCEDURE — 90832 PSYTX W PT 30 MINUTES: CPT

## 2024-12-06 PROCEDURE — 99232 SBSQ HOSP IP/OBS MODERATE 35: CPT

## 2024-12-06 RX ADMIN — Medication 50 MILLIGRAM(S): at 16:54

## 2024-12-06 RX ADMIN — Medication 2 MILLIGRAM(S): at 16:53

## 2024-12-06 RX ADMIN — HALOPERIDOL 5 MILLIGRAM(S): 10 TABLET ORAL at 16:53

## 2024-12-06 NOTE — BH INPATIENT PSYCHIATRY PROGRESS NOTE - NSBHCHARTREVIEWVS_PSY_A_CORE FT
Vital Signs Last 24 Hrs  T(C): 36.7 (12-06-24 @ 08:39), Max: 36.7 (12-06-24 @ 08:39)  T(F): 98 (12-06-24 @ 08:39), Max: 98 (12-06-24 @ 08:39)  HR: --  BP: --  BP(mean): --  RR: 16 (12-06-24 @ 08:39) (16 - 16)  SpO2: --    Orthostatic VS  12-06-24 @ 08:39  Lying BP: --/-- HR: --  Sitting BP: 108/62 HR: 81  Standing BP: 105/63 HR: 91  Site: --  Mode: electronic  Orthostatic VS  12-05-24 @ 08:27  Lying BP: --/-- HR: --  Sitting BP: 107/69 HR: 81  Standing BP: 106/68 HR: 95  Site: --  Mode: --  Orthostatic VS  12-04-24 @ 19:17  Lying BP: --/-- HR: --  Sitting BP: 126/77 HR: 75  Standing BP: 120/85 HR: 84  Site: --  Mode: --

## 2024-12-06 NOTE — BH INPATIENT PSYCHIATRY PROGRESS NOTE - NSBHFUPINTERVALHXFT_PSY_A_CORE
Patient is followed up for NDD. Chart, medications and labs reviewed. Patient is discussed during morning brief, no overnight events, has been in good behavioral control.   Patient was seen and is evaluated on the unit. She continues to exhibit childlike behaviors, w/o intrusiveness towards staff/peers, at times seen standing near phone perez. She was able to have housing interview on 12/3, scheduled for another interview on 12/17. She states that she is doing "good" and does not offer any concerns at this time. She was able to receive maintenance dose of Invega Sustenna 156mg on 11/22. No acute medical concerns, VSS.
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
## 2024-12-06 NOTE — BH INPATIENT PSYCHIATRY PROGRESS NOTE - NSBHMETABOLIC_PSY_ALL_CORE_FT
BMI: BMI (kg/m2): 26 (10-12-24 @ 15:43)  HbA1c: A1C with Estimated Average Glucose Result: 6.1 % (10-18-24 @ 08:00)    Glucose: POCT Blood Glucose.: 57 mg/dL (04-15-24 @ 09:20)    BP: --Vital Signs Last 24 Hrs  T(C): 36.7 (12-06-24 @ 08:39), Max: 36.7 (12-06-24 @ 08:39)  T(F): 98 (12-06-24 @ 08:39), Max: 98 (12-06-24 @ 08:39)  HR: --  BP: --  BP(mean): --  RR: 16 (12-06-24 @ 08:39) (16 - 16)  SpO2: --    Orthostatic VS  12-06-24 @ 08:39  Lying BP: --/-- HR: --  Sitting BP: 108/62 HR: 81  Standing BP: 105/63 HR: 91  Site: --  Mode: electronic  Orthostatic VS  12-05-24 @ 08:27  Lying BP: --/-- HR: --  Sitting BP: 107/69 HR: 81  Standing BP: 106/68 HR: 95  Site: --  Mode: --  Orthostatic VS  12-04-24 @ 19:17  Lying BP: --/-- HR: --  Sitting BP: 126/77 HR: 75  Standing BP: 120/85 HR: 84  Site: --  Mode: --    Lipid Panel: Date/Time: 10-18-24 @ 08:00  Cholesterol, Serum: 106  LDL Cholesterol Calculated: 53  HDL Cholesterol, Serum: 42  Total Cholesterol/HDL Ration Measurement: --  Triglycerides, Serum: 53

## 2024-12-07 RX ADMIN — Medication 1 MILLIGRAM(S): at 20:24

## 2024-12-07 RX ADMIN — Medication 50 MILLIGRAM(S): at 22:18

## 2024-12-07 RX ADMIN — Medication 100 MILLIGRAM(S): at 20:24

## 2024-12-07 RX ADMIN — HALOPERIDOL 5 MILLIGRAM(S): 10 TABLET ORAL at 20:25

## 2024-12-07 RX ADMIN — Medication 2 MILLIGRAM(S): at 22:18

## 2024-12-08 RX ADMIN — Medication 100 MILLIGRAM(S): at 20:29

## 2024-12-08 RX ADMIN — Medication 2 MILLIGRAM(S): at 20:29

## 2024-12-08 RX ADMIN — HALOPERIDOL 5 MILLIGRAM(S): 10 TABLET ORAL at 20:29

## 2024-12-08 RX ADMIN — Medication 1 MILLIGRAM(S): at 20:28

## 2024-12-09 PROCEDURE — 99232 SBSQ HOSP IP/OBS MODERATE 35: CPT

## 2024-12-09 RX ADMIN — Medication 100 MILLIGRAM(S): at 20:32

## 2024-12-09 RX ADMIN — Medication 650 MILLIGRAM(S): at 08:40

## 2024-12-09 RX ADMIN — Medication 1 MILLIGRAM(S): at 20:33

## 2024-12-09 RX ADMIN — Medication 650 MILLIGRAM(S): at 07:40

## 2024-12-09 NOTE — BH INPATIENT PSYCHIATRY PROGRESS NOTE - NSBHMETABOLIC_PSY_ALL_CORE_FT
BMI: BMI (kg/m2): 26 (10-12-24 @ 15:43)  HbA1c: A1C with Estimated Average Glucose Result: 6.1 % (10-18-24 @ 08:00)    Glucose: POCT Blood Glucose.: 57 mg/dL (04-15-24 @ 09:20)    BP: --Vital Signs Last 24 Hrs  T(C): 36.2 (12-09-24 @ 07:45), Max: 36.4 (12-08-24 @ 19:51)  T(F): 97.1 (12-09-24 @ 07:45), Max: 97.5 (12-08-24 @ 19:51)  HR: --  BP: --  BP(mean): --  RR: 17 (12-09-24 @ 07:45) (17 - 17)  SpO2: --    Orthostatic VS  12-09-24 @ 07:45  Lying BP: --/-- HR: --  Sitting BP: 120/61 HR: 99  Standing BP: 108/62 HR: 110  Site: --  Mode: --  Orthostatic VS  12-08-24 @ 19:51  Lying BP: --/-- HR: --  Sitting BP: 112/73 HR: 87  Standing BP: 108/68 HR: 89  Site: --  Mode: --  Orthostatic VS  12-08-24 @ 07:46  Lying BP: 93/60 HR: 62  Sitting BP: 104/62 HR: 74  Standing BP: --/-- HR: --  Site: --  Mode: --    Lipid Panel: Date/Time: 10-18-24 @ 08:00  Cholesterol, Serum: 106  LDL Cholesterol Calculated: 53  HDL Cholesterol, Serum: 42  Total Cholesterol/HDL Ration Measurement: --  Triglycerides, Serum: 53

## 2024-12-09 NOTE — BH INPATIENT PSYCHIATRY PROGRESS NOTE - NSBHCHARTREVIEWVS_PSY_A_CORE FT
Vital Signs Last 24 Hrs  T(C): 36.2 (12-09-24 @ 07:45), Max: 36.4 (12-08-24 @ 19:51)  T(F): 97.1 (12-09-24 @ 07:45), Max: 97.5 (12-08-24 @ 19:51)  HR: --  BP: --  BP(mean): --  RR: 17 (12-09-24 @ 07:45) (17 - 17)  SpO2: --    Orthostatic VS  12-09-24 @ 07:45  Lying BP: --/-- HR: --  Sitting BP: 120/61 HR: 99  Standing BP: 108/62 HR: 110  Site: --  Mode: --  Orthostatic VS  12-08-24 @ 19:51  Lying BP: --/-- HR: --  Sitting BP: 112/73 HR: 87  Standing BP: 108/68 HR: 89  Site: --  Mode: --  Orthostatic VS  12-08-24 @ 07:46  Lying BP: 93/60 HR: 62  Sitting BP: 104/62 HR: 74  Standing BP: --/-- HR: --  Site: --  Mode: --
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
## 2024-12-09 NOTE — BH INPATIENT PSYCHIATRY PROGRESS NOTE - NSBHFUPINTERVALHXFT_PSY_A_CORE
Patient is followed up for NDD. Chart, medications and labs reviewed. Patient is discussed during morning brief, no overnight events, has been in good behavioral control.   Patient was seen and is evaluated on the unit. She continues to exhibit childlike behaviors, w/o intrusiveness towards staff/peers, at times seen standing near phone perez. Did have episode of agitation on Friday evening, states that she felt upset, she shakes her head and says that she does not want to talk about it. She was able to have housing interview on 12/3, scheduled for another interview on 12/17. She was able to receive maintenance dose of Invega Sustenna 156mg on 11/22. No acute medical concerns, VSS.

## 2024-12-10 PROCEDURE — 99232 SBSQ HOSP IP/OBS MODERATE 35: CPT

## 2024-12-10 RX ADMIN — Medication 100 MILLIGRAM(S): at 20:09

## 2024-12-10 RX ADMIN — Medication 2 MILLIGRAM(S): at 20:10

## 2024-12-10 RX ADMIN — Medication 1 MILLIGRAM(S): at 20:10

## 2024-12-10 RX ADMIN — Medication 2 MILLIGRAM(S): at 03:32

## 2024-12-10 RX ADMIN — Medication 50 MILLIGRAM(S): at 03:32

## 2024-12-10 RX ADMIN — HALOPERIDOL 5 MILLIGRAM(S): 10 TABLET ORAL at 03:32

## 2024-12-10 RX ADMIN — HALOPERIDOL 5 MILLIGRAM(S): 10 TABLET ORAL at 20:09

## 2024-12-10 NOTE — BH INPATIENT PSYCHIATRY PROGRESS NOTE - NSBHCHARTREVIEWVS_PSY_A_CORE FT
Vital Signs Last 24 Hrs  T(C): 36.3 (12-09-24 @ 19:31), Max: 36.3 (12-09-24 @ 19:31)  T(F): 97.4 (12-09-24 @ 19:31), Max: 97.4 (12-09-24 @ 19:31)  HR: --  BP: --  BP(mean): --  RR: --  SpO2: --    Orthostatic VS  12-09-24 @ 19:31  Lying BP: --/-- HR: --  Sitting BP: 116/70 HR: 65  Standing BP: 109/60 HR: 88  Site: --  Mode: --  Orthostatic VS  12-09-24 @ 07:45  Lying BP: --/-- HR: --  Sitting BP: 120/61 HR: 99  Standing BP: 108/62 HR: 110  Site: --  Mode: --  Orthostatic VS  12-08-24 @ 19:51  Lying BP: --/-- HR: --  Sitting BP: 112/73 HR: 87  Standing BP: 108/68 HR: 89  Site: --  Mode: --   Vital Signs Last 24 Hrs  T(C): 36.4 (12-10-24 @ 20:01), Max: 36.4 (12-10-24 @ 20:01)  T(F): 97.5 (12-10-24 @ 20:01), Max: 97.5 (12-10-24 @ 20:01)  HR: --  BP: --  BP(mean): --  RR: 18 (12-11-24 @ 08:35) (18 - 18)  SpO2: --    Orthostatic VS  12-10-24 @ 20:01  Lying BP: --/-- HR: --  Sitting BP: 112/66 HR: 86  Standing BP: 101/60 HR: 107  Site: --  Mode: --  Orthostatic VS  12-09-24 @ 19:31  Lying BP: --/-- HR: --  Sitting BP: 116/70 HR: 65  Standing BP: 109/60 HR: 88  Site: --  Mode: --

## 2024-12-10 NOTE — BH INPATIENT PSYCHIATRY PROGRESS NOTE - NSBHFUPINTERVALHXFT_PSY_A_CORE
Patient is followed up for NDD. Chart, medications and labs reviewed. Patient is discussed during morning brief, no overnight events, has been in good behavioral control.   Patient was seen and is evaluated on the unit. She continues to exhibit childlike behaviors, w/o intrusiveness towards staff/peers. Does verbalize that she is feeling "well" and looking forward to next housing interview, pt scheduled for another interview on 12/17. She was able to receive maintenance dose of Invega Sustenna 156mg on 11/22. No acute medical concerns, VSS.

## 2024-12-10 NOTE — BH INPATIENT PSYCHIATRY PROGRESS NOTE - NSBHMETABOLIC_PSY_ALL_CORE_FT
BMI: BMI (kg/m2): 26 (10-12-24 @ 15:43)  HbA1c: A1C with Estimated Average Glucose Result: 6.1 % (10-18-24 @ 08:00)    Glucose: POCT Blood Glucose.: 57 mg/dL (04-15-24 @ 09:20)    BP: --Vital Signs Last 24 Hrs  T(C): 36.3 (12-09-24 @ 19:31), Max: 36.3 (12-09-24 @ 19:31)  T(F): 97.4 (12-09-24 @ 19:31), Max: 97.4 (12-09-24 @ 19:31)  HR: --  BP: --  BP(mean): --  RR: --  SpO2: --    Orthostatic VS  12-09-24 @ 19:31  Lying BP: --/-- HR: --  Sitting BP: 116/70 HR: 65  Standing BP: 109/60 HR: 88  Site: --  Mode: --  Orthostatic VS  12-09-24 @ 07:45  Lying BP: --/-- HR: --  Sitting BP: 120/61 HR: 99  Standing BP: 108/62 HR: 110  Site: --  Mode: --  Orthostatic VS  12-08-24 @ 19:51  Lying BP: --/-- HR: --  Sitting BP: 112/73 HR: 87  Standing BP: 108/68 HR: 89  Site: --  Mode: --    Lipid Panel: Date/Time: 10-18-24 @ 08:00  Cholesterol, Serum: 106  LDL Cholesterol Calculated: 53  HDL Cholesterol, Serum: 42  Total Cholesterol/HDL Ration Measurement: --  Triglycerides, Serum: 53   BMI: BMI (kg/m2): 26 (10-12-24 @ 15:43)  HbA1c: A1C with Estimated Average Glucose Result: 6.1 % (10-18-24 @ 08:00)    Glucose: POCT Blood Glucose.: 57 mg/dL (04-15-24 @ 09:20)    BP: --Vital Signs Last 24 Hrs  T(C): 36.4 (12-10-24 @ 20:01), Max: 36.4 (12-10-24 @ 20:01)  T(F): 97.5 (12-10-24 @ 20:01), Max: 97.5 (12-10-24 @ 20:01)  HR: --  BP: --  BP(mean): --  RR: 18 (12-11-24 @ 08:35) (18 - 18)  SpO2: --    Orthostatic VS  12-10-24 @ 20:01  Lying BP: --/-- HR: --  Sitting BP: 112/66 HR: 86  Standing BP: 101/60 HR: 107  Site: --  Mode: --  Orthostatic VS  12-09-24 @ 19:31  Lying BP: --/-- HR: --  Sitting BP: 116/70 HR: 65  Standing BP: 109/60 HR: 88  Site: --  Mode: --    Lipid Panel: Date/Time: 10-18-24 @ 08:00  Cholesterol, Serum: 106  LDL Cholesterol Calculated: 53  HDL Cholesterol, Serum: 42  Total Cholesterol/HDL Ration Measurement: --  Triglycerides, Serum: 53
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
## 2024-12-11 PROCEDURE — 99232 SBSQ HOSP IP/OBS MODERATE 35: CPT

## 2024-12-11 RX ORDER — MELATONIN 5 MG
5 TABLET ORAL AT BEDTIME
Refills: 0 | Status: DISCONTINUED | OUTPATIENT
Start: 2024-12-11 | End: 2024-12-18

## 2024-12-11 RX ADMIN — Medication 5 MILLIGRAM(S): at 21:13

## 2024-12-11 RX ADMIN — Medication 650 MILLIGRAM(S): at 08:46

## 2024-12-11 RX ADMIN — Medication 1 MILLIGRAM(S): at 21:13

## 2024-12-11 RX ADMIN — Medication 100 MILLIGRAM(S): at 21:13

## 2024-12-11 NOTE — BH INPATIENT PSYCHIATRY PROGRESS NOTE - NSBHCHARTREVIEWVS_PSY_A_CORE FT
Vital Signs Last 24 Hrs  T(C): 36.4 (12-10-24 @ 20:01), Max: 36.4 (12-10-24 @ 20:01)  T(F): 97.5 (12-10-24 @ 20:01), Max: 97.5 (12-10-24 @ 20:01)  HR: --  BP: --  BP(mean): --  RR: 18 (12-11-24 @ 08:35) (18 - 18)  SpO2: --    Orthostatic VS  12-10-24 @ 20:01  Lying BP: --/-- HR: --  Sitting BP: 112/66 HR: 86  Standing BP: 101/60 HR: 107  Site: --  Mode: --  Orthostatic VS  12-09-24 @ 19:31  Lying BP: --/-- HR: --  Sitting BP: 116/70 HR: 65  Standing BP: 109/60 HR: 88  Site: --  Mode: --

## 2024-12-11 NOTE — BH INPATIENT PSYCHIATRY PROGRESS NOTE - NSBHFUPINTERVALHXFT_PSY_A_CORE
Patient is followed up for NDD. Chart, medications and labs reviewed. Patient is discussed during morning brief, no overnight events, has been in good behavioral control.   Patient was seen and is evaluated on the unit. She continues to exhibit childlike behaviors, w/o intrusiveness towards staff/peers. Pt received PRN's overnight for sleep, discussed addition of melatonin for sleep and pt is agreeable. Housing interviews scheduled next week on 12/17 and 12/18. She was able to receive maintenance dose of Invega Sustenna 156mg on 11/22. No acute medical concerns, VSS.

## 2024-12-11 NOTE — BH INPATIENT PSYCHIATRY PROGRESS NOTE - CURRENT MEDICATION
MEDICATIONS  (STANDING):  melatonin. 5 milliGRAM(s) Oral at bedtime  risperiDONE   Tablet 1 milliGRAM(s) Oral at bedtime  traZODone 100 milliGRAM(s) Oral at bedtime    MEDICATIONS  (PRN):  acetaminophen     Tablet .. 650 milliGRAM(s) Oral every 6 hours PRN Temp greater or equal to 38C (100.4F), Mild Pain (1 - 3)  aluminum hydroxide/magnesium hydroxide/simethicone Suspension 30 milliLiter(s) Oral every 6 hours PRN Dyspepsia  diphenhydrAMINE 50 milliGRAM(s) Oral every 6 hours PRN Extrapyramidal symptoms or prophylaxis  diphenhydrAMINE Injectable 50 milliGRAM(s) IntraMuscular once PRN Extrapyramidal prophylaxis  haloperidol     Tablet 5 milliGRAM(s) Oral every 6 hours PRN agitation  haloperidol    Injectable 5 milliGRAM(s) IntraMuscular once PRN aggression  LORazepam     Tablet 2 milliGRAM(s) Oral every 6 hours PRN severe anxiety, agitation  LORazepam   Injectable 2 milliGRAM(s) IntraMuscular once PRN agitation  magnesium hydroxide Suspension 30 milliLiter(s) Oral daily PRN Constipation
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
## 2024-12-11 NOTE — BH INPATIENT PSYCHIATRY PROGRESS NOTE - NSBHMETABOLIC_PSY_ALL_CORE_FT
BMI: BMI (kg/m2): 26 (10-12-24 @ 15:43)  HbA1c: A1C with Estimated Average Glucose Result: 6.1 % (10-18-24 @ 08:00)    Glucose: POCT Blood Glucose.: 57 mg/dL (04-15-24 @ 09:20)    BP: --Vital Signs Last 24 Hrs  T(C): 36.4 (12-10-24 @ 20:01), Max: 36.4 (12-10-24 @ 20:01)  T(F): 97.5 (12-10-24 @ 20:01), Max: 97.5 (12-10-24 @ 20:01)  HR: --  BP: --  BP(mean): --  RR: 18 (12-11-24 @ 08:35) (18 - 18)  SpO2: --    Orthostatic VS  12-10-24 @ 20:01  Lying BP: --/-- HR: --  Sitting BP: 112/66 HR: 86  Standing BP: 101/60 HR: 107  Site: --  Mode: --  Orthostatic VS  12-09-24 @ 19:31  Lying BP: --/-- HR: --  Sitting BP: 116/70 HR: 65  Standing BP: 109/60 HR: 88  Site: --  Mode: --    Lipid Panel: Date/Time: 10-18-24 @ 08:00  Cholesterol, Serum: 106  LDL Cholesterol Calculated: 53  HDL Cholesterol, Serum: 42  Total Cholesterol/HDL Ration Measurement: --  Triglycerides, Serum: 53

## 2024-12-12 PROCEDURE — 99232 SBSQ HOSP IP/OBS MODERATE 35: CPT

## 2024-12-12 RX ORDER — LORAZEPAM 4 MG/ML
2 VIAL (ML) INJECTION EVERY 6 HOURS
Refills: 0 | Status: DISCONTINUED | OUTPATIENT
Start: 2024-12-12 | End: 2024-12-18

## 2024-12-12 RX ORDER — LORAZEPAM 4 MG/ML
2 VIAL (ML) INJECTION ONCE
Refills: 0 | Status: DISCONTINUED | OUTPATIENT
Start: 2024-12-12 | End: 2024-12-18

## 2024-12-12 RX ADMIN — Medication 1 MILLIGRAM(S): at 20:24

## 2024-12-12 RX ADMIN — Medication 5 MILLIGRAM(S): at 20:24

## 2024-12-12 RX ADMIN — Medication 100 MILLIGRAM(S): at 20:24

## 2024-12-12 NOTE — BH INPATIENT PSYCHIATRY PROGRESS NOTE - NSBHFUPINTERVALHXFT_PSY_A_CORE
Patient is followed up for NDD. Chart, medications and labs reviewed. Patient is discussed during morning brief, no overnight events, has been in good behavioral control.   Patient was seen and is evaluated on the unit. She continues to exhibit childlike behaviors, w/o intrusiveness towards staff/peers. Pt's sleep remains disrupted, slept for 4 hours overnight per sleep log. Writer discussed housing interviews scheduled next week on 12/17 and 12/18. She was able to receive maintenance dose of Invega Sustenna 156mg on 11/22. No acute medical concerns, VSS.

## 2024-12-12 NOTE — BH INPATIENT PSYCHIATRY PROGRESS NOTE - NSBHMETABOLIC_PSY_ALL_CORE_FT
BMI: BMI (kg/m2): 26 (10-12-24 @ 15:43)  HbA1c: A1C with Estimated Average Glucose Result: 6.1 % (10-18-24 @ 08:00)    Glucose: POCT Blood Glucose.: 57 mg/dL (04-15-24 @ 09:20)    BP: --Vital Signs Last 24 Hrs  T(C): 36.4 (12-12-24 @ 07:54), Max: 36.4 (12-12-24 @ 07:54)  T(F): 97.5 (12-12-24 @ 07:54), Max: 97.5 (12-12-24 @ 07:54)  HR: --  BP: --  BP(mean): --  RR: --  SpO2: --    Orthostatic VS  12-12-24 @ 07:54  Lying BP: --/-- HR: --  Sitting BP: 103/71 HR: 75  Standing BP: 108/75 HR: 77  Site: upper left arm  Mode: electronic  Orthostatic VS  12-11-24 @ 19:23  Lying BP: --/-- HR: --  Sitting BP: 110/73 HR: 72  Standing BP: 107/74 HR: 85  Site: --  Mode: --  Orthostatic VS  12-11-24 @ 09:04  Lying BP: --/-- HR: --  Sitting BP: 107/63 HR: 83  Standing BP: 112/58 HR: 92  Site: --  Mode: --  Orthostatic VS  12-10-24 @ 20:01  Lying BP: --/-- HR: --  Sitting BP: 112/66 HR: 86  Standing BP: 101/60 HR: 107  Site: --  Mode: --    Lipid Panel: Date/Time: 10-18-24 @ 08:00  Cholesterol, Serum: 106  LDL Cholesterol Calculated: 53  HDL Cholesterol, Serum: 42  Total Cholesterol/HDL Ration Measurement: --  Triglycerides, Serum: 53
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
## 2024-12-12 NOTE — BH INPATIENT PSYCHIATRY PROGRESS NOTE - NSBHCHARTREVIEWVS_PSY_A_CORE FT
Vital Signs Last 24 Hrs  T(C): 36.4 (12-12-24 @ 07:54), Max: 36.4 (12-12-24 @ 07:54)  T(F): 97.5 (12-12-24 @ 07:54), Max: 97.5 (12-12-24 @ 07:54)  HR: --  BP: --  BP(mean): --  RR: --  SpO2: --    Orthostatic VS  12-12-24 @ 07:54  Lying BP: --/-- HR: --  Sitting BP: 103/71 HR: 75  Standing BP: 108/75 HR: 77  Site: upper left arm  Mode: electronic  Orthostatic VS  12-11-24 @ 19:23  Lying BP: --/-- HR: --  Sitting BP: 110/73 HR: 72  Standing BP: 107/74 HR: 85  Site: --  Mode: --  Orthostatic VS  12-11-24 @ 09:04  Lying BP: --/-- HR: --  Sitting BP: 107/63 HR: 83  Standing BP: 112/58 HR: 92  Site: --  Mode: --  Orthostatic VS  12-10-24 @ 20:01  Lying BP: --/-- HR: --  Sitting BP: 112/66 HR: 86  Standing BP: 101/60 HR: 107  Site: --  Mode: --

## 2024-12-13 PROCEDURE — 99232 SBSQ HOSP IP/OBS MODERATE 35: CPT

## 2024-12-13 RX ADMIN — Medication 1 MILLIGRAM(S): at 21:41

## 2024-12-13 RX ADMIN — Medication 100 MILLIGRAM(S): at 21:41

## 2024-12-13 RX ADMIN — Medication 5 MILLIGRAM(S): at 21:41
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
## 2024-12-13 NOTE — BH INPATIENT PSYCHIATRY PROGRESS NOTE - NSBHFUPINTERVALHXFT_PSY_A_CORE
Patient is followed up for NDD. Chart, medications and labs reviewed. Patient is discussed during morning brief, no overnight events, has been in good behavioral control.   Patient was seen and is evaluated on the unit. She continues to exhibit childlike behaviors, w/o intrusiveness towards staff/peers. Writer discussed housing interviews scheduled next week on 12/17 and 12/18. She was able to receive maintenance dose of Invega Sustenna 156mg on 11/22. No acute medical concerns, VSS.

## 2024-12-13 NOTE — BH INPATIENT PSYCHIATRY PROGRESS NOTE - NSBHMETABOLIC_PSY_ALL_CORE_FT
BMI: BMI (kg/m2): 26 (10-12-24 @ 15:43)  HbA1c: A1C with Estimated Average Glucose Result: 6.1 % (10-18-24 @ 08:00)    Glucose: POCT Blood Glucose.: 57 mg/dL (04-15-24 @ 09:20)    BP: --Vital Signs Last 24 Hrs  T(C): 36.2 (12-13-24 @ 08:38), Max: 36.2 (12-13-24 @ 08:38)  T(F): 97.1 (12-13-24 @ 08:38), Max: 97.1 (12-13-24 @ 08:38)  HR: --  BP: --  BP(mean): --  RR: 17 (12-13-24 @ 08:38) (17 - 17)  SpO2: --    Orthostatic VS  12-13-24 @ 08:38  Lying BP: --/-- HR: --  Sitting BP: 103/72 HR: 74  Standing BP: 101/68 HR: 82  Site: --  Mode: --  Orthostatic VS  12-12-24 @ 07:54  Lying BP: --/-- HR: --  Sitting BP: 103/71 HR: 75  Standing BP: 108/75 HR: 77  Site: upper left arm  Mode: electronic  Orthostatic VS  12-11-24 @ 19:23  Lying BP: --/-- HR: --  Sitting BP: 110/73 HR: 72  Standing BP: 107/74 HR: 85  Site: --  Mode: --    Lipid Panel: Date/Time: 10-18-24 @ 08:00  Cholesterol, Serum: 106  LDL Cholesterol Calculated: 53  HDL Cholesterol, Serum: 42  Total Cholesterol/HDL Ration Measurement: --  Triglycerides, Serum: 53

## 2024-12-13 NOTE — BH INPATIENT PSYCHIATRY PROGRESS NOTE - CURRENT MEDICATION
INTERNAL MEDICINE FOLLOW-UP OFFICE VISIT  [facility]    NAME: Anshul Walters  AGE: 68 y o  SEX: female  : 1946   MRN: [id number]    DATE: 2019  TIME: 3:33 PM    Assessment and Plan     Diagnoses and all orders for this visit:    Recurrent major depressive disorder, in partial remission ([address] )  -     escitalopram (LEXAPRO) 10 mg tablet; Take 1 tablet (10 mg total) by mouth daily  I will see her back in 6 weeks  Anxiety    She was told to start taking the Lexapro  Other orders  -     predniSONE 10 mg tablet; Take 3 tabs daily X 4 days, then 2 tabs daily X 7 days, then 1 tab daily X 7 d        - Counseling Documentation: patient was counseled regarding: instructions for management, risk factor reductions, prognosis, patient and family education, impressions, risks and benefits of treatment options and importance of compliance with treatment  - Medication Side Effects: Adverse side effects of medications were reviewed with the patient/guardian today  Return to office in: 6 weeks    Chief Complaint     Chief Complaint   Patient presents with    Follow-up     pt states she is tired all the time and can not catch her breath  History of Present Illness     Depression   This is a chronic problem  The current episode started more than 1 year ago  The problem occurs constantly  The problem has been gradually worsening  Associated symptoms include fatigue  Pertinent negatives include no abdominal pain, arthralgias, chest pain, chills, congestion, coughing, diaphoresis, fever, headaches, joint swelling, myalgias, nausea, neck pain, numbness, rash, sore throat, vomiting or weakness  The symptoms are aggravated by stress  She has tried nothing for the symptoms         The following portions of the patient's history were reviewed and updated as appropriate: allergies, current medications, past family history, past medical history, past social history, past surgical history and problem list     Review of Systems     Review of Systems   Constitutional: Positive for fatigue  Negative for chills, diaphoresis and fever  HENT: Negative for congestion, ear discharge, ear pain, hearing loss, postnasal drip, rhinorrhea, sinus pressure, sinus pain, sneezing, sore throat and voice change  Eyes: Negative for pain, discharge, redness and visual disturbance  Respiratory: Negative for cough, chest tightness, shortness of breath and wheezing  Cardiovascular: Negative for chest pain, palpitations and leg swelling  Gastrointestinal: Negative for abdominal distention, abdominal pain, blood in stool, constipation, diarrhea, nausea and vomiting  Endocrine: Negative for cold intolerance, heat intolerance, polydipsia, polyphagia and polyuria  Genitourinary: Negative for dysuria, flank pain, frequency, hematuria and urgency  Musculoskeletal: Negative for arthralgias, back pain, gait problem, joint swelling, myalgias, neck pain and neck stiffness  Skin: Negative for rash  Neurological: Negative for dizziness, tremors, syncope, facial asymmetry, speech difficulty, weakness, light-headedness, numbness and headaches  Hematological: Does not bruise/bleed easily  Psychiatric/Behavioral: Positive for depression  Negative for behavioral problems, confusion and sleep disturbance  The patient is not nervous/anxious          Active Problem List     Patient Active Problem List   Diagnosis    Parkinson's disease ([facility] [address] )    Scoliosis    Low back pain    Senile osteoporosis    Acquired hypothyroidism    Patient refuses to disclose advance directive information    Elevated antinuclear antibody (RAS) level    Family hx-breast malignancy    Mixed hyperlipidemia    Rosacea    Trochanteric bursitis    Urinary incontinence    Vitamin D deficiency    Irritable bowel syndrome with both constipation and diarrhea    REM behavioral disorder    Anxiety    Rib pain on right side    Contact dermatitis       Objective     /84 (BP Location: Left arm, Patient Position: Sitting, Cuff Size: Standard)   Pulse 100   Ht 5' 3" (1 6 m)   Wt 61 9 kg (136 lb 6 4 oz)   SpO2 98%   BMI 24 16 kg/m²     Physical Exam   Constitutional: She is oriented to person, place, and time  She appears well-developed and well-nourished  No distress  HENT:   Head: Normocephalic and atraumatic  Right Ear: External ear normal    Left Ear: External ear normal    Nose: Nose normal    Mouth/Throat: Oropharynx is clear and moist    Eyes: Conjunctivae and EOM are normal  Right eye exhibits no discharge  Left eye exhibits no discharge  No scleral icterus  Neck: Normal range of motion  Neck supple  No JVD present  No tracheal deviation present  No thyromegaly present  Cardiovascular: Normal rate, regular rhythm, normal heart sounds and intact distal pulses  Exam reveals no gallop and no friction rub  No murmur heard  Pulmonary/Chest: Effort normal and breath sounds normal  No respiratory distress  She has no wheezes  She has no rales  She exhibits no tenderness  Abdominal: Soft  Bowel sounds are normal  She exhibits no distension  There is no tenderness  There is no rebound and no guarding  Musculoskeletal: Normal range of motion  She exhibits no edema or tenderness  Lymphadenopathy:     She has no cervical adenopathy  Neurological: She is alert and oriented to person, place, and time  No cranial nerve deficit  She exhibits normal muscle tone  Coordination normal    Skin: Skin is warm and dry  No rash noted  She is not diaphoretic  No erythema  Psychiatric: She has a normal mood and affect   Judgment normal    Depressed and anxious         Current Medications       Current Outpatient Medications:     aspirin (ECOTRIN LOW STRENGTH) 81 mg EC tablet, Take 81 mg by mouth daily, Disp: , Rfl:     carbidopa-levodopa (SINEMET)  mg per tablet, TAKE 1 & 1/2 (ONE & ONE-HALF) TABLETS BY MOUTH 4 TIMES DAILY (Patient taking differently: 2 tablets in the morning, ( 3 hours later) 1 and 1/2 around  ( 3 hours later) 1 and 1/2, and (sometimes) 1 tab at bedtimes), Disp: 180 tablet, Rfl: 5    Cholecalciferol (VITAMIN D) 2000 units tablet, 1 daily, Disp: , Rfl:     Cranberry 1000 MG CAPS, Take 4,200 mg by mouth daily , Disp: , Rfl:     cyanocobalamin (VITAMIN B-12) 1,000 mcg tablet, Take 1,000 mcg by mouth daily, Disp: , Rfl:     Homeopathic Products (PROSACEA) GEL, Apply topically as needed , Disp: , Rfl:     levothyroxine 50 mcg tablet, TAKE 1 TABLET BY MOUTH ONCE DAILY, Disp: 90 tablet, Rfl: 2    Multiple Vitamin (MULTIVITAMINS PO), daily, Disp: , Rfl:     NEUPRO 4 MG/24HR, APPLY 1 PATCH TOPICALLY ONCE DAILY, Disp: 30 patch, Rfl: 5    polyethylene glycol (MIRALAX) 17 g packet, Take 17 g by mouth as needed , Disp: , Rfl:     predniSONE 10 mg tablet, Take 3 tabs daily X 4 days, then 2 tabs daily X 7 days, then 1 tab daily X 7 d, Disp: , Rfl:     Probiotic Product (ALIGN) CHEW, Chew daily , Disp: , Rfl:     rasagiline (AZILECT) 1 MG, TAKE 1 TABLET BY MOUTH ONCE DAILY, Disp: 30 tablet, Rfl: 3    Wheat Dextrin (BENEFIBER DRINK MIX PO), Take by mouth as needed , Disp: , Rfl:     escitalopram (LEXAPRO) 10 mg tablet, Take 1 tablet (10 mg total) by mouth daily, Disp: 90 tablet, Rfl: 1    sertraline (ZOLOFT) 50 mg tablet, Take 1 tablet (50 mg total) by mouth daily (Patient not taking: Reported on 9/12/2019), Disp: 90 tablet, Rfl: 1    triamcinolone (KENALOG) 0 1 % cream, Apply topically 2 (two) times a day as needed for rash Arms/legs/groin (Patient not taking: Reported on 9/23/2019), Disp: 80 g, Rfl: 3    Health Maintenance     Health Maintenance   Topic Date Due    Hepatitis C Screening  1946    Medicare Annual Wellness Visit (AWV)  1946    INFLUENZA VACCINE  07/01/2019    Fall Risk  05/22/2020    Urinary Incontinence Screening  05/22/2020    Depression Screening PHQ  09/09/2020    BMI: Adult 09/12/2020    DTaP,Tdap,and Td Vaccines (2 - Td) 10/25/2022    CRC Screening: Colonoscopy  09/05/2028    Pneumococcal Vaccine: 65+ Years  Completed    Pneumococcal Vaccine: Pediatrics (0 to 5 Years) and At-Risk Patients (6 to 59 Years)  Aged Out    HEPATITIS B VACCINES  Aged [employer] History   Administered Date(s) Administered    H1N1 Inj 12/06/2009    H1N1, All Formulations 12/14/2009, 12/14/2009    Hep A, adult 06/06/1996, 05/22/1997    INFLUENZA 12/04/2006, 11/07/2007, 10/03/2008, 02/12/2010, 09/15/2010, 09/21/2011, 10/25/2012, 11/01/2013, 10/15/2014, 10/05/2015, 10/02/2016, 11/13/2017, 09/18/2018    Influenza Quadrivalent 3 years and older 10/05/2015    Influenza Quadrivalent 6 mos and older IM 09/18/2018    Influenza TIV (IM) 12/04/2006, 11/07/2007, 10/03/2008, 02/12/2010, 09/15/2010, 09/21/2011, 10/25/2012, 11/01/2013, 10/15/2014    Pneumococcal Conjugate 13-Valent 05/03/2016    Pneumococcal Polysaccharide PPV23 03/17/2011    Td (adult), adsorbed 06/10/2002    Tdap 10/25/2012    Zoster 09/12/2012         Bernardo Arreaga MD  [address] [facility] MEDICATIONS  (STANDING):  melatonin. 5 milliGRAM(s) Oral at bedtime  risperiDONE   Tablet 1 milliGRAM(s) Oral at bedtime  traZODone 100 milliGRAM(s) Oral at bedtime    MEDICATIONS  (PRN):  acetaminophen     Tablet .. 650 milliGRAM(s) Oral every 6 hours PRN Temp greater or equal to 38C (100.4F), Mild Pain (1 - 3)  aluminum hydroxide/magnesium hydroxide/simethicone Suspension 30 milliLiter(s) Oral every 6 hours PRN Dyspepsia  diphenhydrAMINE 50 milliGRAM(s) Oral every 6 hours PRN Extrapyramidal symptoms or prophylaxis  diphenhydrAMINE Injectable 50 milliGRAM(s) IntraMuscular once PRN Extrapyramidal prophylaxis  haloperidol     Tablet 5 milliGRAM(s) Oral every 6 hours PRN agitation  haloperidol    Injectable 5 milliGRAM(s) IntraMuscular once PRN aggression  LORazepam     Tablet 2 milliGRAM(s) Oral every 6 hours PRN severe anxiety, agitation  LORazepam   Injectable 2 milliGRAM(s) IntraMuscular once PRN agitation  magnesium hydroxide Suspension 30 milliLiter(s) Oral daily PRN Constipation

## 2024-12-13 NOTE — BH INPATIENT PSYCHIATRY PROGRESS NOTE - NSBHCHARTREVIEWVS_PSY_A_CORE FT
Vital Signs Last 24 Hrs  T(C): 36.2 (12-13-24 @ 08:38), Max: 36.2 (12-13-24 @ 08:38)  T(F): 97.1 (12-13-24 @ 08:38), Max: 97.1 (12-13-24 @ 08:38)  HR: --  BP: --  BP(mean): --  RR: 17 (12-13-24 @ 08:38) (17 - 17)  SpO2: --    Orthostatic VS  12-13-24 @ 08:38  Lying BP: --/-- HR: --  Sitting BP: 103/72 HR: 74  Standing BP: 101/68 HR: 82  Site: --  Mode: --  Orthostatic VS  12-12-24 @ 07:54  Lying BP: --/-- HR: --  Sitting BP: 103/71 HR: 75  Standing BP: 108/75 HR: 77  Site: upper left arm  Mode: electronic  Orthostatic VS  12-11-24 @ 19:23  Lying BP: --/-- HR: --  Sitting BP: 110/73 HR: 72  Standing BP: 107/74 HR: 85  Site: --  Mode: --

## 2024-12-14 RX ADMIN — Medication 650 MILLIGRAM(S): at 09:22

## 2024-12-14 RX ADMIN — Medication 100 MILLIGRAM(S): at 21:23

## 2024-12-14 RX ADMIN — Medication 650 MILLIGRAM(S): at 10:22

## 2024-12-14 RX ADMIN — Medication 1 MILLIGRAM(S): at 21:23

## 2024-12-14 RX ADMIN — Medication 5 MILLIGRAM(S): at 21:23

## 2024-12-15 RX ADMIN — Medication 100 MILLIGRAM(S): at 20:18

## 2024-12-15 RX ADMIN — Medication 50 MILLIGRAM(S): at 23:07

## 2024-12-15 RX ADMIN — HALOPERIDOL 5 MILLIGRAM(S): 10 TABLET ORAL at 23:07

## 2024-12-15 RX ADMIN — HALOPERIDOL 5 MILLIGRAM(S): 10 TABLET ORAL at 07:04

## 2024-12-15 RX ADMIN — Medication 2 MILLIGRAM(S): at 23:08

## 2024-12-15 RX ADMIN — Medication 50 MILLIGRAM(S): at 07:05

## 2024-12-15 RX ADMIN — Medication 1 MILLIGRAM(S): at 20:18

## 2024-12-15 RX ADMIN — Medication 2 MILLIGRAM(S): at 07:05

## 2024-12-15 RX ADMIN — Medication 5 MILLIGRAM(S): at 20:18

## 2024-12-16 PROCEDURE — 99232 SBSQ HOSP IP/OBS MODERATE 35: CPT

## 2024-12-16 RX ADMIN — Medication 50 MILLIGRAM(S): at 20:39

## 2024-12-16 RX ADMIN — Medication 5 MILLIGRAM(S): at 20:39

## 2024-12-16 RX ADMIN — Medication 1 MILLIGRAM(S): at 20:39

## 2024-12-16 RX ADMIN — Medication 100 MILLIGRAM(S): at 20:42

## 2024-12-16 RX ADMIN — Medication 650 MILLIGRAM(S): at 08:32

## 2024-12-16 NOTE — BH INPATIENT PSYCHIATRY PROGRESS NOTE - NSBHCHARTREVIEWVS_PSY_A_CORE FT
Vital Signs Last 24 Hrs  T(C): 36.4 (12-16-24 @ 08:35), Max: 36.4 (12-16-24 @ 08:35)  T(F): 97.6 (12-16-24 @ 08:35), Max: 97.6 (12-16-24 @ 08:35)  HR: --  BP: --  BP(mean): --  RR: --  SpO2: --    Orthostatic VS  12-16-24 @ 08:35  Lying BP: --/-- HR: --  Sitting BP: 102/69 HR: 80  Standing BP: 101/68 HR: 92  Site: --  Mode: electronic  Orthostatic VS  12-15-24 @ 07:52  Lying BP: --/-- HR: --  Sitting BP: 111/90 HR: 87  Standing BP: 123/90 HR: 100  Site: --  Mode: --  Orthostatic VS  12-14-24 @ 19:13  Lying BP: --/-- HR: --  Sitting BP: 113/60 HR: 98  Standing BP: 111/76 HR: 85  Site: --  Mode: --   Vital Signs Last 24 Hrs  T(C): 36.5 (12-17-24 @ 07:58), Max: 36.5 (12-17-24 @ 07:58)  T(F): 97.7 (12-17-24 @ 07:58), Max: 97.7 (12-17-24 @ 07:58)  HR: --  BP: --  BP(mean): --  RR: --  SpO2: --    Orthostatic VS  12-17-24 @ 07:58  Lying BP: --/-- HR: --  Sitting BP: 115/76 HR: 89  Standing BP: 104/70 HR: 85  Site: --  Mode: --  Orthostatic VS  12-16-24 @ 20:01  Lying BP: --/-- HR: --  Sitting BP: 119/67 HR: 89  Standing BP: 107/73 HR: 97  Site: --  Mode: --  Orthostatic VS  12-16-24 @ 08:35  Lying BP: --/-- HR: --  Sitting BP: 102/69 HR: 80  Standing BP: 101/68 HR: 92  Site: --  Mode: electronic

## 2024-12-16 NOTE — BH INPATIENT PSYCHIATRY PROGRESS NOTE - NSBHFUPINTERVALHXFT_PSY_A_CORE
Patient is followed up for NDD. Chart, medications and labs reviewed. Patient is discussed during morning brief, no overnight events, has been in good behavioral control.   Patient was seen and is evaluated on the unit. She continues to exhibit childlike behaviors, w/o intrusiveness towards staff/peers. Pt became upset and placed mattress & sheets outside her room yesterday, she was offered and received oral PRN's. Of note, pt verbalizes that she is currently menstruating and has previously exhibited behavioral changes during menses. Writer discussed housing interviews scheduled tomorrow 12/17 and 12/18. She was able to receive maintenance dose of Invega Sustenna 156mg on 11/22. No acute medical concerns, VSS.
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
## 2024-12-16 NOTE — BH INPATIENT PSYCHIATRY PROGRESS NOTE - NSBHMETABOLIC_PSY_ALL_CORE_FT
BMI: BMI (kg/m2): 26 (10-12-24 @ 15:43)  HbA1c: A1C with Estimated Average Glucose Result: 6.1 % (10-18-24 @ 08:00)    Glucose: POCT Blood Glucose.: 57 mg/dL (04-15-24 @ 09:20)    BP: --Vital Signs Last 24 Hrs  T(C): 36.4 (12-16-24 @ 08:35), Max: 36.4 (12-16-24 @ 08:35)  T(F): 97.6 (12-16-24 @ 08:35), Max: 97.6 (12-16-24 @ 08:35)  HR: --  BP: --  BP(mean): --  RR: --  SpO2: --    Orthostatic VS  12-16-24 @ 08:35  Lying BP: --/-- HR: --  Sitting BP: 102/69 HR: 80  Standing BP: 101/68 HR: 92  Site: --  Mode: electronic  Orthostatic VS  12-15-24 @ 07:52  Lying BP: --/-- HR: --  Sitting BP: 111/90 HR: 87  Standing BP: 123/90 HR: 100  Site: --  Mode: --  Orthostatic VS  12-14-24 @ 19:13  Lying BP: --/-- HR: --  Sitting BP: 113/60 HR: 98  Standing BP: 111/76 HR: 85  Site: --  Mode: --    Lipid Panel: Date/Time: 10-18-24 @ 08:00  Cholesterol, Serum: 106  LDL Cholesterol Calculated: 53  HDL Cholesterol, Serum: 42  Total Cholesterol/HDL Ration Measurement: --  Triglycerides, Serum: 53   BMI: BMI (kg/m2): 26 (10-12-24 @ 15:43)  HbA1c: A1C with Estimated Average Glucose Result: 6.1 % (10-18-24 @ 08:00)    Glucose: POCT Blood Glucose.: 57 mg/dL (04-15-24 @ 09:20)    BP: --Vital Signs Last 24 Hrs  T(C): 36.5 (12-17-24 @ 07:58), Max: 36.5 (12-17-24 @ 07:58)  T(F): 97.7 (12-17-24 @ 07:58), Max: 97.7 (12-17-24 @ 07:58)  HR: --  BP: --  BP(mean): --  RR: --  SpO2: --    Orthostatic VS  12-17-24 @ 07:58  Lying BP: --/-- HR: --  Sitting BP: 115/76 HR: 89  Standing BP: 104/70 HR: 85  Site: --  Mode: --  Orthostatic VS  12-16-24 @ 20:01  Lying BP: --/-- HR: --  Sitting BP: 119/67 HR: 89  Standing BP: 107/73 HR: 97  Site: --  Mode: --  Orthostatic VS  12-16-24 @ 08:35  Lying BP: --/-- HR: --  Sitting BP: 102/69 HR: 80  Standing BP: 101/68 HR: 92  Site: --  Mode: electronic    Lipid Panel: Date/Time: 10-18-24 @ 08:00  Cholesterol, Serum: 106  LDL Cholesterol Calculated: 53  HDL Cholesterol, Serum: 42  Total Cholesterol/HDL Ration Measurement: --  Triglycerides, Serum: 53

## 2024-12-17 PROCEDURE — 99232 SBSQ HOSP IP/OBS MODERATE 35: CPT

## 2024-12-17 RX ADMIN — Medication 100 MILLIGRAM(S): at 20:40

## 2024-12-17 RX ADMIN — Medication 1 MILLIGRAM(S): at 20:40

## 2024-12-17 RX ADMIN — Medication 5 MILLIGRAM(S): at 20:40

## 2024-12-17 NOTE — BH INPATIENT PSYCHIATRY PROGRESS NOTE - NSBHCHARTREVIEWVS_PSY_A_CORE FT
Vital Signs Last 24 Hrs  T(C): 36.5 (12-17-24 @ 07:58), Max: 36.5 (12-17-24 @ 07:58)  T(F): 97.7 (12-17-24 @ 07:58), Max: 97.7 (12-17-24 @ 07:58)  HR: --  BP: --  BP(mean): --  RR: --  SpO2: --    Orthostatic VS  12-17-24 @ 07:58  Lying BP: --/-- HR: --  Sitting BP: 115/76 HR: 89  Standing BP: 104/70 HR: 85  Site: --  Mode: --  Orthostatic VS  12-16-24 @ 20:01  Lying BP: --/-- HR: --  Sitting BP: 119/67 HR: 89  Standing BP: 107/73 HR: 97  Site: --  Mode: --  Orthostatic VS  12-16-24 @ 08:35  Lying BP: --/-- HR: --  Sitting BP: 102/69 HR: 80  Standing BP: 101/68 HR: 92  Site: --  Mode: electronic

## 2024-12-17 NOTE — BH INPATIENT PSYCHIATRY PROGRESS NOTE - NSBHMETABOLIC_PSY_ALL_CORE_FT
BMI: BMI (kg/m2): 26 (10-12-24 @ 15:43)  HbA1c: A1C with Estimated Average Glucose Result: 6.1 % (10-18-24 @ 08:00)    Glucose: POCT Blood Glucose.: 57 mg/dL (04-15-24 @ 09:20)    BP: --Vital Signs Last 24 Hrs  T(C): 36.5 (12-17-24 @ 07:58), Max: 36.5 (12-17-24 @ 07:58)  T(F): 97.7 (12-17-24 @ 07:58), Max: 97.7 (12-17-24 @ 07:58)  HR: --  BP: --  BP(mean): --  RR: --  SpO2: --    Orthostatic VS  12-17-24 @ 07:58  Lying BP: --/-- HR: --  Sitting BP: 115/76 HR: 89  Standing BP: 104/70 HR: 85  Site: --  Mode: --  Orthostatic VS  12-16-24 @ 20:01  Lying BP: --/-- HR: --  Sitting BP: 119/67 HR: 89  Standing BP: 107/73 HR: 97  Site: --  Mode: --  Orthostatic VS  12-16-24 @ 08:35  Lying BP: --/-- HR: --  Sitting BP: 102/69 HR: 80  Standing BP: 101/68 HR: 92  Site: --  Mode: electronic    Lipid Panel: Date/Time: 10-18-24 @ 08:00  Cholesterol, Serum: 106  LDL Cholesterol Calculated: 53  HDL Cholesterol, Serum: 42  Total Cholesterol/HDL Ration Measurement: --  Triglycerides, Serum: 53
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
## 2024-12-17 NOTE — BH INPATIENT PSYCHIATRY PROGRESS NOTE - NSBHFUPINTERVALHXFT_PSY_A_CORE
Patient is followed up for NDD. Chart, medications and labs reviewed. Patient is discussed during morning brief, no overnight events, has been in good behavioral control.   Patient was seen and is evaluated on the unit. She continues to exhibit childlike behaviors, w/o intrusiveness towards staff/peers. She states that she is doing "good" and feels ready for housing interview this afternoon. Writer discussed topics for interview and housing interview scheduled tomorrow 12/18. She was able to receive maintenance dose of Invega Sustenna 156mg on 11/22. No acute medical concerns, VSS.

## 2024-12-18 PROCEDURE — 99232 SBSQ HOSP IP/OBS MODERATE 35: CPT

## 2024-12-18 RX ORDER — LORAZEPAM 4 MG/ML
2 VIAL (ML) INJECTION EVERY 6 HOURS
Refills: 0 | Status: DISCONTINUED | OUTPATIENT
Start: 2024-12-18 | End: 2024-12-23

## 2024-12-18 RX ORDER — HYDROXYZINE HYDROCHLORIDE 25 MG/1
50 TABLET, FILM COATED ORAL EVERY 6 HOURS
Refills: 0 | Status: DISCONTINUED | OUTPATIENT
Start: 2024-12-18 | End: 2025-01-30

## 2024-12-18 RX ORDER — LORAZEPAM 4 MG/ML
2 VIAL (ML) INJECTION ONCE
Refills: 0 | Status: DISCONTINUED | OUTPATIENT
Start: 2024-12-18 | End: 2024-12-24

## 2024-12-18 RX ADMIN — Medication 650 MILLIGRAM(S): at 21:36

## 2024-12-18 RX ADMIN — Medication 1 MILLIGRAM(S): at 20:12

## 2024-12-18 RX ADMIN — Medication 100 MILLIGRAM(S): at 20:12

## 2024-12-18 RX ADMIN — Medication 650 MILLIGRAM(S): at 20:11

## 2024-12-18 NOTE — BH INPATIENT PSYCHIATRY PROGRESS NOTE - PRN MEDS
MEDICATIONS  (PRN):  acetaminophen     Tablet .. 650 milliGRAM(s) Oral every 6 hours PRN Temp greater or equal to 38C (100.4F), Mild Pain (1 - 3)  diphenhydrAMINE 50 milliGRAM(s) Oral every 6 hours PRN Extrapyramidal symptoms or prophylaxis  diphenhydrAMINE Injectable 50 milliGRAM(s) IntraMuscular once PRN Extrapyramidal prophylaxis  haloperidol    Injectable 5 milliGRAM(s) IntraMuscular once PRN aggression  hydrOXYzine hydrochloride 50 milliGRAM(s) Oral every 6 hours PRN anxiety  LORazepam     Tablet 2 milliGRAM(s) Oral every 6 hours PRN severe anxiety, agitation  LORazepam   Injectable 2 milliGRAM(s) IntraMuscular once PRN agitation

## 2024-12-18 NOTE — BH INPATIENT PSYCHIATRY PROGRESS NOTE - NSBHMETABOLIC_PSY_ALL_CORE_FT
BMI: BMI (kg/m2): 26 (10-12-24 @ 15:43)  HbA1c: A1C with Estimated Average Glucose Result: 6.1 % (10-18-24 @ 08:00)    Glucose: POCT Blood Glucose.: 57 mg/dL (04-15-24 @ 09:20)    BP: --Vital Signs Last 24 Hrs  T(C): 36.6 (12-18-24 @ 09:01), Max: 36.6 (12-18-24 @ 09:01)  T(F): 97.9 (12-18-24 @ 09:01), Max: 97.9 (12-18-24 @ 09:01)  HR: --  BP: --  BP(mean): --  RR: --  SpO2: --    Orthostatic VS  12-18-24 @ 09:01  Lying BP: --/-- HR: --  Sitting BP: 109/69 HR: 88  Standing BP: 122/88 HR: 101  Site: --  Mode: --  Orthostatic VS  12-17-24 @ 19:16  Lying BP: --/-- HR: --  Sitting BP: 114/95 HR: 102  Standing BP: 110/88 HR: 120  Site: --  Mode: --  Orthostatic VS  12-17-24 @ 07:58  Lying BP: --/-- HR: --  Sitting BP: 115/76 HR: 89  Standing BP: 104/70 HR: 85  Site: --  Mode: --  Orthostatic VS  12-16-24 @ 20:01  Lying BP: --/-- HR: --  Sitting BP: 119/67 HR: 89  Standing BP: 107/73 HR: 97  Site: --  Mode: --    Lipid Panel: Date/Time: 10-18-24 @ 08:00  Cholesterol, Serum: 106  LDL Cholesterol Calculated: 53  HDL Cholesterol, Serum: 42  Total Cholesterol/HDL Ration Measurement: --  Triglycerides, Serum: 53
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
## 2024-12-18 NOTE — BH INPATIENT PSYCHIATRY PROGRESS NOTE - CURRENT MEDICATION
MEDICATIONS  (STANDING):  risperiDONE   Tablet 1 milliGRAM(s) Oral at bedtime  traZODone 100 milliGRAM(s) Oral at bedtime    MEDICATIONS  (PRN):  acetaminophen     Tablet .. 650 milliGRAM(s) Oral every 6 hours PRN Temp greater or equal to 38C (100.4F), Mild Pain (1 - 3)  diphenhydrAMINE 50 milliGRAM(s) Oral every 6 hours PRN Extrapyramidal symptoms or prophylaxis  diphenhydrAMINE Injectable 50 milliGRAM(s) IntraMuscular once PRN Extrapyramidal prophylaxis  haloperidol    Injectable 5 milliGRAM(s) IntraMuscular once PRN aggression  hydrOXYzine hydrochloride 50 milliGRAM(s) Oral every 6 hours PRN anxiety  LORazepam     Tablet 2 milliGRAM(s) Oral every 6 hours PRN severe anxiety, agitation  LORazepam   Injectable 2 milliGRAM(s) IntraMuscular once PRN agitation

## 2024-12-18 NOTE — BH INPATIENT PSYCHIATRY PROGRESS NOTE - NSBHCHARTREVIEWVS_PSY_A_CORE FT
Vital Signs Last 24 Hrs  T(C): 36.6 (12-18-24 @ 09:01), Max: 36.6 (12-18-24 @ 09:01)  T(F): 97.9 (12-18-24 @ 09:01), Max: 97.9 (12-18-24 @ 09:01)  HR: --  BP: --  BP(mean): --  RR: --  SpO2: --    Orthostatic VS  12-18-24 @ 09:01  Lying BP: --/-- HR: --  Sitting BP: 109/69 HR: 88  Standing BP: 122/88 HR: 101  Site: --  Mode: --  Orthostatic VS  12-17-24 @ 19:16  Lying BP: --/-- HR: --  Sitting BP: 114/95 HR: 102  Standing BP: 110/88 HR: 120  Site: --  Mode: --  Orthostatic VS  12-17-24 @ 07:58  Lying BP: --/-- HR: --  Sitting BP: 115/76 HR: 89  Standing BP: 104/70 HR: 85  Site: --  Mode: --  Orthostatic VS  12-16-24 @ 20:01  Lying BP: --/-- HR: --  Sitting BP: 119/67 HR: 89  Standing BP: 107/73 HR: 97  Site: --  Mode: --

## 2024-12-18 NOTE — BH INPATIENT PSYCHIATRY PROGRESS NOTE - NSBHFUPINTERVALHXFT_PSY_A_CORE
Patient is followed up for NDD. Chart, medications and labs reviewed. Patient is discussed during morning brief, no overnight events, has been in good behavioral control.   Patient was seen and is evaluated on the unit. She continues to exhibit childlike behaviors, w/o intrusiveness towards staff/peers. She states that she is doing "good", excited about possibility of acceptance into group home and has interview scheduled today afternoon. She was able to receive maintenance dose of Invega Sustenna 156mg on 11/22. No acute medical concerns, VSS.

## 2024-12-19 LAB
ALBUMIN SERPL ELPH-MCNC: 4 G/DL — SIGNIFICANT CHANGE UP (ref 3.3–5)
ALP SERPL-CCNC: 61 U/L — SIGNIFICANT CHANGE UP (ref 40–120)
ALT FLD-CCNC: 19 U/L — SIGNIFICANT CHANGE UP (ref 4–33)
ANION GAP SERPL CALC-SCNC: 13 MMOL/L — SIGNIFICANT CHANGE UP (ref 7–14)
AST SERPL-CCNC: 22 U/L — SIGNIFICANT CHANGE UP (ref 4–32)
BASOPHILS # BLD AUTO: 0.04 K/UL — SIGNIFICANT CHANGE UP (ref 0–0.2)
BASOPHILS NFR BLD AUTO: 0.8 % — SIGNIFICANT CHANGE UP (ref 0–2)
BILIRUB SERPL-MCNC: <0.2 MG/DL — SIGNIFICANT CHANGE UP (ref 0.2–1.2)
BUN SERPL-MCNC: 14 MG/DL — SIGNIFICANT CHANGE UP (ref 7–23)
CALCIUM SERPL-MCNC: 9.2 MG/DL — SIGNIFICANT CHANGE UP (ref 8.4–10.5)
CHLORIDE SERPL-SCNC: 102 MMOL/L — SIGNIFICANT CHANGE UP (ref 98–107)
CO2 SERPL-SCNC: 23 MMOL/L — SIGNIFICANT CHANGE UP (ref 22–31)
CREAT SERPL-MCNC: 0.69 MG/DL — SIGNIFICANT CHANGE UP (ref 0.5–1.3)
EGFR: 113 ML/MIN/1.73M2 — SIGNIFICANT CHANGE UP
EGFR: 113 ML/MIN/1.73M2 — SIGNIFICANT CHANGE UP
EOSINOPHIL # BLD AUTO: 0.05 K/UL — SIGNIFICANT CHANGE UP (ref 0–0.5)
EOSINOPHIL NFR BLD AUTO: 1 % — SIGNIFICANT CHANGE UP (ref 0–6)
GLUCOSE SERPL-MCNC: 97 MG/DL — SIGNIFICANT CHANGE UP (ref 70–99)
HCT VFR BLD CALC: 33.8 % — LOW (ref 34.5–45)
HGB BLD-MCNC: 10.4 G/DL — LOW (ref 11.5–15.5)
IANC: 2.31 K/UL — SIGNIFICANT CHANGE UP (ref 1.8–7.4)
IMM GRANULOCYTES NFR BLD AUTO: 0.8 % — SIGNIFICANT CHANGE UP (ref 0–0.9)
LYMPHOCYTES # BLD AUTO: 2.05 K/UL — SIGNIFICANT CHANGE UP (ref 1–3.3)
LYMPHOCYTES # BLD AUTO: 41 % — SIGNIFICANT CHANGE UP (ref 13–44)
MCHC RBC-ENTMCNC: 22.8 PG — LOW (ref 27–34)
MCHC RBC-ENTMCNC: 30.8 G/DL — LOW (ref 32–36)
MCV RBC AUTO: 74 FL — LOW (ref 80–100)
MONOCYTES # BLD AUTO: 0.51 K/UL — SIGNIFICANT CHANGE UP (ref 0–0.9)
MONOCYTES NFR BLD AUTO: 10.2 % — SIGNIFICANT CHANGE UP (ref 2–14)
NEUTROPHILS # BLD AUTO: 2.31 K/UL — SIGNIFICANT CHANGE UP (ref 1.8–7.4)
NEUTROPHILS NFR BLD AUTO: 46.2 % — SIGNIFICANT CHANGE UP (ref 43–77)
NRBC # BLD AUTO: 0 K/UL — SIGNIFICANT CHANGE UP (ref 0–0)
NRBC # BLD: 0 /100 WBCS — SIGNIFICANT CHANGE UP (ref 0–0)
NRBC # FLD: 0 K/UL — SIGNIFICANT CHANGE UP (ref 0–0)
NRBC BLD-RTO: 0 /100 WBCS — SIGNIFICANT CHANGE UP (ref 0–0)
PLATELET # BLD AUTO: 207 K/UL — SIGNIFICANT CHANGE UP (ref 150–400)
POTASSIUM SERPL-MCNC: 4 MMOL/L — SIGNIFICANT CHANGE UP (ref 3.5–5.3)
POTASSIUM SERPL-SCNC: 4 MMOL/L — SIGNIFICANT CHANGE UP (ref 3.5–5.3)
PROT SERPL-MCNC: 8.1 G/DL — SIGNIFICANT CHANGE UP (ref 6–8.3)
RBC # BLD: 4.57 M/UL — SIGNIFICANT CHANGE UP (ref 3.8–5.2)
RBC # FLD: 18.6 % — HIGH (ref 10.3–14.5)
SODIUM SERPL-SCNC: 138 MMOL/L — SIGNIFICANT CHANGE UP (ref 135–145)
WBC # BLD: 5 K/UL — SIGNIFICANT CHANGE UP (ref 3.8–10.5)
WBC # FLD AUTO: 5 K/UL — SIGNIFICANT CHANGE UP (ref 3.8–10.5)

## 2024-12-19 PROCEDURE — 99231 SBSQ HOSP IP/OBS SF/LOW 25: CPT

## 2024-12-19 PROCEDURE — 90832 PSYTX W PT 30 MINUTES: CPT

## 2024-12-19 RX ADMIN — HYDROXYZINE HYDROCHLORIDE 50 MILLIGRAM(S): 25 TABLET, FILM COATED ORAL at 05:16

## 2024-12-19 RX ADMIN — Medication 100 MILLIGRAM(S): at 20:22

## 2024-12-19 RX ADMIN — HYDROXYZINE HYDROCHLORIDE 50 MILLIGRAM(S): 25 TABLET, FILM COATED ORAL at 20:22

## 2024-12-19 RX ADMIN — Medication 1 MILLIGRAM(S): at 20:22

## 2024-12-19 NOTE — BH INPATIENT PSYCHIATRY PROGRESS NOTE - NSBHFUPINTERVALHXFT_PSY_A_CORE
No acute events overnight. Pt compliant with meds, slept well. Has been in good behavioral control. Reports stable mood today. Denies SIIP and HIIP. Says she is hopeful for housing in the near future.

## 2024-12-19 NOTE — BH INPATIENT PSYCHIATRY PROGRESS NOTE - NSBHMETABOLIC_PSY_ALL_CORE_FT
BMI: BMI (kg/m2): 26 (10-12-24 @ 15:43)  HbA1c: A1C with Estimated Average Glucose Result: 6.1 % (10-18-24 @ 08:00)    Glucose: POCT Blood Glucose.: 57 mg/dL (04-15-24 @ 09:20)    BP: --Vital Signs Last 24 Hrs  T(C): 36.6 (12-19-24 @ 06:02), Max: 36.7 (12-18-24 @ 19:38)  T(F): 97.9 (12-19-24 @ 06:02), Max: 98 (12-18-24 @ 19:38)  HR: --  BP: --  BP(mean): --  RR: 16 (12-19-24 @ 06:02) (16 - 16)  SpO2: --    Orthostatic VS  12-19-24 @ 06:02  Lying BP: --/-- HR: --  Sitting BP: 104/70 HR: 74  Standing BP: 110/80 HR: 70  Site: --  Mode: --  Orthostatic VS  12-18-24 @ 19:38  Lying BP: --/-- HR: --  Sitting BP: 108/69 HR: 83  Standing BP: 105/68 HR: 89  Site: --  Mode: --  Orthostatic VS  12-18-24 @ 09:01  Lying BP: --/-- HR: --  Sitting BP: 109/69 HR: 88  Standing BP: 122/88 HR: 101  Site: --  Mode: --  Orthostatic VS  12-17-24 @ 19:16  Lying BP: --/-- HR: --  Sitting BP: 114/95 HR: 102  Standing BP: 110/88 HR: 120  Site: --  Mode: --    Lipid Panel: Date/Time: 10-18-24 @ 08:00  Cholesterol, Serum: 106  LDL Cholesterol Calculated: 53  HDL Cholesterol, Serum: 42  Total Cholesterol/HDL Ration Measurement: --  Triglycerides, Serum: 53
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
## 2024-12-19 NOTE — BH INPATIENT PSYCHIATRY PROGRESS NOTE - NSBHCHARTREVIEWVS_PSY_A_CORE FT
Vital Signs Last 24 Hrs  T(C): 36.6 (12-19-24 @ 06:02), Max: 36.7 (12-18-24 @ 19:38)  T(F): 97.9 (12-19-24 @ 06:02), Max: 98 (12-18-24 @ 19:38)  HR: --  BP: --  BP(mean): --  RR: 16 (12-19-24 @ 06:02) (16 - 16)  SpO2: --    Orthostatic VS  12-19-24 @ 06:02  Lying BP: --/-- HR: --  Sitting BP: 104/70 HR: 74  Standing BP: 110/80 HR: 70  Site: --  Mode: --  Orthostatic VS  12-18-24 @ 19:38  Lying BP: --/-- HR: --  Sitting BP: 108/69 HR: 83  Standing BP: 105/68 HR: 89  Site: --  Mode: --  Orthostatic VS  12-18-24 @ 09:01  Lying BP: --/-- HR: --  Sitting BP: 109/69 HR: 88  Standing BP: 122/88 HR: 101  Site: --  Mode: --  Orthostatic VS  12-17-24 @ 19:16  Lying BP: --/-- HR: --  Sitting BP: 114/95 HR: 102  Standing BP: 110/88 HR: 120  Site: --  Mode: --

## 2024-12-19 NOTE — BH PSYCHOLOGY - GROUP THERAPY NOTE - NSPSYCHOLGRPCOGPT_PSY_A_CORE FT
Patient attended Cognitive Behavioral Therapy Group incorporating ACT-based concepts. The group started with a brief check-in asking Pt’s to share something they wish others knew about mental health struggles, followed by engaging in a loving kindness meditation; members were encouraged to share their thoughts/reactions to this. Group focused on talking about their values, how these values have changed throughout different stages of life/how they differ from others. Group facilitator explained concepts, reinforced participation, and engaged patients in the discussion.

## 2024-12-19 NOTE — BH PSYCHOLOGY - GROUP THERAPY NOTE - NSBHPSYCHOLRESPCOMMENT_PSY_A_CORE FT
The patient appeared adequately groomed and casually dressed. Pt appeared somewhat engaged in the group as evidenced by her ability to appropriately listen to what others were sharing, though she was quiet throughout the discussion and looked distracted at times. Pt appeared to have some difficulty articulating herself when attempting to share during the check-in. Pt noted that she values being able to move into a group home to have more independence. PT was oriented X3. Pt was appropriate with peers and did not talk over others.

## 2024-12-19 NOTE — BH PSYCHOLOGY - CLINICIAN PSYCHOTHERAPY NOTE - NSBHPSYCHOLINT_PSY_A_CORE FT
Acceptance and Commitment therapy 
Acceptance and Commitment therapy, 
Acceptance and Commitment therapy 

## 2024-12-20 LAB — PROLACTIN SERPL-MCNC: 42.4 NG/ML — HIGH (ref 3.4–24.1)

## 2024-12-20 PROCEDURE — 99231 SBSQ HOSP IP/OBS SF/LOW 25: CPT

## 2024-12-20 RX ADMIN — Medication 650 MILLIGRAM(S): at 08:12

## 2024-12-20 RX ADMIN — Medication 50 MILLIGRAM(S): at 22:22

## 2024-12-20 RX ADMIN — Medication 2 MILLIGRAM(S): at 22:22

## 2024-12-20 RX ADMIN — Medication 1 MILLIGRAM(S): at 20:18

## 2024-12-20 RX ADMIN — Medication 100 MILLIGRAM(S): at 20:17

## 2024-12-20 RX ADMIN — HYDROXYZINE HYDROCHLORIDE 50 MILLIGRAM(S): 25 TABLET, FILM COATED ORAL at 20:18
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
## 2024-12-20 NOTE — BH INPATIENT PSYCHIATRY PROGRESS NOTE - NSBHFUPINTERVALHXFT_PSY_A_CORE
Patient is seen for psychosis.  Discussed in treatment team.  No acute events overnight. Pt remains compliant with meds, no SE reported.  No appetite or sleep disturbances.  Has been in good behavioral control. Reports stable mood today, pt states "I'm going home Monday." Denies any mood dysregulation, no SI/SIB/HI, no plan or intent, engages in safety planning. Remains disorganized, she denies AVH.  No acute medical concerns.  Continue to monitor and provide therapeutic support.

## 2024-12-20 NOTE — BH INPATIENT PSYCHIATRY PROGRESS NOTE - NSBHCHARTREVIEWVS_PSY_A_CORE FT
Vital Signs Last 24 Hrs  T(C): 36.4 (12-20-24 @ 09:07), Max: 36.4 (12-20-24 @ 09:07)  T(F): 97.6 (12-20-24 @ 09:07), Max: 97.6 (12-20-24 @ 09:07)  HR: --  BP: --  BP(mean): --  RR: --  SpO2: --    Orthostatic VS  12-20-24 @ 09:07  Lying BP: --/-- HR: --  Sitting BP: 124/68 HR: 101  Standing BP: 116/68 HR: 105  Site: upper left arm  Mode: electronic  Orthostatic VS  12-19-24 @ 18:31  Lying BP: --/-- HR: --  Sitting BP: 132/78 HR: 89  Standing BP: 121/76 HR: 93  Site: --  Mode: --  Orthostatic VS  12-19-24 @ 06:02  Lying BP: --/-- HR: --  Sitting BP: 104/70 HR: 74  Standing BP: 110/80 HR: 70  Site: --  Mode: --  Orthostatic VS  12-18-24 @ 19:38  Lying BP: --/-- HR: --  Sitting BP: 108/69 HR: 83  Standing BP: 105/68 HR: 89  Site: --  Mode: --   Vital Signs Last 24 Hrs  T(C): --  T(F): --  HR: --  BP: --  BP(mean): --  RR: 16 (12-23-24 @ 08:33) (16 - 16)  SpO2: --    Orthostatic VS  12-22-24 @ 08:43  Lying BP: --/-- HR: --  Sitting BP: 119/73 HR: 75  Standing BP: 123/56 HR: 96  Site: --  Mode: electronic

## 2024-12-20 NOTE — BH INPATIENT PSYCHIATRY PROGRESS NOTE - NSBHMETABOLIC_PSY_ALL_CORE_FT
BMI: BMI (kg/m2): 26 (10-12-24 @ 15:43)  HbA1c: A1C with Estimated Average Glucose Result: 6.1 % (10-18-24 @ 08:00)    Glucose: POCT Blood Glucose.: 57 mg/dL (04-15-24 @ 09:20)    BP: --Vital Signs Last 24 Hrs  T(C): 36.4 (12-20-24 @ 09:07), Max: 36.4 (12-20-24 @ 09:07)  T(F): 97.6 (12-20-24 @ 09:07), Max: 97.6 (12-20-24 @ 09:07)  HR: --  BP: --  BP(mean): --  RR: --  SpO2: --    Orthostatic VS  12-20-24 @ 09:07  Lying BP: --/-- HR: --  Sitting BP: 124/68 HR: 101  Standing BP: 116/68 HR: 105  Site: upper left arm  Mode: electronic  Orthostatic VS  12-19-24 @ 18:31  Lying BP: --/-- HR: --  Sitting BP: 132/78 HR: 89  Standing BP: 121/76 HR: 93  Site: --  Mode: --  Orthostatic VS  12-19-24 @ 06:02  Lying BP: --/-- HR: --  Sitting BP: 104/70 HR: 74  Standing BP: 110/80 HR: 70  Site: --  Mode: --  Orthostatic VS  12-18-24 @ 19:38  Lying BP: --/-- HR: --  Sitting BP: 108/69 HR: 83  Standing BP: 105/68 HR: 89  Site: --  Mode: --    Lipid Panel: Date/Time: 10-18-24 @ 08:00  Cholesterol, Serum: 106  LDL Cholesterol Calculated: 53  HDL Cholesterol, Serum: 42  Total Cholesterol/HDL Ration Measurement: --  Triglycerides, Serum: 53   BMI: BMI (kg/m2): 26 (10-12-24 @ 15:43)  HbA1c: A1C with Estimated Average Glucose Result: 6.1 % (10-18-24 @ 08:00)    Glucose: POCT Blood Glucose.: 57 mg/dL (04-15-24 @ 09:20)    BP: --Vital Signs Last 24 Hrs  T(C): --  T(F): --  HR: --  BP: --  BP(mean): --  RR: 16 (12-23-24 @ 08:33) (16 - 16)  SpO2: --    Orthostatic VS  12-22-24 @ 08:43  Lying BP: --/-- HR: --  Sitting BP: 119/73 HR: 75  Standing BP: 123/56 HR: 96  Site: --  Mode: electronic    Lipid Panel: Date/Time: 10-18-24 @ 08:00  Cholesterol, Serum: 106  LDL Cholesterol Calculated: 53  HDL Cholesterol, Serum: 42  Total Cholesterol/HDL Ration Measurement: --  Triglycerides, Serum: 53

## 2024-12-21 RX ADMIN — Medication 2 MILLIGRAM(S): at 20:48

## 2024-12-21 RX ADMIN — Medication 1 MILLIGRAM(S): at 20:48

## 2024-12-21 RX ADMIN — Medication 50 MILLIGRAM(S): at 20:47

## 2024-12-21 RX ADMIN — Medication 100 MILLIGRAM(S): at 20:47

## 2024-12-22 RX ADMIN — Medication 50 MILLIGRAM(S): at 20:27

## 2024-12-22 RX ADMIN — Medication 650 MILLIGRAM(S): at 12:49

## 2024-12-22 RX ADMIN — Medication 1 MILLIGRAM(S): at 20:27

## 2024-12-22 RX ADMIN — Medication 100 MILLIGRAM(S): at 20:27

## 2024-12-22 RX ADMIN — Medication 2 MILLIGRAM(S): at 20:27

## 2024-12-22 RX ADMIN — Medication 650 MILLIGRAM(S): at 12:19

## 2024-12-23 PROCEDURE — 99232 SBSQ HOSP IP/OBS MODERATE 35: CPT

## 2024-12-23 RX ORDER — OLANZAPINE 10 MG/1
5 TABLET ORAL AT BEDTIME
Refills: 0 | Status: DISCONTINUED | OUTPATIENT
Start: 2024-12-23 | End: 2025-01-03

## 2024-12-23 RX ADMIN — HYDROXYZINE HYDROCHLORIDE 50 MILLIGRAM(S): 25 TABLET, FILM COATED ORAL at 16:39

## 2024-12-23 RX ADMIN — OLANZAPINE 5 MILLIGRAM(S): 10 TABLET ORAL at 20:20

## 2024-12-23 RX ADMIN — Medication 50 MILLIGRAM(S): at 20:21

## 2024-12-23 RX ADMIN — Medication 2 MILLIGRAM(S): at 04:10

## 2024-12-23 RX ADMIN — Medication 50 MILLIGRAM(S): at 04:10

## 2024-12-23 RX ADMIN — Medication 100 MILLIGRAM(S): at 20:21

## 2024-12-23 NOTE — ED ADULT NURSE NOTE - NS ED NURSE RECORD ANOTHER HT AND WT
Received call from patient asking if she should take 10 units or 5 units of Lantus.  Pt stated her new refill of Lantus states to take 5 units.  Patient states she has be taking 10 units and was unaware of the change in dose.  Encouraged patient to call the ordering provider to clarify patients question.  Patient verbalized understanding.  
Yes

## 2024-12-23 NOTE — BH INPATIENT PSYCHIATRY PROGRESS NOTE - NSBHCHARTREVIEWVS_PSY_A_CORE FT
Vital Signs Last 24 Hrs  T(C): --  T(F): --  HR: --  BP: --  BP(mean): --  RR: 16 (12-23-24 @ 08:33) (16 - 16)  SpO2: --    Orthostatic VS  12-22-24 @ 08:43  Lying BP: --/-- HR: --  Sitting BP: 119/73 HR: 75  Standing BP: 123/56 HR: 96  Site: --  Mode: electronic
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
## 2024-12-23 NOTE — BH INPATIENT PSYCHIATRY PROGRESS NOTE - NSBHMETABOLIC_PSY_ALL_CORE_FT
BMI: BMI (kg/m2): 26 (10-12-24 @ 15:43)  HbA1c: A1C with Estimated Average Glucose Result: 6.1 % (10-18-24 @ 08:00)    Glucose: POCT Blood Glucose.: 57 mg/dL (04-15-24 @ 09:20)    BP: --Vital Signs Last 24 Hrs  T(C): --  T(F): --  HR: --  BP: --  BP(mean): --  RR: 16 (12-23-24 @ 08:33) (16 - 16)  SpO2: --    Orthostatic VS  12-22-24 @ 08:43  Lying BP: --/-- HR: --  Sitting BP: 119/73 HR: 75  Standing BP: 123/56 HR: 96  Site: --  Mode: electronic    Lipid Panel: Date/Time: 10-18-24 @ 08:00  Cholesterol, Serum: 106  LDL Cholesterol Calculated: 53  HDL Cholesterol, Serum: 42  Total Cholesterol/HDL Ration Measurement: --  Triglycerides, Serum: 53

## 2024-12-23 NOTE — BH INPATIENT PSYCHIATRY PROGRESS NOTE - PRN MEDS
MEDICATIONS  (PRN):  acetaminophen     Tablet .. 650 milliGRAM(s) Oral every 6 hours PRN Temp greater or equal to 38C (100.4F), Mild Pain (1 - 3)  diphenhydrAMINE 50 milliGRAM(s) Oral every 6 hours PRN Extrapyramidal symptoms or prophylaxis  diphenhydrAMINE Injectable 50 milliGRAM(s) IntraMuscular once PRN Extrapyramidal prophylaxis  haloperidol    Injectable 5 milliGRAM(s) IntraMuscular once PRN aggression  hydrOXYzine hydrochloride 50 milliGRAM(s) Oral every 6 hours PRN anxiety  LORazepam   Injectable 2 milliGRAM(s) IntraMuscular once PRN agitation

## 2024-12-23 NOTE — BH INPATIENT PSYCHIATRY PROGRESS NOTE - NSBHFUPINTERVALHXFT_PSY_A_CORE
Patient is followed up for NDD. Chart, medications and labs reviewed. Patient is discussed during morning brief, no overnight events, has been in good behavioral control.   Patient was seen and is evaluated on the unit. She continues to exhibit childlike behaviors, w/o intrusiveness towards staff/peers. She states that she feels "good" and is hoping to leave the hospital next week. Writer discussed that team is awaiting to hear back from agency regarding lunch date at group home, after this was requested during housing interview. Writer discussed changes to pt's medication regimen, dc'ed from Invega SILVESTRE and PO Risperidone, starting Zyprexa PO tonight. No acute medical concerns, VSS.

## 2024-12-23 NOTE — BH INPATIENT PSYCHIATRY PROGRESS NOTE - CURRENT MEDICATION
MEDICATIONS  (STANDING):  OLANZapine 5 milliGRAM(s) Oral at bedtime  traZODone 100 milliGRAM(s) Oral at bedtime    MEDICATIONS  (PRN):  acetaminophen     Tablet .. 650 milliGRAM(s) Oral every 6 hours PRN Temp greater or equal to 38C (100.4F), Mild Pain (1 - 3)  diphenhydrAMINE 50 milliGRAM(s) Oral every 6 hours PRN Extrapyramidal symptoms or prophylaxis  diphenhydrAMINE Injectable 50 milliGRAM(s) IntraMuscular once PRN Extrapyramidal prophylaxis  haloperidol    Injectable 5 milliGRAM(s) IntraMuscular once PRN aggression  hydrOXYzine hydrochloride 50 milliGRAM(s) Oral every 6 hours PRN anxiety  LORazepam   Injectable 2 milliGRAM(s) IntraMuscular once PRN agitation

## 2024-12-24 PROCEDURE — 99232 SBSQ HOSP IP/OBS MODERATE 35: CPT

## 2024-12-24 RX ORDER — LORAZEPAM 4 MG/ML
2 VIAL (ML) INJECTION EVERY 6 HOURS
Refills: 0 | Status: DISCONTINUED | OUTPATIENT
Start: 2024-12-24 | End: 2024-12-24

## 2024-12-24 RX ORDER — LORAZEPAM 4 MG/ML
2 VIAL (ML) INJECTION ONCE
Refills: 0 | Status: DISCONTINUED | OUTPATIENT
Start: 2024-12-26 | End: 2024-12-30

## 2024-12-24 RX ADMIN — Medication 650 MILLIGRAM(S): at 09:43

## 2024-12-24 RX ADMIN — Medication 100 MILLIGRAM(S): at 20:34

## 2024-12-24 RX ADMIN — OLANZAPINE 5 MILLIGRAM(S): 10 TABLET ORAL at 20:34

## 2024-12-24 RX ADMIN — Medication 2 MILLIGRAM(S): at 23:58

## 2024-12-24 RX ADMIN — Medication 650 MILLIGRAM(S): at 08:43

## 2024-12-24 RX ADMIN — Medication 50 MILLIGRAM(S): at 23:58

## 2024-12-24 NOTE — BH INPATIENT PSYCHIATRY PROGRESS NOTE - NSBHCHARTREVIEWVS_PSY_A_CORE FT
Vital Signs Last 24 Hrs  T(C): 36.4 (12-24-24 @ 08:21), Max: 36.4 (12-24-24 @ 08:21)  T(F): 97.5 (12-24-24 @ 08:21), Max: 97.5 (12-24-24 @ 08:21)  HR: --  BP: --  BP(mean): --  RR: 18 (12-24-24 @ 08:21) (18 - 18)  SpO2: --    Orthostatic VS  12-24-24 @ 08:21  Lying BP: --/-- HR: --  Sitting BP: 118/86 HR: 115  Standing BP: 113/73 HR: 101  Site: --  Mode: electronic  Orthostatic VS  12-23-24 @ 19:14  Lying BP: --/-- HR: --  Sitting BP: 109/73 HR: 111  Standing BP: 106/64 HR: 110  Site: --  Mode: --
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
## 2024-12-24 NOTE — BH INPATIENT PSYCHIATRY PROGRESS NOTE - NSBHMETABOLIC_PSY_ALL_CORE_FT
BMI: BMI (kg/m2): 26 (10-12-24 @ 15:43)  HbA1c: A1C with Estimated Average Glucose Result: 6.1 % (10-18-24 @ 08:00)    Glucose: POCT Blood Glucose.: 57 mg/dL (04-15-24 @ 09:20)    BP: --Vital Signs Last 24 Hrs  T(C): 36.4 (12-24-24 @ 08:21), Max: 36.4 (12-24-24 @ 08:21)  T(F): 97.5 (12-24-24 @ 08:21), Max: 97.5 (12-24-24 @ 08:21)  HR: --  BP: --  BP(mean): --  RR: 18 (12-24-24 @ 08:21) (18 - 18)  SpO2: --    Orthostatic VS  12-24-24 @ 08:21  Lying BP: --/-- HR: --  Sitting BP: 118/86 HR: 115  Standing BP: 113/73 HR: 101  Site: --  Mode: electronic  Orthostatic VS  12-23-24 @ 19:14  Lying BP: --/-- HR: --  Sitting BP: 109/73 HR: 111  Standing BP: 106/64 HR: 110  Site: --  Mode: --    Lipid Panel: Date/Time: 10-18-24 @ 08:00  Cholesterol, Serum: 106  LDL Cholesterol Calculated: 53  HDL Cholesterol, Serum: 42  Total Cholesterol/HDL Ration Measurement: --  Triglycerides, Serum: 53

## 2024-12-24 NOTE — BH INPATIENT PSYCHIATRY PROGRESS NOTE - NSBHFUPINTERVALHXFT_PSY_A_CORE
Patient is followed up for NDD. Chart, medications and labs reviewed. Patient is discussed during morning brief, no overnight events, has been in good behavioral control.   Patient was seen and is evaluated on the unit. She continues to exhibit childlike behaviors, w/o intrusiveness towards staff/peers. She states that she feels "good" and remains discharge focused. Writer discussed that team is awaiting to hear back from agency regarding lunch date at group home, after this was requested during housing interview. She remains compliant with standing medications, denies SE. No acute medical concerns, VSS.

## 2024-12-25 RX ADMIN — OLANZAPINE 5 MILLIGRAM(S): 10 TABLET ORAL at 21:55

## 2024-12-25 RX ADMIN — Medication 100 MILLIGRAM(S): at 21:55

## 2024-12-26 PROCEDURE — 99232 SBSQ HOSP IP/OBS MODERATE 35: CPT

## 2024-12-26 RX ADMIN — OLANZAPINE 5 MILLIGRAM(S): 10 TABLET ORAL at 20:26

## 2024-12-26 RX ADMIN — Medication 100 MILLIGRAM(S): at 20:26

## 2024-12-26 RX ADMIN — HYDROXYZINE HYDROCHLORIDE 50 MILLIGRAM(S): 25 TABLET, FILM COATED ORAL at 05:24

## 2024-12-26 RX ADMIN — HYDROXYZINE HYDROCHLORIDE 50 MILLIGRAM(S): 25 TABLET, FILM COATED ORAL at 20:26

## 2024-12-26 NOTE — BH INPATIENT PSYCHIATRY PROGRESS NOTE - NSBHFUPINTERVALHXFT_PSY_A_CORE
Pt seen and examined. Chart reviewed. No acute overnight events reported. Pt remains medication adherent, tolerating it well without reported side effects. Denies AVH/SI/HI. Sleeping was very poor last night with good appetite. In good behavioral control. Pt remains with childlike behaviors, discharge focused at times. No acute medical complaints.
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
## 2024-12-26 NOTE — BH INPATIENT PSYCHIATRY PROGRESS NOTE - NSDCCRITERIA_PSY_ALL_CORE
no When pt is no longer an acute or imminent risk of harm to self or others, and is able to care for self safely, pt may then be discharged.

## 2024-12-26 NOTE — BH INPATIENT PSYCHIATRY PROGRESS NOTE - NSBHCHARTREVIEWVS_PSY_A_CORE FT
CLNIICAL INDICATORS    1/27 H&P Adult: Patient was at baseline this morning but when she tried to get up from bed while trying to hold the dresser, slipped and fell on knees and back. She was on the floor for about an hour because she couldnt get up and then got up into the wheelchair. Patient was using walker 4 months ago but since then uses wheelchair to get around. … PMH: Parkinsons vs Essential tremor, Chronic back pain/sciatica    2/6 Progress Note Adult-Internal Medicine Attending: … Of note the pt has mobility issues of various etiologies ( tremor, Parkinsonian limb stiffness, chronic back and leg pain, DJD) and up to 4 mos ago was able to use a walker to help her mobility an now transfers and uses the wheelchair.  … Underlying mobility issues … Worsening mobility. Wheelchair bound at baseline … Hx of OA, DJD, DDD, chronic back pain, mobility dysfunction, freq falls …     2/7 Discharge Note Provider: Mechanical fall … PT recommends Sub acute rehab    Radiology:   1/27 XR Pelvis: The bones are osteopenic. There is extensive degenerative disease of the visualized spine. There are bilateral degenerative changes of the femoroacetabular joints.      Physical therapy:  1/28 Physical Therapy Initial Evaluation Adult: PT Diagnosis: Debility … Clinical impressions: impairments found: aerobic capacity/endurance; gait, locomotion and balance;     2/6 Adult Plan of care: … Physical therapy recommendations: Pt requires assistance with functional mobility. PT recommends d/c to rehabilitation facility when medically cleared. Pls refer to eval/tx.     2/7 Adult Assessment and Intervention: Gait Training: … Gait training: close supervision, decline further ambulation 2/2 fatigue ;  rolling walker;  150 feet; Gait Analysis: 2-point gait   decreased step length;  decreased stride length;  decreased strength;  impaired balance;  150 feet;  rolling walker       Orders:   1/27: Gabapentin 300 PO TID; Tylenol 300mg/Codeine 30mg PO Q6H PRN   1/28: Physical therapy eval and treat  1/28: Physical therapy initial eval: General Interventions: Planned therapy interventions: balance training; gait training; bed mobility training; strengthening; transfer training        Based on your professional judgment and the clinical indicators, please clarify if worsening mobility issues can be further specified as:     - Worsening mobility issues associated with Parkinson’s disease (impaired gait and balance)  - Worsening mobility issues associated with chronic back pain/sciatica  - Worsening mobility issues associated with Degenerative joint disease (DJD)  - Worsening mobility issues associated with multifactorial: (Parkinson’s Disease, Chronic back pain/sciatica, Degenerative joint disease)  - Other (please specify):  - Clinically unable to further specify worsening mobility issues      Thank you,  Perri ALBA, RN, BSN  [phone] CLNIICAL INDICATORS    1/27 H&P Adult: Patient was at baseline this morning but when she tried to get up from bed while trying to hold the dresser, slipped and fell on knees and back. She was on the floor for about an hour because she couldnt get up and then got up into the wheelchair. Patient was using walker 4 months ago but since then uses wheelchair to get around. … PMH: Parkinsons vs Essential tremor, Chronic back pain/sciatica    2/6 Progress Note Adult-Internal Medicine Attending: … Of note the pt has mobility issues of various etiologies ( tremor, Parkinsonian limb stiffness, chronic back and leg pain, DJD) … Underlying mobility issues … Worsening mobility. Wheelchair bound at baseline … Hx of OA, DJD, DDD, chronic back pain, mobility dysfunction, freq falls …     2/7 Discharge Note Provider: Mechanical fall … PT recommends Sub acute rehab    Radiology:   1/27 XR Pelvis: The bones are osteopenic. There is extensive degenerative disease of the visualized spine. There are bilateral degenerative changes of the femoroacetabular joints.      Physical therapy:  1/28 Physical Therapy Initial Evaluation Adult: PT Diagnosis: Debility … Clinical impressions: impairments found: aerobic capacity/endurance; gait, locomotion and balance;     2/6 Adult Plan of care: … Physical therapy recommendations: Pt requires assistance with functional mobility. PT recommends d/c to rehabilitation facility when medically cleared. Pls refer to eval/tx.     2/7 Adult Assessment and Intervention: Gait Training: … Gait training: close supervision, decline further ambulation 2/2 fatigue ;  rolling walker;  150 feet; Gait Analysis: 2-point gait   decreased step length;  decreased stride length;  decreased strength;  impaired balance;  150 feet;  rolling walker       Orders:   1/27: Gabapentin 300 PO TID; Tylenol 300mg/Codeine 30mg PO Q6H PRN   1/28: Physical therapy eval and treat  1/28: Physical therapy initial eval: General Interventions: Planned therapy interventions: balance training; gait training; bed mobility training; strengthening; transfer training        Based on your professional judgment and the clinical indicators, please clarify if worsening mobility issues can be further specified as:     - Worsening mobility issues associated with Parkinson’s disease (impaired gait and balance)  - Worsening mobility issues associated with chronic back pain/sciatica  - Worsening mobility issues associated with Degenerative joint disease (DJD)  - Worsening mobility issues associated with multifactorial: (Parkinson’s Disease, Chronic back pain/sciatica, Degenerative joint disease)  - Other (please specify):  - Clinically unable to further specify worsening mobility issues      Thank you,  Perri ALBA, RN, BSN  [phone] Vital Signs Last 24 Hrs  T(C): 36.6 (12-26-24 @ 06:26), Max: 36.6 (12-26-24 @ 06:26)  T(F): 97.8 (12-26-24 @ 06:26), Max: 97.8 (12-26-24 @ 06:26)  HR: --  BP: --  BP(mean): --  RR: --  SpO2: --    Orthostatic VS  12-26-24 @ 06:26  Lying BP: --/-- HR: --  Sitting BP: 116/82 HR: 86  Standing BP: 122/85 HR: 98  Site: --  Mode: --  Orthostatic VS  12-25-24 @ 08:35  Lying BP: --/-- HR: --  Sitting BP: 97/55 HR: 67  Standing BP: 92/58 HR: 72  Site: --  Mode: --

## 2024-12-26 NOTE — BH INPATIENT PSYCHIATRY PROGRESS NOTE - CURRENT MEDICATION
MEDICATIONS  (STANDING):  OLANZapine 5 milliGRAM(s) Oral at bedtime  traZODone 100 milliGRAM(s) Oral at bedtime    MEDICATIONS  (PRN):  acetaminophen     Tablet .. 650 milliGRAM(s) Oral every 6 hours PRN Temp greater or equal to 38C (100.4F), Mild Pain (1 - 3)  diphenhydrAMINE 50 milliGRAM(s) Oral every 6 hours PRN Extrapyramidal symptoms or prophylaxis  diphenhydrAMINE Injectable 50 milliGRAM(s) IntraMuscular once PRN Extrapyramidal prophylaxis  haloperidol    Injectable 5 milliGRAM(s) IntraMuscular once PRN aggression  hydrOXYzine hydrochloride 50 milliGRAM(s) Oral every 6 hours PRN anxiety  LORazepam     Tablet 2 milliGRAM(s) Oral every 6 hours PRN severe anxiety, agitation  LORazepam   Injectable 2 milliGRAM(s) IntraMuscular once PRN agitation

## 2024-12-26 NOTE — BH INPATIENT PSYCHIATRY PROGRESS NOTE - NSBHMETABOLIC_PSY_ALL_CORE_FT
BMI: BMI (kg/m2): 26 (10-12-24 @ 15:43)  HbA1c: A1C with Estimated Average Glucose Result: 6.1 % (10-18-24 @ 08:00)    Glucose: POCT Blood Glucose.: 57 mg/dL (04-15-24 @ 09:20)    BP: --Vital Signs Last 24 Hrs  T(C): 36.6 (12-26-24 @ 06:26), Max: 36.6 (12-26-24 @ 06:26)  T(F): 97.8 (12-26-24 @ 06:26), Max: 97.8 (12-26-24 @ 06:26)  HR: --  BP: --  BP(mean): --  RR: --  SpO2: --    Orthostatic VS  12-26-24 @ 06:26  Lying BP: --/-- HR: --  Sitting BP: 116/82 HR: 86  Standing BP: 122/85 HR: 98  Site: --  Mode: --  Orthostatic VS  12-25-24 @ 08:35  Lying BP: --/-- HR: --  Sitting BP: 97/55 HR: 67  Standing BP: 92/58 HR: 72  Site: --  Mode: --    Lipid Panel: Date/Time: 10-18-24 @ 08:00  Cholesterol, Serum: 106  LDL Cholesterol Calculated: 53  HDL Cholesterol, Serum: 42  Total Cholesterol/HDL Ration Measurement: --  Triglycerides, Serum: 53

## 2024-12-27 PROCEDURE — 99232 SBSQ HOSP IP/OBS MODERATE 35: CPT

## 2024-12-27 RX ORDER — LORAZEPAM 4 MG/ML
2 VIAL (ML) INJECTION EVERY 6 HOURS
Refills: 0 | Status: DISCONTINUED | OUTPATIENT
Start: 2024-12-27 | End: 2024-12-30

## 2024-12-27 RX ADMIN — Medication 2 MILLIGRAM(S): at 20:23

## 2024-12-27 RX ADMIN — Medication 100 MILLIGRAM(S): at 20:23

## 2024-12-27 RX ADMIN — OLANZAPINE 5 MILLIGRAM(S): 10 TABLET ORAL at 20:23

## 2024-12-27 RX ADMIN — HYDROXYZINE HYDROCHLORIDE 50 MILLIGRAM(S): 25 TABLET, FILM COATED ORAL at 20:24

## 2024-12-27 NOTE — BH INPATIENT PSYCHIATRY PROGRESS NOTE - NSBHFUPINTERVALHXFT_PSY_A_CORE
Pt seen and examined. Chart reviewed. Yesterday, pt was increasingly irritable, with multiple childlike behavioral outbursts, eventually able to be redirected.  Pt remains medication adherent, tolerating it well without reported side effects. Denies AVH/SI/HI. Sleeping much improved from day prior with good appetite. In good behavioral control. Pt remains with childlike behaviors, yet in better control then day prior (outbursts yesterday likely d/t poor sleep, menstrual cycle).

## 2024-12-27 NOTE — BH INPATIENT PSYCHIATRY PROGRESS NOTE - NSBHMETABOLIC_PSY_ALL_CORE_FT
BMI: BMI (kg/m2): 26 (10-12-24 @ 15:43)  HbA1c: A1C with Estimated Average Glucose Result: 6.1 % (10-18-24 @ 08:00)    Glucose: POCT Blood Glucose.: 57 mg/dL (04-15-24 @ 09:20)    BP: --Vital Signs Last 24 Hrs  T(C): 36.1 (12-26-24 @ 20:18), Max: 36.1 (12-26-24 @ 20:18)  T(F): 97 (12-26-24 @ 20:18), Max: 97 (12-26-24 @ 20:18)  HR: --  BP: --  BP(mean): --  RR: 16 (12-26-24 @ 20:18) (16 - 16)  SpO2: --    Orthostatic VS  12-26-24 @ 20:18  Lying BP: --/-- HR: --  Sitting BP: 113/76 HR: 88  Standing BP: --/-- HR: --  Site: --  Mode: --  Orthostatic VS  12-26-24 @ 06:26  Lying BP: --/-- HR: --  Sitting BP: 116/82 HR: 86  Standing BP: 122/85 HR: 98  Site: --  Mode: --    Lipid Panel: Date/Time: 10-18-24 @ 08:00  Cholesterol, Serum: 106  LDL Cholesterol Calculated: 53  HDL Cholesterol, Serum: 42  Total Cholesterol/HDL Ration Measurement: --  Triglycerides, Serum: 53
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
## 2024-12-27 NOTE — BH INPATIENT PSYCHIATRY PROGRESS NOTE - NSBHCHARTREVIEWVS_PSY_A_CORE FT
Vital Signs Last 24 Hrs  T(C): 36.1 (12-26-24 @ 20:18), Max: 36.1 (12-26-24 @ 20:18)  T(F): 97 (12-26-24 @ 20:18), Max: 97 (12-26-24 @ 20:18)  HR: --  BP: --  BP(mean): --  RR: 16 (12-26-24 @ 20:18) (16 - 16)  SpO2: --    Orthostatic VS  12-26-24 @ 20:18  Lying BP: --/-- HR: --  Sitting BP: 113/76 HR: 88  Standing BP: --/-- HR: --  Site: --  Mode: --  Orthostatic VS  12-26-24 @ 06:26  Lying BP: --/-- HR: --  Sitting BP: 116/82 HR: 86  Standing BP: 122/85 HR: 98  Site: --  Mode: --

## 2024-12-28 RX ADMIN — Medication 2 MILLIGRAM(S): at 10:37

## 2024-12-29 RX ADMIN — OLANZAPINE 5 MILLIGRAM(S): 10 TABLET ORAL at 00:21

## 2024-12-29 RX ADMIN — Medication 650 MILLIGRAM(S): at 19:54

## 2024-12-29 RX ADMIN — Medication 50 MILLIGRAM(S): at 00:17

## 2024-12-29 RX ADMIN — Medication 50 MILLIGRAM(S): at 19:54

## 2024-12-29 RX ADMIN — Medication 650 MILLIGRAM(S): at 20:34

## 2024-12-29 RX ADMIN — Medication 100 MILLIGRAM(S): at 19:54

## 2024-12-29 RX ADMIN — Medication 100 MILLIGRAM(S): at 00:21

## 2024-12-29 RX ADMIN — OLANZAPINE 5 MILLIGRAM(S): 10 TABLET ORAL at 19:54

## 2024-12-29 RX ADMIN — HYDROXYZINE HYDROCHLORIDE 50 MILLIGRAM(S): 25 TABLET, FILM COATED ORAL at 19:54

## 2024-12-29 RX ADMIN — HYDROXYZINE HYDROCHLORIDE 50 MILLIGRAM(S): 25 TABLET, FILM COATED ORAL at 00:17

## 2024-12-30 PROCEDURE — 99232 SBSQ HOSP IP/OBS MODERATE 35: CPT

## 2024-12-30 RX ORDER — LORAZEPAM 4 MG/ML
2 VIAL (ML) INJECTION ONCE
Refills: 0 | Status: DISCONTINUED | OUTPATIENT
Start: 2024-12-30 | End: 2025-01-06

## 2024-12-30 RX ORDER — LORAZEPAM 4 MG/ML
2 VIAL (ML) INJECTION EVERY 6 HOURS
Refills: 0 | Status: DISCONTINUED | OUTPATIENT
Start: 2024-12-30 | End: 2025-01-06

## 2024-12-30 RX ADMIN — Medication 50 MILLIGRAM(S): at 19:55

## 2024-12-30 RX ADMIN — Medication 100 MILLIGRAM(S): at 19:55

## 2024-12-30 RX ADMIN — HYDROXYZINE HYDROCHLORIDE 50 MILLIGRAM(S): 25 TABLET, FILM COATED ORAL at 19:55

## 2024-12-30 RX ADMIN — OLANZAPINE 5 MILLIGRAM(S): 10 TABLET ORAL at 19:55

## 2024-12-30 NOTE — BH INPATIENT PSYCHIATRY PROGRESS NOTE - NSBHMETABOLIC_PSY_ALL_CORE_FT
BMI: BMI (kg/m2): 26 (10-12-24 @ 15:43)  HbA1c: A1C with Estimated Average Glucose Result: 6.1 % (10-18-24 @ 08:00)    Glucose: POCT Blood Glucose.: 57 mg/dL (04-15-24 @ 09:20)    BP: --Vital Signs Last 24 Hrs  T(C): 36.7 (12-29-24 @ 19:35), Max: 36.7 (12-29-24 @ 19:35)  T(F): 98 (12-29-24 @ 19:35), Max: 98 (12-29-24 @ 19:35)  HR: --  BP: --  BP(mean): --  RR: 17 (12-30-24 @ 10:48) (17 - 17)  SpO2: --    Orthostatic VS  12-29-24 @ 19:35  Lying BP: --/-- HR: --  Sitting BP: 120/91 HR: 92  Standing BP: 122/88 HR: 96  Site: --  Mode: --  Orthostatic VS  12-29-24 @ 09:06  Lying BP: --/-- HR: --  Sitting BP: 116/78 HR: 98  Standing BP: 127/88 HR: 113  Site: upper left arm  Mode: electronic    Lipid Panel: Date/Time: 10-18-24 @ 08:00  Cholesterol, Serum: 106  LDL Cholesterol Calculated: 53  HDL Cholesterol, Serum: 42  Total Cholesterol/HDL Ration Measurement: --  Triglycerides, Serum: 53

## 2024-12-30 NOTE — BH INPATIENT PSYCHIATRY PROGRESS NOTE - NSBHCHARTREVIEWVS_PSY_A_CORE FT
Vital Signs Last 24 Hrs  T(C): 36.7 (12-29-24 @ 19:35), Max: 36.7 (12-29-24 @ 19:35)  T(F): 98 (12-29-24 @ 19:35), Max: 98 (12-29-24 @ 19:35)  HR: --  BP: --  BP(mean): --  RR: 17 (12-30-24 @ 10:48) (17 - 17)  SpO2: --    Orthostatic VS  12-29-24 @ 19:35  Lying BP: --/-- HR: --  Sitting BP: 120/91 HR: 92  Standing BP: 122/88 HR: 96  Site: --  Mode: --  Orthostatic VS  12-29-24 @ 09:06  Lying BP: --/-- HR: --  Sitting BP: 116/78 HR: 98  Standing BP: 127/88 HR: 113  Site: upper left arm  Mode: electronic
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
## 2024-12-30 NOTE — BH INPATIENT PSYCHIATRY PROGRESS NOTE - NSBHFUPINTERVALHXFT_PSY_A_CORE
Chart reviewed including pertinent labs, imaging, ekg. case discussed with treatment team.  Over this interval: reportedly agitated over the weekend necessitating PRNs. today, whispers nonsensically at me. appears responding to internal stimuli. denies AH.

## 2024-12-31 PROCEDURE — 99232 SBSQ HOSP IP/OBS MODERATE 35: CPT

## 2024-12-31 RX ADMIN — Medication 50 MILLIGRAM(S): at 20:14

## 2024-12-31 RX ADMIN — OLANZAPINE 5 MILLIGRAM(S): 10 TABLET ORAL at 20:14

## 2024-12-31 RX ADMIN — HYDROXYZINE HYDROCHLORIDE 50 MILLIGRAM(S): 25 TABLET, FILM COATED ORAL at 20:14

## 2024-12-31 RX ADMIN — Medication 100 MILLIGRAM(S): at 20:14

## 2024-12-31 NOTE — BH INPATIENT PSYCHIATRY PROGRESS NOTE - NSBHFUPINTERVALHXFT_PSY_A_CORE
Chart reviewed including pertinent labs, imaging, ekg. case discussed with treatment team.  Over this interval: no major changes over this interval. pt whispers nonsensically at me something about a group home. per staff, she keeps to herself, is usually on the phone but it's unclear who she's talking to. it's also reported that she will have random and loud outbursts that seem to be psychotically driven

## 2024-12-31 NOTE — BH INPATIENT PSYCHIATRY PROGRESS NOTE - NSBHMETABOLIC_PSY_ALL_CORE_FT
BMI: BMI (kg/m2): 26 (10-12-24 @ 15:43)  HbA1c: A1C with Estimated Average Glucose Result: 6.1 % (10-18-24 @ 08:00)    Glucose: POCT Blood Glucose.: 57 mg/dL (04-15-24 @ 09:20)    BP: --Vital Signs Last 24 Hrs  T(C): --  T(F): --  HR: --  BP: --  BP(mean): --  RR: 16 (12-30-24 @ 21:24) (16 - 16)  SpO2: --    Orthostatic VS  12-29-24 @ 19:35  Lying BP: --/-- HR: --  Sitting BP: 120/91 HR: 92  Standing BP: 122/88 HR: 96  Site: --  Mode: --    Lipid Panel: Date/Time: 10-18-24 @ 08:00  Cholesterol, Serum: 106  LDL Cholesterol Calculated: 53  HDL Cholesterol, Serum: 42  Total Cholesterol/HDL Ration Measurement: --  Triglycerides, Serum: 53

## 2024-12-31 NOTE — BH INPATIENT PSYCHIATRY PROGRESS NOTE - NSBHCHARTREVIEWVS_PSY_A_CORE FT
Vital Signs Last 24 Hrs  T(C): --  T(F): --  HR: --  BP: --  BP(mean): --  RR: 16 (12-30-24 @ 21:24) (16 - 16)  SpO2: --    Orthostatic VS  12-29-24 @ 19:35  Lying BP: --/-- HR: --  Sitting BP: 120/91 HR: 92  Standing BP: 122/88 HR: 96  Site: --  Mode: --
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
## 2025-01-01 RX ADMIN — Medication 100 MILLIGRAM(S): at 21:29

## 2025-01-01 RX ADMIN — Medication 50 MILLIGRAM(S): at 21:29

## 2025-01-01 RX ADMIN — Medication 2 MILLIGRAM(S): at 21:29

## 2025-01-01 RX ADMIN — OLANZAPINE 5 MILLIGRAM(S): 10 TABLET ORAL at 21:29

## 2025-01-02 PROCEDURE — 99232 SBSQ HOSP IP/OBS MODERATE 35: CPT

## 2025-01-02 RX ADMIN — Medication 2 MILLIGRAM(S): at 20:16

## 2025-01-02 RX ADMIN — Medication 100 MILLIGRAM(S): at 20:16

## 2025-01-02 RX ADMIN — Medication 50 MILLIGRAM(S): at 20:16

## 2025-01-02 RX ADMIN — OLANZAPINE 5 MILLIGRAM(S): 10 TABLET ORAL at 20:15

## 2025-01-02 NOTE — BH INPATIENT PSYCHIATRY PROGRESS NOTE - NSBHCHARTREVIEWVS_PSY_A_CORE FT
Vital Signs Last 24 Hrs  T(C): --  T(F): --  HR: --  BP: --  BP(mean): --  RR: 18 (01-02-25 @ 08:27) (18 - 18)  SpO2: --    Orthostatic VS  01-01-25 @ 09:09  Lying BP: --/-- HR: --  Sitting BP: 120/88 HR: 94  Standing BP: 115/83 HR: 109  Site: --  Mode: --

## 2025-01-02 NOTE — BH INPATIENT PSYCHIATRY PROGRESS NOTE - NSBHMETABOLIC_PSY_ALL_CORE_FT
BMI: BMI (kg/m2): 26 (10-12-24 @ 15:43)  HbA1c: A1C with Estimated Average Glucose Result: 6.1 % (10-18-24 @ 08:00)    Glucose: POCT Blood Glucose.: 57 mg/dL (04-15-24 @ 09:20)    BP: --Vital Signs Last 24 Hrs  T(C): --  T(F): --  HR: --  BP: --  BP(mean): --  RR: 18 (01-02-25 @ 08:27) (18 - 18)  SpO2: --    Orthostatic VS  01-01-25 @ 09:09  Lying BP: --/-- HR: --  Sitting BP: 120/88 HR: 94  Standing BP: 115/83 HR: 109  Site: --  Mode: --    Lipid Panel: Date/Time: 10-18-24 @ 08:00  Cholesterol, Serum: 106  LDL Cholesterol Calculated: 53  HDL Cholesterol, Serum: 42  Total Cholesterol/HDL Ration Measurement: --  Triglycerides, Serum: 53

## 2025-01-02 NOTE — BH INPATIENT PSYCHIATRY PROGRESS NOTE - NSBHFUPINTERVALHXFT_PSY_A_CORE
Patient is followed up for NDD. Chart, medications and labs reviewed. Patient is discussed during morning brief, no overnight events, has been in good behavioral control.   Patient was seen and is evaluated on the unit. She continues to exhibit childlike behaviors, w/o intrusiveness towards staff/peers. She states that she is doing "good", talks about having a new boyfriend and wanting to go to [place]. She has been compliant with standing medications, denies SE. No acute medical concerns, VSS.
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
## 2025-01-03 PROCEDURE — 99232 SBSQ HOSP IP/OBS MODERATE 35: CPT

## 2025-01-03 RX ORDER — OLANZAPINE 10 MG/1
10 TABLET ORAL AT BEDTIME
Refills: 0 | Status: DISCONTINUED | OUTPATIENT
Start: 2025-01-03 | End: 2025-01-08

## 2025-01-03 RX ADMIN — Medication 100 MILLIGRAM(S): at 20:31

## 2025-01-03 RX ADMIN — Medication 2 MILLIGRAM(S): at 20:32

## 2025-01-03 RX ADMIN — Medication 50 MILLIGRAM(S): at 20:32

## 2025-01-03 RX ADMIN — Medication 2 MILLIGRAM(S): at 09:13

## 2025-01-03 RX ADMIN — OLANZAPINE 10 MILLIGRAM(S): 10 TABLET ORAL at 20:31

## 2025-01-03 NOTE — BH INPATIENT PSYCHIATRY PROGRESS NOTE - NSBHFUPINTERVALHXFT_PSY_A_CORE
Patient is followed up for NDD. Chart, medications and labs reviewed. Patient is discussed during morning brief, no overnight events, has been in good behavioral control.   Patient was seen and is evaluated on the unit. She continues to exhibit childlike behaviors, w/o intrusiveness towards staff/peers. She states that she does not have a boyfriend or any children, focused on wanting to go to a group home and also [place]. She has been compliant with standing medications, denies SE. No acute medical concerns, VSS.

## 2025-01-03 NOTE — BH INPATIENT PSYCHIATRY PROGRESS NOTE - CURRENT MEDICATION
MEDICATIONS  (STANDING):  OLANZapine 10 milliGRAM(s) Oral at bedtime  traZODone 100 milliGRAM(s) Oral at bedtime    MEDICATIONS  (PRN):  acetaminophen     Tablet .. 650 milliGRAM(s) Oral every 6 hours PRN Temp greater or equal to 38C (100.4F), Mild Pain (1 - 3)  diphenhydrAMINE 50 milliGRAM(s) Oral every 6 hours PRN Extrapyramidal symptoms or prophylaxis  diphenhydrAMINE Injectable 50 milliGRAM(s) IntraMuscular once PRN Extrapyramidal prophylaxis  haloperidol    Injectable 5 milliGRAM(s) IntraMuscular once PRN aggression  hydrOXYzine hydrochloride 50 milliGRAM(s) Oral every 6 hours PRN anxiety  LORazepam     Tablet 2 milliGRAM(s) Oral every 6 hours PRN severe anxiety, agitation  LORazepam   Injectable 2 milliGRAM(s) IntraMuscular once PRN agitation

## 2025-01-03 NOTE — BH INPATIENT PSYCHIATRY PROGRESS NOTE - PRN MEDS
patient complains of severe burning abdominal pain and hives all over body, pain worsening over past two days. Pt reports being on bacterin and keflex for a week after visiting the ED last Sunday for infected insect bite. has stopped taking antibiotics. denies NVD. last took benadryl 1 hour prior to arrival, states that hives have partially improved. MEDICATIONS  (PRN):  acetaminophen     Tablet .. 650 milliGRAM(s) Oral every 6 hours PRN Temp greater or equal to 38C (100.4F), Mild Pain (1 - 3)  diphenhydrAMINE 50 milliGRAM(s) Oral every 6 hours PRN Extrapyramidal symptoms or prophylaxis  diphenhydrAMINE Injectable 50 milliGRAM(s) IntraMuscular once PRN Extrapyramidal prophylaxis  haloperidol    Injectable 5 milliGRAM(s) IntraMuscular once PRN aggression  hydrOXYzine hydrochloride 50 milliGRAM(s) Oral every 6 hours PRN anxiety  LORazepam     Tablet 2 milliGRAM(s) Oral every 6 hours PRN severe anxiety, agitation  LORazepam   Injectable 2 milliGRAM(s) IntraMuscular once PRN agitation

## 2025-01-03 NOTE — BH INPATIENT PSYCHIATRY PROGRESS NOTE - NSBHCHARTREVIEWVS_PSY_A_CORE FT
Vital Signs Last 24 Hrs  T(C): 35.9 (01-03-25 @ 09:47), Max: 36.6 (01-02-25 @ 19:34)  T(F): 96.7 (01-03-25 @ 09:47), Max: 97.8 (01-02-25 @ 19:34)  HR: --  BP: --  BP(mean): --  RR: 18 (01-03-25 @ 09:47) (16 - 18)  SpO2: --    Orthostatic VS  01-03-25 @ 09:47  Lying BP: --/-- HR: --  Sitting BP: 108/73 HR: 106  Standing BP: 98/67 HR: 115  Site: --  Mode: --  Orthostatic VS  01-02-25 @ 19:34  Lying BP: --/-- HR: --  Sitting BP: 118/74 HR: 96  Standing BP: 117/73 HR: 98  Site: --  Mode: --

## 2025-01-03 NOTE — BH INPATIENT PSYCHIATRY PROGRESS NOTE - NSBHATTESTBILLING_PSY_A_CORE
"   05/25/23 0000   Group Therapy Session   Group Attendance attended group session   Time Session Began 1905   Time Session Ended 2000   Total Time (minutes) 55   Total # Attendees 3   Group Type psychotherapeutic   Group Topic Covered coping skills/lifestyle management;structured socialization   Group Session Detail topic of ghada/ Sukumar talk   Patient Response/Contribution cooperative with task;confused;discussed personal experience with topic;disorganized;left the group on several occasions;unable to interrupt patient;verbalizations were off topic     Pt reported mood as terrific. He was hyperverbal and talking nonsensically, about [employer] things, siblings, perseverative on \" having babies\"\" having fun\" he did leave early to use the restroom and then the small group watched a sukumar talk. He would not have been able to participate , he would have been confused. He did make a drawing with symbols that he gifted writer.  " [id number] OBS or IP - moderate complexity OR 35-49 mins

## 2025-01-03 NOTE — BH INPATIENT PSYCHIATRY PROGRESS NOTE - NSBHMETABOLIC_PSY_ALL_CORE_FT
BMI: BMI (kg/m2): 26 (10-12-24 @ 15:43)  HbA1c: A1C with Estimated Average Glucose Result: 6.1 % (10-18-24 @ 08:00)    Glucose: POCT Blood Glucose.: 57 mg/dL (04-15-24 @ 09:20)    BP: --Vital Signs Last 24 Hrs  T(C): 35.9 (01-03-25 @ 09:47), Max: 36.6 (01-02-25 @ 19:34)  T(F): 96.7 (01-03-25 @ 09:47), Max: 97.8 (01-02-25 @ 19:34)  HR: --  BP: --  BP(mean): --  RR: 18 (01-03-25 @ 09:47) (16 - 18)  SpO2: --    Orthostatic VS  01-03-25 @ 09:47  Lying BP: --/-- HR: --  Sitting BP: 108/73 HR: 106  Standing BP: 98/67 HR: 115  Site: --  Mode: --  Orthostatic VS  01-02-25 @ 19:34  Lying BP: --/-- HR: --  Sitting BP: 118/74 HR: 96  Standing BP: 117/73 HR: 98  Site: --  Mode: --    Lipid Panel: Date/Time: 10-18-24 @ 08:00  Cholesterol, Serum: 106  LDL Cholesterol Calculated: 53  HDL Cholesterol, Serum: 42  Total Cholesterol/HDL Ration Measurement: --  Triglycerides, Serum: 53
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
## 2025-01-04 RX ADMIN — OLANZAPINE 10 MILLIGRAM(S): 10 TABLET ORAL at 20:28

## 2025-01-04 RX ADMIN — Medication 50 MILLIGRAM(S): at 20:28

## 2025-01-04 RX ADMIN — Medication 100 MILLIGRAM(S): at 20:28

## 2025-01-04 RX ADMIN — Medication 2 MILLIGRAM(S): at 20:28

## 2025-01-05 RX ADMIN — Medication 100 MILLIGRAM(S): at 21:19

## 2025-01-05 RX ADMIN — Medication 50 MILLIGRAM(S): at 21:20

## 2025-01-05 RX ADMIN — Medication 2 MILLIGRAM(S): at 21:20

## 2025-01-05 RX ADMIN — Medication 650 MILLIGRAM(S): at 15:48

## 2025-01-05 RX ADMIN — OLANZAPINE 10 MILLIGRAM(S): 10 TABLET ORAL at 21:19

## 2025-01-06 PROCEDURE — 99232 SBSQ HOSP IP/OBS MODERATE 35: CPT

## 2025-01-06 RX ORDER — LORAZEPAM 4 MG/ML
2 VIAL (ML) INJECTION ONCE
Refills: 0 | Status: DISCONTINUED | OUTPATIENT
Start: 2025-01-06 | End: 2025-01-09

## 2025-01-06 RX ORDER — LORAZEPAM 4 MG/ML
2 VIAL (ML) INJECTION EVERY 6 HOURS
Refills: 0 | Status: DISCONTINUED | OUTPATIENT
Start: 2025-01-06 | End: 2025-01-09

## 2025-01-06 RX ADMIN — Medication 50 MILLIGRAM(S): at 23:37

## 2025-01-06 RX ADMIN — OLANZAPINE 10 MILLIGRAM(S): 10 TABLET ORAL at 20:19

## 2025-01-06 RX ADMIN — Medication 100 MILLIGRAM(S): at 20:19

## 2025-01-06 RX ADMIN — Medication 2 MILLIGRAM(S): at 23:37

## 2025-01-06 NOTE — BH INPATIENT PSYCHIATRY PROGRESS NOTE - NSBHCHARTREVIEWVS_PSY_A_CORE FT
Vital Signs Last 24 Hrs  T(C): 36.4 (01-06-25 @ 09:18), Max: 36.6 (01-05-25 @ 19:57)  T(F): 97.6 (01-06-25 @ 09:18), Max: 97.8 (01-05-25 @ 19:57)  HR: --  BP: --  BP(mean): --  RR: 17 (01-06-25 @ 09:18) (17 - 17)  SpO2: --    Orthostatic VS  01-06-25 @ 09:18  Lying BP: --/-- HR: --  Sitting BP: 127/72 HR: 91  Standing BP: 113/67 HR: 98  Site: --  Mode: --  Orthostatic VS  01-05-25 @ 19:57  Lying BP: --/-- HR: --  Sitting BP: 117/73 HR: 91  Standing BP: 103/81 HR: 111  Site: --  Mode: --  Orthostatic VS  01-04-25 @ 19:15  Lying BP: --/-- HR: --  Sitting BP: 143/75 HR: 81  Standing BP: 123/70 HR: 90  Site: upper left arm  Mode: electronic   Vital Signs Last 24 Hrs  T(C): 36.6 (01-07-25 @ 08:36), Max: 36.6 (01-07-25 @ 08:36)  T(F): 97.8 (01-07-25 @ 08:36), Max: 97.8 (01-07-25 @ 08:36)  HR: --  BP: --  BP(mean): --  RR: 18 (01-07-25 @ 08:36) (18 - 18)  SpO2: --    Orthostatic VS  01-07-25 @ 08:36  Lying BP: --/-- HR: --  Sitting BP: 136/90 HR: 98  Standing BP: 134/87 HR: 110  Site: --  Mode: electronic  Orthostatic VS  01-06-25 @ 19:45  Lying BP: --/-- HR: --  Sitting BP: 133/78 HR: 111  Standing BP: 134/87 HR: 114  Site: --  Mode: --  Orthostatic VS  01-06-25 @ 09:18  Lying BP: --/-- HR: --  Sitting BP: 127/72 HR: 91  Standing BP: 113/67 HR: 98  Site: --  Mode: --  Orthostatic VS  01-05-25 @ 19:57  Lying BP: --/-- HR: --  Sitting BP: 117/73 HR: 91  Standing BP: 103/81 HR: 111  Site: --  Mode: --
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
## 2025-01-06 NOTE — BH INPATIENT PSYCHIATRY PROGRESS NOTE - NSBHFUPINTERVALHXFT_PSY_A_CORE
Patient is followed up for NDD. Chart, medications and labs reviewed. Patient is discussed during morning brief, no overnight events, has been in good behavioral control.   Patient was seen and is evaluated on the unit. She continues to exhibit childlike behaviors, w/o intrusiveness towards staff/peers. She remains discharge focused, states that she would like to leave tomorrow and be picked up by a cab. Writer discussed meeting scheduled tomorrow AM w/ [facility]. She has been compliant with standing medications, denies SE. No acute medical concerns, VSS.

## 2025-01-06 NOTE — BH INPATIENT PSYCHIATRY PROGRESS NOTE - NSBHMETABOLIC_PSY_ALL_CORE_FT
BMI: BMI (kg/m2): 25.6 (01-04-25 @ 15:39)  HbA1c: A1C with Estimated Average Glucose Result: 6.1 % (10-18-24 @ 08:00)    Glucose: POCT Blood Glucose.: 57 mg/dL (04-15-24 @ 09:20)    BP: --Vital Signs Last 24 Hrs  T(C): 36.4 (01-06-25 @ 09:18), Max: 36.6 (01-05-25 @ 19:57)  T(F): 97.6 (01-06-25 @ 09:18), Max: 97.8 (01-05-25 @ 19:57)  HR: --  BP: --  BP(mean): --  RR: 17 (01-06-25 @ 09:18) (17 - 17)  SpO2: --    Orthostatic VS  01-06-25 @ 09:18  Lying BP: --/-- HR: --  Sitting BP: 127/72 HR: 91  Standing BP: 113/67 HR: 98  Site: --  Mode: --  Orthostatic VS  01-05-25 @ 19:57  Lying BP: --/-- HR: --  Sitting BP: 117/73 HR: 91  Standing BP: 103/81 HR: 111  Site: --  Mode: --  Orthostatic VS  01-04-25 @ 19:15  Lying BP: --/-- HR: --  Sitting BP: 143/75 HR: 81  Standing BP: 123/70 HR: 90  Site: upper left arm  Mode: electronic    Lipid Panel: Date/Time: 10-18-24 @ 08:00  Cholesterol, Serum: 106  LDL Cholesterol Calculated: 53  HDL Cholesterol, Serum: 42  Total Cholesterol/HDL Ration Measurement: --  Triglycerides, Serum: 53   BMI: BMI (kg/m2): 25.6 (01-04-25 @ 15:39)  HbA1c: A1C with Estimated Average Glucose Result: 6.1 % (10-18-24 @ 08:00)    Glucose: POCT Blood Glucose.: 57 mg/dL (04-15-24 @ 09:20)    BP: --Vital Signs Last 24 Hrs  T(C): 36.6 (01-07-25 @ 08:36), Max: 36.6 (01-07-25 @ 08:36)  T(F): 97.8 (01-07-25 @ 08:36), Max: 97.8 (01-07-25 @ 08:36)  HR: --  BP: --  BP(mean): --  RR: 18 (01-07-25 @ 08:36) (18 - 18)  SpO2: --    Orthostatic VS  01-07-25 @ 08:36  Lying BP: --/-- HR: --  Sitting BP: 136/90 HR: 98  Standing BP: 134/87 HR: 110  Site: --  Mode: electronic  Orthostatic VS  01-06-25 @ 19:45  Lying BP: --/-- HR: --  Sitting BP: 133/78 HR: 111  Standing BP: 134/87 HR: 114  Site: --  Mode: --  Orthostatic VS  01-06-25 @ 09:18  Lying BP: --/-- HR: --  Sitting BP: 127/72 HR: 91  Standing BP: 113/67 HR: 98  Site: --  Mode: --  Orthostatic VS  01-05-25 @ 19:57  Lying BP: --/-- HR: --  Sitting BP: 117/73 HR: 91  Standing BP: 103/81 HR: 111  Site: --  Mode: --    Lipid Panel: Date/Time: 10-18-24 @ 08:00  Cholesterol, Serum: 106  LDL Cholesterol Calculated: 53  HDL Cholesterol, Serum: 42  Total Cholesterol/HDL Ration Measurement: --  Triglycerides, Serum: 53

## 2025-01-07 PROCEDURE — 99232 SBSQ HOSP IP/OBS MODERATE 35: CPT

## 2025-01-07 RX ADMIN — HYDROXYZINE HYDROCHLORIDE 50 MILLIGRAM(S): 25 TABLET, FILM COATED ORAL at 20:11

## 2025-01-07 RX ADMIN — Medication 100 MILLIGRAM(S): at 20:11

## 2025-01-07 RX ADMIN — OLANZAPINE 10 MILLIGRAM(S): 10 TABLET ORAL at 20:11

## 2025-01-07 NOTE — BH INPATIENT PSYCHIATRY PROGRESS NOTE - NSBHCHARTREVIEWVS_PSY_A_CORE FT
Vital Signs Last 24 Hrs  T(C): 36.6 (01-07-25 @ 08:36), Max: 36.6 (01-07-25 @ 08:36)  T(F): 97.8 (01-07-25 @ 08:36), Max: 97.8 (01-07-25 @ 08:36)  HR: --  BP: --  BP(mean): --  RR: 18 (01-07-25 @ 08:36) (18 - 18)  SpO2: --    Orthostatic VS  01-07-25 @ 08:36  Lying BP: --/-- HR: --  Sitting BP: 136/90 HR: 98  Standing BP: 134/87 HR: 110  Site: --  Mode: electronic  Orthostatic VS  01-06-25 @ 19:45  Lying BP: --/-- HR: --  Sitting BP: 133/78 HR: 111  Standing BP: 134/87 HR: 114  Site: --  Mode: --  Orthostatic VS  01-06-25 @ 09:18  Lying BP: --/-- HR: --  Sitting BP: 127/72 HR: 91  Standing BP: 113/67 HR: 98  Site: --  Mode: --  Orthostatic VS  01-05-25 @ 19:57  Lying BP: --/-- HR: --  Sitting BP: 117/73 HR: 91  Standing BP: 103/81 HR: 111  Site: --  Mode: --
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
## 2025-01-07 NOTE — BH INPATIENT PSYCHIATRY PROGRESS NOTE - NSBHFUPINTERVALHXFT_PSY_A_CORE
Patient is followed up for NDD. Chart, medications and labs reviewed. Patient is discussed during morning brief, no overnight events, has been in good behavioral control.   Patient was seen and is evaluated on the unit. She continues to exhibit childlike behaviors, w/o intrusiveness towards staff/peers. She remains discharge focused, states that she is going to a group home in [facility] at 9:30pm. Writer discussed that pt would be leaving the hospital yet and is awaiting acceptance to group home. She was able to have meeting w/ [facility] today. She has been compliant with standing medications, denies SE. No acute medical concerns, VSS.

## 2025-01-07 NOTE — BH INPATIENT PSYCHIATRY PROGRESS NOTE - NSBHMETABOLIC_PSY_ALL_CORE_FT
BMI: BMI (kg/m2): 25.6 (01-04-25 @ 15:39)  HbA1c: A1C with Estimated Average Glucose Result: 6.1 % (10-18-24 @ 08:00)    Glucose: POCT Blood Glucose.: 57 mg/dL (04-15-24 @ 09:20)    BP: --Vital Signs Last 24 Hrs  T(C): 36.6 (01-07-25 @ 08:36), Max: 36.6 (01-07-25 @ 08:36)  T(F): 97.8 (01-07-25 @ 08:36), Max: 97.8 (01-07-25 @ 08:36)  HR: --  BP: --  BP(mean): --  RR: 18 (01-07-25 @ 08:36) (18 - 18)  SpO2: --    Orthostatic VS  01-07-25 @ 08:36  Lying BP: --/-- HR: --  Sitting BP: 136/90 HR: 98  Standing BP: 134/87 HR: 110  Site: --  Mode: electronic  Orthostatic VS  01-06-25 @ 19:45  Lying BP: --/-- HR: --  Sitting BP: 133/78 HR: 111  Standing BP: 134/87 HR: 114  Site: --  Mode: --  Orthostatic VS  01-06-25 @ 09:18  Lying BP: --/-- HR: --  Sitting BP: 127/72 HR: 91  Standing BP: 113/67 HR: 98  Site: --  Mode: --  Orthostatic VS  01-05-25 @ 19:57  Lying BP: --/-- HR: --  Sitting BP: 117/73 HR: 91  Standing BP: 103/81 HR: 111  Site: --  Mode: --    Lipid Panel: Date/Time: 10-18-24 @ 08:00  Cholesterol, Serum: 106  LDL Cholesterol Calculated: 53  HDL Cholesterol, Serum: 42  Total Cholesterol/HDL Ration Measurement: --  Triglycerides, Serum: 53

## 2025-01-08 PROCEDURE — 99232 SBSQ HOSP IP/OBS MODERATE 35: CPT

## 2025-01-08 RX ORDER — OLANZAPINE 10 MG/1
15 TABLET ORAL AT BEDTIME
Refills: 0 | Status: DISCONTINUED | OUTPATIENT
Start: 2025-01-08 | End: 2025-04-01

## 2025-01-08 RX ADMIN — Medication 2 MILLIGRAM(S): at 20:15

## 2025-01-08 RX ADMIN — Medication 2 MILLIGRAM(S): at 02:03

## 2025-01-08 RX ADMIN — Medication 100 MILLIGRAM(S): at 20:14

## 2025-01-08 RX ADMIN — OLANZAPINE 15 MILLIGRAM(S): 10 TABLET ORAL at 20:14

## 2025-01-08 NOTE — BH INPATIENT PSYCHIATRY PROGRESS NOTE - NSBHCHARTREVIEWVS_PSY_A_CORE FT
Vital Signs Last 24 Hrs  T(C): 36.3 (01-07-25 @ 19:41), Max: 36.3 (01-07-25 @ 19:41)  T(F): 97.3 (01-07-25 @ 19:41), Max: 97.3 (01-07-25 @ 19:41)  HR: --  BP: --  BP(mean): --  RR: --  SpO2: --    Orthostatic VS  01-07-25 @ 19:41  Lying BP: --/-- HR: --  Sitting BP: 128/95 HR: 107  Standing BP: 120/92 HR: 114  Site: --  Mode: --  Orthostatic VS  01-07-25 @ 08:36  Lying BP: --/-- HR: --  Sitting BP: 136/90 HR: 98  Standing BP: 134/87 HR: 110  Site: --  Mode: electronic  Orthostatic VS  01-06-25 @ 19:45  Lying BP: --/-- HR: --  Sitting BP: 133/78 HR: 111  Standing BP: 134/87 HR: 114  Site: --  Mode: --

## 2025-01-08 NOTE — BH INPATIENT PSYCHIATRY PROGRESS NOTE - NSBHMETABOLIC_PSY_ALL_CORE_FT
BMI: BMI (kg/m2): 25.6 (01-04-25 @ 15:39)  HbA1c: A1C with Estimated Average Glucose Result: 6.1 % (10-18-24 @ 08:00)    Glucose: POCT Blood Glucose.: 57 mg/dL (04-15-24 @ 09:20)    BP: --Vital Signs Last 24 Hrs  T(C): 36.3 (01-07-25 @ 19:41), Max: 36.3 (01-07-25 @ 19:41)  T(F): 97.3 (01-07-25 @ 19:41), Max: 97.3 (01-07-25 @ 19:41)  HR: --  BP: --  BP(mean): --  RR: --  SpO2: --    Orthostatic VS  01-07-25 @ 19:41  Lying BP: --/-- HR: --  Sitting BP: 128/95 HR: 107  Standing BP: 120/92 HR: 114  Site: --  Mode: --  Orthostatic VS  01-07-25 @ 08:36  Lying BP: --/-- HR: --  Sitting BP: 136/90 HR: 98  Standing BP: 134/87 HR: 110  Site: --  Mode: electronic  Orthostatic VS  01-06-25 @ 19:45  Lying BP: --/-- HR: --  Sitting BP: 133/78 HR: 111  Standing BP: 134/87 HR: 114  Site: --  Mode: --    Lipid Panel: Date/Time: 10-18-24 @ 08:00  Cholesterol, Serum: 106  LDL Cholesterol Calculated: 53  HDL Cholesterol, Serum: 42  Total Cholesterol/HDL Ration Measurement: --  Triglycerides, Serum: 53

## 2025-01-08 NOTE — BH INPATIENT PSYCHIATRY PROGRESS NOTE - NSBHFUPINTERVALHXFT_PSY_A_CORE
Patient is followed up for NDD. Chart, medications and labs reviewed. Patient is discussed during morning brief, no overnight events, has been in good behavioral control.   Patient was seen and is evaluated on the unit. She continues to exhibit childlike behaviors, w/o intrusiveness towards staff/peers. She remains discharge focused, writer discussed that pt has lunch scheduled at group home tomorrow, to be accompanied by staff and will return back to unit. She has been compliant with standing medications, denies SE. No acute medical concerns, VSS.
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
## 2025-01-08 NOTE — BH INPATIENT PSYCHIATRY PROGRESS NOTE - CURRENT MEDICATION
MEDICATIONS  (STANDING):  OLANZapine 15 milliGRAM(s) Oral at bedtime  traZODone 100 milliGRAM(s) Oral at bedtime    MEDICATIONS  (PRN):  acetaminophen     Tablet .. 650 milliGRAM(s) Oral every 6 hours PRN Temp greater or equal to 38C (100.4F), Mild Pain (1 - 3)  diphenhydrAMINE 50 milliGRAM(s) Oral every 6 hours PRN Extrapyramidal symptoms or prophylaxis  diphenhydrAMINE Injectable 50 milliGRAM(s) IntraMuscular once PRN Extrapyramidal prophylaxis  haloperidol    Injectable 5 milliGRAM(s) IntraMuscular once PRN aggression  hydrOXYzine hydrochloride 50 milliGRAM(s) Oral every 6 hours PRN anxiety  LORazepam     Tablet 2 milliGRAM(s) Oral every 6 hours PRN severe anxiety, agitation  LORazepam   Injectable 2 milliGRAM(s) IntraMuscular once PRN agitation

## 2025-01-09 DIAGNOSIS — F41.9 ANXIETY DISORDER, UNSPECIFIED: ICD-10-CM

## 2025-01-09 DIAGNOSIS — G47.00 INSOMNIA, UNSPECIFIED: ICD-10-CM

## 2025-01-09 PROCEDURE — 99232 SBSQ HOSP IP/OBS MODERATE 35: CPT

## 2025-01-09 RX ORDER — LORAZEPAM 4 MG/ML
2 VIAL (ML) INJECTION ONCE
Refills: 0 | Status: DISCONTINUED | OUTPATIENT
Start: 2025-01-09 | End: 2025-01-16

## 2025-01-09 RX ORDER — LORAZEPAM 4 MG/ML
2 VIAL (ML) INJECTION EVERY 6 HOURS
Refills: 0 | Status: DISCONTINUED | OUTPATIENT
Start: 2025-01-09 | End: 2025-01-10

## 2025-01-09 RX ADMIN — Medication 100 MILLIGRAM(S): at 21:03

## 2025-01-09 RX ADMIN — OLANZAPINE 15 MILLIGRAM(S): 10 TABLET ORAL at 21:03

## 2025-01-09 NOTE — BH INPATIENT PSYCHIATRY PROGRESS NOTE - NSBHFUPINTERVALHXFT_PSY_A_CORE
Patient is followed up for NDD. Chart, medications and labs reviewed. Patient is discussed during morning brief, no overnight events, has been in good behavioral control.   Patient was seen and is evaluated on the unit. She continues to exhibit childlike behaviors, w/o intrusiveness towards staff/peers. She was able to go for lunch as group home in [place]. Returned back to the unit early as she became emotionally dysregulated. She has been compliant with standing medications, denies SE. Order placed for dental & GYN consult for routine exam as pt hospitalized for 11 months. No acute medical concerns, VSS. Patient is followed up for NDD. Chart, medications and labs reviewed. Patient is discussed during morning brief, no overnight events, has been in good behavioral control.   Patient was seen and is evaluated on the unit. She continues to exhibit childlike behaviors, w/o intrusiveness towards staff/peers. She was able to go for lunch at group home in [place]. Returned back to the unit early as she became emotionally dysregulated. She has been compliant with standing medications, denies SE. Order placed for dental & GYN consult for routine exam as pt hospitalized for 11 months. No acute medical concerns, VSS.

## 2025-01-09 NOTE — BH INPATIENT PSYCHIATRY PROGRESS NOTE - NSBHCHARTREVIEWVS_PSY_A_CORE FT
Vital Signs Last 24 Hrs  T(C): 36.3 (01-09-25 @ 08:43), Max: 36.3 (01-09-25 @ 08:43)  T(F): 97.3 (01-09-25 @ 08:43), Max: 97.3 (01-09-25 @ 08:43)  HR: --  BP: --  BP(mean): --  RR: 18 (01-09-25 @ 08:40) (18 - 18)  SpO2: --    Orthostatic VS  01-09-25 @ 08:43  Lying BP: --/-- HR: --  Sitting BP: 114/74 HR: 99  Standing BP: 115/59 HR: 100  Site: --  Mode: --  Orthostatic VS  01-07-25 @ 19:41  Lying BP: --/-- HR: --  Sitting BP: 128/95 HR: 107  Standing BP: 120/92 HR: 114  Site: --  Mode: --   Vital Signs Last 24 Hrs  T(C): 36.6 (01-09-25 @ 19:12), Max: 36.6 (01-09-25 @ 19:12)  T(F): 97.8 (01-09-25 @ 19:12), Max: 97.8 (01-09-25 @ 19:12)  HR: --  BP: --  BP(mean): --  RR: 18 (01-09-25 @ 08:40) (18 - 18)  SpO2: --    Orthostatic VS  01-09-25 @ 19:12  Lying BP: --/-- HR: --  Sitting BP: 131/88 HR: 100  Standing BP: 139/81 HR: 102  Site: --  Mode: --  Orthostatic VS  01-09-25 @ 08:43  Lying BP: --/-- HR: --  Sitting BP: 114/74 HR: 99  Standing BP: 115/59 HR: 100  Site: --  Mode: --
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
## 2025-01-09 NOTE — BH INPATIENT PSYCHIATRY PROGRESS NOTE - NSBHMETABOLIC_PSY_ALL_CORE_FT
BMI: BMI (kg/m2): 25.6 (01-04-25 @ 15:39)  HbA1c: A1C with Estimated Average Glucose Result: 6.1 % (10-18-24 @ 08:00)    Glucose: POCT Blood Glucose.: 57 mg/dL (04-15-24 @ 09:20)    BP: --Vital Signs Last 24 Hrs  T(C): 36.3 (01-09-25 @ 08:43), Max: 36.3 (01-09-25 @ 08:43)  T(F): 97.3 (01-09-25 @ 08:43), Max: 97.3 (01-09-25 @ 08:43)  HR: --  BP: --  BP(mean): --  RR: 18 (01-09-25 @ 08:40) (18 - 18)  SpO2: --    Orthostatic VS  01-09-25 @ 08:43  Lying BP: --/-- HR: --  Sitting BP: 114/74 HR: 99  Standing BP: 115/59 HR: 100  Site: --  Mode: --  Orthostatic VS  01-07-25 @ 19:41  Lying BP: --/-- HR: --  Sitting BP: 128/95 HR: 107  Standing BP: 120/92 HR: 114  Site: --  Mode: --    Lipid Panel: Date/Time: 10-18-24 @ 08:00  Cholesterol, Serum: 106  LDL Cholesterol Calculated: 53  HDL Cholesterol, Serum: 42  Total Cholesterol/HDL Ration Measurement: --  Triglycerides, Serum: 53   BMI: BMI (kg/m2): 25.6 (01-04-25 @ 15:39)  HbA1c: A1C with Estimated Average Glucose Result: 6.1 % (10-18-24 @ 08:00)    Glucose: POCT Blood Glucose.: 57 mg/dL (04-15-24 @ 09:20)    BP: --Vital Signs Last 24 Hrs  T(C): 36.6 (01-09-25 @ 19:12), Max: 36.6 (01-09-25 @ 19:12)  T(F): 97.8 (01-09-25 @ 19:12), Max: 97.8 (01-09-25 @ 19:12)  HR: --  BP: --  BP(mean): --  RR: 18 (01-09-25 @ 08:40) (18 - 18)  SpO2: --    Orthostatic VS  01-09-25 @ 19:12  Lying BP: --/-- HR: --  Sitting BP: 131/88 HR: 100  Standing BP: 139/81 HR: 102  Site: --  Mode: --  Orthostatic VS  01-09-25 @ 08:43  Lying BP: --/-- HR: --  Sitting BP: 114/74 HR: 99  Standing BP: 115/59 HR: 100  Site: --  Mode: --    Lipid Panel: Date/Time: 10-18-24 @ 08:00  Cholesterol, Serum: 106  LDL Cholesterol Calculated: 53  HDL Cholesterol, Serum: 42  Total Cholesterol/HDL Ration Measurement: --  Triglycerides, Serum: 53

## 2025-01-10 PROCEDURE — 99232 SBSQ HOSP IP/OBS MODERATE 35: CPT

## 2025-01-10 PROCEDURE — 90832 PSYTX W PT 30 MINUTES: CPT

## 2025-01-10 RX ORDER — TEMAZEPAM 15 MG/1
7.5 CAPSULE ORAL AT BEDTIME
Refills: 0 | Status: DISCONTINUED | OUTPATIENT
Start: 2025-01-10 | End: 2025-01-10

## 2025-01-10 RX ORDER — LORAZEPAM 4 MG/ML
2 VIAL (ML) INJECTION EVERY 6 HOURS
Refills: 0 | Status: DISCONTINUED | OUTPATIENT
Start: 2025-01-10 | End: 2025-01-16

## 2025-01-10 RX ORDER — CLONAZEPAM 0.5 MG/1
1 TABLET ORAL AT BEDTIME
Refills: 0 | Status: DISCONTINUED | OUTPATIENT
Start: 2025-01-10 | End: 2025-01-13

## 2025-01-10 RX ADMIN — Medication 2 MILLIGRAM(S): at 03:27

## 2025-01-10 RX ADMIN — OLANZAPINE 15 MILLIGRAM(S): 10 TABLET ORAL at 20:42

## 2025-01-10 RX ADMIN — Medication 100 MILLIGRAM(S): at 20:42

## 2025-01-10 RX ADMIN — CLONAZEPAM 1 MILLIGRAM(S): 0.5 TABLET ORAL at 20:42

## 2025-01-10 RX ADMIN — HYDROXYZINE HYDROCHLORIDE 50 MILLIGRAM(S): 25 TABLET, FILM COATED ORAL at 03:27
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
## 2025-01-10 NOTE — BH INPATIENT PSYCHIATRY PROGRESS NOTE - NSBHFUPINTERVALHXFT_PSY_A_CORE
Patient is followed up for NDD. Chart, medications and labs reviewed. Patient is discussed during morning brief, no overnight events, has been in good behavioral control.   Patient was seen and is evaluated on the unit. She continues to exhibit childlike behaviors, w/o intrusiveness towards staff/peers. She states that she does not want to go to [place] and shakes her head. Team discussed pt scheduled for lunch at another group home in [place] on 1/14. Noted w/ disrupted sleep overnight & overly anxious, starting standing clonazepam tonight. She has been compliant with standing medications, denies SE. No acute medical concerns, VSS.

## 2025-01-10 NOTE — BH INPATIENT PSYCHIATRY PROGRESS NOTE - NSBHMETABOLIC_PSY_ALL_CORE_FT
BMI: BMI (kg/m2): 25.6 (01-04-25 @ 15:39)  HbA1c: A1C with Estimated Average Glucose Result: 6.1 % (10-18-24 @ 08:00)    Glucose: POCT Blood Glucose.: 57 mg/dL (04-15-24 @ 09:20)    BP: --Vital Signs Last 24 Hrs  T(C): 36.6 (01-09-25 @ 19:12), Max: 36.6 (01-09-25 @ 19:12)  T(F): 97.8 (01-09-25 @ 19:12), Max: 97.8 (01-09-25 @ 19:12)  HR: --  BP: --  BP(mean): --  RR: 16 (01-10-25 @ 08:46) (16 - 16)  SpO2: --    Orthostatic VS  01-09-25 @ 19:12  Lying BP: --/-- HR: --  Sitting BP: 131/88 HR: 100  Standing BP: 139/81 HR: 102  Site: --  Mode: --  Orthostatic VS  01-09-25 @ 08:43  Lying BP: --/-- HR: --  Sitting BP: 114/74 HR: 99  Standing BP: 115/59 HR: 100  Site: --  Mode: --    Lipid Panel: Date/Time: 10-18-24 @ 08:00  Cholesterol, Serum: 106  LDL Cholesterol Calculated: 53  HDL Cholesterol, Serum: 42  Total Cholesterol/HDL Ration Measurement: --  Triglycerides, Serum: 53

## 2025-01-10 NOTE — BH INPATIENT PSYCHIATRY PROGRESS NOTE - NSBHCHARTREVIEWVS_PSY_A_CORE FT
Vital Signs Last 24 Hrs  T(C): 36.6 (01-09-25 @ 19:12), Max: 36.6 (01-09-25 @ 19:12)  T(F): 97.8 (01-09-25 @ 19:12), Max: 97.8 (01-09-25 @ 19:12)  HR: --  BP: --  BP(mean): --  RR: 16 (01-10-25 @ 08:46) (16 - 16)  SpO2: --    Orthostatic VS  01-09-25 @ 19:12  Lying BP: --/-- HR: --  Sitting BP: 131/88 HR: 100  Standing BP: 139/81 HR: 102  Site: --  Mode: --  Orthostatic VS  01-09-25 @ 08:43  Lying BP: --/-- HR: --  Sitting BP: 114/74 HR: 99  Standing BP: 115/59 HR: 100  Site: --  Mode: --

## 2025-01-10 NOTE — BH INPATIENT PSYCHIATRY PROGRESS NOTE - PRN MEDS
MEDICATIONS  (PRN):  acetaminophen     Tablet .. 650 milliGRAM(s) Oral every 6 hours PRN Temp greater or equal to 38C (100.4F), Mild Pain (1 - 3)  diphenhydrAMINE 50 milliGRAM(s) Oral every 6 hours PRN Extrapyramidal symptoms or prophylaxis  diphenhydrAMINE Injectable 50 milliGRAM(s) IntraMuscular once PRN Extrapyramidal prophylaxis  haloperidol    Injectable 5 milliGRAM(s) IntraMuscular once PRN aggression  hydrOXYzine hydrochloride 50 milliGRAM(s) Oral every 6 hours PRN anxiety  LORazepam     Tablet 2 milliGRAM(s) Oral every 6 hours PRN severe agitation/aggression  LORazepam   Injectable 2 milliGRAM(s) IntraMuscular once PRN agitation

## 2025-01-10 NOTE — BH INPATIENT PSYCHIATRY PROGRESS NOTE - CURRENT MEDICATION
MEDICATIONS  (STANDING):  clonazePAM  Tablet 1 milliGRAM(s) Oral at bedtime  OLANZapine 15 milliGRAM(s) Oral at bedtime  traZODone 100 milliGRAM(s) Oral at bedtime    MEDICATIONS  (PRN):  acetaminophen     Tablet .. 650 milliGRAM(s) Oral every 6 hours PRN Temp greater or equal to 38C (100.4F), Mild Pain (1 - 3)  diphenhydrAMINE 50 milliGRAM(s) Oral every 6 hours PRN Extrapyramidal symptoms or prophylaxis  diphenhydrAMINE Injectable 50 milliGRAM(s) IntraMuscular once PRN Extrapyramidal prophylaxis  haloperidol    Injectable 5 milliGRAM(s) IntraMuscular once PRN aggression  hydrOXYzine hydrochloride 50 milliGRAM(s) Oral every 6 hours PRN anxiety  LORazepam     Tablet 2 milliGRAM(s) Oral every 6 hours PRN severe agitation/aggression  LORazepam   Injectable 2 milliGRAM(s) IntraMuscular once PRN agitation

## 2025-01-11 RX ADMIN — CLONAZEPAM 1 MILLIGRAM(S): 0.5 TABLET ORAL at 20:24

## 2025-01-11 RX ADMIN — OLANZAPINE 15 MILLIGRAM(S): 10 TABLET ORAL at 20:24

## 2025-01-11 RX ADMIN — Medication 100 MILLIGRAM(S): at 20:24

## 2025-01-11 NOTE — BH CHART NOTE - NSEVENTNOTEFT_PSY_ALL_CORE
LEROY called around 9am. Per nursing pt had witnessed mechanical fall slipping on the floor as she was getting into the shower. Pt fell onto her butt and did not hit her head. At the time no injuries were noted (including bruising, bleeding). When staff attempted to assist pt she became irritable and insisted on continuing with her shower. Pt seen by LEROY at 10AM. Pt is known to writer. Happy to see writer and at baseline. She was able to report slipping on the floor and notes some tenderness on her right buttocks but denies head-strike or other sxs. Her gait and ROM is intact. Encouraged pt to use tylenol PRNs or heat pack if needed. Pt able to come to staff with concerns. No need for further intervention, discussed with RN staff.

## 2025-01-12 LAB — GLUCOSE BLDC GLUCOMTR-MCNC: 89 MG/DL — SIGNIFICANT CHANGE UP (ref 70–99)

## 2025-01-12 RX ORDER — ACETAMINOPHEN 500 MG/5ML
650 LIQUID (ML) ORAL EVERY 6 HOURS
Refills: 0 | Status: DISCONTINUED | OUTPATIENT
Start: 2025-01-12 | End: 2025-04-01

## 2025-01-12 RX ADMIN — Medication 650 MILLIGRAM(S): at 04:13

## 2025-01-12 RX ADMIN — OLANZAPINE 15 MILLIGRAM(S): 10 TABLET ORAL at 20:17

## 2025-01-12 RX ADMIN — CLONAZEPAM 1 MILLIGRAM(S): 0.5 TABLET ORAL at 20:17

## 2025-01-12 RX ADMIN — Medication 650 MILLIGRAM(S): at 03:40

## 2025-01-12 RX ADMIN — Medication 100 MILLIGRAM(S): at 20:17

## 2025-01-13 PROCEDURE — 99232 SBSQ HOSP IP/OBS MODERATE 35: CPT

## 2025-01-13 RX ORDER — TEMAZEPAM 15 MG/1
15 CAPSULE ORAL AT BEDTIME
Refills: 0 | Status: DISCONTINUED | OUTPATIENT
Start: 2025-01-13 | End: 2025-01-16

## 2025-01-13 RX ADMIN — Medication 650 MILLIGRAM(S): at 02:54

## 2025-01-13 RX ADMIN — Medication 100 MILLIGRAM(S): at 20:35

## 2025-01-13 RX ADMIN — HYDROXYZINE HYDROCHLORIDE 50 MILLIGRAM(S): 25 TABLET, FILM COATED ORAL at 02:53

## 2025-01-13 RX ADMIN — OLANZAPINE 15 MILLIGRAM(S): 10 TABLET ORAL at 20:35

## 2025-01-13 RX ADMIN — Medication 650 MILLIGRAM(S): at 03:59

## 2025-01-13 RX ADMIN — TEMAZEPAM 15 MILLIGRAM(S): 15 CAPSULE ORAL at 20:35

## 2025-01-13 NOTE — BH INPATIENT PSYCHIATRY PROGRESS NOTE - CURRENT MEDICATION
MEDICATIONS  (STANDING):  OLANZapine 15 milliGRAM(s) Oral at bedtime  temazepam 15 milliGRAM(s) Oral at bedtime  traZODone 100 milliGRAM(s) Oral at bedtime    MEDICATIONS  (PRN):  acetaminophen     Tablet .. 650 milliGRAM(s) Oral every 6 hours PRN Temp greater or equal to 38C (100.4F), Mild Pain (1 - 3)  acetaminophen     Tablet .. 650 milliGRAM(s) Oral every 6 hours PRN Temp greater or equal to 38C (100.4F), Mild Pain (1 - 3), Moderate Pain (4 - 6)  diphenhydrAMINE 50 milliGRAM(s) Oral every 6 hours PRN Extrapyramidal symptoms or prophylaxis  diphenhydrAMINE Injectable 50 milliGRAM(s) IntraMuscular once PRN Extrapyramidal prophylaxis  haloperidol    Injectable 5 milliGRAM(s) IntraMuscular once PRN aggression  hydrOXYzine hydrochloride 50 milliGRAM(s) Oral every 6 hours PRN anxiety  LORazepam     Tablet 2 milliGRAM(s) Oral every 6 hours PRN severe agitation/aggression  LORazepam   Injectable 2 milliGRAM(s) IntraMuscular once PRN agitation
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
## 2025-01-13 NOTE — BH INPATIENT PSYCHIATRY PROGRESS NOTE - NSBHMETABOLIC_PSY_ALL_CORE_FT
BMI: BMI (kg/m2): 25.6 (01-04-25 @ 15:39)  HbA1c: A1C with Estimated Average Glucose Result: 6.1 % (10-18-24 @ 08:00)    Glucose: POCT Blood Glucose.: 89 mg/dL (01-12-25 @ 20:10)    BP: --Vital Signs Last 24 Hrs  T(C): 36.4 (01-13-25 @ 05:42), Max: 36.4 (01-13-25 @ 05:42)  T(F): 97.5 (01-13-25 @ 05:42), Max: 97.5 (01-13-25 @ 05:42)  HR: --  BP: --  BP(mean): --  RR: --  SpO2: --    Orthostatic VS  01-13-25 @ 05:42  Lying BP: --/-- HR: --  Sitting BP: 119/88 HR: 96  Standing BP: 125/85 HR: 101  Site: --  Mode: --  Orthostatic VS  01-12-25 @ 08:27  Lying BP: --/-- HR: --  Sitting BP: 121/81 HR: 100  Standing BP: 132/85 HR: 104  Site: --  Mode: --  Orthostatic VS  01-11-25 @ 19:43  Lying BP: --/-- HR: --  Sitting BP: 116/85 HR: 103  Standing BP: 144/84 HR: 104  Site: --  Mode: --    Lipid Panel: Date/Time: 10-18-24 @ 08:00  Cholesterol, Serum: 106  LDL Cholesterol Calculated: 53  HDL Cholesterol, Serum: 42  Total Cholesterol/HDL Ration Measurement: --  Triglycerides, Serum: 53

## 2025-01-13 NOTE — BH INPATIENT PSYCHIATRY PROGRESS NOTE - PRN MEDS
MEDICATIONS  (PRN):  acetaminophen     Tablet .. 650 milliGRAM(s) Oral every 6 hours PRN Temp greater or equal to 38C (100.4F), Mild Pain (1 - 3)  acetaminophen     Tablet .. 650 milliGRAM(s) Oral every 6 hours PRN Temp greater or equal to 38C (100.4F), Mild Pain (1 - 3), Moderate Pain (4 - 6)  diphenhydrAMINE 50 milliGRAM(s) Oral every 6 hours PRN Extrapyramidal symptoms or prophylaxis  diphenhydrAMINE Injectable 50 milliGRAM(s) IntraMuscular once PRN Extrapyramidal prophylaxis  haloperidol    Injectable 5 milliGRAM(s) IntraMuscular once PRN aggression  hydrOXYzine hydrochloride 50 milliGRAM(s) Oral every 6 hours PRN anxiety  LORazepam     Tablet 2 milliGRAM(s) Oral every 6 hours PRN severe agitation/aggression  LORazepam   Injectable 2 milliGRAM(s) IntraMuscular once PRN agitation

## 2025-01-13 NOTE — BH INPATIENT PSYCHIATRY PROGRESS NOTE - NSBHFUPINTERVALHXFT_PSY_A_CORE
Patient is followed up for NDD. Chart, medications and labs reviewed. Patient is discussed during morning brief, no overnight events, has been in good behavioral control.   Patient was seen and is evaluated on the unit. She continues to exhibit childlike behaviors, w/o intrusiveness towards staff/peers. Sleep remains disrupted over the weekend, dc'ing clonazepam and starting temazepam tonight. Team discussed pt scheduled for lunch at group home in [place] tomorrow. She has been compliant with standing medications, denies SE. Had mechanical fall on 1/11, states that she slipped while getting into the shower and fell on her buttocks. No acute medical concerns, VSS.

## 2025-01-13 NOTE — BH INPATIENT PSYCHIATRY PROGRESS NOTE - NSBHCHARTREVIEWVS_PSY_A_CORE FT
Vital Signs Last 24 Hrs  T(C): 36.4 (01-13-25 @ 05:42), Max: 36.4 (01-13-25 @ 05:42)  T(F): 97.5 (01-13-25 @ 05:42), Max: 97.5 (01-13-25 @ 05:42)  HR: --  BP: --  BP(mean): --  RR: --  SpO2: --    Orthostatic VS  01-13-25 @ 05:42  Lying BP: --/-- HR: --  Sitting BP: 119/88 HR: 96  Standing BP: 125/85 HR: 101  Site: --  Mode: --  Orthostatic VS  01-12-25 @ 08:27  Lying BP: --/-- HR: --  Sitting BP: 121/81 HR: 100  Standing BP: 132/85 HR: 104  Site: --  Mode: --  Orthostatic VS  01-11-25 @ 19:43  Lying BP: --/-- HR: --  Sitting BP: 116/85 HR: 103  Standing BP: 144/84 HR: 104  Site: --  Mode: --

## 2025-01-14 PROCEDURE — 99232 SBSQ HOSP IP/OBS MODERATE 35: CPT

## 2025-01-14 RX ADMIN — OLANZAPINE 15 MILLIGRAM(S): 10 TABLET ORAL at 20:32

## 2025-01-14 RX ADMIN — Medication 100 MILLIGRAM(S): at 20:32

## 2025-01-14 RX ADMIN — TEMAZEPAM 15 MILLIGRAM(S): 15 CAPSULE ORAL at 20:32

## 2025-01-14 NOTE — BH INPATIENT PSYCHIATRY PROGRESS NOTE - NSBHFUPINTERVALHXFT_PSY_A_CORE
Patient is followed up for NDD. Chart, medications and labs reviewed. Patient is discussed during morning brief, no overnight events, has been in good behavioral control.   Patient was seen and is evaluated on the unit. She continues to exhibit childlike behaviors, w/o intrusiveness towards staff/peers. On interview this morning pt presents anxious but excited for group home visit. She was able to visit group home accompanied by MHW and returned to the unit in the afternoon. She has been compliant with standing medications, denies SE. No acute medical concerns, VSS.

## 2025-01-14 NOTE — BH INPATIENT PSYCHIATRY PROGRESS NOTE - NSBHMETABOLIC_PSY_ALL_CORE_FT
BMI: BMI (kg/m2): 25.6 (01-04-25 @ 15:39)  HbA1c: A1C with Estimated Average Glucose Result: 6.1 % (10-18-24 @ 08:00)    Glucose: POCT Blood Glucose.: 89 mg/dL (01-12-25 @ 20:10)    BP: --Vital Signs Last 24 Hrs  T(C): 36.5 (01-14-25 @ 06:27), Max: 36.5 (01-14-25 @ 06:27)  T(F): 97.7 (01-14-25 @ 06:27), Max: 97.7 (01-14-25 @ 06:27)  HR: --  BP: --  BP(mean): --  RR: --  SpO2: --    Orthostatic VS  01-14-25 @ 06:27  Lying BP: --/-- HR: --  Sitting BP: 124/86 HR: 96  Standing BP: 124/92 HR: 99  Site: upper left arm  Mode: electronic  Orthostatic VS  01-13-25 @ 05:42  Lying BP: --/-- HR: --  Sitting BP: 119/88 HR: 96  Standing BP: 125/85 HR: 101  Site: --  Mode: --    Lipid Panel: Date/Time: 10-18-24 @ 08:00  Cholesterol, Serum: 106  LDL Cholesterol Calculated: 53  HDL Cholesterol, Serum: 42  Total Cholesterol/HDL Ration Measurement: --  Triglycerides, Serum: 53
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
## 2025-01-14 NOTE — BH INPATIENT PSYCHIATRY PROGRESS NOTE - NSBHCHARTREVIEWVS_PSY_A_CORE FT
Vital Signs Last 24 Hrs  T(C): 36.5 (01-14-25 @ 06:27), Max: 36.5 (01-14-25 @ 06:27)  T(F): 97.7 (01-14-25 @ 06:27), Max: 97.7 (01-14-25 @ 06:27)  HR: --  BP: --  BP(mean): --  RR: --  SpO2: --    Orthostatic VS  01-14-25 @ 06:27  Lying BP: --/-- HR: --  Sitting BP: 124/86 HR: 96  Standing BP: 124/92 HR: 99  Site: upper left arm  Mode: electronic  Orthostatic VS  01-13-25 @ 05:42  Lying BP: --/-- HR: --  Sitting BP: 119/88 HR: 96  Standing BP: 125/85 HR: 101  Site: --  Mode: --

## 2025-01-15 PROCEDURE — 99232 SBSQ HOSP IP/OBS MODERATE 35: CPT

## 2025-01-15 RX ADMIN — Medication 100 MILLIGRAM(S): at 20:33

## 2025-01-15 RX ADMIN — TEMAZEPAM 15 MILLIGRAM(S): 15 CAPSULE ORAL at 20:32

## 2025-01-15 RX ADMIN — OLANZAPINE 15 MILLIGRAM(S): 10 TABLET ORAL at 20:33

## 2025-01-15 NOTE — BH INPATIENT PSYCHIATRY PROGRESS NOTE - NSBHCHARTREVIEWVS_PSY_A_CORE FT
Vital Signs Last 24 Hrs  T(C): 36.5 (01-15-25 @ 06:26), Max: 36.7 (01-14-25 @ 19:45)  T(F): 97.7 (01-15-25 @ 06:26), Max: 98.1 (01-14-25 @ 19:45)  HR: --  BP: --  BP(mean): --  RR: 19 (01-15-25 @ 06:26) (19 - 19)  SpO2: 95% (01-15-25 @ 06:26) (95% - 95%)    Orthostatic VS  01-15-25 @ 06:26  Lying BP: --/-- HR: --  Sitting BP: 128/102 HR: 103  Standing BP: 129/91 HR: 104  Site: upper left arm  Mode: electronic  Orthostatic VS  01-14-25 @ 19:45  Lying BP: --/-- HR: --  Sitting BP: 139/86 HR: 117  Standing BP: 144/97 HR: 119  Site: --  Mode: --  Orthostatic VS  01-14-25 @ 06:27  Lying BP: --/-- HR: --  Sitting BP: 124/86 HR: 96  Standing BP: 124/92 HR: 99  Site: upper left arm  Mode: electronic
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
## 2025-01-15 NOTE — BH INPATIENT PSYCHIATRY PROGRESS NOTE - NSBHMETABOLIC_PSY_ALL_CORE_FT
BMI: BMI (kg/m2): 25.6 (01-04-25 @ 15:39)  HbA1c: A1C with Estimated Average Glucose Result: 6.1 % (10-18-24 @ 08:00)    Glucose: POCT Blood Glucose.: 89 mg/dL (01-12-25 @ 20:10)    BP: --Vital Signs Last 24 Hrs  T(C): 36.5 (01-15-25 @ 06:26), Max: 36.7 (01-14-25 @ 19:45)  T(F): 97.7 (01-15-25 @ 06:26), Max: 98.1 (01-14-25 @ 19:45)  HR: --  BP: --  BP(mean): --  RR: 19 (01-15-25 @ 06:26) (19 - 19)  SpO2: 95% (01-15-25 @ 06:26) (95% - 95%)    Orthostatic VS  01-15-25 @ 06:26  Lying BP: --/-- HR: --  Sitting BP: 128/102 HR: 103  Standing BP: 129/91 HR: 104  Site: upper left arm  Mode: electronic  Orthostatic VS  01-14-25 @ 19:45  Lying BP: --/-- HR: --  Sitting BP: 139/86 HR: 117  Standing BP: 144/97 HR: 119  Site: --  Mode: --  Orthostatic VS  01-14-25 @ 06:27  Lying BP: --/-- HR: --  Sitting BP: 124/86 HR: 96  Standing BP: 124/92 HR: 99  Site: upper left arm  Mode: electronic    Lipid Panel: Date/Time: 10-18-24 @ 08:00  Cholesterol, Serum: 106  LDL Cholesterol Calculated: 53  HDL Cholesterol, Serum: 42  Total Cholesterol/HDL Ration Measurement: --  Triglycerides, Serum: 53

## 2025-01-15 NOTE — BH INPATIENT PSYCHIATRY PROGRESS NOTE - NSBHFUPINTERVALHXFT_PSY_A_CORE
Patient is followed up for NDD. Chart, medications and labs reviewed. Patient is discussed during morning brief, no overnight events, has been in good behavioral control.   Patient was seen and is evaluated on the unit. She continues to exhibit childlike behaviors, w/o intrusiveness towards staff/peers. On interview today pt states that she enjoyed group home visit yesterday and would like to "move in" soon. Has been discharge focused, telling peers on the unit "today is my last day" in the hospital. She has been compliant with standing medications, denies SE. No acute medical concerns, VSS.

## 2025-01-16 PROCEDURE — 99232 SBSQ HOSP IP/OBS MODERATE 35: CPT

## 2025-01-16 RX ORDER — LORAZEPAM 4 MG/ML
2 VIAL (ML) INJECTION ONCE
Refills: 0 | Status: DISCONTINUED | OUTPATIENT
Start: 2025-01-16 | End: 2025-01-23

## 2025-01-16 RX ORDER — TEMAZEPAM 15 MG/1
15 CAPSULE ORAL AT BEDTIME
Refills: 0 | Status: DISCONTINUED | OUTPATIENT
Start: 2025-01-16 | End: 2025-01-23

## 2025-01-16 RX ORDER — LORAZEPAM 4 MG/ML
2 VIAL (ML) INJECTION EVERY 6 HOURS
Refills: 0 | Status: DISCONTINUED | OUTPATIENT
Start: 2025-01-16 | End: 2025-01-23

## 2025-01-16 RX ADMIN — Medication 100 MILLIGRAM(S): at 20:08

## 2025-01-16 RX ADMIN — OLANZAPINE 15 MILLIGRAM(S): 10 TABLET ORAL at 20:08

## 2025-01-16 RX ADMIN — HYDROXYZINE HYDROCHLORIDE 50 MILLIGRAM(S): 25 TABLET, FILM COATED ORAL at 03:58

## 2025-01-16 RX ADMIN — TEMAZEPAM 15 MILLIGRAM(S): 15 CAPSULE ORAL at 20:08

## 2025-01-16 NOTE — BH INPATIENT PSYCHIATRY PROGRESS NOTE - CURRENT MEDICATION
PT'S BP IS 81/70, NOTIFIED AJAY VILLAREAL. NO NEW ORDERS AT THIS TIME, PT
IN BED, EATING BREAKFAST, DENIES ANY NEEDS AT THIS TIME. CALL LIGHT IN REACH,
NAD NOTED, WILL CONTINUE TO MONITOR. MEDICATIONS  (STANDING):  OLANZapine 15 milliGRAM(s) Oral at bedtime  temazepam 15 milliGRAM(s) Oral at bedtime  traZODone 100 milliGRAM(s) Oral at bedtime    MEDICATIONS  (PRN):  acetaminophen     Tablet .. 650 milliGRAM(s) Oral every 6 hours PRN Temp greater or equal to 38C (100.4F), Mild Pain (1 - 3)  acetaminophen     Tablet .. 650 milliGRAM(s) Oral every 6 hours PRN Temp greater or equal to 38C (100.4F), Mild Pain (1 - 3), Moderate Pain (4 - 6)  diphenhydrAMINE 50 milliGRAM(s) Oral every 6 hours PRN Extrapyramidal symptoms or prophylaxis  diphenhydrAMINE Injectable 50 milliGRAM(s) IntraMuscular once PRN Extrapyramidal prophylaxis  haloperidol    Injectable 5 milliGRAM(s) IntraMuscular once PRN aggression  hydrOXYzine hydrochloride 50 milliGRAM(s) Oral every 6 hours PRN anxiety  LORazepam     Tablet 2 milliGRAM(s) Oral every 6 hours PRN severe agitation/aggression  LORazepam   Injectable 2 milliGRAM(s) IntraMuscular once PRN agitation

## 2025-01-16 NOTE — BH INPATIENT PSYCHIATRY PROGRESS NOTE - NSBHFUPINTERVALHXFT_PSY_A_CORE
Patient is followed up for NDD. Chart, medications and labs reviewed. Patient is discussed during morning brief, no overnight events, has been in good behavioral control.   Patient was seen and is evaluated on the unit. She continues to exhibit childlike behaviors, w/o intrusiveness towards staff/peers. On interview pt remains discharge focused, team discussed [facility]. She has been compliant with standing medications, denies SE. No acute medical concerns, VSS.

## 2025-01-16 NOTE — BH INPATIENT PSYCHIATRY PROGRESS NOTE - NSBHCHARTREVIEWVS_PSY_A_CORE FT
Vital Signs Last 24 Hrs  T(C): 36.7 (01-16-25 @ 08:30), Max: 36.7 (01-15-25 @ 19:17)  T(F): 98 (01-16-25 @ 08:30), Max: 98.1 (01-15-25 @ 19:17)  HR: --  BP: --  BP(mean): --  RR: 16 (01-16-25 @ 08:30) (16 - 16)  SpO2: --    Orthostatic VS  01-16-25 @ 08:30  Lying BP: --/-- HR: --  Sitting BP: 128/100 HR: 97  Standing BP: 128/96 HR: 109  Site: --  Mode: --  Orthostatic VS  01-15-25 @ 19:17  Lying BP: --/-- HR: --  Sitting BP: 126/82 HR: 102  Standing BP: 139/98 HR: 105  Site: --  Mode: --  Orthostatic VS  01-15-25 @ 07:45  Lying BP: --/-- HR: --  Sitting BP: 125/87 HR: 98  Standing BP: 127/89 HR: 96  Site: --  Mode: --  Orthostatic VS  01-15-25 @ 06:26  Lying BP: --/-- HR: --  Sitting BP: 128/102 HR: 103  Standing BP: 129/91 HR: 104  Site: upper left arm  Mode: electronic  Orthostatic VS  01-14-25 @ 19:45  Lying BP: --/-- HR: --  Sitting BP: 139/86 HR: 117  Standing BP: 144/97 HR: 119  Site: --  Mode: --
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
## 2025-01-17 PROCEDURE — 99232 SBSQ HOSP IP/OBS MODERATE 35: CPT

## 2025-01-17 RX ORDER — HYDROXYZINE HYDROCHLORIDE 25 MG/1
50 TABLET, FILM COATED ORAL AT BEDTIME
Refills: 0 | Status: DISCONTINUED | OUTPATIENT
Start: 2025-01-17 | End: 2025-01-27

## 2025-01-17 RX ADMIN — OLANZAPINE 15 MILLIGRAM(S): 10 TABLET ORAL at 20:19

## 2025-01-17 RX ADMIN — TEMAZEPAM 15 MILLIGRAM(S): 15 CAPSULE ORAL at 20:18

## 2025-01-17 RX ADMIN — Medication 100 MILLIGRAM(S): at 20:19

## 2025-01-17 RX ADMIN — Medication 2 MILLIGRAM(S): at 03:12

## 2025-01-17 RX ADMIN — Medication 2 MILLIGRAM(S): at 20:19

## 2025-01-17 RX ADMIN — HYDROXYZINE HYDROCHLORIDE 50 MILLIGRAM(S): 25 TABLET, FILM COATED ORAL at 20:18

## 2025-01-17 RX ADMIN — HYDROXYZINE HYDROCHLORIDE 50 MILLIGRAM(S): 25 TABLET, FILM COATED ORAL at 03:12
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
## 2025-01-17 NOTE — BH INPATIENT PSYCHIATRY PROGRESS NOTE - NSBHCHARTREVIEWVS_PSY_A_CORE FT
Vital Signs Last 24 Hrs  T(C): 36.7 (01-17-25 @ 08:24), Max: 36.7 (01-17-25 @ 08:24)  T(F): 98 (01-17-25 @ 08:24), Max: 98 (01-17-25 @ 08:24)  HR: --  BP: --  BP(mean): --  RR: 17 (01-17-25 @ 07:32) (17 - 17)  SpO2: --    Orthostatic VS  01-17-25 @ 08:24  Lying BP: --/-- HR: --  Sitting BP: 110/94 HR: 102  Standing BP: 121/84 HR: 105  Site: --  Mode: --  Orthostatic VS  01-16-25 @ 19:21  Lying BP: --/-- HR: --  Sitting BP: 130/89 HR: 113  Standing BP: 135/90 HR: 115  Site: --  Mode: --  Orthostatic VS  01-16-25 @ 08:30  Lying BP: --/-- HR: --  Sitting BP: 128/100 HR: 97  Standing BP: 128/96 HR: 109  Site: --  Mode: --  Orthostatic VS  01-15-25 @ 19:17  Lying BP: --/-- HR: --  Sitting BP: 126/82 HR: 102  Standing BP: 139/98 HR: 105  Site: --  Mode: --

## 2025-01-17 NOTE — BH INPATIENT PSYCHIATRY PROGRESS NOTE - NSBHMETABOLIC_PSY_ALL_CORE_FT
BMI: BMI (kg/m2): 25.6 (01-04-25 @ 15:39)  HbA1c: A1C with Estimated Average Glucose Result: 6.1 % (10-18-24 @ 08:00)    Glucose: POCT Blood Glucose.: 89 mg/dL (01-12-25 @ 20:10)    BP: --Vital Signs Last 24 Hrs  T(C): 36.7 (01-17-25 @ 08:24), Max: 36.7 (01-17-25 @ 08:24)  T(F): 98 (01-17-25 @ 08:24), Max: 98 (01-17-25 @ 08:24)  HR: --  BP: --  BP(mean): --  RR: 17 (01-17-25 @ 07:32) (17 - 17)  SpO2: --    Orthostatic VS  01-17-25 @ 08:24  Lying BP: --/-- HR: --  Sitting BP: 110/94 HR: 102  Standing BP: 121/84 HR: 105  Site: --  Mode: --  Orthostatic VS  01-16-25 @ 19:21  Lying BP: --/-- HR: --  Sitting BP: 130/89 HR: 113  Standing BP: 135/90 HR: 115  Site: --  Mode: --  Orthostatic VS  01-16-25 @ 08:30  Lying BP: --/-- HR: --  Sitting BP: 128/100 HR: 97  Standing BP: 128/96 HR: 109  Site: --  Mode: --  Orthostatic VS  01-15-25 @ 19:17  Lying BP: --/-- HR: --  Sitting BP: 126/82 HR: 102  Standing BP: 139/98 HR: 105  Site: --  Mode: --    Lipid Panel: Date/Time: 10-18-24 @ 08:00  Cholesterol, Serum: 106  LDL Cholesterol Calculated: 53  HDL Cholesterol, Serum: 42  Total Cholesterol/HDL Ration Measurement: --  Triglycerides, Serum: 53

## 2025-01-17 NOTE — BH INPATIENT PSYCHIATRY PROGRESS NOTE - CURRENT MEDICATION
MEDICATIONS  (STANDING):  hydrOXYzine hydrochloride 50 milliGRAM(s) Oral at bedtime  OLANZapine 15 milliGRAM(s) Oral at bedtime  temazepam 15 milliGRAM(s) Oral at bedtime  traZODone 100 milliGRAM(s) Oral at bedtime    MEDICATIONS  (PRN):  acetaminophen     Tablet .. 650 milliGRAM(s) Oral every 6 hours PRN Temp greater or equal to 38C (100.4F), Mild Pain (1 - 3)  acetaminophen     Tablet .. 650 milliGRAM(s) Oral every 6 hours PRN Temp greater or equal to 38C (100.4F), Mild Pain (1 - 3), Moderate Pain (4 - 6)  diphenhydrAMINE 50 milliGRAM(s) Oral every 6 hours PRN Extrapyramidal symptoms or prophylaxis  diphenhydrAMINE Injectable 50 milliGRAM(s) IntraMuscular once PRN Extrapyramidal prophylaxis  haloperidol    Injectable 5 milliGRAM(s) IntraMuscular once PRN aggression  hydrOXYzine hydrochloride 50 milliGRAM(s) Oral every 6 hours PRN anxiety  LORazepam     Tablet 2 milliGRAM(s) Oral every 6 hours PRN severe agitation/aggression  LORazepam   Injectable 2 milliGRAM(s) IntraMuscular once PRN agitation

## 2025-01-17 NOTE — BH INPATIENT PSYCHIATRY PROGRESS NOTE - NSBHFUPINTERVALHXFT_PSY_A_CORE
Patient is followed up for NDD. Chart, medications and labs reviewed. Patient is discussed during morning brief, no overnight events, has been in good behavioral control.   Patient was seen and is evaluated on the unit. She continues to exhibit childlike behaviors, w/o intrusiveness towards staff/peers. Received oral PRN's overnight for increasing anxiety. On interview pt remains discharge focused, discussed pt [facility]. She has been compliant with standing medications, denies SE. Writer discussed addition of standing atarax for anxiety. No acute medical concerns, VSS.

## 2025-01-18 RX ADMIN — TEMAZEPAM 15 MILLIGRAM(S): 15 CAPSULE ORAL at 20:57

## 2025-01-18 RX ADMIN — Medication 100 MILLIGRAM(S): at 20:57

## 2025-01-18 RX ADMIN — HYDROXYZINE HYDROCHLORIDE 50 MILLIGRAM(S): 25 TABLET, FILM COATED ORAL at 20:57

## 2025-01-18 RX ADMIN — OLANZAPINE 15 MILLIGRAM(S): 10 TABLET ORAL at 20:57

## 2025-01-19 RX ORDER — BENZONATATE 100 MG
100 CAPSULE ORAL EVERY 8 HOURS
Refills: 0 | Status: DISCONTINUED | OUTPATIENT
Start: 2025-01-19 | End: 2025-02-03

## 2025-01-19 RX ORDER — DEXTROMETHORPHAN HBR, GUAIFENESIN 200 MG/10ML
100 LIQUID ORAL EVERY 6 HOURS
Refills: 0 | Status: DISCONTINUED | OUTPATIENT
Start: 2025-01-19 | End: 2025-02-03

## 2025-01-19 RX ADMIN — Medication 100 MILLIGRAM(S): at 21:24

## 2025-01-19 RX ADMIN — DEXTROMETHORPHAN HBR, GUAIFENESIN 100 MILLIGRAM(S): 200 LIQUID ORAL at 13:00

## 2025-01-19 RX ADMIN — OLANZAPINE 15 MILLIGRAM(S): 10 TABLET ORAL at 21:24

## 2025-01-19 RX ADMIN — TEMAZEPAM 15 MILLIGRAM(S): 15 CAPSULE ORAL at 21:23

## 2025-01-19 RX ADMIN — HYDROXYZINE HYDROCHLORIDE 50 MILLIGRAM(S): 25 TABLET, FILM COATED ORAL at 21:24

## 2025-01-19 RX ADMIN — Medication 100 MILLIGRAM(S): at 11:11

## 2025-01-20 RX ADMIN — OLANZAPINE 15 MILLIGRAM(S): 10 TABLET ORAL at 20:20

## 2025-01-20 RX ADMIN — Medication 100 MILLIGRAM(S): at 20:21

## 2025-01-20 RX ADMIN — TEMAZEPAM 15 MILLIGRAM(S): 15 CAPSULE ORAL at 20:19

## 2025-01-20 RX ADMIN — HYDROXYZINE HYDROCHLORIDE 50 MILLIGRAM(S): 25 TABLET, FILM COATED ORAL at 20:20

## 2025-01-21 PROCEDURE — 99232 SBSQ HOSP IP/OBS MODERATE 35: CPT

## 2025-01-21 RX ADMIN — OLANZAPINE 15 MILLIGRAM(S): 10 TABLET ORAL at 20:10

## 2025-01-21 RX ADMIN — HYDROXYZINE HYDROCHLORIDE 50 MILLIGRAM(S): 25 TABLET, FILM COATED ORAL at 20:10

## 2025-01-21 RX ADMIN — Medication 2 MILLIGRAM(S): at 00:45

## 2025-01-21 RX ADMIN — TEMAZEPAM 15 MILLIGRAM(S): 15 CAPSULE ORAL at 20:10

## 2025-01-21 RX ADMIN — Medication 100 MILLIGRAM(S): at 20:10

## 2025-01-21 NOTE — ED CDU PROVIDER DISPOSITION NOTE - WET READ LAUNCH FT
Left VM for patient to call office back.  Lab orders mailed to patient.      There are no Wet Read(s) to document.

## 2025-01-21 NOTE — BH INPATIENT PSYCHIATRY PROGRESS NOTE - NSBHCHARTREVIEWVS_PSY_A_CORE FT
Vital Signs Last 24 Hrs  T(C): 36.4 (01-21-25 @ 07:53), Max: 36.6 (01-20-25 @ 19:06)  T(F): 97.6 (01-21-25 @ 07:53), Max: 97.9 (01-20-25 @ 19:06)  HR: --  BP: --  BP(mean): --  RR: 18 (01-21-25 @ 07:53) (18 - 18)  SpO2: --    Orthostatic VS  01-21-25 @ 07:53  Lying BP: --/-- HR: --  Sitting BP: 112/89 HR: 106  Standing BP: 120/64 HR: 75  Site: upper left arm  Mode: electronic  Orthostatic VS  01-20-25 @ 19:06  Lying BP: --/-- HR: --  Sitting BP: 150/81 HR: 116  Standing BP: 137/88 HR: 120  Site: --  Mode: --  Orthostatic VS  01-20-25 @ 08:52  Lying BP: --/-- HR: --  Sitting BP: 125/90 HR: 88  Standing BP: 130/90 HR: 101  Site: --  Mode: --  Orthostatic VS  01-19-25 @ 19:46  Lying BP: --/-- HR: --  Sitting BP: 125/90 HR: 88  Standing BP: 130/90 HR: 101  Site: --  Mode: --

## 2025-01-21 NOTE — BH INPATIENT PSYCHIATRY PROGRESS NOTE - PRN MEDS
MEDICATIONS  (PRN):  acetaminophen     Tablet .. 650 milliGRAM(s) Oral every 6 hours PRN Temp greater or equal to 38C (100.4F), Mild Pain (1 - 3)  acetaminophen     Tablet .. 650 milliGRAM(s) Oral every 6 hours PRN Temp greater or equal to 38C (100.4F), Mild Pain (1 - 3), Moderate Pain (4 - 6)  benzonatate 100 milliGRAM(s) Oral every 8 hours PRN cough  diphenhydrAMINE 50 milliGRAM(s) Oral every 6 hours PRN Extrapyramidal symptoms or prophylaxis  diphenhydrAMINE Injectable 50 milliGRAM(s) IntraMuscular once PRN Extrapyramidal prophylaxis  guaiFENesin Oral Liquid (Sugar-Free) 100 milliGRAM(s) Oral every 6 hours PRN coughing  haloperidol    Injectable 5 milliGRAM(s) IntraMuscular once PRN aggression  hydrOXYzine hydrochloride 50 milliGRAM(s) Oral every 6 hours PRN anxiety  LORazepam     Tablet 2 milliGRAM(s) Oral every 6 hours PRN severe agitation/aggression  LORazepam   Injectable 2 milliGRAM(s) IntraMuscular once PRN agitation

## 2025-01-21 NOTE — BH INPATIENT PSYCHIATRY PROGRESS NOTE - NSBHMETABOLIC_PSY_ALL_CORE_FT
BMI: BMI (kg/m2): 25.6 (01-04-25 @ 15:39)  HbA1c: A1C with Estimated Average Glucose Result: 6.1 % (10-18-24 @ 08:00)    Glucose: POCT Blood Glucose.: 89 mg/dL (01-12-25 @ 20:10)    BP: --Vital Signs Last 24 Hrs  T(C): 36.4 (01-21-25 @ 07:53), Max: 36.6 (01-20-25 @ 19:06)  T(F): 97.6 (01-21-25 @ 07:53), Max: 97.9 (01-20-25 @ 19:06)  HR: --  BP: --  BP(mean): --  RR: 18 (01-21-25 @ 07:53) (18 - 18)  SpO2: --    Orthostatic VS  01-21-25 @ 07:53  Lying BP: --/-- HR: --  Sitting BP: 112/89 HR: 106  Standing BP: 120/64 HR: 75  Site: upper left arm  Mode: electronic  Orthostatic VS  01-20-25 @ 19:06  Lying BP: --/-- HR: --  Sitting BP: 150/81 HR: 116  Standing BP: 137/88 HR: 120  Site: --  Mode: --  Orthostatic VS  01-20-25 @ 08:52  Lying BP: --/-- HR: --  Sitting BP: 125/90 HR: 88  Standing BP: 130/90 HR: 101  Site: --  Mode: --  Orthostatic VS  01-19-25 @ 19:46  Lying BP: --/-- HR: --  Sitting BP: 125/90 HR: 88  Standing BP: 130/90 HR: 101  Site: --  Mode: --    Lipid Panel: Date/Time: 10-18-24 @ 08:00  Cholesterol, Serum: 106  LDL Cholesterol Calculated: 53  HDL Cholesterol, Serum: 42  Total Cholesterol/HDL Ration Measurement: --  Triglycerides, Serum: 53

## 2025-01-21 NOTE — BH INPATIENT PSYCHIATRY PROGRESS NOTE - CURRENT MEDICATION
MEDICATIONS  (STANDING):  hydrOXYzine hydrochloride 50 milliGRAM(s) Oral at bedtime  OLANZapine 15 milliGRAM(s) Oral at bedtime  temazepam 15 milliGRAM(s) Oral at bedtime  traZODone 100 milliGRAM(s) Oral at bedtime    MEDICATIONS  (PRN):  acetaminophen     Tablet .. 650 milliGRAM(s) Oral every 6 hours PRN Temp greater or equal to 38C (100.4F), Mild Pain (1 - 3)  acetaminophen     Tablet .. 650 milliGRAM(s) Oral every 6 hours PRN Temp greater or equal to 38C (100.4F), Mild Pain (1 - 3), Moderate Pain (4 - 6)  benzonatate 100 milliGRAM(s) Oral every 8 hours PRN cough  diphenhydrAMINE 50 milliGRAM(s) Oral every 6 hours PRN Extrapyramidal symptoms or prophylaxis  diphenhydrAMINE Injectable 50 milliGRAM(s) IntraMuscular once PRN Extrapyramidal prophylaxis  guaiFENesin Oral Liquid (Sugar-Free) 100 milliGRAM(s) Oral every 6 hours PRN coughing  haloperidol    Injectable 5 milliGRAM(s) IntraMuscular once PRN aggression  hydrOXYzine hydrochloride 50 milliGRAM(s) Oral every 6 hours PRN anxiety  LORazepam     Tablet 2 milliGRAM(s) Oral every 6 hours PRN severe agitation/aggression  LORazepam   Injectable 2 milliGRAM(s) IntraMuscular once PRN agitation

## 2025-01-21 NOTE — BH INPATIENT PSYCHIATRY PROGRESS NOTE - NSBHFUPINTERVALHXFT_PSY_A_CORE
Patient is followed up for NDD. Chart, medications and labs reviewed. Patient is discussed during morning brief, no overnight events, has been in good behavioral control.   Patient was seen and is evaluated on the unit. She continues to exhibit childlike behaviors, w/o intrusiveness towards staff/peers. On interview pt remains discharge focused, discussed pt scheduled for lunch at group home in [facility] tomorrow. She has been compliant with standing medications, denies SE. No acute medical concerns, VSS.
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
## 2025-01-22 PROCEDURE — 99232 SBSQ HOSP IP/OBS MODERATE 35: CPT

## 2025-01-22 RX ADMIN — Medication 100 MILLIGRAM(S): at 20:48

## 2025-01-22 RX ADMIN — TEMAZEPAM 15 MILLIGRAM(S): 15 CAPSULE ORAL at 20:48

## 2025-01-22 RX ADMIN — OLANZAPINE 15 MILLIGRAM(S): 10 TABLET ORAL at 20:48

## 2025-01-22 RX ADMIN — HYDROXYZINE HYDROCHLORIDE 50 MILLIGRAM(S): 25 TABLET, FILM COATED ORAL at 20:48

## 2025-01-22 NOTE — BH INPATIENT PSYCHIATRY PROGRESS NOTE - NSBHFUPINTERVALHXFT_PSY_A_CORE
Patient is followed up for NDD. Chart, medications and labs reviewed. Patient is discussed during morning brief, no overnight events, has been in good behavioral control.   Patient was seen and is evaluated on the unit. She continues to exhibit childlike behaviors, w/o intrusiveness towards staff/peers. Was able to sleep for 7 hours overnight per sleep log. She was able to go for group home visit in [facility], returned shortly afterwards as she refused to enter the building. States that she felt "sick" and dizzy. She has been compliant with standing medications, denies SE. No acute medical concerns, VSS.

## 2025-01-22 NOTE — LEGAL STATUS PROGRESS NOTE - NSLEGALSTATUS_PSY_ALL_CORE
Court Retained
9.27 Involuntary (2PC) Admission
Court Retained

## 2025-01-22 NOTE — BH INPATIENT PSYCHIATRY PROGRESS NOTE - NSBHMETABOLIC_PSY_ALL_CORE_FT
BMI: BMI (kg/m2): 25.6 (01-04-25 @ 15:39)  HbA1c: A1C with Estimated Average Glucose Result: 6.1 % (10-18-24 @ 08:00)    Glucose: POCT Blood Glucose.: 89 mg/dL (01-12-25 @ 20:10)    BP: --Vital Signs Last 24 Hrs  T(C): 36.7 (01-22-25 @ 08:34), Max: 36.7 (01-22-25 @ 08:34)  T(F): 98.1 (01-22-25 @ 08:34), Max: 98.1 (01-22-25 @ 08:34)  HR: --  BP: --  BP(mean): --  RR: 16 (01-21-25 @ 19:12) (16 - 16)  SpO2: --    Orthostatic VS  01-22-25 @ 08:34  Lying BP: --/-- HR: --  Sitting BP: 121/84 HR: 120  Standing BP: 124/80 HR: 119  Site: --  Mode: --  Orthostatic VS  01-21-25 @ 19:12  Lying BP: --/-- HR: --  Sitting BP: 116/77 HR: 107  Standing BP: 131/82 HR: 120  Site: upper left arm  Mode: --  Orthostatic VS  01-21-25 @ 07:53  Lying BP: --/-- HR: --  Sitting BP: 112/89 HR: 106  Standing BP: 120/64 HR: 75  Site: upper left arm  Mode: electronic  Orthostatic VS  01-20-25 @ 19:06  Lying BP: --/-- HR: --  Sitting BP: 150/81 HR: 116  Standing BP: 137/88 HR: 120  Site: --  Mode: --    Lipid Panel: Date/Time: 10-18-24 @ 08:00  Cholesterol, Serum: 106  LDL Cholesterol Calculated: 53  HDL Cholesterol, Serum: 42  Total Cholesterol/HDL Ration Measurement: --  Triglycerides, Serum: 53
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
## 2025-01-22 NOTE — LEGAL STATUS PROGRESS NOTE - NSEFFECTIVEDATE_PSY_ALL_CORE
09-Feb-2024
10-Sep-2024
12-Nov-2024
25-Jun-2024
16-Apr-2024
14-Jan-2025
25-Jun-2024
09-Feb-2024
09-Feb-2024
10-Sep-2024

## 2025-01-22 NOTE — BH INPATIENT PSYCHIATRY PROGRESS NOTE - NSBHCHARTREVIEWVS_PSY_A_CORE FT
Vital Signs Last 24 Hrs  T(C): 36.7 (01-22-25 @ 08:34), Max: 36.7 (01-22-25 @ 08:34)  T(F): 98.1 (01-22-25 @ 08:34), Max: 98.1 (01-22-25 @ 08:34)  HR: --  BP: --  BP(mean): --  RR: 16 (01-21-25 @ 19:12) (16 - 16)  SpO2: --    Orthostatic VS  01-22-25 @ 08:34  Lying BP: --/-- HR: --  Sitting BP: 121/84 HR: 120  Standing BP: 124/80 HR: 119  Site: --  Mode: --  Orthostatic VS  01-21-25 @ 19:12  Lying BP: --/-- HR: --  Sitting BP: 116/77 HR: 107  Standing BP: 131/82 HR: 120  Site: upper left arm  Mode: --  Orthostatic VS  01-21-25 @ 07:53  Lying BP: --/-- HR: --  Sitting BP: 112/89 HR: 106  Standing BP: 120/64 HR: 75  Site: upper left arm  Mode: electronic  Orthostatic VS  01-20-25 @ 19:06  Lying BP: --/-- HR: --  Sitting BP: 150/81 HR: 116  Standing BP: 137/88 HR: 120  Site: --  Mode: --

## 2025-01-23 LAB
FLUAV AG NPH QL: SIGNIFICANT CHANGE UP
FLUBV AG NPH QL: SIGNIFICANT CHANGE UP
RSV RNA NPH QL NAA+NON-PROBE: SIGNIFICANT CHANGE UP
SARS-COV-2 RNA SPEC QL NAA+PROBE: SIGNIFICANT CHANGE UP

## 2025-01-23 PROCEDURE — 99232 SBSQ HOSP IP/OBS MODERATE 35: CPT

## 2025-01-23 RX ORDER — LORAZEPAM 4 MG/ML
2 VIAL (ML) INJECTION EVERY 6 HOURS
Refills: 0 | Status: DISCONTINUED | OUTPATIENT
Start: 2025-01-23 | End: 2025-01-30

## 2025-01-23 RX ORDER — TEMAZEPAM 15 MG/1
15 CAPSULE ORAL AT BEDTIME
Refills: 0 | Status: DISCONTINUED | OUTPATIENT
Start: 2025-01-23 | End: 2025-01-30

## 2025-01-23 RX ORDER — LORAZEPAM 4 MG/ML
2 VIAL (ML) INJECTION ONCE
Refills: 0 | Status: DISCONTINUED | OUTPATIENT
Start: 2025-01-23 | End: 2025-01-30

## 2025-01-23 RX ADMIN — Medication 100 MILLIGRAM(S): at 20:27

## 2025-01-23 RX ADMIN — OLANZAPINE 15 MILLIGRAM(S): 10 TABLET ORAL at 20:27

## 2025-01-23 RX ADMIN — Medication 2 MILLIGRAM(S): at 20:27

## 2025-01-23 RX ADMIN — TEMAZEPAM 15 MILLIGRAM(S): 15 CAPSULE ORAL at 20:27

## 2025-01-23 RX ADMIN — HYDROXYZINE HYDROCHLORIDE 50 MILLIGRAM(S): 25 TABLET, FILM COATED ORAL at 20:29
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
## 2025-01-23 NOTE — BH INPATIENT PSYCHIATRY PROGRESS NOTE - NSBHFUPINTERVALHXFT_PSY_A_CORE
Patient is followed up for NDD. Chart, medications and labs reviewed. Patient is discussed during morning brief, no overnight events, has been in good behavioral control.   Patient was seen and is evaluated on the unit. She continues to exhibit childlike behaviors, w/o intrusiveness towards staff/peers. She continues to verbalize feeling sick, points to her throat. Writer discussed flu/COVID test, pt agreeable and test resulted negative. Pt encouraged to utilize PRN's for cough. She has been compliant with standing medications, denies SE. No acute medical concerns, VSS.

## 2025-01-23 NOTE — BH INPATIENT PSYCHIATRY PROGRESS NOTE - NSBHMETABOLIC_PSY_ALL_CORE_FT
BMI: BMI (kg/m2): 25.6 (01-04-25 @ 15:39)  HbA1c: A1C with Estimated Average Glucose Result: 6.1 % (10-18-24 @ 08:00)    Glucose: POCT Blood Glucose.: 89 mg/dL (01-12-25 @ 20:10)    BP: --Vital Signs Last 24 Hrs  T(C): 36.7 (01-23-25 @ 08:32), Max: 36.7 (01-23-25 @ 08:32)  T(F): 98 (01-23-25 @ 08:32), Max: 98 (01-23-25 @ 08:32)  HR: --  BP: --  BP(mean): --  RR: --  SpO2: --    Orthostatic VS  01-23-25 @ 08:32  Lying BP: --/-- HR: --  Sitting BP: 131/77 HR: 107  Standing BP: 126/86 HR: 110  Site: --  Mode: electronic  Orthostatic VS  01-22-25 @ 20:16  Lying BP: --/-- HR: --  Sitting BP: 114/91 HR: 110  Standing BP: 110/68 HR: 112  Site: --  Mode: --  Orthostatic VS  01-22-25 @ 08:34  Lying BP: --/-- HR: --  Sitting BP: 121/84 HR: 120  Standing BP: 124/80 HR: 119  Site: --  Mode: --  Orthostatic VS  01-21-25 @ 19:12  Lying BP: --/-- HR: --  Sitting BP: 116/77 HR: 107  Standing BP: 131/82 HR: 120  Site: upper left arm  Mode: --    Lipid Panel: Date/Time: 10-18-24 @ 08:00  Cholesterol, Serum: 106  LDL Cholesterol Calculated: 53  HDL Cholesterol, Serum: 42  Total Cholesterol/HDL Ration Measurement: --  Triglycerides, Serum: 53

## 2025-01-23 NOTE — BH INPATIENT PSYCHIATRY PROGRESS NOTE - NSBHCHARTREVIEWVS_PSY_A_CORE FT
Vital Signs Last 24 Hrs  T(C): 36.7 (01-23-25 @ 08:32), Max: 36.7 (01-23-25 @ 08:32)  T(F): 98 (01-23-25 @ 08:32), Max: 98 (01-23-25 @ 08:32)  HR: --  BP: --  BP(mean): --  RR: --  SpO2: --    Orthostatic VS  01-23-25 @ 08:32  Lying BP: --/-- HR: --  Sitting BP: 131/77 HR: 107  Standing BP: 126/86 HR: 110  Site: --  Mode: electronic  Orthostatic VS  01-22-25 @ 20:16  Lying BP: --/-- HR: --  Sitting BP: 114/91 HR: 110  Standing BP: 110/68 HR: 112  Site: --  Mode: --  Orthostatic VS  01-22-25 @ 08:34  Lying BP: --/-- HR: --  Sitting BP: 121/84 HR: 120  Standing BP: 124/80 HR: 119  Site: --  Mode: --  Orthostatic VS  01-21-25 @ 19:12  Lying BP: --/-- HR: --  Sitting BP: 116/77 HR: 107  Standing BP: 131/82 HR: 120  Site: upper left arm  Mode: --

## 2025-01-24 PROCEDURE — 99232 SBSQ HOSP IP/OBS MODERATE 35: CPT

## 2025-01-24 RX ADMIN — HYDROXYZINE HYDROCHLORIDE 50 MILLIGRAM(S): 25 TABLET, FILM COATED ORAL at 21:07

## 2025-01-24 RX ADMIN — Medication 2 MILLIGRAM(S): at 21:07

## 2025-01-24 RX ADMIN — OLANZAPINE 15 MILLIGRAM(S): 10 TABLET ORAL at 21:07

## 2025-01-24 RX ADMIN — TEMAZEPAM 15 MILLIGRAM(S): 15 CAPSULE ORAL at 21:07

## 2025-01-24 RX ADMIN — Medication 100 MILLIGRAM(S): at 21:07

## 2025-01-24 NOTE — BH INPATIENT PSYCHIATRY PROGRESS NOTE - NSBHCHARTREVIEWVS_PSY_A_CORE FT
Vital Signs Last 24 Hrs  T(C): 36.9 (01-24-25 @ 07:33), Max: 36.9 (01-24-25 @ 07:33)  T(F): 98.4 (01-24-25 @ 07:33), Max: 98.4 (01-24-25 @ 07:33)  HR: 79 (01-24-25 @ 07:33) (79 - 79)  BP: 103/60 (01-24-25 @ 07:33) (103/60 - 103/60)  BP(mean): --  RR: 17 (01-24-25 @ 07:33) (17 - 18)  SpO2: --    Orthostatic VS  01-24-25 @ 07:33  Lying BP: --/-- HR: --  Sitting BP: 103/60 HR: 79  Standing BP: --/-- HR: --  Site: --  Mode: --  Orthostatic VS  01-23-25 @ 08:32  Lying BP: --/-- HR: --  Sitting BP: 131/77 HR: 107  Standing BP: 126/86 HR: 110  Site: --  Mode: electronic  Orthostatic VS  01-22-25 @ 20:16  Lying BP: --/-- HR: --  Sitting BP: 114/91 HR: 110  Standing BP: 110/68 HR: 112  Site: --  Mode: --
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
## 2025-01-24 NOTE — BH INPATIENT PSYCHIATRY PROGRESS NOTE - NSBHFUPINTERVALHXFT_PSY_A_CORE
Patient is followed up for NDD. Chart, medications and labs reviewed. Patient is discussed during morning brief, no overnight events, has been in good behavioral control.   Patient was seen and is evaluated on the unit. She continues to exhibit childlike behaviors, w/o intrusiveness towards staff/peers. She continues to verbalize feeling sick, having "cough, sore throat, and runny nose". Pt encouraged to utilize PRN's for cough/congestion. She has been compliant with standing medications, denies SE. No acute medical concerns, VSS.

## 2025-01-24 NOTE — BH INPATIENT PSYCHIATRY PROGRESS NOTE - NSBHMETABOLIC_PSY_ALL_CORE_FT
BMI: BMI (kg/m2): 25.6 (01-04-25 @ 15:39)  HbA1c: A1C with Estimated Average Glucose Result: 6.1 % (10-18-24 @ 08:00)    Glucose: POCT Blood Glucose.: 89 mg/dL (01-12-25 @ 20:10)    BP: 103/60 (01-24-25 @ 07:33) (103/60 - 103/60)Vital Signs Last 24 Hrs  T(C): 36.9 (01-24-25 @ 07:33), Max: 36.9 (01-24-25 @ 07:33)  T(F): 98.4 (01-24-25 @ 07:33), Max: 98.4 (01-24-25 @ 07:33)  HR: 79 (01-24-25 @ 07:33) (79 - 79)  BP: 103/60 (01-24-25 @ 07:33) (103/60 - 103/60)  BP(mean): --  RR: 17 (01-24-25 @ 07:33) (17 - 18)  SpO2: --    Orthostatic VS  01-24-25 @ 07:33  Lying BP: --/-- HR: --  Sitting BP: 103/60 HR: 79  Standing BP: --/-- HR: --  Site: --  Mode: --  Orthostatic VS  01-23-25 @ 08:32  Lying BP: --/-- HR: --  Sitting BP: 131/77 HR: 107  Standing BP: 126/86 HR: 110  Site: --  Mode: electronic  Orthostatic VS  01-22-25 @ 20:16  Lying BP: --/-- HR: --  Sitting BP: 114/91 HR: 110  Standing BP: 110/68 HR: 112  Site: --  Mode: --    Lipid Panel: Date/Time: 10-18-24 @ 08:00  Cholesterol, Serum: 106  LDL Cholesterol Calculated: 53  HDL Cholesterol, Serum: 42  Total Cholesterol/HDL Ration Measurement: --  Triglycerides, Serum: 53

## 2025-01-25 RX ADMIN — Medication 100 MILLIGRAM(S): at 20:50

## 2025-01-25 RX ADMIN — TEMAZEPAM 15 MILLIGRAM(S): 15 CAPSULE ORAL at 20:49

## 2025-01-25 RX ADMIN — HYDROXYZINE HYDROCHLORIDE 50 MILLIGRAM(S): 25 TABLET, FILM COATED ORAL at 20:49

## 2025-01-25 RX ADMIN — OLANZAPINE 15 MILLIGRAM(S): 10 TABLET ORAL at 20:50

## 2025-01-26 RX ADMIN — Medication 100 MILLIGRAM(S): at 20:27

## 2025-01-26 RX ADMIN — OLANZAPINE 15 MILLIGRAM(S): 10 TABLET ORAL at 20:27

## 2025-01-26 RX ADMIN — TEMAZEPAM 15 MILLIGRAM(S): 15 CAPSULE ORAL at 20:27

## 2025-01-26 RX ADMIN — HYDROXYZINE HYDROCHLORIDE 50 MILLIGRAM(S): 25 TABLET, FILM COATED ORAL at 20:27

## 2025-01-27 PROCEDURE — 99232 SBSQ HOSP IP/OBS MODERATE 35: CPT

## 2025-01-27 RX ORDER — HYDROXYZINE HYDROCHLORIDE 25 MG/1
75 TABLET, FILM COATED ORAL AT BEDTIME
Refills: 0 | Status: DISCONTINUED | OUTPATIENT
Start: 2025-01-27 | End: 2025-01-30

## 2025-01-27 RX ADMIN — HYDROXYZINE HYDROCHLORIDE 50 MILLIGRAM(S): 25 TABLET, FILM COATED ORAL at 20:53

## 2025-01-27 RX ADMIN — OLANZAPINE 15 MILLIGRAM(S): 10 TABLET ORAL at 20:53

## 2025-01-27 RX ADMIN — Medication 100 MILLIGRAM(S): at 20:53

## 2025-01-27 RX ADMIN — HYDROXYZINE HYDROCHLORIDE 75 MILLIGRAM(S): 25 TABLET, FILM COATED ORAL at 20:53

## 2025-01-27 RX ADMIN — TEMAZEPAM 15 MILLIGRAM(S): 15 CAPSULE ORAL at 20:53

## 2025-01-27 NOTE — BH INPATIENT PSYCHIATRY PROGRESS NOTE - NSBHFUPINTERVALHXFT_PSY_A_CORE
Patient is followed up for NDD. Chart, medications and labs reviewed. Patient is discussed during morning brief, no overnight events, has been in good behavioral control.   Patient was seen and is evaluated on the unit. She continues to exhibit childlike behaviors, w/o intrusiveness towards staff/peers. Slept for 3 hours overnight per sleep log. She states that she remains w/ cough & sore throat, improving congestion. She has been compliant with standing medications, denies SE. No acute medical concerns, VSS.
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
## 2025-01-27 NOTE — BH INPATIENT PSYCHIATRY PROGRESS NOTE - CURRENT MEDICATION
MEDICATIONS  (STANDING):  hydrOXYzine hydrochloride 75 milliGRAM(s) Oral at bedtime  OLANZapine 15 milliGRAM(s) Oral at bedtime  temazepam 15 milliGRAM(s) Oral at bedtime  traZODone 100 milliGRAM(s) Oral at bedtime    MEDICATIONS  (PRN):  acetaminophen     Tablet .. 650 milliGRAM(s) Oral every 6 hours PRN Temp greater or equal to 38C (100.4F), Mild Pain (1 - 3)  acetaminophen     Tablet .. 650 milliGRAM(s) Oral every 6 hours PRN Temp greater or equal to 38C (100.4F), Mild Pain (1 - 3), Moderate Pain (4 - 6)  benzonatate 100 milliGRAM(s) Oral every 8 hours PRN cough  diphenhydrAMINE 50 milliGRAM(s) Oral every 6 hours PRN Extrapyramidal symptoms or prophylaxis  diphenhydrAMINE Injectable 50 milliGRAM(s) IntraMuscular once PRN Extrapyramidal prophylaxis  guaiFENesin Oral Liquid (Sugar-Free) 100 milliGRAM(s) Oral every 6 hours PRN coughing  haloperidol    Injectable 5 milliGRAM(s) IntraMuscular once PRN aggression  hydrOXYzine hydrochloride 50 milliGRAM(s) Oral every 6 hours PRN anxiety  LORazepam     Tablet 2 milliGRAM(s) Oral every 6 hours PRN severe agitation/aggression  LORazepam   Injectable 2 milliGRAM(s) IntraMuscular once PRN agitation

## 2025-01-27 NOTE — BH INPATIENT PSYCHIATRY PROGRESS NOTE - NSBHMETABOLIC_PSY_ALL_CORE_FT
BMI: BMI (kg/m2): 29.1 (01-25-25 @ 16:22)  HbA1c: A1C with Estimated Average Glucose Result: 6.1 % (10-18-24 @ 08:00)    Glucose: POCT Blood Glucose.: 89 mg/dL (01-12-25 @ 20:10)    BP: --Vital Signs Last 24 Hrs  T(C): 36.5 (01-27-25 @ 08:27), Max: 36.5 (01-26-25 @ 19:06)  T(F): 97.7 (01-27-25 @ 08:27), Max: 97.7 (01-26-25 @ 19:06)  HR: --  BP: --  BP(mean): --  RR: 17 (01-27-25 @ 08:27) (17 - 17)  SpO2: --    Orthostatic VS  01-27-25 @ 08:27  Lying BP: --/-- HR: --  Sitting BP: 136/104 HR: 79  Standing BP: 135/95 HR: 89  Site: --  Mode: --  Orthostatic VS  01-26-25 @ 19:06  Lying BP: --/-- HR: --  Sitting BP: 125/90 HR: 103  Standing BP: 123/68 HR: 120  Site: --  Mode: --  Orthostatic VS  01-26-25 @ 07:40  Lying BP: --/-- HR: --  Sitting BP: 128/60 HR: 92  Standing BP: 135/84 HR: 97  Site: --  Mode: --  Orthostatic VS  01-25-25 @ 19:40  Lying BP: --/-- HR: --  Sitting BP: 112/60 HR: 80  Standing BP: 118/67 HR: 108  Site: --  Mode: --    Lipid Panel: Date/Time: 10-18-24 @ 08:00  Cholesterol, Serum: 106  LDL Cholesterol Calculated: 53  HDL Cholesterol, Serum: 42  Total Cholesterol/HDL Ration Measurement: --  Triglycerides, Serum: 53

## 2025-01-27 NOTE — BH INPATIENT PSYCHIATRY PROGRESS NOTE - NSBHCHARTREVIEWVS_PSY_A_CORE FT
Vital Signs Last 24 Hrs  T(C): 36.5 (01-27-25 @ 08:27), Max: 36.5 (01-26-25 @ 19:06)  T(F): 97.7 (01-27-25 @ 08:27), Max: 97.7 (01-26-25 @ 19:06)  HR: --  BP: --  BP(mean): --  RR: 17 (01-27-25 @ 08:27) (17 - 17)  SpO2: --    Orthostatic VS  01-27-25 @ 08:27  Lying BP: --/-- HR: --  Sitting BP: 136/104 HR: 79  Standing BP: 135/95 HR: 89  Site: --  Mode: --  Orthostatic VS  01-26-25 @ 19:06  Lying BP: --/-- HR: --  Sitting BP: 125/90 HR: 103  Standing BP: 123/68 HR: 120  Site: --  Mode: --  Orthostatic VS  01-26-25 @ 07:40  Lying BP: --/-- HR: --  Sitting BP: 128/60 HR: 92  Standing BP: 135/84 HR: 97  Site: --  Mode: --  Orthostatic VS  01-25-25 @ 19:40  Lying BP: --/-- HR: --  Sitting BP: 112/60 HR: 80  Standing BP: 118/67 HR: 108  Site: --  Mode: --

## 2025-01-28 PROCEDURE — 99232 SBSQ HOSP IP/OBS MODERATE 35: CPT

## 2025-01-28 RX ADMIN — Medication 100 MILLIGRAM(S): at 20:06

## 2025-01-28 RX ADMIN — TEMAZEPAM 15 MILLIGRAM(S): 15 CAPSULE ORAL at 20:06

## 2025-01-28 RX ADMIN — HYDROXYZINE HYDROCHLORIDE 75 MILLIGRAM(S): 25 TABLET, FILM COATED ORAL at 20:06

## 2025-01-28 RX ADMIN — OLANZAPINE 15 MILLIGRAM(S): 10 TABLET ORAL at 20:06
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
## 2025-01-28 NOTE — BH INPATIENT PSYCHIATRY PROGRESS NOTE - NSBHFUPINTERVALHXFT_PSY_A_CORE
Patient is followed up for NDD. Chart, medications and labs reviewed. Patient is discussed during morning brief, no overnight events, has been in good behavioral control.   Patient was seen and is evaluated on the unit. She continues to exhibit childlike behaviors, w/o intrusiveness towards staff/peers. Slept for 5 hours overnight per sleep log. She states that she is feeling better, states that she would like to leave [facility] and go to "[facility]" She has been compliant with standing medications, denies SE. No acute medical concerns, VSS.

## 2025-01-28 NOTE — BH INPATIENT PSYCHIATRY PROGRESS NOTE - NSBHMETABOLIC_PSY_ALL_CORE_FT
BMI: BMI (kg/m2): 29.1 (01-25-25 @ 16:22)  HbA1c: A1C with Estimated Average Glucose Result: 6.1 % (10-18-24 @ 08:00)    Glucose: POCT Blood Glucose.: 89 mg/dL (01-12-25 @ 20:10)    BP: --Vital Signs Last 24 Hrs  T(C): 36.4 (01-28-25 @ 08:35), Max: 36.4 (01-28-25 @ 08:35)  T(F): 97.5 (01-28-25 @ 08:35), Max: 97.5 (01-28-25 @ 08:35)  HR: --  BP: --  BP(mean): --  RR: 16 (01-28-25 @ 12:08) (16 - 18)  SpO2: --    Orthostatic VS  01-28-25 @ 08:35  Lying BP: --/-- HR: --  Sitting BP: 123/84 HR: 86  Standing BP: 123/84 HR: 88  Site: --  Mode: --  Orthostatic VS  01-27-25 @ 08:27  Lying BP: --/-- HR: --  Sitting BP: 136/104 HR: 79  Standing BP: 135/95 HR: 89  Site: --  Mode: --  Orthostatic VS  01-26-25 @ 19:06  Lying BP: --/-- HR: --  Sitting BP: 125/90 HR: 103  Standing BP: 123/68 HR: 120  Site: --  Mode: --    Lipid Panel: Date/Time: 10-18-24 @ 08:00  Cholesterol, Serum: 106  LDL Cholesterol Calculated: 53  HDL Cholesterol, Serum: 42  Total Cholesterol/HDL Ration Measurement: --  Triglycerides, Serum: 53

## 2025-01-28 NOTE — BH INPATIENT PSYCHIATRY PROGRESS NOTE - NSBHCHARTREVIEWVS_PSY_A_CORE FT
Vital Signs Last 24 Hrs  T(C): 36.4 (01-28-25 @ 08:35), Max: 36.4 (01-28-25 @ 08:35)  T(F): 97.5 (01-28-25 @ 08:35), Max: 97.5 (01-28-25 @ 08:35)  HR: --  BP: --  BP(mean): --  RR: 16 (01-28-25 @ 12:08) (16 - 18)  SpO2: --    Orthostatic VS  01-28-25 @ 08:35  Lying BP: --/-- HR: --  Sitting BP: 123/84 HR: 86  Standing BP: 123/84 HR: 88  Site: --  Mode: --  Orthostatic VS  01-27-25 @ 08:27  Lying BP: --/-- HR: --  Sitting BP: 136/104 HR: 79  Standing BP: 135/95 HR: 89  Site: --  Mode: --  Orthostatic VS  01-26-25 @ 19:06  Lying BP: --/-- HR: --  Sitting BP: 125/90 HR: 103  Standing BP: 123/68 HR: 120  Site: --  Mode: --

## 2025-01-29 PROCEDURE — 99232 SBSQ HOSP IP/OBS MODERATE 35: CPT

## 2025-01-29 RX ADMIN — TEMAZEPAM 15 MILLIGRAM(S): 15 CAPSULE ORAL at 20:28

## 2025-01-29 RX ADMIN — OLANZAPINE 15 MILLIGRAM(S): 10 TABLET ORAL at 20:28

## 2025-01-29 RX ADMIN — HYDROXYZINE HYDROCHLORIDE 75 MILLIGRAM(S): 25 TABLET, FILM COATED ORAL at 20:28

## 2025-01-29 RX ADMIN — Medication 100 MILLIGRAM(S): at 20:28

## 2025-01-29 NOTE — BH INPATIENT PSYCHIATRY PROGRESS NOTE - NSBHMETABOLIC_PSY_ALL_CORE_FT
BMI: BMI (kg/m2): 29.1 (01-25-25 @ 16:22)  HbA1c: A1C with Estimated Average Glucose Result: 6.1 % (10-18-24 @ 08:00)    Glucose: POCT Blood Glucose.: 89 mg/dL (01-12-25 @ 20:10)    BP: --Vital Signs Last 24 Hrs  T(C): 36.8 (01-29-25 @ 08:05), Max: 36.8 (01-29-25 @ 08:05)  T(F): 98.2 (01-29-25 @ 08:05), Max: 98.2 (01-29-25 @ 08:05)  HR: --  BP: --  BP(mean): --  RR: 17 (01-29-25 @ 08:05) (17 - 17)  SpO2: --    Orthostatic VS  01-29-25 @ 08:05  Lying BP: --/-- HR: --  Sitting BP: 135/89 HR: 111  Standing BP: 128/76 HR: 102  Site: --  Mode: --  Orthostatic VS  01-28-25 @ 19:44  Lying BP: --/-- HR: --  Sitting BP: 121/84 HR: 83  Standing BP: 129/82 HR: 94  Site: --  Mode: --  Orthostatic VS  01-28-25 @ 08:35  Lying BP: --/-- HR: --  Sitting BP: 123/84 HR: 86  Standing BP: 123/84 HR: 88  Site: --  Mode: --    Lipid Panel: Date/Time: 10-18-24 @ 08:00  Cholesterol, Serum: 106  LDL Cholesterol Calculated: 53  HDL Cholesterol, Serum: 42  Total Cholesterol/HDL Ration Measurement: --  Triglycerides, Serum: 53
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
## 2025-01-29 NOTE — BH INPATIENT PSYCHIATRY PROGRESS NOTE - NSBHCHARTREVIEWVS_PSY_A_CORE FT
Vital Signs Last 24 Hrs  T(C): 36.8 (01-29-25 @ 08:05), Max: 36.8 (01-29-25 @ 08:05)  T(F): 98.2 (01-29-25 @ 08:05), Max: 98.2 (01-29-25 @ 08:05)  HR: --  BP: --  BP(mean): --  RR: 17 (01-29-25 @ 08:05) (17 - 17)  SpO2: --    Orthostatic VS  01-29-25 @ 08:05  Lying BP: --/-- HR: --  Sitting BP: 135/89 HR: 111  Standing BP: 128/76 HR: 102  Site: --  Mode: --  Orthostatic VS  01-28-25 @ 19:44  Lying BP: --/-- HR: --  Sitting BP: 121/84 HR: 83  Standing BP: 129/82 HR: 94  Site: --  Mode: --  Orthostatic VS  01-28-25 @ 08:35  Lying BP: --/-- HR: --  Sitting BP: 123/84 HR: 86  Standing BP: 123/84 HR: 88  Site: --  Mode: --

## 2025-01-29 NOTE — BH INPATIENT PSYCHIATRY PROGRESS NOTE - NSBHFUPINTERVALHXFT_PSY_A_CORE
Patient is followed up for NDD. Chart, medications and labs reviewed. Patient is discussed during morning brief, no overnight events, has been in good behavioral control.   Patient was seen and is evaluated on the unit. She continues to exhibit childlike behaviors, w/o intrusiveness towards staff/peers. She approaches writer w/ bright affect, states that she would like to go to "[facility]". She has been compliant with standing medications, denies SE. Has been attentive to ADL's, reports showering last night. No acute medical concerns, VSS.

## 2025-01-30 PROCEDURE — 99232 SBSQ HOSP IP/OBS MODERATE 35: CPT

## 2025-01-30 RX ORDER — TEMAZEPAM 15 MG/1
15 CAPSULE ORAL AT BEDTIME
Refills: 0 | Status: DISCONTINUED | OUTPATIENT
Start: 2025-01-30 | End: 2025-02-06

## 2025-01-30 RX ORDER — LORAZEPAM 4 MG/ML
2 VIAL (ML) INJECTION ONCE
Refills: 0 | Status: DISCONTINUED | OUTPATIENT
Start: 2025-01-30 | End: 2025-02-06

## 2025-01-30 RX ORDER — LORAZEPAM 4 MG/ML
2 VIAL (ML) INJECTION EVERY 6 HOURS
Refills: 0 | Status: DISCONTINUED | OUTPATIENT
Start: 2025-01-30 | End: 2025-01-30

## 2025-01-30 RX ORDER — HYDROXYZINE HYDROCHLORIDE 25 MG/1
100 TABLET, FILM COATED ORAL AT BEDTIME
Refills: 0 | Status: DISCONTINUED | OUTPATIENT
Start: 2025-01-30 | End: 2025-04-01

## 2025-01-30 RX ADMIN — OLANZAPINE 15 MILLIGRAM(S): 10 TABLET ORAL at 20:03

## 2025-01-30 RX ADMIN — HYDROXYZINE HYDROCHLORIDE 100 MILLIGRAM(S): 25 TABLET, FILM COATED ORAL at 20:02

## 2025-01-30 RX ADMIN — Medication 100 MILLIGRAM(S): at 20:02

## 2025-01-30 RX ADMIN — TEMAZEPAM 15 MILLIGRAM(S): 15 CAPSULE ORAL at 20:02

## 2025-01-30 NOTE — BH INPATIENT PSYCHIATRY PROGRESS NOTE - CURRENT MEDICATION
MEDICATIONS  (STANDING):  hydrOXYzine hydrochloride 100 milliGRAM(s) Oral at bedtime  OLANZapine 15 milliGRAM(s) Oral at bedtime  temazepam 15 milliGRAM(s) Oral at bedtime  traZODone 100 milliGRAM(s) Oral at bedtime    MEDICATIONS  (PRN):  acetaminophen     Tablet .. 650 milliGRAM(s) Oral every 6 hours PRN Temp greater or equal to 38C (100.4F), Mild Pain (1 - 3)  acetaminophen     Tablet .. 650 milliGRAM(s) Oral every 6 hours PRN Temp greater or equal to 38C (100.4F), Mild Pain (1 - 3), Moderate Pain (4 - 6)  benzonatate 100 milliGRAM(s) Oral every 8 hours PRN cough  diphenhydrAMINE 50 milliGRAM(s) Oral every 6 hours PRN Extrapyramidal symptoms or prophylaxis  diphenhydrAMINE Injectable 50 milliGRAM(s) IntraMuscular once PRN Extrapyramidal prophylaxis  guaiFENesin Oral Liquid (Sugar-Free) 100 milliGRAM(s) Oral every 6 hours PRN coughing  haloperidol    Injectable 5 milliGRAM(s) IntraMuscular once PRN aggression  hydrOXYzine hydrochloride 50 milliGRAM(s) Oral every 6 hours PRN anxiety  LORazepam     Tablet 2 milliGRAM(s) Oral every 6 hours PRN severe agitation/aggression  LORazepam   Injectable 2 milliGRAM(s) IntraMuscular once PRN agitation

## 2025-01-30 NOTE — BH INPATIENT PSYCHIATRY PROGRESS NOTE - NSBHMETABOLIC_PSY_ALL_CORE_FT
BMI: BMI (kg/m2): 29.1 (01-25-25 @ 16:22)  HbA1c: A1C with Estimated Average Glucose Result: 6.1 % (10-18-24 @ 08:00)    Glucose: POCT Blood Glucose.: 89 mg/dL (01-12-25 @ 20:10)    BP: --Vital Signs Last 24 Hrs  T(C): 36.6 (01-30-25 @ 07:50), Max: 36.6 (01-30-25 @ 07:50)  T(F): 97.9 (01-30-25 @ 07:50), Max: 97.9 (01-30-25 @ 07:50)  HR: --  BP: --  BP(mean): --  RR: 18 (01-30-25 @ 07:50) (16 - 18)  SpO2: --    Orthostatic VS  01-30-25 @ 07:50  Lying BP: --/-- HR: --  Sitting BP: 126/81 HR: 104  Standing BP: 116/82 HR: 114  Site: upper left arm  Mode: electronic  Orthostatic VS  01-29-25 @ 08:05  Lying BP: --/-- HR: --  Sitting BP: 135/89 HR: 111  Standing BP: 128/76 HR: 102  Site: --  Mode: --  Orthostatic VS  01-28-25 @ 19:44  Lying BP: --/-- HR: --  Sitting BP: 121/84 HR: 83  Standing BP: 129/82 HR: 94  Site: --  Mode: --    Lipid Panel: Date/Time: 10-18-24 @ 08:00  Cholesterol, Serum: 106  LDL Cholesterol Calculated: 53  HDL Cholesterol, Serum: 42  Total Cholesterol/HDL Ration Measurement: --  Triglycerides, Serum: 53
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
## 2025-01-30 NOTE — BH INPATIENT PSYCHIATRY PROGRESS NOTE - NSBHCHARTREVIEWVS_PSY_A_CORE FT
Vital Signs Last 24 Hrs  T(C): 36.6 (01-30-25 @ 07:50), Max: 36.6 (01-30-25 @ 07:50)  T(F): 97.9 (01-30-25 @ 07:50), Max: 97.9 (01-30-25 @ 07:50)  HR: --  BP: --  BP(mean): --  RR: 18 (01-30-25 @ 07:50) (16 - 18)  SpO2: --    Orthostatic VS  01-30-25 @ 07:50  Lying BP: --/-- HR: --  Sitting BP: 126/81 HR: 104  Standing BP: 116/82 HR: 114  Site: upper left arm  Mode: electronic  Orthostatic VS  01-29-25 @ 08:05  Lying BP: --/-- HR: --  Sitting BP: 135/89 HR: 111  Standing BP: 128/76 HR: 102  Site: --  Mode: --  Orthostatic VS  01-28-25 @ 19:44  Lying BP: --/-- HR: --  Sitting BP: 121/84 HR: 83  Standing BP: 129/82 HR: 94  Site: --  Mode: --

## 2025-01-30 NOTE — BH INPATIENT PSYCHIATRY PROGRESS NOTE - NSBHFUPINTERVALHXFT_PSY_A_CORE
Patient is followed up for NDD. Chart, medications and labs reviewed. Patient is discussed during morning brief, no overnight events, has been in good behavioral control.   Patient was seen and is evaluated on the unit. She continues to exhibit childlike behaviors, w/o intrusiveness towards staff/peers. She approaches writer w/ bright affect, She has been compliant with standing medications, denies SE. Has been attentive to ADL's, reports showering last night. No acute medical concerns, VSS. Patient is followed up for NDD. Chart, medications and labs reviewed. Patient is discussed during morning brief, no overnight events, has been in good behavioral control.   Patient was seen and is evaluated on the unit. She continues to exhibit childlike behaviors, w/o intrusiveness towards staff/peers. She is seen standing in the hallway with her head down. States that she would like to leave the hospital today and go to [place]. Does not offer any other concerns or complaints. She has been compliant with standing medications, denies SE. Has been attentive to ADL's, able to shower this morning. No acute medical concerns, VSS.

## 2025-01-31 PROCEDURE — 99232 SBSQ HOSP IP/OBS MODERATE 35: CPT

## 2025-01-31 PROCEDURE — 90832 PSYTX W PT 30 MINUTES: CPT

## 2025-01-31 RX ADMIN — OLANZAPINE 15 MILLIGRAM(S): 10 TABLET ORAL at 20:03

## 2025-01-31 RX ADMIN — TEMAZEPAM 15 MILLIGRAM(S): 15 CAPSULE ORAL at 20:03

## 2025-01-31 RX ADMIN — Medication 100 MILLIGRAM(S): at 20:04

## 2025-01-31 RX ADMIN — HYDROXYZINE HYDROCHLORIDE 100 MILLIGRAM(S): 25 TABLET, FILM COATED ORAL at 20:03

## 2025-01-31 NOTE — BH INPATIENT PSYCHIATRY PROGRESS NOTE - NSBHMETABOLIC_PSY_ALL_CORE_FT
BMI: BMI (kg/m2): 29.1 (01-25-25 @ 16:22)  HbA1c: A1C with Estimated Average Glucose Result: 6.1 % (10-18-24 @ 08:00)    Glucose: POCT Blood Glucose.: 89 mg/dL (01-12-25 @ 20:10)    BP: --Vital Signs Last 24 Hrs  T(C): 36.7 (01-31-25 @ 08:27), Max: 36.7 (01-31-25 @ 08:27)  T(F): 98 (01-31-25 @ 08:27), Max: 98 (01-31-25 @ 08:27)  HR: --  BP: --  BP(mean): --  RR: 17 (01-30-25 @ 19:58) (17 - 17)  SpO2: --    Orthostatic VS  01-31-25 @ 08:27  Lying BP: --/-- HR: --  Sitting BP: 125/93 HR: 96  Standing BP: 125/85 HR: 91  Site: --  Mode: --  Orthostatic VS  01-30-25 @ 19:43  Lying BP: --/-- HR: --  Sitting BP: 115/90 HR: 87  Standing BP: 129/82 HR: 96  Site: --  Mode: --  Orthostatic VS  01-30-25 @ 07:50  Lying BP: --/-- HR: --  Sitting BP: 126/81 HR: 104  Standing BP: 116/82 HR: 114  Site: upper left arm  Mode: electronic    Lipid Panel: Date/Time: 10-18-24 @ 08:00  Cholesterol, Serum: 106  LDL Cholesterol Calculated: 53  HDL Cholesterol, Serum: 42  Total Cholesterol/HDL Ration Measurement: --  Triglycerides, Serum: 53

## 2025-01-31 NOTE — BH INPATIENT PSYCHIATRY PROGRESS NOTE - PRN MEDS
MEDICATIONS  (PRN):  acetaminophen     Tablet .. 650 milliGRAM(s) Oral every 6 hours PRN Temp greater or equal to 38C (100.4F), Mild Pain (1 - 3)  acetaminophen     Tablet .. 650 milliGRAM(s) Oral every 6 hours PRN Temp greater or equal to 38C (100.4F), Mild Pain (1 - 3), Moderate Pain (4 - 6)  benzonatate 100 milliGRAM(s) Oral every 8 hours PRN cough  diphenhydrAMINE 50 milliGRAM(s) Oral every 6 hours PRN Extrapyramidal symptoms or prophylaxis  diphenhydrAMINE Injectable 50 milliGRAM(s) IntraMuscular once PRN Extrapyramidal prophylaxis  guaiFENesin Oral Liquid (Sugar-Free) 100 milliGRAM(s) Oral every 6 hours PRN coughing  haloperidol    Injectable 5 milliGRAM(s) IntraMuscular once PRN aggression  LORazepam   Injectable 2 milliGRAM(s) IntraMuscular once PRN agitation
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
## 2025-01-31 NOTE — BH INPATIENT PSYCHIATRY PROGRESS NOTE - CURRENT MEDICATION
MEDICATIONS  (STANDING):  hydrOXYzine hydrochloride 100 milliGRAM(s) Oral at bedtime  OLANZapine 15 milliGRAM(s) Oral at bedtime  temazepam 15 milliGRAM(s) Oral at bedtime  traZODone 100 milliGRAM(s) Oral at bedtime    MEDICATIONS  (PRN):  acetaminophen     Tablet .. 650 milliGRAM(s) Oral every 6 hours PRN Temp greater or equal to 38C (100.4F), Mild Pain (1 - 3)  acetaminophen     Tablet .. 650 milliGRAM(s) Oral every 6 hours PRN Temp greater or equal to 38C (100.4F), Mild Pain (1 - 3), Moderate Pain (4 - 6)  benzonatate 100 milliGRAM(s) Oral every 8 hours PRN cough  diphenhydrAMINE 50 milliGRAM(s) Oral every 6 hours PRN Extrapyramidal symptoms or prophylaxis  diphenhydrAMINE Injectable 50 milliGRAM(s) IntraMuscular once PRN Extrapyramidal prophylaxis  guaiFENesin Oral Liquid (Sugar-Free) 100 milliGRAM(s) Oral every 6 hours PRN coughing  haloperidol    Injectable 5 milliGRAM(s) IntraMuscular once PRN aggression  LORazepam   Injectable 2 milliGRAM(s) IntraMuscular once PRN agitation

## 2025-01-31 NOTE — BH INPATIENT PSYCHIATRY PROGRESS NOTE - NSBHCHARTREVIEWVS_PSY_A_CORE FT
Vital Signs Last 24 Hrs  T(C): 36.7 (01-31-25 @ 08:27), Max: 36.7 (01-31-25 @ 08:27)  T(F): 98 (01-31-25 @ 08:27), Max: 98 (01-31-25 @ 08:27)  HR: --  BP: --  BP(mean): --  RR: 17 (01-30-25 @ 19:58) (17 - 17)  SpO2: --    Orthostatic VS  01-31-25 @ 08:27  Lying BP: --/-- HR: --  Sitting BP: 125/93 HR: 96  Standing BP: 125/85 HR: 91  Site: --  Mode: --  Orthostatic VS  01-30-25 @ 19:43  Lying BP: --/-- HR: --  Sitting BP: 115/90 HR: 87  Standing BP: 129/82 HR: 96  Site: --  Mode: --  Orthostatic VS  01-30-25 @ 07:50  Lying BP: --/-- HR: --  Sitting BP: 126/81 HR: 104  Standing BP: 116/82 HR: 114  Site: upper left arm  Mode: electronic

## 2025-01-31 NOTE — BH INPATIENT PSYCHIATRY PROGRESS NOTE - NSBHFUPINTERVALHXFT_PSY_A_CORE
Patient is followed up for NDD. Chart, medications and labs reviewed. Patient is discussed during morning brief, no overnight events, has been in good behavioral control.   Patient was seen and is evaluated on the unit. She continues to exhibit childlike behaviors, w/o intrusiveness towards staff/peers. She slept for 7 hours overnight per sleep log. Remains discharge focused and states that she wants to leave in a taxi. Does not offer any other concerns or complaints. She has been compliant with standing medications, denies SE. Has been attentive to ADL's, able to shower this morning. No acute medical concerns, VSS.

## 2025-02-01 RX ADMIN — TEMAZEPAM 15 MILLIGRAM(S): 15 CAPSULE ORAL at 21:16

## 2025-02-01 RX ADMIN — Medication 100 MILLIGRAM(S): at 21:16

## 2025-02-01 RX ADMIN — HYDROXYZINE HYDROCHLORIDE 100 MILLIGRAM(S): 25 TABLET, FILM COATED ORAL at 21:16

## 2025-02-01 RX ADMIN — OLANZAPINE 15 MILLIGRAM(S): 10 TABLET ORAL at 21:16

## 2025-02-02 RX ADMIN — OLANZAPINE 15 MILLIGRAM(S): 10 TABLET ORAL at 19:59

## 2025-02-02 RX ADMIN — HYDROXYZINE HYDROCHLORIDE 100 MILLIGRAM(S): 25 TABLET, FILM COATED ORAL at 20:00

## 2025-02-02 RX ADMIN — Medication 100 MILLIGRAM(S): at 20:00

## 2025-02-02 RX ADMIN — TEMAZEPAM 15 MILLIGRAM(S): 15 CAPSULE ORAL at 19:59

## 2025-02-03 PROCEDURE — 99232 SBSQ HOSP IP/OBS MODERATE 35: CPT

## 2025-02-03 RX ADMIN — OLANZAPINE 15 MILLIGRAM(S): 10 TABLET ORAL at 20:03

## 2025-02-03 RX ADMIN — Medication 100 MILLIGRAM(S): at 20:03

## 2025-02-03 RX ADMIN — TEMAZEPAM 15 MILLIGRAM(S): 15 CAPSULE ORAL at 20:03

## 2025-02-03 RX ADMIN — HYDROXYZINE HYDROCHLORIDE 100 MILLIGRAM(S): 25 TABLET, FILM COATED ORAL at 20:03

## 2025-02-03 NOTE — BH INPATIENT PSYCHIATRY PROGRESS NOTE - NSBHFUPINTERVALHXFT_PSY_A_CORE
Patient is followed up for NDD. Chart, medications and labs reviewed. Patient is discussed during morning brief, no overnight events, has been in good behavioral control.   Patient was seen and is evaluated on the unit. She continues to exhibit childlike behaviors, w/o intrusiveness towards staff/peers. She remains discharge focused and states that she wants to "leave [facility]". Does discuss wanting to go to [place] and having a party. She has been compliant with standing medications, denies SE. Has been attentive to ADL's, able to shower this morning. No acute medical concerns, VSS.
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
## 2025-02-03 NOTE — BH INPATIENT PSYCHIATRY PROGRESS NOTE - NSBHCHARTREVIEWVS_PSY_A_CORE FT
Vital Signs Last 24 Hrs  T(C): 36.4 (02-02-25 @ 19:47), Max: 36.4 (02-02-25 @ 19:47)  T(F): 97.6 (02-02-25 @ 19:47), Max: 97.6 (02-02-25 @ 19:47)  HR: --  BP: --  BP(mean): --  RR: 17 (02-03-25 @ 08:09) (17 - 17)  SpO2: --    Orthostatic VS  02-02-25 @ 19:47  Lying BP: --/-- HR: --  Sitting BP: 130/110 HR: 85  Standing BP: 127/101 HR: 81  Site: --  Mode: --  Orthostatic VS  02-02-25 @ 08:58  Lying BP: --/-- HR: --  Sitting BP: 140/87 HR: 102  Standing BP: 140/94 HR: 96  Site: --  Mode: --

## 2025-02-03 NOTE — BH INPATIENT PSYCHIATRY PROGRESS NOTE - NSBHMETABOLIC_PSY_ALL_CORE_FT
BMI: BMI (kg/m2): 29.1 (01-25-25 @ 16:22)  HbA1c: A1C with Estimated Average Glucose Result: 6.1 % (10-18-24 @ 08:00)    Glucose: POCT Blood Glucose.: 89 mg/dL (01-12-25 @ 20:10)    BP: --Vital Signs Last 24 Hrs  T(C): 36.4 (02-02-25 @ 19:47), Max: 36.4 (02-02-25 @ 19:47)  T(F): 97.6 (02-02-25 @ 19:47), Max: 97.6 (02-02-25 @ 19:47)  HR: --  BP: --  BP(mean): --  RR: 17 (02-03-25 @ 08:09) (17 - 17)  SpO2: --    Orthostatic VS  02-02-25 @ 19:47  Lying BP: --/-- HR: --  Sitting BP: 130/110 HR: 85  Standing BP: 127/101 HR: 81  Site: --  Mode: --  Orthostatic VS  02-02-25 @ 08:58  Lying BP: --/-- HR: --  Sitting BP: 140/87 HR: 102  Standing BP: 140/94 HR: 96  Site: --  Mode: --    Lipid Panel: Date/Time: 10-18-24 @ 08:00  Cholesterol, Serum: 106  LDL Cholesterol Calculated: 53  HDL Cholesterol, Serum: 42  Total Cholesterol/HDL Ration Measurement: --  Triglycerides, Serum: 53

## 2025-02-03 NOTE — BH INPATIENT PSYCHIATRY PROGRESS NOTE - CURRENT MEDICATION
MEDICATIONS  (STANDING):  hydrOXYzine hydrochloride 100 milliGRAM(s) Oral at bedtime  OLANZapine 15 milliGRAM(s) Oral at bedtime  temazepam 15 milliGRAM(s) Oral at bedtime  traZODone 100 milliGRAM(s) Oral at bedtime    MEDICATIONS  (PRN):  acetaminophen     Tablet .. 650 milliGRAM(s) Oral every 6 hours PRN Temp greater or equal to 38C (100.4F), Mild Pain (1 - 3)  acetaminophen     Tablet .. 650 milliGRAM(s) Oral every 6 hours PRN Temp greater or equal to 38C (100.4F), Mild Pain (1 - 3), Moderate Pain (4 - 6)  diphenhydrAMINE 50 milliGRAM(s) Oral every 6 hours PRN Extrapyramidal symptoms or prophylaxis  diphenhydrAMINE Injectable 50 milliGRAM(s) IntraMuscular once PRN Extrapyramidal prophylaxis  haloperidol    Injectable 5 milliGRAM(s) IntraMuscular once PRN aggression  LORazepam   Injectable 2 milliGRAM(s) IntraMuscular once PRN agitation

## 2025-02-04 PROCEDURE — 99232 SBSQ HOSP IP/OBS MODERATE 35: CPT

## 2025-02-04 RX ADMIN — HYDROXYZINE HYDROCHLORIDE 100 MILLIGRAM(S): 25 TABLET, FILM COATED ORAL at 20:23

## 2025-02-04 RX ADMIN — Medication 100 MILLIGRAM(S): at 20:23

## 2025-02-04 RX ADMIN — OLANZAPINE 15 MILLIGRAM(S): 10 TABLET ORAL at 20:23

## 2025-02-04 RX ADMIN — TEMAZEPAM 15 MILLIGRAM(S): 15 CAPSULE ORAL at 20:23

## 2025-02-04 NOTE — BH INPATIENT PSYCHIATRY PROGRESS NOTE - NSBHCHARTREVIEWVS_PSY_A_CORE FT
Vital Signs Last 24 Hrs  T(C): 36.4 (02-04-25 @ 08:34), Max: 36.4 (02-04-25 @ 08:34)  T(F): 97.5 (02-04-25 @ 08:34), Max: 97.5 (02-04-25 @ 08:34)  HR: --  BP: --  BP(mean): --  RR: 18 (02-04-25 @ 08:34) (17 - 18)  SpO2: --    Orthostatic VS  02-04-25 @ 08:34  Lying BP: --/-- HR: --  Sitting BP: 98/70 HR: 85  Standing BP: 107/96 HR: 100  Site: --  Mode: --  Orthostatic VS  02-02-25 @ 19:47  Lying BP: --/-- HR: --  Sitting BP: 130/110 HR: 85  Standing BP: 127/101 HR: 81  Site: --  Mode: --
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
## 2025-02-04 NOTE — BH INPATIENT PSYCHIATRY PROGRESS NOTE - NSBHMETABOLIC_PSY_ALL_CORE_FT
BMI: BMI (kg/m2): 29.1 (01-25-25 @ 16:22)  HbA1c: A1C with Estimated Average Glucose Result: 6.1 % (10-18-24 @ 08:00)    Glucose: POCT Blood Glucose.: 89 mg/dL (01-12-25 @ 20:10)    BP: --Vital Signs Last 24 Hrs  T(C): 36.4 (02-04-25 @ 08:34), Max: 36.4 (02-04-25 @ 08:34)  T(F): 97.5 (02-04-25 @ 08:34), Max: 97.5 (02-04-25 @ 08:34)  HR: --  BP: --  BP(mean): --  RR: 18 (02-04-25 @ 08:34) (17 - 18)  SpO2: --    Orthostatic VS  02-04-25 @ 08:34  Lying BP: --/-- HR: --  Sitting BP: 98/70 HR: 85  Standing BP: 107/96 HR: 100  Site: --  Mode: --  Orthostatic VS  02-02-25 @ 19:47  Lying BP: --/-- HR: --  Sitting BP: 130/110 HR: 85  Standing BP: 127/101 HR: 81  Site: --  Mode: --    Lipid Panel: Date/Time: 10-18-24 @ 08:00  Cholesterol, Serum: 106  LDL Cholesterol Calculated: 53  HDL Cholesterol, Serum: 42  Total Cholesterol/HDL Ration Measurement: --  Triglycerides, Serum: 53

## 2025-02-04 NOTE — BH INPATIENT PSYCHIATRY PROGRESS NOTE - PRN MEDS
MEDICATIONS  (PRN):  acetaminophen     Tablet .. 650 milliGRAM(s) Oral every 6 hours PRN Temp greater or equal to 38C (100.4F), Mild Pain (1 - 3)  acetaminophen     Tablet .. 650 milliGRAM(s) Oral every 6 hours PRN Temp greater or equal to 38C (100.4F), Mild Pain (1 - 3), Moderate Pain (4 - 6)  diphenhydrAMINE 50 milliGRAM(s) Oral every 6 hours PRN Extrapyramidal symptoms or prophylaxis  diphenhydrAMINE Injectable 50 milliGRAM(s) IntraMuscular once PRN Extrapyramidal prophylaxis  haloperidol    Injectable 5 milliGRAM(s) IntraMuscular once PRN aggression  LORazepam   Injectable 2 milliGRAM(s) IntraMuscular once PRN agitation

## 2025-02-04 NOTE — BH INPATIENT PSYCHIATRY PROGRESS NOTE - NSBHFUPINTERVALHXFT_PSY_A_CORE
Patient is followed up for NDD. Chart, medications and labs reviewed. Patient is discussed during morning brief, no overnight events, has been in good behavioral control.   Patient was seen and is evaluated on the unit. She continues to exhibit childlike behaviors, w/o intrusiveness towards staff/peers. She remains discharge focused and states that she wants to go to a group home. Continues to discuss food she would like for [employer] party. She has been compliant with standing medications, denies SE. Has been attentive to ADL's, able to shower this morning. No acute medical concerns, VSS.

## 2025-02-05 PROCEDURE — 99232 SBSQ HOSP IP/OBS MODERATE 35: CPT

## 2025-02-05 RX ADMIN — TEMAZEPAM 15 MILLIGRAM(S): 15 CAPSULE ORAL at 20:44

## 2025-02-05 RX ADMIN — OLANZAPINE 15 MILLIGRAM(S): 10 TABLET ORAL at 20:44

## 2025-02-05 RX ADMIN — HYDROXYZINE HYDROCHLORIDE 100 MILLIGRAM(S): 25 TABLET, FILM COATED ORAL at 20:44

## 2025-02-05 RX ADMIN — Medication 100 MILLIGRAM(S): at 20:44

## 2025-02-05 NOTE — BH INPATIENT PSYCHIATRY PROGRESS NOTE - NSBHCHARTREVIEWVS_PSY_A_CORE FT
Vital Signs Last 24 Hrs  T(C): 36.4 (02-05-25 @ 07:59), Max: 36.4 (02-05-25 @ 07:59)  T(F): 97.6 (02-05-25 @ 07:59), Max: 97.6 (02-05-25 @ 07:59)  HR: --  BP: --  BP(mean): --  RR: 18 (02-05-25 @ 07:59) (16 - 18)  SpO2: --    Orthostatic VS  02-05-25 @ 07:59  Lying BP: --/-- HR: --  Sitting BP: 128/81 HR: 89  Standing BP: 116/80 HR: 99  Site: --  Mode: --  Orthostatic VS  02-04-25 @ 19:47  Lying BP: --/-- HR: --  Sitting BP: 120/79 HR: 78  Standing BP: 118/82 HR: 84  Site: --  Mode: --  Orthostatic VS  02-04-25 @ 08:34  Lying BP: --/-- HR: --  Sitting BP: 98/70 HR: 85  Standing BP: 107/96 HR: 100  Site: --  Mode: --

## 2025-02-05 NOTE — BH INPATIENT PSYCHIATRY PROGRESS NOTE - NSBHMETABOLIC_PSY_ALL_CORE_FT
BMI: BMI (kg/m2): 29.1 (01-25-25 @ 16:22)  HbA1c: A1C with Estimated Average Glucose Result: 6.1 % (10-18-24 @ 08:00)    Glucose: POCT Blood Glucose.: 89 mg/dL (01-12-25 @ 20:10)    BP: --Vital Signs Last 24 Hrs  T(C): 36.4 (02-05-25 @ 07:59), Max: 36.4 (02-05-25 @ 07:59)  T(F): 97.6 (02-05-25 @ 07:59), Max: 97.6 (02-05-25 @ 07:59)  HR: --  BP: --  BP(mean): --  RR: 18 (02-05-25 @ 07:59) (16 - 18)  SpO2: --    Orthostatic VS  02-05-25 @ 07:59  Lying BP: --/-- HR: --  Sitting BP: 128/81 HR: 89  Standing BP: 116/80 HR: 99  Site: --  Mode: --  Orthostatic VS  02-04-25 @ 19:47  Lying BP: --/-- HR: --  Sitting BP: 120/79 HR: 78  Standing BP: 118/82 HR: 84  Site: --  Mode: --  Orthostatic VS  02-04-25 @ 08:34  Lying BP: --/-- HR: --  Sitting BP: 98/70 HR: 85  Standing BP: 107/96 HR: 100  Site: --  Mode: --    Lipid Panel: Date/Time: 10-18-24 @ 08:00  Cholesterol, Serum: 106  LDL Cholesterol Calculated: 53  HDL Cholesterol, Serum: 42  Total Cholesterol/HDL Ration Measurement: --  Triglycerides, Serum: 53
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
## 2025-02-05 NOTE — BH INPATIENT PSYCHIATRY PROGRESS NOTE - NSBHFUPINTERVALHXFT_PSY_A_CORE
Patient is followed up for NDD. Chart, medications and labs reviewed. Patient is discussed during morning brief, no overnight events, has been in good behavioral control.   Patient was seen and is evaluated on the unit. She continues to exhibit childlike behaviors, w/o intrusiveness towards staff/peers. She presents with bright affect, remains discharge focused and states that she wants to go to a group home. She has been compliant with standing medications, denies SE. Has been attentive to ADL's, able to shower this morning. No acute medical concerns, VSS.

## 2025-02-06 RX ORDER — LORAZEPAM 4 MG/ML
2 VIAL (ML) INJECTION ONCE
Refills: 0 | Status: DISCONTINUED | OUTPATIENT
Start: 2025-02-06 | End: 2025-02-13

## 2025-02-06 RX ORDER — TEMAZEPAM 15 MG/1
15 CAPSULE ORAL AT BEDTIME
Refills: 0 | Status: DISCONTINUED | OUTPATIENT
Start: 2025-02-06 | End: 2025-02-13

## 2025-02-06 RX ADMIN — OLANZAPINE 15 MILLIGRAM(S): 10 TABLET ORAL at 20:06

## 2025-02-06 RX ADMIN — HYDROXYZINE HYDROCHLORIDE 100 MILLIGRAM(S): 25 TABLET, FILM COATED ORAL at 20:07

## 2025-02-06 RX ADMIN — Medication 100 MILLIGRAM(S): at 20:06

## 2025-02-06 RX ADMIN — TEMAZEPAM 15 MILLIGRAM(S): 15 CAPSULE ORAL at 20:06
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
## 2025-02-06 NOTE — BH INPATIENT PSYCHIATRY PROGRESS NOTE - NSBHCHARTREVIEWVS_PSY_A_CORE FT
Vital Signs Last 24 Hrs  T(C): 36.3 (02-05-25 @ 19:29), Max: 36.3 (02-05-25 @ 19:29)  T(F): 97.4 (02-05-25 @ 19:29), Max: 97.4 (02-05-25 @ 19:29)  HR: --  BP: --  BP(mean): --  RR: 18 (02-06-25 @ 07:47) (18 - 18)  SpO2: --    Orthostatic VS  02-05-25 @ 19:29  Lying BP: --/-- HR: --  Sitting BP: 139/89 HR: 99  Standing BP: 135/87 HR: 100  Site: --  Mode: --  Orthostatic VS  02-05-25 @ 07:59  Lying BP: --/-- HR: --  Sitting BP: 128/81 HR: 89  Standing BP: 116/80 HR: 99  Site: --  Mode: --  Orthostatic VS  02-04-25 @ 19:47  Lying BP: --/-- HR: --  Sitting BP: 120/79 HR: 78  Standing BP: 118/82 HR: 84  Site: --  Mode: --

## 2025-02-06 NOTE — BH INPATIENT PSYCHIATRY PROGRESS NOTE - NSBHFUPINTERVALHXFT_PSY_A_CORE
Patient is followed up for NDD. Chart, medications and labs reviewed. Patient is discussed during morning brief, no overnight events, has been in good behavioral control.   Patient was seen and is evaluated on the unit. She continues to exhibit childlike behaviors, w/o intrusiveness towards staff/peers. She presents with bright affect, remains discharge focused and states that she wants to go to a group home. Continues to discuss having a party for the [employer] this Sunday. She has been compliant with standing medications, denies SE. Has been attentive to ADL's, able to shower this morning. No acute medical concerns, VSS.

## 2025-02-06 NOTE — BH INPATIENT PSYCHIATRY PROGRESS NOTE - NSBHMETABOLIC_PSY_ALL_CORE_FT
BMI: BMI (kg/m2): 29.1 (01-25-25 @ 16:22)  HbA1c: A1C with Estimated Average Glucose Result: 6.1 % (10-18-24 @ 08:00)    Glucose: POCT Blood Glucose.: 89 mg/dL (01-12-25 @ 20:10)    BP: --Vital Signs Last 24 Hrs  T(C): 36.3 (02-05-25 @ 19:29), Max: 36.3 (02-05-25 @ 19:29)  T(F): 97.4 (02-05-25 @ 19:29), Max: 97.4 (02-05-25 @ 19:29)  HR: --  BP: --  BP(mean): --  RR: 18 (02-06-25 @ 07:47) (18 - 18)  SpO2: --    Orthostatic VS  02-05-25 @ 19:29  Lying BP: --/-- HR: --  Sitting BP: 139/89 HR: 99  Standing BP: 135/87 HR: 100  Site: --  Mode: --  Orthostatic VS  02-05-25 @ 07:59  Lying BP: --/-- HR: --  Sitting BP: 128/81 HR: 89  Standing BP: 116/80 HR: 99  Site: --  Mode: --  Orthostatic VS  02-04-25 @ 19:47  Lying BP: --/-- HR: --  Sitting BP: 120/79 HR: 78  Standing BP: 118/82 HR: 84  Site: --  Mode: --    Lipid Panel: Date/Time: 10-18-24 @ 08:00  Cholesterol, Serum: 106  LDL Cholesterol Calculated: 53  HDL Cholesterol, Serum: 42  Total Cholesterol/HDL Ration Measurement: --  Triglycerides, Serum: 53

## 2025-02-07 PROCEDURE — 99232 SBSQ HOSP IP/OBS MODERATE 35: CPT

## 2025-02-07 RX ORDER — LORAZEPAM 4 MG/ML
2 VIAL (ML) INJECTION ONCE
Refills: 0 | Status: DISCONTINUED | OUTPATIENT
Start: 2025-02-07 | End: 2025-02-07

## 2025-02-07 RX ORDER — HALOPERIDOL 10 MG/1
5 TABLET ORAL ONCE
Refills: 0 | Status: COMPLETED | OUTPATIENT
Start: 2025-02-07 | End: 2025-02-07

## 2025-02-07 RX ADMIN — Medication 100 MILLIGRAM(S): at 20:36

## 2025-02-07 RX ADMIN — Medication 2 MILLIGRAM(S): at 10:09

## 2025-02-07 RX ADMIN — HALOPERIDOL 5 MILLIGRAM(S): 10 TABLET ORAL at 10:10

## 2025-02-07 RX ADMIN — HYDROXYZINE HYDROCHLORIDE 100 MILLIGRAM(S): 25 TABLET, FILM COATED ORAL at 20:35

## 2025-02-07 RX ADMIN — OLANZAPINE 15 MILLIGRAM(S): 10 TABLET ORAL at 20:36

## 2025-02-07 RX ADMIN — TEMAZEPAM 15 MILLIGRAM(S): 15 CAPSULE ORAL at 20:35

## 2025-02-07 NOTE — BH INPATIENT PSYCHIATRY PROGRESS NOTE - NSBHCHARTREVIEWVS_PSY_A_CORE FT
Vital Signs Last 24 Hrs  T(C): --  T(F): --  HR: --  BP: --  BP(mean): --  RR: 18 (02-07-25 @ 08:16) (17 - 18)  SpO2: --    Orthostatic VS  02-05-25 @ 19:29  Lying BP: --/-- HR: --  Sitting BP: 139/89 HR: 99  Standing BP: 135/87 HR: 100  Site: --  Mode: --
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
## 2025-02-07 NOTE — BH INPATIENT PSYCHIATRY PROGRESS NOTE - NSBHMETABOLIC_PSY_ALL_CORE_FT
BMI: BMI (kg/m2): 29.1 (01-25-25 @ 16:22)  HbA1c: A1C with Estimated Average Glucose Result: 6.1 % (10-18-24 @ 08:00)    Glucose: POCT Blood Glucose.: 89 mg/dL (01-12-25 @ 20:10)    BP: --Vital Signs Last 24 Hrs  T(C): --  T(F): --  HR: --  BP: --  BP(mean): --  RR: 18 (02-07-25 @ 08:16) (17 - 18)  SpO2: --    Orthostatic VS  02-05-25 @ 19:29  Lying BP: --/-- HR: --  Sitting BP: 139/89 HR: 99  Standing BP: 135/87 HR: 100  Site: --  Mode: --    Lipid Panel: Date/Time: 10-18-24 @ 08:00  Cholesterol, Serum: 106  LDL Cholesterol Calculated: 53  HDL Cholesterol, Serum: 42  Total Cholesterol/HDL Ration Measurement: --  Triglycerides, Serum: 53

## 2025-02-07 NOTE — BH INPATIENT PSYCHIATRY PROGRESS NOTE - NSBHFUPINTERVALHXFT_PSY_A_CORE
Patient is followed up for NDD. Chart, medications and labs reviewed. Patient is discussed during morning brief, no overnight events, has been in good behavioral control.   Patient was seen and is evaluated on the unit. She continues to exhibit childlike behaviors, w/o intrusiveness towards staff/peers. Was able to sleep for 10 hours per sleep log. She presents irritable this morning and received one time dose of oral Haldol 5mg and Ativan 2mg. Continues to discuss food that she would like for [employer] party. She has been compliant with standing medications, denies SE. Has been attentive to ADL's. No acute medical concerns, VSS.

## 2025-02-08 RX ADMIN — TEMAZEPAM 15 MILLIGRAM(S): 15 CAPSULE ORAL at 20:00

## 2025-02-08 RX ADMIN — HYDROXYZINE HYDROCHLORIDE 100 MILLIGRAM(S): 25 TABLET, FILM COATED ORAL at 20:00

## 2025-02-08 RX ADMIN — OLANZAPINE 15 MILLIGRAM(S): 10 TABLET ORAL at 20:01

## 2025-02-08 RX ADMIN — Medication 100 MILLIGRAM(S): at 20:00

## 2025-02-09 RX ADMIN — OLANZAPINE 15 MILLIGRAM(S): 10 TABLET ORAL at 20:00

## 2025-02-09 RX ADMIN — Medication 100 MILLIGRAM(S): at 20:01

## 2025-02-09 RX ADMIN — HYDROXYZINE HYDROCHLORIDE 100 MILLIGRAM(S): 25 TABLET, FILM COATED ORAL at 20:00

## 2025-02-09 RX ADMIN — TEMAZEPAM 15 MILLIGRAM(S): 15 CAPSULE ORAL at 20:01

## 2025-02-10 PROCEDURE — 99232 SBSQ HOSP IP/OBS MODERATE 35: CPT

## 2025-02-10 RX ADMIN — OLANZAPINE 15 MILLIGRAM(S): 10 TABLET ORAL at 21:07

## 2025-02-10 RX ADMIN — Medication 100 MILLIGRAM(S): at 21:07

## 2025-02-10 RX ADMIN — TEMAZEPAM 15 MILLIGRAM(S): 15 CAPSULE ORAL at 21:07

## 2025-02-10 RX ADMIN — HYDROXYZINE HYDROCHLORIDE 100 MILLIGRAM(S): 25 TABLET, FILM COATED ORAL at 21:07

## 2025-02-10 NOTE — BH INPATIENT PSYCHIATRY PROGRESS NOTE - NSBHCHARTREVIEWVS_PSY_A_CORE FT
Vital Signs Last 24 Hrs  T(C): 36.8 (02-09-25 @ 19:32), Max: 36.8 (02-09-25 @ 19:32)  T(F): 98.3 (02-09-25 @ 19:32), Max: 98.3 (02-09-25 @ 19:32)  HR: --  BP: --  BP(mean): --  RR: 17 (02-09-25 @ 19:51) (17 - 18)  SpO2: --    Orthostatic VS  02-09-25 @ 19:32  Lying BP: --/-- HR: --  Sitting BP: 129/87 HR: 100  Standing BP: 130/86 HR: 110  Site: --  Mode: --  Orthostatic VS  02-09-25 @ 08:54  Lying BP: --/-- HR: --  Sitting BP: 133/78 HR: 100  Standing BP: 140/80 HR: 109  Site: upper left arm  Mode: electronic  Orthostatic VS  02-08-25 @ 19:27  Lying BP: --/-- HR: --  Sitting BP: 124/77 HR: 93  Standing BP: 120/80 HR: 92  Site: --  Mode: --   Vital Signs Last 24 Hrs  T(C): 36.6 (02-10-25 @ 08:23), Max: 36.8 (02-09-25 @ 19:32)  T(F): 97.9 (02-10-25 @ 08:23), Max: 98.3 (02-09-25 @ 19:32)  HR: --  BP: --  BP(mean): --  RR: 17 (02-09-25 @ 19:51) (17 - 17)  SpO2: --    Orthostatic VS  02-10-25 @ 08:23  Lying BP: --/-- HR: --  Sitting BP: 119/75 HR: 105  Standing BP: 110/74 HR: 108  Site: --  Mode: electronic  Orthostatic VS  02-09-25 @ 19:32  Lying BP: --/-- HR: --  Sitting BP: 129/87 HR: 100  Standing BP: 130/86 HR: 110  Site: --  Mode: --  Orthostatic VS  02-09-25 @ 08:54  Lying BP: --/-- HR: --  Sitting BP: 133/78 HR: 100  Standing BP: 140/80 HR: 109  Site: upper left arm  Mode: electronic  Orthostatic VS  02-08-25 @ 19:27  Lying BP: --/-- HR: --  Sitting BP: 124/77 HR: 93  Standing BP: 120/80 HR: 92  Site: --  Mode: --

## 2025-02-10 NOTE — BH INPATIENT PSYCHIATRY PROGRESS NOTE - NSBHMETABOLIC_PSY_ALL_CORE_FT
BMI: BMI (kg/m2): 29.1 (01-25-25 @ 16:22)  HbA1c: A1C with Estimated Average Glucose Result: 6.1 % (10-18-24 @ 08:00)    Glucose: POCT Blood Glucose.: 89 mg/dL (01-12-25 @ 20:10)    BP: --Vital Signs Last 24 Hrs  T(C): 36.8 (02-09-25 @ 19:32), Max: 36.8 (02-09-25 @ 19:32)  T(F): 98.3 (02-09-25 @ 19:32), Max: 98.3 (02-09-25 @ 19:32)  HR: --  BP: --  BP(mean): --  RR: 17 (02-09-25 @ 19:51) (17 - 18)  SpO2: --    Orthostatic VS  02-09-25 @ 19:32  Lying BP: --/-- HR: --  Sitting BP: 129/87 HR: 100  Standing BP: 130/86 HR: 110  Site: --  Mode: --  Orthostatic VS  02-09-25 @ 08:54  Lying BP: --/-- HR: --  Sitting BP: 133/78 HR: 100  Standing BP: 140/80 HR: 109  Site: upper left arm  Mode: electronic  Orthostatic VS  02-08-25 @ 19:27  Lying BP: --/-- HR: --  Sitting BP: 124/77 HR: 93  Standing BP: 120/80 HR: 92  Site: --  Mode: --    Lipid Panel: Date/Time: 10-18-24 @ 08:00  Cholesterol, Serum: 106  LDL Cholesterol Calculated: 53  HDL Cholesterol, Serum: 42  Total Cholesterol/HDL Ration Measurement: --  Triglycerides, Serum: 53   BMI: BMI (kg/m2): 29.1 (01-25-25 @ 16:22)  HbA1c: A1C with Estimated Average Glucose Result: 6.1 % (10-18-24 @ 08:00)    Glucose: POCT Blood Glucose.: 89 mg/dL (01-12-25 @ 20:10)    BP: --Vital Signs Last 24 Hrs  T(C): 36.6 (02-10-25 @ 08:23), Max: 36.8 (02-09-25 @ 19:32)  T(F): 97.9 (02-10-25 @ 08:23), Max: 98.3 (02-09-25 @ 19:32)  HR: --  BP: --  BP(mean): --  RR: 17 (02-09-25 @ 19:51) (17 - 17)  SpO2: --    Orthostatic VS  02-10-25 @ 08:23  Lying BP: --/-- HR: --  Sitting BP: 119/75 HR: 105  Standing BP: 110/74 HR: 108  Site: --  Mode: electronic  Orthostatic VS  02-09-25 @ 19:32  Lying BP: --/-- HR: --  Sitting BP: 129/87 HR: 100  Standing BP: 130/86 HR: 110  Site: --  Mode: --  Orthostatic VS  02-09-25 @ 08:54  Lying BP: --/-- HR: --  Sitting BP: 133/78 HR: 100  Standing BP: 140/80 HR: 109  Site: upper left arm  Mode: electronic  Orthostatic VS  02-08-25 @ 19:27  Lying BP: --/-- HR: --  Sitting BP: 124/77 HR: 93  Standing BP: 120/80 HR: 92  Site: --  Mode: --    Lipid Panel: Date/Time: 10-18-24 @ 08:00  Cholesterol, Serum: 106  LDL Cholesterol Calculated: 53  HDL Cholesterol, Serum: 42  Total Cholesterol/HDL Ration Measurement: --  Triglycerides, Serum: 53

## 2025-02-10 NOTE — BH INPATIENT PSYCHIATRY PROGRESS NOTE - NSBHFUPINTERVALHXFT_PSY_A_CORE
Patient is followed up for NDD. Chart, medications and labs reviewed. Patient is discussed during morning brief, no overnight events, has been in good behavioral control.  Patient was seen and is evaluated on the unit. She continues to exhibit childlike behaviors, w/o intrusiveness towards staff/peers. Was able to sleep for 8 hours per sleep log. She presents calm and cooperative this morning. Per staff pt showered this morning independently. Per SW pt is scheduled to go to placement appointment on 2/11 at 3pm [facility].  She has been compliant with standing medications, denies SE. Has been attentive to ADL's. No acute medical concerns,
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
## 2025-02-11 PROCEDURE — 99232 SBSQ HOSP IP/OBS MODERATE 35: CPT

## 2025-02-11 RX ADMIN — TEMAZEPAM 15 MILLIGRAM(S): 15 CAPSULE ORAL at 21:43

## 2025-02-11 RX ADMIN — HYDROXYZINE HYDROCHLORIDE 100 MILLIGRAM(S): 25 TABLET, FILM COATED ORAL at 21:43

## 2025-02-11 RX ADMIN — OLANZAPINE 15 MILLIGRAM(S): 10 TABLET ORAL at 21:43

## 2025-02-11 RX ADMIN — Medication 100 MILLIGRAM(S): at 21:43

## 2025-02-11 NOTE — BH INPATIENT PSYCHIATRY PROGRESS NOTE - NSBHCHARTREVIEWVS_PSY_A_CORE FT
Vital Signs Last 24 Hrs  T(C): 36.6 (02-11-25 @ 09:12), Max: 36.6 (02-11-25 @ 09:12)  T(F): 97.8 (02-11-25 @ 09:12), Max: 97.8 (02-11-25 @ 09:12)  HR: --  BP: --  BP(mean): --  RR: 17 (02-11-25 @ 09:12) (16 - 17)  SpO2: --    Orthostatic VS  02-11-25 @ 09:12  Lying BP: --/-- HR: --  Sitting BP: 120/74 HR: 96  Standing BP: 118/87 HR: 99  Site: --  Mode: --  Orthostatic VS  02-10-25 @ 19:45  Lying BP: --/-- HR: --  Sitting BP: 126/72 HR: 91  Standing BP: 128/98 HR: 92  Site: --  Mode: --  Orthostatic VS  02-10-25 @ 08:23  Lying BP: --/-- HR: --  Sitting BP: 119/75 HR: 105  Standing BP: 110/74 HR: 108  Site: --  Mode: electronic  Orthostatic VS  02-09-25 @ 19:32  Lying BP: --/-- HR: --  Sitting BP: 129/87 HR: 100  Standing BP: 130/86 HR: 110  Site: --  Mode: --

## 2025-02-11 NOTE — BH INPATIENT PSYCHIATRY PROGRESS NOTE - NSBHMETABOLIC_PSY_ALL_CORE_FT
BMI: BMI (kg/m2): 29.1 (01-25-25 @ 16:22)  HbA1c: A1C with Estimated Average Glucose Result: 6.1 % (10-18-24 @ 08:00)    Glucose: POCT Blood Glucose.: 89 mg/dL (01-12-25 @ 20:10)    BP: --Vital Signs Last 24 Hrs  T(C): 36.6 (02-11-25 @ 09:12), Max: 36.6 (02-11-25 @ 09:12)  T(F): 97.8 (02-11-25 @ 09:12), Max: 97.8 (02-11-25 @ 09:12)  HR: --  BP: --  BP(mean): --  RR: 17 (02-11-25 @ 09:12) (16 - 17)  SpO2: --    Orthostatic VS  02-11-25 @ 09:12  Lying BP: --/-- HR: --  Sitting BP: 120/74 HR: 96  Standing BP: 118/87 HR: 99  Site: --  Mode: --  Orthostatic VS  02-10-25 @ 19:45  Lying BP: --/-- HR: --  Sitting BP: 126/72 HR: 91  Standing BP: 128/98 HR: 92  Site: --  Mode: --  Orthostatic VS  02-10-25 @ 08:23  Lying BP: --/-- HR: --  Sitting BP: 119/75 HR: 105  Standing BP: 110/74 HR: 108  Site: --  Mode: electronic  Orthostatic VS  02-09-25 @ 19:32  Lying BP: --/-- HR: --  Sitting BP: 129/87 HR: 100  Standing BP: 130/86 HR: 110  Site: --  Mode: --    Lipid Panel: Date/Time: 10-18-24 @ 08:00  Cholesterol, Serum: 106  LDL Cholesterol Calculated: 53  HDL Cholesterol, Serum: 42  Total Cholesterol/HDL Ration Measurement: --  Triglycerides, Serum: 53
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
## 2025-02-11 NOTE — BH INPATIENT PSYCHIATRY PROGRESS NOTE - NSBHFUPINTERVALHXFT_PSY_A_CORE
Patient is followed up for NDD. Chart, medications and labs reviewed. Patient is discussed during morning brief, no overnight events, has been in good behavioral control.   Patient was seen and is evaluated on the unit. She continues to exhibit childlike behaviors, w/o intrusiveness towards staff/peers. She presents with bright affect this morning, team discussed pt's visit to [facility] group home later this afternoon. Does express excitement over her scheduled visit. She has been compliant with standing medications, denies SE. Has been attentive to ADL's. No acute medical concerns, VSS.

## 2025-02-12 PROCEDURE — 99232 SBSQ HOSP IP/OBS MODERATE 35: CPT

## 2025-02-12 RX ADMIN — Medication 100 MILLIGRAM(S): at 21:30

## 2025-02-12 RX ADMIN — HYDROXYZINE HYDROCHLORIDE 100 MILLIGRAM(S): 25 TABLET, FILM COATED ORAL at 21:30

## 2025-02-12 RX ADMIN — TEMAZEPAM 15 MILLIGRAM(S): 15 CAPSULE ORAL at 21:29

## 2025-02-12 RX ADMIN — OLANZAPINE 15 MILLIGRAM(S): 10 TABLET ORAL at 21:30

## 2025-02-12 NOTE — CONSULT NOTE ADULT - SUBJECTIVE AND OBJECTIVE BOX
[facility] Division of Hospital Medicine  Gloria Lopez MD   Available on TEAMS      SUBJECTIVE:        PAST MEDICAL & SURGICAL HISTORY    SOCIAL HISTORY:  Negative for smoking/alcohol/drug use.     ALLERGIES:  No Known Allergies    MEDICATIONS:  MEDICATIONS  (STANDING):  hydrOXYzine hydrochloride 100 milliGRAM(s) Oral at bedtime  OLANZapine 15 milliGRAM(s) Oral at bedtime  temazepam 15 milliGRAM(s) Oral at bedtime  traZODone 100 milliGRAM(s) Oral at bedtime    MEDICATIONS  (PRN):  acetaminophen     Tablet .. 650 milliGRAM(s) Oral every 6 hours PRN Temp greater or equal to 38C (100.4F), Mild Pain (1 - 3)  acetaminophen     Tablet .. 650 milliGRAM(s) Oral every 6 hours PRN Temp greater or equal to 38C (100.4F), Mild Pain (1 - 3), Moderate Pain (4 - 6)  diphenhydrAMINE 50 milliGRAM(s) Oral every 6 hours PRN Extrapyramidal symptoms or prophylaxis  diphenhydrAMINE Injectable 50 milliGRAM(s) IntraMuscular once PRN Extrapyramidal prophylaxis  haloperidol    Injectable 5 milliGRAM(s) IntraMuscular once PRN aggression  LORazepam   Injectable 2 milliGRAM(s) IntraMuscular once PRN agitation    VITALS/PHYSICAL EXAM:   Vital Signs Last 24 Hrs  T(C): --  T(F): --  HR: --  BP: --  BP(mean): --  RR: --  SpO2: --        CONSTITUTIONAL: NAD, well-groomed  EYES: PERRLA; conjunctiva and sclera clear  ENMT: Moist oral mucosa, no pharyngeal injection or exudates; normal dentition  NECK: Supple, no palpable masses; no thyromegaly  RESPIRATORY: Normal respiratory effort; lungs are clear to auscultation bilaterally  CARDIOVASCULAR: Regular rate and rhythm, normal S1 and S2, no murmur/rub/gallop; No lower extremity edema; Peripheral pulses are 2+ bilaterally  ABDOMEN: Nontender to palpation, normoactive bowel sounds, no rebound/guarding; No hepatosplenomegaly  MUSCULOSKELETAL:  Normal gait; no clubbing or cyanosis of digits; no joint swelling or tenderness to palpation  PSYCH: A+O to person, place, and time; affect appropriate  NEUROLOGY: CN 2-12 are intact and symmetric; no gross sensory deficits   SKIN: No rashes; no palpable lesions    LABS:             Hgb 10.4, MCV 74 RDW18.6  TSH 0.59 on 1/14 wnl  HLD screen 1/14/24 wnl  A1c 6.1% 10/18/24  CMP wnl on 10/18/24    Prolactin elevated 42 12/24    RADIOLOGY:  no images to review     PHYSICAL EXAM:  GEN: No acute distress  LUNGS: Clear to auscultation bilaterally   HEART: S1/S2 present. RRR.   ABD: Soft, non-tender, non-distended. Bowel sounds present  EXT: NC/NC/NE/2+PP/LYNN  NEURO: AAOX3  SKIN: intact    [facility] Division of Hospital Medicine  Gloria Lopez MD   Available on TEAMS      SUBJECTIVE:      Pt seen and evaluated at bedside. Denies any complaints, No fever, no headaches, no CP, no SOB. No abdominal pain, no N/V, no diarrhea.     PAST MEDICAL & SURGICAL HISTORY    SOCIAL HISTORY:  Negative for smoking/alcohol/drug use.     ALLERGIES:  No Known Allergies    MEDICATIONS:  MEDICATIONS  (STANDING):  hydrOXYzine hydrochloride 100 milliGRAM(s) Oral at bedtime  OLANZapine 15 milliGRAM(s) Oral at bedtime  temazepam 15 milliGRAM(s) Oral at bedtime  traZODone 100 milliGRAM(s) Oral at bedtime    MEDICATIONS  (PRN):  acetaminophen     Tablet .. 650 milliGRAM(s) Oral every 6 hours PRN Temp greater or equal to 38C (100.4F), Mild Pain (1 - 3)  acetaminophen     Tablet .. 650 milliGRAM(s) Oral every 6 hours PRN Temp greater or equal to 38C (100.4F), Mild Pain (1 - 3), Moderate Pain (4 - 6)  diphenhydrAMINE 50 milliGRAM(s) Oral every 6 hours PRN Extrapyramidal symptoms or prophylaxis  diphenhydrAMINE Injectable 50 milliGRAM(s) IntraMuscular once PRN Extrapyramidal prophylaxis  haloperidol    Injectable 5 milliGRAM(s) IntraMuscular once PRN aggression  LORazepam   Injectable 2 milliGRAM(s) IntraMuscular once PRN agitation    VITALS/PHYSICAL EXAM:   Vital Signs Last 24 Hrs  T(C): --  T(F): --  HR: --  BP: --  BP(mean): --  RR: --  SpO2: --      CONSTITUTIONAL: NAD, well-groomed  EYES: PERRLA; conjunctiva and sclera clear  ENMT: Moist oral mucosa, no pharyngeal injection or exudates; normal dentition  NECK: Supple, no palpable masses; no thyromegaly  RESPIRATORY: Normal respiratory effort; lungs are clear to auscultation bilaterally  CARDIOVASCULAR: Regular rate and rhythm, normal S1 and S2, no murmur/rub/gallop; No lower extremity edema; Peripheral pulses are 2+ bilaterally  ABDOMEN: Nontender to palpation, normoactive bowel sounds, no rebound/guarding; No hepatosplenomegaly  MUSCULOSKELETAL:  Normal gait; no clubbing or cyanosis of digits; no joint swelling or tenderness to palpation  PSYCH: A+O to person, place, and time; affect appropriate  NEUROLOGY: CN 2-12 are intact and symmetric; no gross sensory deficits   SKIN: No rashes; no palpable lesions    LABS:             Hgb 10.4, MCV 74 RDW18.6  TSH 0.59 on 1/14 wnl  HLD screen 1/14/24 wnl  A1c 6.1% 10/18/24  CMP wnl on 10/18/24    Prolactin elevated 42 12/24    RADIOLOGY:  no images to review     PHYSICAL EXAM:  GEN: No acute distress  LUNGS: Clear to auscultation bilaterally   HEART: S1/S2 present. RRR.   ABD: Soft, non-tender, non-distended. Bowel sounds present  EXT: NC/NC/NE/2+PP/LYNN  NEURO: AAOX3  SKIN: intact

## 2025-02-12 NOTE — BH INPATIENT PSYCHIATRY PROGRESS NOTE - NSBHFUPINTERVALHXFT_PSY_A_CORE
Patient is followed up for NDD. Chart, medications and labs reviewed. Patient is discussed during morning brief, no overnight events, has been in good behavioral control.   Patient was seen and is evaluated on the unit. She continues to exhibit childlike behaviors, w/o intrusiveness towards staff/peers. She presents with bright affect this morning, was not able to go for group home visit yesterday. When asked about this pt states that she felt "tired". Team attempting to reschedule group home visit. She has been compliant with standing medications, denies SE. Has been attentive to ADL's. Labs ordered for tomorrow AM. No acute medical concerns, VSS.

## 2025-02-12 NOTE — BH INPATIENT PSYCHIATRY PROGRESS NOTE - NSBHMETABOLIC_PSY_ALL_CORE_FT
BMI: BMI (kg/m2): 29.1 (01-25-25 @ 16:22)  HbA1c: A1C with Estimated Average Glucose Result: 6.1 % (10-18-24 @ 08:00)    Glucose: POCT Blood Glucose.: 89 mg/dL (01-12-25 @ 20:10)    BP: --Vital Signs Last 24 Hrs  T(C): --  T(F): --  HR: --  BP: --  BP(mean): --  RR: --  SpO2: --    Orthostatic VS  02-11-25 @ 09:12  Lying BP: --/-- HR: --  Sitting BP: 120/74 HR: 96  Standing BP: 118/87 HR: 99  Site: --  Mode: --  Orthostatic VS  02-10-25 @ 19:45  Lying BP: --/-- HR: --  Sitting BP: 126/72 HR: 91  Standing BP: 128/98 HR: 92  Site: --  Mode: --    Lipid Panel: Date/Time: 10-18-24 @ 08:00  Cholesterol, Serum: 106  LDL Cholesterol Calculated: 53  HDL Cholesterol, Serum: 42  Total Cholesterol/HDL Ration Measurement: --  Triglycerides, Serum: 53

## 2025-02-12 NOTE — BH INPATIENT PSYCHIATRY PROGRESS NOTE - NSBHCHARTREVIEWVS_PSY_A_CORE FT
Vital Signs Last 24 Hrs  T(C): --  T(F): --  HR: --  BP: --  BP(mean): --  RR: --  SpO2: --    Orthostatic VS  02-11-25 @ 09:12  Lying BP: --/-- HR: --  Sitting BP: 120/74 HR: 96  Standing BP: 118/87 HR: 99  Site: --  Mode: --  Orthostatic VS  02-10-25 @ 19:45  Lying BP: --/-- HR: --  Sitting BP: 126/72 HR: 91  Standing BP: 128/98 HR: 92  Site: --  Mode: --
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
## 2025-02-13 PROCEDURE — 99232 SBSQ HOSP IP/OBS MODERATE 35: CPT

## 2025-02-13 RX ORDER — LORAZEPAM 4 MG/ML
2 VIAL (ML) INJECTION ONCE
Refills: 0 | Status: DISCONTINUED | OUTPATIENT
Start: 2025-02-13 | End: 2025-02-20

## 2025-02-13 RX ORDER — TEMAZEPAM 15 MG/1
15 CAPSULE ORAL AT BEDTIME
Refills: 0 | Status: DISCONTINUED | OUTPATIENT
Start: 2025-02-13 | End: 2025-02-20

## 2025-02-13 RX ADMIN — TEMAZEPAM 15 MILLIGRAM(S): 15 CAPSULE ORAL at 20:02

## 2025-02-13 RX ADMIN — HYDROXYZINE HYDROCHLORIDE 100 MILLIGRAM(S): 25 TABLET, FILM COATED ORAL at 20:03

## 2025-02-13 RX ADMIN — OLANZAPINE 15 MILLIGRAM(S): 10 TABLET ORAL at 20:02

## 2025-02-13 RX ADMIN — Medication 100 MILLIGRAM(S): at 20:03
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
## 2025-02-13 NOTE — BH INPATIENT PSYCHIATRY PROGRESS NOTE - NSBHCHARTREVIEWVS_PSY_A_CORE FT
Vital Signs Last 24 Hrs  T(C): 36.4 (02-12-25 @ 19:57), Max: 36.4 (02-12-25 @ 19:57)  T(F): 97.6 (02-12-25 @ 19:57), Max: 97.6 (02-12-25 @ 19:57)  HR: --  BP: --  BP(mean): --  RR: 17 (02-13-25 @ 08:25) (16 - 17)  SpO2: --    Orthostatic VS  02-12-25 @ 19:57  Lying BP: --/-- HR: --  Sitting BP: 111/81 HR: 83  Standing BP: 126/96 HR: 97  Site: --  Mode: --

## 2025-02-13 NOTE — BH INPATIENT PSYCHIATRY PROGRESS NOTE - NSBHFUPINTERVALHXFT_PSY_A_CORE
Patient is followed up for NDD. Chart, medications and labs reviewed. Patient is discussed during morning brief, no overnight events, has been in good behavioral control.   Patient was seen and is evaluated on the unit. She continues to exhibit childlike behaviors, w/o intrusiveness towards staff/peers. She presents with bright affect this morning, states that she would like to go for group home visit. Does verbalize looking forward to [facility]'s day. She has been compliant with standing medications, denies SE. Has been attentive to ADL's. No acute medical concerns, VSS.

## 2025-02-13 NOTE — BH INPATIENT PSYCHIATRY PROGRESS NOTE - NSBHMETABOLIC_PSY_ALL_CORE_FT
BMI: BMI (kg/m2): 29.1 (01-25-25 @ 16:22)  HbA1c: A1C with Estimated Average Glucose Result: 6.1 % (10-18-24 @ 08:00)    Glucose: POCT Blood Glucose.: 89 mg/dL (01-12-25 @ 20:10)    BP: --Vital Signs Last 24 Hrs  T(C): 36.4 (02-12-25 @ 19:57), Max: 36.4 (02-12-25 @ 19:57)  T(F): 97.6 (02-12-25 @ 19:57), Max: 97.6 (02-12-25 @ 19:57)  HR: --  BP: --  BP(mean): --  RR: 17 (02-13-25 @ 08:25) (16 - 17)  SpO2: --    Orthostatic VS  02-12-25 @ 19:57  Lying BP: --/-- HR: --  Sitting BP: 111/81 HR: 83  Standing BP: 126/96 HR: 97  Site: --  Mode: --    Lipid Panel: Date/Time: 10-18-24 @ 08:00  Cholesterol, Serum: 106  LDL Cholesterol Calculated: 53  HDL Cholesterol, Serum: 42  Total Cholesterol/HDL Ration Measurement: --  Triglycerides, Serum: 53

## 2025-02-14 PROCEDURE — 99232 SBSQ HOSP IP/OBS MODERATE 35: CPT

## 2025-02-14 RX ADMIN — TEMAZEPAM 15 MILLIGRAM(S): 15 CAPSULE ORAL at 20:04

## 2025-02-14 RX ADMIN — OLANZAPINE 15 MILLIGRAM(S): 10 TABLET ORAL at 20:05

## 2025-02-14 RX ADMIN — Medication 100 MILLIGRAM(S): at 20:05

## 2025-02-14 RX ADMIN — HYDROXYZINE HYDROCHLORIDE 100 MILLIGRAM(S): 25 TABLET, FILM COATED ORAL at 20:05

## 2025-02-14 NOTE — BH INPATIENT PSYCHIATRY PROGRESS NOTE - NSBHFUPINTERVALHXFT_PSY_A_CORE
Patient is followed up for NDD. Chart, medications and labs reviewed. Patient is discussed during morning brief, no overnight events, has been in good behavioral control.   Patient was seen and is evaluated on the unit. She continues to exhibit childlike behaviors, w/o intrusiveness towards staff/peers. Reports having good sleep overnight. She presents with bright affect this morning, remains focused on going to group home. She has been compliant with standing medications, denies SE. Has been attentive to ADL's. No acute medical concerns, VSS.

## 2025-02-14 NOTE — BH INPATIENT PSYCHIATRY PROGRESS NOTE - NSBHCHARTREVIEWVS_PSY_A_CORE FT
Vital Signs Last 24 Hrs  T(C): --  T(F): --  HR: --  BP: --  BP(mean): --  RR: 16 (02-14-25 @ 08:53) (16 - 18)  SpO2: --    Orthostatic VS  02-12-25 @ 19:57  Lying BP: --/-- HR: --  Sitting BP: 111/81 HR: 83  Standing BP: 126/96 HR: 97  Site: --  Mode: --
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
## 2025-02-14 NOTE — BH INPATIENT PSYCHIATRY PROGRESS NOTE - NSBHMETABOLIC_PSY_ALL_CORE_FT
BMI: BMI (kg/m2): 29.1 (01-25-25 @ 16:22)  HbA1c: A1C with Estimated Average Glucose Result: 6.1 % (10-18-24 @ 08:00)    Glucose: POCT Blood Glucose.: 89 mg/dL (01-12-25 @ 20:10)    BP: --Vital Signs Last 24 Hrs  T(C): --  T(F): --  HR: --  BP: --  BP(mean): --  RR: 16 (02-14-25 @ 08:53) (16 - 18)  SpO2: --    Orthostatic VS  02-12-25 @ 19:57  Lying BP: --/-- HR: --  Sitting BP: 111/81 HR: 83  Standing BP: 126/96 HR: 97  Site: --  Mode: --    Lipid Panel: Date/Time: 10-18-24 @ 08:00  Cholesterol, Serum: 106  LDL Cholesterol Calculated: 53  HDL Cholesterol, Serum: 42  Total Cholesterol/HDL Ration Measurement: --  Triglycerides, Serum: 53

## 2025-02-15 RX ADMIN — TEMAZEPAM 15 MILLIGRAM(S): 15 CAPSULE ORAL at 20:31

## 2025-02-15 RX ADMIN — OLANZAPINE 15 MILLIGRAM(S): 10 TABLET ORAL at 20:31

## 2025-02-15 RX ADMIN — HYDROXYZINE HYDROCHLORIDE 100 MILLIGRAM(S): 25 TABLET, FILM COATED ORAL at 20:31

## 2025-02-15 RX ADMIN — Medication 100 MILLIGRAM(S): at 20:31

## 2025-02-16 RX ADMIN — Medication 100 MILLIGRAM(S): at 20:19

## 2025-02-16 RX ADMIN — HYDROXYZINE HYDROCHLORIDE 100 MILLIGRAM(S): 25 TABLET, FILM COATED ORAL at 20:20

## 2025-02-16 RX ADMIN — TEMAZEPAM 15 MILLIGRAM(S): 15 CAPSULE ORAL at 20:20

## 2025-02-16 RX ADMIN — OLANZAPINE 15 MILLIGRAM(S): 10 TABLET ORAL at 20:19

## 2025-02-17 RX ADMIN — Medication 100 MILLIGRAM(S): at 20:09

## 2025-02-17 RX ADMIN — TEMAZEPAM 15 MILLIGRAM(S): 15 CAPSULE ORAL at 20:08

## 2025-02-17 RX ADMIN — HYDROXYZINE HYDROCHLORIDE 100 MILLIGRAM(S): 25 TABLET, FILM COATED ORAL at 20:08

## 2025-02-17 RX ADMIN — OLANZAPINE 15 MILLIGRAM(S): 10 TABLET ORAL at 20:09

## 2025-02-18 PROCEDURE — 99232 SBSQ HOSP IP/OBS MODERATE 35: CPT

## 2025-02-18 RX ADMIN — HYDROXYZINE HYDROCHLORIDE 100 MILLIGRAM(S): 25 TABLET, FILM COATED ORAL at 20:01

## 2025-02-18 RX ADMIN — TEMAZEPAM 15 MILLIGRAM(S): 15 CAPSULE ORAL at 20:02

## 2025-02-18 RX ADMIN — Medication 100 MILLIGRAM(S): at 20:02

## 2025-02-18 RX ADMIN — OLANZAPINE 15 MILLIGRAM(S): 10 TABLET ORAL at 20:02

## 2025-02-18 NOTE — BH INPATIENT PSYCHIATRY PROGRESS NOTE - NSBHFUPINTERVALHXFT_PSY_A_CORE
Patient is followed up for NDD. Chart, medications and labs reviewed. Patient is discussed during morning brief, no overnight events, has been in good behavioral control.   Patient was seen and is evaluated on the unit. She continues to exhibit childlike behaviors, w/o intrusiveness towards staff/peers. Reports having good sleep overnight. She presents with bright affect this morning, remains focused on leaving the hospital and going to group home. Team discussed group home visit scheduled for tomorrow 2/19. She has been compliant with standing medications, denies SE. No acute medical concerns, VSS.

## 2025-02-18 NOTE — BH INPATIENT PSYCHIATRY PROGRESS NOTE - NSBHCHARTREVIEWVS_PSY_A_CORE FT
Vital Signs Last 24 Hrs  T(C): --  T(F): --  HR: --  BP: --  BP(mean): --  RR: 16 (02-18-25 @ 08:21) (16 - 16)  SpO2: --    Orthostatic VS  02-17-25 @ 08:44  Lying BP: --/-- HR: --  Sitting BP: 113/79 HR: 97  Standing BP: 107/71 HR: 98  Site: upper left arm  Mode: electronic

## 2025-02-18 NOTE — BH INPATIENT PSYCHIATRY PROGRESS NOTE - NSBHMETABOLIC_PSY_ALL_CORE_FT
BMI: BMI (kg/m2): 29.1 (01-25-25 @ 16:22)  HbA1c: A1C with Estimated Average Glucose Result: 6.1 % (10-18-24 @ 08:00)    Glucose: POCT Blood Glucose.: 89 mg/dL (01-12-25 @ 20:10)    BP: --Vital Signs Last 24 Hrs  T(C): --  T(F): --  HR: --  BP: --  BP(mean): --  RR: 16 (02-18-25 @ 08:21) (16 - 16)  SpO2: --    Orthostatic VS  02-17-25 @ 08:44  Lying BP: --/-- HR: --  Sitting BP: 113/79 HR: 97  Standing BP: 107/71 HR: 98  Site: upper left arm  Mode: electronic    Lipid Panel: Date/Time: 10-18-24 @ 08:00  Cholesterol, Serum: 106  LDL Cholesterol Calculated: 53  HDL Cholesterol, Serum: 42  Total Cholesterol/HDL Ration Measurement: --  Triglycerides, Serum: 53
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
## 2025-02-19 PROCEDURE — 99232 SBSQ HOSP IP/OBS MODERATE 35: CPT

## 2025-02-19 RX ORDER — LORAZEPAM 4 MG/ML
2 VIAL (ML) INJECTION ONCE
Refills: 0 | Status: DISCONTINUED | OUTPATIENT
Start: 2025-02-19 | End: 2025-02-19

## 2025-02-19 RX ORDER — HALOPERIDOL 10 MG/1
5 TABLET ORAL ONCE
Refills: 0 | Status: COMPLETED | OUTPATIENT
Start: 2025-02-19 | End: 2025-02-19

## 2025-02-19 RX ADMIN — Medication 2 MILLIGRAM(S): at 13:42

## 2025-02-19 RX ADMIN — OLANZAPINE 15 MILLIGRAM(S): 10 TABLET ORAL at 22:00

## 2025-02-19 RX ADMIN — HALOPERIDOL 5 MILLIGRAM(S): 10 TABLET ORAL at 13:41

## 2025-02-19 RX ADMIN — TEMAZEPAM 15 MILLIGRAM(S): 15 CAPSULE ORAL at 22:00

## 2025-02-19 RX ADMIN — Medication 100 MILLIGRAM(S): at 22:00

## 2025-02-19 RX ADMIN — HYDROXYZINE HYDROCHLORIDE 100 MILLIGRAM(S): 25 TABLET, FILM COATED ORAL at 22:00

## 2025-02-19 NOTE — BH INPATIENT PSYCHIATRY PROGRESS NOTE - NSBHFUPINTERVALHXFT_PSY_A_CORE
Patient is followed up for NDD. Chart, medications and labs reviewed. Patient is discussed during morning brief, no overnight events, has been in good behavioral control.   Patient was seen and is evaluated on the unit. She continues to exhibit childlike behaviors, w/o intrusiveness towards staff/peers. Slept for 7 hours overnight per sleep log. She presents with bright affect this morning, remains focused on leaving "[facility]". Writer discussed group home visit later this afternoon. Towards the afternoon pt became irritable when staff approached her for room change, was given one time dose of Haldol 5mg and Ativan 2mg. She has been compliant with standing medications, denies SE. No acute medical concerns, VSS.
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
## 2025-02-19 NOTE — BH INPATIENT PSYCHIATRY PROGRESS NOTE - NSBHMETABOLIC_PSY_ALL_CORE_FT
BMI: BMI (kg/m2): 29.1 (01-25-25 @ 16:22)  HbA1c: A1C with Estimated Average Glucose Result: 6.1 % (10-18-24 @ 08:00)    Glucose: POCT Blood Glucose.: 89 mg/dL (01-12-25 @ 20:10)    BP: --Vital Signs Last 24 Hrs  T(C): 36.2 (02-19-25 @ 11:55), Max: 36.2 (02-19-25 @ 11:55)  T(F): 97.2 (02-19-25 @ 11:55), Max: 97.2 (02-19-25 @ 11:55)  HR: --  BP: --  BP(mean): --  RR: 16 (02-19-25 @ 11:55) (16 - 17)  SpO2: --    Orthostatic VS  02-19-25 @ 11:55  Lying BP: --/-- HR: --  Sitting BP: 123/94 HR: 101  Standing BP: 136/102 HR: 104  Site: upper right arm  Mode: electronic    Lipid Panel: Date/Time: 10-18-24 @ 08:00  Cholesterol, Serum: 106  LDL Cholesterol Calculated: 53  HDL Cholesterol, Serum: 42  Total Cholesterol/HDL Ration Measurement: --  Triglycerides, Serum: 53

## 2025-02-19 NOTE — BH INPATIENT PSYCHIATRY PROGRESS NOTE - NSBHCHARTREVIEWVS_PSY_A_CORE FT
Vital Signs Last 24 Hrs  T(C): 36.2 (02-19-25 @ 11:55), Max: 36.2 (02-19-25 @ 11:55)  T(F): 97.2 (02-19-25 @ 11:55), Max: 97.2 (02-19-25 @ 11:55)  HR: --  BP: --  BP(mean): --  RR: 16 (02-19-25 @ 11:55) (16 - 17)  SpO2: --    Orthostatic VS  02-19-25 @ 11:55  Lying BP: --/-- HR: --  Sitting BP: 123/94 HR: 101  Standing BP: 136/102 HR: 104  Site: upper right arm  Mode: electronic

## 2025-02-20 PROCEDURE — 99232 SBSQ HOSP IP/OBS MODERATE 35: CPT

## 2025-02-20 RX ORDER — LORAZEPAM 4 MG/ML
2 VIAL (ML) INJECTION ONCE
Refills: 0 | Status: DISCONTINUED | OUTPATIENT
Start: 2025-02-20 | End: 2025-02-27

## 2025-02-20 RX ORDER — TEMAZEPAM 15 MG/1
15 CAPSULE ORAL AT BEDTIME
Refills: 0 | Status: DISCONTINUED | OUTPATIENT
Start: 2025-02-20 | End: 2025-02-27

## 2025-02-20 RX ADMIN — HYDROXYZINE HYDROCHLORIDE 100 MILLIGRAM(S): 25 TABLET, FILM COATED ORAL at 20:19

## 2025-02-20 RX ADMIN — OLANZAPINE 15 MILLIGRAM(S): 10 TABLET ORAL at 20:19

## 2025-02-20 RX ADMIN — Medication 100 MILLIGRAM(S): at 20:20

## 2025-02-20 RX ADMIN — TEMAZEPAM 15 MILLIGRAM(S): 15 CAPSULE ORAL at 20:19

## 2025-02-20 NOTE — BH INPATIENT PSYCHIATRY PROGRESS NOTE - NSBHMETABOLIC_PSY_ALL_CORE_FT
BMI: BMI (kg/m2): 29.1 (01-25-25 @ 16:22)  HbA1c: A1C with Estimated Average Glucose Result: 6.1 % (10-18-24 @ 08:00)    Glucose: POCT Blood Glucose.: 89 mg/dL (01-12-25 @ 20:10)    BP: --Vital Signs Last 24 Hrs  T(C): 36.7 (02-20-25 @ 08:28), Max: 36.7 (02-20-25 @ 08:28)  T(F): 98.1 (02-20-25 @ 08:28), Max: 98.1 (02-20-25 @ 08:28)  HR: --  BP: --  BP(mean): --  RR: 16 (02-20-25 @ 09:02) (16 - 16)  SpO2: --    Orthostatic VS  02-20-25 @ 08:28  Lying BP: --/-- HR: --  Sitting BP: 125/99 HR: 88  Standing BP: 117/89 HR: 91  Site: --  Mode: --  Orthostatic VS  02-19-25 @ 11:55  Lying BP: --/-- HR: --  Sitting BP: 123/94 HR: 101  Standing BP: 136/102 HR: 104  Site: upper right arm  Mode: electronic    Lipid Panel: Date/Time: 10-18-24 @ 08:00  Cholesterol, Serum: 106  LDL Cholesterol Calculated: 53  HDL Cholesterol, Serum: 42  Total Cholesterol/HDL Ration Measurement: --  Triglycerides, Serum: 53

## 2025-02-20 NOTE — BH INPATIENT PSYCHIATRY PROGRESS NOTE - NSBHCHARTREVIEWVS_PSY_A_CORE FT
Vital Signs Last 24 Hrs  T(C): 36.7 (02-20-25 @ 08:28), Max: 36.7 (02-20-25 @ 08:28)  T(F): 98.1 (02-20-25 @ 08:28), Max: 98.1 (02-20-25 @ 08:28)  HR: --  BP: --  BP(mean): --  RR: 16 (02-20-25 @ 09:02) (16 - 16)  SpO2: --    Orthostatic VS  02-20-25 @ 08:28  Lying BP: --/-- HR: --  Sitting BP: 125/99 HR: 88  Standing BP: 117/89 HR: 91  Site: --  Mode: --  Orthostatic VS  02-19-25 @ 11:55  Lying BP: --/-- HR: --  Sitting BP: 123/94 HR: 101  Standing BP: 136/102 HR: 104  Site: upper right arm  Mode: electronic
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
## 2025-02-20 NOTE — BH INPATIENT PSYCHIATRY PROGRESS NOTE - NSBHFUPINTERVALHXFT_PSY_A_CORE
Patient is followed up for NDD. Chart, medications and labs reviewed. Patient is discussed during morning brief, no overnight events, has been in good behavioral control.   Patient was seen and is evaluated on the unit. She continues to exhibit childlike behaviors, w/o intrusiveness towards staff/peers. Slept for 8 hours overnight per sleep log. Was not able to go for group home visit yesterday, pt appearing increasingly anxious prior to pickup and stated feeling "scared and tired". Team informed that this placement is no longer an option. She has been compliant with standing medications, denies SE. No acute medical concerns, VSS.

## 2025-02-21 PROCEDURE — 99232 SBSQ HOSP IP/OBS MODERATE 35: CPT

## 2025-02-21 RX ADMIN — OLANZAPINE 15 MILLIGRAM(S): 10 TABLET ORAL at 20:31

## 2025-02-21 RX ADMIN — TEMAZEPAM 15 MILLIGRAM(S): 15 CAPSULE ORAL at 20:31

## 2025-02-21 RX ADMIN — Medication 100 MILLIGRAM(S): at 20:32

## 2025-02-21 RX ADMIN — HYDROXYZINE HYDROCHLORIDE 100 MILLIGRAM(S): 25 TABLET, FILM COATED ORAL at 20:31

## 2025-02-21 NOTE — BH INPATIENT PSYCHIATRY PROGRESS NOTE - NSBHMETABOLIC_PSY_ALL_CORE_FT
BMI: BMI (kg/m2): 29.1 (01-25-25 @ 16:22)  HbA1c: A1C with Estimated Average Glucose Result: 6.1 % (10-18-24 @ 08:00)    Glucose: POCT Blood Glucose.: 89 mg/dL (01-12-25 @ 20:10)    BP: --Vital Signs Last 24 Hrs  T(C): --  T(F): --  HR: --  BP: --  BP(mean): --  RR: 16 (02-21-25 @ 08:13) (16 - 16)  SpO2: --    Orthostatic VS  02-20-25 @ 08:28  Lying BP: --/-- HR: --  Sitting BP: 125/99 HR: 88  Standing BP: 117/89 HR: 91  Site: --  Mode: --    Lipid Panel: Date/Time: 10-18-24 @ 08:00  Cholesterol, Serum: 106  LDL Cholesterol Calculated: 53  HDL Cholesterol, Serum: 42  Total Cholesterol/HDL Ration Measurement: --  Triglycerides, Serum: 53   BMI: BMI (kg/m2): 29.1 (01-25-25 @ 16:22)  HbA1c: A1C with Estimated Average Glucose Result: 6.1 % (10-18-24 @ 08:00)    Glucose: POCT Blood Glucose.: 89 mg/dL (01-12-25 @ 20:10)    BP: --Vital Signs Last 24 Hrs  T(C): 36.1 (02-24-25 @ 08:50), Max: 36.1 (02-24-25 @ 08:50)  T(F): 96.9 (02-24-25 @ 08:50), Max: 96.9 (02-24-25 @ 08:50)  HR: --  BP: --  BP(mean): --  RR: --  SpO2: --    Orthostatic VS  02-24-25 @ 08:50  Lying BP: --/-- HR: --  Sitting BP: 114/59 HR: 106  Standing BP: 111/69 HR: 113  Site: --  Mode: electronic  Orthostatic VS  02-22-25 @ 19:38  Lying BP: --/-- HR: --  Sitting BP: 125/96 HR: 99  Standing BP: 127/99 HR: 105  Site: --  Mode: --    Lipid Panel: Date/Time: 10-18-24 @ 08:00  Cholesterol, Serum: 106  LDL Cholesterol Calculated: 53  HDL Cholesterol, Serum: 42  Total Cholesterol/HDL Ration Measurement: --  Triglycerides, Serum: 53

## 2025-02-21 NOTE — BH INPATIENT PSYCHIATRY PROGRESS NOTE - NSBHCHARTREVIEWVS_PSY_A_CORE FT
Vital Signs Last 24 Hrs  T(C): --  T(F): --  HR: --  BP: --  BP(mean): --  RR: 16 (02-21-25 @ 08:13) (16 - 16)  SpO2: --    Orthostatic VS  02-20-25 @ 08:28  Lying BP: --/-- HR: --  Sitting BP: 125/99 HR: 88  Standing BP: 117/89 HR: 91  Site: --  Mode: --   Vital Signs Last 24 Hrs  T(C): 36.1 (02-24-25 @ 08:50), Max: 36.1 (02-24-25 @ 08:50)  T(F): 96.9 (02-24-25 @ 08:50), Max: 96.9 (02-24-25 @ 08:50)  HR: --  BP: --  BP(mean): --  RR: --  SpO2: --    Orthostatic VS  02-24-25 @ 08:50  Lying BP: --/-- HR: --  Sitting BP: 114/59 HR: 106  Standing BP: 111/69 HR: 113  Site: --  Mode: electronic  Orthostatic VS  02-22-25 @ 19:38  Lying BP: --/-- HR: --  Sitting BP: 125/96 HR: 99  Standing BP: 127/99 HR: 105  Site: --  Mode: --
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
## 2025-02-21 NOTE — BH INPATIENT PSYCHIATRY PROGRESS NOTE - NSBHFUPINTERVALHXFT_PSY_A_CORE
Patient is followed up for NDD. Chart, medications and labs reviewed. Patient is discussed during morning brief, no overnight events, has been in good behavioral control.   Patient was seen and is evaluated on the unit. She continues to exhibit childlike behaviors, w/o intrusiveness towards staff/peers. Slept for 8.5 hours overnight per sleep log. Team notified that group home is willing to conduct virtual visit, to be scheduled next week. Team discussed with pt who is agreeable and states that she wants to "leave [facility]". She has been compliant with standing medications, denies SE. No acute medical concerns, VSS.

## 2025-02-22 RX ADMIN — Medication 100 MILLIGRAM(S): at 20:35

## 2025-02-22 RX ADMIN — TEMAZEPAM 15 MILLIGRAM(S): 15 CAPSULE ORAL at 20:35

## 2025-02-22 RX ADMIN — HYDROXYZINE HYDROCHLORIDE 100 MILLIGRAM(S): 25 TABLET, FILM COATED ORAL at 20:35

## 2025-02-22 RX ADMIN — OLANZAPINE 15 MILLIGRAM(S): 10 TABLET ORAL at 20:35

## 2025-02-23 RX ADMIN — OLANZAPINE 15 MILLIGRAM(S): 10 TABLET ORAL at 20:55

## 2025-02-23 RX ADMIN — Medication 100 MILLIGRAM(S): at 20:56

## 2025-02-23 RX ADMIN — TEMAZEPAM 15 MILLIGRAM(S): 15 CAPSULE ORAL at 20:55

## 2025-02-23 RX ADMIN — HYDROXYZINE HYDROCHLORIDE 100 MILLIGRAM(S): 25 TABLET, FILM COATED ORAL at 20:55

## 2025-02-24 PROCEDURE — 99232 SBSQ HOSP IP/OBS MODERATE 35: CPT

## 2025-02-24 RX ADMIN — OLANZAPINE 15 MILLIGRAM(S): 10 TABLET ORAL at 20:02

## 2025-02-24 RX ADMIN — TEMAZEPAM 15 MILLIGRAM(S): 15 CAPSULE ORAL at 20:02

## 2025-02-24 RX ADMIN — HYDROXYZINE HYDROCHLORIDE 100 MILLIGRAM(S): 25 TABLET, FILM COATED ORAL at 20:02

## 2025-02-24 RX ADMIN — Medication 100 MILLIGRAM(S): at 20:02
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
## 2025-02-24 NOTE — BH INPATIENT PSYCHIATRY PROGRESS NOTE - NSBHCHARTREVIEWVS_PSY_A_CORE FT
Vital Signs Last 24 Hrs  T(C): 36.1 (02-24-25 @ 08:50), Max: 36.1 (02-24-25 @ 08:50)  T(F): 96.9 (02-24-25 @ 08:50), Max: 96.9 (02-24-25 @ 08:50)  HR: --  BP: --  BP(mean): --  RR: 17 (02-24-25 @ 08:50) (17 - 17)  SpO2: --    Orthostatic VS  02-24-25 @ 08:50  Lying BP: --/-- HR: --  Sitting BP: 114/59 HR: 106  Standing BP: 111/69 HR: 113  Site: --  Mode: electronic  Orthostatic VS  02-22-25 @ 19:38  Lying BP: --/-- HR: --  Sitting BP: 125/96 HR: 99  Standing BP: 127/99 HR: 105  Site: --  Mode: --   Vital Signs Last 24 Hrs  T(C): --  T(F): --  HR: --  BP: --  BP(mean): --  RR: 16 (02-25-25 @ 08:15) (16 - 18)  SpO2: --    Orthostatic VS  02-24-25 @ 08:50  Lying BP: --/-- HR: --  Sitting BP: 114/59 HR: 106  Standing BP: 111/69 HR: 113  Site: --  Mode: electronic

## 2025-02-24 NOTE — BH INPATIENT PSYCHIATRY PROGRESS NOTE - NSBHMETABOLIC_PSY_ALL_CORE_FT
BMI: BMI (kg/m2): 29.1 (01-25-25 @ 16:22)  HbA1c: A1C with Estimated Average Glucose Result: 6.1 % (10-18-24 @ 08:00)    Glucose: POCT Blood Glucose.: 89 mg/dL (01-12-25 @ 20:10)    BP: --Vital Signs Last 24 Hrs  T(C): 36.1 (02-24-25 @ 08:50), Max: 36.1 (02-24-25 @ 08:50)  T(F): 96.9 (02-24-25 @ 08:50), Max: 96.9 (02-24-25 @ 08:50)  HR: --  BP: --  BP(mean): --  RR: 17 (02-24-25 @ 08:50) (17 - 17)  SpO2: --    Orthostatic VS  02-24-25 @ 08:50  Lying BP: --/-- HR: --  Sitting BP: 114/59 HR: 106  Standing BP: 111/69 HR: 113  Site: --  Mode: electronic  Orthostatic VS  02-22-25 @ 19:38  Lying BP: --/-- HR: --  Sitting BP: 125/96 HR: 99  Standing BP: 127/99 HR: 105  Site: --  Mode: --    Lipid Panel: Date/Time: 10-18-24 @ 08:00  Cholesterol, Serum: 106  LDL Cholesterol Calculated: 53  HDL Cholesterol, Serum: 42  Total Cholesterol/HDL Ration Measurement: --  Triglycerides, Serum: 53   BMI: BMI (kg/m2): 29.1 (01-25-25 @ 16:22)  HbA1c: A1C with Estimated Average Glucose Result: 6.1 % (10-18-24 @ 08:00)    Glucose: POCT Blood Glucose.: 89 mg/dL (01-12-25 @ 20:10)    BP: --Vital Signs Last 24 Hrs  T(C): --  T(F): --  HR: --  BP: --  BP(mean): --  RR: 16 (02-25-25 @ 08:15) (16 - 18)  SpO2: --    Orthostatic VS  02-24-25 @ 08:50  Lying BP: --/-- HR: --  Sitting BP: 114/59 HR: 106  Standing BP: 111/69 HR: 113  Site: --  Mode: electronic    Lipid Panel: Date/Time: 10-18-24 @ 08:00  Cholesterol, Serum: 106  LDL Cholesterol Calculated: 53  HDL Cholesterol, Serum: 42  Total Cholesterol/HDL Ration Measurement: --  Triglycerides, Serum: 53

## 2025-02-24 NOTE — BH INPATIENT PSYCHIATRY PROGRESS NOTE - NSBHFUPINTERVALHXFT_PSY_A_CORE
Patient is followed up for NDD. Chart, medications and labs reviewed. Patient is discussed during morning brief, no overnight events, has been in good behavioral control.   Patient was seen and is evaluated on the unit. She continues to exhibit childlike behaviors, w/o intrusiveness towards staff/peers. Slept for 7 hours overnight per sleep log. She continues to present with bright affect, states that she would like to leave the hospital this Thursday. Writer discussed that group home visit would be done virtually and she nods her head in agreement. She has been compliant with standing medications, denies SE. No acute medical concerns, VSS.

## 2025-02-25 PROCEDURE — 99232 SBSQ HOSP IP/OBS MODERATE 35: CPT

## 2025-02-25 RX ADMIN — Medication 100 MILLIGRAM(S): at 20:06

## 2025-02-25 RX ADMIN — HYDROXYZINE HYDROCHLORIDE 100 MILLIGRAM(S): 25 TABLET, FILM COATED ORAL at 20:06

## 2025-02-25 RX ADMIN — OLANZAPINE 15 MILLIGRAM(S): 10 TABLET ORAL at 20:06

## 2025-02-25 RX ADMIN — TEMAZEPAM 15 MILLIGRAM(S): 15 CAPSULE ORAL at 20:05

## 2025-02-25 NOTE — BH INPATIENT PSYCHIATRY PROGRESS NOTE - NSBHMETABOLIC_PSY_ALL_CORE_FT
BMI: BMI (kg/m2): 29.1 (01-25-25 @ 16:22)  HbA1c: A1C with Estimated Average Glucose Result: 6.1 % (10-18-24 @ 08:00)    Glucose: POCT Blood Glucose.: 89 mg/dL (01-12-25 @ 20:10)    BP: --Vital Signs Last 24 Hrs  T(C): --  T(F): --  HR: --  BP: --  BP(mean): --  RR: 16 (02-25-25 @ 08:15) (16 - 18)  SpO2: --    Orthostatic VS  02-24-25 @ 08:50  Lying BP: --/-- HR: --  Sitting BP: 114/59 HR: 106  Standing BP: 111/69 HR: 113  Site: --  Mode: electronic    Lipid Panel: Date/Time: 10-18-24 @ 08:00  Cholesterol, Serum: 106  LDL Cholesterol Calculated: 53  HDL Cholesterol, Serum: 42  Total Cholesterol/HDL Ration Measurement: --  Triglycerides, Serum: 53

## 2025-02-25 NOTE — BH INPATIENT PSYCHIATRY PROGRESS NOTE - NSBHFUPINTERVALHXFT_PSY_A_CORE
Patient is followed up for NDD. Chart, medications and labs reviewed. Patient is discussed during morning brief, no overnight events, has been in good behavioral control.   Patient was seen and is evaluated on the unit. She continues to exhibit childlike behaviors, w/o intrusiveness towards staff/peers. She continues to present with bright affect, states that it is getting "warm" outside and she wants to leave the hospital. Writer discussed that group home visit would be done virtually and she nods her head in agreement. She has been compliant with standing medications, denies SE. No acute medical concerns, VSS.

## 2025-02-25 NOTE — BH INPATIENT PSYCHIATRY PROGRESS NOTE - NSBHCHARTREVIEWVS_PSY_A_CORE FT
Vital Signs Last 24 Hrs  T(C): --  T(F): --  HR: --  BP: --  BP(mean): --  RR: 16 (02-25-25 @ 08:15) (16 - 18)  SpO2: --    Orthostatic VS  02-24-25 @ 08:50  Lying BP: --/-- HR: --  Sitting BP: 114/59 HR: 106  Standing BP: 111/69 HR: 113  Site: --  Mode: electronic
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
## 2025-02-26 PROCEDURE — 99232 SBSQ HOSP IP/OBS MODERATE 35: CPT

## 2025-02-26 RX ADMIN — HYDROXYZINE HYDROCHLORIDE 100 MILLIGRAM(S): 25 TABLET, FILM COATED ORAL at 20:03

## 2025-02-26 RX ADMIN — OLANZAPINE 15 MILLIGRAM(S): 10 TABLET ORAL at 20:02

## 2025-02-26 RX ADMIN — TEMAZEPAM 15 MILLIGRAM(S): 15 CAPSULE ORAL at 20:02

## 2025-02-26 RX ADMIN — Medication 100 MILLIGRAM(S): at 20:02

## 2025-02-26 NOTE — BH INPATIENT PSYCHIATRY PROGRESS NOTE - NSBHCHARTREVIEWVS_PSY_A_CORE FT
Vital Signs Last 24 Hrs  T(C): --  T(F): --  HR: --  BP: --  BP(mean): --  RR: 18 (02-26-25 @ 07:39) (17 - 18)  SpO2: --

## 2025-02-26 NOTE — BH INPATIENT PSYCHIATRY PROGRESS NOTE - NSBHFUPINTERVALHXFT_PSY_A_CORE
Patient is followed up for NDD. Chart, medications and labs reviewed. Patient is discussed during morning brief, no overnight events. Remains medication compliant. Patient was seen and is evaluated on the unit. She continues to exhibit childlike behaviors. Denies AVH/SI/HI. Overall improved behavioral control.

## 2025-02-26 NOTE — BH INPATIENT PSYCHIATRY PROGRESS NOTE - NSBHMETABOLIC_PSY_ALL_CORE_FT
BMI: BMI (kg/m2): 29.1 (01-25-25 @ 16:22)  HbA1c: A1C with Estimated Average Glucose Result: 6.1 % (10-18-24 @ 08:00)    Glucose: POCT Blood Glucose.: 89 mg/dL (01-12-25 @ 20:10)    BP: --Vital Signs Last 24 Hrs  T(C): --  T(F): --  HR: --  BP: --  BP(mean): --  RR: 18 (02-26-25 @ 07:39) (17 - 18)  SpO2: --      Lipid Panel: Date/Time: 10-18-24 @ 08:00  Cholesterol, Serum: 106  LDL Cholesterol Calculated: 53  HDL Cholesterol, Serum: 42  Total Cholesterol/HDL Ration Measurement: --  Triglycerides, Serum: 53
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
## 2025-02-27 PROCEDURE — 99232 SBSQ HOSP IP/OBS MODERATE 35: CPT

## 2025-02-27 RX ADMIN — TEMAZEPAM 15 MILLIGRAM(S): 15 CAPSULE ORAL at 20:40

## 2025-02-27 RX ADMIN — HYDROXYZINE HYDROCHLORIDE 100 MILLIGRAM(S): 25 TABLET, FILM COATED ORAL at 20:41

## 2025-02-27 RX ADMIN — OLANZAPINE 15 MILLIGRAM(S): 10 TABLET ORAL at 20:40

## 2025-02-27 RX ADMIN — Medication 100 MILLIGRAM(S): at 20:40

## 2025-02-27 NOTE — BH INPATIENT PSYCHIATRY PROGRESS NOTE - NSBHCHARTREVIEWVS_PSY_A_CORE FT
Vital Signs Last 24 Hrs  T(C): --  T(F): --  HR: --  BP: --  BP(mean): --  RR: 16 (02-27-25 @ 07:42) (16 - 17)  SpO2: --

## 2025-02-27 NOTE — BH INPATIENT PSYCHIATRY PROGRESS NOTE - NSBHFUPINTERVALHXFT_PSY_A_CORE
Patient is followed up for NDD. Chart, medications and labs reviewed. Patient is discussed during morning brief, no overnight events. Remains medication compliant. Patient was seen and is evaluated on the unit. She continues to exhibit childlike behaviors. Denies AVH/SI/HI. Patient slept in late today, states "I feel tired today). Overall improved behavioral control.

## 2025-02-27 NOTE — BH INPATIENT PSYCHIATRY PROGRESS NOTE - NSBHMETABOLIC_PSY_ALL_CORE_FT
BMI: BMI (kg/m2): 29.1 (01-25-25 @ 16:22)  HbA1c: A1C with Estimated Average Glucose Result: 6.1 % (10-18-24 @ 08:00)    Glucose: POCT Blood Glucose.: 89 mg/dL (01-12-25 @ 20:10)    BP: --Vital Signs Last 24 Hrs  T(C): --  T(F): --  HR: --  BP: --  BP(mean): --  RR: 16 (02-27-25 @ 07:42) (16 - 17)  SpO2: --      Lipid Panel: Date/Time: 10-18-24 @ 08:00  Cholesterol, Serum: 106  LDL Cholesterol Calculated: 53  HDL Cholesterol, Serum: 42  Total Cholesterol/HDL Ration Measurement: --  Triglycerides, Serum: 53
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
## 2025-02-28 PROCEDURE — 99232 SBSQ HOSP IP/OBS MODERATE 35: CPT

## 2025-02-28 RX ADMIN — HYDROXYZINE HYDROCHLORIDE 100 MILLIGRAM(S): 25 TABLET, FILM COATED ORAL at 21:01

## 2025-02-28 RX ADMIN — OLANZAPINE 15 MILLIGRAM(S): 10 TABLET ORAL at 21:01

## 2025-02-28 RX ADMIN — Medication 100 MILLIGRAM(S): at 21:01

## 2025-02-28 NOTE — BH INPATIENT PSYCHIATRY PROGRESS NOTE - NSBHFUPINTERVALHXFT_PSY_A_CORE
Patient is followed up for NDD. Chart, medications and labs reviewed. Patient is discussed during morning brief, no overnight events. Remains medication compliant. Patient was seen and is evaluated on the unit. She continues to exhibit childlike behaviors. Denies AVH/SI/HI. Overall improved behavioral control. Per KATERYNA patient had her meeting yesterday with her residence, meeting went well.  Potential DC next week.

## 2025-02-28 NOTE — BH INPATIENT PSYCHIATRY PROGRESS NOTE - PRN MEDS
MEDICATIONS  (PRN):  acetaminophen     Tablet .. 650 milliGRAM(s) Oral every 6 hours PRN Temp greater or equal to 38C (100.4F), Mild Pain (1 - 3)  acetaminophen     Tablet .. 650 milliGRAM(s) Oral every 6 hours PRN Temp greater or equal to 38C (100.4F), Mild Pain (1 - 3), Moderate Pain (4 - 6)  diphenhydrAMINE 50 milliGRAM(s) Oral every 6 hours PRN Extrapyramidal symptoms or prophylaxis  diphenhydrAMINE Injectable 50 milliGRAM(s) IntraMuscular once PRN Extrapyramidal prophylaxis  haloperidol    Injectable 5 milliGRAM(s) IntraMuscular once PRN aggression

## 2025-02-28 NOTE — BH INPATIENT PSYCHIATRY PROGRESS NOTE - NSBHMETABOLIC_PSY_ALL_CORE_FT
BMI: BMI (kg/m2): 29.1 (01-25-25 @ 16:22)  HbA1c: A1C with Estimated Average Glucose Result: 6.1 % (10-18-24 @ 08:00)    Glucose: POCT Blood Glucose.: 89 mg/dL (01-12-25 @ 20:10)    BP: --Vital Signs Last 24 Hrs  T(C): --  T(F): --  HR: --  BP: --  BP(mean): --  RR: 16 (02-27-25 @ 07:42) (16 - 16)  SpO2: --      Lipid Panel: Date/Time: 10-18-24 @ 08:00  Cholesterol, Serum: 106  LDL Cholesterol Calculated: 53  HDL Cholesterol, Serum: 42  Total Cholesterol/HDL Ration Measurement: --  Triglycerides, Serum: 53   BMI: BMI (kg/m2): 29.1 (01-25-25 @ 16:22)  HbA1c: A1C with Estimated Average Glucose Result: 6.1 % (10-18-24 @ 08:00)    Glucose: POCT Blood Glucose.: 89 mg/dL (01-12-25 @ 20:10)    BP: --Vital Signs Last 24 Hrs  T(C): --  T(F): --  HR: --  BP: --  BP(mean): --  RR: 16 (02-28-25 @ 07:48) (16 - 16)  SpO2: --      Lipid Panel: Date/Time: 10-18-24 @ 08:00  Cholesterol, Serum: 106  LDL Cholesterol Calculated: 53  HDL Cholesterol, Serum: 42  Total Cholesterol/HDL Ration Measurement: --  Triglycerides, Serum: 53

## 2025-02-28 NOTE — BH INPATIENT PSYCHIATRY PROGRESS NOTE - NSBHCHARTREVIEWVS_PSY_A_CORE FT
Vital Signs Last 24 Hrs  T(C): --  T(F): --  HR: --  BP: --  BP(mean): --  RR: 16 (02-27-25 @ 07:42) (16 - 16)  SpO2: --     Vital Signs Last 24 Hrs  T(C): --  T(F): --  HR: --  BP: --  BP(mean): --  RR: 16 (02-28-25 @ 07:48) (16 - 16)  SpO2: --
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
## 2025-02-28 NOTE — BH INPATIENT PSYCHIATRY PROGRESS NOTE - CURRENT MEDICATION
MEDICATIONS  (STANDING):  hydrOXYzine hydrochloride 100 milliGRAM(s) Oral at bedtime  OLANZapine 15 milliGRAM(s) Oral at bedtime  traZODone 100 milliGRAM(s) Oral at bedtime    MEDICATIONS  (PRN):  acetaminophen     Tablet .. 650 milliGRAM(s) Oral every 6 hours PRN Temp greater or equal to 38C (100.4F), Mild Pain (1 - 3)  acetaminophen     Tablet .. 650 milliGRAM(s) Oral every 6 hours PRN Temp greater or equal to 38C (100.4F), Mild Pain (1 - 3), Moderate Pain (4 - 6)  diphenhydrAMINE 50 milliGRAM(s) Oral every 6 hours PRN Extrapyramidal symptoms or prophylaxis  diphenhydrAMINE Injectable 50 milliGRAM(s) IntraMuscular once PRN Extrapyramidal prophylaxis  haloperidol    Injectable 5 milliGRAM(s) IntraMuscular once PRN aggression

## 2025-03-01 RX ADMIN — OLANZAPINE 15 MILLIGRAM(S): 10 TABLET ORAL at 20:31

## 2025-03-01 RX ADMIN — Medication 100 MILLIGRAM(S): at 20:32

## 2025-03-01 RX ADMIN — HYDROXYZINE HYDROCHLORIDE 100 MILLIGRAM(S): 25 TABLET, FILM COATED ORAL at 20:32

## 2025-03-02 RX ADMIN — OLANZAPINE 15 MILLIGRAM(S): 10 TABLET ORAL at 20:04

## 2025-03-02 RX ADMIN — Medication 100 MILLIGRAM(S): at 20:05

## 2025-03-02 RX ADMIN — HYDROXYZINE HYDROCHLORIDE 100 MILLIGRAM(S): 25 TABLET, FILM COATED ORAL at 20:04

## 2025-03-03 PROCEDURE — 90832 PSYTX W PT 30 MINUTES: CPT

## 2025-03-03 PROCEDURE — 99232 SBSQ HOSP IP/OBS MODERATE 35: CPT

## 2025-03-03 RX ADMIN — OLANZAPINE 15 MILLIGRAM(S): 10 TABLET ORAL at 20:00

## 2025-03-03 RX ADMIN — HYDROXYZINE HYDROCHLORIDE 100 MILLIGRAM(S): 25 TABLET, FILM COATED ORAL at 20:00

## 2025-03-03 RX ADMIN — Medication 100 MILLIGRAM(S): at 20:00
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
## 2025-03-03 NOTE — BH INPATIENT PSYCHIATRY PROGRESS NOTE - NSBHMETABOLIC_PSY_ALL_CORE_FT
BMI: BMI (kg/m2): 29.1 (01-25-25 @ 16:22)  HbA1c: A1C with Estimated Average Glucose Result: 6.1 % (10-18-24 @ 08:00)    Glucose: POCT Blood Glucose.: 89 mg/dL (01-12-25 @ 20:10)    BP: --Vital Signs Last 24 Hrs  T(C): 36.7 (03-03-25 @ 08:17), Max: 36.7 (03-03-25 @ 08:17)  T(F): 98 (03-03-25 @ 08:17), Max: 98 (03-03-25 @ 08:17)  HR: --  BP: --  BP(mean): --  RR: 17 (03-03-25 @ 08:17) (17 - 18)  SpO2: --    Orthostatic VS  03-03-25 @ 08:17  Lying BP: --/-- HR: --  Sitting BP: --/-- HR: --  Standing BP: 124/78 HR: 99  Site: --  Mode: --    Lipid Panel: Date/Time: 10-18-24 @ 08:00  Cholesterol, Serum: 106  LDL Cholesterol Calculated: 53  HDL Cholesterol, Serum: 42  Total Cholesterol/HDL Ration Measurement: --  Triglycerides, Serum: 53

## 2025-03-03 NOTE — BH INPATIENT PSYCHIATRY PROGRESS NOTE - NSBHFUPINTERVALHXFT_PSY_A_CORE
Patient is followed up for NDD. Chart, medications and labs reviewed. Patient is discussed during morning brief, no overnight events, has been in good behavioral control.   Patient was seen and is evaluated on the unit. She continues to exhibit childlike behaviors, w/o intrusiveness towards staff/peers. She continues to present with bright affect, discusses how she was able to see group home for virtual visit last week and will be going for visit in-person tomorrow. Does express excitement in being able to visit group home w/ some mild anxiety. She has been compliant with standing medications, denies SE. No acute medical concerns, VSS.

## 2025-03-03 NOTE — BH INPATIENT PSYCHIATRY PROGRESS NOTE - NSBHCHARTREVIEWVS_PSY_A_CORE FT
Vital Signs Last 24 Hrs  T(C): 36.7 (03-03-25 @ 08:17), Max: 36.7 (03-03-25 @ 08:17)  T(F): 98 (03-03-25 @ 08:17), Max: 98 (03-03-25 @ 08:17)  HR: --  BP: --  BP(mean): --  RR: 17 (03-03-25 @ 08:17) (17 - 18)  SpO2: --    Orthostatic VS  03-03-25 @ 08:17  Lying BP: --/-- HR: --  Sitting BP: --/-- HR: --  Standing BP: 124/78 HR: 99  Site: --  Mode: --

## 2025-03-04 PROCEDURE — 99232 SBSQ HOSP IP/OBS MODERATE 35: CPT

## 2025-03-04 RX ADMIN — Medication 100 MILLIGRAM(S): at 20:29

## 2025-03-04 RX ADMIN — HYDROXYZINE HYDROCHLORIDE 100 MILLIGRAM(S): 25 TABLET, FILM COATED ORAL at 20:29

## 2025-03-04 RX ADMIN — OLANZAPINE 15 MILLIGRAM(S): 10 TABLET ORAL at 20:29

## 2025-03-04 NOTE — BH INPATIENT PSYCHIATRY PROGRESS NOTE - NSBHMETABOLIC_PSY_ALL_CORE_FT
BMI: BMI (kg/m2): 29.1 (01-25-25 @ 16:22)  HbA1c: A1C with Estimated Average Glucose Result: 6.1 % (10-18-24 @ 08:00)    Glucose: POCT Blood Glucose.: 89 mg/dL (01-12-25 @ 20:10)    BP: --Vital Signs Last 24 Hrs  T(C): 36.6 (03-04-25 @ 15:30), Max: 36.6 (03-04-25 @ 15:30)  T(F): 97.8 (03-04-25 @ 15:30), Max: 97.8 (03-04-25 @ 15:30)  HR: --  BP: --  BP(mean): --  RR: 18 (03-04-25 @ 07:58) (18 - 18)  SpO2: --    Orthostatic VS  03-04-25 @ 15:30  Lying BP: --/-- HR: --  Sitting BP: 99/73 HR: 111  Standing BP: 112/85 HR: 114  Site: --  Mode: --  Orthostatic VS  03-03-25 @ 08:17  Lying BP: --/-- HR: --  Sitting BP: --/-- HR: --  Standing BP: 124/78 HR: 99  Site: --  Mode: --    Lipid Panel: Date/Time: 10-18-24 @ 08:00  Cholesterol, Serum: 106  LDL Cholesterol Calculated: 53  HDL Cholesterol, Serum: 42  Total Cholesterol/HDL Ration Measurement: --  Triglycerides, Serum: 53

## 2025-03-04 NOTE — BH INPATIENT PSYCHIATRY PROGRESS NOTE - NSBHCHARTREVIEWVS_PSY_A_CORE FT
Vital Signs Last 24 Hrs  T(C): 36.6 (03-04-25 @ 15:30), Max: 36.6 (03-04-25 @ 15:30)  T(F): 97.8 (03-04-25 @ 15:30), Max: 97.8 (03-04-25 @ 15:30)  HR: --  BP: --  BP(mean): --  RR: 18 (03-04-25 @ 07:58) (18 - 18)  SpO2: --    Orthostatic VS  03-04-25 @ 15:30  Lying BP: --/-- HR: --  Sitting BP: 99/73 HR: 111  Standing BP: 112/85 HR: 114  Site: --  Mode: --  Orthostatic VS  03-03-25 @ 08:17  Lying BP: --/-- HR: --  Sitting BP: --/-- HR: --  Standing BP: 124/78 HR: 99  Site: --  Mode: --
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
## 2025-03-04 NOTE — BH INPATIENT PSYCHIATRY PROGRESS NOTE - NSBHFUPINTERVALHXFT_PSY_A_CORE
Patient is followed up for NDD. Chart, medications and labs reviewed. Patient is discussed during morning brief, no overnight events, has been in good behavioral control.   Patient was seen and is evaluated on the unit. She continues to exhibit childlike behaviors, w/o intrusiveness towards staff/peers. She continues to present with bright affect, was able to go for in-person visit to group home this morning and returned very cheerful. She has been compliant with standing medications, denies SE. No acute medical concerns, VSS.

## 2025-03-05 PROCEDURE — 99232 SBSQ HOSP IP/OBS MODERATE 35: CPT

## 2025-03-05 RX ADMIN — Medication 100 MILLIGRAM(S): at 20:36

## 2025-03-05 RX ADMIN — OLANZAPINE 15 MILLIGRAM(S): 10 TABLET ORAL at 20:36

## 2025-03-05 RX ADMIN — HYDROXYZINE HYDROCHLORIDE 100 MILLIGRAM(S): 25 TABLET, FILM COATED ORAL at 20:36

## 2025-03-05 NOTE — BH INPATIENT PSYCHIATRY PROGRESS NOTE - NSBHCHARTREVIEWVS_PSY_A_CORE FT
Vital Signs Last 24 Hrs  T(C): 36.4 (03-04-25 @ 19:37), Max: 36.6 (03-04-25 @ 15:30)  T(F): 97.6 (03-04-25 @ 19:37), Max: 97.8 (03-04-25 @ 15:30)  HR: --  BP: --  BP(mean): --  RR: 17 (03-05-25 @ 07:41) (16 - 17)  SpO2: --    Orthostatic VS  03-04-25 @ 19:37  Lying BP: --/-- HR: --  Sitting BP: 124/81 HR: 101  Standing BP: 131/87 HR: 103  Site: --  Mode: --  Orthostatic VS  03-04-25 @ 15:30  Lying BP: --/-- HR: --  Sitting BP: 99/73 HR: 111  Standing BP: 112/85 HR: 114  Site: --  Mode: --

## 2025-03-05 NOTE — BH INPATIENT PSYCHIATRY PROGRESS NOTE - NSBHMETABOLIC_PSY_ALL_CORE_FT
BMI: BMI (kg/m2): 29.1 (01-25-25 @ 16:22)  HbA1c: A1C with Estimated Average Glucose Result: 6.1 % (10-18-24 @ 08:00)    Glucose: POCT Blood Glucose.: 89 mg/dL (01-12-25 @ 20:10)    BP: --Vital Signs Last 24 Hrs  T(C): 36.4 (03-04-25 @ 19:37), Max: 36.6 (03-04-25 @ 15:30)  T(F): 97.6 (03-04-25 @ 19:37), Max: 97.8 (03-04-25 @ 15:30)  HR: --  BP: --  BP(mean): --  RR: 17 (03-05-25 @ 07:41) (16 - 17)  SpO2: --    Orthostatic VS  03-04-25 @ 19:37  Lying BP: --/-- HR: --  Sitting BP: 124/81 HR: 101  Standing BP: 131/87 HR: 103  Site: --  Mode: --  Orthostatic VS  03-04-25 @ 15:30  Lying BP: --/-- HR: --  Sitting BP: 99/73 HR: 111  Standing BP: 112/85 HR: 114  Site: --  Mode: --    Lipid Panel: Date/Time: 10-18-24 @ 08:00  Cholesterol, Serum: 106  LDL Cholesterol Calculated: 53  HDL Cholesterol, Serum: 42  Total Cholesterol/HDL Ration Measurement: --  Triglycerides, Serum: 53
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
## 2025-03-05 NOTE — BH INPATIENT PSYCHIATRY PROGRESS NOTE - NSBHFUPINTERVALHXFT_PSY_A_CORE
Patient is followed up for NDD. Chart, medications and labs reviewed. Patient is discussed during morning brief, no overnight events, has been in good behavioral control.   Patient was seen and is evaluated on the unit. She continues to exhibit childlike behaviors, w/o intrusiveness towards staff/peers. She presents with bright affect, remains cheerful regarding visit to group home yesterday. Writer discussed plan for 2nd visit scheduled on Friday 3/7. She has been compliant with standing medications, denies SE. No acute medical concerns, VSS.

## 2025-03-06 PROCEDURE — 99232 SBSQ HOSP IP/OBS MODERATE 35: CPT

## 2025-03-06 RX ADMIN — HYDROXYZINE HYDROCHLORIDE 100 MILLIGRAM(S): 25 TABLET, FILM COATED ORAL at 21:06

## 2025-03-06 RX ADMIN — Medication 100 MILLIGRAM(S): at 21:06

## 2025-03-06 RX ADMIN — OLANZAPINE 15 MILLIGRAM(S): 10 TABLET ORAL at 21:06

## 2025-03-06 NOTE — BH INPATIENT PSYCHIATRY PROGRESS NOTE - NSBHMETABOLIC_PSY_ALL_CORE_FT
BMI: BMI (kg/m2): 29.1 (01-25-25 @ 16:22)  HbA1c: A1C with Estimated Average Glucose Result: 6.1 % (10-18-24 @ 08:00)    Glucose: POCT Blood Glucose.: 89 mg/dL (01-12-25 @ 20:10)    BP: --Vital Signs Last 24 Hrs  T(C): --  T(F): --  HR: --  BP: --  BP(mean): --  RR: 16 (03-06-25 @ 07:05) (16 - 16)  SpO2: --    Orthostatic VS  03-04-25 @ 19:37  Lying BP: --/-- HR: --  Sitting BP: 124/81 HR: 101  Standing BP: 131/87 HR: 103  Site: --  Mode: --  Orthostatic VS  03-04-25 @ 15:30  Lying BP: --/-- HR: --  Sitting BP: 99/73 HR: 111  Standing BP: 112/85 HR: 114  Site: --  Mode: --    Lipid Panel: Date/Time: 10-18-24 @ 08:00  Cholesterol, Serum: 106  LDL Cholesterol Calculated: 53  HDL Cholesterol, Serum: 42  Total Cholesterol/HDL Ration Measurement: --  Triglycerides, Serum: 53

## 2025-03-06 NOTE — BH INPATIENT PSYCHIATRY PROGRESS NOTE - NSBHCHARTREVIEWVS_PSY_A_CORE FT
Vital Signs Last 24 Hrs  T(C): --  T(F): --  HR: --  BP: --  BP(mean): --  RR: 16 (03-06-25 @ 07:05) (16 - 16)  SpO2: --    Orthostatic VS  03-04-25 @ 19:37  Lying BP: --/-- HR: --  Sitting BP: 124/81 HR: 101  Standing BP: 131/87 HR: 103  Site: --  Mode: --  Orthostatic VS  03-04-25 @ 15:30  Lying BP: --/-- HR: --  Sitting BP: 99/73 HR: 111  Standing BP: 112/85 HR: 114  Site: --  Mode: --

## 2025-03-06 NOTE — BH INPATIENT PSYCHIATRY PROGRESS NOTE - NSBHFUPINTERVALHXFT_PSY_A_CORE
Patient is followed up for NDD. Chart, medications and labs reviewed. Patient is discussed during morning brief, no overnight events, has been in good behavioral control.   Patient was seen and is evaluated on the unit. She continues to exhibit childlike behaviors, w/o intrusiveness towards staff/peers. She presents with bright affect, remains in good mood and discusses moving out of the hospital. She is scheduled for group home visit tomorrow evening. She has been compliant with standing medications, denies SE. No acute medical concerns, VSS.
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
## 2025-03-07 LAB
ALBUMIN SERPL ELPH-MCNC: 4 G/DL — SIGNIFICANT CHANGE UP (ref 3.3–5)
ALP SERPL-CCNC: 74 U/L — SIGNIFICANT CHANGE UP (ref 40–120)
ALT FLD-CCNC: 32 U/L — SIGNIFICANT CHANGE UP (ref 4–33)
ANION GAP SERPL CALC-SCNC: 12 MMOL/L — SIGNIFICANT CHANGE UP (ref 7–14)
AST SERPL-CCNC: 27 U/L — SIGNIFICANT CHANGE UP (ref 4–32)
BASOPHILS # BLD AUTO: 0.04 K/UL — SIGNIFICANT CHANGE UP (ref 0–0.2)
BASOPHILS NFR BLD AUTO: 0.9 % — SIGNIFICANT CHANGE UP (ref 0–2)
BILIRUB SERPL-MCNC: <0.2 MG/DL — SIGNIFICANT CHANGE UP (ref 0.2–1.2)
BUN SERPL-MCNC: 14 MG/DL — SIGNIFICANT CHANGE UP (ref 7–23)
CALCIUM SERPL-MCNC: 9.1 MG/DL — SIGNIFICANT CHANGE UP (ref 8.4–10.5)
CHLORIDE SERPL-SCNC: 106 MMOL/L — SIGNIFICANT CHANGE UP (ref 98–107)
CO2 SERPL-SCNC: 23 MMOL/L — SIGNIFICANT CHANGE UP (ref 22–31)
CREAT SERPL-MCNC: 0.7 MG/DL — SIGNIFICANT CHANGE UP (ref 0.5–1.3)
EGFR: 113 ML/MIN/1.73M2 — SIGNIFICANT CHANGE UP
EGFR: 113 ML/MIN/1.73M2 — SIGNIFICANT CHANGE UP
EOSINOPHIL # BLD AUTO: 0.08 K/UL — SIGNIFICANT CHANGE UP (ref 0–0.5)
EOSINOPHIL NFR BLD AUTO: 1.7 % — SIGNIFICANT CHANGE UP (ref 0–6)
GLUCOSE SERPL-MCNC: 85 MG/DL — SIGNIFICANT CHANGE UP (ref 70–99)
HCT VFR BLD CALC: 36.9 % — SIGNIFICANT CHANGE UP (ref 34.5–45)
HGB BLD-MCNC: 11.4 G/DL — LOW (ref 11.5–15.5)
IANC: 2.13 K/UL — SIGNIFICANT CHANGE UP (ref 1.8–7.4)
IMM GRANULOCYTES NFR BLD AUTO: 0.2 % — SIGNIFICANT CHANGE UP (ref 0–0.9)
LYMPHOCYTES # BLD AUTO: 1.97 K/UL — SIGNIFICANT CHANGE UP (ref 1–3.3)
LYMPHOCYTES # BLD AUTO: 42.2 % — SIGNIFICANT CHANGE UP (ref 13–44)
MCHC RBC-ENTMCNC: 23.3 PG — LOW (ref 27–34)
MCHC RBC-ENTMCNC: 30.9 G/DL — LOW (ref 32–36)
MCV RBC AUTO: 75.3 FL — LOW (ref 80–100)
MONOCYTES # BLD AUTO: 0.44 K/UL — SIGNIFICANT CHANGE UP (ref 0–0.9)
MONOCYTES NFR BLD AUTO: 9.4 % — SIGNIFICANT CHANGE UP (ref 2–14)
NEUTROPHILS # BLD AUTO: 2.13 K/UL — SIGNIFICANT CHANGE UP (ref 1.8–7.4)
NEUTROPHILS NFR BLD AUTO: 45.6 % — SIGNIFICANT CHANGE UP (ref 43–77)
NRBC # BLD AUTO: 0 K/UL — SIGNIFICANT CHANGE UP (ref 0–0)
NRBC # FLD: 0 K/UL — SIGNIFICANT CHANGE UP (ref 0–0)
NRBC BLD AUTO-RTO: 0 /100 WBCS — SIGNIFICANT CHANGE UP (ref 0–0)
PLATELET # BLD AUTO: 250 K/UL — SIGNIFICANT CHANGE UP (ref 150–400)
POTASSIUM SERPL-MCNC: 3.8 MMOL/L — SIGNIFICANT CHANGE UP (ref 3.5–5.3)
POTASSIUM SERPL-SCNC: 3.8 MMOL/L — SIGNIFICANT CHANGE UP (ref 3.5–5.3)
PROT SERPL-MCNC: 8.1 G/DL — SIGNIFICANT CHANGE UP (ref 6–8.3)
RBC # BLD: 4.9 M/UL — SIGNIFICANT CHANGE UP (ref 3.8–5.2)
RBC # FLD: 16.6 % — HIGH (ref 10.3–14.5)
SARS-COV-2 RNA SPEC QL NAA+PROBE: SIGNIFICANT CHANGE UP
SODIUM SERPL-SCNC: 141 MMOL/L — SIGNIFICANT CHANGE UP (ref 135–145)
WBC # BLD: 4.67 K/UL — SIGNIFICANT CHANGE UP (ref 3.8–10.5)
WBC # FLD AUTO: 4.67 K/UL — SIGNIFICANT CHANGE UP (ref 3.8–10.5)

## 2025-03-07 PROCEDURE — 99232 SBSQ HOSP IP/OBS MODERATE 35: CPT

## 2025-03-07 RX ADMIN — OLANZAPINE 15 MILLIGRAM(S): 10 TABLET ORAL at 20:48

## 2025-03-07 RX ADMIN — HYDROXYZINE HYDROCHLORIDE 100 MILLIGRAM(S): 25 TABLET, FILM COATED ORAL at 20:48

## 2025-03-07 RX ADMIN — Medication 100 MILLIGRAM(S): at 20:48
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
## 2025-03-07 NOTE — BH INPATIENT PSYCHIATRY PROGRESS NOTE - NSBHCHARTREVIEWVS_PSY_A_CORE FT
Vital Signs Last 24 Hrs  T(C): --  T(F): --  HR: --  BP: --  BP(mean): --  RR: 16 (03-07-25 @ 07:47) (16 - 16)  SpO2: --

## 2025-03-07 NOTE — BH INPATIENT PSYCHIATRY PROGRESS NOTE - NSBHFUPINTERVALHXFT_PSY_A_CORE
Patient is followed up for NDD. Chart, medications and labs reviewed. Patient is discussed during morning brief, no overnight events, has been in good behavioral control.   Patient was seen and is evaluated on the unit. She continues to exhibit childlike behaviors, w/o intrusiveness towards staff/peers. She presents with bright affect, remains in good mood and discusses moving out of the hospital. She is scheduled for group home visit this evening. She has been compliant with standing medications, denies SE. No acute medical concerns, VSS. Patient is followed up for NDD. Chart, medications and labs reviewed. Patient is discussed during morning brief, no overnight events, has been in good behavioral control.   Patient was seen and is evaluated on the unit. She continues to exhibit childlike behaviors, w/o intrusiveness towards staff/peers. She presents with bright affect, remains in good mood and discusses moving out of the hospital. She is scheduled for group home visit this evening. She has been compliant with standing medications, denies SE. Was able to have labs drawn today AM. No acute medical concerns, VSS.

## 2025-03-07 NOTE — BH INPATIENT PSYCHIATRY PROGRESS NOTE - NSBHMETABOLIC_PSY_ALL_CORE_FT
BMI: BMI (kg/m2): 29.1 (01-25-25 @ 16:22)  HbA1c: A1C with Estimated Average Glucose Result: 6.1 % (10-18-24 @ 08:00)    Glucose: POCT Blood Glucose.: 89 mg/dL (01-12-25 @ 20:10)    BP: --Vital Signs Last 24 Hrs  T(C): --  T(F): --  HR: --  BP: --  BP(mean): --  RR: 16 (03-07-25 @ 07:47) (16 - 16)  SpO2: --      Lipid Panel: Date/Time: 10-18-24 @ 08:00  Cholesterol, Serum: 106  LDL Cholesterol Calculated: 53  HDL Cholesterol, Serum: 42  Total Cholesterol/HDL Ration Measurement: --  Triglycerides, Serum: 53

## 2025-03-07 NOTE — BH INPATIENT PSYCHIATRY PROGRESS NOTE - NSBHATTESTATTENDBILLTIME2_PSY_A_CORE
I have reviewed and verified the documentation.

## 2025-03-07 NOTE — BH INPATIENT PSYCHIATRY PROGRESS NOTE - NSBHATTESTATTENDBILLTIME_PSY_A_CORE
I independently performed the documented

## 2025-03-08 LAB
HAV IGM SER-ACNC: SIGNIFICANT CHANGE UP
HBV CORE IGM SER-ACNC: SIGNIFICANT CHANGE UP
HBV SURFACE AG SER-ACNC: SIGNIFICANT CHANGE UP
HCV AB S/CO SERPL IA: 0.09 S/CO — SIGNIFICANT CHANGE UP (ref 0–0.79)
HCV AB SERPL-IMP: SIGNIFICANT CHANGE UP
MEV IGG SER-ACNC: >300 AU/ML — SIGNIFICANT CHANGE UP
MEV IGG+IGM SER-IMP: POSITIVE — SIGNIFICANT CHANGE UP
MUV AB SER-ACNC: POSITIVE — SIGNIFICANT CHANGE UP
MUV IGG FLD-ACNC: 37.1 AU/ML — SIGNIFICANT CHANGE UP
RUBV IGG SER-ACNC: 3.88 INDEX — SIGNIFICANT CHANGE UP
RUBV IGG SER-IMP: POSITIVE — SIGNIFICANT CHANGE UP

## 2025-03-08 RX ADMIN — HYDROXYZINE HYDROCHLORIDE 100 MILLIGRAM(S): 25 TABLET, FILM COATED ORAL at 21:00

## 2025-03-09 RX ADMIN — OLANZAPINE 15 MILLIGRAM(S): 10 TABLET ORAL at 20:13

## 2025-03-09 RX ADMIN — Medication 100 MILLIGRAM(S): at 20:13

## 2025-03-09 RX ADMIN — HYDROXYZINE HYDROCHLORIDE 100 MILLIGRAM(S): 25 TABLET, FILM COATED ORAL at 20:13

## 2025-03-10 PROCEDURE — 99232 SBSQ HOSP IP/OBS MODERATE 35: CPT

## 2025-03-10 RX ADMIN — OLANZAPINE 15 MILLIGRAM(S): 10 TABLET ORAL at 20:44

## 2025-03-10 RX ADMIN — Medication 100 MILLIGRAM(S): at 20:44

## 2025-03-10 RX ADMIN — HYDROXYZINE HYDROCHLORIDE 100 MILLIGRAM(S): 25 TABLET, FILM COATED ORAL at 20:44

## 2025-03-10 NOTE — BH INPATIENT PSYCHIATRY PROGRESS NOTE - NSBHCHARTREVIEWVS_PSY_A_CORE FT
Vital Signs Last 24 Hrs  T(C): --  T(F): --  HR: --  BP: --  BP(mean): --  RR: --  SpO2: --    Orthostatic VS  03-08-25 @ 19:23  Lying BP: --/-- HR: --  Sitting BP: --/-- HR: --  Standing BP: 113/83 HR: 102  Site: upper left arm  Mode: electronic

## 2025-03-10 NOTE — BH INPATIENT PSYCHIATRY PROGRESS NOTE - NSBHMETABOLIC_PSY_ALL_CORE_FT
BMI: BMI (kg/m2): 29.1 (01-25-25 @ 16:22)  HbA1c: A1C with Estimated Average Glucose Result: 6.1 % (10-18-24 @ 08:00)    Glucose: POCT Blood Glucose.: 89 mg/dL (01-12-25 @ 20:10)    BP: --Vital Signs Last 24 Hrs  T(C): --  T(F): --  HR: --  BP: --  BP(mean): --  RR: --  SpO2: --    Orthostatic VS  03-08-25 @ 19:23  Lying BP: --/-- HR: --  Sitting BP: --/-- HR: --  Standing BP: 113/83 HR: 102  Site: upper left arm  Mode: electronic    Lipid Panel: Date/Time: 10-18-24 @ 08:00  Cholesterol, Serum: 106  LDL Cholesterol Calculated: 53  HDL Cholesterol, Serum: 42  Total Cholesterol/HDL Ration Measurement: --  Triglycerides, Serum: 53

## 2025-03-10 NOTE — BH INPATIENT PSYCHIATRY PROGRESS NOTE - NSBHFUPINTERVALHXFT_PSY_A_CORE
Patient is followed up for NDD. Chart, medications and labs reviewed. Patient is discussed during morning brief, no overnight events, has been in good behavioral control.   Patient was seen and is evaluated on the unit. She continues to exhibit childlike behaviors, w/o intrusiveness towards staff/peers. She presents with bright affect, says that she enjoyed her group home visit this past Friday and was able to meet with peers. She has been compliant with standing medications, denies SE. No acute medical concerns, VSS.
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
## 2025-03-11 PROCEDURE — 99232 SBSQ HOSP IP/OBS MODERATE 35: CPT

## 2025-03-11 PROCEDURE — 90832 PSYTX W PT 30 MINUTES: CPT

## 2025-03-11 RX ADMIN — HYDROXYZINE HYDROCHLORIDE 100 MILLIGRAM(S): 25 TABLET, FILM COATED ORAL at 20:16

## 2025-03-11 RX ADMIN — OLANZAPINE 15 MILLIGRAM(S): 10 TABLET ORAL at 20:16

## 2025-03-11 RX ADMIN — Medication 100 MILLIGRAM(S): at 20:16

## 2025-03-11 NOTE — BH PSYCHOLOGY - CLINICIAN PSYCHOTHERAPY NOTE - NSTXIMPULSDATEEST_PSY_ALL_CORE
17-Apr-2024
21-Feb-2024
17-Apr-2024
04-Dec-2024
09-Feb-2024
17-Apr-2024
12-Feb-2024
19-Feb-2025
20-Mar-2024
27-Nov-2024
05-Mar-2025
17-Apr-2024
21-Feb-2024
17-Apr-2024
20-Nov-2024
12-Feb-2024
17-Apr-2024
25-Dec-2024
09-Feb-2024
17-Apr-2024
29-Jan-2025
17-Apr-2024
09-Feb-2024
17-Apr-2024

## 2025-03-11 NOTE — BH PSYCHOLOGY - CLINICIAN PSYCHOTHERAPY NOTE - NSTXVIOLNTGOAL_PSY_ALL_CORE
Will be able to express understanding of at least one trigger to their aggressive behavior
Will identify 2 situations that cause an aggressive response
Will be able to express understanding of at least one trigger to their aggressive behavior
Will identify 2 situations that cause an aggressive response
Will be able to express understanding of at least one trigger to their aggressive behavior
Will identify 2 situations that cause an aggressive response
Will decrease the number/duration/intensity of angry/aggressive outbursts
Will be able to express understanding of at least one trigger to their aggressive behavior
Will identify 2 situations that cause an aggressive response
Will be able to express understanding of at least one trigger to their aggressive behavior
Will decrease the number/duration/intensity of angry/aggressive outbursts
Will be able to express understanding of at least one trigger to their aggressive behavior

## 2025-03-11 NOTE — BH PSYCHOLOGY - CLINICIAN PSYCHOTHERAPY NOTE - NSTXIMPULSDATETRGT_PSY_ALL_CORE
06-Jun-2024
17-Oct-2024
08-Jan-2025
16-Feb-2024
17-Oct-2024
28-Aug-2024
01-May-2024
16-Feb-2024
01-May-2024
04-Jul-2024
04-Jul-2024
02-Oct-2024
01-May-2024
06-Mar-2024
08-Aug-2024
06-Jun-2024
28-Aug-2024
16-Feb-2024
17-Oct-2024
20-Nov-2024
27-Nov-2024
18-Dec-2024
23-May-2024
31-Jul-2024
06-Jun-2024
04-Dec-2024
08-Aug-2024
17-Oct-2024
23-May-2024
19-Sep-2024
10-Apr-2024
04-Jul-2024
05-Feb-2025
06-Jun-2024
23-May-2024
25-Jun-2024
11-Sep-2024
26-Feb-2025
07-Mar-2024
12-Mar-2025
17-Oct-2024
06-Jun-2024
11-Dec-2024

## 2025-03-11 NOTE — BH PSYCHOLOGY - CLINICIAN PSYCHOTHERAPY NOTE - NSTXIMPULSPROGRES_PSY_ALL_CORE
Improving
No Change
Improving
No Change
Improving
No Change
Improving
Improving
No Change
Improving
Improving
No Change
No Change
Improving
No Change
Improving

## 2025-03-11 NOTE — BH PSYCHOLOGY - CLINICIAN PSYCHOTHERAPY NOTE - NSTXVIOLNTDATETRGT_PSY_ALL_CORE
19-Sep-2024
04-Jul-2024
12-Mar-2025
24-Jul-2024
01-May-2024
08-Aug-2024
16-Feb-2024
23-May-2024
16-Feb-2024
17-Oct-2024
06-Jun-2024
06-Jun-2024
28-Aug-2024
06-Jun-2024
06-Jun-2024
17-Oct-2024
02-Oct-2024
11-Dec-2024
01-May-2024
01-May-2024
07-Mar-2024
18-Dec-2024
17-Oct-2024
23-May-2024
04-Jul-2024
28-Aug-2024
04-Jul-2024
05-Feb-2025
16-Feb-2024
23-May-2024
08-Aug-2024
06-Jun-2024
25-Jun-2024
20-Nov-2024
28-Aug-2024
10-Apr-2024
17-Oct-2024
20-Nov-2024

## 2025-03-11 NOTE — BH INPATIENT PSYCHIATRY PROGRESS NOTE - NSBHMETABOLIC_PSY_ALL_CORE_FT
BMI: BMI (kg/m2): 29.1 (01-25-25 @ 16:22)  HbA1c: A1C with Estimated Average Glucose Result: 6.1 % (10-18-24 @ 08:00)    Glucose: POCT Blood Glucose.: 89 mg/dL (01-12-25 @ 20:10)    BP: --Vital Signs Last 24 Hrs  T(C): --  T(F): --  HR: --  BP: --  BP(mean): --  RR: 16 (03-11-25 @ 08:08) (16 - 16)  SpO2: --      Lipid Panel: Date/Time: 10-18-24 @ 08:00  Cholesterol, Serum: 106  LDL Cholesterol Calculated: 53  HDL Cholesterol, Serum: 42  Total Cholesterol/HDL Ration Measurement: --  Triglycerides, Serum: 53

## 2025-03-11 NOTE — BH PSYCHOLOGY - CLINICIAN PSYCHOTHERAPY NOTE - NSTXCONDUCDATEEST_PSY_ALL_CORE
09-Feb-2024
29-Jan-2025
17-Nov-2024
09-Feb-2024
10-Feb-2024
09-Feb-2024
17-Nov-2024
09-Feb-2024
23-Feb-2025
09-Feb-2024
10-Feb-2024
09-Feb-2024
09-Jun-2024
23-Feb-2025
09-Feb-2024
19-Feb-2024
10-Feb-2024
09-Feb-2024
17-Nov-2024
20-Nov-2024
09-Feb-2024
05-Feb-2024
09-Feb-2024

## 2025-03-11 NOTE — BH PSYCHOLOGY - CLINICIAN PSYCHOTHERAPY NOTE - NSTXSKINGOAL_PSY_ALL_CORE
Will identify signs/symptoms of infection and inform staff if these occur

## 2025-03-11 NOTE — BH PSYCHOLOGY - CLINICIAN PSYCHOTHERAPY NOTE - NSTXCONDUCGOALOTHER_PSY_ALL_CORE
Will attend 5 groups/week
will verbalized thoughts/feeing to staff when stressed
will verbalize thoughts/feeling to staff when stressed

## 2025-03-11 NOTE — BH PSYCHOLOGY - CLINICIAN PSYCHOTHERAPY NOTE - NSTXCONDUCDATETRGT_PSY_ALL_CORE
29-May-2024
12-Jun-2024
27-Oct-2024
23-May-2024
27-Nov-2024
18-Apr-2024
22-Dec-2024
05-Jun-2024
16-Mar-2025
03-Nov-2024
17-Feb-2024
05-Jun-2024
14-Jul-2024
29-May-2024
08-Dec-2024
22-Sep-2024
17-Feb-2024
18-Jun-2024
22-May-2024
11-Aug-2024
18-Aug-2024
08-May-2024
01-Mar-2024
11-Sep-2024
17-Feb-2024
28-Jul-2024
04-Jul-2024
07-Jul-2024
15-May-2024
20-Oct-2024
24-Jun-2024
01-Jul-2024
28-Aug-2024
05-Feb-2025
09-Mar-2025
08-Sep-2024
20-Oct-2024
01-Mar-2024
02-Oct-2024
08-Dec-2024

## 2025-03-11 NOTE — BH PSYCHOLOGY - CLINICIAN PSYCHOTHERAPY NOTE - NSTXPSYCHOGOAL_PSY_ALL_CORE
Will report using a mindfulness skill to be able to read 5 pages of a book daily despite hallucinations
Will be able to report experiencing hallucinations to staff
Will identify 2 coping skills that help mitigate hallucinations
Will be able to report experiencing hallucinations to staff
Will verbalize 1 strategy to successfully cope with psychotic symptoms
Will be able to report experiencing hallucinations to staff
Will report using a mindfulness skill to be able to read 5 pages of a book daily despite hallucinations
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
## 2025-03-11 NOTE — BH PSYCHOLOGY - CLINICIAN PSYCHOTHERAPY NOTE - NSTXIMPULSGOAL_PSY_ALL_CORE
Will be able to demonstrate the ability to pause before acting out negatively
Will report using a relaxation skill daily to delay screaming and touching others when upset
Will be able to demonstrate the ability to pause before acting out negatively
Will identify 1 thought that precedes an impulsive acts
Will be able to demonstrate the ability to pause before acting out negatively
Will identify 1 behavior to cope with impulsive urges
Will be able to demonstrate the ability to pause before acting out negatively
Will be able to recognize 2+ triggers
Will be able to demonstrate the ability to pause before acting out negatively
Will identify 1 thought that precedes an impulsive acts
Will be able to demonstrate the ability to pause before acting out negatively
Will be able to demonstrate the ability to pause before acting out negatively

## 2025-03-11 NOTE — BH PSYCHOLOGY - CLINICIAN PSYCHOTHERAPY NOTE - NSTXVIOLNTDATEEST_PSY_ALL_CORE
28-Mar-2024
05-Mar-2025
28-Mar-2024
09-Feb-2024
12-Feb-2024
28-Mar-2024
29-Jan-2025
28-Mar-2024
21-Feb-2024
28-Mar-2024
04-Dec-2024
09-Feb-2024
28-Mar-2024
09-Feb-2024
28-Mar-2024
12-Feb-2024
12-Feb-2024
28-Mar-2024
28-Mar-2024

## 2025-03-11 NOTE — BH PSYCHOLOGY - CLINICIAN PSYCHOTHERAPY NOTE - NSTXPROBCONDUC_PSY_ALL_CORE
CONDUCT PROBLEM

## 2025-03-11 NOTE — BH PSYCHOLOGY - CLINICIAN PSYCHOTHERAPY NOTE - NSTXVIOLNTPROGRES_PSY_ALL_CORE
No Change
Improving
Improving
Met - goal discontinued
No Change
Improving
Met - goal discontinued
Improving
Improving
Met - goal discontinued
Met - goal discontinued
Improving
Improving
Met - goal discontinued
No Change
Met - goal discontinued
Improving
Met - goal discontinued
Met - goal discontinued
Improving
Met - goal discontinued

## 2025-03-11 NOTE — BH PSYCHOLOGY - CLINICIAN PSYCHOTHERAPY NOTE - NSTXCONDUCGOAL_PSY_ALL_CORE
Will develop more adaptive coping strategies to manage stress
Will comply with unit rules
Other...
Will develop more adaptive coping strategies to manage stress
Other...
Other...
Will develop more adaptive coping strategies to manage stress
Other...
Will develop more adaptive coping strategies to manage stress
Other...
Will develop more adaptive coping strategies to manage stress
Will develop more adaptive coping strategies to manage stress

## 2025-03-11 NOTE — BH PSYCHOLOGY - CLINICIAN PSYCHOTHERAPY NOTE - NSTXPSYCHODATEEST_PSY_ALL_CORE
17-Apr-2024
20-Nov-2024
17-Apr-2024
17-Apr-2024
21-Feb-2024
17-Apr-2024
29-Jan-2025
17-Apr-2024
05-Mar-2025
17-Apr-2024
27-Nov-2024
17-Apr-2024
17-Apr-2024
09-Feb-2024
04-Dec-2024

## 2025-03-11 NOTE — BH PSYCHOLOGY - CLINICIAN PSYCHOTHERAPY NOTE - NSTXPROBSKIN_PSY_ALL_CORE
SKIN INTEGRITY, IMPAIRED/RISK FOR IMPAIRMENT

## 2025-03-11 NOTE — BH PSYCHOLOGY - CLINICIAN PSYCHOTHERAPY NOTE - NSTXPSYCHODATETRGT_PSY_ALL_CORE
06-Jun-2024
27-Nov-2024
19-Sep-2024
06-Jun-2024
11-Sep-2024
10-Oct-2024
10-Oct-2024
11-Dec-2024
23-May-2024
18-Dec-2024
06-Jun-2024
02-Oct-2024
10-Oct-2024
28-Aug-2024
04-Jul-2024
04-Jul-2024
05-Feb-2025
10-Oct-2024
06-Jun-2024
04-Jul-2024
06-Jun-2024
25-Jun-2024
27-Nov-2024
07-Mar-2024
12-Mar-2025

## 2025-03-11 NOTE — BH INPATIENT PSYCHIATRY PROGRESS NOTE - NSBHCHARTREVIEWVS_PSY_A_CORE FT
Vital Signs Last 24 Hrs  T(C): --  T(F): --  HR: --  BP: --  BP(mean): --  RR: 16 (03-11-25 @ 08:08) (16 - 16)  SpO2: --

## 2025-03-11 NOTE — BH INPATIENT PSYCHIATRY PROGRESS NOTE - NSBHFUPINTERVALHXFT_PSY_A_CORE
Patient is followed up for NDD. Chart, medications and labs reviewed. Patient is discussed during morning brief, no overnight events, has been in good behavioral control.   Patient was seen and is evaluated on the unit. She continues to exhibit childlike behaviors, w/o intrusiveness towards staff/peers. She presents with bright affect, inquires about discharge and pt informed that she was accepted to group home likely move out date of 4/1. She has been compliant with standing medications, denies SE. No acute medical concerns, VSS.

## 2025-03-11 NOTE — BH PSYCHOLOGY - CLINICIAN PSYCHOTHERAPY NOTE - NSTXPROBVIOLNT_PSY_ALL_CORE
VIOLENT/AGGRESSIVE BEHAVIOR

## 2025-03-11 NOTE — BH PSYCHOLOGY - CLINICIAN PSYCHOTHERAPY NOTE - NSTXPSYCHOPROGRES_PSY_ALL_CORE
Met - goal discontinued
Improving
Met - goal discontinued
Met - goal discontinued
Improving
Met - goal discontinued
Improving
Met - goal discontinued
Improving
Met - goal discontinued
Improving
Met - goal discontinued
Improving

## 2025-03-11 NOTE — BH PSYCHOLOGY - CLINICIAN PSYCHOTHERAPY NOTE - NSTXPROBIMPULS_PSY_ALL_CORE
IMPULSIVITY/AGITATION

## 2025-03-11 NOTE — BH PSYCHOLOGY - CLINICIAN PSYCHOTHERAPY NOTE - NSTXCONDUCPROGRES_PSY_ALL_CORE
No Change
No Change
Improving
No Change
Improving
Improving
No Change
Met - goal discontinued
Improving
No Change
Improving
Improving
Worsening
Improving
Improving
No Change
Improving
No Change
Improving
No Change
Improving
No Change
Improving
Improving

## 2025-03-12 PROCEDURE — 99232 SBSQ HOSP IP/OBS MODERATE 35: CPT

## 2025-03-12 RX ADMIN — Medication 100 MILLIGRAM(S): at 20:36

## 2025-03-12 RX ADMIN — HYDROXYZINE HYDROCHLORIDE 100 MILLIGRAM(S): 25 TABLET, FILM COATED ORAL at 20:36

## 2025-03-12 RX ADMIN — OLANZAPINE 15 MILLIGRAM(S): 10 TABLET ORAL at 20:36

## 2025-03-12 NOTE — BH INPATIENT PSYCHIATRY PROGRESS NOTE - NSBHMETABOLIC_PSY_ALL_CORE_FT
BMI: BMI (kg/m2): 29.1 (01-25-25 @ 16:22)  HbA1c: A1C with Estimated Average Glucose Result: 6.1 % (10-18-24 @ 08:00)    Glucose: POCT Blood Glucose.: 89 mg/dL (01-12-25 @ 20:10)    BP: --Vital Signs Last 24 Hrs  T(C): --  T(F): --  HR: --  BP: --  BP(mean): --  RR: 16 (03-12-25 @ 08:42) (16 - 16)  SpO2: --      Lipid Panel: Date/Time: 10-18-24 @ 08:00  Cholesterol, Serum: 106  LDL Cholesterol Calculated: 53  HDL Cholesterol, Serum: 42  Total Cholesterol/HDL Ration Measurement: --  Triglycerides, Serum: 53

## 2025-03-12 NOTE — BH INPATIENT PSYCHIATRY PROGRESS NOTE - NSBHFUPINTERVALHXFT_PSY_A_CORE
Patient is followed up for NDD. Chart, medications and labs reviewed. Patient is discussed during morning brief, no overnight events, has been in good behavioral control.   Patient was seen and is evaluated on the unit. She continues to exhibit childlike behaviors, w/o intrusiveness towards staff/peers. Noted with poor sleep overnight, discussed with pt who states that her room was "too hot". She presents with bright affect, understands that tentative discharge date is 4/1 to [facility]. She has been compliant with standing medications, denies SE. Was able to have QuantiFeron drawn today AM. No acute medical concerns, VSS.

## 2025-03-12 NOTE — BH INPATIENT PSYCHIATRY PROGRESS NOTE - CURRENT MEDICATION
MEDICATIONS  (STANDING):  hydrOXYzine hydrochloride 100 milliGRAM(s) Oral at bedtime  OLANZapine 15 milliGRAM(s) Oral at bedtime  traZODone 100 milliGRAM(s) Oral at bedtime    MEDICATIONS  (PRN):  acetaminophen     Tablet .. 650 milliGRAM(s) Oral every 6 hours PRN Temp greater or equal to 38C (100.4F), Mild Pain (1 - 3), Moderate Pain (4 - 6)  acetaminophen     Tablet .. 650 milliGRAM(s) Oral every 6 hours PRN Temp greater or equal to 38C (100.4F), Mild Pain (1 - 3)  diphenhydrAMINE 50 milliGRAM(s) Oral every 6 hours PRN Extrapyramidal symptoms or prophylaxis  diphenhydrAMINE Injectable 50 milliGRAM(s) IntraMuscular once PRN Extrapyramidal prophylaxis  haloperidol    Injectable 5 milliGRAM(s) IntraMuscular once PRN aggression
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
## 2025-03-12 NOTE — BH INPATIENT PSYCHIATRY PROGRESS NOTE - PRN MEDS
MEDICATIONS  (PRN):  acetaminophen     Tablet .. 650 milliGRAM(s) Oral every 6 hours PRN Temp greater or equal to 38C (100.4F), Mild Pain (1 - 3), Moderate Pain (4 - 6)  acetaminophen     Tablet .. 650 milliGRAM(s) Oral every 6 hours PRN Temp greater or equal to 38C (100.4F), Mild Pain (1 - 3)  diphenhydrAMINE 50 milliGRAM(s) Oral every 6 hours PRN Extrapyramidal symptoms or prophylaxis  diphenhydrAMINE Injectable 50 milliGRAM(s) IntraMuscular once PRN Extrapyramidal prophylaxis  haloperidol    Injectable 5 milliGRAM(s) IntraMuscular once PRN aggression

## 2025-03-12 NOTE — BH INPATIENT PSYCHIATRY PROGRESS NOTE - NSBHCHARTREVIEWVS_PSY_A_CORE FT
Vital Signs Last 24 Hrs  T(C): --  T(F): --  HR: --  BP: --  BP(mean): --  RR: 16 (03-12-25 @ 08:42) (16 - 16)  SpO2: --

## 2025-03-13 PROCEDURE — 99232 SBSQ HOSP IP/OBS MODERATE 35: CPT

## 2025-03-13 RX ADMIN — HYDROXYZINE HYDROCHLORIDE 100 MILLIGRAM(S): 25 TABLET, FILM COATED ORAL at 20:01

## 2025-03-13 RX ADMIN — OLANZAPINE 15 MILLIGRAM(S): 10 TABLET ORAL at 20:01

## 2025-03-13 RX ADMIN — Medication 100 MILLIGRAM(S): at 20:01

## 2025-03-13 NOTE — BH INPATIENT PSYCHIATRY PROGRESS NOTE - NSBHMETABOLIC_PSY_ALL_CORE_FT
BMI: BMI (kg/m2): 29.1 (01-25-25 @ 16:22)  HbA1c: A1C with Estimated Average Glucose Result: 6.1 % (10-18-24 @ 08:00)    Glucose: POCT Blood Glucose.: 89 mg/dL (01-12-25 @ 20:10)    BP: --Vital Signs Last 24 Hrs  T(C): --  T(F): --  HR: --  BP: --  BP(mean): --  RR: 16 (03-13-25 @ 08:22) (16 - 16)  SpO2: --      Lipid Panel: Date/Time: 10-18-24 @ 08:00  Cholesterol, Serum: 106  LDL Cholesterol Calculated: 53  HDL Cholesterol, Serum: 42  Total Cholesterol/HDL Ration Measurement: --  Triglycerides, Serum: 53
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
## 2025-03-13 NOTE — BH INPATIENT PSYCHIATRY PROGRESS NOTE - NSBHFUPINTERVALHXFT_PSY_A_CORE
Patient is followed up for NDD. Chart, medications and labs reviewed. Patient is discussed during morning brief, no overnight events, has been in good behavioral control.   Patient was seen and is evaluated on the unit. She continues to exhibit childlike behaviors, w/o intrusiveness towards staff/peers. Was able to sleep well overnight, slept for 9 hours per sleep log. She presents with bright affect, understands that tentative discharge date is 4/1 to [facility]. She has been compliant with standing medications, denies SE. No acute medical concerns, VSS.

## 2025-03-13 NOTE — BH INPATIENT PSYCHIATRY PROGRESS NOTE - NSBHCHARTREVIEWVS_PSY_A_CORE FT
Vital Signs Last 24 Hrs  T(C): --  T(F): --  HR: --  BP: --  BP(mean): --  RR: 16 (03-13-25 @ 08:22) (16 - 16)  SpO2: --

## 2025-03-14 PROCEDURE — 99232 SBSQ HOSP IP/OBS MODERATE 35: CPT

## 2025-03-14 RX ADMIN — HYDROXYZINE HYDROCHLORIDE 100 MILLIGRAM(S): 25 TABLET, FILM COATED ORAL at 20:10

## 2025-03-14 RX ADMIN — OLANZAPINE 15 MILLIGRAM(S): 10 TABLET ORAL at 20:10

## 2025-03-14 RX ADMIN — Medication 100 MILLIGRAM(S): at 20:11

## 2025-03-14 NOTE — BH INPATIENT PSYCHIATRY PROGRESS NOTE - NSBHMETABOLIC_PSY_ALL_CORE_FT
BMI: BMI (kg/m2): 29.1 (01-25-25 @ 16:22)  HbA1c: A1C with Estimated Average Glucose Result: 6.1 % (10-18-24 @ 08:00)    Glucose: POCT Blood Glucose.: 89 mg/dL (01-12-25 @ 20:10)    BP: --Vital Signs Last 24 Hrs  T(C): --  T(F): --  HR: --  BP: --  BP(mean): --  RR: 17 (03-13-25 @ 19:36) (17 - 17)  SpO2: --      Lipid Panel: Date/Time: 10-18-24 @ 08:00  Cholesterol, Serum: 106  LDL Cholesterol Calculated: 53  HDL Cholesterol, Serum: 42  Total Cholesterol/HDL Ration Measurement: --  Triglycerides, Serum: 53

## 2025-03-14 NOTE — BH INPATIENT PSYCHIATRY PROGRESS NOTE - NSBHFUPINTERVALHXFT_PSY_A_CORE
Patient is followed up for NDD. Chart, medications and labs reviewed. Patient is discussed during morning brief, no overnight events, has been in good behavioral control.   Patient was seen and is evaluated on the unit. She continues to exhibit childlike behaviors, w/o intrusiveness towards staff/peers. She presents with bright affect, understands that tentative discharge date is 4/1 to [facility]. She states that she is doing "good" and was able to shower this morning. She has been compliant with standing medications, denies SE. No acute medical concerns, VSS.

## 2025-03-14 NOTE — BH INPATIENT PSYCHIATRY PROGRESS NOTE - NSBHCHARTREVIEWVS_PSY_A_CORE FT
Vital Signs Last 24 Hrs  T(C): --  T(F): --  HR: --  BP: --  BP(mean): --  RR: 17 (03-13-25 @ 19:36) (17 - 17)  SpO2: --
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
## 2025-03-15 RX ADMIN — OLANZAPINE 15 MILLIGRAM(S): 10 TABLET ORAL at 20:48

## 2025-03-15 RX ADMIN — HYDROXYZINE HYDROCHLORIDE 100 MILLIGRAM(S): 25 TABLET, FILM COATED ORAL at 20:48

## 2025-03-15 RX ADMIN — Medication 100 MILLIGRAM(S): at 20:48

## 2025-03-16 LAB
GAMMA INTERFERON BACKGROUND BLD IA-ACNC: 0.03 IU/ML — SIGNIFICANT CHANGE UP
M TB IFN-G BLD-IMP: NEGATIVE — SIGNIFICANT CHANGE UP
M TB IFN-G CD4+ BCKGRND COR BLD-ACNC: 0 IU/ML — SIGNIFICANT CHANGE UP
M TB IFN-G CD4+CD8+ BCKGRND COR BLD-ACNC: 0 IU/ML — SIGNIFICANT CHANGE UP
QUANT TB PLUS MITOGEN MINUS NIL: >10 IU/ML — SIGNIFICANT CHANGE UP

## 2025-03-16 RX ADMIN — HYDROXYZINE HYDROCHLORIDE 100 MILLIGRAM(S): 25 TABLET, FILM COATED ORAL at 20:23

## 2025-03-16 RX ADMIN — Medication 100 MILLIGRAM(S): at 20:24

## 2025-03-16 RX ADMIN — OLANZAPINE 15 MILLIGRAM(S): 10 TABLET ORAL at 20:24

## 2025-03-17 PROCEDURE — 99232 SBSQ HOSP IP/OBS MODERATE 35: CPT

## 2025-03-17 RX ADMIN — OLANZAPINE 15 MILLIGRAM(S): 10 TABLET ORAL at 20:43

## 2025-03-17 RX ADMIN — HYDROXYZINE HYDROCHLORIDE 100 MILLIGRAM(S): 25 TABLET, FILM COATED ORAL at 20:42

## 2025-03-17 RX ADMIN — Medication 100 MILLIGRAM(S): at 20:42

## 2025-03-17 NOTE — BH INPATIENT PSYCHIATRY PROGRESS NOTE - NSBHMETABOLIC_PSY_ALL_CORE_FT
BMI: BMI (kg/m2): 29.1 (01-25-25 @ 16:22)  HbA1c: A1C with Estimated Average Glucose Result: 6.1 % (10-18-24 @ 08:00)    Glucose: POCT Blood Glucose.: 89 mg/dL (01-12-25 @ 20:10)    BP: --Vital Signs Last 24 Hrs  T(C): --  T(F): --  HR: --  BP: --  BP(mean): --  RR: 17 (03-17-25 @ 07:22) (17 - 17)  SpO2: --      Lipid Panel: Date/Time: 10-18-24 @ 08:00  Cholesterol, Serum: 106  LDL Cholesterol Calculated: 53  HDL Cholesterol, Serum: 42  Total Cholesterol/HDL Ration Measurement: --  Triglycerides, Serum: 53
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
## 2025-03-17 NOTE — BH INPATIENT PSYCHIATRY PROGRESS NOTE - NSBHFUPINTERVALHXFT_PSY_A_CORE
Patient is followed up for NDD. Chart, medications and labs reviewed. Patient is discussed during morning brief, no overnight events, has been in good behavioral control.   Patient was seen and is evaluated on the unit. She continues to exhibit childlike behaviors, w/o intrusiveness towards staff/peers. She presents with bright affect, understands that tentative discharge date is 4/1 to [facility] [place]. Asks writer what time taxi will pick her up from the hospital. She has been compliant with standing medications, denies SE. No acute medical concerns, VSS.

## 2025-03-17 NOTE — BH INPATIENT PSYCHIATRY PROGRESS NOTE - NSBHCHARTREVIEWVS_PSY_A_CORE FT
Vital Signs Last 24 Hrs  T(C): --  T(F): --  HR: --  BP: --  BP(mean): --  RR: 17 (03-17-25 @ 07:22) (17 - 17)  SpO2: --

## 2025-03-18 PROCEDURE — 99232 SBSQ HOSP IP/OBS MODERATE 35: CPT

## 2025-03-18 RX ADMIN — HYDROXYZINE HYDROCHLORIDE 100 MILLIGRAM(S): 25 TABLET, FILM COATED ORAL at 20:04

## 2025-03-18 RX ADMIN — Medication 100 MILLIGRAM(S): at 20:04

## 2025-03-18 RX ADMIN — OLANZAPINE 15 MILLIGRAM(S): 10 TABLET ORAL at 20:04

## 2025-03-18 NOTE — BH INPATIENT PSYCHIATRY PROGRESS NOTE - NSBHCHARTREVIEWVS_PSY_A_CORE FT
Vital Signs Last 24 Hrs  T(C): --  T(F): --  HR: --  BP: --  BP(mean): --  RR: 16 (03-18-25 @ 07:59) (16 - 16)  SpO2: --

## 2025-03-18 NOTE — BH INPATIENT PSYCHIATRY PROGRESS NOTE - NSBHMETABOLIC_PSY_ALL_CORE_FT
BMI: BMI (kg/m2): 29.1 (01-25-25 @ 16:22)  HbA1c: A1C with Estimated Average Glucose Result: 6.1 % (10-18-24 @ 08:00)    Glucose: POCT Blood Glucose.: 89 mg/dL (01-12-25 @ 20:10)    BP: --Vital Signs Last 24 Hrs  T(C): --  T(F): --  HR: --  BP: --  BP(mean): --  RR: 16 (03-18-25 @ 07:59) (16 - 16)  SpO2: --      Lipid Panel: Date/Time: 10-18-24 @ 08:00  Cholesterol, Serum: 106  LDL Cholesterol Calculated: 53  HDL Cholesterol, Serum: 42  Total Cholesterol/HDL Ration Measurement: --  Triglycerides, Serum: 53
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
## 2025-03-18 NOTE — BH INPATIENT PSYCHIATRY PROGRESS NOTE - NSBHFUPINTERVALHXFT_PSY_A_CORE
Patient is followed up for NDD. Chart, medications and labs reviewed. Patient is discussed during morning brief, no overnight events, has been in good behavioral control.   Patient was seen and is evaluated on the unit. She continues to exhibit childlike behaviors, w/o intrusiveness towards staff/peers. She presents with bright affect, she understands that tentative discharge date is 4/1 to [facility] group home. Quantiferon Plus TB resulting neg, results to be sent to group home. She has been compliant with standing medications, denies SE. No acute medical concerns, VSS.

## 2025-03-19 PROCEDURE — 99232 SBSQ HOSP IP/OBS MODERATE 35: CPT

## 2025-03-19 RX ADMIN — HYDROXYZINE HYDROCHLORIDE 100 MILLIGRAM(S): 25 TABLET, FILM COATED ORAL at 20:05

## 2025-03-19 RX ADMIN — OLANZAPINE 15 MILLIGRAM(S): 10 TABLET ORAL at 20:05

## 2025-03-19 RX ADMIN — Medication 100 MILLIGRAM(S): at 20:05

## 2025-03-19 NOTE — BH INPATIENT PSYCHIATRY PROGRESS NOTE - NSBHFUPINTERVALHXFT_PSY_A_CORE
Patient is followed up for NDD. Chart, medications and labs reviewed. Patient is discussed during morning brief, no overnight events, has been in good behavioral control.   Patient was seen and is evaluated on the unit. She continues to exhibit childlike behaviors, w/o intrusiveness towards staff/peers. She presents with bright affect, on interview states that she would like to go to [facility] group home earlier. Says that April 1st is a "Mu-[employer] holiday". She has been compliant with standing medications, denies SE. No acute medical concerns, VSS.

## 2025-03-19 NOTE — BH INPATIENT PSYCHIATRY PROGRESS NOTE - NSBHCHARTREVIEWVS_PSY_A_CORE FT
Vital Signs Last 24 Hrs  T(C): --  T(F): --  HR: --  BP: --  BP(mean): --  RR: 15 (03-19-25 @ 08:38) (15 - 15)  SpO2: --
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
## 2025-03-19 NOTE — BH INPATIENT PSYCHIATRY PROGRESS NOTE - NSBHMETABOLIC_PSY_ALL_CORE_FT
BMI: BMI (kg/m2): 29.1 (01-25-25 @ 16:22)  HbA1c: A1C with Estimated Average Glucose Result: 6.1 % (10-18-24 @ 08:00)    Glucose: POCT Blood Glucose.: 89 mg/dL (01-12-25 @ 20:10)    BP: --Vital Signs Last 24 Hrs  T(C): --  T(F): --  HR: --  BP: --  BP(mean): --  RR: 15 (03-19-25 @ 08:38) (15 - 15)  SpO2: --      Lipid Panel: Date/Time: 10-18-24 @ 08:00  Cholesterol, Serum: 106  LDL Cholesterol Calculated: 53  HDL Cholesterol, Serum: 42  Total Cholesterol/HDL Ration Measurement: --  Triglycerides, Serum: 53

## 2025-03-20 PROCEDURE — 99232 SBSQ HOSP IP/OBS MODERATE 35: CPT

## 2025-03-20 RX ADMIN — OLANZAPINE 15 MILLIGRAM(S): 10 TABLET ORAL at 19:49

## 2025-03-20 RX ADMIN — HYDROXYZINE HYDROCHLORIDE 100 MILLIGRAM(S): 25 TABLET, FILM COATED ORAL at 19:49

## 2025-03-20 RX ADMIN — Medication 100 MILLIGRAM(S): at 19:49

## 2025-03-20 NOTE — BH INPATIENT PSYCHIATRY PROGRESS NOTE - CURRENT MEDICATION
normal... MEDICATIONS  (STANDING):  hydrOXYzine hydrochloride 100 milliGRAM(s) Oral at bedtime  OLANZapine 15 milliGRAM(s) Oral at bedtime  traZODone 100 milliGRAM(s) Oral at bedtime    MEDICATIONS  (PRN):  acetaminophen     Tablet .. 650 milliGRAM(s) Oral every 6 hours PRN Temp greater or equal to 38C (100.4F), Mild Pain (1 - 3)  acetaminophen     Tablet .. 650 milliGRAM(s) Oral every 6 hours PRN Temp greater or equal to 38C (100.4F), Mild Pain (1 - 3), Moderate Pain (4 - 6)  diphenhydrAMINE 50 milliGRAM(s) Oral every 6 hours PRN Extrapyramidal symptoms or prophylaxis  diphenhydrAMINE Injectable 50 milliGRAM(s) IntraMuscular once PRN Extrapyramidal prophylaxis  haloperidol    Injectable 5 milliGRAM(s) IntraMuscular once PRN aggression

## 2025-03-20 NOTE — BH INPATIENT PSYCHIATRY PROGRESS NOTE - NSBHMETABOLIC_PSY_ALL_CORE_FT
BMI: BMI (kg/m2): 29.1 (01-25-25 @ 16:22)  HbA1c: A1C with Estimated Average Glucose Result: 6.1 % (10-18-24 @ 08:00)    Glucose: POCT Blood Glucose.: 89 mg/dL (01-12-25 @ 20:10)    BP: --Vital Signs Last 24 Hrs  T(C): --  T(F): --  HR: --  BP: --  BP(mean): --  RR: 16 (03-20-25 @ 08:29) (16 - 16)  SpO2: --      Lipid Panel: Date/Time: 10-18-24 @ 08:00  Cholesterol, Serum: 106  LDL Cholesterol Calculated: 53  HDL Cholesterol, Serum: 42  Total Cholesterol/HDL Ration Measurement: --  Triglycerides, Serum: 53
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
## 2025-03-20 NOTE — BH INPATIENT PSYCHIATRY PROGRESS NOTE - NSBHFUPINTERVALHXFT_PSY_A_CORE
Patient is followed up for NDD. Chart, medications and labs reviewed. Patient is discussed during morning brief, no overnight events, has been in good behavioral control.   Patient was seen and is evaluated on the unit. She continues to exhibit childlike behaviors, w/o intrusiveness towards staff/peers. She presents with bright affect, remains focused on going to group home. Writer discussed tentative move out date of 4/1. Sleep and appetite remain well. She has been compliant with standing medications, denies SE. No acute medical concerns, VSS.

## 2025-03-20 NOTE — BH INPATIENT PSYCHIATRY PROGRESS NOTE - NSBHCHARTREVIEWVS_PSY_A_CORE FT
Vital Signs Last 24 Hrs  T(C): --  T(F): --  HR: --  BP: --  BP(mean): --  RR: 16 (03-20-25 @ 08:29) (16 - 16)  SpO2: --

## 2025-03-21 PROCEDURE — 99232 SBSQ HOSP IP/OBS MODERATE 35: CPT

## 2025-03-21 RX ADMIN — HYDROXYZINE HYDROCHLORIDE 100 MILLIGRAM(S): 25 TABLET, FILM COATED ORAL at 20:26

## 2025-03-21 RX ADMIN — Medication 100 MILLIGRAM(S): at 20:27

## 2025-03-21 RX ADMIN — OLANZAPINE 15 MILLIGRAM(S): 10 TABLET ORAL at 20:26

## 2025-03-21 NOTE — BH INPATIENT PSYCHIATRY PROGRESS NOTE - NSBHMETABOLIC_PSY_ALL_CORE_FT
BMI: BMI (kg/m2): 29.1 (01-25-25 @ 16:22)  HbA1c: A1C with Estimated Average Glucose Result: 6.1 % (10-18-24 @ 08:00)    Glucose: POCT Blood Glucose.: 89 mg/dL (01-12-25 @ 20:10)    BP: --Vital Signs Last 24 Hrs  T(C): --  T(F): --  HR: --  BP: --  BP(mean): --  RR: 17 (03-21-25 @ 11:06) (17 - 17)  SpO2: --      Lipid Panel: Date/Time: 10-18-24 @ 08:00  Cholesterol, Serum: 106  LDL Cholesterol Calculated: 53  HDL Cholesterol, Serum: 42  Total Cholesterol/HDL Ration Measurement: --  Triglycerides, Serum: 53
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
## 2025-03-21 NOTE — BH INPATIENT PSYCHIATRY PROGRESS NOTE - NSBHFUPINTERVALHXFT_PSY_A_CORE
Patient is followed up for NDD. Chart, medications and labs reviewed. Patient is discussed during morning brief, no overnight events, has been in good behavioral control.   Patient was seen and is evaluated on the unit. She continues to exhibit childlike behaviors, w/o intrusiveness towards staff/peers. She presents with bright affect, remains focused on going to group home and is aware of tentative move out date 4/1. Sleep and appetite remain well, able to shower this morning. She has been compliant with standing medications, denies SE. No acute medical concerns, VSS.

## 2025-03-21 NOTE — BH INPATIENT PSYCHIATRY PROGRESS NOTE - NSBHCHARTREVIEWVS_PSY_A_CORE FT
Vital Signs Last 24 Hrs  T(C): --  T(F): --  HR: --  BP: --  BP(mean): --  RR: 17 (03-21-25 @ 11:06) (17 - 17)  SpO2: --

## 2025-03-22 RX ADMIN — OLANZAPINE 15 MILLIGRAM(S): 10 TABLET ORAL at 21:45

## 2025-03-22 RX ADMIN — HYDROXYZINE HYDROCHLORIDE 100 MILLIGRAM(S): 25 TABLET, FILM COATED ORAL at 21:45

## 2025-03-22 RX ADMIN — Medication 100 MILLIGRAM(S): at 21:45

## 2025-03-23 RX ADMIN — OLANZAPINE 15 MILLIGRAM(S): 10 TABLET ORAL at 20:12

## 2025-03-23 RX ADMIN — Medication 100 MILLIGRAM(S): at 20:12

## 2025-03-23 RX ADMIN — HYDROXYZINE HYDROCHLORIDE 100 MILLIGRAM(S): 25 TABLET, FILM COATED ORAL at 20:12

## 2025-03-24 PROCEDURE — 99232 SBSQ HOSP IP/OBS MODERATE 35: CPT

## 2025-03-24 RX ADMIN — Medication 100 MILLIGRAM(S): at 21:16

## 2025-03-24 RX ADMIN — HYDROXYZINE HYDROCHLORIDE 100 MILLIGRAM(S): 25 TABLET, FILM COATED ORAL at 21:17

## 2025-03-24 RX ADMIN — OLANZAPINE 15 MILLIGRAM(S): 10 TABLET ORAL at 21:17

## 2025-03-24 NOTE — BH INPATIENT PSYCHIATRY PROGRESS NOTE - NSBHFUPINTERVALHXFT_PSY_A_CORE
Patient is followed up for NDD. Chart, medications and labs reviewed. Patient is discussed during morning brief, no overnight events, has been in good behavioral control.   Patient was seen and is evaluated on the unit. She continues to exhibit childlike behaviors. On interview presents with bright affect, remains focused on going to group home and is aware of tentative move out date 4/1. Sleep and appetite remain well, able to shower this morning. She has been compliant with standing medications, denies SE. No acute medical concerns, VSS.
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
## 2025-03-24 NOTE — BH INPATIENT PSYCHIATRY PROGRESS NOTE - NSBHMETABOLIC_PSY_ALL_CORE_FT
BMI: BMI (kg/m2): 29.1 (01-25-25 @ 16:22)  HbA1c: A1C with Estimated Average Glucose Result: 6.1 % (10-18-24 @ 08:00)    Glucose: POCT Blood Glucose.: 89 mg/dL (01-12-25 @ 20:10)    BP: --Vital Signs Last 24 Hrs  T(C): --  T(F): --  HR: --  BP: --  BP(mean): --  RR: --  SpO2: --      Lipid Panel: Date/Time: 10-18-24 @ 08:00  Cholesterol, Serum: 106  LDL Cholesterol Calculated: 53  HDL Cholesterol, Serum: 42  Total Cholesterol/HDL Ration Measurement: --  Triglycerides, Serum: 53

## 2025-03-25 PROCEDURE — 90853 GROUP PSYCHOTHERAPY: CPT

## 2025-03-25 PROCEDURE — 99232 SBSQ HOSP IP/OBS MODERATE 35: CPT

## 2025-03-25 RX ADMIN — OLANZAPINE 15 MILLIGRAM(S): 10 TABLET ORAL at 19:41

## 2025-03-25 RX ADMIN — Medication 100 MILLIGRAM(S): at 19:41

## 2025-03-25 RX ADMIN — HYDROXYZINE HYDROCHLORIDE 100 MILLIGRAM(S): 25 TABLET, FILM COATED ORAL at 19:41

## 2025-03-25 NOTE — BH PSYCHOLOGY - GROUP THERAPY NOTE - NSBHPSYCHOLASSESSPROV_PSY_A_CORE
Licensed Psychologist
Licensed Psychologist and Psychology Trainee
Licensed Psychologist and Psychology Trainee
Psychology Trainee only
Licensed Psychologist
Licensed Psychologist and Psychology Trainee
Psychology Trainee only
Licensed Psychologist and Psychology Trainee
Licensed Psychologist
Licensed Psychologist
Licensed Psychologist and Psychology Trainee
Licensed Psychologist and Psychology Trainee
Licensed Psychologist
Licensed Psychologist and Psychology Trainee

## 2025-03-25 NOTE — BH PSYCHOLOGY - GROUP THERAPY NOTE - ADDITIONAL COMMENTS
I have spent 45 minutes of time on the encounter which excludes teaching and separately reported services.

## 2025-03-25 NOTE — BH PSYCHOLOGY - GROUP THERAPY NOTE - NSPSYCHOLGRPCOGGOAL_PSY_A_CORE
other...

## 2025-03-25 NOTE — BH PSYCHOLOGY - GROUP THERAPY NOTE - NSPSYCHOLGRPCOGINT_PSY_A_CORE FT
Cognitive/behavioral therapy, Acceptance and Commitment therapy, Emotion regulation/coping skills taught, Psychoed 

## 2025-03-25 NOTE — BH PSYCHOLOGY - GROUP THERAPY NOTE - NSBHPSYCHOLRESPONSE_PSY_A_CORE
Accepted support
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
## 2025-03-25 NOTE — BH PSYCHOLOGY - GROUP THERAPY NOTE - PROVIDER ATTESTATION
I was present with the psychology trainee during the critical/key portions of the visit. I agree with the findings and plan as documented in the psychology trainee note unless noted below.

## 2025-03-25 NOTE — BH PSYCHOLOGY - GROUP THERAPY NOTE - NSPSYCHOLGRPCOGGOAL_PSY_A_CORE FT
Decrease symptoms, Develop coping/emotion regulation skills, Psychoeducation    

## 2025-03-25 NOTE — BH PSYCHOLOGY - GROUP THERAPY NOTE - NSBHPSYCHOLGRPNAME_PSY_A_CORE
CBT (Cognitive Behavioral Therapy) Group

## 2025-03-25 NOTE — BH PSYCHOLOGY - GROUP THERAPY NOTE - NSPSYCHOLGRPBILLING_PSY_A_CORE
[id number] - Group Psychotherapy
[id number] - Group Psychotherapy
[id number] - Group Psychotherapy
[id number] - Group Psychotherapy
[id number] - Group Psychotherapy
[id number] - Group Psychotherapy
[id number] - Group Psychotherapy
[id number] - Group Psychotherapy
[id number] - Group Psychotherapy
[id number] - Group Psychotherapy
[id number] - Group Psychotherapy
[id number] - Group Psychotherapy
[id number] - Group Psychotherapy
[id number] - Group Psychotherapy
[id number] - Group Psychotherapy
[id number] - Group Psychotherapy
[id number] - Group Psychotherapy
[id number] - Group Psychotherapy

## 2025-03-25 NOTE — BH PSYCHOLOGY - GROUP THERAPY NOTE - NSBHPSYCHOLGRPDUR_PSY_A_CORE
45 minutes

## 2025-03-25 NOTE — BH PSYCHOLOGY - GROUP THERAPY NOTE - NSPSYCHOLGRPCOGPT_PSY_A_CORE FT
Patient attended Cognitive Behavioral Therapy Group incorporating ACT-based concepts. The group started with a brief check-in asking Pts to share one thing they would want to have in their homes if they were building a dream home. Discussed noticing their own values based on what how they would choose to spend their money.  Members then engaged in a gratitude meditation and processed the experience. There were a number of distractions occurring on the unit, discussed distractions and mindfulness. Pts engaged in discussion about experiencing and noticing emotions, as well as barriers of doing so. Group members were provided with handout and then engaged in discussion about “riding the wave” of emotions and utilizing coping skills to help allow emotions to run their course. Group facilitator explained concepts, reinforced participation, and engaged patients in the discussion.   Patient attended Cognitive Behavioral Therapy Group incorporating ACT-based concepts. The group started with a brief check-in asking Pts to share one thing they would want to have in their homes if they were building a dream home. Members then engaged in a gratitude meditation and processed the experience. There were a number of distractions occurring on the unit, discussed distractions and mindfulness. Pts engaged in discussion about experiencing and noticing emotions, as well as barriers of doing so. Group members were provided with handout and then engaged in discussion about “riding the wave” of emotions and utilizing coping skills to help allow emotions to run their course. Group facilitator explained concepts, reinforced participation, and engaged patients in the discussion.

## 2025-03-25 NOTE — BH PSYCHOLOGY - GROUP THERAPY NOTE - NSBHPSYCHOLGRPTYPE_PSY_A_CORE
Cognitive Behavioral Coping Skills

## 2025-03-25 NOTE — BH PSYCHOLOGY - GROUP THERAPY NOTE - NSPSYCHOLGRPCOGINT_PSY_A_CORE
group members provided support/group members suggested positive behaviors/mindfulness skills taught/relaxation skills practiced/other..

## 2025-03-25 NOTE — BH PSYCHOLOGY - GROUP THERAPY NOTE - NSBHPSYCHOLRESPCOMMENT_PSY_A_CORE FT
The patient appeared adequately groomed and casually dressed. Pt appeared engaged in group as evidenced by her willingness to verbally communicate during the discussion when called upon. Pt was disorganized but in good behavioral control. Pt was appropriate with peers.

## 2025-03-25 NOTE — BH PSYCHOLOGY - GROUP THERAPY NOTE - TOKEN PULL-DIAGNOSIS
Primary Diagnosis:  Mood disorder [F39]        Problem Dx:   Intellectual disability [F79]      
Primary Diagnosis:  Intellectual disability [F79]      Mood disorder [F39]        Problem Dx:   History of schizophrenia [Z86.59]      
Primary Diagnosis:  Mood disorder [F39]        Problem Dx:   Intellectual disability [F79]      
Primary Diagnosis:  Intellectual disability [F79]      Mood disorder [F39]        Problem Dx:   History of schizophrenia [Z86.59]      
Primary Diagnosis:  Intellectual disability [F79]      Mood disorder [F39]        Problem Dx:   History of schizophrenia [Z86.59]      
Primary Diagnosis:  Intellectual disability [F79]      Mood disorder [F39]        Problem Dx:   
Primary Diagnosis:  Intellectual disability [F79]      Mood disorder [F39]        Problem Dx:   History of schizophrenia [Z86.59]      
Primary Diagnosis:  Intellectual disability [F79]      Mood disorder [F39]        Problem Dx:   Insomnia [G47.00]      Anxiety [F41.9]      
Primary Diagnosis:  Intellectual disability [F79]      Mood disorder [F39]        Problem Dx:   History of schizophrenia [Z86.59]      
Primary Diagnosis:  Intellectual disability [F79]      Mood disorder [F39]        Problem Dx:

## 2025-03-25 NOTE — BH PSYCHOLOGY - GROUP THERAPY NOTE - NSPSYCHOLGRPCOGSPCH_PSY_A_CORE FT
slurred
slurred/mumbled 
slurred
slurred/mumbled 
slurred
slurred/mumbled 
slurred
slurred/mumbled 
slurred
slurred/mumbled

## 2025-03-25 NOTE — BH PSYCHOLOGY - GROUP THERAPY NOTE - NSPSYCHOLGRPCOGPROB_PSY_A_CORE FT
Anxiety, Depression, Emotion dysregulation, Lack of coping skills     

## 2025-03-26 PROCEDURE — 99232 SBSQ HOSP IP/OBS MODERATE 35: CPT

## 2025-03-26 RX ADMIN — OLANZAPINE 15 MILLIGRAM(S): 10 TABLET ORAL at 20:55

## 2025-03-26 RX ADMIN — HYDROXYZINE HYDROCHLORIDE 100 MILLIGRAM(S): 25 TABLET, FILM COATED ORAL at 20:55

## 2025-03-26 RX ADMIN — Medication 100 MILLIGRAM(S): at 20:54

## 2025-03-26 NOTE — BH INPATIENT PSYCHIATRY PROGRESS NOTE - NSBHFUPINTERVALHXFT_PSY_A_CORE
Patient is followed up for NDD. Chart, medications and labs reviewed. Patient is discussed during morning brief, no overnight events, has been in good behavioral control.   Patient was seen and is evaluated on the unit. She continues to exhibit childlike behaviors. On interview presents with bright affect, remains focused on going to group home and is aware of tentative move out date 4/1. States that she would like to take a [employer] flight to go see a comedy show that day. She has been compliant with standing medications, denies SE. No acute medical concerns, VSS.

## 2025-03-26 NOTE — BH INPATIENT PSYCHIATRY PROGRESS NOTE - NSBHMETABOLIC_PSY_ALL_CORE_FT
BMI: BMI (kg/m2): 29.1 (01-25-25 @ 16:22)  HbA1c: A1C with Estimated Average Glucose Result: 6.1 % (10-18-24 @ 08:00)    Glucose: POCT Blood Glucose.: 89 mg/dL (01-12-25 @ 20:10)    BP: --Vital Signs Last 24 Hrs  T(C): 36.6 (03-26-25 @ 08:13), Max: 36.6 (03-26-25 @ 08:13)  T(F): 97.9 (03-26-25 @ 08:13), Max: 97.9 (03-26-25 @ 08:13)  HR: --  BP: --  BP(mean): --  RR: 16 (03-26-25 @ 08:13) (16 - 18)  SpO2: 100% (03-26-25 @ 08:13) (100% - 100%)    Orthostatic VS  03-26-25 @ 08:13  Lying BP: --/-- HR: --  Sitting BP: 133/77 HR: 93  Standing BP: --/-- HR: --  Site: --  Mode: --    Lipid Panel: Date/Time: 10-18-24 @ 08:00  Cholesterol, Serum: 106  LDL Cholesterol Calculated: 53  HDL Cholesterol, Serum: 42  Total Cholesterol/HDL Ration Measurement: --  Triglycerides, Serum: 53

## 2025-03-26 NOTE — BH INPATIENT PSYCHIATRY PROGRESS NOTE - NSBHCHARTREVIEWVS_PSY_A_CORE FT
Vital Signs Last 24 Hrs  T(C): 36.6 (03-26-25 @ 08:13), Max: 36.6 (03-26-25 @ 08:13)  T(F): 97.9 (03-26-25 @ 08:13), Max: 97.9 (03-26-25 @ 08:13)  HR: --  BP: --  BP(mean): --  RR: 16 (03-26-25 @ 08:13) (16 - 18)  SpO2: 100% (03-26-25 @ 08:13) (100% - 100%)    Orthostatic VS  03-26-25 @ 08:13  Lying BP: --/-- HR: --  Sitting BP: 133/77 HR: 93  Standing BP: --/-- HR: --  Site: --  Mode: --   negative...
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
## 2025-03-27 PROCEDURE — 90832 PSYTX W PT 30 MINUTES: CPT

## 2025-03-27 PROCEDURE — 99232 SBSQ HOSP IP/OBS MODERATE 35: CPT

## 2025-03-27 RX ORDER — HYDROXYZINE HYDROCHLORIDE 25 MG/1
2 TABLET, FILM COATED ORAL
Qty: 0 | Refills: 0 | DISCHARGE
Start: 2025-03-27

## 2025-03-27 RX ORDER — OLANZAPINE 10 MG/1
1 TABLET ORAL
Refills: 0 | DISCHARGE

## 2025-03-27 RX ORDER — TRAZODONE HCL 100 MG
1 TABLET ORAL
Qty: 0 | Refills: 0 | DISCHARGE
Start: 2025-03-27

## 2025-03-27 RX ADMIN — Medication 100 MILLIGRAM(S): at 19:56

## 2025-03-27 RX ADMIN — HYDROXYZINE HYDROCHLORIDE 100 MILLIGRAM(S): 25 TABLET, FILM COATED ORAL at 19:56

## 2025-03-27 RX ADMIN — OLANZAPINE 15 MILLIGRAM(S): 10 TABLET ORAL at 19:56

## 2025-03-27 NOTE — BH PSYCHOLOGY - CLINICIAN PSYCHOTHERAPY NOTE - NSBHPSYCHOLPROBS_PSY_ALL_CORE
Aggression/Anger/Irritability/Impulsivity/Other...
Impulsivity/Other...
Impulsivity/Other...
Aggression/Anger/Irritability/Impulsivity/Other...
Impulsivity/Other...
Aggression/Anger/Irritability/Impulsivity/Other...
Impulsivity/Other...
Aggression/Anger/Irritability/Impulsivity/Other...
Aggression/Anger/Irritability/Impulsivity/Other...
Impulsivity/Other...
Aggression/Anger/Irritability/Impulsivity/Other...
Impulsivity/Other...
Aggression/Anger/Irritability/Impulsivity/Other...
Other...
Aggression/Anger/Irritability/Impulsivity/Other...
Impulsivity/Other...
Aggression/Anger/Irritability/Impulsivity/Other...
Impulsivity/Other...
Aggression/Anger/Irritability/Impulsivity/Other...
Impulsivity/Other...
Aggression/Anger/Irritability/Impulsivity/Other...
Impulsivity/Other...

## 2025-03-27 NOTE — BH PSYCHOLOGY - CLINICIAN PSYCHOTHERAPY NOTE - NSBHPSYCHOLDISCUSS_PSY_A_CORE
Discussion with collaborating staff took place since patient's last visit

## 2025-03-27 NOTE — BH PSYCHOLOGY - CLINICIAN PSYCHOTHERAPY NOTE - NSBHPSYCHOLRESPONSE_PSY_A_CORE
Accepted support
Accepted support
Coping skills acquired/Insight displayed/Accepted support
Coping skills acquired/Accepted support
Coping skills acquired/Accepted support
Coping skills acquired/Insight displayed/Accepted support
Accepted support
Coping skills acquired/Accepted support
Insight displayed/Accepted support
Coping skills acquired/Accepted support
Coping skills acquired/Accepted support
Insight displayed/Accepted support
Accepted support
Coping skills acquired/Accepted support
Coping skills acquired/Insight displayed/Accepted support
Coping skills acquired/Insight displayed/Accepted support
Coping skills acquired/Accepted support
Coping skills acquired/Insight displayed/Accepted support
Coping skills acquired/Accepted support
Accepted support
Coping skills acquired/Accepted support
Coping skills acquired/Insight displayed/Accepted support
Coping skills acquired/Insight displayed/Accepted support
Accepted support
Coping skills acquired/Accepted support
Accepted support
Coping skills acquired/Insight displayed/Accepted support
Coping skills acquired/Accepted support
Coping skills acquired/Insight displayed/Accepted support
Insight displayed/Accepted support
Accepted support
Coping skills acquired/Insight displayed/Accepted support
Coping skills acquired/Insight displayed/Accepted support
Accepted support
Accepted support
Coping skills acquired/Insight displayed/Accepted support
Coping skills acquired/Accepted support
Coping skills acquired/Accepted support

## 2025-03-27 NOTE — BH PSYCHOLOGY - CLINICIAN PSYCHOTHERAPY NOTE - NSTXPROBCOPE_PSY_ALL_CORE
COPING, INEFFECTIVE

## 2025-03-27 NOTE — BH PSYCHOLOGY - CLINICIAN PSYCHOTHERAPY NOTE - NSBHPSYCHOLNARRATIVE_PSY_A_CORE FT
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 process was concrete, linear, and perseverative about disliking a peer and living in her own apartment. Pt denied SI, plans, or intent. Pt did not endorse HI. Oriented x3. Pts’ speech was loud, impaired articulation. Limited insight and fair judgement demonstrated.      Writer met with Pt for an individual supportive therapy session. Session began with a check-in exploring how Pt was feeling and about her behaviors on the unit during Writer’s absence over the last two weeks. Pt showed Writer several papers she had written on, asserting that she has been “bored” on the unit. Writer and Pt brainstormed activities on the unit that could keep her engaged (attending groups, drawing, crossword puzzles, getting fresh air). Pt expressed frustrations with two peers on the unit, identifying characteristics about their appearance that were upsetting her, though unable to provide more details. Writer reviewed distress tolerance skills, Pt’s behavior plan, and ways to receive reinforcement, along with effective/appropriate ways to communicate her needs with staff. Writer and Pt discussed the importance of finding calm quiet spaces on the unit (her room) when overstimulated or agitated. Pt repeatedly stated that she would be getting discharged later in the day and that she wants to move to “[facility]”; needing frequent redirection. Writer provided Pt with support, validation, encouragement, and a safe space to share her thoughts and feelings. 
Pt appeared alert and presented with fair hygiene and grooming, dressed in hospital gown. Pt reported that she was feeling “good”, demonstrating pleasant affect, making inconsistent eye contact. Pt denied A/VH and did not appear internally preoccupied during session. Thought process was concrete, linear, tangential at times. Pt denied SI, plans, or intent. Pt did not endorse HI. Oriented x3. Pts’ speech was loud at times with impaired articulation. Limited insight and fair judgement demonstrated.      Writer met with Pt for an individual supportive therapy session. Pt shared that she had a good weekend and took a shower this morning. Writer explained the importance of following a daily routine, reinforcing her continued efforts to attend groups and reminding Pt about the benefits of a consistent sleep schedule. Pt asserted that she wants to continue sleeping out in the day room, voicing beliefs about her roommate that were paranoid in nature. Writer then described the purpose of muscle relaxation exercises, encouraging Pt to identify where in her body she experiences feelings of anger/tension/frustration etc. Pt pointed to her face and shook her hands to signify these areas. Lastly, Writer engaged Pt in a body scan exercise, targeting these areas; with good effect. Writer provided Pt with support, validation, encouragement, and a safe space to share her thoughts and feelings. 
Pt appeared alert and presented with fair hygiene and grooming, dressed in hospital gown. Pt reported that she was feeling “good”; demonstrating euthymic mood; making fair eye contact, in good behavioral control and appearing at baseline. Pt did not endorse A/VH, appeared less distracted. Thought process was concrete. Pt did not endorse SI/HI, plans, or intent. Oriented x3. Pt whispered at times, talking loudly at other points, with impaired articulation. Limited insight and judgement demonstrated.      Writer met with Pt for an individual supportive therapy session, per Pt’s request. Session began with a check-in, with Pt sharing how her weekend went and asking about plans for group home housing. Writer reminded Pt about OPWDD meeting scheduled for tomorrow, reiterating that the team would update her accordingly. Writer engaged Pt in a mindful gratitude exercise followed by a dropping anchor grounding activity; supporting Pt with acknowledging her thoughts and feelings and helping her come back into her body to engage in what she is doing in the moment. Pt was well engaged, distractible at times, though responded well to redirection. Pt shared feeling thankful for staff, getting more time outside, and being able to eventually “leave [facility].” Writer provided Pt with support, validation, encouragement, and a safe space to share her thoughts and feelings.   
Pt appeared alert and presented with fair hygiene and grooming, dressed in hospital gown. Pt reported that she was feeling “okay”, demonstrating euthymic affect; making inconsistent eye contact. Pt denied A/VH and did not appear internally preoccupied. Thought process was concrete and perseverative (content continued to focus on discharge and housing (moving to a different state and living in her own apartment); poorly related. Pt denied SI, plans, or intent. Pt did not endorse HI. Oriented x3. Pts’ speech was loud in volume, impaired articulation. Poor insight and limited judgement demonstrated. Fair impulse control on unit at this time, though tenuous.     Writer met with Pt for a brief supportive individual therapy session, focused on reviewing behavior plan with Pt. Writer described reason for plan, appropriate behaviors and reinforcements she will earn when consistently engaging in appropriate coping skills. Pt was receptive to this and responded well to reinforcement. Writer supported Pt in practicing a guided meditation exercise, needing frequent redirection with good effect. Writer then provided Pt with support, validation, encouragement, and a safe space to share her thoughts and feelings.     See below for behavior plan:  Explain the behavior you want to change: Amanda has had difficulties regulating her emotions at times; displaying instances of physical and verbal agitation/aggression (yelling, throwing food etc.)   Reasons/Antecedents for the behavior: Amanda has had difficulties with impulsivity, emotion regulation and communication. She may engage in these behaviors when task demands are undesirable, her needs/requests are not met immediately, she misinterprets others’ intensions or is overstimulated.   Appropriate replacement behavior(s):    Listening to music, going to a quiet area and counting to 10   Following staff redirection and expressing herself appropriately    Using coping skills such as distraction, deep breathing, taking a walk, using her stress ball   Attending groups    Continuing to take medications    Support – How to make the adaptive/healthy behavior happen more often:   The following schedule applies:   Wake-up to Lunch: She will conduct herself appropriately including following staff redirection, interacting appropriately & using coping skills. She will be allowed to earn a word search printout. However, if she conducts herself poorly and exhibits negative behaviors resulting in three failed chances to recover, she will forfeit that reward (but get a chance to earn it the next shift).   After Lunch to Dinner: She will conduct herself appropriately including following staff redirection, interacting appropriately & using coping skills. She will be allowed to earn a word search printout. However, if she conducts herself poorly and exhibits negative behaviors resulting in three failed chances to recover, she will forfeit that reward (but get a chance to earn it the next shift).   After Dinner to 11pm: She will conduct herself appropriately including following staff redirection, interacting appropriately & using coping skills. She will be allowed to earn a word search printout. However, if she conducts herself poorly and exhibits negative behaviors resulting in three failed chances to recover, she will forfeit that reward (but get a chance to earn it the next shift).     *Staff should be sure to provide Amanda with verbal praise when appropriately communicating her feelings or engaging in pro social behaviors*   *More rewards can be earned including new stress balls/squish toys and listening to music of her choice individually with Psychologist [after three days of earned rewards].   If she is secluded and/or restrained; the clock will reset to zero and Amanda will begin earning her rewards the following day.   *Due to the Patient’s Difficulties Engaging Appropriately: If Patient engages in inappropriate behaviors:   Staff will ask her to go to her room / Calming Room (allowing 30 seconds) to comply:   If Patient does not comply after request:   Prepare oral and IM PRN medications (staff should offer oral PRN medication once)   Call Support Team   Escort to room (or calming room)  If Patient still does not comply, then:   Call Psych Emergency   Escort to room (or calming room)   Encourage IM medications   (Restraints / Seclusion and IM meds to be used to maintain safety of the unit and as a last resort.)   Evaluate – How will you know if it is working: Amanda will demonstrate modest positive progress and engagement in her treatment. 
Pt appeared alert and presented with fair hygiene and grooming, dressed in her own clothes. Pt reported that she was feeling “good”; demonstrating euthymic mood; making fair eye contact, in good behavioral control, cooperative and appearing at baseline. Pt did not endorse A/VH. Thought process was concrete, linear. Pt did not endorse SI/HI, plans, or intent. Oriented x3. Speech was WNL, with impaired articulation. Limited insight and fair judgement demonstrated.       Writer met with Pt for an individual supportive therapy session per her request. Session focused on discussing Pt’s thoughts/feelings about being in the hospital over the holidays, participating in another housing interview, and eventually living with others she will be meeting for the first time. Dyad discussed making a countdown calendar when the team finds out she has been accepted for a home visit as well as when she is finally placed. Writer engaged Pt in a spot the difference picture exercise, and identified activities she can participate in over the weekend to stay occupied. Writer provided Pt with support, validation, encouragement, and a safe space to share her thoughts and feelings. 
Pt appeared alert and presented with fair hygiene and grooming, dressed in hospital gown. Pt reported that she was feeling “good”, demonstrating pleasant affect, making appropriate eye contact. Pt denied A/VH and did not appear internally preoccupied during session. Thought process was concrete, linear, and perseverative about certain peers. Pt denied SI, plans, or intent. Pt did not endorse HI. Oriented x3. Pts’ speech was loud, impaired articulation. Limited insight and fair judgement demonstrated.      Writer met with Pt for an individual supportive therapy session. Session began with a check-in, acknowledging the challenges of adjusting to changing roommates and peers on the unit. Writer provided psychoeducation on the importance of personal boundaries and respecting the physical space of others, along with sharing certain spaces (e.g. the bathroom). Pt noted that she had been crying the day before and “felt better” after speaking with her nurse, which Writer reinforced, reiterating the benefits of discussing emotions rather than acting on them immediately. Writer then engaged Pt in a progressive muscle relaxation exercise, with Pt needing occasional redirection. Pt noted that she had taken Tylenol earlier for some pain/muscle cramping and this exercise was helpful. Writer provided Pt with support, validation, encouragement, and a safe space to share her thoughts and feelings. 
Pt appeared alert and presented with fair hygiene and grooming, dressed in hospital gown. Pt reported that she was feeling “really good”, demonstrating pleasant affect, making appropriate eye contact. Pt denied A/VH and did not appear internally preoccupied during session. Thought process was concrete, linear, and goal directed. Pt denied SI, plans, or intent. Pt did not endorse HI. Oriented x3. Pts’ speech was loud at times with impaired articulation. Limited insight and fair judgement demonstrated.      Writer met with Pt for an individual supportive therapy session. Pt began by talking about her weekend, sharing that a peer on the unit had braided her hair. Session focused on discussing continued ways for Pt to engage in self-care, with Writer reinforcing her engagement with peers and completion of personal hygiene tasks. Writer and Pt identified that cleaning up her space, hydrating, creating art, listening to music, singing/dancing, talking with a friend, and deep breathing, were beneficial ways to take care of her mental and physical health. Pt showed Writer the beaded bracelet she made during leisure group, adding that she chose different shades of purple beads as this is her favorite color. Pt requested to participate in karaoke on the unit, noting what songs she likes to sing for others. Additionally, Writer supported Pt in practicing a mindful breathing exercise, with good effect. Writer provided Pt with support, validation, encouragement, and a safe space to share her thoughts and feelings.
Pt appeared alert and presented with fair hygiene and unkempt grooming, dressed in hospital gown. Pt reported that she was feeling “okay”; demonstrating euthymic mood; making poor eye contact. Pt denied A/VH though appeared distracted at times talking to herself. Thought process was concrete; perseverative, disorganized at times. Pt did not endorse SI/HI, plans, or intent. Oriented x3. Pt whispered at times, with impaired articulation. Limited insight and judgement demonstrated.      Writer met with Pt for an individual supportive therapy session. Session began with a check-in seeing how Pt was feeling and how her long weekend went. Dyad discussed what she is most excited about as Fall approaches, with Pt recalling how she typically likes to celebrate her birthday (approaching in Nov). Pt shared that she will be turning 39, and “can’t believe [she’ll] almost be 40.” Writer encouraged Pt to think about how she has grown and changed since 30, as well as how she hopes to continue to grow in her 40’s. Dyad then listened to music and discussed the emotions/memories it evoked, with Writer reinforcing her healthy/appropriate expression of emotions. Writer provided Pt with support, validation, encouragement, and a safe space to share her thoughts and feelings. 
Pt appeared alert and presented with unkempt hygiene and grooming, dressed in hospital gown. Pt reported that she was feeling “okay”; demonstrating irritable mood, at times elevated; making poor eye contact. Pt denied A/VH though appeared distracted often talking to herself. Thought process was concrete; somewhat perseverative. Pt denied SI, plans, or intent. Pt did not endorse HI. Oriented x3. Pt whispered at times, with impaired articulation. Limited insight and judgement demonstrated.      Writer met with Pt for an individual supportive therapy session, per Pt’s request. Session began by providing support in response to her frustration with peer using the telephone. Just prior to session, Writer observed Pt cursing and calling peer names. Writer reiterated unit values, encouraging Pt to use healthy coping skills and take space when feeling overstimulated or angry. Writer engaged Pt in a brief body scan exercise. Pt was able to point to areas where she was feeling pain or tension. Dyad discussed the connection between Pt’s physical pain and her mood/irritability, with Writer acknowledging that it can also be difficult to express her feelings in these moments. Writer then engaged Pt in a picture finding activity and a connect the dots exercise, linking this to practicing mindfulness. Writer provided Pt with support, validation, encouragement, and a safe space to share her thoughts and feelings. 
Pt appeared alert and presented with fair hygiene and grooming, dressed in her own clothes. Pt reported that she was feeling “good”, demonstrating euthymic sometimes elevated mood/affect, making appropriate eye contact. Pt denied A/VH and did not appear internally preoccupied during session. Thought process was concrete; goal directed and at times perseverative. Pt denied SI, plans, or intent. Pt did not endorse HI. Oriented x3. Pt whispered at times, with impaired articulation. Limited insight and fair judgement demonstrated.      Writer met with Pt for an individual supportive therapy session. Session began by addressing Pt’s concerns that Writer would be leaving to work at another hospital, as well as her worries about peer specialist “dying and not coming back.” Dyad connected these anxieties to team/unit changes (residents/peers leaving). This is likely also reflected in some of Pt’s recent repetitive behaviors on the unit (taking paper cups and filling them with sugar packets and tea bags and placing them in a bag with her other personal items). Writer engaged Pt in a mindful breathing and body scan exercise, to identify any areas of tension, with good effect. Writer provided Pt with support, validation, encouragement, and a safe space to share her thoughts and feelings. 
Pt appeared alert and presented with fair hygiene and grooming, dressed in her own clothes. Pt reported that she was feeling “okay”; demonstrating euthymic mood; making fair eye contact, in good behavioral control, cooperative and appearing at baseline. Pt did not endorse A/VH. Thought process was concrete, linear. Pt did not endorse SI/HI, plans, or intent. Oriented x3. Speech was WNL, with impaired articulation. Limited insight and fair judgement demonstrated.       Writer met with Pt for an individual supportive therapy session. Session focused on discussing Pt’s engagement during recent housing interviews, how she feels about meeting potential roommates/housemates, and her excitement about taking trips with staff and peers. Writer highlighted Pt’s ability to focus on answering all questions while demonstrating her personality and asking her own questions. Dyad discussed Pt’s expectations for the visit and how she might respond to various outcomes. Pt shared that during the visit she hopes to talk about the music she enjoys and trips she hopes to take, adding that she plans to ask questions about chores, house rules, and places to visit/walk in the neighborhood. Additionally, Writer supported Pt in identifying coping skills she can use, should she feel overwhelmed/upset during a visit. Additionally, Writer let Pt know that she would be away for the coming holiday week and will return in the new year, reminding Pt of other members of the treatment team she can go to when in need of support. Writer provided Pt with support, validation, encouragement, and a safe space to share her thoughts and feelings. 
Pt appeared alert and presented with fair hygiene and grooming, dressed in her own clothes. Pt reported that she was feeling “okay”; demonstrating neutral somewhat irritable mood when discussing housing; making fair eye contact, in good behavioral control, cooperative. Pt did not endorse A/VH. Thought process was concrete, perseverative. Pt did not endorse SI/HI, plans, or intent. Oriented x3. Speech was WNL, with impaired articulation. Limited insight and judgement demonstrated.       Writer met with Pt for an individual supportive therapy session focused on processing continued reactions and feelings about housing search. Session began with a check-in. Writer reinforced Pt’s ADL care, as she has been showering, getting dressed, and prioritizing her hygiene. Throughout session, Pt was focused on discharge repeatedly asserting that she was “moving to [facility].” Writer validated Pt’s desire to leave the hospital and reminded her about the process of having to visit any potential housing opportunities. Writer also discussed the importance of telling the team what she likes and dislikes when visiting residences as we want her to find something that fits her wants and needs. Pt shared that she wants to “travel the world”, and Writer supported Pt in identifying how these visits and interviews can help bring her closer to someday getting to travel and explore new places. Writer provided Pt with support, validation, encouragement, and a safe space to share her thoughts and feelings.

## 2025-03-27 NOTE — BH PSYCHOLOGY - CLINICIAN PSYCHOTHERAPY NOTE - NSBHPSYCHOLDURATION_PSY_A_CORE
30 minutes
20 minutes
20 minutes
other...
20 minutes
30 minutes
other...
20 minutes
30 minutes
other...
20 minutes
20 minutes
other...
30 minutes
other...
20 minutes
20 minutes
other...
30 minutes
20 minutes
20 minutes
other...
other...
20 minutes
other...
20 minutes
30 minutes
other...
20 minutes
other...
other...
20 minutes
20 minutes
other...
20 minutes
other...

## 2025-03-27 NOTE — BH PSYCHOLOGY - CLINICIAN PSYCHOTHERAPY NOTE - NSBHPSYCHOLTRAN_PSY_ALL_CORE
No

## 2025-03-27 NOTE — BH PSYCHOLOGY - CLINICIAN PSYCHOTHERAPY NOTE - NSBHPSYCHOLBILLFAM_PSY_A_CORE
[id number] - 16 to 37 minutes
[id number] - 16 to 37 minutes
[id number] - 16 to 37 minutes
94150 - 16 to 37 minutes
88249 - 16 to 37 minutes
[id number] - 16 to 37 minutes
72770 - 16 to 37 minutes
31769 - 16 to 37 minutes
92342 - 16 to 37 minutes
95101 - 16 to 37 minutes
81390 - 16 to 37 minutes
24004 - 16 to 37 minutes
53890 - 16 to 37 minutes
19900 - 16 to 37 minutes
05542 - 16 to 37 minutes
75851 - 16 to 37 minutes
15998 - 16 to 37 minutes
21795 - 16 to 37 minutes
14271 - 16 to 37 minutes
95357 - 16 to 37 minutes
87670 - 16 to 37 minutes
05164 - 16 to 37 minutes
07586 - 16 to 37 minutes
57243 - 16 to 37 minutes
50033 - 16 to 37 minutes
49983 - 16 to 37 minutes
73656 - 16 to 37 minutes
43284 - 16 to 37 minutes
63413 - 16 to 37 minutes
42205 - 16 to 37 minutes
53252 - 16 to 37 minutes
05957 - 16 to 37 minutes
34378 - 16 to 37 minutes
48366 - 16 to 37 minutes
94968 - 16 to 37 minutes
53102 - 16 to 37 minutes
22580 - 16 to 37 minutes
33135 - 16 to 37 minutes
21913 - 16 to 37 minutes
05193 - 16 to 37 minutes
67387 - 16 to 37 minutes
20361 - 16 to 37 minutes
73428 - 16 to 37 minutes
02107 - 16 to 37 minutes

## 2025-03-27 NOTE — BH PSYCHOLOGY - CLINICIAN PSYCHOTHERAPY NOTE - NSTXDCHOUSDATEEST_PSY_ALL_CORE
26-Feb-2025
17-Apr-2024
29-Aug-2024
17-Apr-2024
12-Feb-2024
12-Feb-2024
17-Apr-2024
12-Feb-2024
17-Apr-2024
29-Aug-2024
29-Aug-2024
16-Oct-2024
17-Apr-2024
20-Mar-2025
30-Oct-2024
04-Dec-2024
08-Jan-2025
05-Sep-2024
17-Apr-2024
13-Nov-2024
17-Apr-2024
17-Apr-2024
12-Feb-2024
05-Mar-2025
27-Nov-2024
17-Apr-2024
17-Apr-2024
23-Oct-2024
17-Apr-2024
17-Apr-2024
29-Jan-2025
17-Apr-2024
29-Aug-2024
17-Apr-2024
16-Oct-2024
18-Dec-2024
20-Nov-2024

## 2025-03-27 NOTE — BH PSYCHOLOGY - CLINICIAN PSYCHOTHERAPY NOTE - NSTXCOPEGOAL_PSY_ALL_CORE
Identify and utilize 2 coping skills that meet their needs
Other...
Identify and utilize 2 coping skills that meet their needs

## 2025-03-27 NOTE — BH INPATIENT PSYCHIATRY PROGRESS NOTE - NSBHCHARTREVIEWVS_PSY_A_CORE FT
Vital Signs Last 24 Hrs  T(C): --  T(F): --  HR: --  BP: --  BP(mean): --  RR: 18 (03-27-25 @ 07:55) (18 - 18)  SpO2: --    Orthostatic VS  03-26-25 @ 08:13  Lying BP: --/-- HR: --  Sitting BP: 133/77 HR: 93  Standing BP: --/-- HR: --  Site: --  Mode: --

## 2025-03-27 NOTE — BH INPATIENT PSYCHIATRY PROGRESS NOTE - NSBHMETABOLIC_PSY_ALL_CORE_FT
BMI: BMI (kg/m2): 29.1 (01-25-25 @ 16:22)  HbA1c: A1C with Estimated Average Glucose Result: 6.1 % (10-18-24 @ 08:00)    Glucose: POCT Blood Glucose.: 89 mg/dL (01-12-25 @ 20:10)    BP: --Vital Signs Last 24 Hrs  T(C): --  T(F): --  HR: --  BP: --  BP(mean): --  RR: 18 (03-27-25 @ 07:55) (18 - 18)  SpO2: --    Orthostatic VS  03-26-25 @ 08:13  Lying BP: --/-- HR: --  Sitting BP: 133/77 HR: 93  Standing BP: --/-- HR: --  Site: --  Mode: --    Lipid Panel: Date/Time: 10-18-24 @ 08:00  Cholesterol, Serum: 106  LDL Cholesterol Calculated: 53  HDL Cholesterol, Serum: 42  Total Cholesterol/HDL Ration Measurement: --  Triglycerides, Serum: 53

## 2025-03-27 NOTE — BH PSYCHOLOGY - CLINICIAN PSYCHOTHERAPY NOTE - NSTXDCHOUSDATETRGT_PSY_ALL_CORE
14-May-2024
20-Nov-2024
28-May-2024
27-Mar-2025
02-Jul-2024
21-May-2024
30-Oct-2024
14-Aug-2024
05-Sep-2024
11-Dec-2024
04-Jun-2024
03-Oct-2024
28-May-2024
25-Dec-2024
29-Aug-2024
17-Jul-2024
27-Nov-2024
16-Oct-2024
17-Apr-2024
14-Aug-2024
12-Mar-2025
12-Sep-2024
05-Feb-2025
11-Jun-2024
21-May-2024
25-Jun-2024
19-Feb-2024
04-Dec-2024
24-Jul-2024
15-Nabor-2025
06-Nov-2024
10-Jul-2024
04-Jun-2024
12-Mar-2024
05-Mar-2025
19-Feb-2024
19-Sep-2024
07-May-2024
02-Jul-2024
23-Oct-2024
23-Oct-2024
18-Jun-2024

## 2025-03-27 NOTE — BH PSYCHOLOGY - CLINICIAN PSYCHOTHERAPY NOTE - NSTXDCHOUSPROGRES_PSY_ALL_CORE
Improving
No Change
Improving
No Change
No Change
Improving
No Change
No Change
Improving
Improving
No Change
Improving
No Change
Improving
Improving
No Change
Improving
Improving
No Change
No Change
Improving
Improving
No Change
Improving
Improving
No Change

## 2025-03-27 NOTE — BH PSYCHOLOGY - CLINICIAN PSYCHOTHERAPY NOTE - NSTXCOPEDATEEST_PSY_ALL_CORE
17-Apr-2024
21-Feb-2024
29-Jan-2025
17-Apr-2024
17-Apr-2024
26-Mar-2025
17-Apr-2024
17-Apr-2024
04-Dec-2024
09-Feb-2024
17-Apr-2024
20-Nov-2024
17-Apr-2024
09-Feb-2024
17-Apr-2024
27-Nov-2024
12-Feb-2024
17-Apr-2024
17-Apr-2024
09-Feb-2024
05-Mar-2025
17-Apr-2024
28-Mar-2024
17-Apr-2024
25-Dec-2024
17-Apr-2024
01-Mar-2024
17-Apr-2024
19-Feb-2025
17-Apr-2024
12-Feb-2024

## 2025-03-27 NOTE — BH PSYCHOLOGY - CLINICIAN PSYCHOTHERAPY NOTE - TOKEN PULL-DIAGNOSIS
Primary Diagnosis:  Intellectual disability [F79]      Mood disorder [F39]        Problem Dx:   History of schizophrenia [Z86.59]      
Primary Diagnosis:  Intellectual disability [F79]      Mood disorder [F39]        Problem Dx:   Insomnia [G47.00]      Anxiety [F41.9]      
Primary Diagnosis:  Intellectual disability [F79]      Mood disorder [F39]        Problem Dx:   History of schizophrenia [Z86.59]      
Primary Diagnosis:  Intellectual disability [F79]      Mood disorder [F39]        Problem Dx:   Insomnia [G47.00]      Anxiety [F41.9]      
Primary Diagnosis:  Intellectual disability [F79]      Mood disorder [F39]        Problem Dx:   
Primary Diagnosis:  Intellectual disability [F79]      Mood disorder [F39]        Problem Dx:   History of schizophrenia [Z86.59]      
Primary Diagnosis:  Intellectual disability [F79]      Mood disorder [F39]        Problem Dx:   
Primary Diagnosis:  Intellectual disability [F79]      Mood disorder [F39]        Problem Dx:   History of schizophrenia [Z86.59]      
Primary Diagnosis:  Intellectual disability [F79]      Mood disorder [F39]        Problem Dx:   
Primary Diagnosis:  Intellectual disability [F79]      Mood disorder [F39]        Problem Dx:   Insomnia [G47.00]      Anxiety [F41.9]      
Primary Diagnosis:  Intellectual disability [F79]      Mood disorder [F39]        Problem Dx:   Insomnia [G47.00]      Anxiety [F41.9]      
Primary Diagnosis:  Intellectual disability [F79]      Mood disorder [F39]        Problem Dx:   
Primary Diagnosis:  Intellectual disability [F79]      Mood disorder [F39]        Problem Dx:   History of schizophrenia [Z86.59]      
Primary Diagnosis:  Mood disorder [F39]        Problem Dx:   Intellectual disability [F79]      
Primary Diagnosis:  Intellectual disability [F79]      Mood disorder [F39]        Problem Dx:   History of schizophrenia [Z86.59]      
Primary Diagnosis:  Intellectual disability [F79]      Mood disorder [F39]        Problem Dx:   History of schizophrenia [Z86.59]      
Primary Diagnosis:  Mood disorder [F39]        Problem Dx:   Intellectual disability [F79]      
Primary Diagnosis:  Intellectual disability [F79]      Mood disorder [F39]        Problem Dx:   
Primary Diagnosis:  Intellectual disability [F79]      Mood disorder [F39]        Problem Dx:   History of schizophrenia [Z86.59]      
Primary Diagnosis:  Mood disorder [F39]        Problem Dx:   Intellectual disability [F79]      
Primary Diagnosis:  Intellectual disability [F79]      Mood disorder [F39]        Problem Dx:   History of schizophrenia [Z86.59]      
Primary Diagnosis:  Intellectual disability [F79]      Mood disorder [F39]        Problem Dx:   Insomnia [G47.00]      Anxiety [F41.9]      
Primary Diagnosis:  Intellectual disability [F79]      Mood disorder [F39]        Problem Dx:   History of schizophrenia [Z86.59]      
Primary Diagnosis:  Intellectual disability [F79]      Mood disorder [F39]        Problem Dx:   History of schizophrenia [Z86.59]

## 2025-03-27 NOTE — BH PSYCHOLOGY - CLINICIAN PSYCHOTHERAPY NOTE - NSBHPTASSESSDT_PSY_A_CORE
15-May-2024 09:45
28-May-2024 09:20
10-Nabor-2025 12:15
27-Jun-2024 09:40
06-Sep-2024 10:10
15-Nov-2024 11:50
26-Feb-2024 09:00
03-Jun-2024 11:50
06-May-2024 09:05
10-May-2024 02:15
12-Feb-2024 09:30
07-Aug-2024 09:25
05-Nov-2024 11:40
14-Aug-2024 01:20
23-May-2024 09:35
31-May-2024 09:30
03-Mar-2025 11:00
03-Jul-2024 11:10
15-Feb-2024 12:00
25-Jun-2024 09:25
27-Mar-2025 11:10
06-Dec-2024 11:40
14-Oct-2024 08:55
19-Dec-2024 11:35
21-Nov-2024 11:40
22-Jul-2024 10:50
22-Oct-2024 11:05
14-Feb-2024 12:55
31-Jan-2025 09:05
05-Mar-2024 09:20
18-Jun-2024 02:25
23-Aug-2024 11:40
12-Jun-2024 09:35
17-Apr-2024 09:20
28-Oct-2024 12:05
03-Dec-2024 01:50
11-Mar-2025 11:10
17-Oct-2024 11:45
20-May-2024 09:30
16-Sep-2024 10:40
10-Dexter-2024 09:15
27-Sep-2024 12:35
04-Sep-2024 02:15
10-Jul-2024 11:55

## 2025-03-27 NOTE — BH PSYCHOLOGY - CLINICIAN PSYCHOTHERAPY NOTE - NSBHPSYCHOLGOALS_PSY_A_CORE
Decrease symptoms/Improve social/vocational/coping skills/Psychoeducation/Treatment compliance
Decrease symptoms/Assessment/Improve social/vocational/coping skills/Psychoeducation/Treatment compliance
Decrease symptoms/Improve social/vocational/coping skills/Psychoeducation/Treatment compliance
Decrease symptoms/Assessment/Improve social/vocational/coping skills/Psychoeducation/Treatment compliance
Decrease symptoms/Improve social/vocational/coping skills/Psychoeducation/Treatment compliance
Decrease symptoms/Assessment/Improve social/vocational/coping skills/Psychoeducation/Treatment compliance
Improve social/vocational/coping skills/Psychoeducation/Treatment compliance
Decrease symptoms/Improve social/vocational/coping skills/Psychoeducation/Treatment compliance

## 2025-03-27 NOTE — BH PSYCHOLOGY - CLINICIAN PSYCHOTHERAPY NOTE - NSBHPSYCHOLASSESSPROV_PSY_A_CORE
Licensed Psychologist

## 2025-03-27 NOTE — BH INPATIENT PSYCHIATRY PROGRESS NOTE - NSBHFUPINTERVALHXFT_PSY_A_CORE
Patient is followed up for NDD. Chart, medications and labs reviewed. Patient is discussed during morning brief, no overnight events, has been in good behavioral control.   Patient was seen and is evaluated on the unit. She continues to exhibit childlike behaviors. On interview presents with bright affect, remains focused on going to group home and is aware of tentative move out date 4/1. Was able to shower and have breakfast today morning. She has been compliant with standing medications, denies SE. No acute medical concerns, VSS.

## 2025-03-27 NOTE — BH PSYCHOLOGY - CLINICIAN PSYCHOTHERAPY NOTE - NSTXCOPEDATETRGT_PSY_ALL_CORE
11-Sep-2024
05-Feb-2025
10-Apr-2024
23-May-2024
16-Feb-2024
10-Oct-2024
04-Dec-2024
06-Jun-2024
28-Aug-2024
02-Apr-2025
26-Feb-2025
06-Mar-2024
18-Dec-2024
27-Nov-2024
20-Nov-2024
06-Jun-2024
06-Jun-2024
04-Jul-2024
24-Jul-2024
06-Jun-2024
23-May-2024
16-Feb-2024
02-Oct-2024
10-Oct-2024
16-Feb-2024
12-Mar-2025
08-Jan-2025
24-Jul-2024
24-Jun-2024
01-May-2024
23-May-2024
24-Jul-2024
10-Oct-2024
11-Dec-2024
01-May-2024
10-Oct-2024
10-Oct-2024
18-Sep-2024
04-Jul-2024
01-May-2024
06-Jun-2024
08-Mar-2024
28-Aug-2024
04-Jul-2024

## 2025-03-27 NOTE — BH PSYCHOLOGY - CLINICIAN PSYCHOTHERAPY NOTE - NSTXCOPEPROGRES_PSY_ALL_CORE
Improving
No Change
Improving
Met - goal discontinued
No Change
Improving
Met - goal discontinued
Met - goal discontinued
No Change
Met - goal discontinued
Improving
Met - goal discontinued
Met - goal discontinued
No Change
Met - goal discontinued
Met - goal discontinued
Improving
Met - goal discontinued
Met - goal discontinued
Improving
Met - goal discontinued
Improving
No Change
Improving
Met - goal discontinued
Met - goal discontinued
No Change
Met - goal discontinued
Improving
Met - goal discontinued
Improving

## 2025-03-27 NOTE — BH PSYCHOLOGY - CLINICIAN PSYCHOTHERAPY NOTE - NSBHPSYCHOLDURATION_PSY_A_CORE FT
16 minutes
25 minutes 
16 minutes 
25 minutes 
35 minutes
25 minutes

## 2025-03-27 NOTE — BH PSYCHOLOGY - CLINICIAN PSYCHOTHERAPY NOTE - NSTXDCHOUSGOAL_PSY_ALL_CORE
Will agree to participate in housing process
Will meet with care coordinator and accept services
Will agree to participate in housing process
Will meet with care coordinator and accept services
Will agree to participate in housing process
Will meet with care coordinator and accept services
Will agree to participate in housing process
Will meet with care coordinator and accept services

## 2025-03-27 NOTE — BH PSYCHOLOGY - CLINICIAN PSYCHOTHERAPY NOTE - NSBHPSYCHOLPARTICIP_PSY_A_CORE
Fully engaged
Partially engaged
Fully engaged
Partially engaged
Fully engaged
Fully engaged
Partially engaged
Fully engaged
Partially engaged
Fully engaged
Partially engaged
Fully engaged
Partially engaged
Fully engaged

## 2025-03-27 NOTE — BH PSYCHOLOGY - CLINICIAN PSYCHOTHERAPY NOTE - NSBHPSYCHOLINT_PSY_A_CORE
Cognitive/behavioral therapy/Dialectical  Behavioral Therapy (DBT)/Supported coping skills/Supportive therapy/Treatment compliance encouraged/other...
Cognitive/behavioral therapy/Dialectical  Behavioral Therapy (DBT)/Dynamic issues addressed/Supported coping skills/Supportive therapy/Treatment compliance encouraged
Cognitive/behavioral therapy/Dialectical  Behavioral Therapy (DBT)/Supported coping skills/Supportive therapy/Treatment compliance encouraged/other...
Dynamic issues addressed/Stress management/Supported coping skills/Supportive therapy/Treatment compliance encouraged
Cognitive/behavioral therapy/Dialectical  Behavioral Therapy (DBT)/Supported coping skills/Supportive therapy/Treatment compliance encouraged/other...
Cognitive/behavioral therapy/Dialectical  Behavioral Therapy (DBT)/Supported coping skills/Supportive therapy/Treatment compliance encouraged/other...
Dialectical  Behavioral Therapy (DBT)/Dynamic issues addressed/Supported coping skills/Supportive therapy/Treatment compliance encouraged/other...
Dialectical  Behavioral Therapy (DBT)/Supported coping skills/Supportive therapy/Treatment compliance encouraged/other...
Cognitive/behavioral therapy/Dialectical  Behavioral Therapy (DBT)/Supported coping skills/Supportive therapy/Treatment compliance encouraged/other...
Dialectical  Behavioral Therapy (DBT)/Dynamic issues addressed/Supported coping skills/Supportive therapy/Treatment compliance encouraged
Cognitive/behavioral therapy/Dialectical  Behavioral Therapy (DBT)/Supported coping skills/Supportive therapy/Treatment compliance encouraged/other...
Cognitive/behavioral therapy/Dialectical  Behavioral Therapy (DBT)/Dynamic issues addressed/Supported coping skills/Supportive therapy/Treatment compliance encouraged
Cognitive/behavioral therapy/Dialectical  Behavioral Therapy (DBT)/Supported coping skills/Supportive therapy/Treatment compliance encouraged/other...
Dynamic issues addressed/Stress management/Supported coping skills/Supportive therapy/Treatment compliance encouraged/other...
Cognitive/behavioral therapy/Dialectical  Behavioral Therapy (DBT)/Supported coping skills/Supportive therapy/Treatment compliance encouraged
Dialectical  Behavioral Therapy (DBT)/Dynamic issues addressed/Supported coping skills/Supportive therapy/Treatment compliance encouraged
Dynamic issues addressed/Stress management/Supported coping skills/Supportive therapy/Treatment compliance encouraged/other...
Cognitive/behavioral therapy/Dialectical  Behavioral Therapy (DBT)/Supported coping skills/Supportive therapy/Treatment compliance encouraged/other...
Dialectical  Behavioral Therapy (DBT)/Dynamic issues addressed/Supported coping skills/Supportive therapy/Treatment compliance encouraged/other...
Cognitive/behavioral therapy/Dialectical  Behavioral Therapy (DBT)/Dynamic issues addressed/Supported coping skills/Supportive therapy/Treatment compliance encouraged
Cognitive/behavioral therapy/Dialectical  Behavioral Therapy (DBT)/Supported coping skills/Supportive therapy/Treatment compliance encouraged/other...
Cognitive/behavioral therapy/Dialectical  Behavioral Therapy (DBT)/Dynamic issues addressed/Supported coping skills/Supportive therapy/Treatment compliance encouraged
Cognitive/behavioral therapy/Dynamic issues addressed/Supported coping skills/Supportive therapy/Treatment compliance encouraged
Cognitive/behavioral therapy/Dynamic issues addressed/Stress management/Supported coping skills/Supportive therapy/Treatment compliance encouraged/other...
Dynamic issues addressed/Stress management/Supported coping skills/Supportive therapy/Treatment compliance encouraged
Cognitive/behavioral therapy/Dialectical  Behavioral Therapy (DBT)/Supported coping skills/Supportive therapy/Treatment compliance encouraged/other...
Dialectical  Behavioral Therapy (DBT)/Supported coping skills/Supportive therapy/Treatment compliance encouraged
Cognitive/behavioral therapy/Dialectical  Behavioral Therapy (DBT)/Dynamic issues addressed/Supported coping skills/Supportive therapy/Treatment compliance encouraged
Cognitive/behavioral therapy/Dialectical  Behavioral Therapy (DBT)/Supported coping skills/Supportive therapy/Treatment compliance encouraged/other...
Dynamic issues addressed/Stress management/Supported coping skills/Supportive therapy/Treatment compliance encouraged/other...
Cognitive/behavioral therapy/Dialectical  Behavioral Therapy (DBT)/Supported coping skills/Supportive therapy/Treatment compliance encouraged/other...
Cognitive/behavioral therapy/Dynamic issues addressed/Stress management/Supported coping skills/Supportive therapy/Treatment compliance encouraged/other...
Cognitive/behavioral therapy/Dynamic issues addressed/Stress management/Supported coping skills/Supportive therapy/Treatment compliance encouraged/other...
Cognitive/behavioral therapy/Dialectical  Behavioral Therapy (DBT)/Supported coping skills/Supportive therapy/Treatment compliance encouraged
Cognitive/behavioral therapy/Dialectical  Behavioral Therapy (DBT)/Supported coping skills/Supportive therapy/Treatment compliance encouraged/other...
Dynamic issues addressed/Stress management/Supported coping skills/Supportive therapy/Treatment compliance encouraged/other...
Cognitive/behavioral therapy/Dialectical  Behavioral Therapy (DBT)/Supported coping skills/Supportive therapy/Treatment compliance encouraged/other...
Cognitive/behavioral therapy/Dialectical  Behavioral Therapy (DBT)/Supported coping skills/Supportive therapy/Treatment compliance encouraged/other...
Cognitive/behavioral therapy/Dynamic issues addressed/Stress management/Supported coping skills/Supportive therapy/Treatment compliance encouraged/other...
Cognitive/behavioral therapy/Dialectical  Behavioral Therapy (DBT)/Dynamic issues addressed/Supported coping skills/Supportive therapy/Treatment compliance encouraged
Cognitive/behavioral therapy/Dynamic issues addressed/Supported coping skills/Supportive therapy/Treatment compliance encouraged
Cognitive/behavioral therapy/Dialectical  Behavioral Therapy (DBT)/Supported coping skills/Supportive therapy/Treatment compliance encouraged/other...
Dynamic issues addressed/Stress management/Supported coping skills/Supportive therapy/Treatment compliance encouraged/other...
Dynamic issues addressed/Stress management/Supported coping skills/Supportive therapy/Treatment compliance encouraged/other...

## 2025-03-27 NOTE — BH PSYCHOLOGY - CLINICIAN PSYCHOTHERAPY NOTE - NSBHPSYCHOLSERV_PSY_A_CORE
Individual psychotherapy

## 2025-03-27 NOTE — BH PSYCHOLOGY - CLINICIAN PSYCHOTHERAPY NOTE - NSBHPSYCHOLADHERE_PSY_A_CORE
Good
Fair
Good
Fair
Good
Fair
Good
Fair
Good
Fair
Fair
Good
Fair
Good
Fair
Good
Good
Fair
Good
Fair
Good
Fair
Fair
Good

## 2025-03-27 NOTE — BH PSYCHOLOGY - CLINICIAN PSYCHOTHERAPY NOTE - NSBHPSYCHOLPROBSFT_PSY_ALL_CORE
Coping, ineffective; conduct problem
Coping, ineffective; discharge issue 
Coping, ineffective; conduct problem; discharge issue 
conduct problem; coping-ineffective; discharge issue- lack of appropriate housing 
Coping, ineffective; conduct problem; discharge issue 
Coping, ineffective; discharge issue 
Coping, ineffective; conduct problem; discharge issue 
Coping, ineffective; discharge issue 
Coping, ineffective; conduct problem; discharge issue 
Coping, ineffective; discharge issue 
Coping, ineffective; conduct problem; discharge issue 
Coping, ineffective; discharge issue 
Coping, ineffective; conduct problem; discharge issue 
Coping, ineffective; discharge issue 
Coping, ineffective; conduct problem; discharge issue 
Coping, ineffective; conduct problem
Coping, ineffective; conduct problem; discharge issue 
Coping, ineffective; conduct problem; discharge issue 
Coping, ineffective; discharge issue 
Coping, ineffective; conduct problem
Coping, ineffective; conduct problem
Coping, ineffective; discharge issue 
Coping, ineffective; conduct problem
Coping, ineffective; conduct problem; discharge issue 
Coping, ineffective; discharge issue 
Coping, ineffective; conduct problem; discharge issue 
Coping, ineffective; discharge issue 
Coping, ineffective; conduct problem; discharge issue 
Coping, ineffective; discharge issue

## 2025-03-27 NOTE — BH PSYCHOLOGY - CLINICIAN PSYCHOTHERAPY NOTE - NSTXPROBDCHOUS_PSY_ALL_CORE
DISCHARGE ISSUE - LACK OF APPROPRIATE HOUSING
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
## 2025-03-28 PROCEDURE — 99232 SBSQ HOSP IP/OBS MODERATE 35: CPT

## 2025-03-28 RX ORDER — OLANZAPINE 10 MG/1
1 TABLET ORAL
Qty: 30 | Refills: 0
Start: 2025-03-28 | End: 2025-04-26

## 2025-03-28 RX ORDER — HYDROXYZINE HYDROCHLORIDE 25 MG/1
2 TABLET, FILM COATED ORAL
Qty: 60 | Refills: 0
Start: 2025-03-28 | End: 2025-04-26

## 2025-03-28 RX ORDER — TRAZODONE HCL 100 MG
1 TABLET ORAL
Qty: 30 | Refills: 0
Start: 2025-03-28 | End: 2025-04-26

## 2025-03-28 RX ADMIN — OLANZAPINE 15 MILLIGRAM(S): 10 TABLET ORAL at 20:10

## 2025-03-28 RX ADMIN — HYDROXYZINE HYDROCHLORIDE 100 MILLIGRAM(S): 25 TABLET, FILM COATED ORAL at 20:10

## 2025-03-28 RX ADMIN — Medication 100 MILLIGRAM(S): at 20:10

## 2025-03-28 NOTE — BH INPATIENT PSYCHIATRY PROGRESS NOTE - NSBHFUPINTERVALHXFT_PSY_A_CORE
Patient is followed up for NDD. Chart, medications and labs reviewed. Patient is discussed during morning brief, no overnight events, has been in good behavioral control.   Patient was seen and is evaluated on the unit. She continues to exhibit childlike behaviors. On interview presents with bright affect, remains focused on going to group home and has been counting down days till discharge. She has been compliant with standing medications, denies SE. Pt's prescriptions sent over to [facility] [place]. No acute medical concerns, VSS.

## 2025-03-28 NOTE — BH INPATIENT PSYCHIATRY PROGRESS NOTE - NSBHCHARTREVIEWVS_PSY_A_CORE FT
Vital Signs Last 24 Hrs  T(C): --  T(F): --  HR: --  BP: --  BP(mean): --  RR: 16 (03-28-25 @ 07:45) (16 - 18)  SpO2: --
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
## 2025-03-28 NOTE — BH INPATIENT PSYCHIATRY PROGRESS NOTE - NSBHMETABOLIC_PSY_ALL_CORE_FT
BMI: BMI (kg/m2): 29.1 (01-25-25 @ 16:22)  HbA1c: A1C with Estimated Average Glucose Result: 6.1 % (10-18-24 @ 08:00)    Glucose: POCT Blood Glucose.: 89 mg/dL (01-12-25 @ 20:10)    BP: --Vital Signs Last 24 Hrs  T(C): --  T(F): --  HR: --  BP: --  BP(mean): --  RR: 16 (03-28-25 @ 07:45) (16 - 18)  SpO2: --      Lipid Panel: Date/Time: 10-18-24 @ 08:00  Cholesterol, Serum: 106  LDL Cholesterol Calculated: 53  HDL Cholesterol, Serum: 42  Total Cholesterol/HDL Ration Measurement: --  Triglycerides, Serum: 53

## 2025-03-29 RX ADMIN — Medication 100 MILLIGRAM(S): at 20:51

## 2025-03-29 RX ADMIN — OLANZAPINE 15 MILLIGRAM(S): 10 TABLET ORAL at 20:51

## 2025-03-29 RX ADMIN — HYDROXYZINE HYDROCHLORIDE 100 MILLIGRAM(S): 25 TABLET, FILM COATED ORAL at 20:51

## 2025-03-30 RX ADMIN — OLANZAPINE 15 MILLIGRAM(S): 10 TABLET ORAL at 20:31

## 2025-03-30 RX ADMIN — HYDROXYZINE HYDROCHLORIDE 100 MILLIGRAM(S): 25 TABLET, FILM COATED ORAL at 20:31

## 2025-03-30 RX ADMIN — Medication 100 MILLIGRAM(S): at 20:32

## 2025-03-31 PROCEDURE — 99238 HOSP IP/OBS DSCHRG MGMT 30/<: CPT

## 2025-03-31 PROCEDURE — 99232 SBSQ HOSP IP/OBS MODERATE 35: CPT

## 2025-03-31 RX ADMIN — HYDROXYZINE HYDROCHLORIDE 100 MILLIGRAM(S): 25 TABLET, FILM COATED ORAL at 20:21

## 2025-03-31 RX ADMIN — Medication 100 MILLIGRAM(S): at 20:21

## 2025-03-31 RX ADMIN — OLANZAPINE 15 MILLIGRAM(S): 10 TABLET ORAL at 20:21

## 2025-03-31 NOTE — BH INPATIENT PSYCHIATRY PROGRESS NOTE - NSTXPROBVIOLNT_PSY_ALL_CORE
VIOLENT/AGGRESSIVE BEHAVIOR

## 2025-03-31 NOTE — BH INPATIENT PSYCHIATRY PROGRESS NOTE - NSBHMSEAFFRANGE_PSY_A_CORE
Blunted
Full
Full
Blunted
Full
Blunted
Full

## 2025-03-31 NOTE — BH INPATIENT PSYCHIATRY PROGRESS NOTE - NSBHADMITIPREASONDETAILS_PSY_A_CORE FT
Safe dispo plan.

## 2025-03-31 NOTE — BH INPATIENT PSYCHIATRY PROGRESS NOTE - NSBHMSEEYE_PSY_A_CORE
Good
Fair
Good
Fair
Good
Fair
Good

## 2025-03-31 NOTE — BH INPATIENT PSYCHIATRY PROGRESS NOTE - NSBHMSESPEECH_PSY_A_CORE
Normal volume, rate, productivity, spontaneity and articulation
Abnormal as indicated, otherwise normal...
Abnormal as indicated, otherwise normal...
Normal volume, rate, productivity, spontaneity and articulation
Abnormal as indicated, otherwise normal...
Normal volume, rate, productivity, spontaneity and articulation

## 2025-03-31 NOTE — BH INPATIENT PSYCHIATRY PROGRESS NOTE - NSTXPSYCHOINTERMD_PSY_ALL_CORE
Work with interdisciplinary team to enhance coping skills, optimize meds, observe further for diagnostic clarification. 
Work with interdisciplinary team to enhance coping skills, optimize meds, observe further for diagnostic clarification. 
Work with interdisciplinary team to enhance coping skills, optimize meds, observe further for diagnostic clarification. Treat with risperidone and ensure adherence.
Work with interdisciplinary team to enhance coping skills, optimize meds, observe further for diagnostic clarification. Treat with risperidone and ensure adherence.
Work with interdisciplinary team to enhance coping skills, optimize meds, observe further for diagnostic clarification. Offer risperidone regularly to ensure adherence.
Work with interdisciplinary team to enhance coping skills, optimize meds, observe further for diagnostic clarification. Treat with risperidone and ensure adherence.
Work with interdisciplinary team to enhance coping skills, optimize meds, observe further for diagnostic clarification. 
Work with interdisciplinary team to enhance coping skills, optimize meds, observe further for diagnostic clarification. 
Work with interdisciplinary team to enhance coping skills, optimize meds, observe further for diagnostic clarification. Treat with risperidone and ensure adherence.
Work with interdisciplinary team to enhance coping skills, optimize meds, observe further for diagnostic clarification. 
Work with interdisciplinary team to enhance coping skills, optimize meds, observe further for diagnostic clarification. Treat with risperidone and ensure adherence.
Work with interdisciplinary team to enhance coping skills, optimize meds. 
Work with interdisciplinary team to enhance coping skills, optimize meds, observe further for diagnostic clarification. 
Work with interdisciplinary team to enhance coping skills, optimize meds, observe further for diagnostic clarification. Treat with risperidone and ensure adherence.
Work with interdisciplinary team to enhance coping skills, optimize meds, observe further for diagnostic clarification. Treat with risperidone and ensure adherence.
Work with interdisciplinary team to enhance coping skills, optimize meds, observe further for diagnostic clarification. 
Work with interdisciplinary team to enhance coping skills, optimize meds, observe further for diagnostic clarification. Treat with risperidone and ensure adherence.
Work with interdisciplinary team to enhance coping skills, optimize meds, observe further for diagnostic clarification. Treat with risperidone and ensure adherence.
Work with interdisciplinary team to enhance coping skills, optimize meds, observe further for diagnostic clarification. 
Work with interdisciplinary team to enhance coping skills, optimize meds, observe further for diagnostic clarification. Treat with risperidone and ensure adherence.
Work with interdisciplinary team to enhance coping skills, optimize meds, observe further for diagnostic clarification. Treat with risperidone and ensure adherence.
Work with interdisciplinary team to enhance coping skills, optimize meds, observe further for diagnostic clarification. 
Work with interdisciplinary team to enhance coping skills, optimize meds, observe further for diagnostic clarification. 
Work with interdisciplinary team to enhance coping skills, optimize meds, observe further for diagnostic clarification. Treat with risperidone and ensure adherence.
Work with interdisciplinary team to enhance coping skills, optimize meds, observe further for diagnostic clarification. Treat with risperidone and ensure adherence.
Work with interdisciplinary team to enhance coping skills, optimize meds, observe further for diagnostic clarification. 
Work with interdisciplinary team to enhance coping skills, optimize meds, observe further for diagnostic clarification. Treat with risperidone and ensure adherence.
Work with interdisciplinary team to enhance coping skills, optimize meds, observe further for diagnostic clarification. Treat with risperidone and ensure adherence.
Work with interdisciplinary team to enhance coping skills, optimize meds, observe further for diagnostic clarification. 
Work with interdisciplinary team to enhance coping skills, optimize meds, observe further for diagnostic clarification. 
Work with interdisciplinary team to enhance coping skills, optimize meds, observe further for diagnostic clarification. Treat with risperidone and ensure adherence.
Work with interdisciplinary team to enhance coping skills, optimize meds, observe further for diagnostic clarification. 
Work with interdisciplinary team to enhance coping skills, optimize meds, observe further for diagnostic clarification. Treat with risperidone and ensure adherence.
Work with interdisciplinary team to enhance coping skills, optimize meds, observe further for diagnostic clarification. 
Work with interdisciplinary team to enhance coping skills, optimize meds, observe further for diagnostic clarification. Treat with risperidone and ensure adherence.
Work with interdisciplinary team to enhance coping skills, optimize meds, observe further for diagnostic clarification. Treat with risperidone and ensure adherence.
Work with interdisciplinary team to enhance coping skills, optimize meds, observe further for diagnostic clarification. 
Work with interdisciplinary team to enhance coping skills, optimize meds, observe further for diagnostic clarification. Treat with risperidone and ensure adherence.
Work with interdisciplinary team to enhance coping skills, optimize meds, observe further for diagnostic clarification. Treat with risperidone and ensure adherence.
Work with interdisciplinary team to enhance coping skills, optimize meds, observe further for diagnostic clarification. 
Work with interdisciplinary team to enhance coping skills, optimize meds, observe further for diagnostic clarification. 
Work with interdisciplinary team to enhance coping skills, optimize meds, observe further for diagnostic clarification. Treat with risperidone and ensure adherence.
Work with interdisciplinary team to enhance coping skills, optimize meds, observe further for diagnostic clarification. 
Work with interdisciplinary team to enhance coping skills, optimize meds, observe further for diagnostic clarification. 
Work with interdisciplinary team to enhance coping skills, optimize meds, observe further for diagnostic clarification. Treat with risperidone and ensure adherence.
Work with interdisciplinary team to enhance coping skills, optimize meds, observe further for diagnostic clarification. 
Work with interdisciplinary team to enhance coping skills, optimize meds, observe further for diagnostic clarification. Offer risperidone regularly to ensure adherence.
Work with interdisciplinary team to enhance coping skills, optimize meds, observe further for diagnostic clarification. Treat with risperidone and ensure adherence.
Work with interdisciplinary team to enhance coping skills, optimize meds, observe further for diagnostic clarification. 
Work with interdisciplinary team to enhance coping skills, optimize meds, observe further for diagnostic clarification. Treat with risperidone and ensure adherence.
Work with interdisciplinary team to enhance coping skills, optimize meds, observe further for diagnostic clarification. Offer risperidone regularly to ensure adherence.
Work with interdisciplinary team to enhance coping skills, optimize meds, observe further for diagnostic clarification. Treat with risperidone and ensure adherence.
Work with interdisciplinary team to enhance coping skills, optimize meds, observe further for diagnostic clarification. 
Work with interdisciplinary team to enhance coping skills, optimize meds, observe further for diagnostic clarification. Treat with risperidone and ensure adherence.
Work with interdisciplinary team to enhance coping skills, optimize meds, observe further for diagnostic clarification. 
Work with interdisciplinary team to enhance coping skills, optimize meds, observe further for diagnostic clarification. Treat with risperidone and ensure adherence.
Work with interdisciplinary team to enhance coping skills, optimize meds, observe further for diagnostic clarification. 
Work with interdisciplinary team to enhance coping skills, optimize meds, observe further for diagnostic clarification. 
Work with interdisciplinary team to enhance coping skills, optimize meds, observe further for diagnostic clarification. Treat with risperidone and ensure adherence.
Work with interdisciplinary team to enhance coping skills, optimize meds, observe further for diagnostic clarification. 
Work with interdisciplinary team to enhance coping skills, optimize meds, observe further for diagnostic clarification. Treat with risperidone and ensure adherence.
Work with interdisciplinary team to enhance coping skills, optimize meds, observe further for diagnostic clarification. Treat with risperidone and ensure adherence.
Work with interdisciplinary team to enhance coping skills, optimize meds. 
Work with interdisciplinary team to enhance coping skills, optimize meds, observe further for diagnostic clarification. 
Work with interdisciplinary team to enhance coping skills, optimize meds, observe further for diagnostic clarification. Treat with risperidone and ensure adherence.
Work with interdisciplinary team to enhance coping skills, optimize meds, observe further for diagnostic clarification. Treat with risperidone and ensure adherence.
Work with interdisciplinary team to enhance coping skills, optimize meds, observe further for diagnostic clarification. 
Work with interdisciplinary team to enhance coping skills, optimize meds, observe further for diagnostic clarification. 
Work with interdisciplinary team to enhance coping skills, optimize meds, observe further for diagnostic clarification. Treat with risperidone and ensure adherence.
Work with interdisciplinary team to enhance coping skills, optimize meds, observe further for diagnostic clarification. 
Work with interdisciplinary team to enhance coping skills, optimize meds, observe further for diagnostic clarification. 
Work with interdisciplinary team to enhance coping skills, optimize meds, observe further for diagnostic clarification. Treat with risperidone and ensure adherence.
Work with interdisciplinary team to enhance coping skills, optimize meds, observe further for diagnostic clarification. 
Work with interdisciplinary team to enhance coping skills, optimize meds, observe further for diagnostic clarification. Treat with risperidone and ensure adherence.
Work with interdisciplinary team to enhance coping skills, optimize meds, observe further for diagnostic clarification. 
Work with interdisciplinary team to enhance coping skills, optimize meds, observe further for diagnostic clarification. Offer risperidone regularly to ensure adherence.
Work with interdisciplinary team to enhance coping skills, optimize meds, observe further for diagnostic clarification. Treat with risperidone and ensure adherence.
Work with interdisciplinary team to enhance coping skills, optimize meds, observe further for diagnostic clarification. 
Work with interdisciplinary team to enhance coping skills, optimize meds, observe further for diagnostic clarification. Treat with risperidone and ensure adherence.
Work with interdisciplinary team to enhance coping skills, optimize meds, observe further for diagnostic clarification. Treat with risperidone and ensure adherence.
Work with interdisciplinary team to enhance coping skills, optimize meds, observe further for diagnostic clarification. 
Work with interdisciplinary team to enhance coping skills, optimize meds, observe further for diagnostic clarification. Treat with risperidone and ensure adherence.
Work with interdisciplinary team to enhance coping skills, optimize meds, observe further for diagnostic clarification. 
Work with interdisciplinary team to enhance coping skills, optimize meds, observe further for diagnostic clarification. 
Work with interdisciplinary team to enhance coping skills, optimize meds, observe further for diagnostic clarification. Treat with risperidone and ensure adherence.
Work with interdisciplinary team to enhance coping skills, optimize meds, observe further for diagnostic clarification. Treat with risperidone and ensure adherence.
Work with interdisciplinary team to enhance coping skills, optimize meds, observe further for diagnostic clarification. 
Work with interdisciplinary team to enhance coping skills, optimize meds, observe further for diagnostic clarification. 
Work with interdisciplinary team to enhance coping skills, optimize meds, observe further for diagnostic clarification. Treat with risperidone and ensure adherence.
Work with interdisciplinary team to enhance coping skills, optimize meds, observe further for diagnostic clarification. 
Work with interdisciplinary team to enhance coping skills, optimize meds, observe further for diagnostic clarification. Treat with risperidone and ensure adherence.
Work with interdisciplinary team to enhance coping skills, optimize meds, observe further for diagnostic clarification. 
Work with interdisciplinary team to enhance coping skills, optimize meds, observe further for diagnostic clarification. Treat with risperidone and ensure adherence.
Work with interdisciplinary team to enhance coping skills, optimize meds, observe further for diagnostic clarification. 
Work with interdisciplinary team to enhance coping skills, optimize meds, observe further for diagnostic clarification. 
Work with interdisciplinary team to enhance coping skills, optimize meds, observe further for diagnostic clarification. Treat with risperidone and ensure adherence.
Work with interdisciplinary team to enhance coping skills, optimize meds, observe further for diagnostic clarification. 
Work with interdisciplinary team to enhance coping skills, optimize meds, observe further for diagnostic clarification. Treat with risperidone and ensure adherence.
Work with interdisciplinary team to enhance coping skills, optimize meds, observe further for diagnostic clarification. 
Work with interdisciplinary team to enhance coping skills, optimize meds, observe further for diagnostic clarification. Treat with risperidone and ensure adherence.
Work with interdisciplinary team to enhance coping skills, optimize meds, observe further for diagnostic clarification. 
Work with interdisciplinary team to enhance coping skills, optimize meds, observe further for diagnostic clarification. Treat with risperidone and ensure adherence.
Work with interdisciplinary team to enhance coping skills, optimize meds, observe further for diagnostic clarification. Treat with risperidone and ensure adherence.
Work with interdisciplinary team to enhance coping skills, optimize meds. 
Work with interdisciplinary team to enhance coping skills, optimize meds, observe further for diagnostic clarification. 
Work with interdisciplinary team to enhance coping skills, optimize meds, observe further for diagnostic clarification. 
Work with interdisciplinary team to enhance coping skills, optimize meds, observe further for diagnostic clarification. Treat with risperidone and ensure adherence.
Work with interdisciplinary team to enhance coping skills, optimize meds, observe further for diagnostic clarification. 
Work with interdisciplinary team to enhance coping skills, optimize meds, observe further for diagnostic clarification. Treat with risperidone and ensure adherence.
Work with interdisciplinary team to enhance coping skills, optimize meds, observe further for diagnostic clarification. Treat with risperidone and ensure adherence.
Work with interdisciplinary team to enhance coping skills, optimize meds, observe further for diagnostic clarification. 
Work with interdisciplinary team to enhance coping skills, optimize meds, observe further for diagnostic clarification. Treat with risperidone and ensure adherence.
Work with interdisciplinary team to enhance coping skills, optimize meds, observe further for diagnostic clarification. 
Work with interdisciplinary team to enhance coping skills, optimize meds, observe further for diagnostic clarification. Treat with risperidone and ensure adherence.
Work with interdisciplinary team to enhance coping skills, optimize meds, observe further for diagnostic clarification. 
Work with interdisciplinary team to enhance coping skills, optimize meds, observe further for diagnostic clarification. Treat with risperidone and ensure adherence.
Work with interdisciplinary team to enhance coping skills, optimize meds, observe further for diagnostic clarification. Treat with risperidone and ensure adherence.
Work with interdisciplinary team to enhance coping skills, optimize meds, observe further for diagnostic clarification. 
Work with interdisciplinary team to enhance coping skills, optimize meds, observe further for diagnostic clarification. 
Work with interdisciplinary team to enhance coping skills, optimize meds, observe further for diagnostic clarification. Treat with risperidone and ensure adherence.
Work with interdisciplinary team to enhance coping skills, optimize meds, observe further for diagnostic clarification. 
Work with interdisciplinary team to enhance coping skills, optimize meds, observe further for diagnostic clarification. 
Work with interdisciplinary team to enhance coping skills, optimize meds, observe further for diagnostic clarification. Treat with risperidone and ensure adherence.
Work with interdisciplinary team to enhance coping skills, optimize meds, observe further for diagnostic clarification. 
Work with interdisciplinary team to enhance coping skills, optimize meds, observe further for diagnostic clarification. Treat with risperidone and ensure adherence.
Work with interdisciplinary team to enhance coping skills, optimize meds, observe further for diagnostic clarification. 
Work with interdisciplinary team to enhance coping skills, optimize meds, observe further for diagnostic clarification. Treat with risperidone and ensure adherence.
Work with interdisciplinary team to enhance coping skills, optimize meds, observe further for diagnostic clarification. Treat with risperidone and ensure adherence.
Work with interdisciplinary team to enhance coping skills, optimize meds, observe further for diagnostic clarification. 
Work with interdisciplinary team to enhance coping skills, optimize meds, observe further for diagnostic clarification. Treat with risperidone and ensure adherence.
Work with interdisciplinary team to enhance coping skills, optimize meds, observe further for diagnostic clarification. 
Work with interdisciplinary team to enhance coping skills, optimize meds, observe further for diagnostic clarification. Offer risperidone regularly to ensure adherence.
Work with interdisciplinary team to enhance coping skills, optimize meds, observe further for diagnostic clarification. 
Work with interdisciplinary team to enhance coping skills, optimize meds, observe further for diagnostic clarification. 
Work with interdisciplinary team to enhance coping skills, optimize meds, observe further for diagnostic clarification. Treat with risperidone and ensure adherence.
Work with interdisciplinary team to enhance coping skills, optimize meds, observe further for diagnostic clarification. Treat with risperidone and ensure adherence.
Work with interdisciplinary team to enhance coping skills, optimize meds, observe further for diagnostic clarification. 
Work with interdisciplinary team to enhance coping skills, optimize meds, observe further for diagnostic clarification. Treat with risperidone and ensure adherence.
Work with interdisciplinary team to enhance coping skills, optimize meds, observe further for diagnostic clarification. Treat with risperidone and ensure adherence.
Work with interdisciplinary team to enhance coping skills, optimize meds, observe further for diagnostic clarification. 
Work with interdisciplinary team to enhance coping skills, optimize meds, observe further for diagnostic clarification. 
Work with interdisciplinary team to enhance coping skills, optimize meds, observe further for diagnostic clarification. Treat with risperidone and ensure adherence.
Work with interdisciplinary team to enhance coping skills, optimize meds, observe further for diagnostic clarification. Treat with risperidone and ensure adherence.
Work with interdisciplinary team to enhance coping skills, optimize meds, observe further for diagnostic clarification. 
Work with interdisciplinary team to enhance coping skills, optimize meds, observe further for diagnostic clarification. 
Work with interdisciplinary team to enhance coping skills, optimize meds, observe further for diagnostic clarification. Treat with risperidone and ensure adherence.
Work with interdisciplinary team to enhance coping skills, optimize meds. 
Work with interdisciplinary team to enhance coping skills, optimize meds, observe further for diagnostic clarification. Treat with risperidone and ensure adherence.

## 2025-03-31 NOTE — BH INPATIENT PSYCHIATRY PROGRESS NOTE - NSBHMSEGROOM_PSY_A_CORE
Good
Fair
Good
Good
Fair
Good
Fair
Good

## 2025-03-31 NOTE — BH INPATIENT PSYCHIATRY PROGRESS NOTE - NSBHMSEAFFQUAL_PSY_A_CORE
Euthymic

## 2025-03-31 NOTE — BH INPATIENT PSYCHIATRY PROGRESS NOTE - NSBHMSEBEHAV_PSY_A_CORE
Cooperative/Other
Other
Cooperative/Other
Other
Cooperative/Other
Other
Cooperative/Other

## 2025-03-31 NOTE — BH INPATIENT PSYCHIATRY PROGRESS NOTE - NSTXIMPULSDATETRGT_PSY_ALL_CORE
06-Jun-2024
07-Mar-2024
22-Aug-2024
01-May-2024
06-Jun-2024
06-Jun-2024
17-Oct-2024
19-Feb-2025
22-Aug-2024
02-Apr-2025
02-Oct-2024
04-Jul-2024
08-Aug-2024
10-Apr-2024
17-Oct-2024
17-Oct-2024
02-Oct-2024
07-Mar-2024
08-Jan-2025
11-Dec-2024
18-Dec-2024
24-Apr-2024
26-Mar-2025
31-Jul-2024
04-Apr-2024
06-Jun-2024
07-Mar-2024
17-Oct-2024
22-Jan-2025
25-Jun-2024
28-Aug-2024
04-Apr-2024
06-Jun-2024
10-Apr-2024
16-Feb-2024
17-Oct-2024
01-May-2024
11-Dec-2024
17-Oct-2024
26-Feb-2025
07-Mar-2024
12-Mar-2025
17-Oct-2024
17-Oct-2024
28-Aug-2024
04-Dec-2024
08-Aug-2024
08-Aug-2024
17-Oct-2024
22-Aug-2024
06-Mar-2024
06-Jun-2024
16-Feb-2024
26-Feb-2025
06-Jun-2024
18-Dec-2024
19-Sep-2024
02-Oct-2024
10-Apr-2024
10-Apr-2024
17-Oct-2024
18-Dec-2024
26-Mar-2025
31-Jul-2024
05-Feb-2025
12-Feb-2025
12-Mar-2025
17-Oct-2024
31-Jul-2024
04-Apr-2024
12-Mar-2025
07-Mar-2024
23-May-2024
28-Aug-2024
04-Apr-2024
06-Mar-2024
11-Sep-2024
12-Mar-2025
22-Aug-2024
26-Feb-2025
27-Nov-2024
31-Jul-2024
06-Jun-2024
06-Jun-2024
08-Aug-2024
10-Apr-2024
11-Dec-2024
16-Feb-2024
06-Jun-2024
08-Jan-2025
19-Feb-2025
16-Feb-2024
25-Jun-2024
02-Oct-2024
17-Oct-2024
19-Sep-2024
26-Feb-2025
06-Jun-2024
10-Apr-2024
10-Apr-2024
20-Nov-2024
07-Mar-2024
17-Oct-2024
20-Nov-2024
27-Nov-2024
02-Oct-2024
16-Feb-2024
22-Aug-2024
16-Feb-2024
02-Oct-2024
02-Oct-2024
06-Mar-2024
12-Mar-2025
17-Oct-2024
17-Oct-2024
04-Dec-2024
08-Aug-2024
28-Aug-2024
28-Aug-2024
06-Mar-2024
10-Apr-2024
11-Sep-2024
23-May-2024
01-May-2024
04-Jul-2024
06-Jun-2024
07-Mar-2024
02-Oct-2024
06-Jun-2024
08-Aug-2024
17-Oct-2024
31-Jul-2024
04-Jul-2024
06-Jun-2024
17-Oct-2024
01-May-2024
01-May-2024
02-Oct-2024
04-Apr-2024
08-Aug-2024
20-Nov-2024
23-May-2024
01-May-2024
08-Aug-2024
01-May-2024
29-Jan-2025
06-Jun-2024
07-Mar-2024
10-Apr-2024
18-Dec-2024
24-Apr-2024
02-Oct-2024
06-Jun-2024
08-Jan-2025
11-Sep-2024
19-Feb-2025
23-May-2024
07-Mar-2024
16-Feb-2024
29-Jan-2025
01-May-2024
01-May-2024
02-Oct-2024
02-Oct-2024
07-Mar-2024
07-Mar-2024
08-Jan-2025
11-Sep-2024
31-Jul-2024
31-Jul-2024
04-Apr-2024
04-Jul-2024
19-Sep-2024
27-Nov-2024
31-Jul-2024
04-Apr-2024
04-Jul-2024
06-Jun-2024
19-Sep-2024
04-Apr-2024
22-Jan-2025
24-Apr-2024
25-Jun-2024
31-Jul-2024
01-May-2024
02-Oct-2024
04-Jul-2024
08-Jan-2025
31-Jul-2024
18-Dec-2024
02-Oct-2024
04-Dec-2024
12-Mar-2025
17-Oct-2024
04-Apr-2024
06-Jun-2024
07-Mar-2024
08-Aug-2024
17-Oct-2024
24-Apr-2024
28-Aug-2024
22-Jan-2025
06-Mar-2024
17-Oct-2024
23-May-2024
28-Aug-2024
31-Jul-2024
26-Feb-2025
31-Jul-2024
01-May-2024
17-Oct-2024
23-May-2024
25-Jun-2024
07-Mar-2024
18-Dec-2024
19-Sep-2024
07-Mar-2024
08-Aug-2024
16-Feb-2024
17-Oct-2024
01-May-2024
01-May-2024
02-Oct-2024
29-Jan-2025
23-May-2024
07-Mar-2024
18-Dec-2024
31-Jul-2024
05-Feb-2025
08-Jan-2025
16-Feb-2024
01-May-2024
31-Jul-2024
31-Jul-2024
04-Jul-2024
11-Sep-2024
08-Jan-2025
26-Feb-2025
29-Jan-2025
01-May-2024
10-Apr-2024
23-May-2024
23-May-2024
26-Feb-2025
26-Feb-2025
04-Jul-2024
02-Apr-2025
26-Mar-2025
19-Feb-2025
05-Feb-2025
12-Feb-2025
11-Dec-2024
08-Jan-2025
17-Oct-2024
18-Dec-2024
22-Jan-2025
05-Feb-2025
11-Dec-2024
16-Feb-2024
20-Nov-2024
20-Nov-2024
08-Jan-2025
27-Nov-2024
26-Feb-2025
01-Jan-2025
12-Mar-2025
16-Feb-2024
18-Dec-2024
12-Feb-2025
12-Mar-2025
04-Dec-2024
26-Mar-2025
29-Jan-2025
17-Oct-2024
05-Feb-2025
18-Dec-2024
12-Feb-2025
12-Mar-2025
27-Nov-2024
01-Jan-2025
02-Apr-2025
26-Feb-2025
02-Apr-2025
12-Feb-2025
12-Mar-2025
26-Mar-2025

## 2025-03-31 NOTE — BH INPATIENT PSYCHIATRY PROGRESS NOTE - NSTXVIOLNTINTERMD_PSY_ALL_CORE
Work with interdisciplinary team to enhance coping skills, optimize meds. 
Work with interdisciplinary team to enhance coping skills, optimize meds. 
Work with interdisciplinary team to enhance coping skills, optimize meds. Treat with risperidone and ensure adherence.
Work with interdisciplinary team to enhance coping skills, optimize meds. 
Work with interdisciplinary team to enhance coping skills, optimize meds. Treat with risperidone and ensure adherence.
Work with interdisciplinary team to enhance coping skills, optimize meds. 
Work with interdisciplinary team to enhance coping skills, optimize meds. Treat with risperidone and ensure adherence.
Work with interdisciplinary team to enhance coping skills, optimize meds. 
Work with interdisciplinary team to enhance coping skills, optimize meds. 
Work with interdisciplinary team to enhance coping skills, optimize meds. Treat with risperidone and ensure adherence.
Work with interdisciplinary team to enhance coping skills, optimize meds. 
Work with interdisciplinary team to enhance coping skills, optimize meds. Treat with risperidone and ensure adherence.
Work with interdisciplinary team to enhance coping skills, optimize meds. 
Work with interdisciplinary team to enhance coping skills, optimize meds. 
Work with interdisciplinary team to enhance coping skills, optimize meds. Treat with risperidone and ensure adherence.
Work with interdisciplinary team to enhance coping skills, optimize meds. 
Work with interdisciplinary team to enhance coping skills, optimize meds. Treat with risperidone and ensure adherence.
Work with interdisciplinary team to enhance coping skills, optimize meds. 
Work with interdisciplinary team to enhance coping skills, optimize meds. Treat with risperidone and ensure adherence.
Work with interdisciplinary team to enhance coping skills, optimize meds. 
Work with interdisciplinary team to enhance coping skills, optimize meds. Treat with risperidone and ensure adherence.
Work with interdisciplinary team to enhance coping skills, optimize meds. 
Work with interdisciplinary team to enhance coping skills, optimize meds. Treat with risperidone and ensure adherence.
Work with interdisciplinary team to enhance coping skills, optimize meds. Treat with risperidone and ensure adherence.
Work with interdisciplinary team to enhance coping skills, optimize meds. 
Work with interdisciplinary team to enhance coping skills, optimize meds. 
Work with interdisciplinary team to enhance coping skills, optimize meds. Treat with risperidone and ensure adherence.
Work with interdisciplinary team to enhance coping skills, optimize meds. 
Work with interdisciplinary team to enhance coping skills, optimize meds. Treat with risperidone and ensure adherence.
Work with interdisciplinary team to enhance coping skills, optimize meds. 
Work with interdisciplinary team to enhance coping skills, optimize meds. 
Work with interdisciplinary team to enhance coping skills, optimize meds. Offer risperidone regularly to ensure adherence.
Work with interdisciplinary team to enhance coping skills, optimize meds. Treat with risperidone and ensure adherence.
Work with interdisciplinary team to enhance coping skills, optimize meds. Treat with risperidone and ensure adherence.
Work with interdisciplinary team to enhance coping skills, optimize meds. 
Work with interdisciplinary team to enhance coping skills, optimize meds. Treat with risperidone and ensure adherence.
Work with interdisciplinary team to enhance coping skills, optimize meds. 
Work with interdisciplinary team to enhance coping skills, optimize meds. Treat with risperidone and ensure adherence.
Work with interdisciplinary team to enhance coping skills, optimize meds. 
Work with interdisciplinary team to enhance coping skills, optimize meds. 
Work with interdisciplinary team to enhance coping skills, optimize meds. Treat with risperidone and ensure adherence.
Work with interdisciplinary team to enhance coping skills, optimize meds. 
Work with interdisciplinary team to enhance coping skills, optimize meds. Treat with risperidone and ensure adherence.
Work with interdisciplinary team to enhance coping skills, optimize meds. Treat with risperidone and ensure adherence.
Work with interdisciplinary team to enhance coping skills, optimize meds. 
Work with interdisciplinary team to enhance coping skills, optimize meds. Treat with risperidone and ensure adherence.
Work with interdisciplinary team to enhance coping skills, optimize meds. 
Work with interdisciplinary team to enhance coping skills, optimize meds. Treat with risperidone and ensure adherence.
Work with interdisciplinary team to enhance coping skills, optimize meds. 
Work with interdisciplinary team to enhance coping skills, optimize meds. 
Work with interdisciplinary team to enhance coping skills, optimize meds. Treat with risperidone and ensure adherence.
Work with interdisciplinary team to enhance coping skills, optimize meds. 
Work with interdisciplinary team to enhance coping skills, optimize meds. Treat with risperidone and ensure adherence.
Work with interdisciplinary team to enhance coping skills, optimize meds. 
Work with interdisciplinary team to enhance coping skills, optimize meds. Treat with risperidone and ensure adherence.
Work with interdisciplinary team to enhance coping skills, optimize meds. 
Work with interdisciplinary team to enhance coping skills, optimize meds. Offer risperidone regularly to ensure adherence.
Work with interdisciplinary team to enhance coping skills, optimize meds. Treat with risperidone and ensure adherence.
Work with interdisciplinary team to enhance coping skills, optimize meds. 
Work with interdisciplinary team to enhance coping skills, optimize meds. Offer risperidone regularly to ensure adherence.
Work with interdisciplinary team to enhance coping skills, optimize meds. Treat with risperidone and ensure adherence.
Work with interdisciplinary team to enhance coping skills, optimize meds. 
Work with interdisciplinary team to enhance coping skills, optimize meds. Treat with risperidone and ensure adherence.
Work with interdisciplinary team to enhance coping skills, optimize meds. 
Work with interdisciplinary team to enhance coping skills, optimize meds. 
Work with interdisciplinary team to enhance coping skills, optimize meds. Treat with risperidone and ensure adherence.
Work with interdisciplinary team to enhance coping skills, optimize meds. 
Work with interdisciplinary team to enhance coping skills, optimize meds. 
Work with interdisciplinary team to enhance coping skills, optimize meds. Treat with risperidone and ensure adherence.
Work with interdisciplinary team to enhance coping skills, optimize meds. Treat with risperidone and ensure adherence.
Work with interdisciplinary team to enhance coping skills, optimize meds. 
Work with interdisciplinary team to enhance coping skills, optimize meds. Treat with risperidone and ensure adherence.
Work with interdisciplinary team to enhance coping skills, optimize meds. Treat with risperidone and ensure adherence.
Work with interdisciplinary team to enhance coping skills, optimize meds. 
Work with interdisciplinary team to enhance coping skills, optimize meds. 
Work with interdisciplinary team to enhance coping skills, optimize meds. Treat with risperidone and ensure adherence.
Work with interdisciplinary team to enhance coping skills, optimize meds. 
Work with interdisciplinary team to enhance coping skills, optimize meds. Treat with risperidone and ensure adherence.
Work with interdisciplinary team to enhance coping skills, optimize meds. 
Work with interdisciplinary team to enhance coping skills, optimize meds. Treat with risperidone and ensure adherence.
Work with interdisciplinary team to enhance coping skills, optimize meds. 
Work with interdisciplinary team to enhance coping skills, optimize meds. Treat with risperidone and ensure adherence.
Work with interdisciplinary team to enhance coping skills, optimize meds. 
Work with interdisciplinary team to enhance coping skills, optimize meds. Treat with risperidone and ensure adherence.
Work with interdisciplinary team to enhance coping skills, optimize meds. 
Work with interdisciplinary team to enhance coping skills, optimize meds. 
Work with interdisciplinary team to enhance coping skills, optimize meds. Treat with risperidone and ensure adherence.
Work with interdisciplinary team to enhance coping skills, optimize meds. 
Work with interdisciplinary team to enhance coping skills, optimize meds. Treat with risperidone and ensure adherence.
Work with interdisciplinary team to enhance coping skills, optimize meds. 
Work with interdisciplinary team to enhance coping skills, optimize meds. Treat with risperidone and ensure adherence.
Work with interdisciplinary team to enhance coping skills, optimize meds. 
Work with interdisciplinary team to enhance coping skills, optimize meds. Treat with risperidone and ensure adherence.
Work with interdisciplinary team to enhance coping skills, optimize meds. 
Work with interdisciplinary team to enhance coping skills, optimize meds. 
Work with interdisciplinary team to enhance coping skills, optimize meds. Treat with risperidone and ensure adherence.
Work with interdisciplinary team to enhance coping skills, optimize meds. 
Work with interdisciplinary team to enhance coping skills, optimize meds. 
Work with interdisciplinary team to enhance coping skills, optimize meds. Treat with risperidone and ensure adherence.
Work with interdisciplinary team to enhance coping skills, optimize meds. 
Work with interdisciplinary team to enhance coping skills, optimize meds. Offer risperidone regularly to ensure adherence.
Work with interdisciplinary team to enhance coping skills, optimize meds. Treat with risperidone and ensure adherence.
Work with interdisciplinary team to enhance coping skills, optimize meds. 
Work with interdisciplinary team to enhance coping skills, optimize meds. Treat with risperidone and ensure adherence.
Work with interdisciplinary team to enhance coping skills, optimize meds. 
Work with interdisciplinary team to enhance coping skills, optimize meds. Treat with risperidone and ensure adherence.
Work with interdisciplinary team to enhance coping skills, optimize meds. 
Work with interdisciplinary team to enhance coping skills, optimize meds. Treat with risperidone and ensure adherence.
Work with interdisciplinary team to enhance coping skills, optimize meds. Treat with risperidone and ensure adherence.
Work with interdisciplinary team to enhance coping skills, optimize meds. 
Work with interdisciplinary team to enhance coping skills, optimize meds. 
Work with interdisciplinary team to enhance coping skills, optimize meds. Treat with risperidone and ensure adherence.
Work with interdisciplinary team to enhance coping skills, optimize meds. 
Work with interdisciplinary team to enhance coping skills, optimize meds. 
Work with interdisciplinary team to enhance coping skills, optimize meds. Treat with risperidone and ensure adherence.
Work with interdisciplinary team to enhance coping skills, optimize meds. 
Work with interdisciplinary team to enhance coping skills, optimize meds. Offer risperidone regularly to ensure adherence.
Work with interdisciplinary team to enhance coping skills, optimize meds. Treat with risperidone and ensure adherence.
Work with interdisciplinary team to enhance coping skills, optimize meds. 
Work with interdisciplinary team to enhance coping skills, optimize meds. 
Work with interdisciplinary team to enhance coping skills, optimize meds. Treat with risperidone and ensure adherence.
Work with interdisciplinary team to enhance coping skills, optimize meds. Treat with risperidone and ensure adherence.
Work with interdisciplinary team to enhance coping skills, optimize meds. 
Work with interdisciplinary team to enhance coping skills, optimize meds. Treat with risperidone and ensure adherence.
Work with interdisciplinary team to enhance coping skills, optimize meds. 
Work with interdisciplinary team to enhance coping skills, optimize meds. Treat with risperidone and ensure adherence.
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
## 2025-03-31 NOTE — BH INPATIENT PSYCHIATRY PROGRESS NOTE - NSTXDCHOUSDATETRGT_PSY_ALL_CORE
19-Sep-2024
24-Jul-2024
02-Jul-2024
22-Jan-2025
24-Jul-2024
07-Aug-2024
14-Aug-2024
14-Aug-2024
19-Sep-2024
21-May-2024
23-Oct-2024
23-Oct-2024
29-Aug-2024
03-Apr-2024
05-Mar-2025
10-Oct-2024
14-Aug-2024
16-Oct-2024
03-Apr-2024
03-Oct-2024
30-Apr-2024
11-Dec-2024
20-Nov-2024
21-May-2024
30-Oct-2024
06-Nov-2024
10-Apr-2024
12-Mar-2024
04-Jun-2024
22-Aug-2024
04-Jun-2024
07-May-2024
10-Apr-2024
10-Apr-2024
12-Mar-2025
22-Aug-2024
26-Feb-2025
05-Sep-2024
25-Dec-2024
31-Jul-2024
06-Nov-2024
23-Oct-2024
05-Mar-2024
05-Mar-2025
12-Feb-2025
22-Jan-2025
02-Jul-2024
03-Oct-2024
16-Oct-2024
17-Jul-2024
27-Nov-2024
02-Jul-2024
11-Jun-2024
12-Feb-2025
13-Nov-2024
25-Dec-2024
26-Feb-2025
09-Jan-2025
18-Jun-2024
28-May-2024
02-Oct-2024
19-Feb-2024
25-Jun-2024
14-Aug-2024
11-Jun-2024
07-Aug-2024
11-Jun-2024
20-Mar-2024
03-Oct-2024
17-Apr-2024
18-Dec-2024
25-Jun-2024
12-Sep-2024
14-May-2024
10-Oct-2024
20-Mar-2024
20-Nov-2024
27-Nov-2024
30-Apr-2024
30-Apr-2024
02-Jul-2024
05-Sep-2024
19-Feb-2024
26-Feb-2025
28-Aug-2024
31-Jul-2024
06-Nov-2024
14-Mar-2024
14-May-2024
11-Dec-2024
19-Sep-2024
20-Nov-2024
21-May-2024
02-Oct-2024
03-Apr-2024
10-Oct-2024
11-Jun-2024
19-Feb-2025
05-Feb-2025
06-Mar-2024
11-Jun-2024
30-Apr-2024
10-Jul-2024
12-Feb-2025
24-Jul-2024
25-Dec-2024
27-Mar-2025
04-Dec-2024
07-Aug-2024
17-Apr-2024
18-Jun-2024
21-May-2024
27-Mar-2025
07-May-2024
31-Jul-2024
12-Feb-2025
17-Apr-2024
26-Feb-2025
02-Oct-2024
06-Nov-2024
20-Nov-2024
28-May-2024
02-Jul-2024
05-Sep-2024
06-Nov-2024
10-Oct-2024
11-Dec-2024
19-Mar-2025
24-Apr-2024
31-Jul-2024
03-Apr-2024
10-Apr-2024
12-Sep-2024
22-Aug-2024
28-May-2024
08-Apr-2025
12-Sep-2024
15-Nabor-2025
24-Jul-2024
25-Dec-2024
30-Oct-2024
07-Aug-2024
19-Feb-2024
25-Dec-2024
29-Aug-2024
31-Jul-2024
07-Aug-2024
10-Apr-2024
10-Apr-2024
12-Sep-2024
16-Oct-2024
21-May-2024
07-May-2024
19-Feb-2025
29-Aug-2024
03-Apr-2024
05-Mar-2025
05-Sep-2024
10-Apr-2024
19-Sep-2024
12-Feb-2025
19-Mar-2025
31-Jul-2024
03-Oct-2024
04-Jun-2024
11-Jun-2024
12-Mar-2024
18-Dec-2024
07-May-2024
13-Nov-2024
16-Oct-2024
24-Apr-2024
29-Aug-2024
31-Jul-2024
06-Nov-2024
13-Nov-2024
14-Aug-2024
18-Jun-2024
19-Feb-2025
28-May-2024
02-Oct-2024
02-Oct-2024
05-Mar-2024
06-Mar-2024
15-Nabor-2025
19-Feb-2024
19-Feb-2024
22-Aug-2024
22-Jan-2025
03-Oct-2024
05-Mar-2024
06-Mar-2024
18-Dec-2024
19-Feb-2024
31-Jul-2024
10-Apr-2024
14-Mar-2024
27-Mar-2025
10-Apr-2024
14-May-2024
20-Mar-2024
25-Jun-2024
02-Oct-2024
19-Feb-2024
21-May-2024
25-Jun-2024
30-Oct-2024
07-Aug-2024
18-Jun-2024
06-Mar-2024
10-Jul-2024
31-Jul-2024
30-Jan-2025
14-May-2024
18-Jun-2024
20-Mar-2024
20-Mar-2024
11-Jun-2024
23-Oct-2024
30-Oct-2024
05-Mar-2024
05-Sep-2024
13-Nov-2024
17-Apr-2024
27-Feb-2024
10-Apr-2024
10-Jul-2024
11-Dec-2024
19-Sep-2024
02-Jan-2025
14-Aug-2024
18-Dec-2024
30-Jan-2025
14-Mar-2024
14-Mar-2024
16-Oct-2024
19-Feb-2025
05-Feb-2025
05-Mar-2024
11-Dec-2024
23-Oct-2024
04-Dec-2024
12-Mar-2025
24-Apr-2024
10-Apr-2024
24-Apr-2024
02-Jan-2025
04-Jun-2024
25-Jun-2024
05-Mar-2025
07-May-2024
12-Mar-2025
09-Jan-2025
25-Dec-2024
10-Jul-2024
12-Feb-2025
17-Apr-2024
05-Feb-2025
31-Jul-2024
12-Mar-2025
15-Nabor-2025
27-Nov-2024
04-Dec-2024
20-Nov-2024
15-Nabor-2025
13-Nov-2024
27-Nov-2024
08-Apr-2025
09-Jan-2025
15-Nabor-2025
25-Dec-2024
04-Dec-2024
09-Jan-2025
30-Jan-2025
05-Feb-2025
05-Mar-2025
22-Jan-2025
09-Jan-2025
19-Mar-2025
27-Mar-2025
05-Mar-2025
27-Nov-2024
27-Mar-2025
30-Jan-2025
19-Mar-2025
30-Jan-2025
27-Mar-2025
19-Mar-2025
12-Mar-2025
19-Feb-2024

## 2025-03-31 NOTE — BH INPATIENT PSYCHIATRY PROGRESS NOTE - NSTXIMPULSDATEEST_PSY_ALL_CORE
15-Nabor-2025
17-Apr-2024
20-Mar-2024
17-Apr-2024
17-Apr-2024
19-Feb-2025
25-Dec-2024
17-Apr-2024
21-Feb-2024
20-Mar-2024
17-Apr-2024
20-Mar-2024
20-Nov-2024
21-Feb-2024
17-Apr-2024
19-Mar-2025
05-Mar-2025
09-Feb-2024
12-Feb-2024
17-Apr-2024
17-Apr-2024
20-Mar-2024
26-Mar-2025
27-Nov-2024
17-Apr-2024
05-Feb-2025
17-Apr-2024
20-Mar-2024
21-Feb-2024
17-Apr-2024
20-Mar-2024
17-Apr-2024
20-Mar-2024
25-Dec-2024
05-Feb-2025
12-Feb-2024
12-Feb-2025
17-Apr-2024
21-Feb-2024
17-Apr-2024
19-Mar-2025
29-Jan-2025
17-Apr-2024
20-Nov-2024
21-Feb-2024
04-Dec-2024
12-Feb-2024
17-Apr-2024
21-Feb-2024
17-Apr-2024
20-Mar-2024
09-Feb-2024
17-Apr-2024
20-Mar-2024
21-Feb-2024
22-Jan-2025
17-Apr-2024
21-Feb-2024
17-Apr-2024
17-Apr-2024
29-Jan-2025
05-Mar-2025
17-Apr-2024
20-Mar-2024
17-Apr-2024
20-Mar-2024
21-Feb-2024
05-Feb-2025
17-Apr-2024
21-Feb-2024
09-Feb-2024
17-Apr-2024
19-Feb-2025
19-Mar-2025
20-Mar-2024
21-Feb-2024
12-Feb-2024
17-Apr-2024
19-Feb-2025
21-Feb-2024
05-Mar-2025
17-Apr-2024
20-Mar-2024
12-Feb-2024
17-Apr-2024
17-Apr-2024
21-Feb-2024
29-Jan-2025
05-Mar-2025
17-Apr-2024
17-Apr-2024
12-Feb-2024
17-Apr-2024
20-Mar-2024
17-Apr-2024
17-Apr-2024
09-Feb-2024
17-Apr-2024
17-Apr-2024
19-Feb-2025
21-Feb-2024
12-Feb-2024
19-Feb-2025
26-Mar-2025
05-Mar-2025
17-Apr-2024
17-Apr-2024
20-Mar-2024
25-Dec-2024
25-Dec-2024
09-Feb-2024
17-Apr-2024
29-Jan-2025
17-Apr-2024
17-Apr-2024
09-Feb-2024
17-Apr-2024
20-Mar-2024
21-Feb-2024
21-Feb-2024
17-Apr-2024
25-Dec-2024
05-Feb-2025
17-Apr-2024
26-Mar-2025
12-Feb-2024
17-Apr-2024
12-Feb-2024
17-Apr-2024
17-Apr-2024
21-Feb-2024
25-Dec-2024
04-Dec-2024
17-Apr-2024
19-Feb-2025
26-Mar-2025
17-Apr-2024
17-Apr-2024
20-Mar-2024
04-Dec-2024
17-Apr-2024
20-Mar-2024
17-Apr-2024
21-Feb-2024
25-Dec-2024
05-Mar-2025
17-Apr-2024
25-Dec-2024
09-Feb-2024
17-Apr-2024
21-Feb-2024
21-Feb-2024
17-Apr-2024
05-Mar-2025
17-Apr-2024
17-Apr-2024
12-Feb-2024
25-Dec-2024
25-Dec-2024
04-Dec-2024
09-Feb-2024
17-Apr-2024
20-Mar-2024
12-Feb-2025
17-Apr-2024
22-Jan-2025
27-Nov-2024
12-Feb-2024
17-Apr-2024
17-Apr-2024
15-Nabor-2025
17-Apr-2024
19-Mar-2025
12-Feb-2025
12-Feb-2025
17-Apr-2024
05-Feb-2025
09-Feb-2024
05-Mar-2025
22-Jan-2025
19-Feb-2025
25-Dec-2024
19-Feb-2025
04-Dec-2024
19-Feb-2025
15-Nabor-2025
19-Mar-2025
27-Nov-2024
12-Feb-2024
12-Feb-2024
29-Jan-2025
12-Feb-2024
20-Nov-2024
22-Jan-2025
15-Nabor-2025
20-Nov-2024
12-Feb-2024
05-Mar-2025
17-Apr-2024
19-Feb-2025
20-Nov-2024
25-Dec-2024
22-Jan-2025
25-Dec-2024
27-Nov-2024
05-Mar-2025
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
## 2025-03-31 NOTE — BH INPATIENT PSYCHIATRY PROGRESS NOTE - NSBHMSEMOOD_PSY_A_CORE
Normal
Other
Normal
Other
Other
Normal

## 2025-03-31 NOTE — BH INPATIENT PSYCHIATRY PROGRESS NOTE - NSBHCONSULTIPREASON_PSY_A_CORE
other reason
danger to others; mental illness expected to respond to inpatient care
danger to others; mental illness expected to respond to inpatient care
other reason
danger to others; mental illness expected to respond to inpatient care
other reason
danger to others; mental illness expected to respond to inpatient care
other reason
danger to others; mental illness expected to respond to inpatient care
danger to others; mental illness expected to respond to inpatient care
other reason
danger to others; mental illness expected to respond to inpatient care
other reason
danger to others; mental illness expected to respond to inpatient care
other reason
danger to others; mental illness expected to respond to inpatient care
other reason
danger to others; mental illness expected to respond to inpatient care
other reason
danger to others; mental illness expected to respond to inpatient care
other reason
danger to others; mental illness expected to respond to inpatient care
other reason
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
## 2025-03-31 NOTE — BH INPATIENT PSYCHIATRY PROGRESS NOTE - NSTXPROBDCHOUS_PSY_ALL_CORE
DISCHARGE ISSUE - LACK OF APPROPRIATE HOUSING

## 2025-03-31 NOTE — BH INPATIENT PSYCHIATRY PROGRESS NOTE - NSTXCOPEDATETRGT_PSY_ALL_CORE
06-Jun-2024
01-May-2024
18-Sep-2024
02-Oct-2024
24-Jul-2024
01-May-2024
10-Apr-2024
10-Apr-2024
18-Dec-2024
06-Jun-2024
06-Jun-2024
12-Feb-2025
24-Apr-2024
08-Mar-2024
11-Sep-2024
12-Feb-2025
02-Oct-2024
10-Oct-2024
11-Sep-2024
12-Mar-2025
24-Jul-2024
24-Jun-2024
22-Jan-2025
28-Aug-2024
10-Oct-2024
12-Mar-2025
18-Dec-2024
24-Jul-2024
01-May-2024
02-Oct-2024
06-Jun-2024
18-Dec-2024
04-Dec-2024
06-Jun-2024
11-Sep-2024
24-Jul-2024
12-Feb-2025
24-Jul-2024
26-Feb-2025
11-Sep-2024
10-Oct-2024
28-Mar-2024
06-Mar-2024
08-Jan-2025
10-Oct-2024
10-Oct-2024
23-May-2024
06-Mar-2024
10-Oct-2024
26-Mar-2025
06-Jun-2024
10-Apr-2024
10-Oct-2024
16-Feb-2024
24-Jun-2024
01-May-2024
06-Jun-2024
08-Mar-2024
22-Aug-2024
02-Apr-2025
08-Jan-2025
10-Oct-2024
12-Mar-2025
16-Feb-2024
06-Jun-2024
16-Feb-2024
18-Dec-2024
24-Jul-2024
01-Jan-2025
24-Apr-2024
24-Jul-2024
01-May-2024
01-May-2024
08-Mar-2024
24-Jul-2024
28-Aug-2024
28-Mar-2024
01-May-2024
08-Mar-2024
12-Feb-2025
12-Mar-2025
28-Mar-2024
06-Jun-2024
08-Mar-2024
24-Jul-2024
24-Jul-2024
26-Feb-2025
02-Oct-2024
06-Jun-2024
12-Mar-2025
24-Jul-2024
06-Jun-2024
10-Apr-2024
10-Apr-2024
22-Aug-2024
24-Jul-2024
26-Feb-2025
08-Mar-2024
24-Jul-2024
02-Apr-2025
08-Mar-2024
10-Oct-2024
11-Dec-2024
10-Apr-2024
10-Oct-2024
24-Jul-2024
12-Feb-2025
26-Feb-2025
29-Jan-2025
08-Mar-2024
16-Feb-2024
18-Sep-2024
28-Mar-2024
01-Jan-2025
08-Mar-2024
10-Apr-2024
16-Feb-2024
18-Dec-2024
24-Apr-2024
28-Aug-2024
10-Apr-2024
18-Sep-2024
27-Nov-2024
06-Jun-2024
06-Jun-2024
10-Oct-2024
22-Aug-2024
22-Aug-2024
24-Jul-2024
24-Jul-2024
01-May-2024
04-Jul-2024
10-Oct-2024
10-Oct-2024
11-Dec-2024
18-Sep-2024
18-Sep-2024
24-Jul-2024
24-Jul-2024
01-May-2024
10-Apr-2024
10-Oct-2024
10-Oct-2024
11-Sep-2024
23-May-2024
23-May-2024
26-Feb-2025
02-Oct-2024
04-Jul-2024
10-Oct-2024
06-Jun-2024
10-Oct-2024
18-Dec-2024
12-Feb-2025
08-Jan-2025
08-Jan-2025
10-Oct-2024
16-Feb-2024
20-Nov-2024
22-Jan-2025
23-May-2024
24-Jul-2024
01-May-2024
02-Oct-2024
06-Mar-2024
22-Aug-2024
28-Aug-2024
01-May-2024
10-Apr-2024
10-Oct-2024
02-Oct-2024
04-Jul-2024
18-Dec-2024
28-Mar-2024
01-May-2024
01-May-2024
06-Mar-2024
02-Oct-2024
04-Jul-2024
10-Oct-2024
10-Oct-2024
05-Feb-2025
10-Apr-2024
12-Mar-2025
16-Feb-2024
24-Jul-2024
26-Mar-2025
26-Mar-2025
28-Aug-2024
12-Feb-2025
28-Aug-2024
04-Dec-2024
04-Jul-2024
10-Oct-2024
10-Oct-2024
12-Mar-2025
04-Jul-2024
06-Jun-2024
08-Jan-2025
08-Mar-2024
10-Apr-2024
18-Dec-2024
24-Jul-2024
24-Jun-2024
27-Nov-2024
02-Oct-2024
02-Oct-2024
07-Mar-2024
12-Mar-2025
23-May-2024
27-Nov-2024
06-Jun-2024
08-Jan-2025
08-Mar-2024
27-Nov-2024
29-Jan-2025
10-Apr-2024
23-May-2024
24-Jul-2024
24-Jul-2024
05-Feb-2025
10-Apr-2024
10-Oct-2024
10-Oct-2024
23-May-2024
26-Feb-2025
28-Aug-2024
28-Aug-2024
01-Jan-2025
10-Oct-2024
16-Feb-2024
28-Aug-2024
16-Feb-2024
23-May-2024
24-Jul-2024
24-Jun-2024
26-Feb-2025
26-Mar-2025
04-Jul-2024
10-Oct-2024
11-Dec-2024
26-Feb-2025
01-May-2024
06-Mar-2024
23-May-2024
05-Feb-2025
08-Jan-2025
01-May-2024
06-Jun-2024
10-Oct-2024
24-Jul-2024
08-Mar-2024
10-Apr-2024
10-Apr-2024
29-Jan-2025
04-Jul-2024
05-Feb-2025
06-Jun-2024
26-Feb-2025
29-Jan-2025
24-Apr-2024
29-Jan-2025
10-Oct-2024
04-Dec-2024
20-Nov-2024
22-Jan-2025
11-Dec-2024
11-Dec-2024
20-Nov-2024
16-Feb-2024
12-Mar-2025
16-Feb-2024
22-Jan-2025
05-Feb-2025
18-Dec-2024
02-Apr-2025
18-Dec-2024
20-Nov-2024
01-Jan-2025
08-Jan-2025
20-Nov-2024
08-Jan-2025
12-Feb-2025
12-Feb-2025
27-Nov-2024
02-Apr-2025
26-Feb-2025
04-Dec-2024
26-Mar-2025
12-Mar-2025

## 2025-03-31 NOTE — BH INPATIENT PSYCHIATRY PROGRESS NOTE - NSTXCONDUCDATEEST_PSY_ALL_CORE
09-Feb-2024
10-Feb-2024
23-Feb-2025
05-Feb-2024
09-Feb-2024
19-Feb-2025
26-Mar-2025
09-Feb-2024
09-Jun-2024
17-Nov-2024
09-Feb-2024
10-Feb-2024
09-Feb-2024
09-Feb-2024
23-Feb-2025
09-Feb-2024
17-Nov-2024
09-Feb-2024
17-Nov-2024
23-Feb-2025
09-Feb-2024
17-Nov-2024
09-Feb-2024
17-Nov-2024
17-Nov-2024
05-Feb-2025
09-Feb-2024
17-Nov-2024
17-Nov-2024
05-Feb-2025
17-Nov-2024
09-Feb-2024
09-Jun-2024
09-Feb-2024
17-Nov-2024
09-Feb-2024
10-Feb-2024
17-Nov-2024
09-Feb-2024
23-Feb-2025
09-Feb-2024
19-Mar-2025
09-Feb-2024
09-Feb-2024
25-Dec-2024
09-Feb-2024
17-Nov-2024
23-Feb-2025
09-Feb-2024
17-Nov-2024
09-Feb-2024
10-Feb-2024
12-Mar-2025
19-Mar-2025
09-Feb-2024
17-Nov-2024
19-Feb-2024
05-Feb-2024
09-Feb-2024
17-Nov-2024
20-Nov-2024
28-Mar-2024
09-Feb-2024
19-Feb-2025
09-Feb-2024
10-Feb-2024
10-Feb-2024
21-Feb-2024
29-Jan-2025
09-Feb-2024
19-Feb-2024
09-Feb-2024
17-Nov-2024
09-Feb-2024
19-Feb-2024
09-Feb-2024
28-Mar-2024
09-Feb-2024
17-Nov-2024
27-Nov-2024
29-Jan-2025
09-Feb-2024
10-Feb-2024
09-Feb-2024
12-Mar-2025
09-Feb-2024
10-Feb-2024
17-Nov-2024
09-Feb-2024
17-Nov-2024
09-Feb-2024
17-Nov-2024
09-Feb-2024
17-Nov-2024
17-Nov-2024
23-Feb-2025
09-Feb-2024
17-Nov-2024
17-Nov-2024
09-Feb-2024
19-Feb-2025
09-Feb-2024
17-Nov-2024
23-Feb-2025
09-Feb-2024
17-Nov-2024
26-Mar-2025
09-Feb-2024
09-Feb-2024
17-Nov-2024
09-Feb-2024
17-Nov-2024
17-Nov-2024
19-Feb-2024
10-Feb-2024
17-Nov-2024
21-Feb-2024
09-Feb-2024
17-Nov-2024
09-Feb-2024
09-Feb-2024
17-Nov-2024
17-Nov-2024
25-Dec-2024
19-Feb-2024
27-Nov-2024
09-Feb-2024
17-Nov-2024
23-Feb-2025
19-Mar-2025
23-Feb-2025
17-Nov-2024
23-Feb-2025
17-Nov-2024
10-Feb-2024
12-Mar-2025
17-Nov-2024
26-Mar-2025
17-Nov-2024
17-Nov-2024
09-Feb-2024
17-Nov-2024
05-Feb-2025
29-Jan-2025
17-Nov-2024
20-Nov-2024
17-Nov-2024
20-Nov-2024
17-Nov-2024
23-Feb-2025
23-Feb-2025

## 2025-03-31 NOTE — BH INPATIENT PSYCHIATRY PROGRESS NOTE - NSTREATMENTCERT_PSY_ALL_CORE
.

## 2025-03-31 NOTE — BH INPATIENT PSYCHIATRY PROGRESS NOTE - NSBHMSEINTELL_PSY_A_CORE
Below Average

## 2025-03-31 NOTE — BH INPATIENT PSYCHIATRY PROGRESS NOTE - NSTXCOPEPROGRES_PSY_ALL_CORE
Improving
Improving
Met - goal discontinued
No Change
Improving
Improving
Met - goal discontinued
Met - goal discontinued
No Change
Improving
Met - goal discontinued
No Change
Improving
Met - goal discontinued
Met - goal discontinued
No Change
Improving
Met - goal discontinued
Improving
Met - goal discontinued
Met - goal discontinued
Improving
Met - goal discontinued
No Change
Improving
Improving
Met - goal discontinued
Improving
Improving
Met - goal discontinued
No Change
Met - goal discontinued
Met - goal discontinued
No Change
Improving
Met - goal discontinued
Improving
Met - goal discontinued
Improving
Improving
Met - goal discontinued
Improving
Met - goal discontinued
Improving
Improving
No Change
Improving
Met - goal discontinued
No Change
Improving
Met - goal discontinued
No Change
Met - goal discontinued
Improving
Met - goal discontinued
Met - goal discontinued
No Change
No Change
Improving
No Change
No Change
Improving
Met - goal discontinued
Improving
Met - goal discontinued
Improving
Improving
Met - goal discontinued
No Change
Improving
Improving
Met - goal discontinued
Met - goal discontinued
Improving
Met - goal discontinued
No Change
Improving
Met - goal discontinued
Met - goal discontinued
Improving
Improving
Met - goal discontinued
Met - goal discontinued
No Change
Improving
Improving
Met - goal discontinued
Met - goal discontinued
Improving
Improving
Met - goal discontinued
No Change
Improving
Improving
Met - goal discontinued
Improving
Met - goal discontinued
Improving
Improving
Met - goal discontinued
Improving
Met - goal discontinued
Improving
Improving
Met - goal discontinued
No Change
Improving
Met - goal discontinued
No Change
Improving
Improving
Met - goal discontinued
Improving
Met - goal discontinued
Improving
Met - goal discontinued
No Change
Improving
Met - goal discontinued
Improving
Improving
Met - goal discontinued
Improving
Improving
No Change
Met - goal discontinued
Improving
Met - goal discontinued
Met - goal discontinued
No Change
Improving
Improving
Met - goal discontinued
No Change
Improving
Met - goal discontinued
Met - goal discontinued
No Change
Improving
Met - goal discontinued
Met - goal discontinued
Improving
Met - goal discontinued
No Change
Improving
Improving
No Change
No Change
Improving
Met - goal discontinued
Improving
Met - goal discontinued
No Change
No Change
Improving
Met - goal discontinued
No Change
Improving
Improving
Met - goal discontinued
No Change
No Change
Met - goal discontinued
No Change
No Change
Met - goal discontinued
Improving
Met - goal discontinued
Improving
No Change
Met - goal discontinued
No Change
Met - goal discontinued
Met - goal discontinued
No Change
Met - goal discontinued
No Change
Improving
No Change
Improving
No Change
No Change
Improving
No Change
Improving
No Change
Improving
Improving
No Change
Improving
No Change
Improving
Improving
No Change
Improving
No Change
Improving
Improving
No Change
No Change
Improving

## 2025-03-31 NOTE — BH INPATIENT PSYCHIATRY PROGRESS NOTE - NSTXPROBSKIN_PSY_ALL_CORE
SKIN INTEGRITY, IMPAIRED/RISK FOR IMPAIRMENT

## 2025-03-31 NOTE — BH INPATIENT PSYCHIATRY PROGRESS NOTE - NSTXSKINGOAL_PSY_ALL_CORE
Will identify signs/symptoms of infection and inform staff if these occur
Will verbalize absence of pain, or pain at acceptable level
Will identify signs/symptoms of infection and inform staff if these occur
Will verbalize absence of pain, or pain at acceptable level
Will identify signs/symptoms of infection and inform staff if these occur
Will verbalize absence of pain, or pain at acceptable level
Will identify signs/symptoms of infection and inform staff if these occur
Will verbalize absence of pain, or pain at acceptable level
Will verbalize absence of pain, or pain at acceptable level
Will identify signs/symptoms of infection and inform staff if these occur
Will verbalize absence of pain, or pain at acceptable level
Will verbalize absence of pain, or pain at acceptable level
Will identify signs/symptoms of infection and inform staff if these occur
Will verbalize absence of pain, or pain at acceptable level
Will identify signs/symptoms of infection and inform staff if these occur
Will identify signs/symptoms of infection and inform staff if these occur
Will verbalize absence of pain, or pain at acceptable level
Will identify signs/symptoms of infection and inform staff if these occur
Will verbalize absence of pain, or pain at acceptable level
Will verbalize absence of pain, or pain at acceptable level
Will identify signs/symptoms of infection and inform staff if these occur
Will verbalize absence of pain, or pain at acceptable level
Will verbalize absence of pain, or pain at acceptable level
Will identify signs/symptoms of infection and inform staff if these occur
Will verbalize absence of pain, or pain at acceptable level
Will verbalize absence of pain, or pain at acceptable level
Will identify signs/symptoms of infection and inform staff if these occur
Will verbalize absence of pain, or pain at acceptable level
Will identify signs/symptoms of infection and inform staff if these occur
Will identify signs/symptoms of infection and inform staff if these occur
Will verbalize absence of pain, or pain at acceptable level
Will identify signs/symptoms of infection and inform staff if these occur
Will verbalize absence of pain, or pain at acceptable level
Will identify signs/symptoms of infection and inform staff if these occur

## 2025-03-31 NOTE — BH INPATIENT PSYCHIATRY PROGRESS NOTE - LEVEL OF CONSCIOUSNESS
Alert

## 2025-03-31 NOTE — BH INPATIENT PSYCHIATRY DISCHARGE NOTE - NSDCCPCAREPLAN_GEN_ALL_CORE_FT
PRINCIPAL DISCHARGE DIAGNOSIS  Diagnosis: Intellectual disability  Assessment and Plan of Treatment:       SECONDARY DISCHARGE DIAGNOSES  Diagnosis: Anxiety  Assessment and Plan of Treatment:     Diagnosis: Insomnia  Assessment and Plan of Treatment:

## 2025-03-31 NOTE — BH INPATIENT PSYCHIATRY PROGRESS NOTE - NSBHATTESTBILLONSITE_PSY_A_CORE
Attending to bill
LENA to bill
Attending to bill
LENA to bill
Attending to bill
LENA to bill
Attending to bill
LENA to bill
Attending to bill
LENA to bill
Attending to bill
LENA to bill
Attending to bill
LENA to bill
Attending to bill
Attending to bill
LENA to bill
Attending to bill
LENA to bill
Attending to bill
LENA to bill
Attending to bill
LENA to bill
Attending to bill
Attending to bill
LENA to bill
LENA to bill
Attending to bill
LENA to bill
Attending to bill
LENA to bill
Attending to bill
LENA to bill
Attending to bill

## 2025-03-31 NOTE — BH INPATIENT PSYCHIATRY PROGRESS NOTE - NSTXCOPEGOAL_PSY_ALL_CORE
Identify and utilize 2 coping skills that meet their needs
Other...
Identify and utilize 2 coping skills that meet their needs
Other...
Identify and utilize 2 coping skills that meet their needs
Other...
Identify and utilize 2 coping skills that meet their needs
Other...
Identify and utilize 2 coping skills that meet their needs
Other...
Identify and utilize 2 coping skills that meet their needs

## 2025-03-31 NOTE — BH INPATIENT PSYCHIATRY PROGRESS NOTE - NSTXCOPEINTERMD_PSY_ALL_CORE
Work with interdisciplinary team to enhance coping skills, optimize meds (incl. risperidone), provide psychoed.  
Work with interdisciplinary team to enhance coping skills, optimize meds (incl. risperidone), provide psychoed.  
Work with interdisciplinary team to enhance coping skills, optimize meds. 
Work with interdisciplinary team to enhance coping skills, optimize meds (incl. risperidone), provide psychoed.  
Work with interdisciplinary team to enhance coping skills, optimize meds. 
Work with interdisciplinary team to enhance coping skills, optimize meds. 
Work with interdisciplinary team to enhance coping skills, optimize meds (incl. risperidone), provide psychoed.  
Work with interdisciplinary team to enhance coping skills, optimize meds. 
Work with interdisciplinary team to enhance coping skills, optimize meds (incl. risperidone), provide psychoed.  
Work with interdisciplinary team to enhance coping skills, optimize meds. 
Work with interdisciplinary team to enhance coping skills, optimize meds (incl. risperidone), provide psychoed.  
Work with interdisciplinary team to enhance coping skills, optimize meds. 
Work with interdisciplinary team to enhance coping skills, optimize meds (incl. risperidone), provide psychoed.  
Work with interdisciplinary team to enhance coping skills, optimize meds. 
Work with interdisciplinary team to enhance coping skills, optimize meds (incl. risperidone), provide psychoed.  
Work with interdisciplinary team to enhance coping skills, optimize meds (incl. risperidone), provide psychoed.  
Work with interdisciplinary team to enhance coping skills, optimize meds. 
Work with interdisciplinary team to enhance coping skills, optimize meds (incl. risperidone), provide psychoed.  
Work with interdisciplinary team to enhance coping skills, optimize meds. 
Work with interdisciplinary team to enhance coping skills, optimize meds (incl. risperidone), provide psychoed.  
Work with interdisciplinary team to enhance coping skills, optimize meds. 
Work with interdisciplinary team to enhance coping skills, optimize meds (incl. risperidone), provide psychoed.  
Work with interdisciplinary team to enhance coping skills, optimize meds (incl. risperidone), provide psychoed.  
Work with interdisciplinary team to enhance coping skills, optimize meds. 
Work with interdisciplinary team to enhance coping skills, optimize meds (incl. risperidone), provide psychoed.  
Work with interdisciplinary team to enhance coping skills, optimize meds. 
Work with interdisciplinary team to enhance coping skills, optimize meds (incl. risperidone), provide psychoed.  
Work with interdisciplinary team to enhance coping skills, optimize meds (incl. risperidone), provide psychoed.  
Work with interdisciplinary team to enhance coping skills, optimize meds. 
Work with interdisciplinary team to enhance coping skills, optimize meds. 
Work with interdisciplinary team to enhance coping skills, optimize meds (incl. risperidone), provide psychoed.  
Work with interdisciplinary team to enhance coping skills, optimize meds. 
Work with interdisciplinary team to enhance coping skills, optimize meds (incl. risperidone), provide psychoed.  
Work with interdisciplinary team to enhance coping skills, optimize meds. 
Work with interdisciplinary team to enhance coping skills, optimize meds. 
Work with interdisciplinary team to enhance coping skills, optimize meds (incl. risperidone), provide psychoed.  
Work with interdisciplinary team to enhance coping skills, optimize meds. 
Work with interdisciplinary team to enhance coping skills, optimize meds. 
Work with interdisciplinary team to enhance coping skills, optimize meds (incl. risperidone), provide psychoed.  
Work with interdisciplinary team to enhance coping skills, optimize meds. 
Work with interdisciplinary team to enhance coping skills, optimize meds (incl. risperidone), provide psychoed.  
Work with interdisciplinary team to enhance coping skills, optimize meds (incl. risperidone), provide psychoed.  
Work with interdisciplinary team to enhance coping skills, optimize meds. 
Work with interdisciplinary team to enhance coping skills, optimize meds (incl. risperidone), provide psychoed.  
Work with interdisciplinary team to enhance coping skills, optimize meds. 
Work with interdisciplinary team to enhance coping skills, optimize meds (incl. risperidone), provide psychoed.  
Work with interdisciplinary team to enhance coping skills, optimize meds. 
Work with interdisciplinary team to enhance coping skills, optimize meds (incl. risperidone), provide psychoed.  
Work with interdisciplinary team to enhance coping skills, optimize meds. 
Work with interdisciplinary team to enhance coping skills, optimize meds. 
Work with interdisciplinary team to enhance coping skills, optimize meds (incl. risperidone), provide psychoed.  
Work with interdisciplinary team to enhance coping skills, optimize meds. 
Work with interdisciplinary team to enhance coping skills, optimize meds (incl. risperidone), provide psychoed.  
Work with interdisciplinary team to enhance coping skills, optimize meds. 
Work with interdisciplinary team to enhance coping skills, optimize meds (incl. risperidone), provide psychoed.  
Work with interdisciplinary team to enhance coping skills, optimize meds (incl. risperidone), provide psychoed.  
Work with interdisciplinary team to enhance coping skills, optimize meds. 
Work with interdisciplinary team to enhance coping skills, optimize meds (incl. risperidone), provide psychoed.  
Work with interdisciplinary team to enhance coping skills, optimize meds. 
Work with interdisciplinary team to enhance coping skills, optimize meds (incl. risperidone), provide psychoed.  
Work with interdisciplinary team to enhance coping skills, optimize meds (incl. risperidone), provide psychoed.  
Work with interdisciplinary team to enhance coping skills, optimize meds. 
Work with interdisciplinary team to enhance coping skills, optimize meds. 
Work with interdisciplinary team to enhance coping skills, optimize meds (incl. risperidone), provide psychoed.  
Work with interdisciplinary team to enhance coping skills, optimize meds. 
Work with interdisciplinary team to enhance coping skills, optimize meds (incl. risperidone), provide psychoed.  
Work with interdisciplinary team to enhance coping skills, optimize meds. 
Work with interdisciplinary team to enhance coping skills, optimize meds. 
Work with interdisciplinary team to enhance coping skills, optimize meds (incl. risperidone), provide psychoed.  
Work with interdisciplinary team to enhance coping skills, optimize meds. 
Work with interdisciplinary team to enhance coping skills, optimize meds (incl. risperidone), provide psychoed.  
Work with interdisciplinary team to enhance coping skills, optimize meds. 
Work with interdisciplinary team to enhance coping skills, optimize meds (incl. risperidone), provide psychoed.  
Work with interdisciplinary team to enhance coping skills, optimize meds. 
Work with interdisciplinary team to enhance coping skills, optimize meds (incl. risperidone), provide psychoed.  
Work with interdisciplinary team to enhance coping skills, optimize meds. 
Work with interdisciplinary team to enhance coping skills, optimize meds (incl. risperidone), provide psychoed.  
Work with interdisciplinary team to enhance coping skills, optimize meds. 
Work with interdisciplinary team to enhance coping skills, optimize meds (incl. risperidone), provide psychoed.  
Work with interdisciplinary team to enhance coping skills, optimize meds. 
Work with interdisciplinary team to enhance coping skills, optimize meds. 
Work with interdisciplinary team to enhance coping skills, optimize meds (incl. risperidone), provide psychoed.  
Work with interdisciplinary team to enhance coping skills, optimize meds. 
Work with interdisciplinary team to enhance coping skills, optimize meds (incl. risperidone), provide psychoed.  
Work with interdisciplinary team to enhance coping skills, optimize meds (incl. risperidone), provide psychoed.  
Work with interdisciplinary team to enhance coping skills, optimize meds. 
Work with interdisciplinary team to enhance coping skills, optimize meds (incl. risperidone), provide psychoed.  
Work with interdisciplinary team to enhance coping skills, optimize meds. 
Work with interdisciplinary team to enhance coping skills, optimize meds (incl. risperidone), provide psychoed.  
Work with interdisciplinary team to enhance coping skills, optimize meds. 
Work with interdisciplinary team to enhance coping skills, optimize meds (incl. risperidone), provide psychoed.  
Work with interdisciplinary team to enhance coping skills, optimize meds. 
Work with interdisciplinary team to enhance coping skills, optimize meds (incl. risperidone), provide psychoed.  
Work with interdisciplinary team to enhance coping skills, optimize meds. 
Work with interdisciplinary team to enhance coping skills, optimize meds (incl. risperidone), provide psychoed.  
Work with interdisciplinary team to enhance coping skills, optimize meds. 
Work with interdisciplinary team to enhance coping skills, optimize meds (incl. risperidone), provide psychoed.  
Work with interdisciplinary team to enhance coping skills, optimize meds. 
Work with interdisciplinary team to enhance coping skills, optimize meds (incl. risperidone), provide psychoed.  
Work with interdisciplinary team to enhance coping skills, optimize meds. 
Work with interdisciplinary team to enhance coping skills, optimize meds (incl. risperidone), provide psychoed.  
Work with interdisciplinary team to enhance coping skills, optimize meds (incl. risperidone), provide psychoed.  
Work with interdisciplinary team to enhance coping skills, optimize meds. 
Work with interdisciplinary team to enhance coping skills, optimize meds (incl. risperidone), provide psychoed.  
Work with interdisciplinary team to enhance coping skills, optimize meds. 
Work with interdisciplinary team to enhance coping skills, optimize meds (incl. risperidone), provide psychoed.  
Work with interdisciplinary team to enhance coping skills, optimize meds (incl. risperidone), provide psychoed.  
Work with interdisciplinary team to enhance coping skills, optimize meds. 
Work with interdisciplinary team to enhance coping skills, optimize meds. 
Work with interdisciplinary team to enhance coping skills, optimize meds (incl. risperidone), provide psychoed.  
Work with interdisciplinary team to enhance coping skills, optimize meds. 
Work with interdisciplinary team to enhance coping skills, optimize meds (incl. risperidone), provide psychoed.  
Work with interdisciplinary team to enhance coping skills, optimize meds (incl. risperidone), provide psychoed.  
Work with interdisciplinary team to enhance coping skills, optimize meds. 
Work with interdisciplinary team to enhance coping skills, optimize meds (incl. risperidone), provide psychoed.  

## 2025-03-31 NOTE — BH INPATIENT PSYCHIATRY PROGRESS NOTE - NSBHMSEPERCEPT_PSY_A_CORE
No abnormalities
does not appear internally preoccupied/No abnormalities
No abnormalities
does not appear internally preoccupied/No abnormalities
No abnormalities
does not appear internally preoccupied/No abnormalities
No abnormalities

## 2025-03-31 NOTE — BH INPATIENT PSYCHIATRY PROGRESS NOTE - NSBHCONTPROVIDER_PSY_ALL_CORE
Yes...

## 2025-03-31 NOTE — BH INPATIENT PSYCHIATRY PROGRESS NOTE - NSTXPSYCHOPROGRES_PSY_ALL_CORE
Met - goal discontinued
No Change
Improving
Met - goal discontinued
Met - goal discontinued
No Change
Improving
Improving
Met - goal discontinued
Met - goal discontinued
No Change
Met - goal discontinued
Improving
Improving
Met - goal discontinued
No Change
No Change
Met - goal discontinued
No Change
Improving
Improving
No Change
Improving
Met - goal discontinued
Met - goal discontinued
No Change
Met - goal discontinued
Met - goal discontinued
Improving
Met - goal discontinued
No Change
Improving
Met - goal discontinued
Improving
Improving
Met - goal discontinued
Met - goal discontinued
No Change
Improving
Met - goal discontinued
Improving
Met - goal discontinued
Improving
Improving
Met - goal discontinued
No Change
Improving
Met - goal discontinued
Improving
Improving
Met - goal discontinued
Improving
Improving
Met - goal discontinued
No Change
Met - goal discontinued
Met - goal discontinued
No Change
Improving
Improving
Met - goal discontinued
Improving
Met - goal discontinued
Met - goal discontinued
Improving
Met - goal discontinued
Improving
Met - goal discontinued
Improving
Met - goal discontinued
Improving
Met - goal discontinued
Improving
Improving
Met - goal discontinued
Met - goal discontinued
Improving
Improving
Met - goal discontinued
No Change
Improving
Met - goal discontinued
Improving
Improving
Met - goal discontinued
No Change
Improving
Improving
Met - goal discontinued
Improving
Improving
Met - goal discontinued
No Change
Met - goal discontinued
Improving
Improving
Met - goal discontinued
No Change
Met - goal discontinued
No Change
Improving
Met - goal discontinued
Improving
Met - goal discontinued
Improving
Met - goal discontinued
No Change
Met - goal discontinued
Met - goal discontinued
Improving
Met - goal discontinued
Met - goal discontinued
Improving
Met - goal discontinued
No Change
Improving
Improving
Met - goal discontinued
Met - goal discontinued
Improving
Met - goal discontinued
No Change
Improving
Met - goal discontinued
No Change
Improving
Improving
Met - goal discontinued
Improving
Met - goal discontinued
Improving
Improving
Met - goal discontinued
No Change
Improving
Improving
Met - goal discontinued
Improving
Met - goal discontinued
Improving
Met - goal discontinued
Met - goal discontinued
Improving
Improving
Met - goal discontinued
No Change
Improving
Met - goal discontinued
No Change
Improving
Improving
Met - goal discontinued
No Change
Improving
Met - goal discontinued
Improving
Met - goal discontinued
No Change
Met - goal discontinued
Met - goal discontinued
Improving
No Change
Improving
No Change
Improving
Improving
No Change
Improving

## 2025-03-31 NOTE — BH INPATIENT PSYCHIATRY PROGRESS NOTE - NSTXCONDUCINTERMD_PSY_ALL_CORE
Optimize medication regimen (incl. risperidone), provide psychoed. 
Optimize medication regimen, provide psychoed. 
Optimize medication regimen, provide psychoed. 
Optimize medication regimen (incl. risperidone), provide psychoed. 
Optimize medication regimen, provide psychoed. 
Optimize medication regimen (incl. risperidone), provide psychoed. 
Optimize medication regimen, provide psychoed. 
Optimize medication regimen (incl. risperidone), provide psychoed. 
Optimize medication regimen, provide psychoed. 
Optimize medication regimen, provide psychoed. 
Optimize medication regimen (incl. risperidone), provide psychoed. 
Optimize medication regimen, provide psychoed. 
Optimize medication regimen (incl. risperidone), provide psychoed. 
Optimize medication regimen, provide psychoed. 
Optimize medication regimen (incl. risperidone), provide psychoed. 
Optimize medication regimen (incl. risperidone), provide psychoed. 
Optimize medication regimen, provide psychoed. 
Optimize medication regimen (incl. risperidone), provide psychoed. 
Optimize medication regimen, provide psychoed. 
Optimize medication regimen, provide psychoed. 
Optimize medication regimen (incl. risperidone), provide psychoed. 
Optimize medication regimen, provide psychoed. 
Optimize medication regimen, provide psychoed. 
Optimize medication regimen (incl. risperidone), provide psychoed. 
Optimize medication regimen (incl. risperidone), provide psychoed. 
Optimize medication regimen, provide psychoed. 
Optimize medication regimen (incl. risperidone), provide psychoed. 
Optimize medication regimen (incl. risperidone), provide psychoed. 
Optimize medication regimen, provide psychoed. 
Optimize medication regimen (incl. risperidone), provide psychoed. 
Optimize medication regimen (incl. risperidone), provide psychoed. 
Optimize medication regimen, provide psychoed. 
Optimize medication regimen, provide psychoed. 
Optimize medication regimen (incl. risperidone), provide psychoed. 
Optimize medication regimen, provide psychoed. 
Optimize medication regimen, provide psychoed. 
Optimize medication regimen (incl. risperidone), provide psychoed. 
Optimize medication regimen, provide psychoed. 
Optimize medication regimen (incl. risperidone), provide psychoed. 
Optimize medication regimen (incl. risperidone), provide psychoed. 
Optimize medication regimen, provide psychoed. 
Optimize medication regimen (incl. risperidone), provide psychoed. 
Optimize medication regimen, provide psychoed. 
Optimize medication regimen (incl. risperidone), provide psychoed. 
Optimize medication regimen, provide psychoed. 
Optimize medication regimen (incl. risperidone), provide psychoed. 
Optimize medication regimen, provide psychoed. 
Optimize medication regimen, provide psychoed. 
Optimize medication regimen (incl. risperidone), provide psychoed. 
Optimize medication regimen, provide psychoed. 
Optimize medication regimen, provide psychoed. 
Optimize medication regimen (incl. risperidone), provide psychoed. 
Optimize medication regimen, provide psychoed. 
Optimize medication regimen, provide psychoed. 
Optimize medication regimen (incl. risperidone), provide psychoed. 
Optimize medication regimen (incl. risperidone), provide psychoed. 
Optimize medication regimen, provide psychoed. 
Optimize medication regimen (incl. risperidone), provide psychoed. 
Optimize medication regimen, provide psychoed. 
Optimize medication regimen, provide psychoed. 
Optimize medication regimen (incl. risperidone), provide psychoed. 
Optimize medication regimen, provide psychoed. 
Optimize medication regimen, provide psychoed. 
Optimize medication regimen (incl. risperidone), provide psychoed. 
Optimize medication regimen, provide psychoed. 
Optimize medication regimen (incl. risperidone), provide psychoed. 
Optimize medication regimen, provide psychoed. 
Optimize medication regimen, provide psychoed. 
Optimize medication regimen (incl. risperidone), provide psychoed. 
Optimize medication regimen (incl. risperidone), provide psychoed. 
Optimize medication regimen, provide psychoed. 
Optimize medication regimen, provide psychoed. 
Optimize medication regimen (incl. risperidone), provide psychoed. 
Optimize medication regimen (incl. risperidone), provide psychoed. 
Optimize medication regimen, provide psychoed. 
Optimize medication regimen (incl. risperidone), provide psychoed. 
Optimize medication regimen (incl. risperidone), provide psychoed. 
Optimize medication regimen, provide psychoed. 
Optimize medication regimen (incl. risperidone), provide psychoed. 
Optimize medication regimen, provide psychoed. 
Optimize medication regimen, provide psychoed. 
Optimize medication regimen (incl. risperidone), provide psychoed. 
Optimize medication regimen, provide psychoed. 
Optimize medication regimen (incl. risperidone), provide psychoed. 
Optimize medication regimen, provide psychoed. 
Optimize medication regimen (incl. risperidone), provide psychoed. 
Optimize medication regimen, provide psychoed. 
Optimize medication regimen (incl. risperidone), provide psychoed. 
Optimize medication regimen, provide psychoed. 
Optimize medication regimen (incl. risperidone), provide psychoed. 
Optimize medication regimen (incl. risperidone), provide psychoed. 
Optimize medication regimen, provide psychoed. 
Optimize medication regimen (incl. risperidone), provide psychoed. 
Optimize medication regimen, provide psychoed. 
Optimize medication regimen (incl. risperidone), provide psychoed. 
Optimize medication regimen (incl. risperidone), provide psychoed. 
Optimize medication regimen, provide psychoed. 
Optimize medication regimen (incl. risperidone), provide psychoed. 
Optimize medication regimen, provide psychoed. 
Optimize medication regimen, provide psychoed. 
Optimize medication regimen (incl. risperidone), provide psychoed. 
Optimize medication regimen, provide psychoed. 
Optimize medication regimen (incl. risperidone), provide psychoed. 
Optimize medication regimen (incl. risperidone), provide psychoed. 
Optimize medication regimen, provide psychoed. 
Optimize medication regimen, provide psychoed. 
Optimize medication regimen (incl. risperidone), provide psychoed. 
Optimize medication regimen (incl. risperidone), provide psychoed. 
Optimize medication regimen, provide psychoed. 
Optimize medication regimen (incl. risperidone), provide psychoed. 
Optimize medication regimen, provide psychoed. 
Optimize medication regimen, provide psychoed. 
Optimize medication regimen (incl. risperidone), provide psychoed. 
Optimize medication regimen, provide psychoed. 
Optimize medication regimen (incl. risperidone), provide psychoed. 
Optimize medication regimen, provide psychoed. 
Optimize medication regimen (incl. risperidone), provide psychoed. 
Optimize medication regimen, provide psychoed. 
Optimize medication regimen, provide psychoed. 
Optimize medication regimen (incl. risperidone), provide psychoed. 
Optimize medication regimen, provide psychoed. 
Optimize medication regimen (incl. risperidone), provide psychoed. 
Optimize medication regimen, provide psychoed. 
Optimize medication regimen (incl. risperidone), provide psychoed. 
Optimize medication regimen, provide psychoed. 
Optimize medication regimen (incl. risperidone), provide psychoed. 
Optimize medication regimen (incl. risperidone), provide psychoed. 
Optimize medication regimen, provide psychoed. 
Optimize medication regimen (incl. risperidone), provide psychoed. 
Optimize medication regimen (incl. risperidone), provide psychoed. 
Optimize medication regimen, provide psychoed. 
Optimize medication regimen, provide psychoed. 
Optimize medication regimen (incl. risperidone), provide psychoed. 
Optimize medication regimen, provide psychoed. 
Optimize medication regimen (incl. risperidone), provide psychoed. 

## 2025-03-31 NOTE — BH INPATIENT PSYCHIATRY PROGRESS NOTE - NSBHMSEKNOWHOW_PSY_ALL_CORE
Current Events/Vocabulary
Current Events/Educational attainment/Vocabulary
Current Events/Vocabulary
Current Events/Educational attainment/Vocabulary
Current Events/Vocabulary
Current Events/Educational attainment/Vocabulary
Current Events/Vocabulary

## 2025-03-31 NOTE — BH INPATIENT PSYCHIATRY PROGRESS NOTE - NSTXPROBPSYCHO_PSY_ALL_CORE
PSYCHOTIC SYMPTOMS

## 2025-03-31 NOTE — BH INPATIENT PSYCHIATRY PROGRESS NOTE - NSTXPSYCHODATEEST_PSY_ALL_CORE
20-Mar-2024
28-Mar-2024
15-Nabor-2025
17-Apr-2024
17-Apr-2024
20-Mar-2024
17-Apr-2024
21-Feb-2024
09-Feb-2024
17-Apr-2024
17-Apr-2024
19-Mar-2025
20-Nov-2024
21-Feb-2024
21-Feb-2024
17-Apr-2024
29-Jan-2025
19-Feb-2025
19-Feb-2025
26-Mar-2025
09-Feb-2024
17-Apr-2024
20-Mar-2024
22-Jan-2025
09-Feb-2024
09-Feb-2024
17-Apr-2024
28-Mar-2024
09-Feb-2024
12-Feb-2025
17-Apr-2024
29-Jan-2025
17-Apr-2024
17-Apr-2024
29-Jan-2025
17-Apr-2024
28-Mar-2024
29-Jan-2025
17-Apr-2024
21-Feb-2024
25-Dec-2024
25-Dec-2024
28-Mar-2024
28-Mar-2024
21-Feb-2024
12-Feb-2025
17-Apr-2024
26-Mar-2025
28-Mar-2024
05-Mar-2025
17-Apr-2024
04-Dec-2024
17-Apr-2024
17-Apr-2024
21-Feb-2024
15-Nabor-2025
17-Apr-2024
21-Feb-2024
25-Dec-2024
28-Mar-2024
09-Feb-2024
17-Apr-2024
17-Apr-2024
20-Nov-2024
25-Dec-2024
17-Apr-2024
21-Feb-2024
22-Jan-2025
17-Apr-2024
21-Feb-2024
28-Mar-2024
05-Feb-2025
09-Feb-2024
17-Apr-2024
09-Feb-2024
15-Nabor-2025
17-Apr-2024
19-Feb-2025
21-Feb-2024
17-Apr-2024
21-Feb-2024
17-Apr-2024
17-Apr-2024
28-Mar-2024
05-Feb-2025
09-Feb-2024
25-Dec-2024
17-Apr-2024
25-Dec-2024
09-Feb-2024
17-Apr-2024
21-Feb-2024
28-Mar-2024
17-Apr-2024
09-Feb-2024
17-Apr-2024
17-Apr-2024
25-Dec-2024
28-Mar-2024
09-Feb-2024
09-Feb-2024
12-Feb-2025
17-Apr-2024
20-Nov-2024
28-Mar-2024
17-Apr-2024
21-Feb-2024
04-Dec-2024
21-Feb-2024
17-Apr-2024
25-Dec-2024
21-Feb-2024
27-Nov-2024
29-Jan-2025
17-Apr-2024
09-Feb-2024
17-Apr-2024
25-Dec-2024
17-Apr-2024
21-Feb-2024
17-Apr-2024
27-Nov-2024
17-Apr-2024
05-Mar-2025
09-Feb-2024
17-Apr-2024
28-Mar-2024
28-Mar-2024
04-Dec-2024
09-Feb-2024
17-Apr-2024
21-Feb-2024
17-Apr-2024
20-Mar-2024
28-Mar-2024
09-Feb-2024
09-Feb-2024
17-Apr-2024
21-Feb-2024
22-Jan-2025
09-Feb-2024
17-Apr-2024
22-Jan-2025
05-Mar-2025
12-Feb-2025
17-Apr-2024
20-Mar-2024
09-Feb-2024
17-Apr-2024
17-Apr-2024
09-Feb-2024
17-Apr-2024
28-Mar-2024
05-Mar-2025
17-Apr-2024
17-Apr-2024
19-Mar-2025
09-Feb-2024
17-Apr-2024
26-Mar-2025
25-Dec-2024
09-Feb-2024
09-Feb-2024
17-Apr-2024
05-Feb-2025
17-Apr-2024
27-Nov-2024
09-Feb-2024
17-Apr-2024
05-Mar-2025
17-Apr-2024
17-Apr-2024
20-Nov-2024
05-Mar-2025
17-Apr-2024
25-Dec-2024
15-Nabor-2025
09-Feb-2024
22-Jan-2025
26-Mar-2025
20-Nov-2024
09-Feb-2024
05-Mar-2025
09-Feb-2024
17-Apr-2024
05-Mar-2025
09-Feb-2024
05-Feb-2025
09-Feb-2024
04-Dec-2024
25-Dec-2024
19-Feb-2025
27-Nov-2024
09-Feb-2024
17-Apr-2024
19-Feb-2025
25-Dec-2024
04-Dec-2024
05-Mar-2025
19-Mar-2025
05-Mar-2025
19-Mar-2025
19-Mar-2025
05-Feb-2025

## 2025-03-31 NOTE — BH INPATIENT PSYCHIATRY PROGRESS NOTE - ATTENDING COMMENTS
Chart was reviewed and case discussed with the Psych NP. I agree with the assessment and plan as documented in the Psych NP's progress note and was directly involved in medical decision making.  
Chart was reviewed and case discussed with the Psych NP. I agree with the assessment and plan as documented in the Psych NP's assessment note and was directly involved in medical decision making.
Chart was reviewed and case discussed with the Psych NP. I agree with the assessment and plan as documented in the Psych NP's progress note and was directly involved in medical decision making.  
Chart was reviewed and case discussed with the Psych NP. I agree with the assessment and plan as documented in the Psych NP's assessment note and was directly involved in medical decision making.
Chart was reviewed and case discussed with the Psych NP. I agree with the assessment and plan as documented in the Psych NP's progress note and was directly involved in medical decision making.  

## 2025-03-31 NOTE — BH INPATIENT PSYCHIATRY PROGRESS NOTE - NSTXCONDUCMODTX_PSY_ALL_CORE
Yes

## 2025-03-31 NOTE — BH INPATIENT PSYCHIATRY PROGRESS NOTE - NSCGIIMPROVESX_PSY_ALL_CORE
2 = Much improved - notably better with signficant reduction of symptoms; increase in the level of functioning but some symptoms remain
2 = Much improved - notably better with signficant reduction of symptoms; increase in the level of functioning but some symptoms remain
4 = No change - symptoms remain essentially unchanged
4 = No change - symptoms remain essentially unchanged
3 = Minimally improved - slightly better with little or no clinically meaningful reduction of symptoms.  Represents very little change in basic clinical status, level of care, or functional capacity.
2 = Much improved - notably better with signficant reduction of symptoms; increase in the level of functioning but some symptoms remain
2 = Much improved - notably better with signficant reduction of symptoms; increase in the level of functioning but some symptoms remain
4 = No change - symptoms remain essentially unchanged
2 = Much improved - notably better with signficant reduction of symptoms; increase in the level of functioning but some symptoms remain
3 = Minimally improved - slightly better with little or no clinically meaningful reduction of symptoms.  Represents very little change in basic clinical status, level of care, or functional capacity.
2 = Much improved - notably better with signficant reduction of symptoms; increase in the level of functioning but some symptoms remain
4 = No change - symptoms remain essentially unchanged
4 = No change - symptoms remain essentially unchanged
2 = Much improved - notably better with signficant reduction of symptoms; increase in the level of functioning but some symptoms remain
2 = Much improved - notably better with signficant reduction of symptoms; increase in the level of functioning but some symptoms remain
4 = No change - symptoms remain essentially unchanged
2 = Much improved - notably better with signficant reduction of symptoms; increase in the level of functioning but some symptoms remain
2 = Much improved - notably better with signficant reduction of symptoms; increase in the level of functioning but some symptoms remain
4 = No change - symptoms remain essentially unchanged
4 = No change - symptoms remain essentially unchanged
2 = Much improved - notably better with signficant reduction of symptoms; increase in the level of functioning but some symptoms remain
3 = Minimally improved - slightly better with little or no clinically meaningful reduction of symptoms.  Represents very little change in basic clinical status, level of care, or functional capacity.
4 = No change - symptoms remain essentially unchanged
2 = Much improved - notably better with signficant reduction of symptoms; increase in the level of functioning but some symptoms remain
4 = No change - symptoms remain essentially unchanged
3 = Minimally improved - slightly better with little or no clinically meaningful reduction of symptoms.  Represents very little change in basic clinical status, level of care, or functional capacity.
3 = Minimally improved - slightly better with little or no clinically meaningful reduction of symptoms.  Represents very little change in basic clinical status, level of care, or functional capacity.
2 = Much improved - notably better with signficant reduction of symptoms; increase in the level of functioning but some symptoms remain
2 = Much improved - notably better with signficant reduction of symptoms; increase in the level of functioning but some symptoms remain
4 = No change - symptoms remain essentially unchanged
3 = Minimally improved - slightly better with little or no clinically meaningful reduction of symptoms.  Represents very little change in basic clinical status, level of care, or functional capacity.
2 = Much improved - notably better with signficant reduction of symptoms; increase in the level of functioning but some symptoms remain
2 = Much improved - notably better with signficant reduction of symptoms; increase in the level of functioning but some symptoms remain
4 = No change - symptoms remain essentially unchanged
3 = Minimally improved - slightly better with little or no clinically meaningful reduction of symptoms.  Represents very little change in basic clinical status, level of care, or functional capacity.
4 = No change - symptoms remain essentially unchanged
3 = Minimally improved - slightly better with little or no clinically meaningful reduction of symptoms.  Represents very little change in basic clinical status, level of care, or functional capacity.

## 2025-03-31 NOTE — BH INPATIENT PSYCHIATRY PROGRESS NOTE - NSBHMSEIMPULSE_PSY_A_CORE
Normal
Other
Normal
Other
Normal
Other
Normal

## 2025-03-31 NOTE — BH INPATIENT PSYCHIATRY PROGRESS NOTE - NSBHCONSBHPROVDETAILS_PSY_A_CORE  FT
left message with Dr. Rodriguez. 
left message with Dr. oRdriguez. 
left message with Dr. Rodriguez. 
left message with Dr. Rodirguez. 
left message with Dr. Rodriguez. 

## 2025-03-31 NOTE — BH INPATIENT PSYCHIATRY PROGRESS NOTE - NSTXDCHOUSDATEEST_PSY_ALL_CORE
12-Feb-2024
17-Apr-2024
19-Feb-2025
21-Feb-2024
17-Apr-2024
21-Feb-2024
23-Oct-2024
29-Aug-2024
12-Feb-2024
12-Feb-2024
17-Apr-2024
20-Mar-2024
05-Sep-2024
17-Apr-2024
29-Aug-2024
16-Oct-2024
29-Aug-2024
30-Oct-2024
12-Feb-2024
12-Feb-2024
13-Nov-2024
17-Apr-2024
29-Jan-2025
04-Dec-2024
17-Apr-2024
17-Apr-2024
20-Mar-2024
12-Feb-2024
15-Nabor-2025
17-Apr-2024
29-Aug-2024
17-Apr-2024
17-Apr-2024
20-Nov-2024
29-Aug-2024
12-Feb-2024
29-Aug-2024
12-Feb-2024
17-Apr-2024
20-Mar-2025
23-Jan-2025
17-Apr-2024
28-Mar-2024
04-Dec-2024
17-Apr-2024
23-Oct-2024
30-Oct-2024
12-Feb-2024
17-Apr-2024
17-Apr-2024
23-Jan-2025
26-Dec-2024
18-Dec-2024
29-Aug-2024
12-Feb-2024
16-Oct-2024
17-Apr-2024
15-Nabor-2025
16-Oct-2024
17-Apr-2024
17-Apr-2024
08-Jan-2025
17-Apr-2024
28-Mar-2024
29-Aug-2024
12-Feb-2025
17-Apr-2024
29-Aug-2024
29-Aug-2024
05-Sep-2024
12-Feb-2024
17-Apr-2024
17-Apr-2024
26-Feb-2025
17-Apr-2024
28-Mar-2024
28-Mar-2024
29-Aug-2024
29-Aug-2024
17-Apr-2024
17-Apr-2024
18-Dec-2024
19-Feb-2025
20-Mar-2025
20-Nov-2024
27-Nov-2024
12-Feb-2024
17-Apr-2024
29-Aug-2024
17-Apr-2024
20-Mar-2025
29-Aug-2024
12-Mar-2025
17-Apr-2024
26-Feb-2025
04-Dec-2024
17-Apr-2024
17-Apr-2024
21-Feb-2024
28-Mar-2024
12-Feb-2024
17-Apr-2024
27-Nov-2024
17-Apr-2024
18-Dec-2024
20-Mar-2024
12-Feb-2025
17-Apr-2024
29-Aug-2024
12-Feb-2024
17-Apr-2024
29-Aug-2024
12-Feb-2024
17-Apr-2024
17-Apr-2024
05-Sep-2024
12-Feb-2024
17-Apr-2024
17-Apr-2024
20-Mar-2025
20-Nov-2024
29-Aug-2024
06-Nov-2024
12-Feb-2024
13-Nov-2024
17-Apr-2024
17-Apr-2024
27-Mar-2025
29-Aug-2024
11-Dec-2024
12-Feb-2024
17-Apr-2024
27-Mar-2025
17-Apr-2024
17-Apr-2024
12-Feb-2024
17-Apr-2024
12-Feb-2024
17-Apr-2024
29-Aug-2024
17-Apr-2024
20-Mar-2024
29-Aug-2024
12-Feb-2024
12-Mar-2025
17-Apr-2024
17-Apr-2024
29-Jan-2025
30-Oct-2024
23-Jan-2025
23-Oct-2024
29-Aug-2024
12-Feb-2024
17-Apr-2024
29-Aug-2024
12-Feb-2024
17-Apr-2024
29-Jan-2025
26-Feb-2025
17-Apr-2024
17-Apr-2024
11-Dec-2024
12-Feb-2024
17-Apr-2024
05-Sep-2024
15-Nabor-2025
17-Apr-2024
17-Apr-2024
12-Feb-2024
13-Nov-2024
17-Apr-2024
29-Aug-2024
30-Oct-2024
08-Jan-2025
29-Aug-2024
02-Jan-2025
12-Feb-2024
29-Aug-2024
17-Apr-2024
17-Apr-2024
29-Aug-2024
30-Oct-2024
05-Feb-2025
17-Apr-2024
02-Jan-2025
12-Feb-2024
12-Feb-2024
17-Apr-2024
17-Apr-2024
26-Feb-2025
29-Aug-2024
29-Aug-2024
17-Apr-2024
05-Mar-2025
12-Feb-2024
21-Feb-2024
29-Aug-2024
05-Feb-2025
08-Jan-2025
17-Apr-2024
18-Dec-2024
23-Oct-2024
27-Nov-2024
29-Aug-2024
05-Feb-2025
17-Apr-2024
18-Dec-2024
05-Mar-2025
12-Feb-2024
17-Apr-2024
17-Apr-2024
11-Dec-2024
12-Feb-2024
16-Oct-2024
17-Apr-2024
17-Apr-2024
20-Mar-2024
20-Mar-2025
28-Mar-2024
30-Oct-2024
13-Nov-2024
16-Oct-2024
12-Feb-2024
20-Nov-2024
08-Jan-2025
27-Nov-2024
06-Nov-2024
12-Mar-2025
02-Jan-2025
17-Apr-2024
18-Dec-2024
19-Feb-2025
08-Jan-2025
12-Feb-2024
12-Feb-2024
12-Mar-2025
17-Apr-2024
15-Nabor-2025
12-Feb-2025
02-Jan-2025
23-Jan-2025
26-Dec-2024
04-Dec-2024
05-Mar-2025
23-Jan-2025
13-Nov-2024
18-Dec-2024
06-Nov-2024
19-Feb-2025
26-Feb-2025
05-Mar-2025
06-Nov-2024
05-Feb-2025
12-Mar-2025
06-Nov-2024
29-Jan-2025
02-Jan-2025
04-Dec-2024
20-Nov-2024
26-Feb-2025
05-Feb-2025
11-Dec-2024
12-Feb-2025
05-Feb-2025
05-Mar-2025
20-Mar-2025
12-Feb-2024

## 2025-03-31 NOTE — BH INPATIENT PSYCHIATRY PROGRESS NOTE - NS ED [facility] MED ROS PSYCHIATRIC
See HPI

## 2025-03-31 NOTE — BH INPATIENT PSYCHIATRY PROGRESS NOTE - NSBHCHARTREVIEWVS_PSY_A_CORE FT
[employer] Last 24 Hrs  T(C): --  T(F): --  HR: --  BP: --  BP(mean): --  RR: 16 (03-31-25 @ 07:57) (16 - 16)  SpO2: --

## 2025-03-31 NOTE — BH INPATIENT PSYCHIATRY PROGRESS NOTE - NSTXDCHOUSINTERMD_PSY_ALL_CORE
Work with interdisciplinary team to find appropriate housing. Stabilize pt on meds.
Work with interdisciplinary team to find appropriate housing. Stabilize pt on meds if appropriate. 
Work with interdisciplinary team to find appropriate housing. Stabilize pt on meds.
Work with interdisciplinary team to find appropriate housing. Stabilize pt on meds if appropriate. 
Work with interdisciplinary team to find appropriate housing. Stabilize pt on meds.
Work with interdisciplinary team to find appropriate housing. Stabilize pt on meds if appropriate. 
Work with interdisciplinary team to find appropriate housing. Stabilize pt on meds.
Work with interdisciplinary team to find appropriate housing. Stabilize pt on meds if appropriate. 
Work with interdisciplinary team to find appropriate housing. Stabilize pt on meds.
Work with interdisciplinary team to find appropriate housing. Stabilize pt on meds if appropriate. 
Work with interdisciplinary team to find appropriate housing. Stabilize pt on meds.
Work with interdisciplinary team to find appropriate housing. Stabilize pt on meds if appropriate. 
Work with interdisciplinary team to find appropriate housing. Stabilize pt on meds.
Work with interdisciplinary team to find appropriate housing. Stabilize pt on meds if appropriate. 
Work with interdisciplinary team to find appropriate housing. Stabilize pt on meds.
Work with interdisciplinary team to find appropriate housing. Stabilize pt on meds if appropriate. 
Work with interdisciplinary team to find appropriate housing. Stabilize pt on meds.
Work with interdisciplinary team to find appropriate housing. Stabilize pt on meds if appropriate. 
Work with interdisciplinary team to find appropriate housing. Stabilize pt on meds.
Work with interdisciplinary team to find appropriate housing. Stabilize pt on meds if appropriate. 
Work with interdisciplinary team to find appropriate housing. Stabilize pt on meds if appropriate. 
Work with interdisciplinary team to find appropriate housing. Stabilize pt on meds.
Work with interdisciplinary team to find appropriate housing. Stabilize pt on meds if appropriate. 
Work with interdisciplinary team to find appropriate housing. Stabilize pt on meds.

## 2025-03-31 NOTE — BH INPATIENT PSYCHIATRY PROGRESS NOTE - NSBHMSEMUSCLE_PSY_A_CORE
Normal muscle tone/strength

## 2025-03-31 NOTE — BH INPATIENT PSYCHIATRY PROGRESS NOTE - NSTXSKINDATEEST_PSY_ALL_CORE
05-Feb-2025
25-Dec-2024
29-Jan-2025
29-Jan-2025
26-Mar-2025
05-Feb-2025
25-Dec-2024
19-Mar-2025
15-Nabor-2025
05-Feb-2025
25-Dec-2024
25-Dec-2024
05-Feb-2025
05-Feb-2025
15-Nabor-2025
05-Feb-2025
25-Dec-2024
19-Mar-2025
05-Feb-2025
25-Dec-2024
29-Jan-2025
25-Dec-2024
05-Feb-2025
15-Nabor-2025
22-Jan-2025
05-Feb-2025
05-Feb-2025
22-Jan-2025
25-Dec-2024
22-Jan-2025
26-Mar-2025
05-Feb-2025
05-Feb-2025
15-Nabor-2025
25-Dec-2024
25-Dec-2024
05-Feb-2025
26-Mar-2025
05-Feb-2025
05-Feb-2025
19-Mar-2025
05-Feb-2025
19-Mar-2025
26-Mar-2025
29-Jan-2025
22-Jan-2025
05-Feb-2025
25-Dec-2024
29-Jan-2025
22-Jan-2025
19-Mar-2025

## 2025-03-31 NOTE — BH INPATIENT PSYCHIATRY PROGRESS NOTE - NSDCCRITERIA_PSY_ALL_CORE
When pt is no longer an acute or imminent risk of harm to self or others, and is able to care for self safely, pt may then be discharged.  no abdominal pain, no bloating, no constipation, no diarrhea, no nausea and no vomiting. no

## 2025-03-31 NOTE — BH INPATIENT PSYCHIATRY PROGRESS NOTE - NSBHROSSTATEMENT_PSY_A_CORE
.

## 2025-03-31 NOTE — BH INPATIENT PSYCHIATRY PROGRESS NOTE - NSBHFUPINTERVALHXFT_PSY_A_CORE
Patient is followed up for NDD. Chart, medications and labs reviewed. Patient is discussed during morning brief, no overnight events, has been in good behavioral control.   Patient was seen and is evaluated on the unit. She continues to exhibit childlike behaviors. On interview presents with bright affect, says that she took a shower, changed her clothes this morning and is looking forward to being discharged tomorrow. She has been compliant with standing medications, denies SE. Denies mood disturbances. No acute medical concerns, VSS.

## 2025-03-31 NOTE — BH INPATIENT PSYCHIATRY PROGRESS NOTE - NSBHMSERELATED_PSY_A_CORE
Fair
Poor
Fair
Fair
Poor
Fair
Fair
Poor
Fair

## 2025-03-31 NOTE — BH INPATIENT PSYCHIATRY PROGRESS NOTE - NSTXCONDUCDATENEW_PSY_ALL_CORE
02-Mar-2025
24-Nov-2024
24-Nov-2024
02-Mar-2025
24-Nov-2024
02-Mar-2025
24-Nov-2024
02-Mar-2025
24-Nov-2024
02-Mar-2025
24-Nov-2024
24-Nov-2024
02-Mar-2025
24-Nov-2024
24-Nov-2024
02-Mar-2025
24-Nov-2024
02-Mar-2025
24-Nov-2024
24-Nov-2024
02-Mar-2025
24-Nov-2024
02-Mar-2025
02-Mar-2025
24-Nov-2024
24-Nov-2024
02-Mar-2025
24-Nov-2024
02-Mar-2025
24-Nov-2024
02-Mar-2025
24-Nov-2024
02-Mar-2025
24-Nov-2024
02-Mar-2025
24-Nov-2024
02-Mar-2025
24-Nov-2024
02-Mar-2025
24-Nov-2024
02-Mar-2025
24-Nov-2024
02-Mar-2025
24-Nov-2024
02-Mar-2025
02-Mar-2025
24-Nov-2024
02-Mar-2025
24-Nov-2024
02-Mar-2025
24-Nov-2024

## 2025-03-31 NOTE — BH INPATIENT PSYCHIATRY PROGRESS NOTE - GENERAL APPEARANCE
No deformities present
Developmentally delayed
No deformities present
Developmentally delayed
No deformities present

## 2025-03-31 NOTE — BH INPATIENT PSYCHIATRY PROGRESS NOTE - TELEPSYCHIATRY?
No
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
## 2025-03-31 NOTE — BH INPATIENT PSYCHIATRY PROGRESS NOTE - NSTXCONDUCPROGRES_PSY_ALL_CORE
No Change
No Change
Improving
Improving
No Change
Improving
No Change
Improving
Improving
No Change
Improving
No Change
Improving
No Change
Improving
Improving
No Change
Improving
Improving
No Change
Improving
No Change
Improving
No Change
Improving
Improving
No Change
No Change
Improving
No Change
Improving
Met - goal discontinued
No Change
Improving
No Change
Improving
Improving
No Change
Improving
No Change
Improving
Improving
No Change
Met - goal discontinued
No Change
Improving
No Change
Worsening
Improving
No Change
No Change
Improving
No Change
Improving
No Change
No Change
Improving
Improving
No Change
Improving
Improving
No Change
Improving
Improving
No Change
Improving
Met - goal discontinued
Met - goal discontinued
No Change
Improving
No Change
No Change
Improving
No Change
No Change
Improving
No Change
Improving
No Change
Improving
No Change
Improving
Improving
No Change
Improving
No Change
Improving
No Change
No Change
Improving
Improving
Met - goal discontinued
No Change
Improving
Improving
No Change
Improving
Improving
Met - goal discontinued
No Change
Improving
Improving
No Change
Improving
Improving
No Change
Worsening
Improving
Improving
No Change
No Change
Improving
No Change
Improving
No Change
Improving
Improving
No Change
Improving
Improving
No Change
Worsening
Improving
Improving
No Change
No Change
Improving
No Change
Improving
No Change
No Change
Improving
No Change
Worsening
Improving
No Change
Improving
No Change
Improving
No Change
No Change
Improving
Improving
No Change
Improving
Improving
No Change
No Change
Worsening
Improving
No Change
Improving
Improving
No Change
Improving
No Change
Improving
No Change
Improving
No Change
Improving
No Change
No Change
Improving
No Change
Improving
Improving
No Change
Improving

## 2025-03-31 NOTE — BH INPATIENT PSYCHIATRY PROGRESS NOTE - NSTXVIOLNTDATETRGT_PSY_ALL_CORE
04-Jul-2024
17-Oct-2024
10-Oct-2024
12-Mar-2025
17-Oct-2024
24-Jul-2024
26-Feb-2025
04-Jul-2024
08-Aug-2024
16-Feb-2024
22-Jan-2025
02-Apr-2025
10-Apr-2024
19-Sep-2024
22-Aug-2024
26-Sep-2024
01-May-2024
06-Mar-2024
07-Mar-2024
10-Apr-2024
10-Oct-2024
17-Oct-2024
02-Apr-2025
10-Apr-2024
26-Feb-2025
08-Aug-2024
17-Oct-2024
19-Sep-2024
23-May-2024
06-Jun-2024
17-Oct-2024
01-May-2024
24-Jul-2024
26-Mar-2025
02-Oct-2024
06-Jun-2024
10-Apr-2024
10-Oct-2024
12-Feb-2025
28-Aug-2024
04-Jul-2024
06-Jun-2024
16-Feb-2024
19-Feb-2025
19-Feb-2025
28-Aug-2024
07-Mar-2024
10-Apr-2024
19-Sep-2024
23-May-2024
24-Jul-2024
08-Aug-2024
17-Oct-2024
23-May-2024
26-Feb-2025
06-Jun-2024
07-Mar-2024
24-Jul-2024
26-Sep-2024
02-Oct-2024
12-Mar-2025
24-Jul-2024
06-Jun-2024
06-Jun-2024
06-Mar-2024
07-Mar-2024
07-Mar-2024
08-Aug-2024
22-Aug-2024
24-Jul-2024
06-Jun-2024
28-Aug-2024
01-Jan-2025
04-Apr-2024
06-Jun-2024
07-Mar-2024
16-Feb-2024
24-Jul-2024
28-Aug-2024
08-Jan-2025
24-Jul-2024
10-Apr-2024
20-Nov-2024
17-Oct-2024
04-Jul-2024
12-Mar-2025
26-Sep-2024
01-May-2024
01-May-2024
19-Sep-2024
06-Jun-2024
11-Dec-2024
20-Nov-2024
22-Aug-2024
24-Jul-2024
08-Jan-2025
10-Apr-2024
02-Oct-2024
08-Jan-2025
10-Apr-2024
16-Feb-2024
16-Feb-2024
17-Oct-2024
08-Aug-2024
16-Feb-2024
17-Oct-2024
17-Oct-2024
07-Mar-2024
17-Oct-2024
06-Jun-2024
17-Oct-2024
20-Nov-2024
28-Aug-2024
28-Aug-2024
04-Apr-2024
20-Nov-2024
22-Aug-2024
24-Jul-2024
28-Aug-2024
05-Feb-2025
06-Jun-2024
16-Feb-2024
23-May-2024
29-Jan-2025
01-May-2024
04-Jul-2024
23-May-2024
26-Mar-2025
07-Mar-2024
18-Dec-2024
26-Mar-2025
06-Mar-2024
10-Apr-2024
23-May-2024
24-Jul-2024
26-Feb-2025
02-Apr-2025
28-Aug-2024
01-May-2024
04-Jul-2024
08-Aug-2024
10-Apr-2024
18-Dec-2024
02-Apr-2025
16-Feb-2024
23-May-2024
26-Feb-2025
08-Jan-2025
17-Oct-2024
20-Nov-2024
04-Apr-2024
06-Jun-2024
07-Mar-2024
07-Mar-2024
10-Apr-2024
07-Mar-2024
08-Aug-2024
18-Dec-2024
24-Jul-2024
28-Aug-2024
29-Jan-2025
01-May-2024
07-Mar-2024
10-Apr-2024
17-Oct-2024
02-Oct-2024
25-Jun-2024
01-May-2024
01-May-2024
28-Aug-2024
01-May-2024
02-Oct-2024
05-Feb-2025
06-Jun-2024
12-Mar-2025
06-Jun-2024
08-Aug-2024
11-Dec-2024
19-Sep-2024
22-Jan-2025
10-Apr-2024
10-Apr-2024
12-Feb-2025
19-Feb-2025
06-Jun-2024
28-Aug-2024
29-Jan-2025
18-Dec-2024
19-Sep-2024
25-Jun-2024
26-Sep-2024
04-Apr-2024
04-Jul-2024
01-May-2024
20-Nov-2024
20-Nov-2024
28-Aug-2024
01-May-2024
08-Aug-2024
11-Dec-2024
05-Feb-2025
08-Jan-2025
10-Oct-2024
10-Apr-2024
17-Oct-2024
17-Oct-2024
20-Nov-2024
23-May-2024
06-Jun-2024
16-Feb-2024
18-Dec-2024
01-May-2024
12-Mar-2025
17-Oct-2024
17-Oct-2024
18-Dec-2024
23-May-2024
10-Oct-2024
22-Aug-2024
25-Jun-2024
26-Feb-2025
24-Jul-2024
06-Jun-2024
07-Mar-2024
26-Feb-2025
08-Aug-2024
20-Nov-2024
25-Jun-2024
12-Feb-2025
24-Jul-2024
06-Jun-2024
10-Apr-2024
12-Mar-2025
01-Jan-2025
07-Mar-2024
01-May-2024
16-Feb-2024
18-Dec-2024
04-Jul-2024
10-Apr-2024
10-Apr-2024
01-May-2024
04-Apr-2024
12-Mar-2025
17-Oct-2024
17-Oct-2024
19-Feb-2025
22-Jan-2025
06-Mar-2024
07-Mar-2024
29-Jan-2025
10-Apr-2024
16-Feb-2024
17-Oct-2024
24-Jul-2024
06-Mar-2024
18-Dec-2024
12-Mar-2025
18-Dec-2024
10-Apr-2024
08-Jan-2025
12-Feb-2025
22-Jan-2025
20-Nov-2024
20-Nov-2024
05-Feb-2025
08-Jan-2025
26-Feb-2025
05-Feb-2025
12-Mar-2025
11-Dec-2024
12-Mar-2025
26-Feb-2025
01-Jan-2025
01-Jan-2025
20-Nov-2024
17-Oct-2024
26-Feb-2025
08-Jan-2025
26-Mar-2025
18-Dec-2024
20-Nov-2024
17-Oct-2024
11-Dec-2024
12-Feb-2025
17-Oct-2024
08-Jan-2025
20-Nov-2024
29-Jan-2025
26-Mar-2025

## 2025-03-31 NOTE — BH INPATIENT PSYCHIATRY PROGRESS NOTE - NSBHROSSYSTEMS_PSY_ALL_CORE
Psychiatric

## 2025-03-31 NOTE — BH INPATIENT PSYCHIATRY PROGRESS NOTE - NSTXPROBCONDUC_PSY_ALL_CORE
CONDUCT PROBLEM

## 2025-03-31 NOTE — BH INPATIENT PSYCHIATRY PROGRESS NOTE - NSTXPSYCHOGOAL_PSY_ALL_CORE
Will be able to report experiencing hallucinations to staff
Will report using a mindfulness skill to be able to read 5 pages of a book daily despite hallucinations
Will be able to report experiencing hallucinations to staff
Will identify 2 coping skills that help mitigate hallucinations
Will report using a mindfulness skill to be able to read 5 pages of a book daily despite hallucinations
Will be able to report experiencing hallucinations to staff
Will identify 1 thought/feeling/behavior associated with hallucinations
Will be able to report experiencing hallucinations to staff
Will identify 2 coping skills that help mitigate hallucinations
Will report using a mindfulness skill to be able to read 5 pages of a book daily despite hallucinations
Will report using a mindfulness skill to be able to read 5 pages of a book daily despite hallucinations
Will be able to report experiencing hallucinations to staff
Will verbalize 1 strategy to successfully cope with psychotic symptoms
Will be able to report experiencing hallucinations to staff
Will report using a mindfulness skill to be able to read 5 pages of a book daily despite hallucinations
Will be able to report experiencing hallucinations to staff
Will identify 2 coping skills that help mitigate hallucinations
Will identify 2 coping skills that help mitigate hallucinations
Will report using a mindfulness skill to be able to read 5 pages of a book daily despite hallucinations
Will be able to report experiencing hallucinations to staff
Will identify 2 coping skills that help mitigate hallucinations
Will identify 2 coping skills that help mitigate hallucinations
Will be able to report experiencing hallucinations to staff
Will identify 2 coping skills that help mitigate hallucinations
Will be able to report experiencing hallucinations to staff
Will report using a mindfulness skill to be able to read 5 pages of a book daily despite hallucinations
Will be able to report experiencing hallucinations to staff
Will identify 2 coping skills that help mitigate hallucinations
Will be able to report experiencing hallucinations to staff
Will report using a mindfulness skill to be able to read 5 pages of a book daily despite hallucinations
Will be able to report experiencing hallucinations to staff
Will be able to report experiencing hallucinations to staff
Will identify 2 coping skills that help mitigate hallucinations
Will be able to report experiencing hallucinations to staff
Will identify 2 coping skills that help mitigate hallucinations
Will be able to report experiencing hallucinations to staff
Will report using a mindfulness skill to be able to read 5 pages of a book daily despite hallucinations
Will be able to report experiencing hallucinations to staff
Will report using a mindfulness skill to be able to read 5 pages of a book daily despite hallucinations
Will verbalize 1 strategy to successfully cope with psychotic symptoms
Will be able to report experiencing hallucinations to staff
Will identify 2 coping skills that help mitigate hallucinations
Will be able to report experiencing hallucinations to staff
Will report using a mindfulness skill to be able to read 5 pages of a book daily despite hallucinations
Will be able to report experiencing hallucinations to staff
Will be able to report experiencing hallucinations to staff
Will report using a mindfulness skill to be able to read 5 pages of a book daily despite hallucinations
Will be able to report experiencing hallucinations to staff
Will report using a mindfulness skill to be able to read 5 pages of a book daily despite hallucinations
Will be able to report experiencing hallucinations to staff
Will identify 2 coping skills that help mitigate hallucinations
Will be able to report experiencing hallucinations to staff
Will verbalize 1 strategy to successfully cope with psychotic symptoms
Will be able to report experiencing hallucinations to staff
Will identify 2 coping skills that help mitigate hallucinations
Will report using a mindfulness skill to be able to read 5 pages of a book daily despite hallucinations
Will be able to report experiencing hallucinations to staff
Will report using a mindfulness skill to be able to read 5 pages of a book daily despite hallucinations
Will report using a mindfulness skill to be able to read 5 pages of a book daily despite hallucinations
Will be able to report experiencing hallucinations to staff
Will report using a mindfulness skill to be able to read 5 pages of a book daily despite hallucinations
Will be able to report experiencing hallucinations to staff
Will identify 2 coping skills that help mitigate hallucinations
Will report using a mindfulness skill to be able to read 5 pages of a book daily despite hallucinations
Will be able to report experiencing hallucinations to staff
Will report using a mindfulness skill to be able to read 5 pages of a book daily despite hallucinations
Will be able to report experiencing hallucinations to staff
Will identify 2 coping skills that help mitigate hallucinations
Will report using a mindfulness skill to be able to read 5 pages of a book daily despite hallucinations
Will identify 2 coping skills that help mitigate hallucinations
Will report using a mindfulness skill to be able to read 5 pages of a book daily despite hallucinations
Will be able to report experiencing hallucinations to staff
Will report using a mindfulness skill to be able to read 5 pages of a book daily despite hallucinations
Will identify 1 thought/feeling/behavior associated with hallucinations
Will be able to report experiencing hallucinations to staff
Will report using a mindfulness skill to be able to read 5 pages of a book daily despite hallucinations
Will verbalize 1 strategy to successfully cope with psychotic symptoms
Will be able to report experiencing hallucinations to staff
Will report using a mindfulness skill to be able to read 5 pages of a book daily despite hallucinations
Will identify 1 thought/feeling/behavior associated with hallucinations
Will be able to report experiencing hallucinations to staff
Will report using a mindfulness skill to be able to read 5 pages of a book daily despite hallucinations
Will report using a mindfulness skill to be able to read 5 pages of a book daily despite hallucinations
Will identify 2 coping skills that help mitigate hallucinations
Will be able to report experiencing hallucinations to staff
Will verbalize 1 strategy to successfully cope with psychotic symptoms
Will be able to report experiencing hallucinations to staff
Will identify 1 thought/feeling/behavior associated with hallucinations
Will be able to report experiencing hallucinations to staff
Will report using a mindfulness skill to be able to read 5 pages of a book daily despite hallucinations
Will be able to report experiencing hallucinations to staff
Will report using a mindfulness skill to be able to read 5 pages of a book daily despite hallucinations
Will be able to report experiencing hallucinations to staff
Will be able to report experiencing hallucinations to staff
Will identify 2 coping skills that help mitigate hallucinations
Will report using a mindfulness skill to be able to read 5 pages of a book daily despite hallucinations
Will report using a mindfulness skill to be able to read 5 pages of a book daily despite hallucinations
Will identify 2 coping skills that help mitigate hallucinations
Will report using a mindfulness skill to be able to read 5 pages of a book daily despite hallucinations
Will identify 2 coping skills that help mitigate hallucinations
Will identify 1 thought/feeling/behavior associated with hallucinations
Will report using a mindfulness skill to be able to read 5 pages of a book daily despite hallucinations
Will report using a mindfulness skill to be able to read 5 pages of a book daily despite hallucinations

## 2025-03-31 NOTE — BH INPATIENT PSYCHIATRY PROGRESS NOTE - NSTXCONDUCGOALOTHER_PSY_ALL_CORE
Will attend 5 groups/week
Will attend 5 groups/ week
Will verbalize her thoughts/feelings to staff when stressed
Will attend 5 groups/week
will attend 5 groups per week
will verbalize thoughts/feeing to staff when stressed
will attend 5 groups per week
Will attend 5 groups per week
attended 5 group per week
will attend 5 groups per week
Will verbalize her thoughts/feelings to staff when stressed
will attend 5 groups per week
Will attend 5 groups/week.
Will attend 5 groups/ week
Will attend group 5x/week
will attend 5 groups per week
will attend 5 groups per week
Will verbalize her thoughts/feelings to staff when stressed
will verbalize thoughts/feeling to staff when stressed
Will verbalize her thoughts/feelings to staff when stressed
- will verbalize thoughts/feeing to staff when stressed
will attend 5 groups per week
will verbalized thoughts/feeing to staff when stressed
Will attended 5 group/ week
will attend 5 groups per week
will attend 5 groups per week
Will attend 5 groups/week
attended 5 group per week
attended 5 group per week
Will attend 5 groups/ week
Will attend 5 groups per week
Will attended 5 group/ week
Will attend 5 groups/week
Will attend 5 groups per week
Will attend 5 groups per week
Will attend 5 groups/week.
Will attend 5 groups/week
will attend 5 groups per week
Will attend 5 groups/ week
Will attend 5 groups/week
will attend 5 groups per week
Will attend 5 groups per week
Will attend 5 groups/week
will verbalized thoughts/feeing to staff when stressed
Will attend 5 groups/week
will verbalized thoughts/feeing to staff when stressed
Will attend 5 groups/week
Will attend 5 groups/week
will verbalized thoughts/feeing to staff when stressed
- will verbalize thoughts/feeing to staff when stressed
Will attend 5 groups per week
will verbalized thoughts/feeing to staff when stressed
Will attend group 5x/week
will attend 5 groups per week
Will attend 5 groups per week
attended 5 group per week
will attend 5 groups per week
will verbalize thoughts/feeling to staff when stressed
will attend 5 groups per week
Will verbalize her thoughts/feelings to staff when stressed

## 2025-03-31 NOTE — BH INPATIENT PSYCHIATRY DISCHARGE NOTE - REASON FOR ADMISSION
You were brought in by EMS and admitted involuntarily for erratic behavior at group home in the context of psychosocial stress.

## 2025-03-31 NOTE — BH INPATIENT PSYCHIATRY DISCHARGE NOTE - LEGAL HISTORY
Physical Therapy Discharge    Visit Type: Discharge Summary- Daily Treatment Note  Visit: 3  Referring Provider: Ariel Sanchez MD  Medical Diagnosis (from order): [unfilled]   Patient alert and oriented X3.    SUBJECTIVE                                                                                                               Patient reports no dizziness with any movements since last therapy session. \"I almost forgot I was dizzy\".   Current Functional Limitations: No dizziness with any movements including bed mobility and playing with puppy.     Pain / Symptoms  - Patient denies pain / symptoms.     OBJECTIVE                                                                                                                           Standing Balance  (JANET = base of support)  Firm Surface: Double Leg      - Static, Eyes Open       - Trial 1 (sec): 30       - Trial 1 details: independent     - Static, Eyes Closed       - Trial 1 (sec): 30       - Trial 1 details: independent  Foam Surface: Double Leg      - Eyes Open (sec): 30     - Eyes Open details : independent     - Eyes Closed (sec): 30     - Eyes Closed details: supervision  Instability present with eyes closed on foam but patient maintained without external support.          Vestibular Testing With Goggles   VOR = vestibular ocular reflex    Positional Exam  - Bri-Hallpike (anterior and posterior canal)      Left: negative      Right: negative  - Roll Test (horizontal canal)      Left: negative      Right: negative    Outcome/Assessments  Outcome Measures:   Dizziness Handicap Inventory:   DHI Total Score: 0  (scored 0-100, higher score indicates higher impairment) see flowsheet for additional documentation         Treatment     Neuromuscular Re-Education  - discussed continued vestibular rehab via HEP x2 weeks then tapering as able when symptoms are minimal and exercises are not provocative. Discussed balance triad with vestibular, visual, and  somatosensory system and benefit of performing activities to strength all systems.     Canalith Repositioning    Epley Maneuver completed to address and ensure stable position of otoliths with right posterior canalithiasis benign paroxysmal positional vertigo (BPPV):    - 1 minute hold in each position, performed 1 sets, slow transition between positions with continuous observation for nystagmus or subjective reports of vertigo      - Patient reports no symptoms following canalith repositioning technique treatment  Skilled input: verbal instruction/cues, as detailed above and facilitation    Writer verbally educated and received verbal consent for hand placement, positioning of patient, and techniques to be performed today from patient   Home Exercise Program  Access Code: [email or web address]  URL: [email or web address]  Date: 09/23/2024  Prepared by: Meche Josue    Exercises  - Romberg Stance with Eyes Closed  - 1 x daily - 7 x weekly - 3 sets - 30 sec hold  - Romberg Stance Eyes Closed on Foam Pad  - 1 x daily - 7 x weekly - 3 sets - 30 sec hold  - Romberg Stance with Head Rotation  - 1 x daily - 7 x weekly - 3 sets - 10 reps  - Romberg Stance with Head Nods  - 1 x daily - 7 x weekly - 3 sets - 10 reps  - Walking with Head Rotation  - 1 x daily - 7 x weekly - 3 sets - 10 reps      ASSESSMENT                                                                                                          To date the patient has made gains as expected.    Patient has participated in 3 visits to address deficits related to dizziness and BPPV  Since most recent progress note, patient has demonstrated the following objective improvements: no continued dizziness and resolution of all benign paroxysmal positional vertigo.   Also with improved functional status as evidenced by: no dizziness or hesitancy with head turns, rolling in bed, playing with dog.   Barriers to progression in therapy: none    Goals: 4/4 goals met  with progress made towards remaining goals     Pain/symptoms after session (out of 10): 0  Education:   - Results of above outlined education: Verbalizes understanding    PLAN                                                                                                                           Discharge from skilled therapy with instructions/recommendations to: continue home exercise program  Return with new order as needed for any return of dizziness or balance deficits.     Suggestions for next session as indicated: Discharge from skilled PT    Goals  Long Term Goals: to be met by end of plan of care  1. Patient will report no dizziness episodes with rolling in bed or bending forward when performed at normalized speed for at least 2 weeks.  Status: met  No dizziness reported since last session (weekend).  2. Patient will score 110/120 on mCTSIB (improve eyes closed on foam to 20 seconds) to improve vestibular system function and integration and decrease fall risk on unlevel surfaces in the dark.  Status: met   3. Patient will be independent and compliant with HEP to improve vestibular system function and decrease overall dizziness.  Status: met   4. Patient will score < 10/100 on DHI to be within mild dizziness handicap to improve QOL and return to PLOF.   Status: met  Score 0%.       Therapy procedure time and total treatment time can be found documented on the Time Entry flowsheet   N/A

## 2025-03-31 NOTE — BH INPATIENT PSYCHIATRY PROGRESS NOTE - NS ED [facility] REVIEW OF ED CHART VITAL SIGNS REVIEWED
Yes

## 2025-03-31 NOTE — BH INPATIENT PSYCHIATRY PROGRESS NOTE - NSTXVIOLNTPROGRES_PSY_ALL_CORE
Met - goal discontinued
Met - goal discontinued
Improving
Met - goal discontinued
Improving
Improving
Met - goal discontinued
Met - goal discontinued
Improving
Improving
Met - goal discontinued
Improving
Met - goal discontinued
Improving
Met - goal discontinued
No Change
Improving
Met - goal discontinued
Improving
Met - goal discontinued
Improving
Met - goal discontinued
Improving
Met - goal discontinued
No Change
Improving
Met - goal discontinued
Met - goal discontinued
No Change
Improving
Met - goal discontinued
Met - goal discontinued
Improving
Met - goal discontinued
Met - goal discontinued
No Change
Improving
Met - goal discontinued
Improving
Improving
Met - goal discontinued
Met - goal discontinued
No Change
Improving
Met - goal discontinued
Met - goal discontinued
Improving
Met - goal discontinued
Improving
Met - goal discontinued
Improving
Improving
Met - goal discontinued
Met - goal discontinued
No Change
Improving
Met - goal discontinued
Improving
Met - goal discontinued
Met - goal discontinued
Improving
No Change
Met - goal discontinued
Met - goal discontinued
No Change
Met - goal discontinued
Improving
Improving
Met - goal discontinued
Improving
Improving
Met - goal discontinued
Improving
Improving
Met - goal discontinued
Met - goal discontinued
No Change
No Change
Improving
Met - goal discontinued
Met - goal discontinued
No Change
Improving
Met - goal discontinued
Met - goal discontinued
Improving
Met - goal discontinued
Improving
Met - goal discontinued
Improving
Met - goal discontinued
Improving
Met - goal discontinued
Met - goal discontinued
Improving
Improving
Met - goal discontinued
Improving
Improving
Met - goal discontinued
Improving
Improving
Met - goal discontinued
Improving
Improving
Met - goal discontinued
Improving
Met - goal discontinued
No Change
Improving
Met - goal discontinued
Improving
Met - goal discontinued
No Change
Improving
Met - goal discontinued
Met - goal discontinued
Improving
Met - goal discontinued
Met - goal discontinued
No Change
Improving
Improving
Met - goal discontinued
Met - goal discontinued
No Change
Improving
Met - goal discontinued
Met - goal discontinued
Improving
Met - goal discontinued
No Change
Improving
Met - goal discontinued
Met - goal discontinued
Improving
Met - goal discontinued
Improving
Met - goal discontinued
No Change
No Change
Improving
Met - goal discontinued
No Change
Improving
Improving
Met - goal discontinued
Improving
No Change
Improving
Met - goal discontinued
Improving
No Change
Improving
No Change
Improving
No Change
Improving
Improving
No Change
No Change
Met - goal discontinued
Improving
No Change
Improving
Improving
No Change
Improving
Met - goal discontinued
Improving
No Change
Improving
No Change
Improving
Improving

## 2025-03-31 NOTE — BH INPATIENT PSYCHIATRY PROGRESS NOTE - NSTXDCHOUSPROGRES_PSY_ALL_CORE
Improving
Improving
No Change
Improving
No Change
Improving
No Change
No Change
Improving
Improving
No Change
No Change
Improving
No Change
No Change
Improving
Improving
No Change
No Change
Improving
No Change
Improving
Improving
No Change
No Change
Improving
No Change
No Change
Improving
Improving
No Change
Improving
No Change
No Change
Improving
Improving
No Change
Improving
No Change
No Change
Improving
No Change
Improving
No Change
Improving
Improving
No Change
Improving
No Change
Improving
No Change
Improving
No Change
Improving
No Change
Improving
No Change
Improving
Improving
No Change
Improving
No Change
Improving
Improving
No Change
Improving
Improving
No Change
No Change
Improving
No Change
Improving
No Change
No Change
Improving
No Change
No Change
Improving
No Change
No Change
Improving
Improving
No Change
Improving
Improving
No Change
Improving
No Change
Improving
No Change
Improving
No Change
Improving
Improving
No Change
Improving
No Change
Improving
Improving
No Change
Improving
No Change
Improving
No Change
Improving
No Change
Improving
Improving
No Change
Improving
No Change
Improving
No Change
No Change
Improving
No Change
Improving
No Change
Improving
Improving
No Change
Improving
Improving
No Change
No Change
Improving
No Change
Improving
No Change
Improving
No Change
Improving

## 2025-03-31 NOTE — BH INPATIENT PSYCHIATRY PROGRESS NOTE - NSTXPROBCOPE_PSY_ALL_CORE
COPING, INEFFECTIVE

## 2025-03-31 NOTE — BH INPATIENT PSYCHIATRY PROGRESS NOTE - NSBHMSEJUDGE_PSY_A_CORE
Other

## 2025-03-31 NOTE — BH INPATIENT PSYCHIATRY PROGRESS NOTE - NSBHMSETHTCONTENT_PSY_A_CORE
Delusions/Other
Delusions/Other
Other
Other
Delusions/Other
Other
Delusions/Other
v/Unremarkable
Delusions/Other
Other
Delusions/Other
Other
Delusions/Other
Delusions/Other
Other
Delusions/Other
v/Unremarkable
Delusions/Other
Delusions/Other
Other
Other
Delusions/Other
Delusions/Other
Other
Delusions/Other
Other
Delusions/Other
Other
Delusions/Other
Other
Delusions/Other
Other
Delusions/Other
Other
Delusions/Other
Delusions/Other
Other
Delusions/Other
Other
Delusions/Other
Delusions/Other
v/Unremarkable
Other
Delusions/Other
Delusions/Other
Other
Delusions/Other
Delusions/Other
Other
Delusions/Other
Other
Other
Delusions/Other
Other
Delusions/Other
Other
Delusions/Other
Other
Delusions/Other
Other
Delusions/Other
Other
Delusions/Other
Delusions/Other
Other
Other
Delusions/Other
Delusions/Other
Other
Delusions/Other
Other
Other
Delusions/Other
Other
Delusions/Other

## 2025-03-31 NOTE — BH INPATIENT PSYCHIATRY PROGRESS NOTE - NSTXCOPEDATEEST_PSY_ALL_CORE
17-Apr-2024
17-Apr-2024
01-Mar-2024
09-Feb-2024
17-Apr-2024
17-Apr-2024
19-Feb-2025
21-Feb-2024
05-Mar-2025
17-Apr-2024
29-Jan-2025
12-Feb-2024
17-Apr-2024
19-Feb-2025
17-Apr-2024
01-Mar-2024
09-Feb-2024
17-Apr-2024
17-Apr-2024
28-Mar-2024
17-Apr-2024
17-Apr-2024
01-Mar-2024
04-Dec-2024
12-Feb-2024
17-Apr-2024
20-Nov-2024
25-Dec-2024
17-Apr-2024
01-Mar-2024
20-Mar-2024
17-Apr-2024
17-Apr-2024
01-Mar-2024
17-Apr-2024
01-Mar-2024
17-Apr-2024
12-Feb-2024
05-Mar-2025
17-Apr-2024
21-Feb-2024
05-Feb-2025
12-Feb-2024
17-Apr-2024
27-Nov-2024
17-Apr-2024
17-Apr-2024
09-Feb-2024
17-Apr-2024
17-Apr-2024
01-Mar-2024
17-Apr-2024
28-Mar-2024
01-Mar-2024
17-Apr-2024
17-Apr-2024
12-Feb-2024
17-Apr-2024
17-Apr-2024
20-Mar-2024
17-Apr-2024
17-Apr-2024
05-Mar-2025
12-Feb-2024
17-Apr-2024
01-Mar-2024
09-Feb-2024
17-Apr-2024
22-Jan-2025
28-Mar-2024
05-Feb-2025
17-Apr-2024
28-Mar-2024
05-Feb-2025
17-Apr-2024
17-Apr-2024
20-Nov-2024
17-Apr-2024
05-Feb-2025
17-Apr-2024
17-Apr-2024
05-Mar-2025
17-Apr-2024
17-Apr-2024
22-Jan-2025
25-Dec-2024
17-Apr-2024
20-Mar-2024
17-Apr-2024
27-Nov-2024
28-Mar-2024
04-Dec-2024
12-Feb-2024
17-Apr-2024
28-Mar-2024
04-Dec-2024
17-Apr-2024
17-Apr-2024
19-Feb-2025
19-Mar-2025
01-Mar-2024
17-Apr-2024
19-Mar-2025
05-Feb-2025
05-Mar-2025
17-Apr-2024
19-Mar-2025
22-Jan-2025
29-Jan-2025
01-Mar-2024
17-Apr-2024
19-Feb-2025
20-Mar-2024
21-Feb-2024
01-Mar-2024
17-Apr-2024
21-Feb-2024
26-Mar-2025
09-Feb-2024
12-Feb-2024
17-Apr-2024
28-Mar-2024
01-Mar-2024
09-Feb-2024
15-Nabor-2025
17-Apr-2024
20-Mar-2024
12-Feb-2024
17-Apr-2024
25-Dec-2024
05-Feb-2025
05-Mar-2025
17-Apr-2024
17-Apr-2024
25-Dec-2024
17-Apr-2024
25-Dec-2024
17-Apr-2024
21-Feb-2024
05-Mar-2025
17-Apr-2024
25-Dec-2024
17-Apr-2024
17-Apr-2024
19-Feb-2025
25-Dec-2024
05-Mar-2025
09-Feb-2024
26-Mar-2025
28-Mar-2024
04-Dec-2024
17-Apr-2024
19-Mar-2025
05-Feb-2025
09-Feb-2024
09-Feb-2024
17-Apr-2024
17-Apr-2024
21-Feb-2024
15-Nabor-2025
17-Apr-2024
28-Mar-2024
05-Feb-2025
17-Apr-2024
28-Mar-2024
17-Apr-2024
28-Mar-2024
28-Mar-2024
17-Apr-2024
26-Mar-2025
01-Mar-2024
17-Apr-2024
17-Apr-2024
05-Mar-2025
12-Feb-2024
17-Apr-2024
17-Apr-2024
28-Mar-2024
17-Apr-2024
15-Nabor-2025
17-Apr-2024
17-Apr-2024
09-Feb-2024
12-Feb-2024
17-Apr-2024
17-Apr-2024
28-Mar-2024
17-Apr-2024
25-Dec-2024
28-Mar-2024
17-Apr-2024
19-Feb-2025
26-Mar-2025
17-Apr-2024
17-Apr-2024
25-Dec-2024
17-Apr-2024
19-Feb-2025
28-Mar-2024
15-Nabor-2025
12-Feb-2024
17-Apr-2024
25-Dec-2024
17-Apr-2024
12-Feb-2024
27-Nov-2024
29-Jan-2025
25-Dec-2024
09-Feb-2024
19-Mar-2025
20-Nov-2024
22-Jan-2025
29-Jan-2025
19-Feb-2025
29-Jan-2025
12-Feb-2024
19-Feb-2025
05-Mar-2025
19-Feb-2025
27-Nov-2024
20-Nov-2024
05-Feb-2025
20-Nov-2024
22-Jan-2025
12-Feb-2024
17-Apr-2024
04-Dec-2024
25-Dec-2024
25-Dec-2024

## 2025-03-31 NOTE — BH INPATIENT PSYCHIATRY PROGRESS NOTE - NSBHMSELANG_PSY_A_CORE
No abnormalities noted
No abnormalities noted/Unable to assess
No abnormalities noted
No
No abnormalities noted
No abnormalities noted/Unable to assess
No abnormalities noted
No abnormalities noted/Unable to assess
No abnormalities noted

## 2025-03-31 NOTE — BH INPATIENT PSYCHIATRY PROGRESS NOTE - NSBHMSEAFFCONG_PSY_A_CORE
Congruent

## 2025-03-31 NOTE — BH INPATIENT PSYCHIATRY PROGRESS NOTE - NSBHMSEBODY_PSY_A_CORE
Average build
Overweight
Average build
Overweight
Average build
Overweight
Average build

## 2025-03-31 NOTE — BH INPATIENT PSYCHIATRY PROGRESS NOTE - NSTXCONDUCDATETRGT_PSY_ALL_CORE
18-Apr-2024
25-Apr-2024
05-Jan-2025
09-Mar-2025
15-Sep-2024
19-Mar-2025
20-Oct-2024
24-Jul-2024
10-Oct-2024
07-Jul-2024
10-Nov-2024
21-Jul-2024
22-Dec-2024
24-Jun-2024
28-Aug-2024
01-Jan-2025
30-Mar-2025
08-Jan-2025
11-Aug-2024
13-Oct-2024
23-Feb-2024
25-Mar-2025
01-Mar-2024
03-Nov-2024
11-Aug-2024
12-Jun-2024
26-Jan-2025
10-Apr-2024
03-Nov-2024
08-May-2024
17-Nov-2024
18-Aug-2024
29-May-2024
07-Jul-2024
08-Dec-2024
22-Dec-2024
24-Jul-2024
26-Feb-2025
28-Jul-2024
05-Jun-2024
10-Nov-2024
11-Aug-2024
15-Dec-2024
20-Oct-2024
21-Mar-2024
23-May-2024
29-Mar-2024
01-Oct-2024
04-Aug-2024
04-Dec-2024
15-Mar-2024
20-Nov-2024
21-Jul-2024
27-Nov-2024
10-Apr-2024
11-Sep-2024
15-May-2024
17-Feb-2024
27-Oct-2024
19-Jan-2025
01-Mar-2024
13-Oct-2024
19-Jan-2025
25-Aug-2024
01-Mar-2024
17-Oct-2024
29-Mar-2024
31-Jul-2024
01-May-2024
05-Feb-2025
09-Feb-2025
17-Feb-2024
18-Jun-2024
08-May-2024
23-Feb-2024
01-Jul-2024
01-Dec-2024
01-Dec-2024
01-Sep-2024
10-Oct-2024
22-Sep-2024
15-May-2024
16-Feb-2025
18-Jun-2024
22-Dec-2024
29-Sep-2024
08-Dec-2024
15-Mar-2024
15-Sep-2024
20-Oct-2024
22-Sep-2024
25-Mar-2025
29-May-2024
31-Jul-2024
16-Feb-2025
17-Feb-2024
17-Feb-2024
23-Mar-2025
24-Jun-2024
25-Aug-2024
04-Aug-2024
22-Mar-2024
25-Apr-2024
27-Oct-2024
29-May-2024
05-Jan-2025
15-Mar-2024
12-Jan-2025
17-Nov-2024
18-Aug-2024
02-Oct-2024
05-Jun-2024
06-Mar-2024
10-Apr-2024
12-Feb-2025
20-Nov-2024
25-Aug-2024
25-Mar-2025
29-May-2024
10-Nov-2024
23-Mar-2025
29-Sep-2024
03-Nov-2024
05-Apr-2024
19-Jan-2025
22-Mar-2024
01-Mar-2024
15-May-2024
18-Aug-2024
01-Mar-2024
24-Jun-2024
29-May-2024
01-May-2024
05-Jun-2024
08-Dec-2024
10-Apr-2024
14-Jul-2024
22-Mar-2024
05-Jun-2024
14-Jul-2024
16-Mar-2025
29-Dec-2024
01-Jan-2025
07-Jul-2024
07-Mar-2024
08-May-2024
16-Feb-2024
04-Jul-2024
12-Feb-2025
15-Dec-2024
18-Apr-2024
23-May-2024
29-Sep-2024
01-May-2024
01-Oct-2024
08-Dec-2024
11-Aug-2024
21-Jul-2024
01-Jul-2024
01-May-2024
08-Sep-2024
09-Mar-2025
12-Jun-2024
18-Apr-2024
22-Aug-2024
12-Apr-2024
14-Jul-2024
15-May-2024
21-Jul-2024
01-Jul-2024
20-Oct-2024
23-Feb-2025
08-May-2024
12-Apr-2024
03-Nov-2024
18-Dec-2024
09-Mar-2025
06-Oct-2024
06-Oct-2024
17-Oct-2024
18-Dec-2024
22-May-2024
01-Mar-2024
11-Sep-2024
21-Jul-2024
22-Sep-2024
26-Feb-2025
26-Feb-2025
05-Apr-2024
26-Jan-2025
27-Oct-2024
15-Sep-2024
18-Apr-2024
22-Aug-2024
22-Mar-2024
25-Apr-2024
01-Jul-2024
01-Mar-2024
01-Mar-2024
01-May-2024
03-Nov-2024
07-Jul-2024
08-Sep-2024
12-Jun-2024
12-Jun-2024
23-Feb-2024
29-Dec-2024
12-Feb-2025
16-Feb-2025
01-Mar-2024
04-Aug-2024
11-Sep-2024
04-Dec-2024
06-Oct-2024
18-Apr-2024
28-Jul-2024
08-Aug-2024
12-Jan-2025
15-May-2024
16-Feb-2025
18-Jun-2024
02-Feb-2025
09-Mar-2025
17-Feb-2024
05-Apr-2024
11-Aug-2024
11-Sep-2024
15-Sep-2024
08-Aug-2024
11-Sep-2024
15-Sep-2024
23-Feb-2024
01-Sep-2024
23-May-2024
02-Oct-2024
12-Jun-2024
27-Oct-2024
13-Oct-2024
26-Jan-2025
02-Apr-2025
02-Apr-2025
25-Apr-2024
15-Mar-2024
17-Feb-2024
17-Nov-2024
24-Jun-2024
28-Aug-2024
29-Mar-2024
18-Jun-2024
20-Oct-2024
27-Oct-2024
05-Apr-2024
22-Mar-2024
24-Jun-2024
02-Apr-2025
19-Mar-2025
06-Mar-2024
27-Nov-2024
12-Apr-2024
23-May-2024
28-Jul-2024
20-Nov-2024
09-Feb-2025
16-Mar-2025
17-Nov-2024
08-Dec-2024
10-Nov-2024
12-Jan-2025
16-Feb-2025
10-Nov-2024
05-Feb-2025
19-Jan-2025
23-Feb-2025
23-Feb-2025
08-Jan-2025
09-Mar-2025
22-Dec-2024
27-Nov-2024
19-Jan-2025
23-Feb-2025
23-Feb-2025
05-Feb-2025
22-Dec-2024
26-Jan-2025
30-Mar-2025
18-Dec-2024
06-Apr-2025
12-Jan-2025
19-Mar-2025
02-Feb-2025
12-Jan-2025
23-Feb-2025

## 2025-03-31 NOTE — BH INPATIENT PSYCHIATRY PROGRESS NOTE - NSTXIMPULSPROGRES_PSY_ALL_CORE
No Change
Improving
Met - goal discontinued
Improving
Met - goal discontinued
No Change
Improving
No Change
Improving
No Change
Improving
No Change
Improving
Met - goal discontinued
No Change
Improving
No Change
Improving
No Change
Improving
No Change
Improving
No Change
Improving
Met - goal discontinued
Improving
No Change
Improving
No Change
Improving
No Change
Improving
No Change
Improving
No Change
Improving
Met - goal discontinued
Improving
No Change
Improving
No Change
Improving
No Change
Improving
No Change
Improving
No Change
No Change
Improving
No Change
No Change
Improving
Improving
No Change
Improving
Met - goal discontinued
Improving
Met - goal discontinued
Improving
No Change
Improving
No Change
Improving
No Change
Improving
No Change
Improving
Met - goal discontinued
Improving
Improving
No Change
Improving
No Change
No Change
Improving
No Change
No Change
Improving
Met - goal discontinued
Improving
No Change
Improving
No Change
Improving
No Change
Improving
Improving
No Change
Improving
No Change
Improving
No Change
Improving

## 2025-03-31 NOTE — BH INPATIENT PSYCHIATRY PROGRESS NOTE - NSTXPSYCHODATETRGT_PSY_ALL_CORE
06-Jun-2024
08-Jan-2025
11-Sep-2024
16-Feb-2024
05-Feb-2025
08-Jan-2025
10-Apr-2024
16-Feb-2024
25-Jun-2024
06-Jun-2024
08-Aug-2024
08-Aug-2024
10-Apr-2024
18-Dec-2024
20-Nov-2024
24-Jul-2024
04-Jul-2024
08-Aug-2024
08-Jan-2025
10-Oct-2024
19-Sep-2024
24-Jul-2024
02-Apr-2025
07-Mar-2024
08-Aug-2024
10-Oct-2024
12-Feb-2025
18-Dec-2024
27-Nov-2024
29-Jan-2025
01-May-2024
01-May-2024
02-Oct-2024
06-Jun-2024
07-Mar-2024
16-Feb-2024
10-Oct-2024
23-May-2024
24-Jul-2024
16-Feb-2024
19-Sep-2024
01-May-2024
02-Oct-2024
04-Jul-2024
10-Oct-2024
12-Mar-2025
22-Jan-2025
23-May-2024
28-Aug-2024
29-Jan-2025
29-Jan-2025
01-May-2024
02-Oct-2024
06-Jun-2024
10-Apr-2024
11-Sep-2024
20-Nov-2024
24-Jul-2024
24-Jul-2024
28-Aug-2024
06-Jun-2024
01-May-2024
08-Jan-2025
10-Oct-2024
24-Apr-2024
28-Aug-2024
29-Jan-2025
06-Jun-2024
10-Oct-2024
16-Feb-2024
08-Aug-2024
10-Oct-2024
10-Oct-2024
11-Sep-2024
08-Jan-2025
11-Dec-2024
12-Mar-2025
26-Feb-2025
28-Aug-2024
04-Jul-2024
10-Oct-2024
26-Mar-2025
07-Mar-2024
24-Jul-2024
04-Apr-2024
19-Feb-2025
24-Jul-2024
27-Nov-2024
02-Oct-2024
02-Oct-2024
07-Mar-2024
08-Aug-2024
08-Jan-2025
10-Oct-2024
16-Feb-2024
16-Feb-2024
04-Jul-2024
28-Aug-2024
10-Apr-2024
18-Dec-2024
25-Jun-2024
22-Aug-2024
23-May-2024
01-Jan-2025
01-May-2024
07-Mar-2024
24-Apr-2024
24-Jul-2024
06-Jun-2024
06-Jun-2024
01-Jan-2025
08-Aug-2024
10-Apr-2024
10-Oct-2024
24-Jul-2024
05-Mar-2025
02-Oct-2024
07-Mar-2024
07-Mar-2024
10-Oct-2024
10-Oct-2024
16-Feb-2024
18-Dec-2024
19-Sep-2024
04-Jul-2024
04-Jul-2024
06-Jun-2024
06-Mar-2024
07-Mar-2024
22-Aug-2024
25-Jun-2024
02-Oct-2024
10-Apr-2024
19-Feb-2025
26-Mar-2025
10-Apr-2024
10-Apr-2024
10-Oct-2024
12-Mar-2025
20-Nov-2024
27-Nov-2024
07-Mar-2024
12-Mar-2025
23-May-2024
11-Sep-2024
22-Aug-2024
24-Apr-2024
25-Jun-2024
28-Aug-2024
06-Jun-2024
07-Mar-2024
01-May-2024
10-Apr-2024
22-Jan-2025
23-May-2024
08-Aug-2024
22-Aug-2024
08-Jan-2025
19-Sep-2024
26-Feb-2025
28-Aug-2024
02-Oct-2024
04-Jul-2024
10-Oct-2024
01-May-2024
10-Oct-2024
11-Sep-2024
18-Dec-2024
01-Jan-2025
04-Apr-2024
10-Oct-2024
01-May-2024
02-Oct-2024
05-Mar-2025
07-Mar-2024
10-Oct-2024
10-Oct-2024
10-Apr-2024
23-May-2024
24-Jul-2024
10-Apr-2024
02-Oct-2024
04-Apr-2024
07-Mar-2024
10-Apr-2024
24-Jul-2024
04-Jul-2024
28-Aug-2024
02-Apr-2025
10-Apr-2024
24-Apr-2024
06-Mar-2024
10-Oct-2024
18-Dec-2024
19-Sep-2024
28-Aug-2024
05-Feb-2025
06-Jun-2024
06-Jun-2024
12-Mar-2025
20-Nov-2024
24-Jul-2024
02-Apr-2025
10-Oct-2024
19-Feb-2025
10-Apr-2024
16-Feb-2024
16-Feb-2024
06-Jun-2024
08-Aug-2024
22-Aug-2024
01-May-2024
10-Apr-2024
27-Nov-2024
06-Mar-2024
08-Aug-2024
26-Mar-2025
01-May-2024
04-Apr-2024
10-Oct-2024
11-Dec-2024
27-Nov-2024
01-May-2024
06-Mar-2024
11-Dec-2024
06-Jun-2024
10-Oct-2024
10-Oct-2024
16-Feb-2024
23-May-2024
24-Jul-2024
24-Jul-2024
26-Feb-2025
01-May-2024
06-Mar-2024
18-Dec-2024
04-Apr-2024
07-Mar-2024
12-Feb-2025
12-Mar-2025
29-Jan-2025
06-Jun-2024
07-Mar-2024
07-Mar-2024
23-May-2024
23-May-2024
01-Jan-2025
10-Oct-2024
10-Oct-2024
24-Jul-2024
10-Apr-2024
06-Jun-2024
18-Dec-2024
26-Feb-2025
10-Oct-2024
05-Mar-2025
05-Mar-2025
22-Jan-2025
12-Mar-2025
12-Mar-2025
12-Feb-2025
05-Feb-2025
05-Mar-2025
11-Dec-2024
05-Feb-2025
11-Dec-2024
27-Nov-2024
10-Oct-2024
26-Mar-2025
26-Mar-2025
27-Nov-2024
18-Dec-2024
12-Feb-2025
20-Nov-2024
22-Jan-2025
08-Jan-2025
10-Oct-2024
19-Feb-2025
27-Nov-2024
05-Feb-2025
08-Jan-2025
26-Feb-2025
12-Mar-2025
12-Feb-2025
18-Dec-2024
27-Nov-2024
02-Apr-2025
12-Mar-2025

## 2025-03-31 NOTE — [facility] INPATIENT PSYCHIATRY PROGRESS NOTE - NSTXIMPULSINTERMD_PSY_ALL_CORE
Work with interdisciplinary team to enhance coping skills, optimize meds. 

## 2025-03-31 NOTE — [facility] INPATIENT PSYCHIATRY PROGRESS NOTE - NSBHPROGSUIC_PSY_A_CORE
no
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
## 2025-03-31 NOTE — BH INPATIENT PSYCHIATRY DISCHARGE NOTE - ATTENDING DISCHARGE PHYSICAL EXAMINATION:
I have personally seen and evaluated patient prior to discharge. Chart reviewed and discharge plan discussed with the psych NP as follows. Pt prior to discharge was calm, cooperative, friendly and smiling. Patient has been attending groups with active participation.  Pt interacting well with others. No recent behavioral problems and no episodes of aggressive or dangerous behavior. No problems with sleep, appetite or energy level. Denied any active and current manic, hypomanic, depressive, psychotic or anxiety symptoms. Denied any current SI/HI/AH/VH/PI. No overt delusions.  Patient is more organized and commits to safety and to ongoing outpatient treatment.  Pt asks relevant questions about his discharge plan and about the medication regimen. Pt articulate a plan for living life after discharge. Medication risks/benefits/side effects were discussed with the patient.  Patient expressed understanding and agreed with the treatment plan. Further, instructed the patient to seek help immediately by dialing "911" or by going to the nearest ER if symptoms worsen.  Pt does not pose an acute elevated risk of imminent danger to harm to self or others. Does not require further inpatient psychiatric admission at this time. Psychiatrically cleared for discharge.  Pt urged to avoid using any alcohol or street drugs, particularly marijuana & K2, cocaine and methamphetamine (crystal meth) once discharged.  Safety plan filled out by patient and submitted into chart.

## 2025-03-31 NOTE — BH INPATIENT PSYCHIATRY PROGRESS NOTE - NSTXCONDUCGOAL_PSY_ALL_CORE
Will develop more adaptive coping strategies to manage stress
Other...
Other...
Will develop more adaptive coping strategies to manage stress
Other...
Will comply with unit rules
Will develop more adaptive coping strategies to manage stress
Other...
Will develop more adaptive coping strategies to manage stress
Will describe and accept responsibility for 1 problem behavior
Will develop more adaptive coping strategies to manage stress
Other...
Will develop more adaptive coping strategies to manage stress
Will comply with unit rules
Will develop more adaptive coping strategies to manage stress
Other...
Will develop more adaptive coping strategies to manage stress
Other...
Will develop more adaptive coping strategies to manage stress
Other...
Will develop more adaptive coping strategies to manage stress
Other...
Will develop more adaptive coping strategies to manage stress
Other...
Other...
Will comply with unit rules
Other...
Will develop more adaptive coping strategies to manage stress
Will describe and accept responsibility for 1 problem behavior
Will develop more adaptive coping strategies to manage stress
Other...
Will develop more adaptive coping strategies to manage stress
Will comply with unit rules
Other...
Will develop more adaptive coping strategies to manage stress
Other...
Will develop more adaptive coping strategies to manage stress
Other...
Will develop more adaptive coping strategies to manage stress
Will develop more adaptive coping strategies to manage stress
Will comply with unit rules
Will develop more adaptive coping strategies to manage stress
Other...
Other...
Will develop more adaptive coping strategies to manage stress
Other...
Other...
Will develop more adaptive coping strategies to manage stress
Other...
Other...
Will develop more adaptive coping strategies to manage stress
Other...
Will develop more adaptive coping strategies to manage stress
Other...
Other...
Will develop more adaptive coping strategies to manage stress
Other...
Will develop more adaptive coping strategies to manage stress
Will develop more adaptive coping strategies to manage stress
Other...
Will develop more adaptive coping strategies to manage stress
Other...
Will comply with unit rules
Will develop more adaptive coping strategies to manage stress
Other...
Will develop more adaptive coping strategies to manage stress
Other...
Will develop more adaptive coping strategies to manage stress
Other...
Will comply with unit rules
Will develop more adaptive coping strategies to manage stress
Other...
Will develop more adaptive coping strategies to manage stress
Other...
Will develop more adaptive coping strategies to manage stress
Other...
Will comply with unit rules
Will develop more adaptive coping strategies to manage stress
Will comply with unit rules
Will develop more adaptive coping strategies to manage stress
Other...
Will develop more adaptive coping strategies to manage stress
Other...
Other...
Will develop more adaptive coping strategies to manage stress
Will describe and accept responsibility for 1 problem behavior
Will develop more adaptive coping strategies to manage stress
Other...
Will develop more adaptive coping strategies to manage stress
Will develop more adaptive coping strategies to manage stress
Other...
Will develop more adaptive coping strategies to manage stress
Other...
Will develop more adaptive coping strategies to manage stress
Other...
Other...
Will develop more adaptive coping strategies to manage stress
Other...
Will comply with unit rules
Other...
Other...
Will describe and accept responsibility for 1 problem behavior
Other...
Will develop more adaptive coping strategies to manage stress
Other...
Will comply with unit rules
Will comply with unit rules
Will describe and accept responsibility for 1 problem behavior
Will develop more adaptive coping strategies to manage stress
Other...

## 2025-03-31 NOTE — BH INPATIENT PSYCHIATRY PROGRESS NOTE - NSBHMSERECMEM_PSY_A_CORE
Normal

## 2025-03-31 NOTE — BH INPATIENT PSYCHIATRY DISCHARGE NOTE - NSBHFUPINTERVALHXFT_PSY_A_CORE
... Patient is followed up for NDD. Chart, medications and labs reviewed. Patient is discussed during morning brief, no overnight events, has been in good behavioral control.   Patient was seen and is evaluated on the unit. She continues to exhibit childlike behaviors w/o any behavioral disturbances. On interview presents with bright affect, noted w/ some anxiety but states that she is looking forward to being discharged today to group home. She has been compliant with standing medications, denies SE. Denies mood disturbances. No acute medical concerns, VSS. Pt being discharged today to [facility] group home, to be picked up by group home staff.

## 2025-03-31 NOTE — BH INPATIENT PSYCHIATRY PROGRESS NOTE - NSTXVIOLNTGOAL_PSY_ALL_CORE
Will be able to express understanding of at least one trigger to their aggressive behavior
Will decrease the number/duration/intensity of angry/aggressive outbursts
Will decrease the number/duration/intensity of angry/aggressive outbursts
Will identify 2 situations that cause an aggressive response
Will identify 2 situations that cause an aggressive response
Will decrease the number/duration/intensity of angry/aggressive outbursts
Will identify 2 situations that cause an aggressive response
Will identify 2 situations that cause an aggressive response
Will be able to express understanding of at least one trigger to their aggressive behavior
Will be able to express understanding of at least one trigger to their aggressive behavior
Will decrease the number/duration/intensity of angry/aggressive outbursts
Will identify 2 situations that cause an aggressive response
Will be able to express understanding of at least one trigger to their aggressive behavior
Will be able to express understanding of at least one trigger to their aggressive behavior
Will decrease the number/duration/intensity of angry/aggressive outbursts
Will be able to express understanding of at least one trigger to their aggressive behavior
Will decrease the number/duration/intensity of angry/aggressive outbursts
Will decrease the number/duration/intensity of angry/aggressive outbursts
Will be able to express understanding of at least one trigger to their aggressive behavior
Will identify 2 situations that cause an aggressive response
Will be able to express understanding of at least one trigger to their aggressive behavior
Will identify 2 situations that cause an aggressive response
Will be able to express understanding of at least one trigger to their aggressive behavior
Will decrease the number/duration/intensity of angry/aggressive outbursts
Will be able to express understanding of at least one trigger to their aggressive behavior
Will decrease the number/duration/intensity of angry/aggressive outbursts
Will identify 2 situations that cause an aggressive response
Will be able to express understanding of at least one trigger to their aggressive behavior
Will identify 2 situations that cause an aggressive response
Will be able to express understanding of at least one trigger to their aggressive behavior
Will identify 2 situations that cause an aggressive response
Will be able to express understanding of at least one trigger to their aggressive behavior
Will decrease the number/duration/intensity of angry/aggressive outbursts
Will be able to express understanding of at least one trigger to their aggressive behavior
Will be able to walk away from an interpersonal conflict and use a self-soothing skill
Will be able to express understanding of at least one trigger to their aggressive behavior
Will identify 2 situations that cause an aggressive response
Will be able to express understanding of at least one trigger to their aggressive behavior
Will identify 2 situations that cause an aggressive response
Will be able to express understanding of at least one trigger to their aggressive behavior
Will be able to walk away from an interpersonal conflict and use a self-soothing skill
Will be able to express understanding of at least one trigger to their aggressive behavior
Will be able to express understanding of at least one trigger to their aggressive behavior
Will be able to walk away from an interpersonal conflict and use a self-soothing skill
Will identify 2 situations that cause an aggressive response
Will be able to express understanding of at least one trigger to their aggressive behavior
Will identify 2 situations that cause an aggressive response
Will identify 2 situations that cause an aggressive response
Will be able to express understanding of at least one trigger to their aggressive behavior
Will decrease the number/duration/intensity of angry/aggressive outbursts
Will be able to express understanding of at least one trigger to their aggressive behavior
Will decrease the number/duration/intensity of angry/aggressive outbursts
Will decrease the number/duration/intensity of angry/aggressive outbursts
Will be able to express understanding of at least one trigger to their aggressive behavior
Will be able to express understanding of at least one trigger to their aggressive behavior
Will be able to walk away from an interpersonal conflict and use a self-soothing skill
Will be able to express understanding of at least one trigger to their aggressive behavior
Will be able to express understanding of at least one trigger to their aggressive behavior
Will be able to walk away from an interpersonal conflict and use a self-soothing skill
Will identify 2 situations that cause an aggressive response
Will be able to express understanding of at least one trigger to their aggressive behavior
Will decrease the number/duration/intensity of angry/aggressive outbursts
Will be able to express understanding of at least one trigger to their aggressive behavior
Will be able to walk away from an interpersonal conflict and use a self-soothing skill
Will be able to express understanding of at least one trigger to their aggressive behavior
Will be able to express understanding of at least one trigger to their aggressive behavior
Will decrease the number/duration/intensity of angry/aggressive outbursts
Will be able to express understanding of at least one trigger to their aggressive behavior
Will decrease the number/duration/intensity of angry/aggressive outbursts
Will be able to express understanding of at least one trigger to their aggressive behavior
Will decrease the number/duration/intensity of angry/aggressive outbursts
Will decrease the number/duration/intensity of angry/aggressive outbursts
Will be able to express understanding of at least one trigger to their aggressive behavior
Will decrease the number/duration/intensity of angry/aggressive outbursts
Will demonstrate 2 problem solving strategies to decrease aggressive behavior
Will be able to express understanding of at least one trigger to their aggressive behavior
Will decrease the number/duration/intensity of angry/aggressive outbursts
Will be able to express understanding of at least one trigger to their aggressive behavior
Will identify 2 situations that cause an aggressive response
Will be able to express understanding of at least one trigger to their aggressive behavior
Will decrease the number/duration/intensity of angry/aggressive outbursts
Will be able to express understanding of at least one trigger to their aggressive behavior
Will be able to express understanding of at least one trigger to their aggressive behavior
Will decrease the number/duration/intensity of angry/aggressive outbursts
Will decrease the number/duration/intensity of angry/aggressive outbursts
Will be able to express understanding of at least one trigger to their aggressive behavior
Will demonstrate 2 problem solving strategies to decrease aggressive behavior
Will be able to express understanding of at least one trigger to their aggressive behavior
Will decrease the number/duration/intensity of angry/aggressive outbursts
Will decrease the number/duration/intensity of angry/aggressive outbursts
Will be able to express understanding of at least one trigger to their aggressive behavior
Will decrease the number/duration/intensity of angry/aggressive outbursts
Will be able to express understanding of at least one trigger to their aggressive behavior
Will identify 2 situations that cause an aggressive response
Will be able to express understanding of at least one trigger to their aggressive behavior
Will identify 2 situations that cause an aggressive response
Will identify 2 situations that cause an aggressive response
Will be able to express understanding of at least one trigger to their aggressive behavior
Will be able to walk away from an interpersonal conflict and use a self-soothing skill
Will be able to express understanding of at least one trigger to their aggressive behavior
Will identify 2 situations that cause an aggressive response
Will be able to express understanding of at least one trigger to their aggressive behavior
Will be able to walk away from an interpersonal conflict and use a self-soothing skill
Will decrease the number/duration/intensity of angry/aggressive outbursts
Will decrease the number/duration/intensity of angry/aggressive outbursts
Will be able to express understanding of at least one trigger to their aggressive behavior
Will be able to walk away from an interpersonal conflict and use a self-soothing skill
Will be able to walk away from an interpersonal conflict and use a self-soothing skill
Will decrease the number/duration/intensity of angry/aggressive outbursts
Will identify 2 situations that cause an aggressive response
Will demonstrate 2 problem solving strategies to decrease aggressive behavior
Will decrease the number/duration/intensity of angry/aggressive outbursts
Will identify 2 situations that cause an aggressive response
Will be able to walk away from an interpersonal conflict and use a self-soothing skill
Will demonstrate 2 problem solving strategies to decrease aggressive behavior
Will be able to walk away from an interpersonal conflict and use a self-soothing skill
Will decrease the number/duration/intensity of angry/aggressive outbursts
Will identify 2 situations that cause an aggressive response
Will be able to walk away from an interpersonal conflict and use a self-soothing skill
Will decrease the number/duration/intensity of angry/aggressive outbursts
Will be able to express understanding of at least one trigger to their aggressive behavior
Will identify 2 situations that cause an aggressive response
Will identify 2 situations that cause an aggressive response
Will be able to express understanding of at least one trigger to their aggressive behavior
Will identify 2 situations that cause an aggressive response
Will identify 2 situations that cause an aggressive response
Will be able to express understanding of at least one trigger to their aggressive behavior
Will demonstrate 2 problem solving strategies to decrease aggressive behavior
Will decrease the number/duration/intensity of angry/aggressive outbursts
Will identify 2 situations that cause an aggressive response
Will be able to express understanding of at least one trigger to their aggressive behavior
Will decrease the number/duration/intensity of angry/aggressive outbursts
Will decrease the number/duration/intensity of angry/aggressive outbursts

## 2025-03-31 NOTE — BH INPATIENT PSYCHIATRY PROGRESS NOTE - NSTXPROBIMPULS_PSY_ALL_CORE
IMPULSIVITY/AGITATION

## 2025-03-31 NOTE — BH INPATIENT PSYCHIATRY PROGRESS NOTE - NSTXSKINPROGRES_PSY_ALL_CORE
Improving
No Change
Improving
Met - goal discontinued
Improving
No Change
No Change
Improving
Met - goal discontinued
Improving
Met - goal discontinued
Improving
No Change
Improving
No Change
Improving
Met - goal discontinued
No Change
Improving
Improving
No Change
No Change
Improving
No Change
Improving

## 2025-03-31 NOTE — BH INPATIENT PSYCHIATRY PROGRESS NOTE - NSTREATMENTCERTY_PSY_ALL_CORE

## 2025-03-31 NOTE — BH INPATIENT PSYCHIATRY PROGRESS NOTE - NSBHMSEGAIT_PSY_A_CORE
Normal gait / station

## 2025-03-31 NOTE — BH INPATIENT PSYCHIATRY PROGRESS NOTE - NSTXDCHOUSGOAL_PSY_ALL_CORE
Will agree to participate in housing process
Will meet with care coordinator and accept services
Will agree to participate in housing process
Will meet with care coordinator and accept services
Will agree to participate in housing process
Will meet with care coordinator and accept services
Will agree to participate in housing process
Will meet with care coordinator and accept services
Will agree to participate in housing process
Will meet with care coordinator and accept services
Will agree to participate in housing process
Will meet with care coordinator and accept services
Will agree to participate in housing process
Will meet with care coordinator and accept services
Will agree to participate in housing process
Will meet with care coordinator and accept services
Will agree to participate in housing process
Will meet with care coordinator and accept services
Will agree to participate in housing process
Will meet with care coordinator and accept services
Will meet with care coordinator and accept services
Will agree to participate in housing process
Will meet with care coordinator and accept services
Will agree to participate in housing process
Will meet with care coordinator and accept services
Will agree to participate in housing process
Will meet with care coordinator and accept services
Will agree to participate in housing process
Will meet with care coordinator and accept services
Will agree to participate in housing process
Will agree to participate in housing process
Will meet with care coordinator and accept services
Will agree to participate in housing process
Will meet with care coordinator and accept services
Will agree to participate in housing process
Will meet with care coordinator and accept services
Will agree to participate in housing process
Will meet with care coordinator and accept services
Will agree to participate in housing process
Will meet with care coordinator and accept services
Will agree to participate in housing process

## 2025-03-31 NOTE — BH INPATIENT PSYCHIATRY PROGRESS NOTE - NSTXVIOLNTDATEEST_PSY_ALL_CORE
05-Mar-2025
28-Mar-2024
12-Feb-2024
21-Feb-2024
28-Mar-2024
09-Feb-2024
21-Feb-2024
28-Mar-2024
12-Feb-2024
28-Mar-2024
28-Mar-2024
19-Feb-2025
28-Mar-2024
28-Mar-2024
12-Feb-2024
21-Feb-2024
28-Mar-2024
28-Mar-2024
15-Nabor-2025
28-Mar-2024
04-Dec-2024
28-Mar-2024
28-Mar-2024
21-Feb-2024
28-Mar-2024
09-Feb-2024
19-Mar-2025
19-Mar-2025
21-Feb-2024
21-Feb-2024
22-Jan-2025
28-Mar-2024
09-Feb-2024
28-Mar-2024
12-Feb-2024
26-Mar-2025
28-Mar-2024
28-Mar-2024
21-Feb-2024
28-Mar-2024
19-Feb-2025
28-Mar-2024
21-Feb-2024
28-Mar-2024
09-Feb-2024
19-Mar-2025
20-Mar-2024
28-Mar-2024
05-Mar-2025
28-Mar-2024
25-Dec-2024
28-Mar-2024
28-Mar-2024
09-Feb-2024
12-Feb-2024
28-Mar-2024
05-Mar-2025
20-Mar-2024
25-Dec-2024
25-Dec-2024
12-Feb-2024
22-Jan-2025
12-Feb-2024
09-Feb-2024
28-Mar-2024
09-Feb-2024
19-Feb-2025
22-Jan-2025
28-Mar-2024
28-Mar-2024
25-Dec-2024
28-Mar-2024
19-Mar-2025
20-Mar-2024
28-Mar-2024
28-Mar-2024
12-Feb-2024
22-Jan-2025
25-Dec-2024
28-Mar-2024
04-Dec-2024
28-Mar-2024
12-Feb-2024
21-Feb-2024
21-Feb-2024
26-Mar-2025
28-Mar-2024
12-Feb-2024
21-Feb-2024
28-Mar-2024
29-Jan-2025
05-Feb-2025
09-Feb-2024
21-Feb-2024
26-Mar-2025
28-Mar-2024
04-Dec-2024
12-Feb-2024
21-Feb-2024
28-Mar-2024
26-Mar-2025
28-Mar-2024
12-Feb-2024
20-Mar-2024
28-Mar-2024
28-Mar-2024
21-Feb-2024
28-Mar-2024
09-Feb-2024
19-Mar-2025
21-Feb-2024
25-Dec-2024
28-Mar-2024
12-Feb-2024
21-Feb-2024
28-Mar-2024
21-Feb-2024
28-Mar-2024
21-Feb-2024
21-Feb-2024
28-Mar-2024
29-Jan-2025
20-Mar-2024
28-Mar-2024
21-Feb-2024
28-Mar-2024
12-Feb-2024
19-Feb-2025
19-Feb-2025
28-Mar-2024
19-Feb-2025
28-Mar-2024
05-Mar-2025
09-Feb-2024
15-Nabor-2025
28-Mar-2024
05-Mar-2025
28-Mar-2024
05-Feb-2025
28-Mar-2024
19-Feb-2025
28-Mar-2024
12-Feb-2025
28-Mar-2024
04-Dec-2024
12-Feb-2025
22-Jan-2025
28-Mar-2024
05-Feb-2025
12-Feb-2024
25-Dec-2024
29-Jan-2025
12-Feb-2025
15-Nabor-2025
12-Feb-2024
25-Dec-2024
29-Jan-2025
15-Nabor-2025
05-Mar-2025
09-Feb-2024
12-Feb-2024
12-Feb-2024
19-Feb-2025
05-Mar-2025
12-Feb-2024
12-Feb-2024
25-Dec-2024
12-Feb-2025
19-Feb-2025
12-Feb-2024
25-Dec-2024
19-Feb-2025
25-Dec-2024
29-Jan-2025
12-Feb-2024
05-Mar-2025
12-Feb-2024
25-Dec-2024
28-Mar-2024
04-Dec-2024
12-Feb-2024
05-Feb-2025
25-Dec-2024
05-Mar-2025
05-Mar-2025
05-Feb-2025

## 2025-03-31 NOTE — BH INPATIENT PSYCHIATRY PROGRESS NOTE - NSBHMETABOLIC_PSY_ALL_CORE_FT
BMI: BMI (kg/m2): 29.1 (01-25-25 @ 16:22)  HbA1c: A1C with Estimated Average Glucose Result: 6.1 % (10-18-24 @ 08:00)    Glucose: POCT Blood Glucose.: 89 mg/dL (01-12-25 @ 20:10)    BP: --Vital Signs Last 24 Hrs  T(C): --  T(F): --  HR: --  BP: --  BP(mean): --  RR: 16 (03-31-25 @ 07:57) (16 - 16)  SpO2: --      Lipid Panel: Date/Time: 10-18-24 @ 08:00  Cholesterol, Serum: 106  LDL Cholesterol Calculated: 53  HDL Cholesterol, Serum: 42  Total Cholesterol/HDL Ration Measurement: --  Triglycerides, Serum: 53

## 2025-03-31 NOTE — BH INPATIENT PSYCHIATRY PROGRESS NOTE - NSBHMSEKNOW_PSY_A_CORE
Impaired
Normal
Impaired
Normal
Impaired
Normal
Impaired

## 2025-03-31 NOTE — BH INPATIENT PSYCHIATRY PROGRESS NOTE - NSTXIMPULSGOAL_PSY_ALL_CORE
Will be able to demonstrate the ability to pause before acting out negatively
Will identify 1 thought that precedes an impulsive acts
Will be able to demonstrate the ability to pause before acting out negatively
Will identify 1 thought that precedes an impulsive acts
Will report using a relaxation skill daily to delay screaming and touching others when upset
Will be able to demonstrate the ability to pause before acting out negatively
Will report using a relaxation skill daily to delay screaming and touching others when upset
Will be able to demonstrate the ability to pause before acting out negatively
Will report using a relaxation skill daily to delay screaming and touching others when upset
Will be able to demonstrate the ability to pause before acting out negatively
Will report using a relaxation skill daily to delay screaming and touching others when upset
Will be able to demonstrate the ability to pause before acting out negatively
Will identify 1 thought that precedes an impulsive acts
Will be able to demonstrate the ability to pause before acting out negatively
Will be able to recognize 2+ triggers
Will identify 1 thought that precedes an impulsive acts
Will be able to demonstrate the ability to pause before acting out negatively
Will be able to recognize 2+ triggers
Will be able to demonstrate the ability to pause before acting out negatively
Will identify 1 thought that precedes an impulsive acts
Will be able to demonstrate the ability to pause before acting out negatively
Will identify 1 behavior to cope with impulsive urges
Will be able to demonstrate the ability to pause before acting out negatively
Will be able to demonstrate the ability to pause before acting out negatively
Will be able to recognize 2+ triggers
Will identify 1 behavior to cope with impulsive urges
Will identify 1 thought that precedes an impulsive acts
Will be able to demonstrate the ability to pause before acting out negatively
Will identify 1 thought that precedes an impulsive acts
Will be able to demonstrate the ability to pause before acting out negatively
Will be able to recognize 2+ triggers
Will be able to demonstrate the ability to pause before acting out negatively
Will identify 1 thought that precedes an impulsive acts
Will identify 1 thought that precedes an impulsive acts
Will be able to demonstrate the ability to pause before acting out negatively
Will identify 1 thought that precedes an impulsive acts
Will be able to demonstrate the ability to pause before acting out negatively
Will be able to recognize 2+ triggers
Will be able to recognize 2+ triggers
Will be able to demonstrate the ability to pause before acting out negatively
Will be able to demonstrate the ability to pause before acting out negatively
Will identify 1 thought that precedes an impulsive acts
Will identify 1 thought that precedes an impulsive acts
Will be able to demonstrate the ability to pause before acting out negatively
Will identify 1 thought that precedes an impulsive acts
Will be able to demonstrate the ability to pause before acting out negatively
Will identify 1 thought that precedes an impulsive acts
Will report using a relaxation skill daily to delay screaming and touching others when upset
Will be able to demonstrate the ability to pause before acting out negatively
Will identify 1 thought that precedes an impulsive acts
Will be able to demonstrate the ability to pause before acting out negatively
Will identify 1 thought that precedes an impulsive acts
Will be able to demonstrate the ability to pause before acting out negatively
Will report using a relaxation skill daily to delay screaming and touching others when upset
Will be able to demonstrate the ability to pause before acting out negatively
Will identify 1 thought that precedes an impulsive acts
Will be able to demonstrate the ability to pause before acting out negatively
Will identify 1 thought that precedes an impulsive acts
Will be able to demonstrate the ability to pause before acting out negatively
Will identify 1 thought that precedes an impulsive acts
Will be able to demonstrate the ability to pause before acting out negatively
Will identify 1 behavior to cope with impulsive urges
Will be able to demonstrate the ability to pause before acting out negatively
Will be able to recognize 2+ triggers
Will be able to demonstrate the ability to pause before acting out negatively
Will identify 1 behavior to cope with impulsive urges
Will be able to demonstrate the ability to pause before acting out negatively
Will identify 1 thought that precedes an impulsive acts
Will be able to demonstrate the ability to pause before acting out negatively
Will be able to recognize 2+ triggers
Will identify 1 behavior to cope with impulsive urges
Will be able to demonstrate the ability to pause before acting out negatively
Will identify 1 thought that precedes an impulsive acts
Will be able to demonstrate the ability to pause before acting out negatively
Will identify 1 thought that precedes an impulsive acts
Will report using a relaxation skill daily to delay screaming and touching others when upset
Will identify 1 thought that precedes an impulsive acts
Will be able to demonstrate the ability to pause before acting out negatively
Will be able to demonstrate the ability to pause before acting out negatively
Will report using a relaxation skill daily to delay screaming and touching others when upset
Will be able to demonstrate the ability to pause before acting out negatively
Will be able to demonstrate the ability to pause before acting out negatively
Will report using a relaxation skill daily to delay screaming and touching others when upset
Will be able to demonstrate the ability to pause before acting out negatively
Will identify 1 thought that precedes an impulsive acts
Will be able to demonstrate the ability to pause before acting out negatively
Will be able to demonstrate the ability to pause before acting out negatively
Will be able to recognize 2+ triggers
Will be able to demonstrate the ability to pause before acting out negatively
Will identify 1 thought that precedes an impulsive acts
Will identify 1 thought that precedes an impulsive acts
Will be able to demonstrate the ability to pause before acting out negatively
Will identify 1 thought that precedes an impulsive acts
Will be able to recognize 2+ triggers
Will identify 1 thought that precedes an impulsive acts
Will report using a relaxation skill daily to delay screaming and touching others when upset
Will identify 1 thought that precedes an impulsive acts
Will be able to demonstrate the ability to pause before acting out negatively
Will identify 1 thought that precedes an impulsive acts
Will identify 1 thought that precedes an impulsive acts
Will be able to demonstrate the ability to pause before acting out negatively
Will identify 1 thought that precedes an impulsive acts

## 2025-03-31 NOTE — BH INPATIENT PSYCHIATRY PROGRESS NOTE - NSTXSKINDATETRGT_PSY_ALL_CORE
26-Mar-2025
05-Feb-2025
12-Feb-2025
12-Feb-2025
26-Mar-2025
12-Feb-2025
12-Feb-2025
05-Feb-2025
01-Jan-2025
05-Feb-2025
12-Feb-2025
08-Jan-2025
08-Jan-2025
12-Feb-2025
12-Feb-2025
26-Mar-2025
12-Feb-2025
29-Jan-2025
02-Apr-2025
12-Feb-2025
29-Jan-2025
12-Feb-2025
22-Jan-2025
08-Jan-2025
12-Feb-2025
29-Jan-2025
08-Jan-2025
01-Jan-2025
08-Jan-2025
12-Feb-2025
02-Apr-2025
26-Mar-2025
08-Jan-2025
08-Jan-2025
12-Feb-2025
12-Feb-2025
02-Apr-2025
12-Feb-2025
05-Feb-2025
22-Jan-2025
08-Jan-2025
12-Feb-2025
01-Jan-2025
12-Feb-2025
12-Feb-2025
22-Jan-2025
12-Feb-2025
01-Jan-2025
12-Feb-2025
05-Feb-2025
08-Jan-2025
02-Apr-2025
12-Feb-2025
29-Jan-2025
22-Jan-2025
26-Mar-2025
29-Jan-2025

## 2025-03-31 NOTE — BH INPATIENT PSYCHIATRY PROGRESS NOTE - NSBHMSEHYG_PSY_A_CORE
Good
Other
Good
Other
Good
Other
Good

## 2025-03-31 NOTE — BH INPATIENT PSYCHIATRY DISCHARGE NOTE - NSDCMRMEDTOKEN_GEN_ALL_CORE_FT
hydrOXYzine hydrochloride 50 mg oral tablet: 2 tab(s) orally once a day (at bedtime)  Invega Sustenna 234 mg/1.5 mL intramuscular suspension, extended release: 234 milligram(s) intramuscularly every 4 weeks Please check insurance coverage only and email or page with result.  Thanks!  OLANZapine 15 mg oral tablet: 1 tab(s) orally once a day (at bedtime)  traZODone 100 mg oral tablet: 1 tab(s) orally once a day (at bedtime)   hydrOXYzine hydrochloride 50 mg oral tablet: 2 tab(s) orally once a day (at bedtime)  OLANZapine 15 mg oral tablet: 1 tab(s) orally once a day (at bedtime)  traZODone 100 mg oral tablet: 1 tab(s) orally once a day (at bedtime)

## 2025-04-01 VITALS — RESPIRATION RATE: 18 BRPM | TEMPERATURE: 98 F

## 2025-04-01 NOTE — BH DISCHARGE NOTE NURSING/SOCIAL WORK/PSYCH REHAB - NSDCADDINFO1FT_PSY_ALL_CORE
Please be prepared to be on your Zoom appointment to complete this PPI (Psychiatry Diagnostic Interview) Appointment with Ms. Courtney Vallejo. A discharge summary has already been sent to the [facility] nursing department as well as [facility]  [facility] and [facility].

## 2025-04-01 NOTE — BH CHART NOTE - NSBHPTASSESSDT_PSY_A_CORE
04-Mar-2024 13:38
12-Oct-2024 15:04
16-Apr-2024 11:50
27-Apr-2024 22:13
31-May-2024 18:32
01-Apr-2025 09:50
08-May-2024 02:10
11-Jan-2025 10:00
15-Mar-2024 15:45
15-Sep-2024 11:19
28-Mar-2024 12:10
05-Apr-2024 11:54
09-Apr-2024 11:00
11-Mar-2024 08:27
11-Oct-2024 21:30
12-Feb-2024 19:33

## 2025-04-01 NOTE — BH DISCHARGE NOTE NURSING/SOCIAL WORK/PSYCH REHAB - NSDCPRGOAL_PSY_ALL_CORE
Pt made progress towards her discharge. Pt has maintained good behavioral control towards her discharge. Pt has identified her coping skills such as eating snack, listening to music, taking shower, dancing, and word puzzle book to manage symptom. Pt has demonstrated compliant with medication regimen and dosage. Pt has verbally agreed to comply with medication post-discharge. Pt currently denies SI, HI, AH, and VH. During this hospitalization, pt attended few music groups. During the group session, pt was unable to tolerate group structure, participated minimally. Pt was visible on the unit, interacts well with staff, around nursing station at time. Pt showed  significant improvement on her ADLs. Pt has participated in her safety plan and writer reviewed safety plan with her prior to her discharge.

## 2025-04-01 NOTE — BH CHART NOTE - NSNOTETYPE_PSY_ALL_CORE
Event Note
Psychology Progress Note
Psychology Progress Note
Event Note
Psychology Progress Note
Psychology Progress Note
Event Note
Psychology Progress Note

## 2025-04-01 NOTE — BH DISCHARGE NOTE NURSING/SOCIAL WORK/PSYCH REHAB - PATIENT PORTAL LINK FT
You can access the FollowMyHealth Patient Portal offered by [facility] by registering at the following website: [email or web address]. By joining [facility]’s FollowMyHealth portal, you will also be able to view your health information using other applications (apps) compatible with our system.

## 2025-04-01 NOTE — BH DISCHARGE NOTE NURSING/SOCIAL WORK/PSYCH REHAB - NSDCADDINFO2FT_PSY_ALL_CORE
Please bring your insurance and identification card to your appointment. Your psychiatry team has your discharge summary on filed.

## 2025-04-01 NOTE — BH DISCHARGE NOTE NURSING/SOCIAL WORK/PSYCH REHAB - NSCDUDCCRISIS_PSY_A_CORE
[facility]  1 (926) [facility]-WELL ([phone])  Text "WELL" to 72074  Website: www."[employer]/.[employer] [facility] 1 (860) 621-ZZUV (9850) Website: www.safehorizon.org/.National Suicide Prevention Lifeline 3 (859) 513-1687/.  [facility]  1 (985) LIFENET (578-9289)/.  [facility]  [address] [place] [phone]   Hours:  Monday through Friday from 9 AM to 3 PM/988 Suicide and Crisis Lifeline

## 2025-04-01 NOTE — BH CHART NOTE - NSPSYPRGNOTEFT_PSY_ALL_CORE
The writer met with the patient for a brief oral feedback session regarding her assessment. The results of the assessment, including relative strengths and weaknesses, were presented, and the recommendations generated based on the results were communicated to the patient."
Writer attempted to meet with Pt for an individual supportive therapy session, however Pt was observed to be sleeping on both attempts. Writer will meet with Pt again later in the week to discuss other possible food items that can be included as reinforcement on her behavior plan to encourage medication and treatment adherence. No safety concerns noted. 
Writer briefly checked in with pt regarding her readiness for continuing testing scheduled for next week. Pt presented as energetic and motivated, and responded appropriately. 
Writer met with Pt briefly in her room to provide psychoeducation and encouragement around the importance and benefit of medication adherence, as she has needed more prompting/support with doing this recently. Pt spoke about her desire to live in her own apartment, mentioning that she had "a flight at 3 o'clock" [place] "with [place]." Writer also encouraged Pt to continue following behavior plan to earn reinforcer. No safety concerns noted.
Writer attempted to meet with Pt to provide support after she became agitated following an interaction with a peer. Pt was observed sitting in game group room and declined to speak at that time. No safety concerns noted. 
The patient reached out to the writer for a conversation regarding hearing voices from the ceiling of the unit. However, due to the patient's incoherent thoughts and slurred speech, it was challenging to comprehend her statements and gather specific details. The patient exhibited an elevated mood with inexplicable laughing at times. 
Writer met with Pt to wish her luck as she moves into her new group home, highlighting all of her growth over the last year, reminding her of the new support she will have, and normalizing any anxiety/overwhelming feelings that come up during this transition. Pt thanked Writer and expressed her excitement; no safety concerns noted.

## 2025-04-01 NOTE — BH DISCHARGE NOTE NURSING/SOCIAL WORK/PSYCH REHAB - DISCHARGE INSTRUCTIONS AFTERCARE APPOINTMENTS
In order to check the location, date, or time of your aftercare appointment, please refer to your Discharge Instructions Document given to you upon leaving the hospital.  If you have lost the instructions please call [phone]
